# Patient Record
Sex: MALE | Race: WHITE | Employment: OTHER | URBAN - METROPOLITAN AREA
[De-identification: names, ages, dates, MRNs, and addresses within clinical notes are randomized per-mention and may not be internally consistent; named-entity substitution may affect disease eponyms.]

---

## 2017-01-06 ENCOUNTER — OFFICE VISIT (OUTPATIENT)
Dept: FAMILY MEDICINE CLINIC | Age: 67
End: 2017-01-06

## 2017-01-06 VITALS
HEIGHT: 67 IN | TEMPERATURE: 97 F | WEIGHT: 199.5 LBS | SYSTOLIC BLOOD PRESSURE: 122 MMHG | DIASTOLIC BLOOD PRESSURE: 72 MMHG | HEART RATE: 72 BPM | BODY MASS INDEX: 31.31 KG/M2 | RESPIRATION RATE: 20 BRPM

## 2017-01-06 DIAGNOSIS — M47.816 ARTHRITIS OF LUMBAR SPINE: ICD-10-CM

## 2017-01-06 DIAGNOSIS — N18.30 CKD (CHRONIC KIDNEY DISEASE) STAGE 3, GFR 30-59 ML/MIN (HCC): ICD-10-CM

## 2017-01-06 DIAGNOSIS — E78.1 HYPERTRIGLYCERIDEMIA: ICD-10-CM

## 2017-01-06 DIAGNOSIS — M10.9 GOUT, UNSPECIFIED CAUSE, UNSPECIFIED CHRONICITY, UNSPECIFIED SITE: ICD-10-CM

## 2017-01-06 DIAGNOSIS — E78.2 MIXED HYPERLIPIDEMIA: ICD-10-CM

## 2017-01-06 DIAGNOSIS — M54.50 ACUTE BILATERAL LOW BACK PAIN WITHOUT SCIATICA: ICD-10-CM

## 2017-01-06 DIAGNOSIS — I10 ESSENTIAL HYPERTENSION: ICD-10-CM

## 2017-01-06 DIAGNOSIS — Z51.81 MEDICATION MONITORING ENCOUNTER: ICD-10-CM

## 2017-01-06 RX ORDER — ALLOPURINOL 300 MG/1
300 TABLET ORAL DAILY
Qty: 90 TAB | Refills: 1 | Status: SHIPPED | OUTPATIENT
Start: 2017-01-06 | End: 2017-09-08 | Stop reason: SDUPTHER

## 2017-01-06 RX ORDER — LISINOPRIL 20 MG/1
20 TABLET ORAL 2 TIMES DAILY
Qty: 180 TAB | Refills: 1 | Status: SHIPPED | OUTPATIENT
Start: 2017-01-06 | End: 2018-01-19 | Stop reason: SDUPTHER

## 2017-01-06 RX ORDER — DILTIAZEM HYDROCHLORIDE 360 MG/1
360 CAPSULE, EXTENDED RELEASE ORAL DAILY
Qty: 90 CAP | Refills: 1 | Status: SHIPPED | OUTPATIENT
Start: 2017-01-06 | End: 2018-01-19 | Stop reason: SDUPTHER

## 2017-01-06 RX ORDER — HYDROCODONE BITARTRATE AND ACETAMINOPHEN 5; 325 MG/1; MG/1
1 TABLET ORAL
Qty: 30 TAB | Refills: 0 | Status: SHIPPED | OUTPATIENT
Start: 2017-01-06 | End: 2017-09-08 | Stop reason: ALTCHOICE

## 2017-01-06 RX ORDER — GLYBURIDE 2.5 MG/1
2.5 TABLET ORAL 2 TIMES DAILY WITH MEALS
Qty: 180 TAB | Refills: 1 | Status: SHIPPED | OUTPATIENT
Start: 2017-01-06 | End: 2018-01-19 | Stop reason: SDUPTHER

## 2017-01-06 NOTE — PROGRESS NOTES
HISTORY OF PRESENT ILLNESS  Geoff Byrd is a 74 y.o. male. Chief Complaint   Patient presents with    Hypertension Chronic problem, uncontrolled Asymptomatic, no headache or dizziness. Reports compliance with meds      Gout    Diabetes chronic problem, uncontrolled to 8.0 had improved last ck, continues compliance with med and watching diet. Reports compliance with meds     Results     discuss lab results   complains of pain in low back noted over the past couple of weeks was severe now down to 2/10 now, has been taking aspirin 2/day which has helped, pain with bending over, no radiation, weakness, n/t.  ckd chronic problem, stable has been drinking more water  HPI  Past Medical History   Diagnosis Date    Calculus of kidney     Cancer ([facility] [address]      HX of BCC    Diabetes mellitus ([facility] [address] 8/19/2010    Gout 8/19/2010    HTN (hypertension) 8/19/2010    Hyperlipemia 8/19/2010    Kidney disease     Lumbar spondylosis     Proteinuria     Urolithiasis      Current Outpatient Prescriptions   Medication Sig Dispense Refill    diltiazem (TIAZAC) 360 mg SR capsule Take 1 Cap by mouth daily. 90 Cap 1    allopurinol (ZYLOPRIM) 300 mg tablet Take 1 Tab by mouth daily. 90 Tab 1    glyBURIDE (DIABETA) 2.5 mg tablet Take 1 Tab by mouth two (2) times daily (with meals). 180 Tab 1    lisinopril (PRINIVIL, ZESTRIL) 20 mg tablet Take 1 Tab by mouth two (2) times a day. 180 Tab 1    aspirin delayed-release (ADULT LOW DOSE ASPIRIN) 81 mg tablet Take 1 Tab by mouth daily. Indications: MYOCARDIAL INFARCTION PREVENTION 30 Tab prn     Allergies   Allergen Reactions    Metformin Other (comments)     Renal insufficiency         ROS Respiratory: Negative for shortness of breath. Cardiovascular: Negative for chest pain. Genitourinary: Negative for frequency. Neurological: Negative for dizziness and headaches.     Visit Vitals    /80 (BP 1 Location: Right arm, BP Patient Position: Sitting)    Pulse 72    Temp 97 °F (36.1 °C) (Oral)    Resp 20    Ht 5' 7\" (1.702 m)    Wt 199 lb 8 oz (90.5 kg)    BMI 31.25 kg/m2       Physical Exam.Nursing note and vitals reviewed. Constitutional: He is oriented to person, place, and time. He appears well-developed and well-nourished. No distress. HENT:   Mouth/Throat: Oropharynx is clear and moist.   Neck: No JVD present. No thyromegaly present. Cardiovascular: Normal rate, regular rhythm and normal heart sounds. Pulmonary/Chest: Effort normal and breath sounds normal. No respiratory distress. He has no wheezes. He has no rales. Abdominal: Soft. Bowel sounds are normal. He exhibits no distension. There is no tenderness. There is no rebound. Musculoskeletal: He exhibits no edema and no tenderness. Lymphadenopathy:     He has no cervical adenopathy. Neurological: He is alert and oriented to person, place, and time. Skin: No pallor. Psychiatric: He has a normal mood and affect. His behavior is normal.     Results for orders placed or performed during the hospital encounter of 12/23/16   LIPID PANEL   Result Value Ref Range    LIPID PROFILE          Cholesterol, total 183 <200 MG/DL    Triglyceride 225 (H) <150 MG/DL    HDL Cholesterol 30 (L) 40 - 60 MG/DL    LDL, calculated 108 (H) 0 - 100 MG/DL    VLDL, calculated 45 MG/DL    CHOL/HDL Ratio 6.1 (H) 0 - 5.0     METABOLIC PANEL, COMPREHENSIVE   Result Value Ref Range    Sodium 143 136 - 145 mmol/L    Potassium 4.3 3.5 - 5.5 mmol/L    Chloride 111 (H) 100 - 108 mmol/L    CO2 21 21 - 32 mmol/L    Anion gap 11 3.0 - 18 mmol/L    Glucose 112 (H) 74 - 99 mg/dL    BUN 19 (H) 7.0 - 18 MG/DL    Creatinine 1.42 (H) 0.6 - 1.3 MG/DL    BUN/Creatinine ratio 13 12 - 20      GFR est AA >60 >60 ml/min/1.73m2    GFR est non-AA 50 (L) >60 ml/min/1.73m2    Calcium 9.1 8.5 - 10.1 MG/DL    Bilirubin, total 0.6 0.2 - 1.0 MG/DL    ALT 18 16 - 61 U/L    AST 13 (L) 15 - 37 U/L    Alk.  phosphatase 98 45 - 117 U/L    Protein, total 6.8 6.4 - 8.2 g/dL    Albumin 3.5 3.4 - 5.0 g/dL    Globulin 3.3 2.0 - 4.0 g/dL    A-G Ratio 1.1 0.8 - 1.7     TSH AND FREE T4   Result Value Ref Range    TSH 0.33 (L) 0.36 - 3.74 uIU/mL    T4, Free 0.9 0.7 - 1.5 NG/DL   MICROALBUMIN, UR, RAND   Result Value Ref Range    Microalbumin,urine random 253.00 (H) 0 - 3.0 MG/DL    Creatinine, urine 141.00 (H) 30 - 125 mg/dL    Microalbumin/Creat ratio (mg/g creat) 1794 (H) 0 - 30 mg/g   HEMOGLOBIN A1C W/O EAGSTIMATION   Result Value Ref Range    Hemoglobin A1c 7.0 (H) 4.2 - 5.6 %       ASSESSMENT and PLAN    ICD-10-CM ICD-9-CM    1. Uncontrolled diabetes mellitus type 2 without complications, unspecified long term insulin use status (HCC) E11.65 250.02 glyBURIDE (DIABETA) 2.5 mg tablet      METABOLIC PANEL, COMPREHENSIVE      HEMOGLOBIN A1C W/O EAG   2. Hypertriglyceridemia E78.1 272.1 LIPID PANEL      METABOLIC PANEL, COMPREHENSIVE   3. Mixed hyperlipidemia E78.2 272.2 LIPID PANEL      METABOLIC PANEL, COMPREHENSIVE   4. Acute bilateral low back pain without sciatica M54.5 724.2 HYDROcodone-acetaminophen (NORCO) 5-325 mg per tablet     338.19    5. Arthritis of lumbar spine M47.9 721.3 HYDROcodone-acetaminophen (NORCO) 5-325 mg per tablet   6. Essential hypertension I10 401.9 dilTIAZem (TIAZAC) 360 mg SR capsule      lisinopril (PRINIVIL, ZESTRIL) 20 mg tablet      METABOLIC PANEL, COMPREHENSIVE   7. CKD (chronic kidney disease) stage 3, GFR 30-59 ml/min O29.1 877.2 METABOLIC PANEL, COMPREHENSIVE   8. Gout, unspecified cause, unspecified chronicity, unspecified site M10.9 274.9 allopurinol (ZYLOPRIM) 300 mg tablet   9. Medication monitoring encounter Z51.81 V58.83 LIPID PANEL      METABOLIC PANEL, COMPREHENSIVE      HEMOGLOBIN A1C W/O EAG   Follow-up Disposition:  Return in about 6 months (around 7/6/2017).  per pt request

## 2017-01-06 NOTE — PROGRESS NOTES
Chief Complaint   Patient presents with    Hypertension    Gout    Diabetes    Results     discuss lab results       Health Maintenance reviewed     1. Have you been to the ER, urgent care clinic since your last visit? Hospitalized since your last visit? No    2. Have you seen or consulted any other health care providers outside of the [address] [place] [address] since your last visit? Include any pap smears or colon screening.  No

## 2017-01-27 ENCOUNTER — HOSPITAL ENCOUNTER (OUTPATIENT)
Dept: LAB | Age: 67
Discharge: HOME | End: 2017-01-27
Payer: COMMERCIAL

## 2017-01-27 DIAGNOSIS — Z01.818 PRE-OP EVALUATION: ICD-10-CM

## 2017-01-27 LAB
ANION GAP BLD CALC-SCNC: 8 MMOL/L (ref 3–18)
ATRIAL RATE: 74 BPM
BUN SERPL-MCNC: 23 MG/DL (ref 7–18)
BUN/CREAT SERPL: 14 (ref 12–20)
CALCIUM SERPL-MCNC: 9.4 MG/DL (ref 8.5–10.1)
CALCULATED P AXIS, ECG09: 34 DEGREES
CALCULATED R AXIS, ECG10: 17 DEGREES
CALCULATED T AXIS, ECG11: 55 DEGREES
CHLORIDE SERPL-SCNC: 109 MMOL/L (ref 100–108)
CO2 SERPL-SCNC: 26 MMOL/L (ref 21–32)
CREAT SERPL-MCNC: 1.64 MG/DL (ref 0.6–1.3)
DIAGNOSIS, 93000: NORMAL
GLUCOSE SERPL-MCNC: 141 MG/DL (ref 74–99)
P-R INTERVAL, ECG05: 162 MS
POTASSIUM SERPL-SCNC: 4.7 MMOL/L (ref 3.5–5.5)
Q-T INTERVAL, ECG07: 364 MS
QRS DURATION, ECG06: 80 MS
QTC CALCULATION (BEZET), ECG08: 404 MS
SODIUM SERPL-SCNC: 143 MMOL/L (ref 136–145)
VENTRICULAR RATE, ECG03: 74 BPM

## 2017-01-27 PROCEDURE — 93005 ELECTROCARDIOGRAM TRACING: CPT

## 2017-02-01 ENCOUNTER — HOSPITAL ENCOUNTER (OUTPATIENT)
Dept: GENERAL RADIOLOGY | Age: 67
Discharge: HOME | End: 2017-02-01
Payer: COMMERCIAL

## 2017-02-01 ENCOUNTER — OFFICE VISIT (OUTPATIENT)
Dept: FAMILY MEDICINE CLINIC | Age: 67
End: 2017-02-01

## 2017-02-01 VITALS
WEIGHT: 194 LBS | HEART RATE: 89 BPM | DIASTOLIC BLOOD PRESSURE: 67 MMHG | BODY MASS INDEX: 30.45 KG/M2 | HEIGHT: 67 IN | SYSTOLIC BLOOD PRESSURE: 120 MMHG | TEMPERATURE: 96.5 F

## 2017-02-01 DIAGNOSIS — M54.42 ACUTE LEFT-SIDED LOW BACK PAIN WITH LEFT-SIDED SCIATICA: Primary | ICD-10-CM

## 2017-02-01 DIAGNOSIS — M62.830 MUSCLE SPASM OF BACK: ICD-10-CM

## 2017-02-01 DIAGNOSIS — M54.42 ACUTE LEFT-SIDED LOW BACK PAIN WITH LEFT-SIDED SCIATICA: ICD-10-CM

## 2017-02-01 PROCEDURE — 72114 X-RAY EXAM L-S SPINE BENDING: CPT

## 2017-02-01 RX ORDER — CYCLOBENZAPRINE HCL 10 MG
10 TABLET ORAL
Qty: 30 TAB | Refills: 0 | Status: SHIPPED | OUTPATIENT
Start: 2017-02-01 | End: 2017-09-08 | Stop reason: ALTCHOICE

## 2017-02-01 RX ORDER — PREDNISONE 10 MG/1
TABLET ORAL
Qty: 20 TAB | Refills: 0 | Status: SHIPPED | OUTPATIENT
Start: 2017-02-01 | End: 2017-02-08 | Stop reason: ALTCHOICE

## 2017-02-01 NOTE — PROGRESS NOTES
HISTORY OF PRESENT ILLNESS  Annemarie Pruett is a 74 y.o. male. .  Pain now ongoing for 2 months, had improved with muscle relaxants and pain med but worse now  complains of severe flare of pain in the low back on the left side, worse 2 weeks ago after bending over and putting on his pants, not able to sleep bc of pain pain level 8/10. Complains that due to pain his balance is off  Pain radiating down both legs worse on the left, no bowel or bladder incontinence    Feb 14 will have BCC neck removed  HPI  Past Medical History   Diagnosis Date    Calculus of kidney     Cancer ([facility] [address]      HX of BCC    Diabetes mellitus ([facility] [address] 8/19/2010    Gout 8/19/2010    HTN (hypertension) 8/19/2010    Hyperlipemia 8/19/2010    Kidney disease     Lumbar spondylosis     Proteinuria     Urolithiasis      Current Outpatient Prescriptions   Medication Sig Dispense Refill    dilTIAZem (TIAZAC) 360 mg SR capsule Take 1 Cap by mouth daily. 90 Cap 1    allopurinol (ZYLOPRIM) 300 mg tablet Take 1 Tab by mouth daily. 90 Tab 1    glyBURIDE (DIABETA) 2.5 mg tablet Take 1 Tab by mouth two (2) times daily (with meals). 180 Tab 1    lisinopril (PRINIVIL, ZESTRIL) 20 mg tablet Take 1 Tab by mouth two (2) times a day. 180 Tab 1    HYDROcodone-acetaminophen (NORCO) 5-325 mg per tablet Take 1 Tab by mouth daily as needed for Pain. 30 Tab 0    aspirin delayed-release (ADULT LOW DOSE ASPIRIN) 81 mg tablet Take 1 Tab by mouth daily. Indications: MYOCARDIAL INFARCTION PREVENTION 30 Tab prn     Allergies   Allergen Reactions    Metformin Other (comments)     Renal insufficiency         Review of Systems   Musculoskeletal: Positive for back pain and joint pain. Negative for falls. Neurological: Positive for sensory change. Negative for dizziness and focal weakness.      Visit Vitals    /67 (BP 1 Location: Right arm, BP Patient Position: Sitting)    Pulse 89    Temp 96.5 °F (35.8 °C) (Oral)    Ht 5' 7\" (1.702 m)    Wt 194 lb (88 kg)  BMI 30.38 kg/m2       Physical Exam   Constitutional: He appears well-developed and well-nourished. He appears distressed. Cardiovascular: Normal rate, regular rhythm and normal heart sounds. Pulmonary/Chest: Effort normal and breath sounds normal. No respiratory distress. He has no wheezes. He has no rales. Musculoskeletal: He exhibits no edema. Neurological: No cranial nerve deficit or sensory deficit. He exhibits normal muscle tone. Gait abnormal. Coordination normal.   Reflex Scores:       Patellar reflexes are 1+ on the right side and 1+ on the left side. Psychiatric: He has a normal mood and affect. His behavior is normal.   Nursing note and vitals reviewed. ASSESSMENT and PLAN    ICD-10-CM ICD-9-CM    1. Acute left-sided low back pain with left-sided sciatica M54.42 724.2 XR SPINE LUMB COMP W BEND     724.3 predniSONE (DELTASONE) 10 mg tablet   2. Muscle spasm of back M62.830 724.8 cyclobenzaprine (FLEXERIL) 10 mg tablet   prescribed prednisone 40 mg taper  Follow-up Disposition:  Return in about 1 week (around 2/8/2017).

## 2017-02-01 NOTE — MR AVS SNAPSHOT
Visit Information Date & Time Provider Department Dept. Phone Encounter #  
 2/1/2017 11:15 AM Ofelia Velazquez MD [address] [phone] [id number] Follow-up Instructions Return in about 1 week (around 2/8/2017). Your Appointments 5/5/2017  7:30 AM  
Follow Up with Ofelia Velazquez MD  
[address] (--) Appt Note: Follow Up  
 [address] [id number] [address] [address] [phone]  
  
   
 [address] [id number] [address] [address] Upcoming Health Maintenance Date Due Hepatitis C Screening 1950 FOBT Q 1 YEAR AGE 50-75 3/21/1998 Pneumococcal 65+ High/Highest Risk (1 of 2 - PCV13) 3/21/2013 MEDICARE YEARLY EXAM 3/21/2013 EYE EXAM RETINAL OR DILATED Q1 1/30/2015 FOOT EXAM Q1 6/17/2017 HEMOGLOBIN A1C Q6M 6/23/2017 MICROALBUMIN Q1 12/23/2017 LIPID PANEL Q1 12/23/2017 GLAUCOMA SCREENING Q2Y 6/17/2018 DTaP/Tdap/Td series (2 - Td) 6/17/2026 Allergies as of 2/1/2017  Review Complete On: 2/1/2017 By: Ofelia Velazquez MD  
  
 Severity Noted Reaction Type Reactions Metformin  11/01/2013   Side Effect Other (comments) Renal insufficiency Current Immunizations  Never Reviewed Name Date Influenza Vaccine 12/7/2015 Not reviewed this visit You Were Diagnosed With   
  
 Codes Comments Acute left-sided low back pain with left-sided sciatica    -  Primary ICD-10-CM: M54.42 
ICD-9-CM: 724.2, 724.3 Muscle spasm of back     ICD-10-CM: Y36.967 ICD-9-CM: 724.8 Vitals Smoking Status Never Smoker Preferred Pharmacy Pharmacy Name Phone [facility] [address] [facility], [address] [facility] [phone] Your Updated Medication List  
  
   
This list is accurate as of: 2/1/17 12:42 PM.  Always use your most recent med list.  
  
  
  
  
 ADULT LOW DOSE ASPIRIN 81 mg tablet Generic drug:  aspirin delayed-release Take 1 Tab by mouth daily. Indications: MYOCARDIAL INFARCTION PREVENTION  
  
 allopurinol 300 mg tablet Commonly known as:  Deepa Gosling Take 1 Tab by mouth daily. cyclobenzaprine 10 mg tablet Commonly known as:  FLEXERIL Take 1 Tab by mouth three (3) times daily as needed for Muscle Spasm(s). dilTIAZem 360 mg SR capsule Commonly known as:  [facility] Take 1 Cap by mouth daily. glyBURIDE 2.5 mg tablet Commonly known as:  Diego Jaki Take 1 Tab by mouth two (2) times daily (with meals). HYDROcodone-acetaminophen 5-325 mg per tablet Commonly known as:  Dionna Amaya Take 1 Tab by mouth daily as needed for Pain. lisinopril 20 mg tablet Commonly known as:  Delicia Pinch Take 1 Tab by mouth two (2) times a day. predniSONE 10 mg tablet Commonly known as:  DELTASONE  
T.tessy 4 piis x 2 days, 3 x 2 days, 2 x 2 days, 1 x 2 days then stop. Take with food Prescriptions Sent to Pharmacy Refills  
 predniSONE (DELTASONE) 10 mg tablet 0 Sig: T.tessy 4 piis x 2 days, 3 x 2 days, 2 x 2 days, 1 x 2 days then stop. Take with food Class: Normal  
 Pharmacy: [address] Lawanda Shafer Ph #: [phone]  
 cyclobenzaprine (FLEXERIL) 10 mg tablet 0 Sig: Take 1 Tab by mouth three (3) times daily as needed for Muscle Spasm(s). Class: Normal  
 Pharmacy: [id number] [address] [place] [address], [facility] Ph #: [phone] Route: Oral  
  
Follow-up Instructions Return in about 1 week (around 2/8/2017). To-Do List   
 02/01/2017 Imaging:  XR SPINE LUMB COMP W BEND Patient Instructions Sciatica: Care Instructions Your Care Instructions Sciatica (say \"bbi-PF-yb-kuh\") is an irritation of one of the sciatic nerves, which come from the spinal cord in the lower back.  The sciatic nerves and their branches extend down through the buttock to the foot. Sciatica can develop when an injured disc in the back presses against a spinal nerve root. Its main symptom is pain, numbness, or weakness that is often worse in the leg or foot than in the back. Sciatica often will improve and go away with time. Early treatment usually includes medicines and exercises to relieve pain. Follow-up care is a key part of your treatment and safety. Be sure to make and go to all appointments, and call your doctor if you are having problems. It's also a good idea to know your test results and keep a list of the medicines you take. How can you care for yourself at home? · Take pain medicines exactly as directed. ¨ If the doctor gave you a prescription medicine for pain, take it as prescribed. ¨ If you are not taking a prescription pain medicine, ask your doctor if you can take an over-the-counter medicine. · Use heat or ice to relieve pain. ¨ To apply heat, put a warm water bottle, heating pad set on low, or warm cloth on your back. Do not go to sleep with a heating pad on your skin. ¨ To use ice, put ice or a cold pack on the area for 10 to 20 minutes at a time. Put a thin cloth between the ice and your skin. · Avoid sitting if possible, unless it feels better than standing. · Alternate lying down with short walks. Increase your walking distance as you are able to without making your symptoms worse. · Do not do anything that makes your symptoms worse. When should you call for help? Call 911 anytime you think you may need emergency care. For example, call if: 
· You are unable to move a leg at all. Call your doctor now or seek immediate medical care if: 
· You have new or worse symptoms in your legs or buttocks. Symptoms may include: ¨ Numbness or tingling. ¨ Weakness. ¨ Pain. · You lose bladder or bowel control. Watch closely for changes in your health, and be sure to contact your doctor if: · You are not getting better as expected. Where can you learn more? Go to http://kari-jesus.info/. Enter [phone] in the search box to learn more about \"Sciatica: Care Instructions. \" Current as of: May 23, 2016 Content Version: 11.1 © 8961-2864 [employer]. Care instructions adapted under license by [employer] (which disclaims liability or warranty for this information). If you have questions about a medical condition or this instruction, always ask your healthcare professional. [address] any warranty or liability for your use of this information. Introducing [facility]! Dear Deanna Morales: Thank you for requesting a [employer] account. Our records indicate that you already have an active Sybari account. You can access your account anytime at [email or web address]. [email or web address] Did you know that you can access your hospital and ER discharge instructions at any time in Sybari? You can also review all of your test results from your hospital stay or ER visit. Additional Information If you have questions, please visit the Frequently Asked Questions section of the Syb[employer] website at [email or web address] Remember, Sybari is NOT to be used for urgent needs. For medical emergencies, dial 911. Now available from your iPhone and Android! Please provide this summary of care documentation to your next provider. Your primary care clinician is listed as LARISA HURST. If you have any questions after today's visit, please call [phone].

## 2017-02-01 NOTE — PATIENT INSTRUCTIONS
Sciatica: Care Instructions  Your Care Instructions    Sciatica (say \"vpl-RG-hr-kuh\") is an irritation of one of the sciatic nerves, which come from the spinal cord in the lower back. The sciatic nerves and their branches extend down through the buttock to the foot. Sciatica can develop when an injured disc in the back presses against a spinal nerve root. Its main symptom is pain, numbness, or weakness that is often worse in the leg or foot than in the back. Sciatica often will improve and go away with time. Early treatment usually includes medicines and exercises to relieve pain. Follow-up care is a key part of your treatment and safety. Be sure to make and go to all appointments, and call your doctor if you are having problems. It's also a good idea to know your test results and keep a list of the medicines you take. How can you care for yourself at home? · Take pain medicines exactly as directed. ¨ If the doctor gave you a prescription medicine for pain, take it as prescribed. ¨ If you are not taking a prescription pain medicine, ask your doctor if you can take an over-the-counter medicine. · Use heat or ice to relieve pain. ¨ To apply heat, put a warm water bottle, heating pad set on low, or warm cloth on your back. Do not go to sleep with a heating pad on your skin. ¨ To use ice, put ice or a cold pack on the area for 10 to 20 minutes at a time. Put a thin cloth between the ice and your skin. · Avoid sitting if possible, unless it feels better than standing. · Alternate lying down with short walks. Increase your walking distance as you are able to without making your symptoms worse. · Do not do anything that makes your symptoms worse. When should you call for help? Call 911 anytime you think you may need emergency care. For example, call if:  · You are unable to move a leg at all.   Call your doctor now or seek immediate medical care if:  · You have new or worse symptoms in your legs or buttocks. Symptoms may include:  ¨ Numbness or tingling. ¨ Weakness. ¨ Pain. · You lose bladder or bowel control. Watch closely for changes in your health, and be sure to contact your doctor if:  · You are not getting better as expected. Where can you learn more? Go to http://kari-jesus.info/. Enter [phone] in the search box to learn more about \"Sciatica: Care Instructions. \"  Current as of: May 23, 2016  Content Version: 11.1  © 2060-3980 [employer]. Care instructions adapted under license by [employer] (which disclaims liability or warranty for this information). If you have questions about a medical condition or this instruction, always ask your healthcare professional. [address] any warranty or liability for your use of this information.

## 2017-02-08 ENCOUNTER — OFFICE VISIT (OUTPATIENT)
Dept: FAMILY MEDICINE CLINIC | Age: 67
End: 2017-02-08

## 2017-02-08 VITALS
HEART RATE: 70 BPM | RESPIRATION RATE: 16 BRPM | HEIGHT: 67 IN | SYSTOLIC BLOOD PRESSURE: 108 MMHG | WEIGHT: 192.5 LBS | BODY MASS INDEX: 30.21 KG/M2 | DIASTOLIC BLOOD PRESSURE: 64 MMHG | TEMPERATURE: 97.1 F

## 2017-02-08 DIAGNOSIS — M54.42 ACUTE LEFT-SIDED LOW BACK PAIN WITH LEFT-SIDED SCIATICA: ICD-10-CM

## 2017-02-08 DIAGNOSIS — M51.36 DDD (DEGENERATIVE DISC DISEASE), LUMBAR: Primary | ICD-10-CM

## 2017-02-08 NOTE — PATIENT INSTRUCTIONS
Please contact our office if you have any questions about your visit today. Back Care and Preventing Injuries: Care Instructions  Your Care Instructions  You can hurt your back doing many everyday activities: lifting a heavy box, bending down to garden, exercising at the gym, and even getting out of bed. But you can keep your back strong and healthy by doing some exercises. You also can follow a few tips for sitting, sleeping, and lifting to avoid hurting your back again. Talk to your doctor before you start an exercise program. Ask for help if you want to learn more about keeping your back healthy. Follow-up care is a key part of your treatment and safety. Be sure to make and go to all appointments, and call your doctor if you are having problems. It's also a good idea to know your test results and keep a list of the medicines you take. How can you care for yourself at home? · Stay at a healthy weight to avoid strain on your lower back. · Do not smoke. Smoking increases the risk of osteoporosis, which weakens the spine. If you need help quitting, talk to your doctor about stop-smoking programs and medicines. These can increase your chances of quitting for good. · Make sure you sleep in a position that maintains your back's normal curves and on a mattress that feels comfortable. Sleep on your side with a pillow between your knees, or sleep on your back with a pillow under your knees. These positions can reduce strain on your back. · When you get out of bed, lie on your side and bend both knees. Drop your feet over the edge of the bed as you push up with both arms. Scoot to the edge of the bed. Make sure your feet are in line with your rear end (buttocks), and then stand up. · If you must stand for a long time, put one foot on a stool, ledge, or box. Exercise to strengthen your back and other muscles  · Get at least 30 minutes of exercise on most days of the week. Walking is a good choice.  You also may want to do other activities, such as running, swimming, cycling, or playing tennis or team sports. · Stretch your back muscles. Here are few exercises to try:  Jillian Sonya on your back with your knees bent and your feet flat on the floor. Gently pull one bent knee to your chest. Put that foot back on the floor, and then pull the other knee to your chest. Hold for 15 to 30 seconds. Repeat 2 to 4 times. ¨ Do pelvic tilts. Lie on your back with your knees bent. Tighten your stomach muscles. Pull your belly button (navel) in and up toward your ribs. You should feel like your back is pressing to the floor and your hips and pelvis are slightly lifting off the floor. Hold for 6 seconds while breathing smoothly. · Keep your core muscles strong. The muscles of your back, belly (abdomen), and buttocks support your spine. ¨ Pull in your belly, and imagine pulling your navel toward your spine. Hold this for 6 seconds, then relax. Remember to keep breathing normally as you tense your muscles. ¨ Do curl-ups. Always do them with your knees bent. Keep your low back on the floor, and curl your shoulders toward your knees using a smooth, slow motion. Keep your arms folded across your chest. If this bothers your neck, try putting your hands behind your neck (not your head), with your elbows spread apart. ¨ Lie on your back with your knees bent and your feet flat on the floor. Tighten your belly muscles, and then push with your feet and raise your buttocks up a few inches. Hold this position 6 seconds as you continue to breathe normally, then lower yourself slowly to the floor. Repeat 8 to 12 times. ¨ If you like group exercise, try Pilates or yoga. These classes have poses that strengthen the core muscles. Protect your back when you sit  · Place a small pillow, a rolled-up towel, or a lumbar roll in the curve of your back if you need extra support.   · Sit in a chair that is low enough to let you place both feet flat on the floor with both knees nearly level with your hips. If your chair or desk is too high, use a foot rest to raise your knees. · When driving, keep your knees nearly level with your hips. Sit straight, and drive with both hands on the steering wheel. Your arms should be in a slightly bent position. · Try a kneeling chair, which helps tilt your hips forward. This takes pressure off your lower back. · Try sitting on an exercise ball. It can rock from side to side, which helps keep your back loose. Lift properly  · Squat down, bending at the hips and knees only. If you need to, put one knee to the floor and extend your other knee in front of you, bent at a right angle (half kneeling). · Press your chest straight forward. This helps keep your upper back straight while keeping a slight arch in your low back. · Hold the load as close to your body as possible, at the level of your navel. · Use your feet to change direction, taking small steps. · Lead with your hips as you change direction. Keep your shoulders in line with your hips as you move. Do not twist your body. · Set down your load carefully, squatting with your knees and hips only. When should you call for help? Watch closely for changes in your health, and be sure to contact your doctor if:  · You want more exercises to make your back and other core muscles stronger. Where can you learn more? Go to http://kari-jesus.info/. Enter S810 in the search box to learn more about \"Back Care and Preventing Injuries: Care Instructions. \"  Current as of: May 23, 2016  Content Version: 11.1  © 9382-8256 [employer]. Care instructions adapted under license by [employer] (which disclaims liability or warranty for this information).  If you have questions about a medical condition or this instruction, always ask your healthcare professional. [address] any warranty or liability for your use of this information. Back Stretches: Exercises  Your Care Instructions  Here are some examples of exercises for stretching your back. Start each exercise slowly. Ease off the exercise if you start to have pain. Your doctor or physical therapist will tell you when you can start these exercises and which ones will work best for you. How to do the exercises  Overhead stretch    1. Stand comfortably with your feet shoulder-width apart. 2. Looking straight ahead, raise both arms over your head and reach toward the ceiling. Do not allow your head to tilt back. 3. Hold for 15 to 30 seconds, then lower your arms to your sides. 4. Repeat 2 to 4 times. Side stretch    1. Stand comfortably with your feet shoulder-width apart. 2. Raise one arm over your head, and then lean to the other side. 3. Slide your hand down your leg as you let the weight of your arm gently stretch your side muscles. Hold for 15 to 30 seconds. 4. Repeat 2 to 4 times on each side. Press-up    1. Lie on your stomach, supporting your body with your forearms. 2. Press your elbows down into the floor to raise your upper back. As you do this, relax your stomach muscles and allow your back to arch without using your back muscles. As your press up, do not let your hips or pelvis come off the floor. 3. Hold for 15 to 30 seconds, then relax. 4. Repeat 2 to 4 times. Relax and rest    1. Lie on your back with a rolled towel under your neck and a pillow under your knees. Extend your arms comfortably to your sides. 2. Relax and breathe normally. 3. Remain in this position for about 10 minutes. 4. If you can, do this 2 or 3 times each day. Follow-up care is a key part of your treatment and safety. Be sure to make and go to all appointments, and call your doctor if you are having problems. It's also a good idea to know your test results and keep a list of the medicines you take. Where can you learn more?   Go to http://kari-jesus.info/. Enter Q718 in the search box to learn more about \"Back Stretches: Exercises. \"  Current as of: May 23, 2016  Content Version: 11.1  © 9941-3988 [employer]. Care instructions adapted under license by [employer] (which disclaims liability or warranty for this information). If you have questions about a medical condition or this instruction, always ask your healthcare professional. [place] [address] any warranty or liability for your use of this information.

## 2017-02-08 NOTE — PROGRESS NOTES
Chief Complaint   Patient presents with    Follow-up     1 week f/u on back pain. Patient states back feels 'a whole lot better'.  Results     discuss xray results       1. Have you been to the ER, urgent care clinic since your last visit? Hospitalized since your last visit? No    2. Have you seen or consulted any other health care providers outside of the [address] [place] [address] since your last visit? Include any pap smears or colon screening.  No

## 2017-02-08 NOTE — MR AVS SNAPSHOT
Visit Information Date & Time Provider Department Dept. Phone Encounter #  
 2/8/2017 11:15 AM Abdifatah Allen MD [address] [phone] [id number] Your Appointments 5/5/2017  7:30 AM  
Follow Up with Abdifatah Allen MD  
[address] (--) Appt Note: Follow Up  
 [place] [address] [address] [address] [phone]  
  
   
 Lisa [address] [address] [address] [address] Upcoming Health Maintenance Date Due Hepatitis C Screening 1950 FOBT Q 1 YEAR AGE 50-75 3/21/1998 Pneumococcal 65+ High/Highest Risk (1 of 2 - PCV13) 3/21/2013 MEDICARE YEARLY EXAM 3/21/2013 EYE EXAM RETINAL OR DILATED Q1 1/30/2015 FOOT EXAM Q1 6/17/2017 HEMOGLOBIN A1C Q6M 6/23/2017 MICROALBUMIN Q1 12/23/2017 LIPID PANEL Q1 12/23/2017 GLAUCOMA SCREENING Q2Y 6/17/2018 DTaP/Tdap/Td series (2 - Td) 6/17/2026 Allergies as of 2/8/2017  Review Complete On: 2/8/2017 By: Abdifatah Allen MD  
  
 Severity Noted Reaction Type Reactions Metformin  11/01/2013   Side Effect Other (comments) Renal insufficiency Current Immunizations  Never Reviewed Name Date Influenza Vaccine 12/7/2015 Not reviewed this visit You Were Diagnosed With   
  
 Codes Comments DDD (degenerative disc disease), lumbar    -  Primary ICD-10-CM: M51.36 
ICD-9-CM: 722.52 Acute left-sided low back pain with left-sided sciatica     ICD-10-CM: M54.42 
ICD-9-CM: 724.2, 724.3 Vitals BP Pulse Temp Resp Height(growth percentile) Weight(growth percentile) 108/64 (BP 1 Location: Right arm, BP Patient Position: Sitting) 70 97.1 °F (36.2 °C) (Oral) 16 5' 7\" (1.702 m) 192 lb 8 oz (87.3 kg) BMI Smoking Status 30.15 kg/m2 Never Smoker BMI and BSA Data Body Mass Index Body Surface Area  
 30.15 kg/m 2 2.03 m 2 Preferred Pharmacy Pharmacy Name Phone [facility] [address] [facility], [facility] [phone] Your Updated Medication List  
  
   
This list is accurate as of: 2/8/17 12:19 PM.  Always use your most recent med list.  
  
  
  
  
 ADULT LOW DOSE ASPIRIN 81 mg tablet Generic drug:  aspirin delayed-release Take 1 Tab by mouth daily. Indications: MYOCARDIAL INFARCTION PREVENTION  
  
 allopurinol 300 mg tablet Commonly known as:  Acquanetta Bunk Take 1 Tab by mouth daily. cyclobenzaprine 10 mg tablet Commonly known as:  FLEXERIL Take 1 Tab by mouth three (3) times daily as needed for Muscle Spasm(s). dilTIAZem 360 mg SR capsule Commonly known as:  [facility] Take 1 Cap by mouth daily. glyBURIDE 2.5 mg tablet Commonly known as:  Arzella Arn Take 1 Tab by mouth two (2) times daily (with meals). HYDROcodone-acetaminophen 5-325 mg per tablet Commonly known as:  Juanetta Rasp Take 1 Tab by mouth daily as needed for Pain. lisinopril 20 mg tablet Commonly known as:  Emily Giorgio Take 1 Tab by mouth two (2) times a day. Patient Instructions Please contact our office if you have any questions about your visit today. Back Care and Preventing Injuries: Care Instructions Your Care Instructions You can hurt your back doing many everyday activities: lifting a heavy box, bending down to garden, exercising at the gym, and even getting out of bed. But you can keep your back strong and healthy by doing some exercises. You also can follow a few tips for sitting, sleeping, and lifting to avoid hurting your back again. Talk to your doctor before you start an exercise program. Ask for help if you want to learn more about keeping your back healthy. Follow-up care is a key part of your treatment and safety. Be sure to make and go to all appointments, and call your doctor if you are having problems.  It's also a good idea to know your test results and keep a list of the medicines you take. How can you care for yourself at home? · Stay at a healthy weight to avoid strain on your lower back. · Do not smoke. Smoking increases the risk of osteoporosis, which weakens the spine. If you need help quitting, talk to your doctor about stop-smoking programs and medicines. These can increase your chances of quitting for good. · Make sure you sleep in a position that maintains your back's normal curves and on a mattress that feels comfortable. Sleep on your side with a pillow between your knees, or sleep on your back with a pillow under your knees. These positions can reduce strain on your back. · When you get out of bed, lie on your side and bend both knees. Drop your feet over the edge of the bed as you push up with both arms. Scoot to the edge of the bed. Make sure your feet are in line with your rear end (buttocks), and then stand up. · If you must stand for a long time, put one foot on a stool, ledge, or box. Exercise to strengthen your back and other muscles · Get at least 30 minutes of exercise on most days of the week. Walking is a good choice. You also may want to do other activities, such as running, swimming, cycling, or playing tennis or team sports. · Stretch your back muscles. Here are few exercises to try: ¨ Lie on your back with your knees bent and your feet flat on the floor. Gently pull one bent knee to your chest. Put that foot back on the floor, and then pull the other knee to your chest. Hold for 15 to 30 seconds. Repeat 2 to 4 times. ¨ Do pelvic tilts. Lie on your back with your knees bent. Tighten your stomach muscles. Pull your belly button (navel) in and up toward your ribs. You should feel like your back is pressing to the floor and your hips and pelvis are slightly lifting off the floor. Hold for 6 seconds while breathing smoothly. · Keep your core muscles strong. The muscles of your back, belly (abdomen), and buttocks support your spine. ¨ Pull in your belly, and imagine pulling your navel toward your spine. Hold this for 6 seconds, then relax. Remember to keep breathing normally as you tense your muscles. ¨ Do curl-ups. Always do them with your knees bent. Keep your low back on the floor, and curl your shoulders toward your knees using a smooth, slow motion. Keep your arms folded across your chest. If this bothers your neck, try putting your hands behind your neck (not your head), with your elbows spread apart. ¨ Lie on your back with your knees bent and your feet flat on the floor. Tighten your belly muscles, and then push with your feet and raise your buttocks up a few inches. Hold this position 6 seconds as you continue to breathe normally, then lower yourself slowly to the floor. Repeat 8 to 12 times. ¨ If you like group exercise, try Pilates or yoga. These classes have poses that strengthen the core muscles. Protect your back when you sit · Place a small pillow, a rolled-up towel, or a lumbar roll in the curve of your back if you need extra support. · Sit in a chair that is low enough to let you place both feet flat on the floor with both knees nearly level with your hips. If your chair or desk is too high, use a foot rest to raise your knees. · When driving, keep your knees nearly level with your hips. Sit straight, and drive with both hands on the steering wheel. Your arms should be in a slightly bent position. · Try a kneeling chair, which helps tilt your hips forward. This takes pressure off your lower back. · Try sitting on an exercise ball. It can rock from side to side, which helps keep your back loose. Lift properly · Squat down, bending at the hips and knees only. If you need to, put one knee to the floor and extend your other knee in front of you, bent at a right angle (half kneeling). · Press your chest straight forward. This helps keep your upper back straight while keeping a slight arch in your low back. · Hold the load as close to your body as possible, at the level of your navel. · Use your feet to change direction, taking small steps. · Lead with your hips as you change direction. Keep your shoulders in line with your hips as you move. Do not twist your body. · Set down your load carefully, squatting with your knees and hips only. When should you call for help? Watch closely for changes in your health, and be sure to contact your doctor if: 
· You want more exercises to make your back and other core muscles stronger. Where can you learn more? Go to http://kari-jesus.info/. Enter S810 in the search box to learn more about \"Back Care and Preventing Injuries: Care Instructions. \" Current as of: May 23, 2016 Content Version: 11.1 © 5875-5948 [employer]. Care instructions adapted under license by [employer] (which disclaims liability or warranty for this information). If you have questions about a medical condition or this instruction, always ask your healthcare professional. [place] [address] any warranty or liability for your use of this information. Back Stretches: Exercises Your Care Instructions Here are some examples of exercises for stretching your back. Start each exercise slowly. Ease off the exercise if you start to have pain. Your doctor or physical therapist will tell you when you can start these exercises and which ones will work best for you. How to do the exercises Overhead stretch 1. Stand comfortably with your feet shoulder-width apart. 2. Looking straight ahead, raise both arms over your head and reach toward the ceiling. Do not allow your head to tilt back. 3. Hold for 15 to 30 seconds, then lower your arms to your sides. 4. Repeat 2 to 4 times. Side stretch 1. Stand comfortably with your feet shoulder-width apart. 2. Raise one arm over your head, and then lean to the other side. 3. Slide your hand down your leg as you let the weight of your arm gently stretch your side muscles. Hold for 15 to 30 seconds. 4. Repeat 2 to 4 times on each side. Press-up 1. Lie on your stomach, supporting your body with your forearms. 2. Press your elbows down into the floor to raise your upper back. As you do this, relax your stomach muscles and allow your back to arch without using your back muscles. As your press up, do not let your hips or pelvis come off the floor. 3. Hold for 15 to 30 seconds, then relax. 4. Repeat 2 to 4 times. Relax and rest 
 
1. Lie on your back with a rolled towel under your neck and a pillow under your knees. Extend your arms comfortably to your sides. 2. Relax and breathe normally. 3. Remain in this position for about 10 minutes. 4. If you can, do this 2 or 3 times each day. Follow-up care is a key part of your treatment and safety. Be sure to make and go to all appointments, and call your doctor if you are having problems. It's also a good idea to know your test results and keep a list of the medicines you take. Where can you learn more? Go to http://kari-jesus.info/. Enter L003 in the search box to learn more about \"Back Stretches: Exercises. \" Current as of: May 23, 2016 Content Version: 11.1 © 6406-2081 [employer]. Care instructions adapted under license by [employer] (which disclaims liability or warranty for this information). If you have questions about a medical condition or this instruction, always ask your healthcare professional. [address] any warranty or liability for your use of this information. Introducing [facility]! Dear Glenn Purchase: Thank you for requesting a Chat& (ChatAnd) account. Our records indicate that you already have an active Chat& (ChatAnd) account. You can access your account anytime at [email or web address] Did you know that you can access your hospital and ER discharge instructions at any time in IsoPlexis? You can also review all of your test results from your hospital stay or ER visit. Additional Information If you have questions, please visit the Frequently Asked Questions section of the IsoPlexis website at [email or web address] Remember, IsoPlexis is NOT to be used for urgent needs. For medical emergencies, dial 911. Now available from your iPhone and Android! Please provide this summary of care documentation to your next provider. Your primary care clinician is listed as LARISA HURST. If you have any questions after today's visit, please call [phone].

## 2017-02-08 NOTE — PROGRESS NOTES
HISTORY OF PRESENT ILLNESS  Sarah Garcia is a 74 y.o. male. Chief Complaint   Patient presents with    Follow-up     1 week f/u on back pain. Patient states back feels 'a whole lot better'.  Results     discuss xray results     i prescribed prednisone 40 mg taper and flexeril, back pain is much improved. Still has a little pain in the buttock but much better. Tolerated meds well. Improvement noted mostly over the past 2 days. HPI  Past Medical History   Diagnosis Date    Calculus of kidney     Cancer ([facility] [address]      HX of BCC    Diabetes mellitus ([facility] [address] 8/19/2010    Gout 8/19/2010    HTN (hypertension) 8/19/2010    Hyperlipemia 8/19/2010    Kidney disease     Lumbar spondylosis     Proteinuria     Urolithiasis      Current Outpatient Prescriptions   Medication Sig Dispense Refill    cyclobenzaprine (FLEXERIL) 10 mg tablet Take 1 Tab by mouth three (3) times daily as needed for Muscle Spasm(s). 30 Tab 0    dilTIAZem (TIAZAC) 360 mg SR capsule Take 1 Cap by mouth daily. 90 Cap 1    allopurinol (ZYLOPRIM) 300 mg tablet Take 1 Tab by mouth daily. 90 Tab 1    glyBURIDE (DIABETA) 2.5 mg tablet Take 1 Tab by mouth two (2) times daily (with meals). 180 Tab 1    lisinopril (PRINIVIL, ZESTRIL) 20 mg tablet Take 1 Tab by mouth two (2) times a day. 180 Tab 1    HYDROcodone-acetaminophen (NORCO) 5-325 mg per tablet Take 1 Tab by mouth daily as needed for Pain. 30 Tab 0    aspirin delayed-release (ADULT LOW DOSE ASPIRIN) 81 mg tablet Take 1 Tab by mouth daily. Indications: MYOCARDIAL INFARCTION PREVENTION 30 Tab prn     Allergies   Allergen Reactions    Metformin Other (comments)     Renal insufficiency         Review of Systems   Musculoskeletal: Positive for back pain and joint pain.      Visit Vitals    /64 (BP 1 Location: Right arm, BP Patient Position: Sitting)    Pulse 70    Temp 97.1 °F (36.2 °C) (Oral)    Resp 16    Ht 5' 7\" (1.702 m)    Wt 192 lb 8 oz (87.3 kg)    BMI 30.15 kg/m2 Physical Exam   Constitutional: He is oriented to person, place, and time. He appears well-developed and well-nourished. No distress. Musculoskeletal: He exhibits no tenderness. Neurological: He is alert and oriented to person, place, and time. Psychiatric: He has a normal mood and affect. His behavior is normal.   Nursing note and vitals reviewed. Lumbar spine 5 views     HISTORY: Back pain.     COMPARISON: October 10, 2014.     FINDINGS: Vertebral body heights are maintained. There is severe degenerative  disc disease throughout the lumbar spine. There is also severe degenerative disc  disease in the lower thoracic spine.     There is anterior subluxation of L4 on L5 by 8 millimeters.     There is advanced facet hypertrophy at L3-4, L4-5 and L5-S1. No pars  interarticularis defects are seen.     There is suspicion for severe neural foraminal narrowing at L2-3, L3-4, L4-5 and  L5-S1. L4-5 level is the worst level.     In flexion, the subluxation was 10 mm. In extension the subluxation was 10 mm.     Oblique view showed do not show any pars interarticularis defects.     Frontal view showed moderate scoliosis.     IMPRESSION  IMPRESSION:  1. Severe degenerative disc disease throughout the lumbar spine. 2. There is severe neural foraminal narrowing at L3-4. Moderate neural foraminal  narrowing suspected at L5-3, L4-5.  4. Anterior subluxation of L4 on L5 demonstrated instability with flexion and  extension. 5. There is no pars interarticularis defects. Severe facet hypertrophy was seen  at L3-4, L4-5 and L5-S1.       ASSESSMENT and PLAN    ICD-10-CM ICD-9-CM    1. DDD (degenerative disc disease), lumbar M51.36 722.52    2. Acute left-sided low back pain with left-sided sciatica M54.42 724.2      724.3    discussed xr in detail, DDD, sciatica, offered PT, handouts give, refused PT for now.   Do not feel needs mri as sx improved at present and likely not surgicaly  follow up if symptoms persist or worsen. Or on regular follow up already scheduled.

## 2017-02-09 ENCOUNTER — TELEPHONE (OUTPATIENT)
Dept: FAMILY MEDICINE CLINIC | Age: 67
End: 2017-02-09

## 2017-02-09 DIAGNOSIS — M54.42 ACUTE LEFT-SIDED LOW BACK PAIN WITH LEFT-SIDED SCIATICA: ICD-10-CM

## 2017-02-09 RX ORDER — PREDNISONE 10 MG/1
TABLET ORAL
Qty: 20 TAB | Refills: 0 | Status: SHIPPED | OUTPATIENT
Start: 2017-02-09 | End: 2017-09-08 | Stop reason: ALTCHOICE

## 2017-02-09 NOTE — TELEPHONE ENCOUNTER
Pt was seen yesterday and was told if he started feeling bad again to call to get another round of Prednisone.  He would like this [facility] to [facility].

## 2017-04-20 ENCOUNTER — HOSPITAL ENCOUNTER (OUTPATIENT)
Dept: LAB | Age: 67
Discharge: HOME | End: 2017-04-20
Payer: COMMERCIAL

## 2017-04-20 LAB
ANION GAP BLD CALC-SCNC: 10 MMOL/L (ref 3–18)
BUN SERPL-MCNC: 35 MG/DL (ref 7–18)
BUN/CREAT SERPL: 19 (ref 12–20)
CALCIUM SERPL-MCNC: 8.5 MG/DL (ref 8.5–10.1)
CHLORIDE SERPL-SCNC: 109 MMOL/L (ref 100–108)
CO2 SERPL-SCNC: 21 MMOL/L (ref 21–32)
CREAT SERPL-MCNC: 1.86 MG/DL (ref 0.6–1.3)
GLUCOSE SERPL-MCNC: 177 MG/DL (ref 74–99)
POTASSIUM SERPL-SCNC: 4.2 MMOL/L (ref 3.5–5.5)
SODIUM SERPL-SCNC: 140 MMOL/L (ref 136–145)

## 2017-04-20 PROCEDURE — 80048 BASIC METABOLIC PNL TOTAL CA: CPT | Performed by: PLASTIC SURGERY

## 2017-04-20 PROCEDURE — 36415 COLL VENOUS BLD VENIPUNCTURE: CPT | Performed by: PLASTIC SURGERY

## 2017-05-02 ENCOUNTER — HOSPITAL ENCOUNTER (OUTPATIENT)
Dept: LAB | Age: 67
Discharge: HOME | End: 2017-05-02
Payer: COMMERCIAL

## 2017-05-02 PROCEDURE — 88331 PATH CONSLTJ SURG 1 BLK 1SPC: CPT | Performed by: PLASTIC SURGERY

## 2017-05-02 PROCEDURE — 88332 PATH CONSLTJ SURG EA ADD BLK: CPT | Performed by: PLASTIC SURGERY

## 2017-05-02 PROCEDURE — 88305 TISSUE EXAM BY PATHOLOGIST: CPT | Performed by: PLASTIC SURGERY

## 2017-05-03 ENCOUNTER — HOSPITAL ENCOUNTER (OUTPATIENT)
Dept: LAB | Age: 67
Discharge: HOME | End: 2017-05-03
Payer: COMMERCIAL

## 2017-05-03 DIAGNOSIS — E78.1 HYPERTRIGLYCERIDEMIA: ICD-10-CM

## 2017-05-03 DIAGNOSIS — Z51.81 MEDICATION MONITORING ENCOUNTER: ICD-10-CM

## 2017-05-03 DIAGNOSIS — E78.2 MIXED HYPERLIPIDEMIA: ICD-10-CM

## 2017-05-03 DIAGNOSIS — I10 ESSENTIAL HYPERTENSION: ICD-10-CM

## 2017-05-03 DIAGNOSIS — N18.30 CKD (CHRONIC KIDNEY DISEASE) STAGE 3, GFR 30-59 ML/MIN (HCC): ICD-10-CM

## 2017-05-03 LAB
ALBUMIN SERPL BCP-MCNC: 3.4 G/DL (ref 3.4–5)
ALBUMIN/GLOB SERPL: 1.1 {RATIO} (ref 0.8–1.7)
ALP SERPL-CCNC: 94 U/L (ref 45–117)
ALT SERPL-CCNC: 17 U/L (ref 16–61)
ANION GAP BLD CALC-SCNC: 7 MMOL/L (ref 3–18)
AST SERPL W P-5'-P-CCNC: 11 U/L (ref 15–37)
BILIRUB SERPL-MCNC: 0.4 MG/DL (ref 0.2–1)
BUN SERPL-MCNC: 20 MG/DL (ref 7–18)
BUN/CREAT SERPL: 14 (ref 12–20)
CALCIUM SERPL-MCNC: 9 MG/DL (ref 8.5–10.1)
CHLORIDE SERPL-SCNC: 111 MMOL/L (ref 100–108)
CHOLEST SERPL-MCNC: 165 MG/DL
CO2 SERPL-SCNC: 24 MMOL/L (ref 21–32)
CREAT SERPL-MCNC: 1.44 MG/DL (ref 0.6–1.3)
GLOBULIN SER CALC-MCNC: 3.2 G/DL (ref 2–4)
GLUCOSE SERPL-MCNC: 126 MG/DL (ref 74–99)
HBA1C MFR BLD: 7.5 % (ref 4.2–5.6)
HDLC SERPL-MCNC: 35 MG/DL (ref 40–60)
HDLC SERPL: 4.7 {RATIO} (ref 0–5)
LDLC SERPL CALC-MCNC: 98.6 MG/DL (ref 0–100)
LIPID PROFILE,FLP: ABNORMAL
POTASSIUM SERPL-SCNC: 4.6 MMOL/L (ref 3.5–5.5)
PROT SERPL-MCNC: 6.6 G/DL (ref 6.4–8.2)
SODIUM SERPL-SCNC: 142 MMOL/L (ref 136–145)
TRIGL SERPL-MCNC: 157 MG/DL (ref ?–150)
VLDLC SERPL CALC-MCNC: 31.4 MG/DL

## 2017-05-03 PROCEDURE — 83036 HEMOGLOBIN GLYCOSYLATED A1C: CPT | Performed by: FAMILY MEDICINE

## 2017-05-03 PROCEDURE — 36415 COLL VENOUS BLD VENIPUNCTURE: CPT | Performed by: FAMILY MEDICINE

## 2017-05-03 PROCEDURE — 80061 LIPID PANEL: CPT | Performed by: FAMILY MEDICINE

## 2017-05-03 PROCEDURE — 80053 COMPREHEN METABOLIC PANEL: CPT | Performed by: FAMILY MEDICINE

## 2017-05-05 ENCOUNTER — OFFICE VISIT (OUTPATIENT)
Dept: FAMILY MEDICINE CLINIC | Age: 67
End: 2017-05-05

## 2017-05-05 VITALS
SYSTOLIC BLOOD PRESSURE: 135 MMHG | WEIGHT: 197 LBS | OXYGEN SATURATION: 98 % | BODY MASS INDEX: 30.92 KG/M2 | HEIGHT: 67 IN | HEART RATE: 86 BPM | DIASTOLIC BLOOD PRESSURE: 76 MMHG | TEMPERATURE: 97.2 F

## 2017-05-05 DIAGNOSIS — E78.2 MIXED HYPERLIPIDEMIA: ICD-10-CM

## 2017-05-05 DIAGNOSIS — I10 ESSENTIAL HYPERTENSION: ICD-10-CM

## 2017-05-05 NOTE — PROGRESS NOTES
HISTORY OF PRESENT ILLNESS  Amena Gillis is a 69 y.o. male. Chief Complaint   Patient presents with    Hypertension chronic problem, stable     Diabetes    Chronic Kidney Disease    Cholesterol Problem    Back Pain     chronic, states no pain today    Skin Problem     just had skin cancer removed from neck 5/2/17       HPI  Past Medical History:   Diagnosis Date    Calculus of kidney     Cancer ([facility] [address]     HX of BCC    Diabetes mellitus ([facility] [address] 8/19/2010    Gout 8/19/2010    HTN (hypertension) 8/19/2010    Hyperlipemia 8/19/2010    Kidney disease     Lumbar spondylosis     Proteinuria     Skin cancer, basal cell     neck    Urolithiasis      Current Outpatient Prescriptions   Medication Sig Dispense Refill    dilTIAZem (TIAZAC) 360 mg SR capsule Take 1 Cap by mouth daily. 90 Cap 1    allopurinol (ZYLOPRIM) 300 mg tablet Take 1 Tab by mouth daily. 90 Tab 1    glyBURIDE (DIABETA) 2.5 mg tablet Take 1 Tab by mouth two (2) times daily (with meals). 180 Tab 1    lisinopril (PRINIVIL, ZESTRIL) 20 mg tablet Take 1 Tab by mouth two (2) times a day. 180 Tab 1    aspirin delayed-release (ADULT LOW DOSE ASPIRIN) 81 mg tablet Take 1 Tab by mouth daily. Indications: MYOCARDIAL INFARCTION PREVENTION 30 Tab prn    predniSONE (DELTASONE) 10 mg tablet T.tessy 4 piis x 2 days, 3 x 2 days, 2 x 2 days, 1 x 2 days then stop. Take with food 20 Tab 0    cyclobenzaprine (FLEXERIL) 10 mg tablet Take 1 Tab by mouth three (3) times daily as needed for Muscle Spasm(s). 30 Tab 0    HYDROcodone-acetaminophen (NORCO) 5-325 mg per tablet Take 1 Tab by mouth daily as needed for Pain. 30 Tab 0     Allergies   Allergen Reactions    Metformin Other (comments)     Renal insufficiency         ROS Respiratory: Negative for shortness of breath. Cardiovascular: Negative for chest pain. Genitourinary: Negative for frequency. Neurological: Negative for dizziness and headaches.     Visit Vitals    /76    Pulse 86    Temp 97.2 °F (36.2 °C) (Oral)    Ht 5' 7\" (1.702 m)    Wt 197 lb (89.4 kg)    SpO2 98%    BMI 30.85 kg/m2       Physical Exam Nursing note and vitals reviewed. Constitutional: He is oriented to person, place, and time. He appears well-developed and well-nourished. No distress. HENT:   Mouth/Throat: Oropharynx is clear and moist.   Neck: No JVD present. No thyromegaly present. Cardiovascular: Normal rate, regular rhythm and normal heart sounds. Pulmonary/Chest: Effort normal and breath sounds normal. No respiratory distress. He has no wheezes. He has no rales. Abdominal: Soft. Bowel sounds are normal. He exhibits no distension. There is no tenderness. There is no rebound. Musculoskeletal: He exhibits no edema and no tenderness. Lymphadenopathy:     He has no cervical adenopathy. Neurological: He is alert and oriented to person, place, and time. Skin: No pallor. Psychiatric: He has a normal mood and affect. His behavior is normal.    Results for orders placed or performed during the hospital encounter of 05/03/17   LIPID PANEL   Result Value Ref Range    LIPID PROFILE          Cholesterol, total 165 <200 MG/DL    Triglyceride 157 (H) <150 MG/DL    HDL Cholesterol 35 (L) 40 - 60 MG/DL    LDL, calculated 98.6 0 - 100 MG/DL    VLDL, calculated 31.4 MG/DL    CHOL/HDL Ratio 4.7 0 - 5.0     METABOLIC PANEL, COMPREHENSIVE   Result Value Ref Range    Sodium 142 136 - 145 mmol/L    Potassium 4.6 3.5 - 5.5 mmol/L    Chloride 111 (H) 100 - 108 mmol/L    CO2 24 21 - 32 mmol/L    Anion gap 7 3.0 - 18 mmol/L    Glucose 126 (H) 74 - 99 mg/dL    BUN 20 (H) 7.0 - 18 MG/DL    Creatinine 1.44 (H) 0.6 - 1.3 MG/DL    BUN/Creatinine ratio 14 12 - 20      GFR est AA 59 (L) >60 ml/min/1.73m2    GFR est non-AA 49 (L) >60 ml/min/1.73m2    Calcium 9.0 8.5 - 10.1 MG/DL    Bilirubin, total 0.4 0.2 - 1.0 MG/DL    ALT (SGPT) 17 16 - 61 U/L    AST (SGOT) 11 (L) 15 - 37 U/L    Alk.  phosphatase 94 45 - 117 U/L    Protein, total 6.6 6.4 - 8.2 g/dL    Albumin 3.4 3.4 - 5.0 g/dL    Globulin 3.2 2.0 - 4.0 g/dL    A-G Ratio 1.1 0.8 - 1.7     HEMOGLOBIN A1C W/O EAG   Result Value Ref Range    Hemoglobin A1c 7.5 (H) 4.2 - 5.6 %       ASSESSMENT and PLAN    ICD-10-CM ICD-9-CM    1. Uncontrolled type 2 diabetes mellitus without complication, without long-term current use of insulin ([facility] [address] improved some watch diet E11.65 250.02    2. Mixed hyperlipidemia improved stable continue current medications  E78.2 272.2    3.  Essential hypertension stable continue current medications  I10 401.9

## 2017-05-05 NOTE — MR AVS SNAPSHOT
Visit Information Date & Time Provider Department Dept. Phone Encounter #  
 5/5/2017  7:30 AM Eliel James MD [facility] [phone] [id number] Follow-up Instructions Return in about 4 months (around 9/5/2017). Upcoming Health Maintenance Date Due Hepatitis C Screening 1950 FOBT Q 1 YEAR AGE 50-75 3/21/1998 EYE EXAM RETINAL OR DILATED Q1 1/30/2015 Pneumococcal 65+ Low/Medium Risk (2 of 2 - PPSV23) 6/17/2017 FOOT EXAM Q1 6/17/2017 INFLUENZA AGE 9 TO ADULT 8/1/2017 HEMOGLOBIN A1C Q6M 11/3/2017 MICROALBUMIN Q1 12/23/2017 LIPID PANEL Q1 5/3/2018 MEDICARE YEARLY EXAM 5/6/2018 GLAUCOMA SCREENING Q2Y 6/17/2018 DTaP/Tdap/Td series (2 - Td) 6/17/2026 Allergies as of 5/5/2017  Review Complete On: 5/5/2017 By: Eliel James MD  
  
 Severity Noted Reaction Type Reactions Metformin  11/01/2013   Side Effect Other (comments) Renal insufficiency Current Immunizations  Never Reviewed Name Date Influenza Vaccine 12/7/2015 Not reviewed this visit You Were Diagnosed With   
  
 Codes Comments Uncontrolled type 2 diabetes mellitus without complication, without long-term current use of insulin ([facility]    -  Primary ICD-10-CM: E11.65 ICD-9-CM: 250.02 Mixed hyperlipidemia     ICD-10-CM: E78.2 ICD-9-CM: 272.2 Essential hypertension     ICD-10-CM: I10 
ICD-9-CM: 401.9 Vitals BP Pulse Temp Height(growth percentile) Weight(growth percentile) SpO2  
 135/76 86 97.2 °F (36.2 °C) (Oral) 5' 7\" (1.702 m) 197 lb (89.4 kg) 98% BMI Smoking Status 30.85 kg/m2 Never Smoker BMI and BSA Data Body Mass Index Body Surface Area  
 30.85 kg/m 2 2.06 m 2 Preferred Pharmacy Pharmacy Name Phone [facility] [address] [facility], [address] [phone] Your Updated Medication List  
  
   
 This list is accurate as of: 5/5/17  8:08 AM.  Always use your most recent med list.  
  
  
  
  
 ADULT LOW DOSE ASPIRIN 81 mg tablet Generic drug:  aspirin delayed-release Take 1 Tab by mouth daily. Indications: MYOCARDIAL INFARCTION PREVENTION  
  
 allopurinol 300 mg tablet Commonly known as:  Joe Van Take 1 Tab by mouth daily. cyclobenzaprine 10 mg tablet Commonly known as:  FLEXERIL Take 1 Tab by mouth three (3) times daily as needed for Muscle Spasm(s). dilTIAZem 360 mg SR capsule Commonly known as:  [facility] Take 1 Cap by mouth daily. glyBURIDE 2.5 mg tablet Commonly known as:  Amilcar Forget Take 1 Tab by mouth two (2) times daily (with meals). HYDROcodone-acetaminophen 5-325 mg per tablet Commonly known as:  Teri Staple Take 1 Tab by mouth daily as needed for Pain. lisinopril 20 mg tablet Commonly known as:  Michael Apo Take 1 Tab by mouth two (2) times a day. predniSONE 10 mg tablet Commonly known as:  DELTASONE  
Nik 4 piis x 2 days, 3 x 2 days, 2 x 2 days, 1 x 2 days then stop. Take with food Follow-up Instructions Return in about 4 months (around 9/5/2017). Patient Instructions Please contact our office if you have any questions about your visit today. Introducing [facility]! Dear Julia Nixon: Thank you for requesting a [facility] account. Our records indicate that you already have an active FP Complete account. You can access your account anytime at [email or web address]". PlayWith/"[employer]" Did you know that you can access your hospital and ER discharge instructions at any time in FP Complete? You can also review all of your test results from your hospital stay or ER visit. Additional Information If you have questions, please visit the Frequently Asked Questions section of the FP Complete website at [email or web address]". PlayWith/"[employer]"/. Remember, FP Complete is NOT to be used for urgent needs.  For medical emergencies, dial 911. Now available from your iPhone and Android! Please provide this summary of care documentation to your next provider. Your primary care clinician is listed as LARISA HURST. If you have any questions after today's visit, please call [phone].

## 2017-05-05 NOTE — PROGRESS NOTES
Chief Complaint   Patient presents with    Hypertension    Diabetes    Chronic Kidney Disease    Cholesterol Problem    Back Pain     chronic, states no pain today    Skin Problem     just had skin cancer removed from neck 5/2/17     1. Have you been to the ER, urgent care clinic since your last visit? Hospitalized since your last visit? No    2. Have you seen or consulted any other health care providers outside of the [facility] since your last visit? Include any pap smears or colon screening.  Yes When: 5/2/17 Where: Dr. Gardenia Villagomez Reason for visit: skin cancer removal neck

## 2017-07-12 ENCOUNTER — HOSPITAL ENCOUNTER (EMERGENCY)
Age: 67
Discharge: HOME | End: 2017-07-12
Attending: EMERGENCY MEDICINE
Payer: COMMERCIAL

## 2017-07-12 ENCOUNTER — APPOINTMENT (OUTPATIENT)
Dept: GENERAL RADIOLOGY | Age: 67
End: 2017-07-12
Attending: EMERGENCY MEDICINE
Payer: COMMERCIAL

## 2017-07-12 VITALS
RESPIRATION RATE: 16 BRPM | SYSTOLIC BLOOD PRESSURE: 158 MMHG | WEIGHT: 200 LBS | TEMPERATURE: 97.7 F | BODY MASS INDEX: 31.39 KG/M2 | HEIGHT: 67 IN | OXYGEN SATURATION: 97 % | HEART RATE: 66 BPM | DIASTOLIC BLOOD PRESSURE: 75 MMHG
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
## 2017-07-12 DIAGNOSIS — T07.XXXA MULTIPLE ABRASIONS: ICD-10-CM

## 2017-07-12 PROCEDURE — 99283 EMERGENCY DEPT VISIT LOW MDM: CPT

## 2017-07-12 PROCEDURE — 72040 X-RAY EXAM NECK SPINE 2-3 VW: CPT

## 2017-07-12 PROCEDURE — 74011000250 HC RX REV CODE- 250: Performed by: EMERGENCY MEDICINE

## 2017-07-12 PROCEDURE — 73060 X-RAY EXAM OF HUMERUS: CPT

## 2017-07-12 RX ORDER — BACITRACIN ZINC 500 UNIT/G
OINTMENT (GRAM) TOPICAL
Status: COMPLETED | OUTPATIENT
Start: 2017-07-12 | End: 2017-07-12

## 2017-07-12 RX ORDER — OXYCODONE AND ACETAMINOPHEN 5; 325 MG/1; MG/1
TABLET ORAL
Qty: 12 TAB | Refills: 0 | Status: SHIPPED | OUTPATIENT
Start: 2017-07-12 | End: 2017-09-08 | Stop reason: ALTCHOICE

## 2017-07-12 RX ADMIN — BACITRACIN ZINC: 500 OINTMENT TOPICAL at 03:45

## 2017-07-12 NOTE — ED TRIAGE NOTES
Pt states he fell tonight; pt has multiple abrasions noted to face and arms bilat. States he fell 12 steps, pt c/o neck pain and bilat shoulder pain and L shoulder pain. Uncertain of last tetanus.

## 2017-07-12 NOTE — ED PROVIDER NOTES
HPI Comments: Diana Marie down a flight of steps tonight while walking down the slade, says his neck is sore and L upper arm hurts, no LOC or MS changes , no headache. C/O multiple abrasions to face and arms. Patient is a 69 y.o. male presenting with fall. The history is provided by the patient. Fall          Past Medical History:   Diagnosis Date    Calculus of kidney     Cancer ([facility] [address]     HX of BCC    Diabetes mellitus ([facility] [address] 8/19/2010    Gout 8/19/2010    HTN (hypertension) 8/19/2010    Hyperlipemia 8/19/2010    Kidney disease     Lumbar spondylosis     Proteinuria     Skin cancer, basal cell     neck    Urolithiasis        Past Surgical History:   Procedure Laterality Date    HX GI      HX OTHER SURGICAL      Basal cell removed from left side of face    HX UROLOGICAL      kidney         Family History:   Problem Relation Age of Onset    Diabetes Mother        Social History     Social History    Marital status:      Spouse name: N/A    Number of children: N/A    Years of education: N/A     Occupational History    Not on file. Social History Main Topics    Smoking status: Never Smoker    Smokeless tobacco: Never Used    Alcohol use No    Drug use: No    Sexual activity: Yes     Other Topics Concern    Not on file     Social History Narrative         ALLERGIES: Metformin    Review of Systems   Constitutional: Negative. HENT: Negative. Respiratory: Negative. Cardiovascular: Negative. Gastrointestinal: Negative. Genitourinary: Negative. Neurological: Negative. Psychiatric/Behavioral: Negative. Vitals:    07/12/17 0304   BP: 158/75   Pulse: 66   Resp: 16   Temp: 97.7 °F (36.5 °C)   SpO2: 97%   Weight: 90.7 kg (200 lb)   Height: 5' 7\" (1.702 m)            Physical Exam   Constitutional: He is oriented to person, place, and time. He appears well-developed and well-nourished. HENT:   Head: Normocephalic and atraumatic.    Mouth/Throat: Oropharynx is clear and moist.   Eyes: EOM are normal. Pupils are equal, round, and reactive to light. Neck: Neck supple. Cardiovascular: Normal rate and regular rhythm. Pulmonary/Chest: Effort normal and breath sounds normal.   Abdominal: Soft. Musculoskeletal: Normal range of motion. Neurological: He is alert and oriented to person, place, and time. Skin: Skin is warm and dry. Multiple abrasions on face and arms   Psychiatric: He has a normal mood and affect. Nursing note and vitals reviewed. MDM  Number of Diagnoses or Management Options  Diagnosis management comments: X-Rays reviewed, no fractures. Pt's abrasions have been cleaned and dressed, he feels better and wants to go home. Pt agrees with dispo and F/U plan.   Reshma Mata MD  3:52 AM         Amount and/or Complexity of Data Reviewed  Tests in the radiology section of CPT®: ordered and reviewed      ED Course       Procedures

## 2017-07-12 NOTE — ED NOTES
Wounds cleaned and covered, instructions given regarding wound and bandage care with verbal understanding. Wife and son to return patient home. I have reviewed discharge instructions with the patient and spouse. The patient and spouse verbalized understanding. Ambulatory from ED. Patient armband removed and shredded.

## 2017-07-12 NOTE — DISCHARGE INSTRUCTIONS

## 2017-09-08 ENCOUNTER — OFFICE VISIT (OUTPATIENT)
Dept: FAMILY MEDICINE CLINIC | Age: 67
End: 2017-09-08

## 2017-09-08 VITALS
HEIGHT: 67 IN | TEMPERATURE: 97.3 F | HEART RATE: 57 BPM | WEIGHT: 194 LBS | RESPIRATION RATE: 16 BRPM | BODY MASS INDEX: 30.45 KG/M2 | DIASTOLIC BLOOD PRESSURE: 60 MMHG | SYSTOLIC BLOOD PRESSURE: 98 MMHG
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
## 2017-09-08 DIAGNOSIS — M10.9 GOUT, UNSPECIFIED CAUSE, UNSPECIFIED CHRONICITY, UNSPECIFIED SITE: ICD-10-CM

## 2017-09-08 DIAGNOSIS — E11.8 TYPE 2 DIABETES MELLITUS WITH COMPLICATION, UNSPECIFIED LONG TERM INSULIN USE STATUS: Primary | ICD-10-CM

## 2017-09-08 DIAGNOSIS — I95.1 ORTHOSTATIC HYPOTENSION: ICD-10-CM

## 2017-09-08 DIAGNOSIS — I10 ESSENTIAL HYPERTENSION: ICD-10-CM

## 2017-09-08 DIAGNOSIS — R42 DIZZINESS: ICD-10-CM

## 2017-09-08 LAB
GLUCOSE POC: 156 MG/DL
HBA1C MFR BLD HPLC: 6.8 %

## 2017-09-08 RX ORDER — DILTIAZEM HYDROCHLORIDE 360 MG/1
360 CAPSULE, EXTENDED RELEASE ORAL DAILY
Qty: 90 CAP | Refills: 1 | Status: CANCELLED | OUTPATIENT
Start: 2017-09-08

## 2017-09-08 RX ORDER — ALLOPURINOL 300 MG/1
300 TABLET ORAL DAILY
Qty: 90 TAB | Refills: 1 | Status: SHIPPED | OUTPATIENT
Start: 2017-09-08 | End: 2018-01-19 | Stop reason: SDUPTHER

## 2017-09-08 NOTE — MR AVS SNAPSHOT
Visit Information Date & Time Provider Department Dept. Phone Encounter #  
 9/8/2017  7:30 AM Clara Wagner MD [facility] [id number] Follow-up Instructions Return in about 1 day (around 9/9/2017) for 10:00 am. Upcoming Health Maintenance Date Due Hepatitis C Screening 1950 FOBT Q 1 YEAR AGE 50-75 3/21/1998 EYE EXAM RETINAL OR DILATED Q1 1/30/2015 Pneumococcal 65+ Low/Medium Risk (2 of 2 - PPSV23) 6/17/2017 FOOT EXAM Q1 6/17/2017 HEMOGLOBIN A1C Q6M 11/3/2017 MICROALBUMIN Q1 12/23/2017 LIPID PANEL Q1 5/3/2018 MEDICARE YEARLY EXAM 5/6/2018 GLAUCOMA SCREENING Q2Y 6/17/2018 DTaP/Tdap/Td series (2 - Td) 6/17/2026 Allergies as of 9/8/2017  Review Complete On: 9/8/2017 By: Clara Wagner MD  
  
 Severity Noted Reaction Type Reactions Metformin  11/01/2013   Side Effect Other (comments) Renal insufficiency Current Immunizations  Never Reviewed Name Date Influenza Vaccine 12/7/2015 Not reviewed this visit You Were Diagnosed With   
  
 Codes Comments Type 2 diabetes mellitus with complication, unspecified long term insulin use status (HCC)    -  Primary ICD-10-CM: E11.8 ICD-9-CM: 250.90 Essential hypertension     ICD-10-CM: I10 
ICD-9-CM: 401.9 Gout, unspecified cause, unspecified chronicity, unspecified site     ICD-10-CM: M10.9 ICD-9-CM: 274.9 Orthostatic hypotension     ICD-10-CM: I95.1 ICD-9-CM: 458.0 Dizziness     ICD-10-CM: V16 ICD-9-CM: 780.4 Fall, sequela     ICD-10-CM: W19. XXXS 
ICD-9-CM: 909.4, E929.3 Vitals BP Pulse Temp Resp Height(growth percentile) Weight(growth percentile) 98/60 (BP 1 Location: Right arm, BP Patient Position: Standing) (!) 57 97.3 °F (36.3 °C) (Oral) 16 5' 7\" (1.702 m) 194 lb (88 kg) BMI Smoking Status 30.38 kg/m2 Never Smoker Vitals History BMI and BSA Data Body Mass Index Body Surface Area  
 30.38 kg/m 2 2.04 m 2 Preferred Pharmacy Pharmacy Name Phone [facility] [address] [facility], [address] [facility] [phone] Your Updated Medication List  
  
   
This list is accurate as of: 9/8/17  8:56 AM.  Always use your most recent med list.  
  
  
  
  
 ADULT LOW DOSE ASPIRIN 81 mg tablet Generic drug:  aspirin delayed-release Take 1 Tab by mouth daily. Indications: MYOCARDIAL INFARCTION PREVENTION  
  
 allopurinol 300 mg tablet Commonly known as:  Carola Mantilla Take 1 Tab by mouth daily. dilTIAZem 360 mg SR capsule Commonly known as:  [facility] Take 1 Cap by mouth daily. glyBURIDE 2.5 mg tablet Commonly known as:  Ja Slateddy Take 1 Tab by mouth two (2) times daily (with meals). lisinopril 20 mg tablet Commonly known as:  Serafinclayton Vargas Take 1 Tab by mouth two (2) times a day. Prescriptions Sent to Pharmacy Refills  
 allopurinol (ZYLOPRIM) 300 mg tablet 1 Sig: Take 1 Tab by mouth daily. Class: Normal  
 Pharmacy: [id number] [place] [address], [address] [facility] Ph #: [phone] Route: Oral  
  
We Performed the Following AMB POC GLUCOSE, QUANTITATIVE, BLOOD [84021 CPT(R)] AMB POC HEMOGLOBIN A1C [19829 CPT(R)] Follow-up Instructions Return in about 1 day (around 9/9/2017) for 10:00 am.  
  
  
Patient Instructions Please contact our office if you have any questions about your visit today. Introducing [facility]! Dear Mariya Bryant: Thank you for requesting a [facility] account. Our records indicate that you already have an active [facility] account. You can access your account anytime at [email or web address] Did you know that you can access your hospital and ER discharge instructions at any time in [facility]? You can also review all of your test results from your hospital stay or ER visit. Additional Information If you have questions, please visit the Frequently Asked Questions section of the [email or web address] website at [email or web address] Remember, [employer] is NOT to be used for urgent needs. For medical emergencies, dial 911. Now available from your iPhone and Android! Please provide this summary of care documentation to your next provider. Your primary care clinician is listed as LARISA HURST. If you have any questions after today's visit, please call [phone].

## 2017-09-08 NOTE — PROGRESS NOTES
Chief Complaint   Patient presents with    Diabetes    Cholesterol Problem     high chol    Hypertension       Health Maintenance Due   Topic Date Due    Hepatitis C Screening  1950    FOBT Q 1 YEAR AGE 50-75  03/21/1998    EYE EXAM RETINAL OR DILATED Q1  01/30/2015    Pneumococcal 65+ Low/Medium Risk (2 of 2 - PPSV23) 06/17/2017    FOOT EXAM Q1  06/17/2017    INFLUENZA AGE 9 TO ADULT  08/01/2017       Health Maintenance reviewed     1. Have you been to the ER, urgent care clinic since your last visit? Hospitalized since your last visit? Yes When: 7/17 Where: [address] [place] [address] Reason for visit: fall    2. Have you seen or consulted any other health care providers outside of the [address] [place] [address] since your last visit? Include any pap smears or colon screening.  No

## 2017-09-08 NOTE — PROGRESS NOTES
HISTORY OF PRESENT ILLNESS  Genia Sawyer is a 69 y.o. male. Chief Complaint   Patient presents with    Diabetes chronic problem, stable     Cholesterol Problem chronic problem, stable      high chol    Hypertension chronic problem, stable blood pressure low today     complains of a fall 2  Months ago, lost footing and fell down 20 stairs from top of stairs in his home in the middle of the night, went to ED HV and has scars from skin tears but no fx. .complains of feeling dizzy and off balance since then. complains of spinning dizzy spells with moving his head. Worse with lying in bed and when turns over feels dizzy, occ with standing. No n/v.    HPIjj  Past Medical History:   Diagnosis Date    Calculus of kidney     Cancer ([facility] [address]     HX of BCC    Diabetes mellitus ([facility] [address] 8/19/2010    Gout 8/19/2010    HTN (hypertension) 8/19/2010    Hyperlipemia 8/19/2010    Kidney disease     Lumbar spondylosis     Proteinuria     Skin cancer, basal cell     neck    Urolithiasis      Current Outpatient Prescriptions   Medication Sig Dispense Refill    dilTIAZem (TIAZAC) 360 mg SR capsule Take 1 Cap by mouth daily. 90 Cap 1    allopurinol (ZYLOPRIM) 300 mg tablet Take 1 Tab by mouth daily. 90 Tab 1    glyBURIDE (DIABETA) 2.5 mg tablet Take 1 Tab by mouth two (2) times daily (with meals). 180 Tab 1    lisinopril (PRINIVIL, ZESTRIL) 20 mg tablet Take 1 Tab by mouth two (2) times a day. 180 Tab 1    aspirin delayed-release (ADULT LOW DOSE ASPIRIN) 81 mg tablet Take 1 Tab by mouth daily. Indications: MYOCARDIAL INFARCTION PREVENTION 30 Tab prn     Allergies   Allergen Reactions    Metformin Other (comments)     Renal insufficiency         Review of Systems   Constitutional: Positive for malaise/fatigue. Musculoskeletal: Positive for falls. Neurological: Positive for dizziness and headaches.      .  Visit Vitals    /56 (BP 1 Location: Left arm, BP Patient Position: Sitting)    Pulse (!) 57    Temp 97.3 °F (36.3 °C) (Oral)    Resp 16    Ht 5' 7\" (1.702 m)    Wt 194 lb (88 kg)    BMI 30.38 kg/m2       Physical Exam Nursing note and vitals reviewed. Constitutional: He is oriented to person, place, and time. He appears well-developed and well-nourished. No distress. HENT:   Mouth/Throat: Oropharynx is clear and moist.   Neck: No JVD present. No thyromegaly present. Cardiovascular: Normal rate, regular rhythm and normal heart sounds. Pulmonary/Chest: Effort normal and breath sounds normal. No respiratory distress. He has no wheezes. He has no rales. Abdominal: Soft. Bowel sounds are normal. He exhibits no distension. There is no tenderness. There is no rebound. Musculoskeletal: He exhibits no edema and no tenderness. Lymphadenopathy:     He has no cervical adenopathy. Neurological: He is alert and oriented to person, place, and time. Skin: No pallor. Psychiatric: He has a normal mood and affect. His behavior is normal.    Results for orders placed or performed in visit on 09/08/17   AMB POC HEMOGLOBIN A1C   Result Value Ref Range    Hemoglobin A1c (POC) 6.8 %   AMB POC GLUCOSE, QUANTITATIVE, BLOOD   Result Value Ref Range    Glucose  mg/dL       ASSESSMENT and PLAN    ICD-10-CM ICD-9-CM    1. Type 2 diabetes mellitus with complication, unspecified long term insulin use status E11.8 250.90 AMB POC HEMOGLOBIN A1C      AMB POC GLUCOSE, QUANTITATIVE, BLOOD   2. Essential hypertension I10 401.9    3. Gout, unspecified cause, unspecified chronicity, unspecified site M10.9 274.9 allopurinol (ZYLOPRIM) 300 mg tablet   4. Orthostatic hypotension I95.1 458.0    5. Dizziness R42 780.4    6. Fall, sequela W19. XXXS 909.4      E929.3    Visit based of time 50 minutes total,  with more than 50% of the face-to-face visit time spent in counseling on fall, dizziness,  orthostatic hypotension, it's treatment, prognosis, management advise, plan and follow-up recommendations.    Follow-up Disposition:  Return in about 1 day (around 9/9/2017) for 10:00 am.

## 2017-09-15 ENCOUNTER — OFFICE VISIT (OUTPATIENT)
Dept: FAMILY MEDICINE CLINIC | Age: 67
End: 2017-09-15

## 2017-09-15 VITALS
SYSTOLIC BLOOD PRESSURE: 119 MMHG | HEART RATE: 84 BPM | WEIGHT: 192.5 LBS | HEIGHT: 67 IN | BODY MASS INDEX: 30.21 KG/M2 | DIASTOLIC BLOOD PRESSURE: 68 MMHG | RESPIRATION RATE: 20 BRPM | TEMPERATURE: 97.7 F

## 2017-09-15 DIAGNOSIS — I95.1 ORTHOSTATIC HYPOTENSION: Primary | ICD-10-CM

## 2017-09-15 DIAGNOSIS — S09.90XS CLOSED HEAD INJURY, SEQUELA: ICD-10-CM
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
## 2017-09-15 DIAGNOSIS — R42 DIZZINESS: ICD-10-CM

## 2017-09-15 NOTE — MR AVS SNAPSHOT
Visit Information Date & Time Provider Department Dept. Phone Encounter #  
 9/15/2017 10:00 AM Shayla Thapa MD [address] [phone] [id number] Follow-up Instructions Return in about 3 weeks (around 10/6/2017) for 12:45 pm. Upcoming Health Maintenance Date Due Hepatitis C Screening 1950 FOBT Q 1 YEAR AGE 50-75 3/21/1998 EYE EXAM RETINAL OR DILATED Q1 1/30/2015 Pneumococcal 65+ Low/Medium Risk (2 of 2 - PPSV23) 6/17/2017 FOOT EXAM Q1 6/17/2017 MICROALBUMIN Q1 12/23/2017 HEMOGLOBIN A1C Q6M 3/8/2018 LIPID PANEL Q1 5/3/2018 MEDICARE YEARLY EXAM 5/6/2018 GLAUCOMA SCREENING Q2Y 6/17/2018 DTaP/Tdap/Td series (2 - Td) 6/17/2026 Allergies as of 9/15/2017  Review Complete On: 9/15/2017 By: Shayla Thapa MD  
  
 Severity Noted Reaction Type Reactions Metformin  11/01/2013   Side Effect Other (comments) Renal insufficiency Current Immunizations  Never Reviewed Name Date Influenza Vaccine 12/7/2015 Not reviewed this visit You Were Diagnosed With   
  
 Codes Comments Orthostatic hypotension    -  Primary ICD-10-CM: I95.1 ICD-9-CM: 458.0 Fall, sequela     ICD-10-CM: W19. XXXS 
ICD-9-CM: 909.4, E929.3 Dizziness     ICD-10-CM: V09 ICD-9-CM: 780.4 Closed head injury, sequela     ICD-10-CM: S09.90XS ICD-9-CM: 793. 9 Vitals BP Pulse Temp Resp Height(growth percentile) Weight(growth percentile)  
 119/68 (BP 1 Location: Left arm, BP Patient Position: Sitting) 84 97.7 °F (36.5 °C) (Oral) 20 5' 7\" (1.702 m) 192 lb 8 oz (87.3 kg) BMI Smoking Status 30.15 kg/m2 Never Smoker BMI and BSA Data Body Mass Index Body Surface Area  
 30.15 kg/m 2 2.03 m 2 Preferred Pharmacy Pharmacy Name Phone [facility] [address] [facility] [phone] Your Updated Medication List  
  
   
 This list is accurate as of: 9/15/17 11:31 AM.  Always use your most recent med list.  
  
  
  
  
 ADULT LOW DOSE ASPIRIN 81 mg tablet Generic drug:  aspirin delayed-release Take 1 Tab by mouth daily. Indications: MYOCARDIAL INFARCTION PREVENTION  
  
 allopurinol 300 mg tablet Commonly known as:  Hanna Pop Take 1 Tab by mouth daily. dilTIAZem 360 mg SR capsule Commonly known as:  [facility] Take 1 Cap by mouth daily. glyBURIDE 2.5 mg tablet Commonly known as:  Tod Brandon Take 1 Tab by mouth two (2) times daily (with meals). lisinopril 20 mg tablet Commonly known as:  Ward Mejias Take 1 Tab by mouth two (2) times a day. Follow-up Instructions Return in about 3 weeks (around 10/6/2017) for 12:45 pm.  
  
To-Do List   
 09/15/2017 Imaging:  CT HEAD WO CONT Patient Instructions Please contact our office if you have any questions about your visit today. Introducing [facility]! Dear James: Thank you for requesting a [employer] account. Our records indicate that you already have an active [employer] account. You can access your account anytime at [email or web address] Did you know that you can access your hospital and ER discharge instructions at any time in [employer]? You can also review all of your test results from your hospital stay or ER visit. Additional Information If you have questions, please visit the Frequently Asked Questions section of the [employer] website at [email or web address] Remember, [employer] is NOT to be used for urgent needs. For medical emergencies, dial 911. Now available from your iPhone and Android! Please provide this summary of care documentation to your next provider. Your primary care clinician is listed as LARISA HURST. If you have any questions after today's visit, please call [phone].

## 2017-09-15 NOTE — PROGRESS NOTES
HISTORY OF PRESENT ILLNESS  Berlin Schmidt is a 69 y.o. male. Chief Complaint   Patient presents with    Hypotension     1 week f/u   Follow-up hypotension patient was taken off his medications but quickly noticed that his blood pressures became severely elevated in the 170-180 range over  and at times even higher. He restarted his medications and since then it has been better. He was significantly orthostatic on the last visit. He reports that he has been drinking fluids. His dizziness has improved but is still there is still not resolved. He complains of feeling dizzy since his severe fall from the top of a flight of stairs all the way to the bottom of the stairs. He had no problems with dizziness prior to this but is still off balance some. HPI  Past Medical History:   Diagnosis Date    Calculus of kidney     Cancer ([facility] [address]     HX of BCC    Diabetes mellitus ([facility] [address] 8/19/2010    Gout 8/19/2010    HTN (hypertension) 8/19/2010    Hyperlipemia 8/19/2010    Kidney disease     Lumbar spondylosis     Proteinuria     Skin cancer, basal cell     neck    Urolithiasis      Current Outpatient Prescriptions   Medication Sig Dispense Refill    allopurinol (ZYLOPRIM) 300 mg tablet Take 1 Tab by mouth daily. 90 Tab 1    dilTIAZem (TIAZAC) 360 mg SR capsule Take 1 Cap by mouth daily. 90 Cap 1    glyBURIDE (DIABETA) 2.5 mg tablet Take 1 Tab by mouth two (2) times daily (with meals). 180 Tab 1    lisinopril (PRINIVIL, ZESTRIL) 20 mg tablet Take 1 Tab by mouth two (2) times a day. 180 Tab 1    aspirin delayed-release (ADULT LOW DOSE ASPIRIN) 81 mg tablet Take 1 Tab by mouth daily. Indications: MYOCARDIAL INFARCTION PREVENTION 30 Tab prn     Allergies   Allergen Reactions    Metformin Other (comments)     Renal insufficiency         Review of Systems   Constitutional: Negative for chills and fever. Respiratory: Negative for shortness of breath. Musculoskeletal: Positive for falls.    Neurological: Positive for dizziness. All other systems reviewed and are negative. Visit Vitals    /68 (BP 1 Location: Left arm, BP Patient Position: Sitting)    Pulse 84    Temp 97.7 °F (36.5 °C) (Oral)    Resp 20    Ht 5' 7\" (1.702 m)    Wt 192 lb 8 oz (87.3 kg)    BMI 30.15 kg/m2     Vitals:    09/15/17 1037   BP: 119/68   BP 1 Location: Left arm   BP Patient Position: Sitting   Pulse: 84   Resp: 20   Temp: 97.7 °F (36.5 °C)   TempSrc: Oral   Weight: 192 lb 8 oz (87.3 kg)   Height: 5' 7\" (1.702 m)     Visit Vitals    /68 (BP 1 Location: Left arm, BP Patient Position: Sitting)    Pulse 84    Temp 97.7 °F (36.5 °C) (Oral)    Resp 20    Ht 5' 7\" (1.702 m)    Wt 192 lb 8 oz (87.3 kg)    BMI 30.15 kg/m2       Physical Exam  Nursing note and vitals reviewed. Constitutional: He is oriented to person, place, and time. He appears well-developed and well-nourished. No distress. HENT:   Mouth/Throat: Oropharynx is clear and moist.   Neck: No JVD present. No thyromegaly present. Cardiovascular: Normal rate, regular rhythm and normal heart sounds. Pulmonary/Chest: Effort normal and breath sounds normal. No respiratory distress. He has no wheezes. He has no rales. Abdominal: Soft. Bowel sounds are normal. He exhibits no distension. There is no tenderness. There is no rebound. Musculoskeletal: He exhibits no edema and no tenderness. Lymphadenopathy:     He has no cervical adenopathy. Neurological: He is alert and oriented to person, place, and time. gait and balance mildly impaired  Skin: No pallor. Psychiatric: He has a normal mood and affect. His behavior is normal.    ASSESSMENT and PLAN    ICD-10-CM ICD-9-CM    1. Orthostatic hypotension I95.1 458.0    2. Fall, sequela W19. XXXS 909.4 CT HEAD WO CONT     E929.3    3. Dizziness R42 780.4 CT HEAD WO CONT   4.  Closed head injury, sequela S09.90XS 908.9 CT HEAD WO CONT   Orthostatic hypotension seems to have resolved at present the patient has continued significant dizziness with history of closed head injury from severe fall from the top of a flight of stairs.   I feel it necessary to order a CT of the head  Follow-up Disposition:  Return in about 3 weeks (around 10/6/2017) for 12:45 pm.

## 2017-09-15 NOTE — PROGRESS NOTES
Chief Complaint   Patient presents with    Hypotension     1 week f/u       1. Have you been to the ER, urgent care clinic since your last visit? Hospitalized since your last visit? No    2. Have you seen or consulted any other health care providers outside of the [address] [place] [address] since your last visit? Include any pap smears or colon screening.  No

## 2017-09-19 ENCOUNTER — HOSPITAL ENCOUNTER (OUTPATIENT)
Dept: CT IMAGING | Age: 67
Discharge: HOME | End: 2017-09-19
Attending: FAMILY MEDICINE
Payer: COMMERCIAL
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
## 2017-09-19 DIAGNOSIS — R42 DIZZINESS: ICD-10-CM

## 2017-09-19 DIAGNOSIS — S09.90XS CLOSED HEAD INJURY, SEQUELA: ICD-10-CM

## 2017-09-19 PROCEDURE — 70450 CT HEAD/BRAIN W/O DYE: CPT

## 2017-09-25 ENCOUNTER — TELEPHONE (OUTPATIENT)
Dept: FAMILY MEDICINE CLINIC | Age: 67
End: 2017-09-25

## 2017-09-25 NOTE — TELEPHONE ENCOUNTER
Chief Complaint   Patient presents with    Results     Pt called and is requesting his results of CT scan. Pt can be reached at his work until 3:30 @ [phone] then at home after that time.

## 2017-09-27 NOTE — TELEPHONE ENCOUNTER
Andreia Merrill MD   You 2 days ago                 No acute changes, no stroke, no tumor (Routing comment)

## 2017-10-06 ENCOUNTER — OFFICE VISIT (OUTPATIENT)
Dept: FAMILY MEDICINE CLINIC | Age: 67
End: 2017-10-06

## 2017-10-06 VITALS
TEMPERATURE: 98.2 F | HEIGHT: 67 IN | HEART RATE: 97 BPM | WEIGHT: 190 LBS | SYSTOLIC BLOOD PRESSURE: 115 MMHG | BODY MASS INDEX: 29.82 KG/M2 | DIASTOLIC BLOOD PRESSURE: 74 MMHG | RESPIRATION RATE: 20 BRPM

## 2017-10-06 DIAGNOSIS — F32.A DEPRESSION, UNSPECIFIED DEPRESSION TYPE: ICD-10-CM

## 2017-10-06 DIAGNOSIS — I95.1 ORTHOSTATIC HYPOTENSION: Primary | ICD-10-CM

## 2017-10-06 DIAGNOSIS — R42 DIZZINESS: ICD-10-CM

## 2017-10-06 DIAGNOSIS — F43.10 PTSD (POST-TRAUMATIC STRESS DISORDER): ICD-10-CM

## 2017-10-06 DIAGNOSIS — I10 ESSENTIAL HYPERTENSION: ICD-10-CM

## 2017-10-06 RX ORDER — SERTRALINE HYDROCHLORIDE 50 MG/1
50 TABLET, FILM COATED ORAL DAILY
Qty: 30 TAB | Refills: 3 | Status: SHIPPED | OUTPATIENT
Start: 2017-10-06 | End: 2018-03-23 | Stop reason: ALTCHOICE

## 2017-10-06 NOTE — PATIENT INSTRUCTIONS
Please contact our office if you have any questions about your visit today. Post-Traumatic Stress Disorder (PTSD): Care Instructions  Your Care Instructions  Post-traumatic stress disorder (PTSD) is a mental condition that can result from being in or seeing a traumatic or terrifying event. These events can include combat, a terrorist attack, a natural disaster, a serious accident, an assault, or a rape. If you have PTSD, you may often relive the experience in nightmares or flashbacks. These are clear and frightening memories of the event. You may also have trouble sleeping. PTSD affects people in very different ways. It can interfere with daily activities such as work or school, and it can make you withdraw from friends or loved ones. Follow-up care is a key part of your treatment and safety. Be sure to make and go to all appointments, and call your doctor if you are having problems. It's also a good idea to know your test results and keep a list of the medicines you take. How can you care for yourself at home? · Take medicines exactly as directed. Call your doctor if you think you are having a problem with your medicine. · Go to your counseling sessions and follow-up appointments. · Recognize and accept your anxiety. Then, when you are in a situation that makes you anxious, say to yourself, \"This is not an emergency. I feel uncomfortable, but I am not in danger. I can keep going even if I feel anxious. \"  · Be kind to your body:  ¨ Relieve tension with exercise or a massage. ¨ Get enough rest.  ¨ Avoid alcohol, caffeine, nicotine, and illegal drugs. They can increase your anxiety level and cause sleep problems. ¨ Learn and do relaxation techniques. See below for more about these techniques. · Engage your mind. Get out and do something you enjoy. Go to a funny movie, or take a walk or hike. Plan your day. Having too much or too little to do can make you anxious. · Keep a record of your symptoms. Discuss your fears with a good friend or family member, or join a support group for people with similar problems. Talking to others sometimes relieves stress. · Get involved in social groups, or volunteer to help others. Being alone sometimes makes things seem worse than they are. · Get at least 30 minutes of exercise on most days of the week. Walking is a good choice. You also may want to do other activities, such as running, swimming, cycling, or playing tennis or team sports. · Keep the numbers for these national suicide hotlines: 2-406-748-TALK (3-301.899.2199) and 3-941-ETDLRUO (4-812.138.3571). If you or someone you know talks about suicide or feeling hopeless, get help right away. Relaxation techniques  Do relaxation exercises 10 to 20 minutes a day. You can play soothing, relaxing music while you do them, if you wish. · Tell others in your house that you are going to do your relaxation exercises. Ask them not to disturb you. · Find a comfortable place, away from all distractions and noise. · Lie down on your back, or sit with your back straight. · Focus on your breathing. Make it slow and steady. · Breathe in through your nose. Breathe out through either your nose or mouth. · Breathe deeply, filling up the area between your navel and your rib cage. Breathe so that your belly goes up and down. · Do not hold your breath. · Breathe like this for 5 to 10 minutes. Notice the feeling of calmness throughout your whole body. As you continue to breathe slowly and deeply, relax by doing the following for another 5 to 10 minutes:  · Tighten and relax each muscle group in your body. You can begin at your toes and work your way up to your head. · Imagine your muscle groups relaxing and becoming heavy. · Empty your mind of all thoughts. · Let yourself relax more and more deeply. · Become aware of the state of calmness that surrounds you.   · When your relaxation time is over, you can bring yourself back to alertness by moving your fingers and toes and then your hands and feet and then stretching and moving your entire body. Sometimes people fall asleep during relaxation, but they usually wake up shortly afterward. · Always give yourself time to return to full alertness before you drive a car or do anything that might cause an accident if you are not fully alert. Never play a relaxation tape while you drive a car. When should you call for help? Call 911 anytime you think you may need emergency care. For example, call if:  · You feel you cannot stop from hurting yourself or someone else. Watch closely for changes in your health, and be sure to contact your doctor if:  · Your PTSD symptoms are getting worse. · You have new or worsening symptoms of anxiety. · You are not getting better as expected. Where can you learn more? Go to http://kari-jesus.info/. Archana Moore in the search box to learn more about \"Post-Traumatic Stress Disorder (PTSD): Care Instructions. \"  Current as of: July 26, 2016  Content Version: 11.3  © 1553-9878 [employer] Care instructions adapted under license by [employer] (which disclaims liability or warranty for this information). If you have questions about a medical condition or this instruction, always ask your healthcare professional. [place] [address] any warranty or liability for your use of this information. Orthostatic Hypotension: Care Instructions  Your Care Instructions  Orthostatic hypotension is a quick drop in blood pressure. It happens when you get up from sitting or lying down. You may feel faint, lightheaded, or dizzy. When a person sits up or stands up, the body changes the way it pumps blood. This can slow the flow of blood to the brain for a very short time. And that can make you feel lightheaded. Many medicines can cause this problem, especially in older people.  Lack of fluids (dehydration) or illnesses such as diabetes or heart disease also can cause it. Follow-up care is a key part of your treatment and safety. Be sure to make and go to all appointments, and call your doctor if you are having problems. It's also a good idea to know your test results and keep a list of the medicines you take. How can you care for yourself at home? · Tell your doctor about any problems you have with your medicines. · If your doctor prescribes medicine to help prevent a low blood pressure problem, take it exactly as prescribed. Call your doctor if you think you are having a problem with your medicine. · Drink plenty of fluids, enough so that your urine is light yellow or clear like water. Choose water and other caffeine-free clear liquids. If you have kidney, heart, or liver disease and have to limit fluids, talk with your doctor before you increase the amount of fluids you drink. · Limit or avoid alcohol and caffeine. · Get up slowly from bed or after sitting for a long time. If you are in bed, roll to your side and swing your legs over the edge of the bed and onto the floor. Push your body up to a sitting position. Wait for a while before you slowly stand up. If you are dizzy or lightheaded, sit or lie down. When should you call for help? Call 911 anytime you think you may need emergency care. For example, call if:  · You passed out (lost consciousness). Watch closely for changes in your health, and be sure to contact your doctor if:  · You do not get better as expected. Where can you learn more? Go to http://kari-jesus.info/. Enter Y042 in the search box to learn more about \"Orthostatic Hypotension: Care Instructions. \"  Current as of: April 3, 2017  Content Version: 11.3  © 2701-3022 [employer]. Care instructions adapted under license by [employer] (which disclaims liability or warranty for this information).  If you have questions about a medical condition or this instruction, always ask your healthcare professional. [place] [address] any warranty or liability for your use of this information. Vertigo: Care Instructions  Your Care Instructions  Vertigo is the feeling that you or your surroundings are moving when there is no actual movement. It is often described as a feeling of spinning, whirling, falling, or tilting. Vertigo may make you vomit or feel nauseated. You may have trouble standing or walking and may lose your balance. Vertigo is often related to an inner ear problem, but it can have other more serious causes. If vertigo continues, you may need more tests to find its cause. Follow-up care is a key part of your treatment and safety. Be sure to make and go to all appointments, and call your doctor if you are having problems. Its also a good idea to know your test results and keep a list of the medicines you take. How can you care for yourself at home? · Do not lie flat on your back. Prop yourself up slightly. This may reduce the spinning feeling. Keep your eyes open. · Move slowly so that you do not fall. · If your doctor recommends medicine, take it exactly as directed. · Do not drive while you are having vertigo. Certain exercises, called Angeles-Daroff exercises, can help decrease vertigo. To do Angeles-Daroff exercises:  · Sit on the edge of a bed or sofa and quickly lie down on the side that causes the worst vertigo. Lie on your side with your ear down. · Stay in this position for at least 30 seconds or until the vertigo goes away. · Sit up. If this causes vertigo, wait for it to stop. · Repeat the procedure on the other side. · Repeat this 10 times. Do these exercises 2 times a day until the vertigo is gone. When should you call for help? Call 911 anytime you think you may need emergency care. For example, call if:  · You passed out (lost consciousness). · You have symptoms of a stroke.  These may include:  ¨ Sudden numbness, tingling, weakness, or loss of movement in your face, arm, or leg, especially on only one side of your body. ¨ Sudden vision changes. ¨ Sudden trouble speaking. ¨ Sudden confusion or trouble understanding simple statements. ¨ Sudden problems with walking or balance. ¨ A sudden, severe headache that is different from past headaches. Call your doctor now or seek immediate medical care if:  · Vertigo occurs with a fever, a headache, or ringing in your ears. · You have new or increased nausea and vomiting. Watch closely for changes in your health, and be sure to contact your doctor if:  · Vertigo gets worse or happens more often. · Vertigo has not gotten better after 2 weeks. Where can you learn more? Go to http://kari-jesus.info/. Enter O266 in the search box to learn more about \"Vertigo: Care Instructions. \"  Current as of: July 29, 2016  Content Version: 11.3  © 1322-0102 [employer]. Care instructions adapted under license by [employer] (which disclaims liability or warranty for this information). If you have questions about a medical condition or this instruction, always ask your healthcare professional. [place] [address] any warranty or liability for your use of this information.

## 2017-10-06 NOTE — MR AVS SNAPSHOT
Visit Information Date & Time Provider Department Dept. Phone Encounter #  
 10/6/2017 12:45 PM Eleonora Winkler MD [address] [phone] [id number] Follow-up Instructions Return in about 1 month (around 11/6/2017). Upcoming Health Maintenance Date Due Hepatitis C Screening 1950 FOBT Q 1 YEAR AGE 50-75 3/21/1998 EYE EXAM RETINAL OR DILATED Q1 1/30/2015 Pneumococcal 65+ Low/Medium Risk (2 of 2 - PPSV23) 6/17/2017 FOOT EXAM Q1 6/17/2017 MICROALBUMIN Q1 12/23/2017 HEMOGLOBIN A1C Q6M 3/8/2018 LIPID PANEL Q1 5/3/2018 MEDICARE YEARLY EXAM 5/6/2018 GLAUCOMA SCREENING Q2Y 6/17/2018 DTaP/Tdap/Td series (2 - Td) 6/17/2026 Allergies as of 10/6/2017  Review Complete On: 10/6/2017 By: Eleonora Winkler MD  
  
 Severity Noted Reaction Type Reactions Metformin  11/01/2013   Side Effect Other (comments) Renal insufficiency Current Immunizations  Never Reviewed Name Date Influenza Vaccine 12/7/2015 Not reviewed this visit You Were Diagnosed With   
  
 Codes Comments Orthostatic hypotension    -  Primary ICD-10-CM: I95.1 ICD-9-CM: 458.0 Dizziness     ICD-10-CM: Y10 ICD-9-CM: 780.4 Essential hypertension     ICD-10-CM: I10 
ICD-9-CM: 401.9 Depression, unspecified depression type     ICD-10-CM: F32.9 ICD-9-CM: 918 PTSD (post-traumatic stress disorder)     ICD-10-CM: F43.10 ICD-9-CM: 309.81 Vitals BP Pulse Temp Resp Height(growth percentile) Weight(growth percentile) 115/74 97 98.2 °F (36.8 °C) (Oral) 20 5' 7\" (1.702 m) 190 lb (86.2 kg) BMI Smoking Status 29.76 kg/m2 Never Smoker Vitals History BMI and BSA Data Body Mass Index Body Surface Area  
 29.76 kg/m 2 2.02 m 2 Preferred Pharmacy Pharmacy Name Phone [facility] [address] [facility], [address] [phone] Your Updated Medication List  
  
   
 This list is accurate as of: 10/6/17  1:52 PM.  Always use your most recent med list.  
  
  
  
  
 ADULT LOW DOSE ASPIRIN 81 mg tablet Generic drug:  aspirin delayed-release Take 1 Tab by mouth daily. Indications: MYOCARDIAL INFARCTION PREVENTION  
  
 allopurinol 300 mg tablet Commonly known as:  Cee Diaz Take 1 Tab by mouth daily. dilTIAZem 360 mg SR capsule Commonly known as:  [facility] Take 1 Cap by mouth daily. glyBURIDE 2.5 mg tablet Commonly known as:  Larinda Manzanilla Take 1 Tab by mouth two (2) times daily (with meals). lisinopril 20 mg tablet Commonly known as:  Christy Nicanor Take 1 Tab by mouth two (2) times a day. sertraline 50 mg tablet Commonly known as:  ZOLOFT Take 1 Tab by mouth daily. Prescriptions Sent to Pharmacy Refills  
 sertraline (ZOLOFT) 50 mg tablet 3 Sig: Take 1 Tab by mouth daily. Class: Normal  
 Pharmacy: [address] #: [phone] Route: Oral  
  
We Performed the Following REFERRAL TO CARDIOLOGY [UHC71 Custom] Comments:  
 Please evaluate for REFERRAL TO PHYSICAL THERAPY [XMO31 Custom] Follow-up Instructions Return in about 1 month (around 11/6/2017). Referral Information Referral ID Referred By Referred To  
  
 [id number] Ellen HURST, [address], [address] Phone: [phone] Fax: [phone] Visits Status Start Date End Date 1 New Request 10/6/17 10/6/18 If your referral has a status of pending review or denied, additional information will be sent to support the outcome of this decision. Referral ID Referred By Referred To  
 [id number] Precious CUMMINGS Not Available Visits Status Start Date End Date 1 New Request 10/6/17 10/6/18  If your referral has a status of pending review or denied, additional information will be sent to support the outcome of this decision. Patient Instructions Please contact our office if you have any questions about your visit today. Introducing [facility] SERVICES! Dear Ondina Pop: Thank you for requesting a [employer] account. Our records indicate that you already have an active [employer] account. You can access your account anytime at [email or web address] Did you know that you can access your hospital and ER discharge instructions at any time in [employer]? You can also review all of your test results from your hospital stay or ER visit. Additional Information If you have questions, please visit the Frequently Asked Questions section of the [employer] website at [email or web address] Remember, [employer] is NOT to be used for urgent needs. For medical emergencies, dial 911. Now available from your iPhone and Android! Please provide this summary of care documentation to your next provider. Your primary care clinician is listed as LARISA HURST. If you have any questions after today's visit, please call [phone].

## 2017-10-06 NOTE — PROGRESS NOTES
Chief Complaint   Patient presents with    Hypotension    Follow-up     fall, dizziness    Results     discuss CT results       Health Maintenance Due   Topic Date Due    Hepatitis C Screening  1950    FOBT Q 1 YEAR AGE 50-75  03/21/1998    EYE EXAM RETINAL OR DILATED Q1  01/30/2015    Pneumococcal 65+ Low/Medium Risk (2 of 2 - PPSV23) 06/17/2017    FOOT EXAM Q1  06/17/2017       Health Maintenance reviewed     1. Have you been to the ER, urgent care clinic since your last visit? Hospitalized since your last visit? No    2. Have you seen or consulted any other health care providers outside of the [address] [place] [address] since your last visit? Include any pap smears or colon screening.  No
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
## 2017-11-17 ENCOUNTER — DOCUMENTATION ONLY (OUTPATIENT)
Dept: CARDIOLOGY CLINIC | Age: 67
End: 2017-11-17

## 2017-11-17 ENCOUNTER — OFFICE VISIT (OUTPATIENT)
Dept: CARDIOLOGY CLINIC | Age: 67
End: 2017-11-17

## 2017-11-17 VITALS
HEIGHT: 67 IN | DIASTOLIC BLOOD PRESSURE: 88 MMHG | WEIGHT: 192 LBS | OXYGEN SATURATION: 97 % | SYSTOLIC BLOOD PRESSURE: 140 MMHG | BODY MASS INDEX: 30.13 KG/M2 | HEART RATE: 89 BPM

## 2017-11-17 DIAGNOSIS — R42 DIZZINESS: Primary | ICD-10-CM

## 2017-11-17 NOTE — PROGRESS NOTES
Patient scheduled with ENT @ Dr. Meg Norton office for 12/1/17 @ 9:00am arrival. Address is [address] [facility], [place], [address] [place] [address] Str. Patient given all information.

## 2017-11-17 NOTE — MR AVS SNAPSHOT
Visit Information Date & Time Provider Department Dept. Phone Encounter #  
 11/17/2017  8:20 AM Lo Marie MD [facility] [address] [id number] Upcoming Health Maintenance Date Due Hepatitis C Screening 1950 FOBT Q 1 YEAR AGE 50-75 3/21/1998 EYE EXAM RETINAL OR DILATED Q1 1/30/2015 Pneumococcal 65+ Low/Medium Risk (2 of 2 - PPSV23) 6/17/2017 FOOT EXAM Q1 6/17/2017 MICROALBUMIN Q1 12/23/2017 HEMOGLOBIN A1C Q6M 3/8/2018 LIPID PANEL Q1 5/3/2018 MEDICARE YEARLY EXAM 5/6/2018 GLAUCOMA SCREENING Q2Y 6/17/2018 DTaP/Tdap/Td series (2 - Td) 6/17/2026 Allergies as of 11/17/2017  Review Complete On: 10/6/2017 By: Dorian Hadley MD  
  
 Severity Noted Reaction Type Reactions Metformin  11/01/2013   Side Effect Other (comments) Renal insufficiency Current Immunizations  Never Reviewed Name Date Influenza Vaccine 12/7/2015 Not reviewed this visit You Were Diagnosed With   
  
 Codes Comments Dizziness    -  Primary ICD-10-CM: M40 ICD-9-CM: 780.4 Vitals BP Pulse Height(growth percentile) Weight(growth percentile) SpO2 BMI  
 140/88 (BP 1 Location: Left arm, BP Patient Position: Standing) 89 5' 7\" (1.702 m) 192 lb (87.1 kg) 97% 30.07 kg/m2 Smoking Status Never Smoker Vitals History BMI and BSA Data Body Mass Index Body Surface Area 30.07 kg/m 2 2.03 m 2 Preferred Pharmacy Pharmacy Name Phone [facility] [address] [facility], [address] [phone] Your Updated Medication List  
  
   
This list is accurate as of: 11/17/17  9:10 AM.  Always use your most recent med list.  
  
  
  
  
 ADULT LOW DOSE ASPIRIN 81 mg tablet Generic drug:  aspirin delayed-release Take 81 mg by mouth daily as needed. Indications: myocardial infarction prevention  
  
 allopurinol 300 mg tablet Commonly known as:  Genora Hack Take 1 Tab by mouth daily. dilTIAZem 360 mg SR capsule Commonly known as:  [facility] Take 1 Cap by mouth daily. glyBURIDE 2.5 mg tablet Commonly known as:  Timo Angelo Take 1 Tab by mouth two (2) times daily (with meals). lisinopril 20 mg tablet Commonly known as:  Prakash Bridges Take 1 Tab by mouth two (2) times a day. sertraline 50 mg tablet Commonly known as:  ZOLOFT Take 1 Tab by mouth daily. We Performed the Following AMB POC EKG ROUTINE W/ 12 LEADS, INTER & REP [22064 CPT(R)] REFERRAL TO ENT-OTOLARYNGOLOGY [IOO91 Custom] Comments:  
 Please evaluate patient for vertigo. Referral Information Referral ID Referred By Referred To  
  
 [id number] Elvin Guillaume MD   
   [address] Phone: [phone] Fax: [phone] Visits Status Start Date End Date 1 New Request 11/17/17 11/17/18 If your referral has a status of pending review or denied, additional information will be sent to support the outcome of this decision. Introducing [facility] & HEALTH SERVICES! Dear Zulema Ambrosio: Thank you for requesting a [employer] [facility] account. Our records indicate that you already have an active [facility] account. You can access your account anytime at [email or web address] Did you know that you can access your hospital and ER discharge instructions at any time in [employer] [facility]? You can also review all of your test results from your hospital stay or ER visit. Additional Information If you have questions, please visit the Frequently Asked Questions section of the [place] [facility] website at [email or web address] Remember, [employer] [facility] is NOT to be used for urgent needs. For medical emergencies, dial 911. Now available from your iPhone and Android! Please provide this summary of care documentation to your next provider. Your primary care clinician is listed as LARISA HURST. If you have any questions after today's visit, please call [phone].

## 2017-11-17 NOTE — LETTER
10/24/2017 2:05 PM 
 
Mr. Alexy Chapman [phone] Dear Kelley Ernst, This letter is being sent to you because we have been unable to reach you by telephone. Minesh Darnell MD will be out of the office on 11/17/17. Please call the office at your earliest convenience to reschedule your appointment. We can be reached at [phone]. We look forward to hearing from you soon.

## 2017-11-17 NOTE — PROGRESS NOTES
1. Have you been to the ER, urgent care clinic since your last visit? Hospitalized since your last visit? no  2. Have you seen or consulted any other health care providers outside of the [address] [place] [address] since your last visit? Include any pap smears or colon screening.  no

## 2017-11-19 NOTE — PROGRESS NOTES
PATIENT NAME: Esther Narvaez         69 y.o.      1950              DATE:11/17/2017    REASON FOR VISIT: Dizziness    HISTORY OF PRESENT ILLNESS: The patient had a head injury. He has been experiencing some dizziness intermittently since that time. The symptoms occur when he is lying in bed and when he rolls over and changes positions. There are described as a vertiginous sensation. The patient denies palpitation, syncope, presyncope. Denies chest pain and dyspnea on exertion. Denies edema in the lower extremities. PAST MEDICAL HISTORY:   Past Medical History:  No date: Calculus of kidney  No date: Cancer [facility])      Comment: HX of BCC  8/19/2010: Diabetes mellitus ([address]  8/19/2010: Gout  8/19/2010: HTN (hypertension)  8/19/2010: Hyperlipemia  No date: Kidney disease  No date: Lumbar spondylosis  No date: Proteinuria  No date: Skin cancer, basal cell      Comment: neck  No date: Urolithiasis    PAST SURGICAL HISTORY:   Past Surgical History:  No date: HX GI  No date: HX OTHER SURGICAL      Comment: Basal cell removed from left side of face  No date: HX UROLOGICAL      Comment: kidney      SOCIAL HISTORY:  Social History    Marital status:              Spouse name:                       Years of education:                 Number of children:               Social History Main Topics    Smoking status: Never Smoker                                                                Smokeless status: Never Used                        Alcohol use: No              Drug use: No              Sexual activity: Yes                      ALLERGIES:    -- Metformin -- Other (comments)    --  Renal insufficiency     CURRENT MEDICATIONS:   Current Outpatient Prescriptions:  sertraline (ZOLOFT) 50 mg tablet, Take 1 Tab by mouth daily. allopurinol (ZYLOPRIM) 300 mg tablet, Take 1 Tab by mouth daily. dilTIAZem (TIAZAC) 360 mg SR capsule, Take 1 Cap by mouth daily.   glyBURIDE (DIABETA) 2.5 mg tablet, Take 1 Tab by mouth two (2) times daily (with meals). (Patient taking differently: Take 2.5 mg by mouth daily.)  lisinopril (PRINIVIL, ZESTRIL) 20 mg tablet, Take 1 Tab by mouth two (2) times a day. (Patient taking differently: Take 20 mg by mouth daily.)  aspirin delayed-release (ADULT LOW DOSE ASPIRIN) 81 mg tablet, Take 81 mg by mouth daily as needed. Indications: myocardial infarction prevention    No current facility-administered medications for this visit. REVIEW of SYSTEMS:History obtained from chart review and the patient  General ROS: negative for - weight gain or weight loss  HEENT: Denies tinnitus. Cardiovascular: Please see history of present illness     PHYSICAL EXAMINATION:   /88 (BP 1 Location: Left arm, BP Patient Position: Standing)  Pulse 89  Ht 5' 7\" (1.702 m)  Wt 87.1 kg (192 lb)  SpO2 97%  BMI 30.07 kg/m2  BP Readings from Last 3 Encounters:  11/17/17 : 140/88  10/06/17 : 115/74  09/15/17 : 119/68    Pulse Readings from Last 3 Encounters:  11/17/17 : 89  10/06/17 : 97  09/15/17 : 84    Wt Readings from Last 3 Encounters:  11/17/17 : 87.1 kg (192 lb)  10/06/17 : 86.2 kg (190 lb)  09/15/17 : 87.3 kg (192 lb 8 oz)    General: Well-developed white male in no apparent distress. HEENT: Sclera clear. Mucous membranes pink and moist.  Neck: No jugular venous distention. Carotid upstrokes 2+ without bruits. Chest: Clear to  auscultation. Heart: PMI not palpable. Regular rhythm. No murmur or gallop. Abdomen: Nontender without masses or organomegaly. Extremities: No edema. Skin: Warm and dry. No stasis changes. Neuro: Alert, oriented, speech WNL, no facial asymmetry. Gait WNL. EKG: Within normal limits    IMPRESSION:   Benign positional vertigo  Lightheadedness no cardiac etiology graft    PLAN:  No additional cardiac testing indicated. Referral to otolaryngology   return to office as needed    The diagnoses and plan were discussed with patient. All questions answered.   Plan of care agreed to by all concerned. Amy Ovalle.  MD Will       ,

## 2018-01-19 ENCOUNTER — OFFICE VISIT (OUTPATIENT)
Dept: FAMILY MEDICINE CLINIC | Age: 68
End: 2018-01-19

## 2018-01-19 VITALS
BODY MASS INDEX: 30.29 KG/M2 | WEIGHT: 193 LBS | HEART RATE: 85 BPM | SYSTOLIC BLOOD PRESSURE: 144 MMHG | DIASTOLIC BLOOD PRESSURE: 83 MMHG | OXYGEN SATURATION: 98 % | TEMPERATURE: 97 F | RESPIRATION RATE: 14 BRPM | HEIGHT: 67 IN

## 2018-01-19 DIAGNOSIS — E11.9 TYPE 2 DIABETES MELLITUS WITHOUT COMPLICATION, WITHOUT LONG-TERM CURRENT USE OF INSULIN (HCC): Primary | ICD-10-CM

## 2018-01-19 DIAGNOSIS — I10 ESSENTIAL HYPERTENSION: ICD-10-CM

## 2018-01-19 DIAGNOSIS — M10.9 GOUT, UNSPECIFIED CAUSE, UNSPECIFIED CHRONICITY, UNSPECIFIED SITE: ICD-10-CM

## 2018-01-19 RX ORDER — LISINOPRIL 20 MG/1
20 TABLET ORAL 2 TIMES DAILY
Qty: 180 TAB | Refills: 0 | Status: SHIPPED | OUTPATIENT
Start: 2018-01-19 | End: 2018-06-22 | Stop reason: SDUPTHER

## 2018-01-19 RX ORDER — ALLOPURINOL 300 MG/1
300 TABLET ORAL DAILY
Qty: 90 TAB | Refills: 0 | Status: SHIPPED | OUTPATIENT
Start: 2018-01-19 | End: 2018-03-23 | Stop reason: SDUPTHER

## 2018-01-19 RX ORDER — GLYBURIDE 2.5 MG/1
2.5 TABLET ORAL 2 TIMES DAILY WITH MEALS
Qty: 180 TAB | Refills: 0 | Status: SHIPPED | OUTPATIENT
Start: 2018-01-19 | End: 2018-06-22 | Stop reason: SDUPTHER

## 2018-01-19 RX ORDER — DILTIAZEM HYDROCHLORIDE 360 MG/1
360 CAPSULE, EXTENDED RELEASE ORAL DAILY
Qty: 90 CAP | Refills: 0 | Status: SHIPPED | OUTPATIENT
Start: 2018-01-19 | End: 2018-03-23 | Stop reason: SDUPTHER

## 2018-01-19 NOTE — PROGRESS NOTES
Terri Cervantes 69 y.o. male   Chief Complaint   Patient presents with    Follow-up    Diabetes    Hypertension    Gout    Medication Refill         1. Have you been to the ER, urgent care clinic since your last visit? Hospitalized since your last visit? No    2. Have you seen or consulted any other health care providers outside of the [address] [place] [address] since your last visit? Include any pap smears or colon screening.  No

## 2018-01-19 NOTE — PROGRESS NOTES
HISTORY OF PRESENT ILLNESS  Jenn Lee is a 69 y.o. male. Chief Complaint   Patient presents with    Follow-up    Diabetes    Hypertension    Gout    Medication Refill       HPI  Patient is here for a follow up of DM, HTN, and Gout. Pt reports that he is feeling well overall. His home bs readings have been wnl per pt. Patient does need medication refills today. Patient requests electronic refills. New concerns today: none      ROS  Review of Systems   Constitutional: Negative. HENT: Negative. Respiratory: Negative. Cardiovascular: Negative. All other systems reviewed and are negative. Physical Exam  Nursing note and vitals reviewed. Constitutional: He is oriented to person, place, and time. He appears well-developed and well-nourished. HENT:   Head: Normocephalic and atraumatic. Right Ear: External ear normal.   Left Ear: External ear normal.   Nose: Nose normal.   Eyes: Conjunctivae and EOM are normal.   Neck: Normal range of motion. Neck supple. No JVD present. Carotid bruit is not present. No thyromegaly present. Cardiovascular: Normal rate, regular rhythm, normal heart sounds and intact distal pulses. Exam reveals no gallop and no friction rub. No murmur heard. Pulmonary/Chest: Effort normal and breath sounds normal. He has no wheezes. He has no rhonchi. He has no rales. Abdominal: Soft. Musculoskeletal: Normal range of motion. Neurological: He is alert and oriented to person, place, and time. Coordination normal.   Skin: Skin is warm and dry. Psychiatric: He has a normal mood and affect. His behavior is normal. Judgment and thought content normal.       ASSESSMENT and PLAN  Diagnoses and all orders for this visit:    1. Type 2 diabetes mellitus without complication, without long-term current use of insulin (HCC)  -     glyBURIDE (DIABETA) 2.5 mg tablet; Take 1 Tab by mouth two (2) times daily (with meals). -     METABOLIC PANEL, COMPREHENSIVE;  Future  - LIPID PANEL; Future  -     HEMOGLOBIN A1C WITH EAG; Future  -     MICROALBUMIN, UR, RAND W/ MICROALBUMIN/CREA RATIO; Future  Stable, cont pres tx plan. 2. Gout, unspecified cause, unspecified chronicity, unspecified site  -     allopurinol (ZYLOPRIM) 300 mg tablet; Take 1 Tab by mouth daily.  -     METABOLIC PANEL, COMPREHENSIVE; Future  Stable, cont pres tx plan. 3. Essential hypertension  -     lisinopril (PRINIVIL, ZESTRIL) 20 mg tablet; Take 1 Tab by mouth two (2) times a day. -     dilTIAZem (TIAZAC) 360 mg SR capsule; Take 1 Cap by mouth daily.  -     METABOLIC PANEL, COMPREHENSIVE; Future  -     LIPID PANEL; Future  Stable, cont pres tx plan.        Follow-up Disposition:  3 months with pcp; sooner prn

## 2018-03-16 ENCOUNTER — HOSPITAL ENCOUNTER (OUTPATIENT)
Dept: LAB | Age: 68
Discharge: HOME | End: 2018-03-16
Payer: COMMERCIAL

## 2018-03-16 DIAGNOSIS — M10.9 GOUT, UNSPECIFIED CAUSE, UNSPECIFIED CHRONICITY, UNSPECIFIED SITE: ICD-10-CM

## 2018-03-16 DIAGNOSIS — I10 ESSENTIAL HYPERTENSION: ICD-10-CM

## 2018-03-16 DIAGNOSIS — E11.9 TYPE 2 DIABETES MELLITUS WITHOUT COMPLICATION, WITHOUT LONG-TERM CURRENT USE OF INSULIN (HCC): ICD-10-CM

## 2018-03-16 LAB
ALBUMIN SERPL-MCNC: 3.5 G/DL (ref 3.4–5)
ALBUMIN/GLOB SERPL: 1.1 {RATIO} (ref 0.8–1.7)
ALP SERPL-CCNC: 105 U/L (ref 45–117)
ALT SERPL-CCNC: 17 U/L (ref 16–61)
ANION GAP SERPL CALC-SCNC: 8 MMOL/L (ref 3–18)
AST SERPL-CCNC: 13 U/L (ref 15–37)
BILIRUB SERPL-MCNC: 0.6 MG/DL (ref 0.2–1)
BUN SERPL-MCNC: 19 MG/DL (ref 7–18)
BUN/CREAT SERPL: 14 (ref 12–20)
CALCIUM SERPL-MCNC: 9.1 MG/DL (ref 8.5–10.1)
CHLORIDE SERPL-SCNC: 106 MMOL/L (ref 100–108)
CHOLEST SERPL-MCNC: 160 MG/DL
CO2 SERPL-SCNC: 24 MMOL/L (ref 21–32)
CREAT SERPL-MCNC: 1.34 MG/DL (ref 0.6–1.3)
EST. AVERAGE GLUCOSE BLD GHB EST-MCNC: 151 MG/DL
GLOBULIN SER CALC-MCNC: 3.3 G/DL (ref 2–4)
GLUCOSE SERPL-MCNC: 112 MG/DL (ref 74–99)
HBA1C MFR BLD: 6.9 % (ref 4.2–5.6)
HDLC SERPL-MCNC: 30 MG/DL (ref 40–60)
HDLC SERPL: 5.3 {RATIO} (ref 0–5)
LDLC SERPL CALC-MCNC: 91 MG/DL (ref 0–100)
LIPID PROFILE,FLP: ABNORMAL
POTASSIUM SERPL-SCNC: 4.1 MMOL/L (ref 3.5–5.5)
PROT SERPL-MCNC: 6.8 G/DL (ref 6.4–8.2)
SODIUM SERPL-SCNC: 138 MMOL/L (ref 136–145)
TRIGL SERPL-MCNC: 195 MG/DL (ref ?–150)
VLDLC SERPL CALC-MCNC: 39 MG/DL

## 2018-03-16 PROCEDURE — 83036 HEMOGLOBIN GLYCOSYLATED A1C: CPT | Performed by: FAMILY MEDICINE

## 2018-03-16 PROCEDURE — 80053 COMPREHEN METABOLIC PANEL: CPT | Performed by: FAMILY MEDICINE

## 2018-03-16 PROCEDURE — 36415 COLL VENOUS BLD VENIPUNCTURE: CPT | Performed by: FAMILY MEDICINE

## 2018-03-16 PROCEDURE — 80061 LIPID PANEL: CPT | Performed by: FAMILY MEDICINE

## 2018-03-23 ENCOUNTER — OFFICE VISIT (OUTPATIENT)
Dept: FAMILY MEDICINE CLINIC | Age: 68
End: 2018-03-23

## 2018-03-23 VITALS
HEIGHT: 67 IN | DIASTOLIC BLOOD PRESSURE: 76 MMHG | WEIGHT: 190.25 LBS | SYSTOLIC BLOOD PRESSURE: 129 MMHG | HEART RATE: 95 BPM | TEMPERATURE: 97.2 F | RESPIRATION RATE: 20 BRPM | BODY MASS INDEX: 29.86 KG/M2

## 2018-03-23 DIAGNOSIS — Z71.2 ENCOUNTER TO DISCUSS TEST RESULTS: Primary | ICD-10-CM

## 2018-03-23 DIAGNOSIS — M25.512 CHRONIC LEFT SHOULDER PAIN: ICD-10-CM

## 2018-03-23 DIAGNOSIS — I10 ESSENTIAL HYPERTENSION: ICD-10-CM

## 2018-03-23 DIAGNOSIS — M10.9 GOUT, UNSPECIFIED CAUSE, UNSPECIFIED CHRONICITY, UNSPECIFIED SITE: ICD-10-CM

## 2018-03-23 DIAGNOSIS — G89.29 CHRONIC LEFT SHOULDER PAIN: ICD-10-CM

## 2018-03-23 PROBLEM — E11.21 TYPE 2 DIABETES WITH NEPHROPATHY (HCC): Status: ACTIVE | Noted: 2018-03-23

## 2018-03-23 RX ORDER — DILTIAZEM HYDROCHLORIDE 360 MG/1
360 CAPSULE, EXTENDED RELEASE ORAL DAILY
Qty: 90 CAP | Refills: 0 | Status: SHIPPED | OUTPATIENT
Start: 2018-03-23 | End: 2018-06-22 | Stop reason: SDUPTHER

## 2018-03-23 RX ORDER — ALLOPURINOL 300 MG/1
300 TABLET ORAL DAILY
Qty: 90 TAB | Refills: 0 | Status: SHIPPED | OUTPATIENT
Start: 2018-03-23 | End: 2018-06-22 | Stop reason: SDUPTHER

## 2018-03-23 NOTE — PROGRESS NOTES
Chief Complaint   Patient presents with    Diabetes    Gout    Hypertension    Results     discuss lab results       Health Maintenance Due   Topic Date Due    Hepatitis C Screening  1950    FOBT Q 1 YEAR AGE 50-75  03/21/1998    EYE EXAM RETINAL OR DILATED Q1  01/30/2015    Pneumococcal 65+ Low/Medium Risk (2 of 2 - PPSV23) 06/17/2017    FOOT EXAM Q1  06/17/2017    MICROALBUMIN Q1  12/23/2017       Health Maintenance reviewed     1. Have you been to the ER, urgent care clinic since your last visit? Hospitalized since your last visit? No    2. Have you seen or consulted any other health care providers outside of the [address] [place] [address] since your last visit? Include any pap smears or colon screening.  No

## 2018-03-23 NOTE — MR AVS SNAPSHOT
[address] [place] [address] 
 
 
 [address] [place] [id number] [phone] Patient: Zina Fothergill MRN:  [id number] Visit Information Date & Time Provider Department Dept. Phone Encounter #  
 3/23/2018 11:15 AM Jacqui Vinson NP [phone] [id number] Follow-up Instructions Return in about 3 months (around 6/23/2018). Upcoming Health Maintenance Date Due Hepatitis C Screening 1950 FOBT Q 1 YEAR AGE 50-75 3/21/1998 EYE EXAM RETINAL OR DILATED Q1 1/30/2015 Pneumococcal 65+ Low/Medium Risk (2 of 2 - PPSV23) 6/17/2017 FOOT EXAM Q1 6/17/2017 MICROALBUMIN Q1 12/23/2017 MEDICARE YEARLY EXAM 5/6/2018 GLAUCOMA SCREENING Q2Y 6/17/2018 HEMOGLOBIN A1C Q6M 9/16/2018 LIPID PANEL Q1 3/16/2019 DTaP/Tdap/Td series (2 - Td) 6/17/2026 Allergies as of 3/23/2018  Review Complete On: 3/23/2018 By: Jacqui Vinson NP Severity Noted Reaction Type Reactions Metformin  11/01/2013   Side Effect Other (comments) Renal insufficiency Current Immunizations  Never Reviewed Name Date Influenza Vaccine 12/7/2015 Not reviewed this visit You Were Diagnosed With   
  
 Codes Comments Encounter to discuss test results    -  Primary ICD-10-CM: Z71.89 ICD-9-CM: V65.49 Gout, unspecified cause, unspecified chronicity, unspecified site     ICD-10-CM: M10.9 ICD-9-CM: 274.9 Essential hypertension     ICD-10-CM: I10 
ICD-9-CM: 401.9 Chronic left shoulder pain     ICD-10-CM: M25.512, G89.29 ICD-9-CM: 719.41, 338.29 Vitals BP Pulse Temp Resp Height(growth percentile) Weight(growth percentile) 129/76 (BP 1 Location: Left arm, BP Patient Position: Sitting) 95 97.2 °F (36.2 °C) (Oral) 20 5' 7\" (1.702 m) 190 lb 4 oz (86.3 kg) BMI Smoking Status 29.8 kg/m2 Never Smoker BMI and BSA Data Body Mass Index Body Surface Area 29.8 kg/m 2 2.02 m 2 [facility]erred Pharm[facility] [phone] Your Updated Medication List  
  
   
This list is accurate as of 3/23/18 11:58 AM.  Always use your most recent med list.  
  
  
  
  
 ADULT LOW DOSE ASPIRIN 81 mg tablet Generic drug:  aspirin delayed-release Take 81 mg by mouth daily as needed. Indications: myocardial infarction prevention  
  
 allopurinol 300 mg tablet Commonly known as:  Sims Ros Take 1 Tab by mouth daily. dilTIAZem 360 mg SR capsule Commonly known as:  [facility] Take 1 Cap by mouth daily. glyBURIDE 2.5 mg tablet Commonly known as:  Perkb Bibber Take 1 Tab by mouth two (2) times daily (with meals). lisinopril 20 mg tablet Commonly known as:  Sita Huizar Take 1 Tab by mouth two (2) times a day. Prescriptions Sent to Pharmacy Refills  
 allopurinol (ZYLOPRIM) 300 mg tablet 0 Sig: Take 1 Tab by mouth daily. Class: Normal  
 Pharmacy: [id number] [place] [address] Ph #: [phone] Route: Oral  
 dilTIAZem (TIAZAC) 360 mg SR capsule 0 Sig: Take 1 Cap by mouth daily. Class: Normal  
 Pharmacy: [id number] [place] [address] Ph #: [phone] Route: Oral  
  
We Performed the Following REFERRAL TO PHYSICAL THERAPY [[id number] Custom] Comments:  
 Please evaluate patient for left shoulder pain Follow-up Instructions Return in about 3 months (around 6/23/2018). Referral Information Referral ID Referred By Referred To  
  
 [id number] Bud LANGE Not Available Visits Status Start Date End Date 1 New Request 3/23/18 3/23/19 If your referral has a status of pending review or denied, additional information will be sent to support the outcome of this decision. Patient Instructions Please contact our office if you have any questions about your visit today. DASH Diet: Care Instructions Your Care Instructions The DASH diet is an eating plan that can help lower your blood pressure. DASH stands for Dietary Approaches to Stop Hypertension. Hypertension is high blood pressure. The DASH diet focuses on eating foods that are high in calcium, potassium, and magnesium. These nutrients can lower blood pressure. The foods that are highest in these nutrients are fruits, vegetables, low-fat dairy products, nuts, seeds, and legumes. But taking calcium, potassium, and magnesium supplements instead of eating foods that are high in those nutrients does not have the same effect. The DASH diet also includes whole grains, fish, and poultry. The DASH diet is one of several lifestyle changes your doctor may recommend to lower your high blood pressure. Your doctor may also want you to decrease the amount of sodium in your diet. Lowering sodium while following the DASH diet can lower blood pressure even further than just the DASH diet alone. Follow-up care is a key part of your treatment and safety. Be sure to make and go to all appointments, and call your doctor if you are having problems. It's also a good idea to know your test results and keep a list of the medicines you take. How can you care for yourself at home? Following the DASH diet · Eat 4 to 5 servings of fruit each day. A serving is 1 medium-sized piece of fruit, ½ cup chopped or canned fruit, 1/4 cup dried fruit, or 4 ounces (½ cup) of fruit juice. Choose fruit more often than fruit juice. · Eat 4 to 5 servings of vegetables each day. A serving is 1 cup of lettuce or raw leafy vegetables, ½ cup of chopped or cooked vegetables, or 4 ounces (½ cup) of vegetable juice. Choose vegetables more often than vegetable juice. · Get 2 to 3 servings of low-fat and fat-free dairy each day.  A serving is 8 ounces of milk, 1 cup of yogurt, or 1 ½ ounces of cheese. · Eat 6 to 8 servings of grains each day. A serving is 1 slice of bread, 1 ounce of dry cereal, or ½ cup of cooked rice, pasta, or cooked cereal. Try to choose whole-grain products as much as possible. · Limit lean meat, poultry, and fish to 2 servings each day. A serving is 3 ounces, about the size of a deck of cards. · Eat 4 to 5 servings of nuts, seeds, and legumes (cooked dried beans, lentils, and split peas) each week. A serving is 1/3 cup of nuts, 2 tablespoons of seeds, or ½ cup of cooked beans or peas. · Limit fats and oils to 2 to 3 servings each day. A serving is 1 teaspoon of vegetable oil or 2 tablespoons of salad dressing. · Limit sweets and added sugars to 5 servings or less a week. A serving is 1 tablespoon jelly or jam, ½ cup sorbet, or 1 cup of lemonade. · Eat less than 2,300 milligrams (mg) of sodium a day. If you limit your sodium to 1,500 mg a day, you can lower your blood pressure even more. Tips for success · Start small. Do not try to make dramatic changes to your diet all at once. You might feel that you are missing out on your favorite foods and then be more likely to not follow the plan. Make small changes, and stick with them. Once those changes become habit, add a few more changes. · Try some of the following: ¨ Make it a goal to eat a fruit or vegetable at every meal and at snacks. This will make it easy to get the recommended amount of fruits and vegetables each day. ¨ Try yogurt topped with fruit and nuts for a snack or healthy dessert. ¨ Add lettuce, tomato, cucumber, and onion to sandwiches. ¨ Combine a ready-made pizza crust with low-fat mozzarella cheese and lots of vegetable toppings. Try using tomatoes, squash, spinach, broccoli, carrots, cauliflower, and onions. ¨ Have a variety of cut-up vegetables with a low-fat dip as an appetizer instead of chips and dip. ¨ Sprinkle sunflower seeds or chopped almonds over salads. Or try adding chopped walnuts or almonds to cooked vegetables. ¨ Try some vegetarian meals using beans and peas. Add garbanzo or kidney beans to salads. Make burritos and tacos with mashed reid beans or black beans. Where can you learn more? Go to http://kari-jesus.info/. Enter B586 in the search box to learn more about \"DASH Diet: Care Instructions. \" Current as of: September 21, 2016 Content Version: 11.4 © 7265-5771 StudyCloud. Care instructions adapted under license by [employer] (which disclaims liability or warranty for this information). If you have questions about a medical condition or this instruction, always ask your healthcare professional. [address] any warranty or liability for your use of this information. Kidney Disease and High Blood Pressure: Care Instructions Your Care Instructions Long-term (chronic) kidney disease happens when the kidneys cannot remove waste and keep your body's fluids and chemicals in balance. Usually, the kidneys remove waste from the blood through the urine. When the kidneys are not working well, waste can build up so much that it poisons the body. Kidney disease can make you very tired. It also can cause swelling, or edema, in your legs or other areas of your body. High blood pressure is one of the major causes of chronic kidney disease. And kidney disease can also cause high blood pressure. No matter which came first, having high blood pressure damages the tiny blood vessels in the kidneys. If you have high blood pressure, it is important to lower it. There are many things you can do to lower your blood pressure, which may help slow or stop the damage to your kidneys. Follow-up care is a key part of your treatment and safety.  Be sure to make and go to all appointments, and call your doctor if you are having problems. It's also a good idea to know your test results and keep a list of the medicines you take. How can you care for yourself at home? · Be safe with medicines. Take your medicines exactly as prescribed. Call your doctor if you have any problems with your medicine. You will probably need more than one medicine to lower your blood pressure. You will get more details on the specific medicines your doctor prescribes. · Work with your doctor and a dietitian to plan meals that have the right amount of nutrients for you. You will probably have to limit salt, fluids, and protein. · Stay at a healthy weight. This is very important if you put on weight around the waist. Losing even 10 pounds can help you lower your blood pressure. · Manage other health problems such as diabetes and high cholesterol. You can help lower your risk for heart disease and blood vessel problems with a healthy lifestyle along with medicines. · Do not take ibuprofen (Advil, Motrin) or naproxen (Aleve), or similar medicines, unless your doctor tells you to. They may make chronic kidney disease worse. It is okay to take acetaminophen (Tylenol). · If your doctor recommends it, get more exercise. Walking is a good choice. Bit by bit, increase the amount you walk every day. Try for at least 30 minutes on most days of the week. You also may want to swim, bike, or do other activities. · Limit or avoid alcohol. Talk to your doctor about whether you can drink any alcohol. · Do not smoke or allow others to smoke around you. If you need help quitting, talk to your doctor about stop-smoking programs and medicines. These can increase your chances of quitting for good. When should you call for help? Call 911 anytime you think you may need emergency care. For example, call if: 
? · You passed out (lost consciousness). ?Call your doctor now or seek immediate medical care if: 
? · You have new or worse nausea and vomiting. ? · You have much less urine than normal, or you have no urine. ? · You are feeling confused or cannot think clearly. ? · You have new or more blood in your urine. ? · You have new swelling. ? · You are dizzy or lightheaded, or you feel like you may faint. ? Watch closely for changes in your health, and be sure to contact your doctor if: 
? · You do not get better as expected. Where can you learn more? Go to http://kari-jesus.info/. Enter H181 in the search box to learn more about \"Kidney Disease and High Blood Pressure: Care Instructions. \" Current as of: May 12, 2017 Content Version: 11.4 © 2963-0365 Skelta Software. Care instructions adapted under license by [employer] (which disclaims liability or warranty for this information). If you have questions about a medical condition or this instruction, always ask your healthcare professional. [address] any warranty or liability for your use of this information. Introducing hospitals & HEALTH SERVICES! Dear Lucía Juarez: Thank you for requesting a ethority account. Our records indicate that you already have an active ethority account. You can access your account anytime at [email or web address] Did you know that you can access your hospital and ER discharge instructions at any time in ethority? You can also review all of your test results from your hospital stay or ER visit. Additional Information If you have questions, please visit the Frequently Asked Questions section of the ethority website at [email or web address] Remember, ethority is NOT to be used for urgent needs. For medical emergencies, dial 911. Now available from your iPhone and Android! Please provide this summary of care documentation to your next provider. Your primary care clinician is listed as LARISA HURST.  If you have any questions after today's visit, please call [phone].

## 2018-03-23 NOTE — PATIENT INSTRUCTIONS
Please contact our office if you have any questions about your visit today. DASH Diet: Care Instructions  Your Care Instructions    The DASH diet is an eating plan that can help lower your blood pressure. DASH stands for Dietary Approaches to Stop Hypertension. Hypertension is high blood pressure. The DASH diet focuses on eating foods that are high in calcium, potassium, and magnesium. These nutrients can lower blood pressure. The foods that are highest in these nutrients are fruits, vegetables, low-fat dairy products, nuts, seeds, and legumes. But taking calcium, potassium, and magnesium supplements instead of eating foods that are high in those nutrients does not have the same effect. The DASH diet also includes whole grains, fish, and poultry. The DASH diet is one of several lifestyle changes your doctor may recommend to lower your high blood pressure. Your doctor may also want you to decrease the amount of sodium in your diet. Lowering sodium while following the DASH diet can lower blood pressure even further than just the DASH diet alone. Follow-up care is a key part of your treatment and safety. Be sure to make and go to all appointments, and call your doctor if you are having problems. It's also a good idea to know your test results and keep a list of the medicines you take. How can you care for yourself at home? Following the DASH diet  · Eat 4 to 5 servings of fruit each day. A serving is 1 medium-sized piece of fruit, ½ cup chopped or canned fruit, 1/4 cup dried fruit, or 4 ounces (½ cup) of fruit juice. Choose fruit more often than fruit juice. · Eat 4 to 5 servings of vegetables each day. A serving is 1 cup of lettuce or raw leafy vegetables, ½ cup of chopped or cooked vegetables, or 4 ounces (½ cup) of vegetable juice. Choose vegetables more often than vegetable juice. · Get 2 to 3 servings of low-fat and fat-free dairy each day.  A serving is 8 ounces of milk, 1 cup of yogurt, or 1 ½ ounces of cheese. · Eat 6 to 8 servings of grains each day. A serving is 1 slice of bread, 1 ounce of dry cereal, or ½ cup of cooked rice, pasta, or cooked cereal. Try to choose whole-grain products as much as possible. · Limit lean meat, poultry, and fish to 2 servings each day. A serving is 3 ounces, about the size of a deck of cards. · Eat 4 to 5 servings of nuts, seeds, and legumes (cooked dried beans, lentils, and split peas) each week. A serving is 1/3 cup of nuts, 2 tablespoons of seeds, or ½ cup of cooked beans or peas. · Limit fats and oils to 2 to 3 servings each day. A serving is 1 teaspoon of vegetable oil or 2 tablespoons of salad dressing. · Limit sweets and added sugars to 5 servings or less a week. A serving is 1 tablespoon jelly or jam, ½ cup sorbet, or 1 cup of lemonade. · Eat less than 2,300 milligrams (mg) of sodium a day. If you limit your sodium to 1,500 mg a day, you can lower your blood pressure even more. Tips for success  · Start small. Do not try to make dramatic changes to your diet all at once. You might feel that you are missing out on your favorite foods and then be more likely to not follow the plan. Make small changes, and stick with them. Once those changes become habit, add a few more changes. · Try some of the following:  ¨ Make it a goal to eat a fruit or vegetable at every meal and at snacks. This will make it easy to get the recommended amount of fruits and vegetables each day. ¨ Try yogurt topped with fruit and nuts for a snack or healthy dessert. ¨ Add lettuce, tomato, cucumber, and onion to sandwiches. ¨ Combine a ready-made pizza crust with low-fat mozzarella cheese and lots of vegetable toppings. Try using tomatoes, squash, spinach, broccoli, carrots, cauliflower, and onions. ¨ Have a variety of cut-up vegetables with a low-fat dip as an appetizer instead of chips and dip. ¨ Sprinkle sunflower seeds or chopped almonds over salads.  Or try adding chopped walnuts or almonds to cooked vegetables. ¨ Try some vegetarian meals using beans and peas. Add garbanzo or kidney beans to salads. Make burritos and tacos with mashed reid beans or black beans. Where can you learn more? Go to http://kari-jesus.info/. Enter A218 in the search box to learn more about \"DASH Diet: Care Instructions. \"  Current as of: September 21, 2016  Content Version: 11.4  © 3696-4757 CADFORCE. Care instructions adapted under license by [employer] (which disclaims liability or warranty for this information). If you have questions about a medical condition or this instruction, always ask your healthcare professional. [address] any warranty or liability for your use of this information. Kidney Disease and High Blood Pressure: Care Instructions  Your Care Instructions    Long-term (chronic) kidney disease happens when the kidneys cannot remove waste and keep your body's fluids and chemicals in balance. Usually, the kidneys remove waste from the blood through the urine. When the kidneys are not working well, waste can build up so much that it poisons the body. Kidney disease can make you very tired. It also can cause swelling, or edema, in your legs or other areas of your body. High blood pressure is one of the major causes of chronic kidney disease. And kidney disease can also cause high blood pressure. No matter which came first, having high blood pressure damages the tiny blood vessels in the kidneys. If you have high blood pressure, it is important to lower it. There are many things you can do to lower your blood pressure, which may help slow or stop the damage to your kidneys. Follow-up care is a key part of your treatment and safety. Be sure to make and go to all appointments, and call your doctor if you are having problems. It's also a good idea to know your test results and keep a list of the medicines you take.   How can you care for yourself at home? · Be safe with medicines. Take your medicines exactly as prescribed. Call your doctor if you have any problems with your medicine. You will probably need more than one medicine to lower your blood pressure. You will get more details on the specific medicines your doctor prescribes. · Work with your doctor and a dietitian to plan meals that have the right amount of nutrients for you. You will probably have to limit salt, fluids, and protein. · Stay at a healthy weight. This is very important if you put on weight around the waist. Losing even 10 pounds can help you lower your blood pressure. · Manage other health problems such as diabetes and high cholesterol. You can help lower your risk for heart disease and blood vessel problems with a healthy lifestyle along with medicines. · Do not take ibuprofen (Advil, Motrin) or naproxen (Aleve), or similar medicines, unless your doctor tells you to. They may make chronic kidney disease worse. It is okay to take acetaminophen (Tylenol). · If your doctor recommends it, get more exercise. Walking is a good choice. Bit by bit, increase the amount you walk every day. Try for at least 30 minutes on most days of the week. You also may want to swim, bike, or do other activities. · Limit or avoid alcohol. Talk to your doctor about whether you can drink any alcohol. · Do not smoke or allow others to smoke around you. If you need help quitting, talk to your doctor about stop-smoking programs and medicines. These can increase your chances of quitting for good. When should you call for help? Call 911 anytime you think you may need emergency care. For example, call if:  ? · You passed out (lost consciousness). ?Call your doctor now or seek immediate medical care if:  ? · You have new or worse nausea and vomiting. ? · You have much less urine than normal, or you have no urine. ? · You are feeling confused or cannot think clearly.    ? · You have new or more blood in your urine. ? · You have new swelling. ? · You are dizzy or lightheaded, or you feel like you may faint. ? Watch closely for changes in your health, and be sure to contact your doctor if:  ? · You do not get better as expected. Where can you learn more? Go to http://kari-jesus.info/. Enter C714 in the search box to learn more about \"Kidney Disease and High Blood Pressure: Care Instructions. \"  Current as of: May 12, 2017  Content Version: 11.4  © 2503-5107 [employer]. Care instructions adapted under license by [employer] (which disclaims liability or warranty for this information). If you have questions about a medical condition or this instruction, always ask your healthcare professional. [address] any warranty or liability for your use of this information.

## 2018-03-23 NOTE — PROGRESS NOTES
NORRIS Cuevas is a 77 y.o. male  Chief Complaint   Patient presents with    Diabetes    Gout    Hypertension    Results     discuss lab results     Denies chest pain and or shortness of breath. Takes blood pressure as prescribed. Denies any current gout. Requesting medication refills. Left intermittent shoulder pain from a fall that occurred 9 months ago. Shoulder was x-rayed and negative at the time of fall. PT was recommended by his PCP a few months later but he declined due to his other medical issues that were more important. He is now requesting PT as the pain and pressure occurs a little more frequently depending on movement. Reports pain is more of a twinge or muscle tightening. Reports pain is non-radiating. Past Medical History  Past Medical History:   Diagnosis Date    Calculus of kidney     Cancer ([facility]     HX of BCC    Diabetes mellitus ([facility] 8/19/2010    Gout 8/19/2010    HTN (hypertension) 8/19/2010    Hyperlipemia 8/19/2010    Kidney disease     Lumbar spondylosis     Proteinuria     Skin cancer, basal cell     neck    Type 2 diabetes mellitus without complication, without long-term current use of insulin ([facility] [address] [facility] 8/19/2010    Urolithiasis        Surgical History  Past Surgical History:   Procedure Laterality Date    HX GI      HX OTHER SURGICAL      Basal cell removed from left side of face    HX UROLOGICAL      kidney        Medications  Current Outpatient Prescriptions   Medication Sig Dispense Refill    allopurinol (ZYLOPRIM) 300 mg tablet Take 1 Tab by mouth daily. 90 Tab 0    dilTIAZem (TIAZAC) 360 mg SR capsule Take 1 Cap by mouth daily. 90 Cap 0    glyBURIDE (DIABETA) 2.5 mg tablet Take 1 Tab by mouth two (2) times daily (with meals). 180 Tab 0    lisinopril (PRINIVIL, ZESTRIL) 20 mg tablet Take 1 Tab by mouth two (2) times a day. 180 Tab 0    aspirin delayed-release (ADULT LOW DOSE ASPIRIN) 81 mg tablet Take 81 mg by mouth daily as needed.  Indications: myocardial infarction prevention 30 Tab prn       Allergies  Allergies   Allergen Reactions    Metformin Other (comments)     Renal insufficiency         Family History  Family History   Problem Relation Age of Onset    Diabetes Mother        Social History  Social History     Social History    Marital status:      Spouse name: N/A    Number of children: N/A    Years of education: N/A     Occupational History    Not on file. Social History Main Topics    Smoking status: Never Smoker    Smokeless tobacco: Never Used    Alcohol use No    Drug use: No    Sexual activity: Yes     Other Topics Concern    Not on file     Social History Narrative       Problem List  Patient Active Problem List   Diagnosis Code    Type 2 diabetes mellitus without complication, without long-term current use of insulin (HCC) E11.9    HTN (hypertension) I10    Gout M10.9    Hyperlipemia E78.5    Nocturia R35.1    Type 2 diabetes with nephropathy (HCC) E11.21       Review of Systems  Review of Systems   Eyes: Negative for blurred vision. Respiratory: Negative for shortness of breath. Cardiovascular: Negative for chest pain. Gastrointestinal: Negative for abdominal pain, nausea and vomiting. Genitourinary: Negative for frequency and urgency. Neurological: Negative for dizziness. Endo/Heme/Allergies: Negative for polydipsia. Vital Signs  Vitals:    03/23/18 1131   BP: 129/76   Pulse: 95   Resp: 20   Temp: 97.2 °F (36.2 °C)   TempSrc: Oral   Weight: 190 lb 4 oz (86.3 kg)   Height: 5' 7\" (1.702 m)   PainSc:   0 - No pain       Physical Exam  Physical Exam   Constitutional: He is oriented to person, place, and time. HENT:   Mouth/Throat: Oropharynx is clear and moist.   Eyes: Pupils are equal, round, and reactive to light. Cardiovascular: Normal rate, regular rhythm and normal heart sounds. No murmur heard. Pulmonary/Chest: Effort normal and breath sounds normal. No respiratory distress. Musculoskeletal:   Pain on positioning of left shoulder and arm a certain way. No swelling or bruising of left shoulder or arm. No deformity noted. Non-tender to touch. Neurological: He is alert and oriented to person, place, and time. Skin: Skin is warm. Psychiatric: He has a normal mood and affect. His behavior is normal.   Vitals reviewed. Diagnostics  Orders Placed This Encounter    REFERRAL TO PHYSICAL THERAPY     Referral Priority:   Routine     Referral Type:   PT/OT/ST     Referral Reason:   Specialty Services Required    allopurinol (ZYLOPRIM) 300 mg tablet     Sig: Take 1 Tab by mouth daily. Dispense:  90 Tab     Refill:  0    dilTIAZem (TIAZAC) 360 mg SR capsule     Sig: Take 1 Cap by mouth daily. Dispense:  90 Cap     Refill:  0       Results  Results for orders placed or performed during the hospital encounter of 56/45/30   METABOLIC PANEL, COMPREHENSIVE   Result Value Ref Range    Sodium 138 136 - 145 mmol/L    Potassium 4.1 3.5 - 5.5 mmol/L    Chloride 106 100 - 108 mmol/L    CO2 24 21 - 32 mmol/L    Anion gap 8 3.0 - 18 mmol/L    Glucose 112 (H) 74 - 99 mg/dL    BUN 19 (H) 7.0 - 18 MG/DL    Creatinine 1.34 (H) 0.6 - 1.3 MG/DL    BUN/Creatinine ratio 14 12 - 20      GFR est AA >60 >60 ml/min/1.73m2    GFR est non-AA 53 (L) >60 ml/min/1.73m2    Calcium 9.1 8.5 - 10.1 MG/DL    Bilirubin, total 0.6 0.2 - 1.0 MG/DL    ALT (SGPT) 17 16 - 61 U/L    AST (SGOT) 13 (L) 15 - 37 U/L    Alk.  phosphatase 105 45 - 117 U/L    Protein, total 6.8 6.4 - 8.2 g/dL    Albumin 3.5 3.4 - 5.0 g/dL    Globulin 3.3 2.0 - 4.0 g/dL    A-G Ratio 1.1 0.8 - 1.7     LIPID PANEL   Result Value Ref Range    LIPID PROFILE          Cholesterol, total 160 <200 MG/DL    Triglyceride 195 (H) <150 MG/DL    HDL Cholesterol 30 (L) 40 - 60 MG/DL    LDL, calculated 91 0 - 100 MG/DL    VLDL, calculated 39 MG/DL    CHOL/HDL Ratio 5.3 (H) 0 - 5.0     HEMOGLOBIN A1C WITH EAG   Result Value Ref Range    Hemoglobin A1c 6.9 (H) 4.2 - 5.6 %    Est. average glucose 151 mg/dL           Assessment and Plan  Diagnoses and all orders for this visit:    1. Encounter to discuss test results    2. Gout, unspecified cause, unspecified chronicity, unspecified site  -     allopurinol (ZYLOPRIM) 300 mg tablet; Take 1 Tab by mouth daily. 3. Essential hypertension  -     dilTIAZem (TIAZAC) 360 mg SR capsule; Take 1 Cap by mouth daily. 4. Chronic left shoulder pain  -     REFERRAL TO PHYSICAL THERAPY      Lab results reviewed with patient. After care summary printed and reviewed with patient. Plan reviewed with patient. Questions answered. Patient verbalized understanding of plan and is in agreement with plan. Patient to follow up in three months or earlier if symptoms worsen.      De Valderrama, CHRISTIANO-C

## 2018-04-03 ENCOUNTER — HOSPITAL ENCOUNTER (OUTPATIENT)
Dept: PHYSICAL THERAPY | Age: 68
Discharge: HOME | End: 2018-04-03
Payer: MEDICARE

## 2018-04-03 PROCEDURE — G8984 CARRY CURRENT STATUS: HCPCS

## 2018-04-03 PROCEDURE — G8985 CARRY GOAL STATUS: HCPCS

## 2018-04-03 PROCEDURE — 97110 THERAPEUTIC EXERCISES: CPT

## 2018-04-03 PROCEDURE — 97161 PT EVAL LOW COMPLEX 20 MIN: CPT

## 2018-04-03 NOTE — PROGRESS NOTES
PT DAILY TREATMENT NOTE - [facility]     Patient Name: Tammy Batch  Date:4/3/2018  : 1950  [x]  Patient  Verified  [place]or: [place] MED[place]ARE / Plan: [place] MED[place]ARE PART A / Product Type: Medicare /    In time:1105  Out time:1140  Total Treatment Time (min): 35  Total Timed Codes (min): 8  1:1 Treatment Time ( [address] only): 35   Visit #: 1 of 10    Treatment Area: Left shoulder pain [M25.512]    SUBJECTIVE  Pain Level (0-10 scale): 0  Any medication changes, allergies to medications, adverse drug reactions, diagnosis change, or new procedure performed?: [x] No    [] Yes (see summary sheet for update)  Subjective functional status/changes:   [] No changes reported      OBJECTIVE    Modality rationale:    Min Type Additional Details    [] Estim:  []Unatt       []IFC  []Premod                        []Other:  []w/ice   []w/heat  Position:  Location:    [] Estim: []Att    []TENS instruct  []NMES                    []Other:  []w/US   []w/ice   []w/heat  Position:  Location:    []  Traction: [] Cervical       []Lumbar                       [] Prone          []Supine                       []Intermittent   []Continuous Lbs:  [] before manual  [] after manual    []  Ultrasound: []Continuous   [] Pulsed                           []1MHz   []3MHz W/cm2:  Location:    []  Iontophoresis with dexamethasone         Location: [] Take home patch   [] In clinic    []  Ice     []  heat  []  Ice massage  []  Laser   []  Anodyne Position:  Location:    []  Laser with stim  []  Other:  Position:  Location:    []  Vasopneumatic Device Pressure:       [] lo [] med [] hi   Temperature: [] lo [] med [] hi   [] Skin assessment post-treatment:  []intact []redness- no adverse reaction    []redness  adverse reaction:     27 min [x]Eval                  []Re-Eval       8 min Therapeutic Exercise:  [] See flow sheet :HEP   Rationale: increase ROM and increase strength to improve the patients ability to perform ADL    With   [] TE   [] TA   [] neuro [] other: Patient Education: [x] Review HEP    [] Progressed/Changed HEP based on:   [] positioning   [] body mechanics   [] transfers   [] heat/ice application    [] other:      Other Objective/Functional Measures:      Pain Level (0-10 scale) post treatment: 0    ASSESSMENT/Changes in Function:     Patient will continue to benefit from skilled PT services to modify and progress therapeutic interventions, address functional mobility deficits, address ROM deficits, address strength deficits, analyze and address soft tissue restrictions, analyze and cue movement patterns, analyze and modify body mechanics/ergonomics, assess and modify postural abnormalities, address imbalance/dizziness and instruct in home and community integration to attain remaining goals.      [x]  See Plan of Care  []  See progress note/recertification  []  See Discharge Summary         Progress towards goals / Updated goals:  See POC    PLAN  []  Upgrade activities as tolerated     [x]  Continue plan of care  []  Update interventions per flow sheet       []  Discharge due to:_  []  Other:_      Gilbert Davis PT 4/3/2018  10:38 AM    Future Appointments  Date Time Provider Roby Joyner   4/3/2018 11:00 AM Gilbert Davis PT [facility]   6/22/2018 8:30 AM Miriam Benítez NP [facility] None

## 2018-04-03 NOTE — PROGRESS NOTES
[facility], [address] [place] [address]  [phone] fax  Plan of Care/ Statement of Necessity for Physical Therapy Services    Patient name: Kayy Norton Start of Care: 4/3/2018   Referral source: Socorro Duval MD : 1950    Medical Diagnosis: Left shoulder pain [M25.512]   Onset Date:2 months    Treatment Diagnosis: Left shoulder pain   Prior Hospitalization: see medical history Provider#: [id number]   Medications: Verified on Patient summary List    Comorbidities: history of basal cell cancer, high blood pressure, diabetes   Prior Level of Function: Functionally independent, just retired from [employer], lives with wife and they also have a house at [address] [facility] and following information is based on the information from the initial evaluation. Assessment/ key information: Patient is a pleasant Right handed 77year old male who presents with complaints of Left shoulder pain over the past few months. He reports falling down his stairs last July, which did not result in any fractures but he did suffer dizziness spells and had extensive bruising and skin tears in his Left arm; limited pain at that time. As he has been able to use his arm more he reports intermittent sharp pains, as well as limitations with reaching overhead and with lifting. At evaluation Patient demonstrates the following shoulder AROM: flexion Left 120 Right 133, abduction Left 72 Right 120, extension Left 45 Right 50, internal rotation bilaterally behind the bact to iliac crest, external rotation grossly full. Left shoulder PROM in supine grossly full. Within available ROM, Left shoulder strength is 4+/5 to 5/5. Tender at the [facility] joint. Negative special tests other than pain with Speed's test Left at the biceps.  Overall Patient is a good rehab candidate based on premorbid status, and will benefit from skilled physical therapy in order to address the above deficits. Evaluation Complexity History MEDIUM  Complexity : 1-2 comorbidities / personal factors will impact the outcome/ POC ; Examination LOW Complexity : 1-2 Standardized tests and measures addressing body structure, function, activity limitation and / or participation in recreation  ;Presentation LOW Complexity : Stable, uncomplicated  ;Clinical Decision Making MEDIUM Complexity : FOTO score of 26-74  Overall Complexity Rating: LOW   Problem List: pain affecting function, decrease ROM, decrease strength, edema affecting function, decrease ADL/ functional abilitiies, decrease activity tolerance, decrease flexibility/ joint mobility and decrease transfer abilities   Treatment Plan may include any combination of the following: Therapeutic exercise, Therapeutic activities, Neuromuscular re-education, Physical agent/modality, Manual therapy, Aquatic therapy, Patient education and Self Care training  Patient / Family readiness to learn indicated by: asking questions, trying to perform skills and interest  Persons(s) to be included in education: patient (P)  Barriers to Learning/Limitations: None  Patient Goal (s): find cause of pain  Patient Self Reported Health Status: good  Rehabilitation Potential: good    Short Term Goals: To be accomplished in 1 weeks:  Goal: Patient will be independent and compliant with HEP in order to progress toward long term goals. Status at last note/certification: Issued and reviewed  Long Term Goals: To be accomplished in 10 treatments:  Goal: Patient will improve FOTO assessment score to 73 in order to indicate improved functional abilities. Status at last note/certification: 68  Goal: Patient will improve seated Left shoulder flexion and abduction AROM by at least 10 degrees in order to perform overhead activities.   Status at last note/certification: flexion 120, abduction 72  Goal: Patient will improve Left shoulder flexion/abduction/external rotation strength, within available ROM, to 5/5 without increase in pain in order to improve ease of household/yard tasks. Status at last note/certification: 4+/5, some pain and instability  Goal: Patient will report overall at least 65% improvement in function in order to progress toward premorbid status. Status at last note/certification: n/a    Frequency / Duration: Patient to be seen 2 times per week for 10 treatments. Patient/ Caregiver education and instruction: Diagnosis, prognosis, exercises   [x]  Plan of care has been reviewed with PTA    G-Codes (GP)  Carry   Current  CJ= 20-39%    Goal  CJ= 20-39%    The severity rating is based on clinical judgment and the FOTO score. Certification Period: 4/3/18 to 5/2/18  Silverio Correa, PT 4/3/2018 10:38 AM  _____________________________________________________________________  I certify that the above Therapy Services are being furnished while the patient is under my care. I agree with the treatment plan and certify that this therapy is necessary.     Physician's Signature:____________________  Date:__________Time:______    Please sign and return to [facility] [address]  [phone] fax

## 2018-04-05 ENCOUNTER — HOSPITAL ENCOUNTER (OUTPATIENT)
Dept: PHYSICAL THERAPY | Age: 68
Discharge: HOME | End: 2018-04-05
Payer: MEDICARE

## 2018-04-05 PROCEDURE — 97110 THERAPEUTIC EXERCISES: CPT

## 2018-04-05 NOTE — PROGRESS NOTES
PT DAILY TREATMENT NOTE - [facility]     Patient Name: Shalonda Brown  Date:2018  : 1950  [x]  Patient  Verified  Payor: [place] MEDICARE / Plan: [place] MEDICARE PART A & B / Product Type: Medicare /    In time:2:00  Out time:2:25  Total Treatment Time (min): 25  Total Timed Codes (min): 25  1:1 Treatment Time ( [address] only): 25   Visit #: 2 of 10    Treatment Area: Left shoulder pain [M25.512]    SUBJECTIVE  Pain Level (0-10 scale): 0/10  Any medication changes, allergies to medications, adverse drug reactions, diagnosis change, or new procedure performed?: [x] No    [] Yes (see summary sheet for update)  Subjective functional status/changes:   [] No changes reported  Pt reports compliance with HEP. OBJECTIVE      25 min Therapeutic Exercise:  [] See flow sheet :   Rationale: increase ROM and increase strength to improve the patients ability to perform ADLs without difficulty. With   [] TE   [] TA   [] neuro   [] other: Patient Education: [x] Review HEP    [] Progressed/Changed HEP based on:   [] positioning   [] body mechanics   [] transfers   [] heat/ice application    [] other:      Other Objective/Functional Measures:      Pain Level (0-10 scale) post treatment: 0/10    ASSESSMENT/Changes in Function: Initiated ex. Per flow sheet. Pt reported slight discomfort with finger ladder ex. But was able to perform. Patient will continue to benefit from skilled PT services to modify and progress therapeutic interventions, address functional mobility deficits, address ROM deficits, address strength deficits, analyze and address soft tissue restrictions and analyze and cue movement patterns to attain remaining goals. []  See Plan of Care  []  See progress note/recertification  []  See Discharge Summary         Progress towards goals / Updated goals:    Short Term Goals:  To be accomplished in 1 weeks:  Goal: Patient will be independent and compliant with HEP in order to progress toward long term goals.  Status at last note/certification: Issued and reviewed  Current: MET. Pt reports compliance. Long Term Goals: To be accomplished in 10 treatments:  Goal: Patient will improve FOTO assessment score to 73 in order to indicate improved functional abilities. Status at last note/certification: 68  Goal: Patient will improve seated Left shoulder flexion and abduction AROM by at least 10 degrees in order to perform overhead activities. Status at last note/certification: flexion 120, abduction 72  Goal: Patient will improve Left shoulder flexion/abduction/external rotation strength, within available ROM, to 5/5 without increase in pain in order to improve ease of household/yard tasks. Status at last note/certification: 4+/5, some pain and instability  Goal: Patient will report overall at least 65% improvement in function in order to progress toward premorbid status.   Status at last note/certification: n/a  PLAN  [x]  Upgrade activities as tolerated     [x]  Continue plan of care  []  Update interventions per flow sheet       []  Discharge due to:_  []  Other:_      Shelia Johnson PTA 4/5/2018  2:08 PM    Future Appointments  Date Time Provider Roby Joyner   4/10/2018 10:00  [address] [facility]   4/12/2018 11:00  [address] [facility]   4/17/2018 10:30 AM NATALIE Sun   4/19/2018 11:00  [address] [facility]   4/24/2018 10:30 AM Shelia Johnson PTA [facility]   4/26/2018 11:00 AM Shelia Johnson PTA [facility]   6/22/2018 8:30 AM Giselle Ramírez NP [facility] None

## 2018-04-10 ENCOUNTER — HOSPITAL ENCOUNTER (OUTPATIENT)
Dept: PHYSICAL THERAPY | Age: 68
Discharge: HOME | End: 2018-04-10
Payer: MEDICARE

## 2018-04-10 PROCEDURE — 97110 THERAPEUTIC EXERCISES: CPT

## 2018-04-10 NOTE — PROGRESS NOTES
PT DAILY TREATMENT NOTE - [facility]     Patient Name: Ale Kilgore  Date:4/10/2018  : 1950  [x]  Patient  Verified  Payor: [place] MEDICARE / Plan: [place] MEDICARE PART A & B / Product Type: Medicare /    In time:10:00  Out time:10:25  Total Treatment Time (min): 25  Total Timed Codes (min): 25  1:1 Treatment Time ( [address] only): 15   Visit #: 3 of 10    Treatment Area: Left shoulder pain [M25.512]    SUBJECTIVE  Pain Level (0-10 scale): 0  Any medication changes, allergies to medications, adverse drug reactions, diagnosis change, or new procedure performed?: [x] No    [] Yes (see summary sheet for update)  Subjective functional status/changes:   [] No changes reported  I'm doing OK. But my wife and I have been sick which has slowed me down. Haven't been able to do my HEP    OBJECTIVE    25 min Therapeutic Exercise:  [] See flow sheet :   Rationale: increase ROM and increase strength to improve the patients ability to perform ADL's    With   [] TE   [] TA   [] neuro   [] other: Patient Education: [x] Review HEP    [] Progressed/Changed HEP based on:   [] positioning   [] body mechanics   [] transfers   [] heat/ice application    [] other:      Other Objective/Functional Measures: progressed program     Pain Level (0-10 scale) post treatment: 0    ASSESSMENT/Changes in Function: challenged to hold scapular protraction for serratus punches, weak. Assist provided to correct form    Patient will continue to benefit from skilled PT services to modify and progress therapeutic interventions, address functional mobility deficits, address ROM deficits, address strength deficits, analyze and address soft tissue restrictions, analyze and cue movement patterns, analyze and modify body mechanics/ergonomics, assess and modify postural abnormalities and address imbalance/dizziness to attain remaining goals.      []  See Plan of Care  []  See progress note/recertification  []  See Discharge Summary         Progress towards goals / Updated goals:  Goal: Patient will be independent and compliant with HEP in order to progress toward long term goals. Status at last note/certification: Issued and reviewed  Current: MET. Pt reports compliance. Long Term Goals: To be accomplished in 10 treatments:  Goal: Patient will improve FOTO assessment score to 73 in order to indicate improved functional abilities. Status at last note/certification: 68  Goal: Patient will improve seated Left shoulder flexion and abduction AROM by at least 10 degrees in order to perform overhead activities. Status at last note/certification: flexion 120, abduction 72  Goal: Patient will improve Left shoulder flexion/abduction/external rotation strength, within available ROM, to 5/5 without increase in pain in order to improve ease of household/yard tasks. Status at last note/certification: 4+/5, some pain and instability  Goal: Patient will report overall at least 65% improvement in function in order to progress toward premorbid status.   Status at last note/certification: n/a    PLAN  [x]  Upgrade activities as tolerated     []  Continue plan of care  []  Update interventions per flow sheet       []  Discharge due to:_  []  Other:_      Haven Ball PTA 4/10/2018  10:34 AM    Future Appointments  Date Time Provider Roby Joyner   4/12/2018 11:00  [address] [facility]   4/17/2018 10:30 AM Celestine Rincon PT [facility]   4/19/2018 11:00  [address] [facility]   4/24/2018 10:30 AM Reyes Palin, PTA [facility]   4/26/2018 11:00 AM Reyes Palin, PTA [facility]   6/22/2018 8:30 AM Josephyvonne Hawthorne NP [facility] None

## 2018-04-12 ENCOUNTER — HOSPITAL ENCOUNTER (OUTPATIENT)
Dept: PHYSICAL THERAPY | Age: 68
Discharge: HOME | End: 2018-04-12
Payer: MEDICARE

## 2018-04-12 PROCEDURE — 97110 THERAPEUTIC EXERCISES: CPT

## 2018-04-12 NOTE — PROGRESS NOTES
PT DAILY TREATMENT NOTE - [facility]     Patient Name: Shakira Nunn  Date:2018  : 1950  [x]  Patient  Verified  Payor: Sushila Matias / Plan: [place] / Product Type: Medicare /    In time:11;00  Out time:  Total Treatment Time (min): 25  Total Timed Codes (min): 25  1:1 Treatment Time ( [address] only): 10   Visit #: 4 of 10    Treatment Area: Left shoulder pain [M25.512]    SUBJECTIVE  Pain Level (0-10 scale): 0  Any medication changes, allergies to medications, adverse drug reactions, diagnosis change, or new procedure performed?: [x] No    [] Yes (see summary sheet for update)  Subjective functional status/changes:   [] No changes reported  Decreased episodes of sharp pain    OBJECTIVE    25 min Therapeutic Exercise:  [] See flow sheet :   Rationale: increase ROM and increase strength to improve the patients ability to perform ADL's      With   [] TE   [] TA   [] neuro   [] other: Patient Education: [x] Review HEP    [] Progressed/Changed HEP based on:   [] positioning   [] body mechanics   [] transfers   [] heat/ice application    [] other:      Other Objective/Functional Measures:      Pain Level (0-10 scale) post treatment:  0    ASSESSMENT/Changes in Function: improving ROM and strength and dcreased c/o pain. Patient will continue to benefit from skilled PT services to modify and progress therapeutic interventions, address functional mobility deficits, address ROM deficits, address strength deficits, analyze and address soft tissue restrictions, analyze and cue movement patterns, analyze and modify body mechanics/ergonomics, assess and modify postural abnormalities, address imbalance/dizziness and instruct in home and community integration to attain remaining goals.      []  See Plan of Care  []  See progress note/recertification  []  See Discharge Summary         Progress towards goals / Updated goals:  Goal: Patient will be independent and compliant with HEP in order to progress toward long term goals. Status at last note/certification: Issued and reviewed  Current: MET. Pt reports compliance. Long Term Goals: To be accomplished in 10 treatments:  Goal: Patient will improve FOTO assessment score to 73 in order to indicate improved functional abilities. Status at last note/certification: 68  Goal: Patient will improve seated Left shoulder flexion and abduction AROM by at least 10 degrees in order to perform overhead activities. Status at last note/certification: flexion 120, abduction 72  Goal Met: Flexion  40, abd 95  Goal: Patient will improve Left shoulder flexion/abduction/external rotation strength, within available ROM, to 5/5 without increase in pain in order to improve ease of household/yard tasks. Status at last note/certification: 4+/5, some pain and instability  Progressing flexion, ABD 5/5 but shaky,  ER 4+/5  Goal: Patient will report overall at least 65% improvement in function in order to progress toward premorbid status.   Status at last note/certification: n/a  82% improved progressing       PLAN  [x]  Upgrade activities as tolerated     []  Continue plan of care  []  Update interventions per flow sheet       []  Discharge due to:_  []  Other:_      William Serra PTA 4/12/2018  11:19 AM    Future Appointments  Date Time Provider Roby Joyner   4/17/2018 10:30 AM Felicia Hager, PT [facility]   4/19/2018 11:00  [address] [facility]   4/24/2018 10:30 AM Evonne Worley [facility]   4/26/2018 11:00 AM Evonne Worley [facility]   6/22/2018 8:30 AM Sherley Vega, NP [facility] None

## 2018-04-17 ENCOUNTER — HOSPITAL ENCOUNTER (OUTPATIENT)
Dept: PHYSICAL THERAPY | Age: 68
Discharge: HOME | End: 2018-04-17
Payer: MEDICARE

## 2018-04-17 PROCEDURE — 97110 THERAPEUTIC EXERCISES: CPT

## 2018-04-17 NOTE — PROGRESS NOTES
PT DAILY TREATMENT NOTE - [facility]     Patient Name: Ortiz Age  Date:2018  : 1950  [x]  Patient  Verified  Payor: Yanet Art / Plan: [place] MED[place] PART A & B / Product Type: Medicare /    In time:1027  Out time:1100  Total Treatment Time (min): 33  Total Timed Codes (min): 33  1:1 Treatment Time ( [address] only): 29   Visit #: 5 of 10    Treatment Area: Left shoulder pain [M25.512]    SUBJECTIVE  Pain Level (0-10 scale): 0  Any medication changes, allergies to medications, adverse drug reactions, diagnosis change, or new procedure performed?: [x] No    [] Yes (see summary sheet for update)  Subjective functional status/changes:   [] No changes reported  I don't have any pain right now but it was  or yesterday when it was nagging me. I never know when it's going to hurt.      OBJECTIVE    Modality rationale:    Min Type Additional Details    [] Estim:  []Unatt       []IFC  []Premod                        []Other:  []w/ice   []w/heat  Position:  Location:    [] Estim: []Att    []TENS instruct  []NMES                    []Other:  []w/US   []w/ice   []w/heat  Position:  Location:    []  Traction: [] Cervical       []Lumbar                       [] Prone          []Supine                       []Intermittent   []Continuous Lbs:  [] before manual  [] after manual    []  Ultrasound: []Continuous   [] Pulsed                           []1MHz   []3MHz W/cm2:  Location:    []  Iontophoresis with dexamethasone         Location: [] Take home patch   [] In clinic    []  Ice     []  heat  []  Ice massage  []  Laser   []  Anodyne Position:  Location:    []  Laser with stim  []  Other:  Position:  Location:    []  Vasopneumatic Device Pressure:       [] lo [] med [] hi   Temperature: [] lo [] med [] hi   [] Skin assessment post-treatment:  []intact []redness- no adverse reaction    []redness  adverse reaction:     33 min Therapeutic Exercise:  [x] See flow sheet :   Rationale: increase ROM and increase strength to improve the patients ability to perform ADL    With   [] TE   [] TA   [] neuro   [] other: Patient Education: [x] Review HEP    [] Progressed/Changed HEP based on:   [] positioning   [] body mechanics   [] transfers   [] heat/ice application    [] other:      Other Objective/Functional Measures: increased repetitions per flow sheet     Pain Level (0-10 scale) post treatment: 0    ASSESSMENT/Changes in Function: Patient experiencing pain with Left shoulder abduction at start of session, but decreased after performing exercises. Patient will continue to benefit from skilled PT services to modify and progress therapeutic interventions, address functional mobility deficits, address ROM deficits, address strength deficits, analyze and address soft tissue restrictions, analyze and cue movement patterns, analyze and modify body mechanics/ergonomics, assess and modify postural abnormalities and instruct in home and community integration to attain remaining goals. []  See Plan of Care  []  See progress note/recertification  []  See Discharge Summary         Progress towards goals / Updated goals:  Goal: Patient will be independent and compliant with HEP in order to progress toward long term goals. Status at last note/certification: Issued and reviewed  Current: MET. Pt reports compliance. Long Term Goals: To be accomplished in 10 treatments:  Goal: Patient will improve FOTO assessment score to 73 in order to indicate improved functional abilities. Status at last note/certification: 68  Current status: Progressing, 69  Goal: Patient will improve seated Left shoulder flexion and abduction AROM by at least 10 degrees in order to perform overhead activities.   Status at last note/certification: flexion 120, abduction 72  Goal Met: Flexion  40, abd 95  Goal: Patient will improve Left shoulder flexion/abduction/external rotation strength, within available ROM, to 5/5 without increase in pain in order to improve ease of household/yard tasks. Status at last note/certification: 4+/5, some pain and instability  Progressing flexion, ABD 5/5 but shaky,  ER 4+/5  Goal: Patient will report overall at least 65% improvement in function in order to progress toward premorbid status.   Status at last note/certification: n/a  31% improved progressing    PLAN  [x]  Upgrade activities as tolerated     [x]  Continue plan of care  []  Update interventions per flow sheet       []  Discharge due to:_  []  Other:_      Dom Patches, PT 4/17/2018  7:14 AM    Future Appointments  Date Time Provider Roby Joyner   4/17/2018 10:30 AM Dom Enid, PT [facility]   4/19/2018 11:00  [address] [facility]   4/24/2018 10:30 AM Troy Rust [facility]   4/26/2018 11:00 AM Troy Rust [facility]   6/22/2018 8:30 AM Octavio Roman NP [facility] None

## 2018-04-19 ENCOUNTER — HOSPITAL ENCOUNTER (OUTPATIENT)
Dept: PHYSICAL THERAPY | Age: 68
Discharge: HOME | End: 2018-04-19
Payer: MEDICARE

## 2018-04-19 PROCEDURE — 97110 THERAPEUTIC EXERCISES: CPT

## 2018-04-19 NOTE — PROGRESS NOTES
PT DAILY TREATMENT NOTE - [facility]     Patient Name: Shalonda Brown  [id number]  : 1950  [x]  Patient  Verified  Payor: Sekou Thornton / Plan: [place] MEDICARE PART A & B / Product Type: Medicare /    In time:11;00  Out time:;40  Total Treatment Time (min): 40  Total Timed Codes (min): 40  1:1 Treatment Time ([facility] only): 15   Visit #: 6 of 10    Treatment Area: Left shoulder pain [M25.512]    SUBJECTIVE  Pain Level (0-10 scale): 0  Any medication changes, allergies to medications, adverse drug reactions, diagnosis change, or new procedure performed?: [x] No    [] Yes (see summary sheet for update)  Subjective functional status/changes:   [] No changes reported  I thought I'd be sore today from putting down mulch yesterday,  But I feel OK    OBJECTIVE      30 min Therapeutic Exercise:  [] See flow sheet :   Rationale: increase ROM and increase strength to improve the patients ability to perform ADL's    10 min Manual Therapy:  STM to left pec minor, UT. Manual stretching   Rationale: increase ROM and increase tissue extensibility to improve ease of UE function         With   [] TE   [] TA   [] neuro   [] other: Patient Education: [x] Review HEP    [] Progressed/Changed HEP based on:   [] positioning   [] body mechanics   [] transfers   [] heat/ice application    [] other:      Other Objective/Functional Measures:    Added MT to address tightness and ROM     Pain Level (0-10 scale) post treatment:  0    ASSESSMENT/Changes in Function: Good tolerance ot MT and reported reduction in c/o tightness    Patient will continue to benefit from skilled PT services to modify and progress therapeutic interventions, address functional mobility deficits, address ROM deficits, address strength deficits, analyze and address soft tissue restrictions, analyze and cue movement patterns, analyze and modify body mechanics/ergonomics, assess and modify postural abnormalities, address imbalance/dizziness and instruct in home and community integration to attain remaining goals. []  See Plan of Care  []  See progress note/recertification  []  See Discharge Summary         Progress towards goals / Updated goals:  Goal: Patient will be independent and compliant with HEP in order to progress toward long term goals. Status at last note/certification: Issued and reviewed  Current: MET. Pt reports compliance. Long Term Goals: To be accomplished in 10 treatments:  Goal: Patient will improve FOTO assessment score to 73 in order to indicate improved functional abilities. Status at last note/certification: 68  Current status: Progressing, 69  Goal: Patient will improve seated Left shoulder flexion and abduction AROM by at least 10 degrees in order to perform overhead activities. Status at last note/certification: flexion 120, abduction 72  Goal Met: Flexion  40, abd 95  Goal: Patient will improve Left shoulder flexion/abduction/external rotation strength, within available ROM, to 5/5 without increase in pain in order to improve ease of household/yard tasks. Status at last note/certification: 4+/5, some pain and instability  Progressing flexion, ABD 5/5 but shaky,  ER 4+/5  Goal: Patient will report overall at least 65% improvement in function in order to progress toward premorbid status.   Status at last note/certification: n/a  63% improved progressing    PLAN  [x]  Upgrade activities as tolerated     []  Continue plan of care  []  Update interventions per flow sheet       []  Discharge due to:_  []  Other:_      Nahomy Marks [facility] [place] 4/19/2018  11:30 AM    Future Appointments  Date Time Provider Roby Joyner   4/24/2018 10:30 AM Nghia Izaguirre [facility]   4/26/2018 11:00 AM Nghia Izaguirre [facility]   6/22/2018 8:30 AM Keisha Zee NP [facility] None

## 2018-04-24 ENCOUNTER — HOSPITAL ENCOUNTER (OUTPATIENT)
Dept: PHYSICAL THERAPY | Age: 68
Discharge: HOME | End: 2018-04-24
Payer: MEDICARE

## 2018-04-24 PROCEDURE — 97110 THERAPEUTIC EXERCISES: CPT

## 2018-04-24 NOTE — PROGRESS NOTES
PT DAILY TREATMENT NOTE - [facility]     Patient Name: Laurent Councilman  Date:2018  : 1950  [x]  Patient  Verified  Payor: Renny Frankel / Plan: [place] MED[place]ARE PART A & B / Product Type: Medicare /    In time:10:25  Out time:10:55  Total Treatment Time (min): 30  Total Timed Codes (min): 30  1:1 Treatment Time ( [address] only): 25   Visit #: 7 of 10    Treatment Area: Left shoulder pain [M25.512]    SUBJECTIVE  Pain Level (0-10 scale): 0/10  Any medication changes, allergies to medications, adverse drug reactions, diagnosis change, or new procedure performed?: [x] No    [] Yes (see summary sheet for update)  Subjective functional status/changes:   [] No changes reported  \"I was really sore after last visit, but I feel okay right now. \"    OBJECTIVE    30 min Therapeutic Exercise:  [] See flow sheet :   Rationale: increase ROM and increase strength to improve the patients ability to perform ADls with less difficulty. With   [] TE   [] TA   [] neuro   [] other: Patient Education: [x] Review HEP    [] Progressed/Changed HEP based on:   [] positioning   [] body mechanics   [] transfers   [] heat/ice application    [] other:      Other Objective/Functional Measures:      Pain Level (0-10 scale) post treatment: 0/10    ASSESSMENT/Changes in Function: Added wall planks with twist to increase thoracic and pec mobility. No p! Was reported during or after therapy. Patient will continue to benefit from skilled PT services to modify and progress therapeutic interventions, address functional mobility deficits, address ROM deficits, address strength deficits, analyze and address soft tissue restrictions and analyze and cue movement patterns to attain remaining goals. []  See Plan of Care  []  See progress note/recertification  []  See Discharge Summary         Progress towards goals / Updated goals:    Goal: Patient will be independent and compliant with HEP in order to progress toward long term goals.   Status at last note/certification: Issued and reviewed  Current: MET. Pt reports compliance. Long Term Goals: To be accomplished in 10 treatments:  Goal: Patient will improve FOTO assessment score to 73 in order to indicate improved functional abilities. Status at last note/certification: 68  Current status: Progressing, 69  Goal: Patient will improve seated Left shoulder flexion and abduction AROM by at least 10 degrees in order to perform overhead activities. Status at last note/certification: flexion 120, abduction 72  Goal Met: Flexion  40, abd 95  Goal: Patient will improve Left shoulder flexion/abduction/external rotation strength, within available ROM, to 5/5 without increase in pain in order to improve ease of household/yard tasks. Status at last note/certification: 4+/5, some pain and instability  Progressing flexion, ABD 5/5 but shaky,  ER 4+/5  Goal: Patient will report overall at least 65% improvement in function in order to progress toward premorbid status.   Status at last note/certification: n/a  75% improved progressing  PLAN  [x]  Upgrade activities as tolerated     [x]  Continue plan of care  []  Update interventions per flow sheet       []  Discharge due to:_  []  Other:_      Trung Gonzalez PTA 4/24/2018  10:52 AM    Future Appointments  Date Time Provider Roby Joyner   4/26/2018 11:00 AM Trung Gonzalez PTA [facility]   6/22/2018 8:30 AM Benedict Veliz NP [facility] None

## 2018-04-26 ENCOUNTER — HOSPITAL ENCOUNTER (OUTPATIENT)
Dept: PHYSICAL THERAPY | Age: 68
Discharge: HOME | End: 2018-04-26
Payer: MEDICARE

## 2018-04-26 PROCEDURE — 97110 THERAPEUTIC EXERCISES: CPT

## 2018-04-26 PROCEDURE — 97140 MANUAL THERAPY 1/> REGIONS: CPT

## 2018-04-26 NOTE — PROGRESS NOTES
PT DAILY TREATMENT NOTE - [facility]     Patient Name: Ale Kilgore  Date:2018  : 1950  [x]  Patient  Verified  Payor: Aminata Jesus / Plan: [place] MEDICARE PART A & B / Product Type: Medicare /    In time:10:55  Out time:11:30  Total Treatment Time (min): 35  Total Timed Codes (min): 35  1:1 Treatment Time ( [address] only): 35   Visit #: 8 of 10    Treatment Area: Left shoulder pain [M25.512]    SUBJECTIVE  Pain Level (0-10 scale): 0/10  Any medication changes, allergies to medications, adverse drug reactions, diagnosis change, or new procedure performed?: [x] No    [] Yes (see summary sheet for update)  Subjective functional status/changes:   [] No changes reported  Pt reports improvement, but still has pain when he gets the twinge\"    OBJECTIVE    20 min Therapeutic Exercise:  [] See flow sheet :   Rationale: increase ROM and increase strength to improve the patients ability to perform ADLs with less difficulty. 15 min Manual Therapy:  MET into extension right sidebend, right rotation @ T6. Rationale: decrease pain, increase ROM and increase tissue extensibility to perform ADLs with less difficulty. With   [] TE   [] TA   [] neuro   [] other: Patient Education: [x] Review HEP    [] Progressed/Changed HEP based on:   [] positioning   [] body mechanics   [] transfers   [] heat/ice application    [] other:      Other Objective/Functional Measures: Functional Gains: \"increased flexibility\"  Functional Deficits: \"infrequent shoulder pain\"  % improvement: 50%  Pain   Average: 0/10       Best: 0/10     Worst: 4/10 \"twinge\"  Patient Goal: \"Get this shoulder better and figure out whats going on. \"     Pain Level (0-10 scale) post treatment: 0/10    ASSESSMENT/Changes in Function: Added seated thoracic ext with 1/2 foam ex. To increase mobility. Pt reported some discomfort with spider walk ex. And was only able to perform one lap.     Patient will continue to benefit from skilled PT services to modify and progress therapeutic interventions, address functional mobility deficits, address ROM deficits, address strength deficits and analyze and address soft tissue restrictions to attain remaining goals. []  See Plan of Care  []  See progress note/recertification  []  See Discharge Summary         Progress towards goals / Updated goals:  Goal: Patient will be independent and compliant with HEP in order to progress toward long term goals. Status at last note/certification: Issued and reviewed  Current: MET. Pt reports compliance. Long Term Goals: To be accomplished in 10 treatments:  Goal: Patient will improve FOTO assessment score to 73 in order to indicate improved functional abilities. Status at last note/certification: 68  Current status: Progressing, 69  Goal: Patient will improve seated Left shoulder flexion and abduction AROM by at least 10 degrees in order to perform overhead activities. Status at last note/certification: flexion 120, abduction 72  Goal Met: Flexion  40, abd 135  Goal: Patient will improve Left shoulder flexion/abduction/external rotation strength, within available ROM, to 5/5 without increase in pain in order to improve ease of household/yard tasks. Status at last note/certification: 4+/5, some pain and instability  Progressing flexion, ABD 5/5 but shaky,  ER 4+/5  Goal: Patient will report overall at least 65% improvement in function in order to progress toward premorbid status. Status at last note/certification: n/a  Current: NOT MET.  50%    PLAN  []  Upgrade activities as tolerated     []  Continue plan of care  []  Update interventions per flow sheet       []  Discharge due to:_  []  Other:_      Vale Parsons PTA 4/26/2018  10:55 AM    Future Appointments  Date Time Provider Roby Joyner   4/26/2018 11:00 AM Vale Parsons PTA [facility]   6/22/2018 8:30 AM Sukhdeep Coates NP [facility] None

## 2018-05-01 ENCOUNTER — HOSPITAL ENCOUNTER (OUTPATIENT)
Dept: PHYSICAL THERAPY | Age: 68
Discharge: HOME | End: 2018-05-01
Payer: MEDICARE

## 2018-05-01 PROCEDURE — G8985 CARRY GOAL STATUS: HCPCS

## 2018-05-01 PROCEDURE — G8984 CARRY CURRENT STATUS: HCPCS

## 2018-05-01 PROCEDURE — 97110 THERAPEUTIC EXERCISES: CPT

## 2018-05-01 NOTE — PROGRESS NOTES
PT DAILY TREATMENT NOTE - [facility]     Patient Name: Hemalatha Cox  Date:2018  : 1950  [x]  Patient  Verified  Payor: [place] MEDICARE / Plan: [place] MEDICARE PART A & B / Product Type: Medicare /    In time:9:25  Out time:10:00  Total Treatment Time (min): 35  Total Timed Codes (min): 35  1:1 Treatment Time ( [address] only): 15   Visit #: 9  of 10    Treatment Area: Left shoulder pain [M25.512]    SUBJECTIVE  Pain Level (0-10 scale): 0  Any medication changes, allergies to medications, adverse drug reactions, diagnosis change, or new procedure performed?: [x] No    [] Yes (see summary sheet for update)  Subjective functional status/changes:   [] No changes reported  My arm is getting stronger    OBJECTIVE    25 min Therapeutic Exercise:  [] See flow sheet :   Rationale: increase ROM and increase strength to improve the patients ability to perform ADL's    10 min Manual Therapy:  STM to left pec minor and GHJ mobs to improve ease of UE function   Rationale: increase ROM and increase tissue extensibility to increase UE function for ADL's. With   [] TE   [] TA   [] neuro   [] other: Patient Education: [x] Review HEP    [] Progressed/Changed HEP based on:   [] positioning   [] body mechanics   [] transfers   [] heat/ice application    [] other:      Other Objective/Functional Measures:      Pain Level (0-10 scale) post treatment:  0    ASSESSMENT/Changes in Function: taut minor on the left.   Still reports weakness in left compared to the right but is improving    Patient will continue to benefit from skilled PT services to modify and progress therapeutic interventions, address functional mobility deficits, address ROM deficits, address strength deficits, analyze and address soft tissue restrictions, analyze and cue movement patterns, analyze and modify body mechanics/ergonomics, assess and modify postural abnormalities, address imbalance/dizziness and instruct in home and community integration to attain remaining goals. []  See Plan of Care  []  See progress note/recertification  []  See Discharge Summary         Progress towards goals / Updated goals:  Goal: Patient will be independent and compliant with HEP in order to progress toward long term goals. Status at last note/certification: Issued and reviewed  Current: MET. Pt reports compliance. Long Term Goals: To be accomplished in 10 treatments:  Goal: Patient will improve FOTO assessment score to 73 in order to indicate improved functional abilities. Status at last note/certification: 68  Current status: Progressing, 69  Goal: Patient will improve seated Left shoulder flexion and abduction AROM by at least 10 degrees in order to perform overhead activities. Status at last note/certification: flexion 120, abduction 72  Goal Met: Flexion  40, abd 135  Goal: Patient will improve Left shoulder flexion/abduction/external rotation strength, within available ROM, to 5/5 without increase in pain in order to improve ease of household/yard tasks. Status at last note/certification: 4+/5, some pain and instability  Progressing flexion, ABD 5/5 but shaky,  ER 4+/5  Goal: Patient will report overall at least 65% improvement in function in order to progress toward premorbid status. Status at last note/certification: n/a  Current: NOT MET.  50%    PLAN  [x]  Upgrade activities as tolerated     []  Continue plan of care  []  Update interventions per flow sheet       []  Discharge due to:_  []  Other:_      William Serra PTA 5/1/2018  9:47 AM    Future Appointments  Date Time Provider Roby Joyner   5/3/2018 10:30  [address] [facility]   5/8/2018 11:30 AM Evonne Worley PTA [facility]   5/10/2018 11:00  [address] [facility]   5/15/2018 10:30  [address] [facility]   5/17/2018 10:00  [address] [facility]   6/22/2018 8:30 AM Sherley Vega, JOSE [facility] None

## 2018-05-03 ENCOUNTER — HOSPITAL ENCOUNTER (OUTPATIENT)
Dept: PHYSICAL THERAPY | Age: 68
Discharge: HOME | End: 2018-05-03
Payer: MEDICARE

## 2018-05-03 PROCEDURE — 97140 MANUAL THERAPY 1/> REGIONS: CPT

## 2018-05-03 PROCEDURE — 97110 THERAPEUTIC EXERCISES: CPT

## 2018-05-03 NOTE — PROGRESS NOTES
PT DAILY TREATMENT NOTE - [facility]     Patient Name: Tono Cuellar  Date:5/3/2018  : 1950  [x]  Patient  Verified  Payor: VA MEDICARE / Plan: VA MEDICARE PART A & B / Product Type: Medicare /    In time:10:25  Out time: 10:55  Total Treatment Time (min): 30  Total Timed Codes (min): 30  1:1 Treatment Time ( [address] only): 25   Visit #: 1 of 10    Treatment Area: Left shoulder pain [M25.512]    SUBJECTIVE  Pain Level (0-10 scale): 0  Any medication changes, allergies to medications, adverse drug reactions, diagnosis change, or new procedure performed?: [x] No    [] Yes (see summary sheet for update)  Subjective functional status/changes:   [] No changes reported  I noticed yesterday that I can turn the steering wheel better    OBJECTIVE    20 min Therapeutic Exercise:  [] See flow sheet :   Rationale: increase ROM and increase strength to improve the patients ability to improve ease of function,  ADL's        10 min Manual Therapy:  STM to pec minor with GHJ  mobs and long axis traction   Rationale: increase ROM and increase tissue extensibility to improve ease of reach, function         With   [] TE   [] TA   [] neuro   [] other: Patient Education: [x] Review HEP    [] Progressed/Changed HEP based on:   [] positioning   [] body mechanics   [] transfers   [] heat/ice application    [] other:      Other Objective/Functional Measures:      Pain Level (0-10 scale) post treatment: 0    ASSESSMENT/Changes in Function:  Decreased c/o pulling with turing steering wheel and more mobility per pt. Patient will continue to benefit from skilled PT services to modify and progress therapeutic interventions, address functional mobility deficits, address ROM deficits, address strength deficits, analyze and address soft tissue restrictions, analyze and cue movement patterns, analyze and modify body mechanics/ergonomics and assess and modify postural abnormalities to attain remaining goals.      []  See Plan of Care  []  See progress note/recertification  []  See Discharge Summary         Progress towards goals / Updated goals:  Goal: Patient will improve FOTO assessment score to 73 in order to indicate improved functional abilities. Status at last note/certification: 69  Goal: Patient will improve Left shoulder flexion/abduction/external rotation strength, within available ROM, to 5/5 without increase in pain in order to improve ease of household/yard tasks. Status at last note/certification: flexion/Abduction 5/5 but shaky, external rotation 4+/5  Goal: Patient will report overall at least 65% improvement in function in order to progress toward premorbid status.   Status at last note/certification: 18%    PLAN  [x]  Upgrade activities as tolerated     []  Continue plan of care  []  Update interventions per flow sheet       []  Discharge due to:_  []  Other:_      Eric Juarez PTA 5/3/2018  10:38 AM    Future Appointments  Date Time Provider Roby Joyner   5/8/2018 11:30 AM Corry Flowers PTA [facility]   5/10/2018 11:00  [address] [facility]   5/15/2018 10:30  [address] [facility]   5/17/2018 10:00  [address] [facility]   6/22/2018 8:30 AM [facility] None

## 2018-05-08 ENCOUNTER — HOSPITAL ENCOUNTER (OUTPATIENT)
Dept: PHYSICAL THERAPY | Age: 68
Discharge: HOME | End: 2018-05-08
Payer: MEDICARE

## 2018-05-08 PROCEDURE — 97112 NEUROMUSCULAR REEDUCATION: CPT

## 2018-05-08 PROCEDURE — 97110 THERAPEUTIC EXERCISES: CPT

## 2018-05-08 NOTE — PROGRESS NOTES
PT DAILY TREATMENT NOTE - [facility]     Patient Name: Hemalatha Cox  Date:2018  : 1950  [x]  Patient  Verified  Payor: VA MEDICARE / Plan: VA MEDICARE PART A & B / Product Type: Medicare /    In time:11:30  Out time:12:00  Total Treatment Time (min): 30  Total Timed Codes (min): 30  1:1 Treatment Time ( [address] only): 30   Visit #: 2 of 10    Treatment Area: Left shoulder pain [M25.512]    SUBJECTIVE  Pain Level (0-10 scale): 0/10  Any medication changes, allergies to medications, adverse drug reactions, diagnosis change, or new procedure performed?: [x] No    [] Yes (see summary sheet for update)  Subjective functional status/changes:   [] No changes reported  \"I feel a little better now especially when I drive. \"    OBJECTIVE    20 min Therapeutic Exercise:  [] See flow sheet :   Rationale: increase ROM and increase strength to improve the patients ability to perform ADLs with less difficulty. 10 min Neuromuscular Re-education:  []  See flow sheet :scap stab. Ex. Rationale: increase strength  to improve the patients ability to perform ADls with less difficulty. With   [] TE   [] TA   [] neuro   [] other: Patient Education: [x] Review HEP    [] Progressed/Changed HEP based on:   [] positioning   [] body mechanics   [] transfers   [] heat/ice application    [] other:      Other Objective/Functional Measures: FOTO 67     Pain Level (0-10 scale) post treatment: 0/10    ASSESSMENT/Changes in Function: Continued with ex. Per flow sheet. No p! Was reported during ex. Pt. Reported he could feel his muscles working. Patient will continue to benefit from skilled PT services to modify and progress therapeutic interventions, address functional mobility deficits, address ROM deficits, address strength deficits, analyze and address soft tissue restrictions and analyze and cue movement patterns to attain remaining goals.      []  See Plan of Care  []  See progress note/recertification  []  See Discharge Summary         Progress towards goals / Updated goals:  Goal: Patient will improve FOTO assessment score to 73 in order to indicate improved functional abilities. Status at last note/certification: 69  Current: Regression, FOTO 67. Goal: Patient will improve Left shoulder flexion/abduction/external rotation strength, within available ROM, to 5/5 without increase in pain in order to improve ease of household/yard tasks. Status at last note/certification: [id number] 3/3 but shaky, external rotation 4+/5  Goal: Patient will report overall at least 65% improvement in function in order to progress toward premorbid status.   Status at last note/certification: 82%    PLAN  [x]  Upgrade activities as tolerated     [x]  Continue plan of care  []  Update interventions per flow sheet       []  Discharge due to:_  []  Other:_      Evonne Worley PTA 5/8/2018  11:36 AM    Future Appointments  Date Time Provider Roby Joyner   5/10/2018 11:00  [address] [facility]   5/15/2018 10:30  [address] [facility]   5/17/2018 10:00  [address] [facility]   6/22/2018 8:30 AM Sherley Vega NP [facility] None

## 2018-05-10 ENCOUNTER — HOSPITAL ENCOUNTER (OUTPATIENT)
Dept: PHYSICAL THERAPY | Age: 68
Discharge: HOME | End: 2018-05-10
Payer: MEDICARE

## 2018-05-10 PROCEDURE — 97110 THERAPEUTIC EXERCISES: CPT

## 2018-05-10 NOTE — PROGRESS NOTES
PT DAILY TREATMENT NOTE - [facility]     Patient Name: Kimberly Sutton  Date:5/10/2018  : 1950  [x]  Patient  Verified  Payor: VA MEDICARE / Plan: VA MEDICARE PART A & B / Product Type: Medicare /    In time:;  Out time:   Total Treatment Time (min): 25  Total Timed Codes (min): 25  1:1 Treatment Time ( [address] only): 15   Visit #: 3 of 10    Treatment Area: Left shoulder pain [M25.512]    SUBJECTIVE  Pain Level (0-10 scale): 0  Any medication changes, allergies to medications, adverse drug reactions, diagnosis change, or new procedure performed?: [x] No    [] Yes (see summary sheet for update)  Subjective functional status/changes:   [] No changes reported  I do feel a little weaker when I move my arm out to the side, but much better    OBJECTIVE    15 min Therapeutic Exercise:  [] See flow sheet :   Rationale: increase ROM and increase strength to improve the patients ability to perform ADL's    10 min Neuromuscular Re-education:  []  See flow sheet :   Rationale: increase strength  to improve the patients ability to increase shoulder stability       With   [] TE   [] TA   [] neuro   [] other: Patient Education: [x] Review HEP    [] Progressed/Changed HEP based on:   [] positioning   [] body mechanics   [] transfers   [] heat/ice application    [] other:      Other Objective/Functional Measures:      Pain Level (0-10 scale) post treatment:  0    ASSESSMENT/Changes in Function: demo improved strength in ER, some shaking and no pain. Patient will continue to benefit from skilled PT services to modify and progress therapeutic interventions, address functional mobility deficits, address ROM deficits, address strength deficits, analyze and address soft tissue restrictions, analyze and cue movement patterns, analyze and modify body mechanics/ergonomics, assess and modify postural abnormalities, address imbalance/dizziness and instruct in home and community integration to attain remaining goals.      [] See Plan of Care  []  See progress note/recertification  []  See Discharge Summary         Progress towards goals / Updated goals:  Goal: Patient will improve FOTO assessment score to 73 in order to indicate improved functional abilities. Status at last note/certification: 69  Current: Regression, FOTO 67. Goal: Patient will improve Left shoulder flexion/abduction/external rotation strength, within available ROM, to 5/5 without increase in pain in order to improve ease of household/yard tasks. Status at last note/certification: [id number] 7/0 but shaky, external rotation 4+/5  5/5 grossly  met  Goal: Patient will report overall at least 65% improvement in function in order to progress toward premorbid status.   Status at last note/certification: 31%   70% improved  Met    PLAN  [x]  Upgrade activities as tolerated     []  Continue plan of care  []  Update interventions per flow sheet       []  Discharge due to:_  []  Other:_      Betty Overall, PTA 5/10/2018  10:52 AM    Future Appointments  Date Time Provider Roby Joyner   5/10/2018 11:00  [address] [facility]   5/15/2018 10:30  [address] [facility]   5/17/2018 10:00  [address] [facility]   6/22/2018 8:30 AM Orlando Mccoy NP [facility] None

## 2018-05-15 ENCOUNTER — HOSPITAL ENCOUNTER (OUTPATIENT)
Dept: PHYSICAL THERAPY | Age: 68
Discharge: HOME | End: 2018-05-15
Payer: MEDICARE

## 2018-05-15 PROCEDURE — 97112 NEUROMUSCULAR REEDUCATION: CPT

## 2018-05-15 PROCEDURE — 97110 THERAPEUTIC EXERCISES: CPT

## 2018-05-15 NOTE — PROGRESS NOTES
PT DAILY TREATMENT NOTE - [facility]     Patient Name: Merlin Carey  Date:5/15/2018  : 1950  [x]  Patient  Verified  Payor: VA MEDICARE / Plan: VA MEDICARE PART A & B / Product Type: Medicare /    In time:10;25  Out time: 10;50  Total Treatment Time (min): 25  Total Timed Codes (min): 25  1:1 Treatment Time ( [address] only): 25   Visit #: 4 of 10    Treatment Area: Left shoulder pain [M25.512]    SUBJECTIVE  Pain Level (0-10 scale): 0  Any medication changes, allergies to medications, adverse drug reactions, diagnosis change, or new procedure performed?: [x] No    [] Yes (see summary sheet for update)  Subjective functional status/changes:   [] No changes reported  I can lower a bottle of laundry detergent from a shelf just above my shoulder. Dereje Thomasos it, but not really pain. OBJECTIVE    10 min Therapeutic Exercise:  [] See flow sheet :   Rationale: increase ROM and increase strength to improve the patients ability to performA DL's     15 min Neuromuscular Re-education:  []  See flow sheet :   Rationale: increase strength and improve coordination  to improve the patients ability to increase shoulder stability for reaching, lifting    With   [] TE   [] TA   [] neuro   [] other: Patient Education: [x] Review HEP    [] Progressed/Changed HEP based on:   [] positioning   [] body mechanics   [] transfers   [] heat/ice application    [] other:      Other Objective/Functional Measures: added 1# weight to serratus punches     Pain Level (0-10 scale) post treatment: 0    ASSESSMENT/Changes in Function: Reports occasional sharp pains in left shoulder that occur at random.   But is able to lift more with left UE    Patient will continue to benefit from skilled PT services to modify and progress therapeutic interventions, address functional mobility deficits, address ROM deficits, address strength deficits, analyze and address soft tissue restrictions, analyze and cue movement patterns, analyze and modify body mechanics/ergonomics, assess and modify postural abnormalities, address imbalance/dizziness and instruct in home and community integration to attain remaining goals. []  See Plan of Care  []  See progress note/recertification  []  See Discharge Summary         Progress towards goals / Updated goals:  Goal: Patient will improve FOTO assessment score to 73 in order to indicate improved functional abilities. Status at last note/certification: 69  Current: Regression, FOTO 67. Goal: Patient will improve Left shoulder flexion/abduction/external rotation strength, within available ROM, to 5/5 without increase in pain in order to improve ease of household/yard tasks. Status at last note/certification: [id number] 2/8 but shaky, external rotation 4+/5  5/5 grossly  met  Goal: Patient will report overall at least 65% improvement in function in order to progress toward premorbid status.   Status at last note/certification: 05%   70% improved  Met    PLAN  [x]  Upgrade activities as tolerated     []  Continue plan of care  []  Update interventions per flow sheet       []  Discharge due to:_  []  Other:_      Basilio Euceda PTA 5/15/2018  10:26 AM    Future Appointments  Date Time Provider Roby Joyner   5/15/2018 10:30  [address] [facility]   5/17/2018 10:00  [address] [facility]   6/22/2018 8:30 AM Shruthi Ramsey NP [facility] None

## 2018-05-17 ENCOUNTER — HOSPITAL ENCOUNTER (OUTPATIENT)
Dept: PHYSICAL THERAPY | Age: 68
Discharge: HOME | End: 2018-05-17
Payer: MEDICARE

## 2018-05-17 PROCEDURE — G8986 CARRY D/C STATUS: HCPCS

## 2018-05-17 PROCEDURE — 97112 NEUROMUSCULAR REEDUCATION: CPT

## 2018-05-17 PROCEDURE — 97110 THERAPEUTIC EXERCISES: CPT

## 2018-05-17 PROCEDURE — G8985 CARRY GOAL STATUS: HCPCS

## 2018-05-17 NOTE — PROGRESS NOTES
PT DAILY TREATMENT NOTE - [facility]     Patient Name: Nik Palmer  Date:2018  : 1950  [x]  Patient  Verified  Payor: VA MEDICARE / Plan: VA MEDICARE PART A & B / Product Type: Medicare /    In time:10:00  Out time:10:25  Total Treatment Time (min): 25  Total Timed Codes (min): 25  1:1 Treatment Time ( [address] only): 25   Visit #: 5 of 10    Treatment Area: Left shoulder pain [M25.512]    SUBJECTIVE  Pain Level (0-10 scale): 0  Any medication changes, allergies to medications, adverse drug reactions, diagnosis change, or new procedure performed?: [x] No    [] Yes (see summary sheet for update)  Subjective functional status/changes:   [] No changes reported  My left arm doens't feel as strong as my right and I still get pains if I move it certain ways. OBJECTIVE    10 min Therapeutic Exercise:  [] See flow sheet :   Rationale: increase ROM and increase strength to improve the patients ability to perform ADL's    15 min Neuromuscular Re-education:  []  See flow sheet :   Rationale: increase strength and improve coordination  to improve the patients ability to increase shoulder stability          With   [] TE   [] TA   [] neuro   [] other: Patient Education: [x] Review HEP    [] Progressed/Changed HEP based on:   [] positioning   [] body mechanics   [] transfers   [] heat/ice application    [] other:      Other Objective/Functional Measures:   Deficits: still has some weakness in left UE and certain movements provoke sharp pain. Pain with reachng, lifting laundry detergent into [place] shelf. Otherwise no functional limitations.     Pain Level (0-10 scale) post treatment: 0    ASSESSMENT/Changes in Function: see goals    Patient will continue to benefit from skilled PT services to modify and progress therapeutic interventions, address functional mobility deficits, address ROM deficits, address strength deficits, analyze and address soft tissue restrictions, analyze and cue movement patterns, analyze and modify body mechanics/ergonomics, assess and modify postural abnormalities, address imbalance/dizziness and instruct in home and community integration to attain remaining goals. []  See Plan of Care  []  See progress note/recertification  []  See Discharge Summary         Progress towards goals / Updated goals:  Goal: Patient will improve FOTO assessment score to 73 in order to indicate improved functional abilities. Status at last note/certification: 69  Current: Regression, FOTO 67. Goal: Patient will improve Left shoulder flexion/abduction/external rotation strength, within available ROM, to 5/5 without increase in pain in order to improve ease of household/yard tasks. Status at last note/certification: [id number] 0/4 but shaky, external rotation 4+/5  5/5 grossly  met  Goal: Patient will report overall at least 65% improvement in function in order to progress toward premorbid status.   Status at last note/certification: 10%   70% improved  Met    PLAN  [x]  Upgrade activities as tolerated     []  Continue plan of care  []  Update interventions per flow sheet       []  Discharge due to:_  []  Other:_      Eric Juarez PTA 5/17/2018  10:21 AM    Future Appointments  Date Time Provider Roby Joyner   6/22/2018 8:30 AM [facility]

## 2018-05-17 NOTE — PROGRESS NOTES
[facility], [address] [place] [address]  [phone] fax    Discharge Summary      Patient name: Opal Acosta Start of Care: 4/3/2018   Referral source: Corazon Lutz MD : 1950                         Medical Diagnosis: Left shoulder pain [M25.512] Onset Date:2 months                         Treatment Diagnosis: Left shoulder pain   Prior Hospitalization: see medical history Provider#: [id number]   Medications: Verified on Patient summary List    Comorbidities: history of basal cell cancer, high blood pressure, diabetes   Prior Level of Function: Functionally independent, just retired from [employer], lives with wife and they also have a house at [facility]    Visits from [facility]: 14    Missed Visits: 0  Reporting Period : 4/3/18 to 18    Goal: Patient will improve FOTO assessment score to 73 in order to indicate improved functional abilities. Status at last note/certification: 69  Current status: Not met, FOTO 67. Goal: Patient will improve Left shoulder flexion/abduction/external rotation strength, within available ROM, to 5/5 without increase in pain in order to improve ease of household/yard tasks. Status at last note/certification: [id number]/[id number] 4/7 but shaky, external rotation 4+/5  Current status: Met, 5/5 grossly   Goal: Patient will report overall at least 65% improvement in function in order to progress toward premorbid status. Status at last note/certification: 56%   Current status: Met, 70% improved     G-Codes (GP)  Carry    Goal  CJ= 20-39%   D/C  CJ= 20-39%    The severity rating is based on clinical judgment and the FOTO score. Assessment/ Summary of Care: Patient has consistently attended therapy for Left shoulder pain/weakness. While he reports some difficulty with reaching/lifting to this day, he has made good gains in strength, and reports overall limited functional deficits.  Due to decent gains Patient will be discharged to [facility]. Thank you for the referral of this Patient.      RECOMMENDATIONS:  [x]Discontinue therapy: [x]Patient has reached or is progressing toward set goals      []Patient is non-compliant or has abdicated      []Due to lack of appreciable progress towards set [facility], PT 5/17/2018 1:33 PM

## 2018-06-22 ENCOUNTER — OFFICE VISIT (OUTPATIENT)
Dept: FAMILY MEDICINE CLINIC | Age: 68
End: 2018-06-22

## 2018-06-22 VITALS
HEIGHT: 67 IN | BODY MASS INDEX: 29.19 KG/M2 | TEMPERATURE: 98 F | OXYGEN SATURATION: 96 % | WEIGHT: 186 LBS | HEART RATE: 82 BPM | RESPIRATION RATE: 16 BRPM | SYSTOLIC BLOOD PRESSURE: 129 MMHG | DIASTOLIC BLOOD PRESSURE: 72 MMHG

## 2018-06-22 DIAGNOSIS — M10.9 GOUT, UNSPECIFIED CAUSE, UNSPECIFIED CHRONICITY, UNSPECIFIED SITE: ICD-10-CM

## 2018-06-22 DIAGNOSIS — I10 ESSENTIAL HYPERTENSION: ICD-10-CM

## 2018-06-22 DIAGNOSIS — G89.29 CHRONIC LEFT SHOULDER PAIN: ICD-10-CM

## 2018-06-22 DIAGNOSIS — M25.512 CHRONIC LEFT SHOULDER PAIN: ICD-10-CM

## 2018-06-22 DIAGNOSIS — E11.9 TYPE 2 DIABETES MELLITUS WITHOUT COMPLICATION, WITHOUT LONG-TERM CURRENT USE OF INSULIN (HCC): ICD-10-CM

## 2018-06-22 DIAGNOSIS — E11.8 TYPE 2 DIABETES MELLITUS WITH COMPLICATION, WITHOUT LONG-TERM CURRENT USE OF INSULIN (HCC): Primary | ICD-10-CM

## 2018-06-22 DIAGNOSIS — N52.9 INABILITY TO MAINTAIN ERECTION: ICD-10-CM

## 2018-06-22 RX ORDER — ASPIRIN 81 MG/1
81 TABLET ORAL
Qty: 30 TAB | Status: SHIPPED | OUTPATIENT
Start: 2018-06-22 | End: 2018-09-21 | Stop reason: ALTCHOICE

## 2018-06-22 RX ORDER — GLYBURIDE 2.5 MG/1
2.5 TABLET ORAL 2 TIMES DAILY WITH MEALS
Qty: 180 TAB | Refills: 0 | Status: SHIPPED | OUTPATIENT
Start: 2018-06-22 | End: 2019-03-28 | Stop reason: SDUPTHER

## 2018-06-22 RX ORDER — LISINOPRIL 20 MG/1
20 TABLET ORAL 2 TIMES DAILY
Qty: 180 TAB | Refills: 0 | Status: SHIPPED | OUTPATIENT
Start: 2018-06-22 | End: 2019-03-28 | Stop reason: SDUPTHER

## 2018-06-22 RX ORDER — DILTIAZEM HYDROCHLORIDE 360 MG/1
360 CAPSULE, EXTENDED RELEASE ORAL DAILY
Qty: 90 CAP | Refills: 0 | Status: SHIPPED | OUTPATIENT
Start: 2018-06-22 | End: 2019-03-28 | Stop reason: SDUPTHER

## 2018-06-22 RX ORDER — ALLOPURINOL 300 MG/1
300 TABLET ORAL DAILY
Qty: 90 TAB | Refills: 0 | Status: SHIPPED | OUTPATIENT
Start: 2018-06-22 | End: 2019-03-28 | Stop reason: SDUPTHER

## 2018-06-22 NOTE — MR AVS SNAPSHOT
[address] [place] [address] 
 
 
 [address] [place] [address] [phone] Patient: Opal Acosta MRN:  [id number] Visit Information Date & Time Provider Department Dept. Phone Encounter #  
 6/22/2018  8:30 AM Ludwig Schaffer NP [facility] Desai 77 [id number] Follow-up Instructions Return in about 3 months (around 9/22/2018), or if symptoms worsen or fail to improve. Upcoming Health Maintenance Date Due Hepatitis C Screening 1950 FOBT Q 1 YEAR AGE 50-75 3/21/1998 EYE EXAM RETINAL OR DILATED Q1 1/30/2015 Pneumococcal 65+ Low/Medium Risk (2 of 2 - PPSV23) 6/17/2017 FOOT EXAM Q1 6/17/2017 MICROALBUMIN Q1 12/23/2017 MEDICARE YEARLY EXAM 5/6/2018 GLAUCOMA SCREENING Q2Y 6/17/2018 Influenza Age 5 to Adult 8/1/2018 HEMOGLOBIN A1C Q6M 9/16/2018 LIPID PANEL Q1 3/16/2019 DTaP/Tdap/Td series (2 - Td) 6/17/2026 Allergies as of 6/22/2018  Review Complete On: 6/22/2018 By: Claudia Yu LPN Severity Noted Reaction Type Reactions Metformin  11/01/2013   Side Effect Other (comments) Renal insufficiency Current Immunizations  Never Reviewed Name Date Influenza Vaccine 12/7/2015 Not reviewed this visit You Were Diagnosed With   
  
 Codes Comments Type 2 diabetes mellitus with complication, without long-term current use of insulin (HCC)    -  Primary ICD-10-CM: E11.8 ICD-9-CM: 250.90 Gout, unspecified cause, unspecified chronicity, unspecified site     ICD-10-CM: M10.9 ICD-9-CM: 274.9 Essential hypertension     ICD-10-CM: I10 
ICD-9-CM: 401.9 Type 2 diabetes mellitus without complication, without long-term current use of insulin (HCC)     ICD-10-CM: E11.9 ICD-9-CM: 250.00 Chronic left shoulder pain     ICD-10-CM: M25.512, G89.29 ICD-9-CM: 719.41, 338.29 Inability to maintain erection     ICD-10-CM: N52.9 ICD-9-CM: 302.72   
  
 Vitals BP Pulse Temp Resp Height(growth percentile) Weight(growth percentile) 129/72 (BP 1 Location: Right arm, BP Patient Position: Sitting) 82 98 °F (36.7 °C) (Oral) 16 5' 7\" (1.702 m) 186 lb (84.4 kg) SpO2 BMI Smoking Status 96% 29.13 kg/m2 Never Smoker BMI and BSA Data Body Mass Index Body Surface Area  
 29.13 kg/m 2 2 m 2 Preferred Pharmacy Pharmacy Name Phone [facility] [address] [facility], [address] [place] [address] [phone] Your Updated Medication List  
  
   
This list is accurate as of 6/22/18  9:30 AM.  Always use your most recent med list.  
  
  
  
  
 allopurinol 300 mg tablet Commonly known as:  Vena Ditch Take 1 Tab by mouth daily. aspirin delayed-release 81 mg tablet Commonly known as:  ADULT LOW DOSE ASPIRIN Take 1 Tab by mouth daily as needed. Indications: myocardial infarction prevention  
  
 dilTIAZem 360 mg SR capsule Commonly known as:  [facility] Take 1 Cap by mouth daily. glyBURIDE 2.5 mg tablet Commonly known as:  Tressia Linda Take 1 Tab by mouth two (2) times daily (with meals). lisinopril 20 mg tablet Commonly known as:  Pako Boehringer Take 1 Tab by mouth two (2) times a day. Prescriptions Sent to Pharmacy Refills  
 allopurinol (ZYLOPRIM) 300 mg tablet 0 Sig: Take 1 Tab by mouth daily. Class: Normal  
 Pharmacy: [id number] [place] Ph #: [phone] Route: Oral  
 dilTIAZem (TIAZAC) 360 mg SR capsule 0 Sig: Take 1 Cap by mouth daily. Class: Normal  
 Pharmacy: [id number] [address] Ph #: [phone] Route: Oral  
 glyBURIDE (DIABETA) 2.5 mg tablet 0 Sig: Take 1 Tab by mouth two (2) times daily (with meals). Class: Normal  
 Pharmacy: [id number] [place] Ph #: [phone] Route: Oral  
 lisinopril (PRINIVIL, ZESTRIL) 20 mg tablet 0 Sig: Take 1 Tab by mouth two (2) times a day. Class: Normal  
 Pharmacy: [id number] [address], [address] [place] [address] Ph #: [phone] Route: Oral  
 aspirin delayed-release (ADULT LOW DOSE ASPIRIN) 81 mg tablet prn Sig: Take 1 Tab by mouth daily as needed. Indications: myocardial infarction prevention Class: Normal  
 Pharmacy: [id number] [address] [facility], [address] [place] [address] Ph #: [phone] Route: Oral  
  
We Performed the Following  DIABETES FOOT EXAM [[facility]] Follow-up Instructions Return in about 3 months (around 9/22/2018), or if symptoms worsen or fail to improve. To-Do List   
 06/22/2018 Lab:  HEMOGLOBIN A1C WITH EAG   
  
 06/22/2018 Lab:  TESTOSTERONE, FREE & TOTAL Around 09/20/2018 Lab:  LIPID PANEL Around 09/20/2018 Lab:  METABOLIC PANEL, COMPREHENSIVE Patient Instructions Please contact our office if you have any questions about your visit today. Introducing [facility]! Dear Fabrice Topete: Thank you for requesting a [employer] account. Our records indicate that you already have an active [employer] account. You can access your account anytime at [email or web address] Did you know that you can access your hospital and ER discharge instructions at any time in [employer]? You can also review all of your test results from your hospital stay or ER visit. Additional Information If you have questions, please visit the Frequently Asked Questions section of the [employer] website at [email or web address] Remember, [employer] is NOT to be used for urgent needs. For medical emergencies, dial 911. Now available from your iPhone and Android! Please provide this summary of care documentation to your next provider. Your primary care clinician is listed as LARISA HURST. If you have any questions after today's visit, please call [phone].

## 2018-06-22 NOTE — PROGRESS NOTES
HPI  Kimberly Sutton is a 77 y.o. male  Chief Complaint   Patient presents with    Follow-up     3 month follow up patient last seen in office 3/23/2018    Medication Refill     Requesting labs as he has had issues keeping an erection. Reports his wife has noticed this and she is concerned. Reports he still can get an erection but states he has a hard time maintaining. Reports he still has shoulder pain from his fall almost a year ago. Reports going to Physical Therapy but states he thinks this caused more pain. Reports PT did help as he is able to move his shoulder. Reports he started taking OTC aspirin two in the morning and two in the evening. Reports this does help with the pain. Reports he has noticed increased bruising to his arms and wants to know what can be done about this. Denies chest pain or shortness of breath. Reports taking blood pressure medications as prescribed. Admits to diabetes. Reports he has not had a foot exam. Denies sores or open areas to feet. Denies hypo or hyperglycemic episodes. Denies dizziness or tingling to extremities. Past Medical History  Past Medical History:   Diagnosis Date    Calculus of kidney     Cancer ([facility]     HX of BCC    Diabetes mellitus ([facility] 8/19/2010    Gout 8/19/2010    HTN (hypertension) 8/19/2010    Hyperlipemia 8/19/2010    Kidney disease     Lumbar spondylosis     Proteinuria     Skin cancer, basal cell     neck    Type 2 diabetes mellitus without complication, without long-term current use of insulin ([facility] 8/19/2010    Urolithiasis        Surgical History  Past Surgical History:   Procedure Laterality Date    HX GI      HX OTHER SURGICAL      Basal cell removed from left side of face    HX UROLOGICAL      kidney        Medications  Current Outpatient Prescriptions   Medication Sig Dispense Refill    allopurinol (ZYLOPRIM) 300 mg tablet Take 1 Tab by mouth daily.  90 Tab 0    dilTIAZem (TIAZAC) 360 mg SR capsule Take 1 Cap by mouth daily. 90 Cap 0    glyBURIDE (DIABETA) 2.5 mg tablet Take 1 Tab by mouth two (2) times daily (with meals). 180 Tab 0    lisinopril (PRINIVIL, ZESTRIL) 20 mg tablet Take 1 Tab by mouth two (2) times a day. 180 Tab 0    aspirin delayed-release (ADULT LOW DOSE ASPIRIN) 81 mg tablet Take 1 Tab by mouth daily as needed. Indications: myocardial infarction prevention 30 Tab prn       Allergies  Allergies   Allergen Reactions    Metformin Other (comments)     Renal insufficiency         Family History  Family History   Problem Relation Age of Onset    Diabetes Mother        Social History  Social History     Social History    Marital status:      Spouse name: N/A    Number of children: N/A    Years of education: N/A     Occupational History    Not on file. Social History Main Topics    Smoking status: Never Smoker    Smokeless tobacco: Never Used    Alcohol use No    Drug use: No    Sexual activity: Yes     Other Topics Concern    Not on file     Social History Narrative       Problem List  Patient Active Problem List   Diagnosis Code    Type 2 diabetes mellitus without complication, without long-term current use of insulin (HCC) E11.9    HTN (hypertension) I10    Gout M10.9    Hyperlipemia E78.5    Nocturia R35.1    Type 2 diabetes with nephropathy ([facility] E11.21       Review of Systems  Review of Systems   Constitutional: Negative for chills and fever. Respiratory: Negative for shortness of breath. Cardiovascular: Negative for chest pain. Gastrointestinal: Negative for abdominal pain, nausea and vomiting. Musculoskeletal: Positive for joint pain and myalgias. Neurological: Negative for dizziness and speech change. Endo/Heme/Allergies: Bruises/bleeds easily.        Vital Signs  Vitals:    06/22/18 0839   BP: 129/72   Pulse: 82   Resp: 16   Temp: 98 °F (36.7 °C)   TempSrc: Oral   SpO2: 96%   Weight: 186 lb (84.4 kg)   Height: 5' 7\" (1.702 m)   PainSc:   0 - No pain       Physical Exam  Physical Exam   Constitutional: He is oriented to person, place, and time. HENT:   Mouth/Throat: Oropharynx is clear and moist.   Eyes: Pupils are equal, round, and reactive to light. Cardiovascular: Normal rate, regular rhythm, normal heart sounds and intact distal pulses. Pulmonary/Chest: Effort normal and breath sounds normal. No respiratory distress. Abdominal: Soft. Bowel sounds are normal.   Musculoskeletal: He exhibits no edema. Right foot: There is normal range of motion and no deformity. Left foot: There is normal range of motion and no deformity. Feet:   Right Foot:   Protective Sensation: 5 sites tested. 5 sites sensed. Skin Integrity: Negative for ulcer, blister or skin breakdown. Left Foot:   Protective Sensation: 5 sites sensed. Skin Integrity: Negative for ulcer, blister or skin breakdown. Lymphadenopathy:     He has no cervical adenopathy. Neurological: He is alert and oriented to person, place, and time. Skin:   Old bruising in multiple stages of healing to arms bilaterally. Psychiatric: He has a normal mood and affect. His behavior is normal.   Vitals reviewed. Diagnostics  Orders Placed This Encounter    LIPID PANEL     Standing Status:   Future     Standing Expiration Date:   81/41/3540    METABOLIC PANEL, COMPREHENSIVE     Standing Status:   Future     Standing Expiration Date:   12/20/2018    HEMOGLOBIN A1C WITH EAG     Standing Status:   Future     Standing Expiration Date:   6/23/2019    TESTOSTERONE, FREE & TOTAL     Standing Status:   Future     Standing Expiration Date:   6/23/2019     DIABETES FOOT EXAM  [order this if you have performed a diabetic foot exam today)    allopurinol (ZYLOPRIM) 300 mg tablet     Sig: Take 1 Tab by mouth daily. Dispense:  90 Tab     Refill:  0    dilTIAZem (TIAZAC) 360 mg SR capsule     Sig: Take 1 Cap by mouth daily.      Dispense:  90 Cap     Refill:  0    glyBURIDE (DIABETA) 2.5 mg tablet Sig: Take 1 Tab by mouth two (2) times daily (with meals). Dispense:  180 Tab     Refill:  0    lisinopril (PRINIVIL, ZESTRIL) 20 mg tablet     Sig: Take 1 Tab by mouth two (2) times a day. Dispense:  180 Tab     Refill:  0    aspirin delayed-release (ADULT LOW DOSE ASPIRIN) 81 mg tablet     Sig: Take 1 Tab by mouth daily as needed. Indications: myocardial infarction prevention     Dispense:  30 Tab     Refill:  prn       Results  Results for orders placed or performed during the hospital encounter of 99/01/53   METABOLIC PANEL, COMPREHENSIVE   Result Value Ref Range    Sodium 138 136 - 145 mmol/L    Potassium 4.1 3.5 - 5.5 mmol/L    Chloride 106 100 - 108 mmol/L    CO2 24 21 - 32 mmol/L    Anion gap 8 3.0 - 18 mmol/L    Glucose 112 (H) 74 - 99 mg/dL    BUN 19 (H) 7.0 - 18 MG/DL    Creatinine 1.34 (H) 0.6 - 1.3 MG/DL    BUN/Creatinine ratio 14 12 - 20      GFR est AA >60 >60 ml/min/1.73m2    GFR est non-AA 53 (L) >60 ml/min/1.73m2    Calcium 9.1 8.5 - 10.1 MG/DL    Bilirubin, total 0.6 0.2 - 1.0 MG/DL    ALT (SGPT) 17 16 - 61 U/L    AST (SGOT) 13 (L) 15 - 37 U/L    Alk. phosphatase 105 45 - 117 U/L    Protein, total 6.8 6.4 - 8.2 g/dL    Albumin 3.5 3.4 - 5.0 g/dL    Globulin 3.3 2.0 - 4.0 g/dL    A-G Ratio 1.1 0.8 - 1.7     LIPID PANEL   Result Value Ref Range    LIPID PROFILE          Cholesterol, total 160 <200 MG/DL    Triglyceride 195 (H) <150 MG/DL    HDL Cholesterol 30 (L) 40 - 60 MG/DL    LDL, calculated 91 0 - 100 MG/DL    VLDL, calculated 39 MG/DL    CHOL/HDL Ratio 5.3 (H) 0 - 5.0     HEMOGLOBIN A1C WITH EAG   Result Value Ref Range    Hemoglobin A1c 6.9 (H) 4.2 - 5.6 %    Est. average glucose 151 mg/dL       Assessment and Plan  Diagnoses and all orders for this visit:    1. Type 2 diabetes mellitus with complication, without long-term current use of insulin ([facility]  -      DIABETES FOOT EXAM  [order this if you have performed a diabetic foot exam today)    2.  Gout, unspecified cause, unspecified chronicity, unspecified site  -     allopurinol (ZYLOPRIM) 300 mg tablet; Take 1 Tab by mouth daily. 3. Essential hypertension  -     dilTIAZem (TIAZAC) 360 mg SR capsule; Take 1 Cap by mouth daily. -     lisinopril (PRINIVIL, ZESTRIL) 20 mg tablet; Take 1 Tab by mouth two (2) times a day. -     LIPID PANEL; Future  -     METABOLIC PANEL, COMPREHENSIVE; Future    4. Type 2 diabetes mellitus without complication, without long-term current use of insulin (HCC)  -     glyBURIDE (DIABETA) 2.5 mg tablet; Take 1 Tab by mouth two (2) times daily (with meals). -     HEMOGLOBIN A1C WITH EAG; Future    5. Chronic left shoulder pain    6. Inability to maintain erection  -     TESTOSTERONE, FREE & TOTAL; Future    Other orders  -     aspirin delayed-release (ADULT LOW DOSE ASPIRIN) 81 mg tablet; Take 1 Tab by mouth daily as needed. Indications: myocardial infarction prevention    Lab results from 3/16/18 reviewed. Instructed to take OTC tylenol for shoulder pain. Instructed to take aspirin as prescribed. Educated on aspirin and bruising. Offered orthopedic referral for shoulder. Patient declined. After care summary printed and reviewed with patient. Plan reviewed with patient. Questions answered. Patient verbalized understanding of plan and is in agreement with plan. Patient to follow up in three months or earlier if symptoms worsen.      KARTHIK Byrne

## 2018-06-22 NOTE — PROGRESS NOTES
Chief Complaint   Patient presents with    Follow-up     3 month follow up patient last seen in office 3/23/2018    Medication Refill     1. Have you been to the ER, urgent care clinic since your last visit? Hospitalized since your last visit? No    2. Have you seen or consulted any other health care providers outside of the [address] [place] [address] since your last visit? Include any pap smears or colon screening.  No     Health Maintenance Due   Topic Date Due    Hepatitis C Screening  1950    FOBT Q 1 YEAR AGE 50-75  03/21/1998    EYE EXAM RETINAL OR DILATED Q1  01/30/2015    Pneumococcal 65+ Low/Medium Risk (2 of 2 - PPSV23) 06/17/2017    FOOT EXAM Q1  06/17/2017    MICROALBUMIN Q1  12/23/2017    MEDICARE YEARLY EXAM  05/06/2018    GLAUCOMA SCREENING Q2Y  06/17/2018

## 2018-09-13 ENCOUNTER — HOSPITAL ENCOUNTER (OUTPATIENT)
Dept: LAB | Age: 68
Discharge: HOME | End: 2018-09-13
Payer: MEDICARE

## 2018-09-13 DIAGNOSIS — N52.9 INABILITY TO MAINTAIN ERECTION: ICD-10-CM

## 2018-09-13 DIAGNOSIS — I10 ESSENTIAL HYPERTENSION: ICD-10-CM

## 2018-09-13 DIAGNOSIS — E11.9 TYPE 2 DIABETES MELLITUS WITHOUT COMPLICATION, WITHOUT LONG-TERM CURRENT USE OF INSULIN (HCC): ICD-10-CM

## 2018-09-13 LAB
ALBUMIN SERPL-MCNC: 3.6 G/DL (ref 3.4–5)
ALBUMIN/GLOB SERPL: 1.1 {RATIO} (ref 0.8–1.7)
ALP SERPL-CCNC: 96 U/L (ref 45–117)
ALT SERPL-CCNC: 15 U/L (ref 16–61)
ANION GAP SERPL CALC-SCNC: 8 MMOL/L (ref 3–18)
AST SERPL-CCNC: 9 U/L (ref 15–37)
BILIRUB SERPL-MCNC: 0.5 MG/DL (ref 0.2–1)
BUN SERPL-MCNC: 18 MG/DL (ref 7–18)
BUN/CREAT SERPL: 12 (ref 12–20)
CALCIUM SERPL-MCNC: 9 MG/DL (ref 8.5–10.1)
CHLORIDE SERPL-SCNC: 113 MMOL/L (ref 100–108)
CHOLEST SERPL-MCNC: 163 MG/DL
CO2 SERPL-SCNC: 23 MMOL/L (ref 21–32)
CREAT SERPL-MCNC: 1.51 MG/DL (ref 0.6–1.3)
EST. AVERAGE GLUCOSE BLD GHB EST-MCNC: 128 MG/DL
GLOBULIN SER CALC-MCNC: 3.4 G/DL (ref 2–4)
GLUCOSE SERPL-MCNC: 114 MG/DL (ref 74–99)
HBA1C MFR BLD: 6.1 % (ref 4.2–5.6)
HDLC SERPL-MCNC: 32 MG/DL (ref 40–60)
HDLC SERPL: 5.1 {RATIO} (ref 0–5)
LDLC SERPL CALC-MCNC: 106.8 MG/DL (ref 0–100)
LIPID PROFILE,FLP: ABNORMAL
POTASSIUM SERPL-SCNC: 4.4 MMOL/L (ref 3.5–5.5)
PROT SERPL-MCNC: 7 G/DL (ref 6.4–8.2)
SODIUM SERPL-SCNC: 144 MMOL/L (ref 136–145)
TRIGL SERPL-MCNC: 121 MG/DL (ref ?–150)
VLDLC SERPL CALC-MCNC: 24.2 MG/DL

## 2018-09-13 PROCEDURE — 84403 ASSAY OF TOTAL TESTOSTERONE: CPT | Performed by: NURSE PRACTITIONER

## 2018-09-13 PROCEDURE — 80061 LIPID PANEL: CPT | Performed by: NURSE PRACTITIONER

## 2018-09-13 PROCEDURE — 83036 HEMOGLOBIN GLYCOSYLATED A1C: CPT | Performed by: NURSE PRACTITIONER

## 2018-09-13 PROCEDURE — 36415 COLL VENOUS BLD VENIPUNCTURE: CPT | Performed by: NURSE PRACTITIONER

## 2018-09-13 PROCEDURE — 80053 COMPREHEN METABOLIC PANEL: CPT | Performed by: NURSE PRACTITIONER

## 2018-09-14 LAB
TESTOST FREE SERPL-MCNC: 9.2 PG/ML (ref 6.6–18.1)
TESTOST SERPL-MCNC: 626 NG/DL (ref 264–916)

## 2018-09-21 ENCOUNTER — OFFICE VISIT (OUTPATIENT)
Dept: FAMILY MEDICINE CLINIC | Age: 68
End: 2018-09-21

## 2018-09-21 ENCOUNTER — HOSPITAL ENCOUNTER (OUTPATIENT)
Dept: LAB | Age: 68
Discharge: HOME | End: 2018-09-21
Payer: MEDICARE

## 2018-09-21 VITALS
HEIGHT: 67 IN | RESPIRATION RATE: 20 BRPM | BODY MASS INDEX: 29.58 KG/M2 | HEART RATE: 79 BPM | SYSTOLIC BLOOD PRESSURE: 116 MMHG | DIASTOLIC BLOOD PRESSURE: 69 MMHG | TEMPERATURE: 97.9 F | WEIGHT: 188.5 LBS

## 2018-09-21 DIAGNOSIS — M25.511 CHRONIC PAIN OF BOTH SHOULDERS: ICD-10-CM

## 2018-09-21 DIAGNOSIS — R07.89 CHEST TIGHTNESS: ICD-10-CM

## 2018-09-21 DIAGNOSIS — I10 ESSENTIAL HYPERTENSION: ICD-10-CM

## 2018-09-21 DIAGNOSIS — E11.21 TYPE 2 DIABETES WITH NEPHROPATHY (HCC): ICD-10-CM

## 2018-09-21 DIAGNOSIS — Z28.21 INFLUENZA VACCINATION DECLINED: ICD-10-CM

## 2018-09-21 DIAGNOSIS — R94.4 DECREASED GFR: ICD-10-CM

## 2018-09-21 DIAGNOSIS — M1A.9XX0 CHRONIC GOUT WITHOUT TOPHUS, UNSPECIFIED CAUSE, UNSPECIFIED SITE: ICD-10-CM

## 2018-09-21 DIAGNOSIS — G89.29 CHRONIC PAIN OF BOTH SHOULDERS: ICD-10-CM

## 2018-09-21 DIAGNOSIS — E78.2 MIXED HYPERLIPIDEMIA: ICD-10-CM

## 2018-09-21 DIAGNOSIS — M25.512 CHRONIC PAIN OF BOTH SHOULDERS: ICD-10-CM

## 2018-09-21 LAB
ATRIAL RATE: 61 BPM
CALCULATED P AXIS, ECG09: 19 DEGREES
CALCULATED R AXIS, ECG10: 15 DEGREES
CALCULATED T AXIS, ECG11: 49 DEGREES
DIAGNOSIS, 93000: NORMAL
P-R INTERVAL, ECG05: 168 MS
Q-T INTERVAL, ECG07: 404 MS
QRS DURATION, ECG06: 84 MS
QTC CALCULATION (BEZET), ECG08: 406 MS
VENTRICULAR RATE, ECG03: 61 BPM

## 2018-09-21 PROCEDURE — 93005 ELECTROCARDIOGRAM TRACING: CPT

## 2018-09-21 RX ORDER — CYCLOBENZAPRINE HCL 5 MG
5 TABLET ORAL
Qty: 30 TAB | Refills: 0 | Status: SHIPPED | OUTPATIENT
Start: 2018-09-21 | End: 2020-01-01

## 2018-09-21 NOTE — PROGRESS NOTES
Chief Complaint Patient presents with  Diabetes  Gout  Hypertension  Erectile Dysfunction  Pain (Chronic)  
  shoulder  Results  
  discuss lab results Health Maintenance Due Topic Date Due  
 Hepatitis C Screening  1950  FOBT Q 1 YEAR AGE 50-75  03/21/1998  Pneumococcal 65+ Low/Medium Risk (2 of 2 - PPSV23) 06/17/2017  MICROALBUMIN Q1  12/23/2017  MEDICARE YEARLY EXAM  05/06/2018  Influenza Age 5 to Adult  08/01/2018 Health Maintenance reviewed 1. Have you been to the ER, urgent care clinic since your last visit? Hospitalized since your last visit? No 
 
2. Have you seen or consulted any other health care providers outside of the [address] [place] [address] since your last visit? Include any pap smears or colon screening. No 
 
 
Abuse and learning screenings updated

## 2018-09-21 NOTE — PATIENT INSTRUCTIONS
Please contact our office if you have any questions about your visit today. Body Mass Index: Care Instructions Your Care Instructions Body mass index (BMI) can help you see if your weight is raising your risk for health problems. It uses a formula to compare how much you weigh with how tall you are. · A BMI lower than 18.5 is considered underweight. · A BMI between 18.5 and 24.9 is considered healthy. · A BMI between 25 and 29.9 is considered overweight. A BMI of 30 or higher is considered obese. If your BMI is in the normal range, it means that you have a lower risk for weight-related health problems. If your BMI is in the overweight or obese range, you may be at increased risk for weight-related health problems, such as high blood pressure, heart disease, stroke, arthritis or joint pain, and diabetes. If your BMI is in the underweight range, you may be at increased risk for health problems such as fatigue, lower protection (immunity) against illness, muscle loss, bone loss, hair loss, and hormone problems. BMI is just one measure of your risk for weight-related health problems. You may be at higher risk for health problems if you are not active, you eat an unhealthy diet, or you drink too much alcohol or use tobacco products. Follow-up care is a key part of your treatment and safety. Be sure to make and go to all appointments, and call your doctor if you are having problems. It's also a good idea to know your test results and keep a list of the medicines you take. How can you care for yourself at home? · Practice healthy eating habits. This includes eating plenty of fruits, vegetables, whole grains, lean protein, and low-fat dairy. · If your doctor recommends it, get more exercise. Walking is a good choice. Bit by bit, increase the amount you walk every day. Try for at least 30 minutes on most days of the week. · Do not smoke. Smoking can increase your risk for health problems.  If you need help quitting, talk to your doctor about stop-smoking programs and medicines. These can increase your chances of quitting for good. · Limit alcohol to 2 drinks a day for men and 1 drink a day for women. Too much alcohol can cause health problems. If you have a BMI higher than 25 · Your doctor may do other tests to check your risk for weight-related health problems. This may include measuring the distance around your waist. A waist measurement of more than 40 inches in men or 35 inches in women can increase the risk of weight-related health problems. · Talk with your doctor about steps you can take to stay healthy or improve your health. You may need to make lifestyle changes to lose weight and stay healthy, such as changing your diet and getting regular exercise. If you have a BMI lower than 18.5 · Your doctor may do other tests to check your risk for health problems. · Talk with your doctor about steps you can take to stay healthy or improve your health. You may need to make lifestyle changes to gain or maintain weight and stay healthy, such as getting more healthy foods in your diet and doing exercises to build muscle. Where can you learn more? Go to http://kari-jesus.info/. Enter S176 in the search box to learn more about \"Body Mass Index: Care Instructions. \" Current as of: October 13, 2016 Content Version: 11.4 © 8588-7407 Healthwise, Incorporated. Care instructions adapted under license by [employer] (which disclaims liability or warranty for this information). If you have questions about a medical condition or this instruction, always ask your healthcare professional. [place] [address] any warranty or liability for your use of this information. Joint Pain: Care Instructions Your Care Instructions Many people have small aches and pains from overuse or injury to muscles and joints. Joint injuries often happen during sports or recreation, work tasks, or projects around the home. An overuse injury can happen when you put too much stress on a joint or when you do an activity that stresses the joint over and over, such as using the computer or rowing a boat. You can take action at home to help your muscles and joints get better. You should feel better in 1 to 2 weeks, but it can take 3 months or more to heal completely. Follow-up care is a key part of your treatment and safety. Be sure to make and go to all appointments, and call your doctor if you are having problems. It's also a good idea to know your test results and keep a list of the medicines you take. How can you care for yourself at home? · Do not put weight on the injured joint for at least a day or two. · For the first day or two after an injury, do not take hot showers or baths, and do not use hot packs. The heat could make swelling worse. · Put ice or a cold pack on the sore joint for 10 to 20 minutes at a time. Try to do this every 1 to 2 hours for the next 3 days (when you are awake) or until the swelling goes down. Put a thin cloth between the ice and your skin. · Wrap the injury in an elastic bandage. Do not wrap it too tightly because this can cause more swelling. · Prop up the sore joint on a pillow when you ice it or anytime you sit or lie down during the next 3 days. Try to keep it above the level of your heart. This will help reduce swelling. · Take an over-the-counter pain medicine, such as acetaminophen (Tylenol), ibuprofen (Advil, Motrin), or naproxen (Aleve). Read and follow all instructions on the label. · After 1 or 2 days of rest, begin moving the joint gently. While the joint is still healing, you can begin to exercise using activities that do not strain or hurt the painful joint. When should you call for help? Call your doctor now or seek immediate medical care if:   · You have signs of infection, such as: 
¨ Increased pain, swelling, warmth, and redness. ¨ Red streaks leading from the joint. ¨ A fever.  
 Watch closely for changes in your health, and be sure to contact your doctor if: 
  · Your movement or symptoms are not getting better after 1 to 2 weeks of home treatment. Where can you learn more? Go to http://kari-jesus.info/. Enter P205 in the search box to learn more about \"Joint Pain: Care Instructions. \" Current as of: November 29, 2017 Content Version: 11.7 © 2598-8079 [employer]. Care instructions adapted under license by [employer] (which disclaims liability or warranty for this information). If you have questions about a medical condition or this instruction, always ask your healthcare professional. [address] any warranty or liability for your use of this information. Musculoskeletal Pain: Care Instructions Your Care Instructions Different problems with the bones, muscles, nerves, ligaments, and tendons in the body can cause pain. One or more areas of your body may ache or burn. Or they may feel tired, stiff, or sore. The medical term for this type of pain is musculoskeletal pain. It can have many different causes. Sometimes the pain is caused by an injury such as a strain or sprain. Or you might have pain from using one part of your body in the same way over and over again. This is called overuse. In some cases, the cause of the pain is another health problem such as arthritis or fibromyalgia. The doctor will examine you and ask you questions about your health to help find the cause of your pain. Blood tests or imaging tests like an X-ray may also be helpful. But sometimes doctors can't find a cause of the pain. Treatment depends on your symptoms and the cause of the pain, if known. The doctor has checked you carefully, but problems can develop later.  If you notice any problems or new symptoms, get medical treatment right away. Follow-up care is a key part of your treatment and safety. Be sure to make and go to all appointments, and call your doctor if you are having problems. It's also a good idea to know your test results and keep a list of the medicines you take. How can you care for yourself at home? · Rest until you feel better. · Do not do anything that makes the pain worse. Return to exercise gradually if you feel better and your doctor says it's okay. · Be safe with medicines. Read and follow all instructions on the label. ¨ If the doctor gave you a prescription medicine for pain, take it as prescribed. ¨ If you are not taking a prescription pain medicine, ask your doctor if you can take an over-the-counter medicine. · Put ice or a cold pack on the area for 10 to 20 minutes at a time to ease pain. Put a thin cloth between the ice and your skin. When should you call for help? Call your doctor now or seek immediate medical care if: 
  · You have new pain, or your pain gets worse.  
  · You have new symptoms such as a fever, a rash, or chills.  
 Watch closely for changes in your health, and be sure to contact your doctor if: 
  · You do not get better as expected. Where can you learn more? Go to http://kari-jesus.info/. Enter E698 in the search box to learn more about \"Musculoskeletal Pain: Care Instructions. \" Current as of: October 9, 2017 Content Version: 11.7 © 6520-0698 [employer]. Care instructions adapted under license by [employer] (which disclaims liability or warranty for this information). If you have questions about a medical condition or this instruction, always ask your healthcare professional. [address] any warranty or liability for your use of this information.

## 2018-09-21 NOTE — MR AVS SNAPSHOT
[address] [place] [address] 
 
 
 [address] [address] [address] [address] [phone] Patient: Danish Blanco MRN:  [id number] Visit Information Date & Time Provider Department Dept. Phone Encounter #  
 9/21/2018  8:00 AM Audra Renner NP [address] [phone] [id number] Follow-up Instructions Return in about 2 weeks (around 10/5/2018), or if symptoms worsen or fail to improve, for 30 min follow up. Upcoming Health Maintenance Date Due Hepatitis C Screening 1950 FOBT Q 1 YEAR AGE 50-75 3/21/1998 Pneumococcal 65+ Low/Medium Risk (2 of 2 - PPSV23) 6/17/2017 MICROALBUMIN Q1 12/23/2017 MEDICARE YEARLY EXAM 5/6/2018 Influenza Age 5 to Adult 10/21/2018* HEMOGLOBIN A1C Q6M 3/13/2019 FOOT EXAM Q1 6/22/2019 EYE EXAM RETINAL OR DILATED Q1 7/20/2019 LIPID PANEL Q1 9/13/2019 GLAUCOMA SCREENING Q2Y 7/20/2020 DTaP/Tdap/Td series (2 - Td) 6/17/2026 *Topic was postponed. The date shown is not the original due date. Allergies as of 9/21/2018  Review Complete On: 6/22/2018 By: Audra Renner NP Severity Noted Reaction Type Reactions Metformin  11/01/2013   Side Effect Other (comments) Renal insufficiency Current Immunizations  Never Reviewed Name Date Influenza Vaccine 12/7/2015 Not reviewed this visit You Were Diagnosed With   
  
 Codes Comments BMI 29.0-29.9,adult    -  Primary ICD-10-CM: I86.46 ICD-9-CM: V85.25 Mixed hyperlipidemia     ICD-10-CM: E78.2 ICD-9-CM: 272.2 Essential hypertension     ICD-10-CM: I10 
ICD-9-CM: 401.9 Chronic gout without tophus, unspecified cause, unspecified site     ICD-10-CM: M1A. 9XX0 
ICD-9-CM: 274.02 Type 2 diabetes with nephropathy (HCC)     ICD-10-CM: E11.21 
ICD-9-CM: 250.40, 583.81 Chronic pain of both shoulders     ICD-10-CM: M25.511, G89.29, M25.512 ICD-9-CM: 719.41, 338.29   
 Chest tightness     ICD-10-CM: R07.89 ICD-9-CM: 786.59 Decreased GFR     ICD-10-CM: R94.4 ICD-9-CM: 794.4 Influenza vaccination declined     ICD-10-CM: C41.54 ICD-9-CM: V64.06 Vitals BP Pulse Temp Resp Height(growth percentile) Weight(growth percentile) 116/69 (BP 1 Location: Left arm, BP Patient Position: Sitting) 79 97.9 °F (36.6 °C) (Oral) 20 5' 7\" (1.702 m) 188 lb 8 oz (85.5 kg) BMI Smoking Status 29.52 kg/m2 Never Smoker BMI and BSA Data Body Mass Index Body Surface Area  
 29.52 kg/m 2 2.01 m 2 Preferred Pharmacy Pharmacy Name Phone [facility] [phone] [facility], [facility] [phone] Your Updated Medication List  
  
   
This list is accurate as of 9/21/18  9:12 AM.  Always use your most recent med list.  
  
  
  
  
 allopurinol 300 mg tablet Commonly known as:  Ollen Epley Take 1 Tab by mouth daily. cyclobenzaprine 5 mg tablet Commonly known as:  FLEXERIL Take 1 Tab by mouth nightly. dilTIAZem 360 mg SR capsule Commonly known as:  [facility] Take 1 Cap by mouth daily. glyBURIDE 2.5 mg tablet Commonly known as:  Merwyn Fleet Take 1 Tab by mouth two (2) times daily (with meals). lisinopril 20 mg tablet Commonly known as:  Stuart Shutters Take 1 Tab by mouth two (2) times a day. Prescriptions Sent to Pharmacy Refills  
 cyclobenzaprine (FLEXERIL) 5 mg tablet 0 Sig: Take 1 Tab by mouth nightly. Class: Normal  
 Pharmacy: [id number] [address] [facility], [address] [facility] Ph #: [phone] Route: Oral  
  
We Performed the Following REFERRAL TO NEPHROLOGY [[facility]] Follow-up Instructions Return in about 2 weeks (around 10/5/2018), or if symptoms worsen or fail to improve, for 30 min follow up. To-Do List   
 09/21/2018 ECG:  EKG, 12 LEAD, INITIAL   
  
 09/21/2018 Lab: MICROALBUMIN, UR, RAND W/ MICROALB/CREAT RATIO Referral Information Referral ID Referred By Referred To  
  
 [id number] Yari LANGE Not Available Visits Status Start Date End Date 1 New Request 9/21/18 9/21/19 If your referral has a status of pending review or denied, additional information will be sent to support the outcome of this decision. Patient Instructions Please contact our office if you have any questions about your visit today. Body Mass Index: Care Instructions Your Care Instructions Body mass index (BMI) can help you see if your weight is raising your risk for health problems. It uses a formula to compare how much you weigh with how tall you are. · A BMI lower than 18.5 is considered underweight. · A BMI between 18.5 and 24.9 is considered healthy. · A BMI between 25 and 29.9 is considered overweight. A BMI of 30 or higher is considered obese. If your BMI is in the normal range, it means that you have a lower risk for weight-related health problems. If your BMI is in the overweight or obese range, you may be at increased risk for weight-related health problems, such as high blood pressure, heart disease, stroke, arthritis or joint pain, and diabetes. If your BMI is in the underweight range, you may be at increased risk for health problems such as fatigue, lower protection (immunity) against illness, muscle loss, bone loss, hair loss, and hormone problems. BMI is just one measure of your risk for weight-related health problems. You may be at higher risk for health problems if you are not active, you eat an unhealthy diet, or you drink too much alcohol or use tobacco products. Follow-up care is a key part of your treatment and safety. Be sure to make and go to all appointments, and call your doctor if you are having problems. It's also a good idea to know your test results and keep a list of the medicines you take. How can you care for yourself at home? · Practice healthy eating habits. This includes eating plenty of fruits, vegetables, whole grains, lean protein, and low-fat dairy. · If your doctor recommends it, get more exercise. Walking is a good choice. Bit by bit, increase the amount you walk every day. Try for at least 30 minutes on most days of the week. · Do not smoke. Smoking can increase your risk for health problems. If you need help quitting, talk to your doctor about stop-smoking programs and medicines. These can increase your chances of quitting for good. · Limit alcohol to 2 drinks a day for men and 1 drink a day for women. Too much alcohol can cause health problems. If you have a BMI higher than 25 · Your doctor may do other tests to check your risk for weight-related health problems. This may include measuring the distance around your waist. A waist measurement of more than 40 inches in men or 35 inches in women can increase the risk of weight-related health problems. · Talk with your doctor about steps you can take to stay healthy or improve your health. You may need to make lifestyle changes to lose weight and stay healthy, such as changing your diet and getting regular exercise. If you have a BMI lower than 18.5 · Your doctor may do other tests to check your risk for health problems. · Talk with your doctor about steps you can take to stay healthy or improve your health. You may need to make lifestyle changes to gain or maintain weight and stay healthy, such as getting more healthy foods in your diet and doing exercises to build muscle. Where can you learn more? Go to http://kari-jesus.info/. Enter S176 in the search box to learn more about \"Body Mass Index: Care Instructions. \" Current as of: October 13, 2016 Content Version: 11.4 © 4781-3992 Healthwise, Incorporated.  Care instructions adapted under license by [address] (which disclaims liability or warranty for this information). If you have questions about a medical condition or this instruction, always ask your healthcare professional. [address] any warranty or liability for your use of this information. Joint Pain: Care Instructions Your Care Instructions Many people have small aches and pains from overuse or injury to muscles and joints. Joint injuries often happen during sports or recreation, work tasks, or projects around the home. An overuse injury can happen when you put too much stress on a joint or when you do an activity that stresses the joint over and over, such as using the computer or rowing a boat. You can take action at home to help your muscles and joints get better. You should feel better in 1 to 2 weeks, but it can take 3 months or more to heal completely. Follow-up care is a key part of your treatment and safety. Be sure to make and go to all appointments, and call your doctor if you are having problems. It's also a good idea to know your test results and keep a list of the medicines you take. How can you care for yourself at home? · Do not put weight on the injured joint for at least a day or two. · For the first day or two after an injury, do not take hot showers or baths, and do not use hot packs. The heat could make swelling worse. · Put ice or a cold pack on the sore joint for 10 to 20 minutes at a time. Try to do this every 1 to 2 hours for the next 3 days (when you are awake) or until the swelling goes down. Put a thin cloth between the ice and your skin. · Wrap the injury in an elastic bandage. Do not wrap it too tightly because this can cause more swelling. · Prop up the sore joint on a pillow when you ice it or anytime you sit or lie down during the next 3 days. Try to keep it above the level of your heart. This will help reduce swelling. · Take an over-the-counter pain medicine, such as acetaminophen (Tylenol), ibuprofen (Advil, Motrin), or naproxen (Aleve). Read and follow all instructions on the label. · After 1 or 2 days of rest, begin moving the joint gently. While the joint is still healing, you can begin to exercise using activities that do not strain or hurt the painful joint. When should you call for help? Call your doctor now or seek immediate medical care if: 
  · You have signs of infection, such as: 
¨ Increased pain, swelling, warmth, and redness. ¨ Red streaks leading from the joint. ¨ A fever.  
 Watch closely for changes in your health, and be sure to contact your doctor if: 
  · Your movement or symptoms are not getting better after 1 to 2 weeks of home treatment. Where can you learn more? Go to http://kariSaint Cloud Arcadejesus.info/. Enter P205 in the search box to learn more about \"Joint Pain: Care Instructions. \" Current as of: November 29, 2017 Content Version: 11.7 © 2507-7779 [employer]. Care instructions adapted under license by [facility] (which disclaims liability or warranty for this information). If you have questions about a medical condition or this instruction, always ask your healthcare professional. [address] any warranty or liability for your use of this information. Musculoskeletal Pain: Care Instructions Your Care Instructions Different problems with the bones, muscles, nerves, ligaments, and tendons in the body can cause pain. One or more areas of your body may ache or burn. Or they may feel tired, stiff, or sore. The medical term for this type of pain is musculoskeletal pain. It can have many different causes. Sometimes the pain is caused by an injury such as a strain or sprain. Or you might have pain from using one part of your body in the same way over and over again. This is called overuse. In some cases, the cause of the pain is another health problem such as arthritis or fibromyalgia. The doctor will examine you and ask you questions about your health to help find the cause of your pain. Blood tests or imaging tests like an X-ray may also be helpful. But sometimes doctors can't find a cause of the pain. Treatment depends on your symptoms and the cause of the pain, if known. The doctor has checked you carefully, but problems can develop later. If you notice any problems or new symptoms, get medical treatment right away. Follow-up care is a key part of your treatment and safety. Be sure to make and go to all appointments, and call your doctor if you are having problems. It's also a good idea to know your test results and keep a list of the medicines you take. How can you care for yourself at home? · Rest until you feel better. · Do not do anything that makes the pain worse. Return to exercise gradually if you feel better and your doctor says it's okay. · Be safe with medicines. Read and follow all instructions on the label. ¨ If the doctor gave you a prescription medicine for pain, take it as prescribed. ¨ If you are not taking a prescription pain medicine, ask your doctor if you can take an over-the-counter medicine. · Put ice or a cold pack on the area for 10 to 20 minutes at a time to ease pain. Put a thin cloth between the ice and your skin. When should you call for help? Call your doctor now or seek immediate medical care if: 
  · You have new pain, or your pain gets worse.  
  · You have new symptoms such as a fever, a rash, or chills.  
 Watch closely for changes in your health, and be sure to contact your doctor if: 
  · You do not get better as expected. Where can you learn more? Go to http://kari-jesus.info/. Enter C331 in the search box to learn more about \"Musculoskeletal Pain: Care Instructions. \" Current as of: October 9, 2017 Content Version: 11.7 © 9283-9272 Healthwise, Incorporated. Care instructions adapted under license by [employer] (which disclaims liability or warranty for this information). If you have questions about a medical condition or this instruction, always ask your healthcare professional. [address] any warranty or liability for your use of this information. Introducing \[facility]\"" & HEALTH SERVICES! Dear Matthew Hodges: Thank you for requesting a GetMaid account. Our records indicate that you already have an active GetMaid account. You can access your account anytime at [email or web address] Did you know that you can access your hospital and ER discharge instructions at any time in GetMaid? You can also review all of your test results from your hospital stay or ER visit. Additional Information If you have questions, please visit the Frequently Asked Questions section of the GetMaid website at [email or web address] Remember, GetMaid is NOT to be used for urgent needs. For medical emergencies, dial 911. Now available from your iPhone and Android! Please provide this summary of care documentation to your next provider. Your primary care clinician is listed as Audra Renner. If you have any questions after today's visit, please call [phone].

## 2018-09-21 NOTE — PROGRESS NOTES
NORRIS White is a 77 y.o. male Chief Complaint Patient presents with  Diabetes  Gout  Hypertension  Erectile Dysfunction  Pain (Chronic)  
  shoulder  Results  
  discuss lab results Reports right shoulder is now hurting. Reports left shoulder still hurts and is a little uncomfortable. Reports shoulder pain is radiating down especially when he is pushing the lawn more. Reports it feels like muscle pain but he is concerned that it is cardiac in nature. Denies shortness of breath. Denies dizziness or blurred vision. Reports the pain is more irritating and feels like a tightness around his chest when he raises his arms. Reports shoulder pain is achy. Reports shoulder pain 2-3/10. Reports he is concerned that pain may be cardiac in nature although he denies chest pain. Reports pain only occurs with certain movements. Denies any recent gout flares. Reports he has not had any issues with ED and thinks this was just a one time occurrence. Reports he does not have a kidney specialitst currently. Denies urinary issues. Reports going to the bathroom once at night. Reports his sugars have been controlled and he monitors them at home as they are usually around 100. Requesting lab results. Past Medical History Past Medical History:  
Diagnosis Date  Calculus of kidney  Cancer ([address] [facility] of [facility]  Diabetes mellitus ([address] 8/19/2010  Gout 8/19/2010  
 HTN (hypertension) 8/19/2010  Hyperlipemia 8/19/2010  Kidney disease  Lumbar spondylosis  Proteinuria   
 Skin cancer, basal cell   
 neck  Type 2 diabetes mellitus without complication, without long-term current use of insulin ([address] 8/19/2010  Urolithiasis Surgical History Past Surgical History:  
Procedure Laterality Date  HX GI    
 HX OTHER SURGICAL Basal cell removed from left side of face  HX UROLOGICAL    
 kidney Medications Current Outpatient Prescriptions Medication Sig Dispense Refill  cyclobenzaprine (FLEXERIL) 5 mg tablet Take 1 Tab by mouth nightly. 30 Tab 0  
 allopurinol (ZYLOPRIM) 300 mg tablet Take 1 Tab by mouth daily. 90 Tab 0  
 dilTIAZem (TIAZAC) 360 mg SR capsule Take 1 Cap by mouth daily. 90 Cap 0  
 glyBURIDE (DIABETA) 2.5 mg tablet Take 1 Tab by mouth two (2) times daily (with meals). 180 Tab 0  
 lisinopril (PRINIVIL, ZESTRIL) 20 mg tablet Take 1 Tab by mouth two (2) times a day. 180 Tab 0 Allergies Allergies Allergen Reactions  Metformin Other (comments) Renal insufficiency Family History Family History Problem Relation Age of Onset  Diabetes Mother Social History Social History Social History  Marital status:  Spouse name: N/A  
 Number of children: N/A  
 Years of education: N/A Occupational History  Not on file. Social History Main Topics  Smoking status: Never Smoker  Smokeless tobacco: Never Used  Alcohol use No  
 Drug use: No  
 Sexual activity: Yes Other Topics Concern  Not on file Social History Narrative Problem List 
Patient Active Problem List  
Diagnosis Code  Type 2 diabetes mellitus without complication, without long-term current use of insulin ([facility]) E11.9  
 HTN (hypertension) I10  
 Gout M10.9  Hyperlipemia E78.5  Nocturia R35.1  Type 2 diabetes with nephropathy ([facility]) E11.21 Review of Systems Review of Systems Eyes: Negative for blurred vision. Respiratory: Negative for shortness of breath. Cardiovascular: Positive for chest pain. Negative for palpitations and leg swelling. Gastrointestinal: Negative for abdominal pain, nausea and vomiting. Genitourinary: Negative. Musculoskeletal: Positive for joint pain (shoulders) and myalgias (shoulders). Negative for falls. Neurological: Negative for dizziness. Vital Signs Vitals:  
 09/21/18 0827 BP: 116/69 Pulse: 79 Resp: 20  
 Temp: 97.9 °F (36.6 °C) TempSrc: Oral  
Weight: 188 lb 8 oz (85.5 kg) Height: 5' 7\" (1.702 m) PainSc:   0 - No pain Physical Exam 
Physical Exam  
Constitutional: He is oriented to person, place, and time. HENT:  
Mouth/Throat: Oropharynx is clear and moist.  
Eyes: Pupils are equal, round, and reactive to light. Cardiovascular: Normal rate, regular rhythm and normal heart sounds. No murmur heard. Pulmonary/Chest: Effort normal and breath sounds normal. No respiratory distress. He exhibits no tenderness. Abdominal: Soft. Bowel sounds are normal. He exhibits no distension. There is no tenderness. Neurological: He is alert and oriented to person, place, and time. Skin: Skin is warm and dry. Psychiatric: He has a normal mood and affect. His behavior is normal.  
Vitals reviewed. Diagnostics Orders Placed This Encounter  MICROALBUMIN, UR, RAND W/ MICROALB/CREAT RATIO Standing Status:   Future Standing Expiration Date:   9/22/2019  REFERRAL TO NEPHROLOGY Referral Priority:   Routine Referral Type:   Consultation Referral Reason:   Specialty Services Required  EKG, 12 LEAD, INITIAL Standing Status:   Future Number of Occurrences:   1 Standing Expiration Date:   3/21/2019 Order Specific Question:   Reason for Exam: Answer:   chest tightness  cyclobenzaprine (FLEXERIL) 5 mg tablet Sig: Take 1 Tab by mouth nightly. Dispense:  30 Tab Refill:  0 Results Results for orders placed or performed during the hospital encounter of 09/13/18 LIPID PANEL Result Value Ref Range LIPID PROFILE Cholesterol, total 163 <200 MG/DL Triglyceride 121 <150 MG/DL  
 HDL Cholesterol 32 (L) 40 - 60 MG/DL  
 LDL, calculated 106.8 (H) 0 - 100 MG/DL VLDL, calculated 24.2 MG/DL  
 CHOL/HDL Ratio 5.1 (H) 0 - 5.0 METABOLIC PANEL, COMPREHENSIVE Result Value Ref Range  Sodium 144 136 - 145 mmol/L  
 Potassium 4.4 3.5 - 5.5 mmol/L Chloride 113 (H) 100 - 108 mmol/L  
 CO2 23 21 - 32 mmol/L Anion gap 8 3.0 - 18 mmol/L Glucose 114 (H) 74 - 99 mg/dL BUN 18 7.0 - 18 MG/DL Creatinine 1.51 (H) 0.6 - 1.3 MG/DL  
 BUN/Creatinine ratio 12 12 - 20 GFR est AA 56 (L) >60 ml/min/1.73m2 GFR est non-AA 46 (L) >60 ml/min/1.73m2 Calcium 9.0 8.5 - 10.1 MG/DL Bilirubin, total 0.5 0.2 - 1.0 MG/DL  
 ALT (SGPT) 15 (L) 16 - 61 U/L  
 AST (SGOT) 9 (L) 15 - 37 U/L Alk. phosphatase 96 45 - 117 U/L Protein, total 7.0 6.4 - 8.2 g/dL Albumin 3.6 3.4 - 5.0 g/dL Globulin 3.4 2.0 - 4.0 g/dL A-G Ratio 1.1 0.8 - 1.7 HEMOGLOBIN A1C WITH EAG Result Value Ref Range Hemoglobin A1c 6.1 (H) 4.2 - 5.6 % Est. average glucose 128 mg/dL TESTOSTERONE, FREE & TOTAL Result Value Ref Range Testosterone 626 264 - 916 ng/dL Free testosterone (Direct) 9.2 6.6 - 18.1 pg/mL Assessment and Plan Diagnoses and all orders for this visit: 1. BMI 29.0-29.9,adult 2. Mixed hyperlipidemia 3. Essential hypertension 4. Chronic gout without tophus, unspecified cause, unspecified site 5. Type 2 diabetes with nephropathy (HCC) -     MICROALBUMIN, UR, RAND W/ MICROALB/CREAT RATIO; Future 6. Chronic pain of both shoulders 
-     cyclobenzaprine (FLEXERIL) 5 mg tablet; Take 1 Tab by mouth nightly. 7. Chest tightness 
-     EKG, 12 LEAD, INITIAL; Future 8. Decreased GFR 
-     REFERRAL TO NEPHROLOGY 9. Influenza vaccination declined Risk of influenza discussed. Patient declines flu vaccine. Lab results discussed. After care summary printed and reviewed with patient. Plan reviewed with patient. Questions answered. Patient verbalized understanding of plan and is in agreement with plan. Patient to follow up in two weeks or earlier if symptoms worsen. KARTHIK Guy Discussed the patient's BMI with him. The BMI follow up plan is as follows: dietary management education, guidance, and counseling 
encourage exercise 
monitor weight 
prescribed dietary intake An After Visit Summary was printed and given to the patient.  
 
ANKITA ChuaC

## 2018-10-10 ENCOUNTER — OFFICE VISIT (OUTPATIENT)
Dept: FAMILY MEDICINE CLINIC | Age: 68
End: 2018-10-10

## 2018-10-10 VITALS
TEMPERATURE: 97.1 F | BODY MASS INDEX: 30.21 KG/M2 | SYSTOLIC BLOOD PRESSURE: 128 MMHG | RESPIRATION RATE: 20 BRPM | HEART RATE: 65 BPM | DIASTOLIC BLOOD PRESSURE: 64 MMHG | WEIGHT: 192.5 LBS | HEIGHT: 67 IN

## 2018-10-10 DIAGNOSIS — M25.512 CHRONIC PAIN OF BOTH SHOULDERS: Primary | ICD-10-CM

## 2018-10-10 DIAGNOSIS — G89.29 CHRONIC PAIN OF BOTH SHOULDERS: Primary | ICD-10-CM

## 2018-10-10 DIAGNOSIS — Z71.2 ENCOUNTER TO DISCUSS TEST RESULTS: ICD-10-CM

## 2018-10-10 DIAGNOSIS — M19.90 ARTHRITIS: ICD-10-CM

## 2018-10-10 DIAGNOSIS — M25.511 CHRONIC PAIN OF BOTH SHOULDERS: Primary | ICD-10-CM

## 2018-10-10 NOTE — PROGRESS NOTES
Chief Complaint   Patient presents with    Follow-up     2 week f/u    Results     discuss EKG results       Health Maintenance Due   Topic Date Due    Hepatitis C Screening  1950    Shingrix Vaccine Age 50> (1 of 2) 03/21/1998    FOBT Q 1 YEAR AGE 50-75  03/21/1998    Pneumococcal 65+ Low/Medium Risk (2 of 2 - PPSV23) 06/17/2017    MICROALBUMIN Q1  12/23/2017    MEDICARE YEARLY EXAM  05/06/2018       Health Maintenance reviewed      1. Have you been to the ER, urgent care clinic since your last visit? Hospitalized since your last visit? No    2. Have you seen or consulted any other health care providers outside of the [address] [place] [address] since your last visit? Include any pap smears or colon screening.  No

## 2018-10-10 NOTE — MR AVS SNAPSHOT
Dalton Man 
 
 
 [address] [id number] [address] [address] [phone] Patient: Aiyana Nayak MRN:  [id number] Visit Information Date & Time Provider Department Dept. Phone Encounter #  
 10/10/2018  7:30 AM Haven Villa NP [address] [id number] Follow-up Instructions Return in about 3 months (around 1/10/2019), or if symptoms worsen or fail to improve. Upcoming Health Maintenance Date Due Hepatitis C Screening 1950 Shingrix Vaccine Age 50> (1 of 2) 3/21/1998 FOBT Q 1 YEAR AGE 50-75 3/21/1998 Pneumococcal 65+ Low/Medium Risk (2 of 2 - PPSV23) 6/17/2017 MICROALBUMIN Q1 12/23/2017 MEDICARE YEARLY EXAM 5/6/2018 Influenza Age 5 to Adult 10/21/2018* HEMOGLOBIN A1C Q6M 3/13/2019 FOOT EXAM Q1 6/22/2019 EYE EXAM RETINAL OR DILATED Q1 7/20/2019 LIPID PANEL Q1 9/13/2019 GLAUCOMA SCREENING Q2Y 7/20/2020 DTaP/Tdap/Td series (2 - Td) 6/17/2026 *Topic was postponed. The date shown is not the original due date. Allergies as of 10/10/2018  Review Complete On: 10/10/2018 By: Haven Villa NP Severity Noted Reaction Type Reactions Metformin  11/01/2013   Side Effect Other (comments) Renal insufficiency Current Immunizations  Never Reviewed Name Date Influenza Vaccine 12/7/2015 Not reviewed this visit You Were Diagnosed With   
  
 Codes Comments Chronic pain of both shoulders    -  Primary ICD-10-CM: M25.511, G89.29, M25.512 ICD-9-CM: 719.41, 338.29 Arthritis     ICD-10-CM: M19.90 ICD-9-CM: 716.90 Encounter to discuss test results     ICD-10-CM: Z71.2 ICD-9-CM: V65.49 Vitals BP Pulse Temp Resp Height(growth percentile) Weight(growth percentile) 128/64 (BP 1 Location: Left arm, BP Patient Position: Sitting) 65 97.1 °F (36.2 °C) (Oral) 20 5' 7\" (1.702 m) 192 lb 8 oz (87.3 kg) BMI Smoking Status 30.15 kg/m2 Never Smoker BMI and BSA Data Body Mass Index Body Surface Area  
 30.15 kg/m 2 2.03 m 2 Preferred Pharmacy Pharmacy Name Phone [facility] [address] [phone] Your Updated Medication List  
  
   
This list is accurate as of 10/10/18  7:56 AM.  Always use your most recent med list.  
  
  
  
  
 allopurinol 300 mg tablet Commonly known as:  Neal Cassette Take 1 Tab by mouth daily. cyclobenzaprine 5 mg tablet Commonly known as:  FLEXERIL Take 1 Tab by mouth nightly. dilTIAZem 360 mg SR capsule Commonly known as:  [facility] Take 1 Cap by mouth daily. glyBURIDE 2.5 mg tablet Commonly known as:  Magaly Purple Take 1 Tab by mouth two (2) times daily (with meals). lisinopril 20 mg tablet Commonly known as:  Vonna Anny Take 1 Tab by mouth two (2) times a day. Follow-up Instructions Return in about 3 months (around 1/10/2019), or if symptoms worsen or fail to improve. Patient Instructions Please contact our office if you have any questions about your visit today. Introducing [facility]! Dear Lizzy Bishop: Thank you for requesting a [employer] account. Our records indicate that you already have an active [employer] account. You can access your account anytime at [email or web address] Did you know that you can access your hospital and ER discharge instructions at any time in [employer]? You can also review all of your test results from your hospital stay or ER visit. Additional Information If you have questions, please visit the Frequently Asked Questions section of the [employer] website at [email or web address] Remember, [employer] is NOT to be used for urgent needs. For medical emergencies, dial 911. Now available from your iPhone and Android! Please provide this summary of care documentation to your next provider. Your primary care clinician is listed as Jonh Tenorio. If you have any questions after today's visit, please call [phone].

## 2018-10-10 NOTE — PROGRESS NOTES
HPI  Vicky Ortega is a 77 y.o. male  Chief Complaint   Patient presents with    Follow-up     2 week f/u    Results     discuss EKG results     Reports shoulders still hurt especially in the morning or when he has not moved for awhile. Reports tylenol arthritis does help. Denies pain today as some days are better than others. Reports he has also tried a heating pad which has helped. Denies chest pain or shortness of breath. Requesting EKG results. Past Medical History  Past Medical History:   Diagnosis Date    Calculus of kidney     [facility]     HX of [facility]    Diabetes mellitus ([facility] 8/19/2010    Gout 8/19/2010    HTN (hypertension) 8/19/2010    Hyperlipemia 8/19/2010    Kidney disease     Lumbar spondylosis     Proteinuria     Skin cancer, basal cell     neck    Type 2 diabetes mellitus without complication, without long-term current use of insulin ([facility] 8/19/2010    Urolithiasis        Surgical History  Past Surgical History:   Procedure Laterality Date    HX GI      HX OTHER SURGICAL      Basal cell removed from left side of face    HX UROLOGICAL      kidney        Medications  Current Outpatient Prescriptions   Medication Sig Dispense Refill    allopurinol (ZYLOPRIM) 300 mg tablet Take 1 Tab by mouth daily. 90 Tab 0    dilTIAZem (TIAZAC) 360 mg SR capsule Take 1 Cap by mouth daily. 90 Cap 0    glyBURIDE (DIABETA) 2.5 mg tablet Take 1 Tab by mouth two (2) times daily (with meals). 180 Tab 0    lisinopril (PRINIVIL, ZESTRIL) 20 mg tablet Take 1 Tab by mouth two (2) times a day. 180 Tab 0    cyclobenzaprine (FLEXERIL) 5 mg tablet Take 1 Tab by mouth nightly.  30 Tab 0       Allergies  Allergies   Allergen Reactions    Metformin Other (comments)     Renal insufficiency         Family History  Family History   Problem Relation Age of Onset    Diabetes Mother        Social History  Social History     Social History    Marital status:      Spouse name: N/A    Number of children: N/A    Years of education: N/A     Occupational History    Not on file. Social History Main Topics    Smoking status: Never Smoker    Smokeless tobacco: Never Used    Alcohol use No    Drug use: No    Sexual activity: Yes     Other Topics Concern    Not on file     Social History Narrative       Problem List  Patient Active Problem List   Diagnosis Code    Type 2 diabetes mellitus without complication, without long-term current use of insulin ([facility]) E11.9    HTN (hypertension) I10    Gout M10.9    Hyperlipemia E78.5    Nocturia R35.1    Type 2 diabetes with nephropathy ([facility] E11.21       Review of Systems  Review of Systems   Respiratory: Negative for shortness of breath. Cardiovascular: Negative for chest pain. Musculoskeletal: Positive for joint pain and myalgias. Negative for falls. Vital Signs  Vitals:    10/10/18 0744   BP: 128/64   Pulse: 65   Resp: 20   Temp: 97.1 °F (36.2 °C)   TempSrc: Oral   Weight: 192 lb 8 oz (87.3 kg)   Height: 5' 7\" (1.702 m)   PainSc:   0 - No pain       Physical Exam  Physical Exam   Constitutional: He is oriented to person, place, and time. Cardiovascular: Normal rate, regular rhythm and normal heart sounds. Pulmonary/Chest: Effort normal and breath sounds normal. No respiratory distress. Musculoskeletal: He exhibits no edema or tenderness. Able to raise arms above head. Shoulders non-tender on paplation   Neurological: He is alert and oriented to person, place, and time. Psychiatric: He has a normal mood and affect. His behavior is normal.       Diagnostics  No orders of the defined types were placed in this encounter.       Results  Results for orders placed or performed during the hospital encounter of 09/21/18   EKG, 12 LEAD, INITIAL   Result Value Ref Range    Ventricular Rate 61 BPM    Atrial Rate 61 BPM    P-R Interval 168 ms    QRS Duration 84 ms    Q-T Interval 404 ms    QTC Calculation (Bezet) 406 ms    Calculated P Axis 19 degrees    Calculated R Axis 15 degrees    Calculated T Axis 49 degrees    Diagnosis       Normal sinus rhythm  Normal ECG  When compared with ECG of 27-JAN-2017 11:28,  No significant change was found  Confirmed by Shavon Serna ([id number]) on 9/21/2018 5:28:30 PM             Assessment and Plan  Diagnoses and all orders for this visit:    1. Chronic pain of both shoulders    2. Arthritis    3. Encounter to discuss test results     Continue with tylenol arthritis OTC. After care summary printed and reviewed with patient. Plan reviewed with patient. Questions answered. Patient verbalized understanding of plan and is in agreement with plan. Patient to follow up in three months or earlier if symptoms worsen.      Davy Simeon, CHRISTIANO-C

## 2018-11-15 ENCOUNTER — HOSPITAL ENCOUNTER (OUTPATIENT)
Dept: LAB | Age: 68
Discharge: HOME | End: 2018-11-15
Payer: MEDICARE

## 2018-11-15 ENCOUNTER — HOSPITAL ENCOUNTER (OUTPATIENT)
Dept: ULTRASOUND IMAGING | Age: 68
Discharge: HOME | End: 2018-11-15
Attending: INTERNAL MEDICINE
Payer: MEDICARE

## 2018-11-15 DIAGNOSIS — N18.30 CHRONIC KIDNEY DISEASE, STAGE III (MODERATE) (HCC): ICD-10-CM

## 2018-11-15 LAB
ALBUMIN SERPL-MCNC: 3.7 G/DL (ref 3.4–5)
ANION GAP SERPL CALC-SCNC: 5 MMOL/L (ref 3–18)
APPEARANCE UR: CLEAR
BACTERIA URNS QL MICRO: ABNORMAL /HPF
BILIRUB UR QL: NEGATIVE
BUN SERPL-MCNC: 21 MG/DL (ref 7–18)
BUN/CREAT SERPL: 14 (ref 12–20)
CALCIUM SERPL-MCNC: 9 MG/DL (ref 8.5–10.1)
CALCIUM SERPL-MCNC: 9.3 MG/DL (ref 8.5–10.1)
CHLORIDE SERPL-SCNC: 109 MMOL/L (ref 100–108)
CO2 SERPL-SCNC: 24 MMOL/L (ref 21–32)
COLOR UR: YELLOW
CREAT SERPL-MCNC: 1.52 MG/DL (ref 0.6–1.3)
CREAT UR-MCNC: 19.6 MG/DL (ref 30–125)
EPITH CASTS URNS QL MICRO: ABNORMAL /LPF (ref 0–5)
GLUCOSE SERPL-MCNC: 110 MG/DL (ref 74–99)
GLUCOSE UR STRIP.AUTO-MCNC: NEGATIVE MG/DL
HGB UR QL STRIP: NEGATIVE
KETONES UR QL STRIP.AUTO: NEGATIVE MG/DL
LEUKOCYTE ESTERASE UR QL STRIP.AUTO: NEGATIVE
MICROALBUMIN UR-MCNC: 57.1 MG/DL (ref 0–3)
MICROALBUMIN/CREAT UR-RTO: 2913 MG/G (ref 0–30)
NITRITE UR QL STRIP.AUTO: NEGATIVE
PH UR STRIP: 6 [PH] (ref 5–8)
PHOSPHATE SERPL-MCNC: 3.4 MG/DL (ref 2.5–4.9)
POTASSIUM SERPL-SCNC: 4.6 MMOL/L (ref 3.5–5.5)
PROT UR STRIP-MCNC: 100 MG/DL
PTH-INTACT SERPL-MCNC: 50.5 PG/ML (ref 18.4–88)
RBC #/AREA URNS HPF: ABNORMAL /HPF (ref 0–5)
SODIUM SERPL-SCNC: 138 MMOL/L (ref 136–145)
SP GR UR REFRACTOMETRY: 1 (ref 1–1.03)
URATE SERPL-MCNC: 5.9 MG/DL (ref 2.6–7.2)
UROBILINOGEN UR QL STRIP.AUTO: 0.2 EU/DL (ref 0.2–1)
WBC URNS QL MICRO: ABNORMAL /HPF (ref 0–4)

## 2018-11-15 PROCEDURE — 80069 RENAL FUNCTION PANEL: CPT

## 2018-11-15 PROCEDURE — 82570 ASSAY OF URINE CREATININE: CPT

## 2018-11-15 PROCEDURE — 84550 ASSAY OF BLOOD/URIC ACID: CPT

## 2018-11-15 PROCEDURE — 36415 COLL VENOUS BLD VENIPUNCTURE: CPT

## 2018-11-15 PROCEDURE — 81001 URINALYSIS AUTO W/SCOPE: CPT

## 2018-11-15 PROCEDURE — 83970 ASSAY OF PARATHORMONE: CPT

## 2018-11-15 PROCEDURE — 76770 US EXAM ABDO BACK WALL COMP: CPT

## 2018-12-27 ENCOUNTER — OFFICE VISIT (OUTPATIENT)
Dept: FAMILY MEDICINE CLINIC | Age: 68
End: 2018-12-27

## 2018-12-27 VITALS
HEIGHT: 67 IN | WEIGHT: 190.2 LBS | BODY MASS INDEX: 29.85 KG/M2 | HEART RATE: 76 BPM | DIASTOLIC BLOOD PRESSURE: 69 MMHG | RESPIRATION RATE: 18 BRPM | SYSTOLIC BLOOD PRESSURE: 112 MMHG | TEMPERATURE: 97.4 F

## 2018-12-27 DIAGNOSIS — M19.90 ARTHRITIS: ICD-10-CM

## 2018-12-27 DIAGNOSIS — E78.2 MIXED HYPERLIPIDEMIA: ICD-10-CM

## 2018-12-27 DIAGNOSIS — I10 ESSENTIAL HYPERTENSION: Primary | ICD-10-CM

## 2018-12-27 DIAGNOSIS — E11.21 TYPE 2 DIABETES WITH NEPHROPATHY (HCC): ICD-10-CM

## 2018-12-27 NOTE — PROGRESS NOTES
NORRIS Morales is a 77 y.o. male  Chief Complaint   Patient presents with    Hypertension     follow up    Diabetes    Cholesterol Problem     Diabetes- denies hypo or hyperglycemic episodes. Reports he did see Dr. Hubert Negrete who instructed him that he has a little protein in his urine but no changes where needed. Denies needing any medication. Hypertension - Denies chest pain and or shortness of breath. Reports taking medications as prescribed. Denies dizziness or blurred vision. Arthritis - Reports taking tylenol arthritis for chronic left arm and wrist pain. Past Medical History  Past Medical History:   Diagnosis Date    Calculus of kidney     Cancer ([facility]     HX of BCC    Diabetes mellitus ([facility] 8/19/2010    Gout 8/19/2010    HTN (hypertension) 8/19/2010    Hyperlipemia 8/19/2010    Kidney disease     Lumbar spondylosis     Proteinuria     Skin cancer, basal cell     neck    Type 2 diabetes mellitus without complication, without long-term current use of insulin ([facility] 8/19/2010    Urolithiasis        Surgical History  Past Surgical History:   Procedure Laterality Date    HX GI      HX OTHER SURGICAL      Basal cell removed from left side of face    HX UROLOGICAL      kidney        Medications  Current Outpatient Medications   Medication Sig Dispense Refill    TAZTIA  mg SR capsule take 1 capsule by mouth once daily 90 Cap 1    allopurinol (ZYLOPRIM) 300 mg tablet take 1 tablet by mouth once daily 90 Tab 1    allopurinol (ZYLOPRIM) 300 mg tablet Take 1 Tab by mouth daily. 90 Tab 0    dilTIAZem (TIAZAC) 360 mg SR capsule Take 1 Cap by mouth daily. 90 Cap 0    glyBURIDE (DIABETA) 2.5 mg tablet Take 1 Tab by mouth two (2) times daily (with meals). 180 Tab 0    lisinopril (PRINIVIL, ZESTRIL) 20 mg tablet Take 1 Tab by mouth two (2) times a day. 180 Tab 0    cyclobenzaprine (FLEXERIL) 5 mg tablet Take 1 Tab by mouth nightly.  30 Tab 0       Allergies  Allergies   Allergen Reactions    Metformin Other (comments)     Renal insufficiency         Family History  Family History   Problem Relation Age of Onset    Diabetes Mother        Social History  Social History     Socioeconomic History    Marital status:      Spouse name: Not on file    Number of children: Not on file    Years of education: Not on file    Highest education level: Not on file   Social Needs    Financial resource strain: Not on file    Food insecurity - worry: Not on file    Food insecurity - inability: Not on file   [employer] needs - medical: Not on file   [employer] needs - non-medical: Not on file   Occupational History    Not on file   Tobacco Use    Smoking status: Never Smoker    Smokeless tobacco: Never Used   Substance and Sexual Activity    Alcohol use: No    Drug use: No    Sexual activity: Yes   Other Topics Concern    Not on file   Social History Narrative    Not on file       Problem List  Patient Active Problem List   Diagnosis Code    Type 2 diabetes mellitus without complication, without long-term current use of insulin ([facility] E11.9    HTN (hypertension) I10    Gout M10.9    Hyperlipemia E78.5    Nocturia R35.1    Type 2 diabetes with nephropathy ([facility] E11.21       Review of Systems  Review of Systems   Constitutional: Negative for chills and fever. Eyes: Negative for blurred vision. Respiratory: Negative for shortness of breath. Cardiovascular: Negative for chest pain. Gastrointestinal: Negative for abdominal pain, nausea and vomiting. Musculoskeletal: Positive for joint pain (left arm and wrist pain) and myalgias (left arm chronic). Negative for falls. Neurological: Negative for dizziness. Endo/Heme/Allergies: Negative for polydipsia.        Vital Signs  Vitals:    12/27/18 1358   BP: 112/69   Pulse: 76   Resp: 18   Temp: 97.4 °F (36.3 °C)   TempSrc: Oral   Weight: 190 lb 3.2 oz (86.3 kg)   Height: 5' 7\" (1.702 m)   PainSc:   0 - No pain Physical Exam  Physical Exam   Constitutional: He is oriented to person, place, and time. HENT:   Mouth/Throat: Oropharynx is clear and moist.   Eyes: Pupils are equal, round, and reactive to light. Cardiovascular: Normal rate, regular rhythm, normal heart sounds and intact distal pulses. Pulmonary/Chest: Effort normal and breath sounds normal.   Abdominal: Soft. Bowel sounds are normal. He exhibits no distension. Musculoskeletal: Normal range of motion. He exhibits no edema. Neurological: He is alert and oriented to person, place, and time. Psychiatric: He has a normal mood and affect.  His behavior is normal.       Diagnostics  Orders Placed This Encounter    METABOLIC PANEL, COMPREHENSIVE     Standing Status:   Future     Standing Expiration Date:   12/28/2019    LIPID PANEL     Standing Status:   Future     Standing Expiration Date:   12/28/2019    CBC WITH AUTOMATED DIFF     Standing Status:   Future     Standing Expiration Date:   12/28/2019    TSH 3RD GENERATION     Standing Status:   Future     Standing Expiration Date:   12/28/2019    T4, FREE     Standing Status:   Future     Standing Expiration Date:   6/26/2019    HEMOGLOBIN A1C WITH EAG     Standing Status:   Future     Standing Expiration Date:   12/28/2019    MAGNESIUM     Standing Status:   Future     Standing Expiration Date:   12/28/2019       Results  Results for orders placed or performed during the hospital encounter of 11/15/18   RENAL FUNCTION PANEL   Result Value Ref Range    Sodium 138 136 - 145 mmol/L    Potassium 4.6 3.5 - 5.5 mmol/L    Chloride 109 (H) 100 - 108 mmol/L    CO2 24 21 - 32 mmol/L    Anion gap 5 3.0 - 18 mmol/L    Glucose 110 (H) 74 - 99 mg/dL    BUN 21 (H) 7.0 - 18 MG/DL    Creatinine 1.52 (H) 0.6 - 1.3 MG/DL    BUN/Creatinine ratio 14 12 - 20      GFR est AA 55 (L) >60 ml/min/1.73m2    GFR est non-AA 46 (L) >60 ml/min/1.73m2    Calcium 9.0 8.5 - 10.1 MG/DL    Phosphorus 3.4 2.5 - 4.9 MG/DL    Albumin 3.7 3.4 - 5.0 g/dL   URINALYSIS W/MICROSCOPIC   Result Value Ref Range    Color YELLOW      Appearance CLEAR      Specific gravity 1.005 1.005 - 1.030      pH (UA) 6.0 5.0 - 8.0      Protein 100 (A) NEG mg/dL    Glucose NEGATIVE  NEG mg/dL    Ketone NEGATIVE  NEG mg/dL    Bilirubin NEGATIVE  NEG      Blood NEGATIVE  NEG      Urobilinogen 0.2 0.2 - 1.0 EU/dL    Nitrites NEGATIVE  NEG      Leukocyte Esterase NEGATIVE  NEG      WBC 0 to 1 0 - 4 /hpf    RBC NONE 0 - 5 /hpf    Epithelial cells FEW 0 - 5 /lpf    Bacteria FEW (A) NEG /hpf   URIC ACID   Result Value Ref Range    Uric acid 5.9 2.6 - 7.2 MG/DL   PTH INTACT   Result Value Ref Range    Calcium 9.3 8.5 - 10.1 MG/DL    PTH, Intact 50.5 18.4 - 88.0 pg/mL   MICROALBUMIN, UR, RAND   Result Value Ref Range    Microalbumin,urine random 57.10 (H) 0 - 3.0 MG/DL    Creatinine, urine 19.60 (L) 30 - 125 mg/dL    Microalbumin/Creat ratio (mg/g creat) 2,913 (H) 0 - 30 mg/g           Assessment and Plan  Diagnoses and all orders for this visit:    1. Essential hypertension  -     METABOLIC PANEL, COMPREHENSIVE; Future  -     LIPID PANEL; Future  -     CBC WITH AUTOMATED DIFF; Future  -     TSH 3RD GENERATION; Future  -     T4, FREE; Future  -     HEMOGLOBIN A1C WITH EAG; Future  -     MAGNESIUM; Future    2. Type 2 diabetes with nephropathy (HCC)  -     METABOLIC PANEL, COMPREHENSIVE; Future  -     LIPID PANEL; Future  -     CBC WITH AUTOMATED DIFF; Future  -     TSH 3RD GENERATION; Future  -     T4, FREE; Future  -     HEMOGLOBIN A1C WITH EAG; Future  -     MAGNESIUM; Future    3. Mixed hyperlipidemia  -     METABOLIC PANEL, COMPREHENSIVE; Future  -     LIPID PANEL; Future  -     CBC WITH AUTOMATED DIFF; Future  -     TSH 3RD GENERATION; Future  -     T4, FREE; Future  -     HEMOGLOBIN A1C WITH EAG; Future  -     MAGNESIUM; Future    4. Arthritis      Patient has declined Hep C screening and pneumococcal vaccine.  He understands the risk of declining this screening and this vaccine. Fasting labs prior to next visit. Can continue tylenol arthritis for left arm and wrist pain. After care summary printed and reviewed with patient. Plan reviewed with patient. Questions answered. Patient verbalized understanding of plan and is in agreement with plan. Patient to follow up in three months or earlier if symptoms worsen.      Carolyn Rodriguez, HEP-C

## 2018-12-27 NOTE — PROGRESS NOTES
Jonas Acuna presents today for   Chief Complaint   Patient presents with    Hypertension     follow up    Diabetes    Cholesterol Problem       Is someone accompanying this pt? no    Is the patient using any DME equipment during OV? no    Depression Screening:  PHQ over the last two weeks 1/19/2018   Little interest or pleasure in doing things Not at all   Feeling down, depressed, irritable, or hopeless Not at all   Total Score PHQ 2 0       Learning Assessment:  Learning Assessment 9/21/2018   PRIMARY LEARNER Patient   HIGHEST LEVEL OF EDUCATION - PRIMARY LEARNER  2 YEARS Jaxson PRIMARY LEARNER NONE   CO-LEARNER CAREGIVER No   PRIMARY LANGUAGE ENGLISH    NEED No   LEARNER PREFERENCE PRIMARY DEMONSTRATION     -   ANSWERED BY patient   RELATIONSHIP SELF       Abuse Screening:  Abuse Screening Questionnaire 9/21/2018   Do you ever feel afraid of your partner? N   Are you in a relationship with someone who physically or mentally threatens you? N   Is it safe for you to go home? Y           Health Maintenance Due   Topic Date Due    Hepatitis C Screening  1950    Shingrix Vaccine Age 50> (1 of 2) 03/21/1998    FOBT Q 1 YEAR AGE 50-75  03/21/1998    Pneumococcal 65+ Low/Medium Risk (2 of 2 - PPSV23) 06/17/2017    MEDICARE YEARLY EXAM  05/06/2018    Influenza Age 9 to Adult  08/01/2018   . Health Maintenance reviewed and discussed and ordered per Provider. Jonas Acuan is updated on all       Coordination of Care:  1. Have you been to the ER, urgent care clinic since your last visit? Hospitalized since your last visit? no    2. Have you seen or consulted any other health care providers outside of the [address] [place] [address] since your last visit? Include any pap smears or colon screening. no    Per-NP Adwoa ackerman medication not taking to be deleted. Advance Directive:  1. Do you have an advance directive in place? Patient Reply:no    2.  If not, would you like material regarding how to put one in place?  Patient Reply: no

## 2019-01-01 ENCOUNTER — OFFICE VISIT (OUTPATIENT)
Dept: ORTHOPEDIC SURGERY | Facility: CLINIC | Age: 69
End: 2019-01-01

## 2019-01-01 ENCOUNTER — OFFICE VISIT (OUTPATIENT)
Dept: FAMILY MEDICINE CLINIC | Age: 69
End: 2019-01-01

## 2019-01-01 VITALS
DIASTOLIC BLOOD PRESSURE: 76 MMHG | HEART RATE: 71 BPM | SYSTOLIC BLOOD PRESSURE: 130 MMHG | HEIGHT: 67 IN | TEMPERATURE: 97.5 F | WEIGHT: 191.2 LBS | RESPIRATION RATE: 18 BRPM | BODY MASS INDEX: 30.01 KG/M2

## 2019-01-01 VITALS
SYSTOLIC BLOOD PRESSURE: 129 MMHG | RESPIRATION RATE: 18 BRPM | HEART RATE: 104 BPM | WEIGHT: 185 LBS | HEIGHT: 67 IN | TEMPERATURE: 96.4 F | BODY MASS INDEX: 29.03 KG/M2 | DIASTOLIC BLOOD PRESSURE: 89 MMHG

## 2019-01-01 DIAGNOSIS — G89.29 CHRONIC LEFT SHOULDER PAIN: Primary | ICD-10-CM

## 2019-01-01 DIAGNOSIS — M10.9 GOUT, UNSPECIFIED CAUSE, UNSPECIFIED CHRONICITY, UNSPECIFIED SITE: ICD-10-CM

## 2019-01-01 DIAGNOSIS — M25.512 CHRONIC LEFT SHOULDER PAIN: ICD-10-CM

## 2019-01-01 DIAGNOSIS — E11.9 TYPE 2 DIABETES MELLITUS WITHOUT COMPLICATION, WITHOUT LONG-TERM CURRENT USE OF INSULIN (HCC): ICD-10-CM

## 2019-01-01 DIAGNOSIS — G89.29 CHRONIC LEFT SHOULDER PAIN: ICD-10-CM

## 2019-01-01 DIAGNOSIS — I10 ESSENTIAL HYPERTENSION: ICD-10-CM

## 2019-01-01 DIAGNOSIS — Z23 ENCOUNTER FOR IMMUNIZATION: ICD-10-CM

## 2019-01-01 DIAGNOSIS — M25.512 CHRONIC LEFT SHOULDER PAIN: Primary | ICD-10-CM

## 2019-01-01 DIAGNOSIS — M75.52 CHRONIC BURSITIS OF LEFT SHOULDER: Primary | ICD-10-CM

## 2019-01-01 RX ORDER — ALLOPURINOL 300 MG/1
TABLET ORAL
Qty: 90 TAB | Refills: 1 | Status: SHIPPED | OUTPATIENT
Start: 2019-01-01 | End: 2020-01-01 | Stop reason: SDUPTHER

## 2019-01-01 RX ORDER — BETAMETHASONE SODIUM PHOSPHATE AND BETAMETHASONE ACETATE 3; 3 MG/ML; MG/ML
6 INJECTION, SUSPENSION INTRA-ARTICULAR; INTRALESIONAL; INTRAMUSCULAR; SOFT TISSUE ONCE
Qty: 0.5 ML | Refills: 0
Start: 2019-01-01 | End: 2019-01-01

## 2019-01-01 RX ORDER — LISINOPRIL 20 MG/1
20 TABLET ORAL 2 TIMES DAILY
Qty: 180 TAB | Refills: 1 | Status: SHIPPED | OUTPATIENT
Start: 2019-01-01 | End: 2020-01-01

## 2019-01-01 RX ORDER — GLYBURIDE 2.5 MG/1
2.5 TABLET ORAL 2 TIMES DAILY WITH MEALS
Qty: 180 TAB | Refills: 1 | Status: SHIPPED | OUTPATIENT
Start: 2019-01-01

## 2019-01-01 RX ORDER — DILTIAZEM HYDROCHLORIDE 360 MG/1
360 CAPSULE, EXTENDED RELEASE ORAL DAILY
Qty: 90 CAP | Refills: 1 | Status: SHIPPED | OUTPATIENT
Start: 2019-01-01 | End: 2020-01-01

## 2019-03-20 ENCOUNTER — HOSPITAL ENCOUNTER (OUTPATIENT)
Dept: LAB | Age: 69
Discharge: HOME | End: 2019-03-20
Payer: MEDICARE

## 2019-03-20 DIAGNOSIS — E78.2 MIXED HYPERLIPIDEMIA: ICD-10-CM

## 2019-03-20 DIAGNOSIS — E11.21 TYPE 2 DIABETES WITH NEPHROPATHY (HCC): ICD-10-CM

## 2019-03-20 DIAGNOSIS — I10 ESSENTIAL HYPERTENSION: ICD-10-CM

## 2019-03-20 LAB
ALBUMIN SERPL-MCNC: 3.6 G/DL (ref 3.4–5)
ALBUMIN/GLOB SERPL: 1.1 {RATIO} (ref 0.8–1.7)
ALP SERPL-CCNC: 94 U/L (ref 45–117)
ALT SERPL-CCNC: 19 U/L (ref 16–61)
ANION GAP SERPL CALC-SCNC: 4 MMOL/L (ref 3–18)
AST SERPL-CCNC: 13 U/L (ref 15–37)
BASOPHILS # BLD: 0.1 K/UL (ref 0–0.1)
BASOPHILS NFR BLD: 1 % (ref 0–2)
BILIRUB SERPL-MCNC: 0.2 MG/DL (ref 0.2–1)
BUN SERPL-MCNC: 22 MG/DL (ref 7–18)
BUN/CREAT SERPL: 15 (ref 12–20)
CALCIUM SERPL-MCNC: 9 MG/DL (ref 8.5–10.1)
CHLORIDE SERPL-SCNC: 112 MMOL/L (ref 100–108)
CHOLEST SERPL-MCNC: 189 MG/DL
CO2 SERPL-SCNC: 27 MMOL/L (ref 21–32)
CREAT SERPL-MCNC: 1.49 MG/DL (ref 0.6–1.3)
DIFFERENTIAL METHOD BLD: ABNORMAL
EOSINOPHIL # BLD: 0.2 K/UL (ref 0–0.4)
EOSINOPHIL NFR BLD: 2 % (ref 0–5)
ERYTHROCYTE [DISTWIDTH] IN BLOOD BY AUTOMATED COUNT: 13.5 % (ref 11.6–14.5)
EST. AVERAGE GLUCOSE BLD GHB EST-MCNC: 160 MG/DL
GLOBULIN SER CALC-MCNC: 3.2 G/DL (ref 2–4)
GLUCOSE SERPL-MCNC: 100 MG/DL (ref 74–99)
HBA1C MFR BLD: 7.2 % (ref 4.2–5.6)
HCT VFR BLD AUTO: 43 % (ref 36–48)
HDLC SERPL-MCNC: 32 MG/DL (ref 40–60)
HDLC SERPL: 5.9 {RATIO} (ref 0–5)
HGB BLD-MCNC: 14.4 G/DL (ref 13–16)
LDLC SERPL CALC-MCNC: 83.4 MG/DL (ref 0–100)
LIPID PROFILE,FLP: ABNORMAL
LYMPHOCYTES # BLD: 2.8 K/UL (ref 0.9–3.6)
LYMPHOCYTES NFR BLD: 27 % (ref 21–52)
MAGNESIUM SERPL-MCNC: 2.4 MG/DL (ref 1.6–2.6)
MCH RBC QN AUTO: 31.2 PG (ref 24–34)
MCHC RBC AUTO-ENTMCNC: 33.5 G/DL (ref 31–37)
MCV RBC AUTO: 93.3 FL (ref 74–97)
MONOCYTES # BLD: 0.7 K/UL (ref 0.05–1.2)
MONOCYTES NFR BLD: 7 % (ref 3–10)
NEUTS SEG # BLD: 6.4 K/UL (ref 1.8–8)
NEUTS SEG NFR BLD: 63 % (ref 40–73)
PLATELET # BLD AUTO: 268 K/UL (ref 135–420)
PMV BLD AUTO: 10.6 FL (ref 9.2–11.8)
POTASSIUM SERPL-SCNC: 4.4 MMOL/L (ref 3.5–5.5)
PROT SERPL-MCNC: 6.8 G/DL (ref 6.4–8.2)
RBC # BLD AUTO: 4.61 M/UL (ref 4.7–5.5)
SODIUM SERPL-SCNC: 143 MMOL/L (ref 136–145)
T4 FREE SERPL-MCNC: 1 NG/DL (ref 0.7–1.5)
TRIGL SERPL-MCNC: 368 MG/DL (ref ?–150)
TSH SERPL DL<=0.05 MIU/L-ACNC: 0.77 UIU/ML (ref 0.36–3.74)
VLDLC SERPL CALC-MCNC: 73.6 MG/DL
WBC # BLD AUTO: 10.3 K/UL (ref 4.6–13.2)

## 2019-03-20 PROCEDURE — 83735 ASSAY OF MAGNESIUM: CPT

## 2019-03-20 PROCEDURE — 36415 COLL VENOUS BLD VENIPUNCTURE: CPT

## 2019-03-20 PROCEDURE — 80053 COMPREHEN METABOLIC PANEL: CPT

## 2019-03-20 PROCEDURE — 84439 ASSAY OF FREE THYROXINE: CPT

## 2019-03-20 PROCEDURE — 84443 ASSAY THYROID STIM HORMONE: CPT

## 2019-03-20 PROCEDURE — 80061 LIPID PANEL: CPT

## 2019-03-20 PROCEDURE — 85025 COMPLETE CBC W/AUTO DIFF WBC: CPT

## 2019-03-20 PROCEDURE — 83036 HEMOGLOBIN GLYCOSYLATED A1C: CPT

## 2019-03-28 ENCOUNTER — OFFICE VISIT (OUTPATIENT)
Dept: FAMILY MEDICINE CLINIC | Age: 69
End: 2019-03-28

## 2019-03-28 VITALS
WEIGHT: 190.8 LBS | TEMPERATURE: 97.8 F | BODY MASS INDEX: 29.95 KG/M2 | RESPIRATION RATE: 18 BRPM | HEART RATE: 91 BPM | HEIGHT: 67 IN | SYSTOLIC BLOOD PRESSURE: 124 MMHG | DIASTOLIC BLOOD PRESSURE: 74 MMHG

## 2019-03-28 DIAGNOSIS — I10 ESSENTIAL HYPERTENSION: ICD-10-CM

## 2019-03-28 DIAGNOSIS — Z12.11 COLON CANCER SCREENING: Primary | ICD-10-CM

## 2019-03-28 DIAGNOSIS — E78.1 HIGH TRIGLYCERIDES: ICD-10-CM

## 2019-03-28 DIAGNOSIS — E11.9 TYPE 2 DIABETES MELLITUS WITHOUT COMPLICATION, WITHOUT LONG-TERM CURRENT USE OF INSULIN (HCC): ICD-10-CM

## 2019-03-28 DIAGNOSIS — M10.9 GOUT, UNSPECIFIED CAUSE, UNSPECIFIED CHRONICITY, UNSPECIFIED SITE: ICD-10-CM

## 2019-03-28 RX ORDER — LISINOPRIL 20 MG/1
20 TABLET ORAL 2 TIMES DAILY
Qty: 180 TAB | Refills: 1 | Status: SHIPPED | OUTPATIENT
Start: 2019-03-28 | End: 2019-01-01 | Stop reason: SDUPTHER

## 2019-03-28 RX ORDER — GLYBURIDE 2.5 MG/1
2.5 TABLET ORAL 2 TIMES DAILY WITH MEALS
Qty: 180 TAB | Refills: 1 | Status: SHIPPED | OUTPATIENT
Start: 2019-03-28 | End: 2019-01-01 | Stop reason: SDUPTHER

## 2019-03-28 RX ORDER — ALLOPURINOL 300 MG/1
TABLET ORAL
Qty: 90 TAB | Refills: 1 | Status: SHIPPED | OUTPATIENT
Start: 2019-03-28 | End: 2019-01-01 | Stop reason: SDUPTHER

## 2019-03-28 RX ORDER — DILTIAZEM HYDROCHLORIDE 360 MG/1
360 CAPSULE, EXTENDED RELEASE ORAL DAILY
Qty: 90 CAP | Refills: 1 | Status: SHIPPED | OUTPATIENT
Start: 2019-03-28 | End: 2019-01-01 | Stop reason: SDUPTHER

## 2019-03-28 NOTE — PROGRESS NOTES
Austin Gutierrez presents today for   Chief Complaint   Patient presents with    Hypertension     follow up    Diabetes    Cholesterol Problem    Labs     completed 3/20/19    Medication Refill       Is someone accompanying this pt? no    Is the patient using any DME equipment during OV? no    Depression Screening:  3 most recent PHQ Screens 3/28/2019   Little interest or pleasure in doing things Not at all   Feeling down, depressed, irritable, or hopeless Not at all   Total Score PHQ 2 0       Learning Assessment:  Learning Assessment 3/28/2019   PRIMARY LEARNER Patient   HIGHEST LEVEL OF EDUCATION - PRIMARY LEARNER  2 YEARS Jaxson PRIMARY LEARNER NONE   CO-LEARNER CAREGIVER No   PRIMARY LANGUAGE ENGLISH    NEED -   LEARNER PREFERENCE PRIMARY LISTENING     -   ANSWERED BY self   RELATIONSHIP SELF       Abuse Screening:  Abuse Screening Questionnaire 3/28/2019   Do you ever feel afraid of your partner? N   Are you in a relationship with someone who physically or mentally threatens you? N   Is it safe for you to go home? Y           Health Maintenance Due   Topic Date Due    Shingrix Vaccine Age 49> (1 of 2) 03/21/1998    FOBT Q 1 YEAR AGE 50-75  03/21/1998    Pneumococcal 65+ years (1 of 2 - PCV13) 03/21/2013    MEDICARE YEARLY EXAM  05/06/2018   . Health Maintenance reviewed and discussed and ordered per Provider. Austin Gutierrez is updated on all     Coordination of Care  1. Have you been to the ER, urgent care clinic since your last visit? Hospitalized since your last visit? no    2. Have you seen or consulted any other health care providers outside of the [address] [place] [address] since your last visit? Include any pap smears or colon screening. no    Per-NP. Aissatou ackerman medication not taking to be deleted. Advance Directive:  1. Do you have an advance directive in place? Patient Reply:no    2. If not, would you like material regarding how to put one in place? Patient Reply: no

## 2019-03-28 NOTE — PROGRESS NOTES
HPI  Hemalatha Cox is a 68 y.o. male  Chief Complaint   Patient presents with    Hypertension     follow up    Diabetes    Cholesterol Problem    Labs     completed 3/20/19    Medication Refill     Reports he retired last year and his eating habits have been off every since. Reports his hemoglobin A1c has always been in the low sevens and he thinks the last time this was a fluke. Patient states he has been on glyburide and he does not wish to change the doses of this medication. Patient states he has been to the renal specialist and he told him to increase his water intake. He states he is aware that his diabetes has impacted his kidneys but the renal specialist was not concerned. Left shoulder pain from a past fall. No falls recently. Reports he is using tylenol arthritis which does help but he has to use this daily. Reports pain is dull and sore. He denies any recent gout flares. Patient declines statin medication. He states he is not interested in cholesterol medication. Past Medical History  Past Medical History:   Diagnosis Date    Calculus of kidney     Cancer ([facility]     HX of BCC    Diabetes mellitus ([facility] 8/19/2010    Gout 8/19/2010    HTN (hypertension) 8/19/2010    Hyperlipemia 8/19/2010    Kidney disease     Lumbar spondylosis     Proteinuria     Skin cancer, basal cell     neck    Type 2 diabetes mellitus without complication, without long-term current use of insulin ([facility] 8/19/2010    Urolithiasis        Surgical History  Past Surgical History:   Procedure Laterality Date    HX GI      HX OTHER SURGICAL      Basal cell removed from left side of face    HX UROLOGICAL      kidney        Medications  Current Outpatient Medications   Medication Sig Dispense Refill    allopurinol (ZYLOPRIM) 300 mg tablet take 1 tablet by mouth once daily 90 Tab 1    dilTIAZem (TIAZAC) 360 mg SR capsule Take 1 Cap by mouth daily.  90 Cap 1    glyBURIDE (DIABETA) 2.5 mg tablet Take 1 Tab by mouth two (2) times daily (with meals). 180 Tab 1    lisinopril (PRINIVIL, ZESTRIL) 20 mg tablet Take 1 Tab by mouth two (2) times a day. 180 Tab 1    TAZTIA  mg SR capsule take 1 capsule by mouth once daily 90 Cap 1    cyclobenzaprine (FLEXERIL) 5 mg tablet Take 1 Tab by mouth nightly.  30 Tab 0       Allergies  Allergies   Allergen Reactions    Metformin Other (comments)     Renal insufficiency         Family History  Family History   Problem Relation Age of Onset    Diabetes Mother        Social History  Social History     Socioeconomic History    Marital status:      Spouse name: Not on file    Number of children: Not on file    Years of education: Not on file    Highest education level: Not on file   Occupational History    Not on file   Social Needs    Financial resource strain: Not on file    Food insecurity:     Worry: Not on file     Inability: Not on file    Transportation needs:     Medical: Not on file     Non-medical: Not on file   Tobacco Use    Smoking status: Never Smoker    Smokeless tobacco: Never Used   Substance and Sexual Activity    Alcohol use: No    Drug use: No    Sexual activity: Yes   Lifestyle    Physical activity:     Days per week: Not on file     Minutes per session: Not on file    Stress: Not on file   Relationships    Social connections:     Talks on phone: Not on file     Gets together: Not on file     Attends [employer] service: Not on file     Active member of club or organization: Not on file     Attends meetings of clubs or organizations: Not on file     Relationship status: Not on file    Intimate partner violence:     Fear of current or ex partner: Not on file     Emotionally abused: Not on file     Physically abused: Not on file     Forced sexual activity: Not on file   Other Topics Concern    Not on file   Social History Narrative    Not on file       Problem List  Patient Active Problem List   Diagnosis Code    Type 2 diabetes mellitus without complication, without long-term current use of insulin (HCC) E11.9    HTN (hypertension) I10    Gout M10.9    Hyperlipemia E78.5    Nocturia R35.1    Type [facility] E11.21       Review of Systems  Review of Systems   Respiratory: Negative for shortness of breath. Cardiovascular: Negative for chest pain and palpitations. Gastrointestinal: Negative for abdominal pain, nausea and vomiting. Musculoskeletal: Positive for joint pain and myalgias. Neurological: Negative for dizziness. Vital Signs  Vitals:    03/28/19 0744   BP: 124/74   Pulse: 91   Resp: 18   Temp: 97.8 °F (36.6 °C)   TempSrc: Oral   Weight: 190 lb 12.8 oz (86.5 kg)   Height: 5' 7\" (1.702 m)   PainSc:   0 - No pain       Physical Exam  Physical Exam   Constitutional: He is oriented to person, place, and time. HENT:   Mouth/Throat: Oropharynx is clear and moist.   Eyes: Pupils are equal, round, and reactive to light. Cardiovascular: Normal rate, regular rhythm and normal heart sounds. Pulmonary/Chest: Effort normal and breath sounds normal.   Abdominal: Soft. Bowel sounds are normal. He exhibits no distension. There is no tenderness. Musculoskeletal: He exhibits no edema. Neurological: He is alert and oriented to person, place, and time. Psychiatric: He has a normal mood and affect. Vitals reviewed.       Diagnostics  Orders Placed This Encounter    OCCULT BLOOD IMMUNOASSAY,DIAGNOSTIC     Standing Status:   Future     Standing Expiration Date:   5/49/2039    METABOLIC PANEL, COMPREHENSIVE     Standing Status:   Future     Standing Expiration Date:   3/28/2020    LIPID PANEL     Standing Status:   Future     Standing Expiration Date:   3/28/2020    CBC WITH AUTOMATED DIFF     Standing Status:   Future     Standing Expiration Date:   3/28/2020    HEMOGLOBIN A1C WITH EAG     Standing Status:   Future     Standing Expiration Date:   3/28/2020    allopurinol (ZYLOPRIM) 300 mg tablet     Sig: take 1 tablet by mouth once daily     Dispense:  90 Tab     Refill:  1    dilTIAZem (TIAZAC) 360 mg SR capsule     Sig: Take 1 Cap by mouth daily. Dispense:  90 Cap     Refill:  1    glyBURIDE (DIABETA) 2.5 mg tablet     Sig: Take 1 Tab by mouth two (2) times daily (with meals). Dispense:  180 Tab     Refill:  1    lisinopril (PRINIVIL, ZESTRIL) 20 mg tablet     Sig: Take 1 Tab by mouth two (2) times a day. Dispense:  180 Tab     Refill:  1       Results  Results for orders placed or performed during the hospital encounter of 29/26/64   METABOLIC PANEL, COMPREHENSIVE   Result Value Ref Range    Sodium 143 136 - 145 mmol/L    Potassium 4.4 3.5 - 5.5 mmol/L    Chloride 112 (H) 100 - 108 mmol/L    CO2 27 21 - 32 mmol/L    Anion gap 4 3.0 - 18 mmol/L    Glucose 100 (H) 74 - 99 mg/dL    BUN 22 (H) 7.0 - 18 MG/DL    Creatinine 1.49 (H) 0.6 - 1.3 MG/DL    BUN/Creatinine ratio 15 12 - 20      GFR est AA 57 (L) >60 ml/min/1.73m2    GFR est non-AA 47 (L) >60 ml/min/1.73m2    Calcium 9.0 8.5 - 10.1 MG/DL    Bilirubin, total 0.2 0.2 - 1.0 MG/DL    ALT (SGPT) 19 16 - 61 U/L    AST (SGOT) 13 (L) 15 - 37 U/L    Alk.  phosphatase 94 45 - 117 U/L    Protein, total 6.8 6.4 - 8.2 g/dL    Albumin 3.6 3.4 - 5.0 g/dL    Globulin 3.2 2.0 - 4.0 g/dL    A-G Ratio 1.1 0.8 - 1.7     LIPID PANEL   Result Value Ref Range    LIPID PROFILE          Cholesterol, total 189 <200 MG/DL    Triglyceride 368 (H) <150 MG/DL    HDL Cholesterol 32 (L) 40 - 60 MG/DL    LDL, calculated 83.4 0 - 100 MG/DL    VLDL, calculated 73.6 MG/DL    CHOL/HDL Ratio 5.9 (H) 0 - 5.0     CBC WITH AUTOMATED DIFF   Result Value Ref Range    WBC 10.3 4.6 - 13.2 K/uL    RBC 4.61 (L) 4.70 - 5.50 M/uL    HGB 14.4 13.0 - 16.0 g/dL    HCT 43.0 36.0 - 48.0 %    MCV 93.3 74.0 - 97.0 FL    MCH 31.2 24.0 - 34.0 PG    MCHC 33.5 31.0 - 37.0 g/dL    RDW 13.5 11.6 - 14.5 %    PLATELET 030 633 - 874 K/uL    MPV 10.6 9.2 - 11.8 FL    NEUTROPHILS 63 40 - 73 %    LYMPHOCYTES 27 21 - 52 %    MONOCYTES 7 3 - 10 %    EOSINOPHILS 2 0 - 5 %    BASOPHILS 1 0 - 2 %    ABS. NEUTROPHILS 6.4 1.8 - 8.0 K/UL    ABS. LYMPHOCYTES 2.8 0.9 - 3.6 K/UL    ABS. MONOCYTES 0.7 0.05 - 1.2 K/UL    ABS. EOSINOPHILS 0.2 0.0 - 0.4 K/UL    ABS. BASOPHILS 0.1 0.0 - 0.1 K/UL    DF AUTOMATED     TSH 3RD GENERATION   Result Value Ref Range    TSH 0.77 0.36 - 3.74 uIU/mL   T4, FREE   Result Value Ref Range    T4, Free 1.0 0.7 - 1.5 NG/DL   HEMOGLOBIN A1C WITH EAG   Result Value Ref Range    Hemoglobin A1c 7.2 (H) 4.2 - 5.6 %    Est. average glucose 160 mg/dL   MAGNESIUM   Result Value Ref Range    Magnesium 2.4 1.6 - 2.6 mg/dL         Assessment and Plan  Diagnoses and all orders for this visit:    1. Colon cancer screening  -     OCCULT BLOOD IMMUNOASSAY,DIAGNOSTIC; Future    2. Gout, unspecified cause, unspecified chronicity, unspecified site  -     allopurinol (ZYLOPRIM) 300 mg tablet; take 1 tablet by mouth once daily    3. Essential hypertension  -     dilTIAZem (TIAZAC) 360 mg SR capsule; Take 1 Cap by mouth daily. -     lisinopril (PRINIVIL, ZESTRIL) 20 mg tablet; Take 1 Tab by mouth two (2) times a day. -     METABOLIC PANEL, COMPREHENSIVE; Future  -     LIPID PANEL; Future  -     CBC WITH AUTOMATED DIFF; Future  -     HEMOGLOBIN A1C WITH EAG; Future    4. Type 2 diabetes mellitus without complication, without long-term current use of insulin (HCC)  -     glyBURIDE (DIABETA) 2.5 mg tablet; Take 1 Tab by mouth two (2) times daily (with meals). -     METABOLIC PANEL, COMPREHENSIVE; Future  -     LIPID PANEL; Future  -     CBC WITH AUTOMATED DIFF; Future  -     HEMOGLOBIN A1C WITH EAG; Future    5. High triglycerides  -     METABOLIC PANEL, COMPREHENSIVE; Future  -     LIPID PANEL; Future  -     CBC WITH AUTOMATED DIFF; Future  -     HEMOGLOBIN A1C WITH EAG; Future    All lab values discussed with patient.   Patient has refused adjustments to his diabetic medication glyburide and he also refuses a statin for his triglycerides at this time. Discussed his renal function with him in detail. Stress cardiovascular risk factors associated with elevated triglycerides. Stressed importance of diet adjustment for cholesterol and for diabetes. Patient adamantly declines a statin or any medication for his cholesterol. He would like to wait and have these repeated as he thinks that these are fluke as well. Patient declines any adjustments to his diabetic medication. Patient declines a colonoscopy. He is agreeable to the fit test.  After care summary printed and reviewed with patient. Plan reviewed with patient. Questions answered. Patient verbalized understanding of plan and is in agreement with plan. Patient to follow up in four months or earlier if symptoms worsen. Follow-up and Dispositions    · Return in about 4 months (around 7/28/2019), or if symptoms worsen or fail to improve.        Sarajane Cushing, CHRISTIANO-C

## 2019-07-18 ENCOUNTER — PATIENT OUTREACH (OUTPATIENT)
Dept: FAMILY MEDICINE CLINIC | Age: 69
End: 2019-07-18

## 2019-07-22 ENCOUNTER — PATIENT OUTREACH (OUTPATIENT)
Dept: FAMILY MEDICINE CLINIC | Age: 69
End: 2019-07-22

## 2019-08-19 ENCOUNTER — PATIENT OUTREACH (OUTPATIENT)
Dept: FAMILY MEDICINE CLINIC | Age: 69
End: 2019-08-19

## 2019-11-12 NOTE — PATIENT INSTRUCTIONS
Vaccine Information Statement    Influenza (Flu) Vaccine (Inactivated or Recombinant): What You Need to Know    Many Vaccine Information Statements are available in Serbian and other languages. See www.immunize.org/vis  Hojas de información sobre vacunas están disponibles en español y en muchos otros idiomas. Visite www.immunize.org/vis    1. Why get vaccinated? Influenza vaccine can prevent influenza (flu). Flu is a contagious disease that spreads around the [facility] every year, usually between October and May. Anyone can get the flu, but it is more dangerous for some people. Infants and young children, people 72years of age and older, pregnant women, and people with certain health conditions or a weakened immune system are at greatest risk of flu complications. Pneumonia, bronchitis, sinus infections and ear infections are examples of flu-related complications. If you have a medical condition, such as heart disease, cancer or diabetes, flu can make it worse. Flu can cause fever and chills, sore throat, muscle aches, fatigue, cough, headache, and runny or stuffy nose. Some people may have vomiting and diarrhea, though this is more common in children than adults. Each year thousands of people in the [facility] die from flu, and many more are hospitalized. Flu vaccine prevents millions of illnesses and flu-related visits to the doctor each year. 2. Influenza vaccines     CDC recommends everyone 10months of age and older get vaccinated every flu season. Children 6 months through 6years of age may need 2 doses during a single flu season. Everyone else needs only 1 dose each flu season. It takes about 2 weeks for protection to develop after vaccination. There are many flu viruses, and they are always changing. Each year a new flu vaccine is made to protect against three or four viruses that are likely to cause disease in the upcoming flu season.  Even when the vaccine doesnt exactly match these viruses, it may still provide some protection. Influenza vaccine does not cause flu. Influenza vaccine may be given at the same time as other vaccines. 3. Talk with your health care provider    Tell your vaccine provider if the person getting the vaccine:   Has had an allergic reaction after a previous dose of influenza vaccine, or has any severe, life-threatening allergies.  Has ever had Guillain-Barré Syndrome (also called GBS). In some cases, your health care provider may decide to postpone influenza vaccination to a future visit. People with minor illnesses, such as a cold, may be vaccinated. People who are moderately or severely ill should usually wait until they recover before getting influenza vaccine. Your health care provider can give you more information. 4. Risks of a reaction     Soreness, redness, and swelling where shot is given, fever, muscle aches, and headache can happen after influenza vaccine.  There may be a very small increased risk of Guillain-Barré Syndrome (GBS) after inactivated influenza vaccine (the flu shot). [facility] children who get the flu shot along with pneumococcal vaccine (PCV13), and/or DTaP vaccine at the same time might be slightly more likely to have a seizure caused by fever. Tell your health care provider if a child who is getting flu vaccine has ever had a seizure. People sometimes faint after medical procedures, including vaccination. Tell your provider if you feel dizzy or have vision changes or ringing in the ears. As with any medicine, there is a very remote chance of a vaccine causing a severe allergic reaction, other serious injury, or death. 5. What if there is a serious problem? An allergic reaction could occur after the vaccinated person leaves the clinic.  If you see signs of a severe allergic reaction (hives, swelling of the face and throat, difficulty breathing, a fast heartbeat, dizziness, or weakness), call 9-1-1 and get the person to the nearest hospital.    For other signs that concern you, call your health care provider. Adverse reactions should be reported to the Vaccine Adverse Event Reporting System (VAERS). Your health care provider will usually file this report, or you can do it yourself. Visit the VAERS website at www.vaers. [facility].gov or call 6-952.663.9005. VAERS is only for reporting reactions, and VAERS staff do not give medical advice. 6. The National Vaccine Injury Compensation Program    The [facility] Vaccine Injury Compensation Program (VICP) is a federal program that was created to compensate people who may have been injured by certain vaccines. Visit the VICP website at www.hrsa.gov/vaccinecompensation or call 3-186.783.3483 to learn about the program and about filing a claim. There is a time limit to file a claim for compensation. 7. How can I learn more?  Ask your health care provider.  Call your local or state health department.  Contact the Centers for Disease Control and Prevention (CDC):  - Call 1-662.976.9298 (0-565-TQR-INFO) or  - Visit CDCs influenza website at www.cdc.gov/flu    Vaccine Information Statement (Interim)  Inactivated Influenza Vaccine   8/15/2019  42 ASIA Cohen [id number]-[id number]   Department of Health and Human Services  Centers for Disease Control and Prevention    Office Use Only

## 2019-11-12 NOTE — PROGRESS NOTES
HPI  Isabelle Hooks is a 68 y.o. male  Chief Complaint   Patient presents with    Hypertension     follow up    Diabetes    Medication Refill     Left shoulder has started irritating him again. He was using the tylenol and this is no longer effective. Reports he has not been checking his blood glucose. He states he has been checking his blood pressure and this has been fine. He denies chest pain and or shortness of breath. He denies any swelling in his lower extremities. Past Medical History  Past Medical History:   Diagnosis Date    Calculus of kidney     Cancer ([facility] [address]     HX of BCC    Diabetes mellitus ([facility] [address] 8/19/2010    Gout 8/19/2010    HTN (hypertension) 8/19/2010    Hyperlipemia 8/19/2010    Kidney disease     Lumbar spondylosis     Proteinuria     Skin cancer, basal cell     neck    Type 2 diabetes mellitus without complication, without long-term current use of insulin ([facility] [address] 8/19/2010    Urolithiasis        Surgical History  Past Surgical History:   Procedure Laterality Date    HX GI      HX OTHER SURGICAL      Basal cell removed from left side of face    HX UROLOGICAL      kidney        Medications  Current Outpatient Medications   Medication Sig Dispense Refill    allopurinol (ZYLOPRIM) 300 mg tablet take 1 tablet by mouth once daily 90 Tab 1    dilTIAZem (TIAZAC) 360 mg SR capsule Take 1 Cap by mouth daily. 90 Cap 1    glyBURIDE (DIABETA) 2.5 mg tablet Take 1 Tab by mouth two (2) times daily (with meals). 180 Tab 1    lisinopril (PRINIVIL, ZESTRIL) 20 mg tablet Take 1 Tab by mouth two (2) times a day. 180 Tab 1    cyclobenzaprine (FLEXERIL) 5 mg tablet Take 1 Tab by mouth nightly.  30 Tab 0       Allergies  Allergies   Allergen Reactions    Metformin Other (comments)     Renal insufficiency         Family History  Family History   Problem Relation Age of Onset    Diabetes Mother        Social History  Social History     Socioeconomic History    Marital status:  Spouse name: Not on file    Number of children: Not on file    Years of education: Not on file    Highest education level: Not on file   Occupational History    Not on file   Social Needs    Financial resource strain: Not on file    Food insecurity:     Worry: Not on file     Inability: Not on file    Transportation needs:     Medical: Not on file     Non-medical: Not on file   Tobacco Use    Smoking status: Never Smoker    Smokeless tobacco: Never Used   Substance and Sexual Activity    Alcohol use: No    Drug use: No    Sexual activity: Yes   Lifestyle    Physical activity:     Days per week: Not on file     Minutes per session: Not on file    Stress: Not on file   Relationships    Social connections:     Talks on phone: Not on file     Gets together: Not on file     Attends Jehovah's witness service: Not on file     Active member of club or organization: Not on file     Attends meetings of clubs or organizations: Not on file     Relationship status: Not on file    Intimate partner violence:     Fear of current or ex partner: Not on file     Emotionally abused: Not on file     Physically abused: Not on file     Forced sexual activity: Not on file   Other Topics Concern    Not on file   Social History Narrative    Not on file       Problem List  Patient Active Problem List   Diagnosis Code    Type 2 diabetes mellitus without complication, without long-term current use of insulin ([facility] E11.9    HTN (hypertension) I10    Gout M10.9    Hyperlipemia E78.5    Nocturia R35.1    Type 2 diabetes with nephropathy ([facility] E11.21       Review of Systems  Review of Systems   Constitutional: Negative for chills and fever. Eyes: Negative for blurred vision. Respiratory: Negative for shortness of breath. Cardiovascular: Negative for chest pain and palpitations. Gastrointestinal: Negative for abdominal pain, nausea and vomiting. Neurological: Negative for dizziness.    Psychiatric/Behavioral: Negative for depression and suicidal ideas. Vital Signs  Vitals:    11/12/19 0749   BP: 130/76   Pulse: 71   Resp: 18   Temp: 97.5 °F (36.4 °C)   TempSrc: Oral   Weight: 191 lb 3.2 oz (86.7 kg)   Height: 5' 7\" (1.702 m)   PainSc:   0 - No pain       Physical Exam  Physical Exam   Constitutional: He is oriented to person, place, and time. HENT:   Mouth/Throat: Oropharynx is clear and moist.   Cardiovascular: Normal rate and regular rhythm. Pulmonary/Chest: Effort normal and breath sounds normal.   Abdominal: Soft. Bowel sounds are normal.   Musculoskeletal: Normal range of motion. Neurological: He is alert and oriented to person, place, and time. Skin: Skin is warm and dry. Psychiatric: He has a normal mood and affect. His behavior is normal.   Vitals reviewed. Diagnostics  Orders Placed This Encounter    Influenza virus vaccine (QUADRIVALENT PRES FREE SYRINGE) IM (11318)    Administration fee () for Medicare insured patients    allopurinol (ZYLOPRIM) 300 mg tablet     Sig: take 1 tablet by mouth once daily     Dispense:  90 Tab     Refill:  1    dilTIAZem (TIAZAC) 360 mg SR capsule     Sig: Take 1 Cap by mouth daily. Dispense:  90 Cap     Refill:  1    glyBURIDE (DIABETA) 2.5 mg tablet     Sig: Take 1 Tab by mouth two (2) times daily (with meals). Dispense:  180 Tab     Refill:  1    lisinopril (PRINIVIL, ZESTRIL) 20 mg tablet     Sig: Take 1 Tab by mouth two (2) times a day.      Dispense:  180 Tab     Refill:  1       Results  Results for orders placed or performed during the hospital encounter of 64/38/58   METABOLIC PANEL, COMPREHENSIVE   Result Value Ref Range    Sodium 143 136 - 145 mmol/L    Potassium 4.4 3.5 - 5.5 mmol/L    Chloride 112 (H) 100 - 108 mmol/L    CO2 27 21 - 32 mmol/L    Anion gap 4 3.0 - 18 mmol/L    Glucose 100 (H) 74 - 99 mg/dL    BUN 22 (H) 7.0 - 18 MG/DL    Creatinine 1.49 (H) 0.6 - 1.3 MG/DL    BUN/Creatinine ratio 15 12 - 20      GFR est AA 57 (L) >60 ml/min/1.73m2    GFR est non-AA 47 (L) >60 ml/min/1.73m2    Calcium 9.0 8.5 - 10.1 MG/DL    Bilirubin, total 0.2 0.2 - 1.0 MG/DL    ALT (SGPT) 19 16 - 61 U/L    AST (SGOT) 13 (L) 15 - 37 U/L    Alk. phosphatase 94 45 - 117 U/L    Protein, total 6.8 6.4 - 8.2 g/dL    Albumin 3.6 3.4 - 5.0 g/dL    Globulin 3.2 2.0 - 4.0 g/dL    A-G Ratio 1.1 0.8 - 1.7     LIPID PANEL   Result Value Ref Range    LIPID PROFILE          Cholesterol, total 189 <200 MG/DL    Triglyceride 368 (H) <150 MG/DL    HDL Cholesterol 32 (L) 40 - 60 MG/DL    LDL, calculated 83.4 0 - 100 MG/DL    VLDL, calculated 73.6 MG/DL    CHOL/HDL Ratio 5.9 (H) 0 - 5.0     CBC WITH AUTOMATED DIFF   Result Value Ref Range    WBC 10.3 4.6 - 13.2 K/uL    RBC 4.61 (L) 4.70 - 5.50 M/uL    HGB 14.4 13.0 - 16.0 g/dL    HCT 43.0 36.0 - 48.0 %    MCV 93.3 74.0 - 97.0 FL    MCH 31.2 24.0 - 34.0 PG    MCHC 33.5 31.0 - 37.0 g/dL    RDW 13.5 11.6 - 14.5 %    PLATELET 812 367 - 937 K/uL    MPV 10.6 9.2 - 11.8 FL    NEUTROPHILS 63 40 - 73 %    LYMPHOCYTES 27 21 - 52 %    MONOCYTES 7 3 - 10 %    EOSINOPHILS 2 0 - 5 %    BASOPHILS 1 0 - 2 %    ABS. NEUTROPHILS 6.4 1.8 - 8.0 K/UL    ABS. LYMPHOCYTES 2.8 0.9 - 3.6 K/UL    ABS. MONOCYTES 0.7 0.05 - 1.2 K/UL    ABS. EOSINOPHILS 0.2 0.0 - 0.4 K/UL    ABS. BASOPHILS 0.1 0.0 - 0.1 K/UL    DF AUTOMATED     TSH 3RD GENERATION   Result Value Ref Range    TSH 0.77 0.36 - 3.74 uIU/mL   T4, FREE   Result Value Ref Range    T4, Free 1.0 0.7 - 1.5 NG/DL   HEMOGLOBIN A1C WITH EAG   Result Value Ref Range    Hemoglobin A1c 7.2 (H) 4.2 - 5.6 %    Est. average glucose 160 mg/dL   MAGNESIUM   Result Value Ref Range    Magnesium 2.4 1.6 - 2.6 mg/dL     Declines his pneumococcal vaccine and his medicare wellness. He is aware that these are due. Patient agreed to influenza vaccine but declined the high dose vaccine as his wife had a reaction to the high dose and he was told not to get it.     Assessment and Plan  Diagnoses and all orders for this visit: 1. Gout, unspecified cause, unspecified chronicity, unspecified site  -     allopurinol (ZYLOPRIM) 300 mg tablet; take 1 tablet by mouth once daily    2. Essential hypertension  -     dilTIAZem (TIAZAC) 360 mg SR capsule; Take 1 Cap by mouth daily. -     lisinopril (PRINIVIL, ZESTRIL) 20 mg tablet; Take 1 Tab by mouth two (2) times a day. 3. Type 2 diabetes mellitus without complication, without long-term current use of insulin (HCC)  -     glyBURIDE (DIABETA) 2.5 mg tablet; Take 1 Tab by mouth two (2) times daily (with meals). 4. Encounter for immunization  -     INFLUENZA VIRUS VAC QUAD,SPLIT,PRESV FREE SYRINGE IM  -     ADMIN INFLUENZA VIRUS VAC      He would like all of his labs completed at [facility] including his A1C. He is to go for fasting labs. After care summary printed and reviewed with patient. Plan reviewed with patient. Questions answered. Patient verbalized understanding of plan and is in agreement with plan. Patient to follow up in three months or earlier if symptoms worsen.      KARTHIK Mayfield
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
## 2019-12-02 NOTE — PROGRESS NOTES
Verbal order given by Dr. Zabrina Leach to draw up 4 mL 0.25% Sensorcaine and 0.5 mL 30 mg/5mL Betamethasone.

## 2020-01-01 ENCOUNTER — TELEPHONE (OUTPATIENT)
Dept: FAMILY MEDICINE CLINIC | Age: 70
End: 2020-01-01

## 2020-01-01 ENCOUNTER — HOSPITAL ENCOUNTER (INPATIENT)
Age: 70
LOS: 11 days | Discharge: HOME HEALTH | DRG: 233 | End: 2020-03-13
Attending: EMERGENCY MEDICINE | Admitting: HOSPITALIST
Payer: MEDICARE

## 2020-01-01 ENCOUNTER — HOSPITAL ENCOUNTER (OUTPATIENT)
Dept: CARDIAC REHAB | Age: 70
Discharge: HOME | End: 2020-07-21
Payer: MEDICARE

## 2020-01-01 ENCOUNTER — HOSPITAL ENCOUNTER (INPATIENT)
Age: 70
LOS: 11 days | DRG: 177 | End: 2020-08-21
Attending: EMERGENCY MEDICINE | Admitting: HOSPITALIST
Payer: MEDICARE

## 2020-01-01 ENCOUNTER — HOME CARE VISIT (OUTPATIENT)
Dept: SCHEDULING | Facility: HOME HEALTH | Age: 70
End: 2020-01-01
Payer: MEDICARE

## 2020-01-01 ENCOUNTER — OFFICE VISIT (OUTPATIENT)
Dept: CARDIOLOGY CLINIC | Age: 70
End: 2020-01-01

## 2020-01-01 ENCOUNTER — HOSPITAL ENCOUNTER (OUTPATIENT)
Dept: CARDIAC REHAB | Age: 70
Discharge: HOME | End: 2020-06-23
Payer: MEDICARE

## 2020-01-01 ENCOUNTER — HOSPITAL ENCOUNTER (OUTPATIENT)
Dept: CARDIAC REHAB | Age: 70
Discharge: HOME | End: 2020-06-30
Payer: MEDICARE

## 2020-01-01 ENCOUNTER — HOME CARE VISIT (OUTPATIENT)
Dept: HOME HEALTH SERVICES | Facility: HOME HEALTH | Age: 70
End: 2020-01-01
Payer: MEDICARE

## 2020-01-01 ENCOUNTER — PATIENT OUTREACH (OUTPATIENT)
Dept: CASE MANAGEMENT | Age: 70
End: 2020-01-01

## 2020-01-01 ENCOUNTER — HOSPITAL ENCOUNTER (OUTPATIENT)
Dept: CARDIAC REHAB | Age: 70
Discharge: HOME | End: 2020-06-11
Payer: MEDICARE

## 2020-01-01 ENCOUNTER — APPOINTMENT (OUTPATIENT)
Dept: CARDIAC REHAB | Age: 70
End: 2020-01-01
Payer: MEDICARE

## 2020-01-01 ENCOUNTER — HOSPITAL ENCOUNTER (OUTPATIENT)
Dept: CARDIAC REHAB | Age: 70
Discharge: HOME | End: 2020-07-30
Payer: MEDICARE

## 2020-01-01 ENCOUNTER — OFFICE VISIT (OUTPATIENT)
Dept: FAMILY MEDICINE CLINIC | Age: 70
End: 2020-01-01

## 2020-01-01 ENCOUNTER — APPOINTMENT (OUTPATIENT)
Dept: VASCULAR SURGERY | Age: 70
DRG: 177 | End: 2020-01-01
Attending: FAMILY MEDICINE
Payer: MEDICARE

## 2020-01-01 ENCOUNTER — ANESTHESIA (OUTPATIENT)
Dept: MEDSURG UNIT | Age: 70
DRG: 177 | End: 2020-01-01
Payer: MEDICARE

## 2020-01-01 ENCOUNTER — APPOINTMENT (OUTPATIENT)
Dept: GENERAL RADIOLOGY | Age: 70
DRG: 233 | End: 2020-01-01
Attending: PHYSICIAN ASSISTANT
Payer: MEDICARE

## 2020-01-01 ENCOUNTER — HOSPITAL ENCOUNTER (OUTPATIENT)
Dept: ULTRASOUND IMAGING | Age: 70
DRG: 233 | End: 2020-01-01
Attending: NURSE PRACTITIONER
Payer: MEDICARE

## 2020-01-01 ENCOUNTER — HOSPITAL ENCOUNTER (INPATIENT)
Dept: INTERVENTIONAL RADIOLOGY/VASCULAR | Age: 70
Discharge: HOME | DRG: 177 | End: 2020-08-17
Attending: INTERNAL MEDICINE
Payer: MEDICARE

## 2020-01-01 ENCOUNTER — APPOINTMENT (OUTPATIENT)
Dept: CT IMAGING | Age: 70
DRG: 233 | End: 2020-01-01
Attending: EMERGENCY MEDICINE
Payer: MEDICARE

## 2020-01-01 ENCOUNTER — TELEPHONE (OUTPATIENT)
Dept: CARDIOTHORACIC SURGERY | Age: 70
End: 2020-01-01

## 2020-01-01 ENCOUNTER — TELEPHONE (OUTPATIENT)
Dept: CARDIOLOGY CLINIC | Age: 70
End: 2020-01-01

## 2020-01-01 ENCOUNTER — APPOINTMENT (OUTPATIENT)
Dept: VASCULAR SURGERY | Age: 70
DRG: 177 | End: 2020-01-01
Attending: INTERNAL MEDICINE
Payer: MEDICARE

## 2020-01-01 ENCOUNTER — TELEPHONE (OUTPATIENT)
Dept: CARDIAC REHAB | Age: 70
End: 2020-01-01

## 2020-01-01 ENCOUNTER — HOSPITAL ENCOUNTER (OUTPATIENT)
Dept: CARDIAC REHAB | Age: 70
Discharge: HOME | End: 2020-06-16
Payer: MEDICARE

## 2020-01-01 ENCOUNTER — HOSPITAL ENCOUNTER (OUTPATIENT)
Dept: CARDIAC REHAB | Age: 70
Discharge: HOME | End: 2020-05-29
Payer: MEDICARE

## 2020-01-01 ENCOUNTER — HOSPITAL ENCOUNTER (OUTPATIENT)
Dept: LAB | Age: 70
Discharge: HOME | End: 2020-05-19
Payer: MEDICARE

## 2020-01-01 ENCOUNTER — DOCUMENTATION ONLY (OUTPATIENT)
Dept: CARDIOTHORACIC SURGERY | Age: 70
End: 2020-01-01

## 2020-01-01 ENCOUNTER — ANESTHESIA EVENT (OUTPATIENT)
Dept: MEDSURG UNIT | Age: 70
DRG: 177 | End: 2020-01-01
Payer: MEDICARE

## 2020-01-01 ENCOUNTER — HOSPITAL ENCOUNTER (OUTPATIENT)
Dept: ULTRASOUND IMAGING | Age: 70
Discharge: HOME | DRG: 233 | End: 2020-03-13
Attending: NURSE PRACTITIONER
Payer: MEDICARE

## 2020-01-01 ENCOUNTER — HOME HEALTH ADMISSION (OUTPATIENT)
Dept: HOME HEALTH SERVICES | Facility: HOME HEALTH | Age: 70
End: 2020-01-01
Payer: MEDICARE

## 2020-01-01 ENCOUNTER — HOSPITAL ENCOUNTER (OUTPATIENT)
Dept: CARDIAC REHAB | Age: 70
Discharge: HOME | End: 2020-07-23
Payer: MEDICARE

## 2020-01-01 ENCOUNTER — HOSPITAL ENCOUNTER (EMERGENCY)
Age: 70
Discharge: HOME | End: 2020-03-17
Attending: EMERGENCY MEDICINE
Payer: MEDICARE

## 2020-01-01 ENCOUNTER — ANESTHESIA EVENT (OUTPATIENT)
Dept: CARDIOTHORACIC SURGERY | Age: 70
DRG: 233 | End: 2020-01-01
Payer: MEDICARE

## 2020-01-01 ENCOUNTER — HOSPITAL ENCOUNTER (OUTPATIENT)
Dept: CARDIAC REHAB | Age: 70
Discharge: HOME | End: 2020-07-14
Payer: MEDICARE

## 2020-01-01 ENCOUNTER — HOSPITAL ENCOUNTER (OUTPATIENT)
Dept: GENERAL RADIOLOGY | Age: 70
Discharge: HOME | End: 2020-03-20
Payer: MEDICARE

## 2020-01-01 ENCOUNTER — APPOINTMENT (OUTPATIENT)
Dept: GENERAL RADIOLOGY | Age: 70
DRG: 177 | End: 2020-01-01
Attending: HOSPITALIST
Payer: MEDICARE

## 2020-01-01 ENCOUNTER — APPOINTMENT (OUTPATIENT)
Dept: GENERAL RADIOLOGY | Age: 70
DRG: 177 | End: 2020-01-01
Attending: INTERNAL MEDICINE
Payer: MEDICARE

## 2020-01-01 ENCOUNTER — HOSPITAL ENCOUNTER (OUTPATIENT)
Dept: CARDIAC REHAB | Age: 70
Discharge: HOME | End: 2020-07-02
Payer: MEDICARE

## 2020-01-01 ENCOUNTER — HOSPITAL ENCOUNTER (OUTPATIENT)
Dept: CARDIAC REHAB | Age: 70
Discharge: HOME | End: 2020-06-04
Payer: MEDICARE

## 2020-01-01 ENCOUNTER — HOSPITAL ENCOUNTER (OUTPATIENT)
Dept: CARDIAC REHAB | Age: 70
Discharge: HOME | End: 2020-06-25
Payer: MEDICARE

## 2020-01-01 ENCOUNTER — APPOINTMENT (OUTPATIENT)
Dept: ULTRASOUND IMAGING | Age: 70
DRG: 177 | End: 2020-01-01
Attending: EMERGENCY MEDICINE
Payer: MEDICARE

## 2020-01-01 ENCOUNTER — APPOINTMENT (OUTPATIENT)
Dept: GENERAL RADIOLOGY | Age: 70
DRG: 233 | End: 2020-01-01
Attending: EMERGENCY MEDICINE
Payer: MEDICARE

## 2020-01-01 ENCOUNTER — APPOINTMENT (OUTPATIENT)
Dept: GENERAL RADIOLOGY | Age: 70
End: 2020-01-01
Attending: EMERGENCY MEDICINE
Payer: MEDICARE

## 2020-01-01 ENCOUNTER — HOSPITAL ENCOUNTER (OUTPATIENT)
Dept: CARDIAC REHAB | Age: 70
Discharge: HOME | End: 2020-07-09
Payer: MEDICARE

## 2020-01-01 ENCOUNTER — VIRTUAL VISIT (OUTPATIENT)
Dept: CARDIOTHORACIC SURGERY | Age: 70
End: 2020-01-01

## 2020-01-01 ENCOUNTER — APPOINTMENT (OUTPATIENT)
Dept: GENERAL RADIOLOGY | Age: 70
DRG: 177 | End: 2020-01-01
Attending: EMERGENCY MEDICINE
Payer: MEDICARE

## 2020-01-01 ENCOUNTER — OFFICE VISIT (OUTPATIENT)
Dept: CARDIOTHORACIC SURGERY | Age: 70
End: 2020-01-01

## 2020-01-01 ENCOUNTER — DOCUMENTATION ONLY (OUTPATIENT)
Dept: FAMILY MEDICINE CLINIC | Age: 70
End: 2020-01-01

## 2020-01-01 ENCOUNTER — APPOINTMENT (OUTPATIENT)
Dept: NON INVASIVE DIAGNOSTICS | Age: 70
DRG: 233 | End: 2020-01-01
Attending: INTERNAL MEDICINE
Payer: MEDICARE

## 2020-01-01 ENCOUNTER — APPOINTMENT (OUTPATIENT)
Dept: GENERAL RADIOLOGY | Age: 70
DRG: 233 | End: 2020-01-01
Attending: SPECIALIST
Payer: MEDICARE

## 2020-01-01 ENCOUNTER — HOSPITAL ENCOUNTER (OUTPATIENT)
Dept: LAB | Age: 70
Discharge: HOME | End: 2020-03-20
Payer: MEDICARE

## 2020-01-01 ENCOUNTER — OFFICE VISIT (OUTPATIENT)
Dept: ORTHOPEDIC SURGERY | Age: 70
End: 2020-01-01

## 2020-01-01 ENCOUNTER — ANESTHESIA (OUTPATIENT)
Dept: CARDIOTHORACIC SURGERY | Age: 70
DRG: 233 | End: 2020-01-01
Payer: MEDICARE

## 2020-01-01 ENCOUNTER — APPOINTMENT (OUTPATIENT)
Dept: CARDIAC REHAB | Age: 70
End: 2020-01-01

## 2020-01-01 ENCOUNTER — HOSPITAL ENCOUNTER (OUTPATIENT)
Dept: LAB | Age: 70
Discharge: HOME | End: 2020-05-12
Payer: MEDICARE

## 2020-01-01 ENCOUNTER — HOSPITAL ENCOUNTER (OUTPATIENT)
Dept: CARDIAC REHAB | Age: 70
Discharge: HOME | End: 2020-06-18
Payer: MEDICARE

## 2020-01-01 VITALS
WEIGHT: 172 LBS | SYSTOLIC BLOOD PRESSURE: 120 MMHG | HEIGHT: 67 IN | BODY MASS INDEX: 27 KG/M2 | HEART RATE: 83 BPM | OXYGEN SATURATION: 96 % | DIASTOLIC BLOOD PRESSURE: 76 MMHG

## 2020-01-01 VITALS
OXYGEN SATURATION: 97 % | SYSTOLIC BLOOD PRESSURE: 110 MMHG | TEMPERATURE: 96.9 F | RESPIRATION RATE: 17 BRPM | DIASTOLIC BLOOD PRESSURE: 80 MMHG | HEART RATE: 89 BPM

## 2020-01-01 VITALS
HEART RATE: 93 BPM | SYSTOLIC BLOOD PRESSURE: 120 MMHG | OXYGEN SATURATION: 97 % | TEMPERATURE: 98.4 F | DIASTOLIC BLOOD PRESSURE: 90 MMHG

## 2020-01-01 VITALS
BODY MASS INDEX: 28.9 KG/M2 | HEART RATE: 87 BPM | OXYGEN SATURATION: 100 % | HEIGHT: 67 IN | TEMPERATURE: 98 F | RESPIRATION RATE: 16 BRPM | DIASTOLIC BLOOD PRESSURE: 64 MMHG | SYSTOLIC BLOOD PRESSURE: 122 MMHG | WEIGHT: 184.1 LBS

## 2020-01-01 VITALS
TEMPERATURE: 96.5 F | WEIGHT: 173 LBS | SYSTOLIC BLOOD PRESSURE: 145 MMHG | DIASTOLIC BLOOD PRESSURE: 84 MMHG | HEART RATE: 80 BPM | OXYGEN SATURATION: 96 % | BODY MASS INDEX: 27.15 KG/M2 | HEIGHT: 67 IN

## 2020-01-01 VITALS
OXYGEN SATURATION: 98 % | SYSTOLIC BLOOD PRESSURE: 128 MMHG | TEMPERATURE: 97.2 F | HEART RATE: 98 BPM | DIASTOLIC BLOOD PRESSURE: 76 MMHG

## 2020-01-01 VITALS
TEMPERATURE: 97.4 F | SYSTOLIC BLOOD PRESSURE: 120 MMHG | RESPIRATION RATE: 20 BRPM | DIASTOLIC BLOOD PRESSURE: 80 MMHG | OXYGEN SATURATION: 99 % | HEART RATE: 100 BPM

## 2020-01-01 VITALS
TEMPERATURE: 97.6 F | HEART RATE: 102 BPM | OXYGEN SATURATION: 98 % | SYSTOLIC BLOOD PRESSURE: 130 MMHG | DIASTOLIC BLOOD PRESSURE: 80 MMHG

## 2020-01-01 VITALS — WEIGHT: 174 LBS | BODY MASS INDEX: 27.25 KG/M2

## 2020-01-01 VITALS
OXYGEN SATURATION: 98 % | DIASTOLIC BLOOD PRESSURE: 76 MMHG | TEMPERATURE: 98 F | HEART RATE: 80 BPM | SYSTOLIC BLOOD PRESSURE: 128 MMHG | RESPIRATION RATE: 18 BRPM

## 2020-01-01 VITALS
DIASTOLIC BLOOD PRESSURE: 86 MMHG | SYSTOLIC BLOOD PRESSURE: 141 MMHG | TEMPERATURE: 98.6 F | WEIGHT: 168 LBS | OXYGEN SATURATION: 98 % | RESPIRATION RATE: 16 BRPM | HEART RATE: 67 BPM | BODY MASS INDEX: 26.31 KG/M2

## 2020-01-01 VITALS — BODY MASS INDEX: 26.36 KG/M2 | WEIGHT: 168.3 LBS

## 2020-01-01 VITALS
WEIGHT: 174 LBS | SYSTOLIC BLOOD PRESSURE: 101 MMHG | HEART RATE: 96 BPM | TEMPERATURE: 97.2 F | HEIGHT: 67 IN | BODY MASS INDEX: 27.31 KG/M2 | DIASTOLIC BLOOD PRESSURE: 69 MMHG | RESPIRATION RATE: 20 BRPM

## 2020-01-01 VITALS — TEMPERATURE: 97.9 F | RESPIRATION RATE: 18 BRPM | HEART RATE: 100 BPM | OXYGEN SATURATION: 96 %

## 2020-01-01 VITALS — WEIGHT: 169 LBS | BODY MASS INDEX: 26.47 KG/M2

## 2020-01-01 VITALS
OXYGEN SATURATION: 94 % | DIASTOLIC BLOOD PRESSURE: 68 MMHG | RESPIRATION RATE: 20 BRPM | TEMPERATURE: 99 F | HEART RATE: 90 BPM | SYSTOLIC BLOOD PRESSURE: 132 MMHG

## 2020-01-01 VITALS — BODY MASS INDEX: 27.33 KG/M2 | WEIGHT: 174.5 LBS

## 2020-01-01 VITALS
TEMPERATURE: 95.9 F | DIASTOLIC BLOOD PRESSURE: 61 MMHG | RESPIRATION RATE: 24 BRPM | HEART RATE: 100 BPM | SYSTOLIC BLOOD PRESSURE: 119 MMHG

## 2020-01-01 VITALS
OXYGEN SATURATION: 99 % | SYSTOLIC BLOOD PRESSURE: 130 MMHG | DIASTOLIC BLOOD PRESSURE: 90 MMHG | RESPIRATION RATE: 28 BRPM | HEART RATE: 108 BPM | TEMPERATURE: 98.4 F

## 2020-01-01 VITALS
TEMPERATURE: 97.9 F | DIASTOLIC BLOOD PRESSURE: 90 MMHG | HEART RATE: 92 BPM | OXYGEN SATURATION: 95 % | SYSTOLIC BLOOD PRESSURE: 135 MMHG

## 2020-01-01 VITALS
WEIGHT: 185 LBS | TEMPERATURE: 99.2 F | RESPIRATION RATE: 35 BRPM | HEIGHT: 67 IN | DIASTOLIC BLOOD PRESSURE: 78 MMHG | BODY MASS INDEX: 29.03 KG/M2 | SYSTOLIC BLOOD PRESSURE: 154 MMHG | OXYGEN SATURATION: 95 % | HEART RATE: 112 BPM

## 2020-01-01 VITALS — BODY MASS INDEX: 27.1 KG/M2 | WEIGHT: 173 LBS

## 2020-01-01 VITALS
DIASTOLIC BLOOD PRESSURE: 90 MMHG | TEMPERATURE: 98.4 F | HEART RATE: 92 BPM | OXYGEN SATURATION: 95 % | SYSTOLIC BLOOD PRESSURE: 140 MMHG

## 2020-01-01 VITALS
SYSTOLIC BLOOD PRESSURE: 132 MMHG | RESPIRATION RATE: 18 BRPM | BODY MASS INDEX: 25.99 KG/M2 | TEMPERATURE: 97.6 F | OXYGEN SATURATION: 96 % | HEIGHT: 67 IN | DIASTOLIC BLOOD PRESSURE: 102 MMHG | HEART RATE: 70 BPM | WEIGHT: 165.6 LBS

## 2020-01-01 VITALS
DIASTOLIC BLOOD PRESSURE: 66 MMHG | BODY MASS INDEX: 27.62 KG/M2 | OXYGEN SATURATION: 98 % | SYSTOLIC BLOOD PRESSURE: 100 MMHG | WEIGHT: 176 LBS | HEIGHT: 67 IN | HEART RATE: 97 BPM

## 2020-01-01 VITALS — WEIGHT: 172 LBS | BODY MASS INDEX: 26.94 KG/M2

## 2020-01-01 VITALS
OXYGEN SATURATION: 99 % | HEART RATE: 100 BPM | DIASTOLIC BLOOD PRESSURE: 85 MMHG | RESPIRATION RATE: 20 BRPM | TEMPERATURE: 97.2 F | SYSTOLIC BLOOD PRESSURE: 146 MMHG | BODY MASS INDEX: 29.19 KG/M2 | WEIGHT: 186 LBS | HEIGHT: 67 IN

## 2020-01-01 VITALS — WEIGHT: 175 LBS | BODY MASS INDEX: 27.41 KG/M2

## 2020-01-01 VITALS — BODY MASS INDEX: 26.94 KG/M2 | WEIGHT: 172 LBS

## 2020-01-01 VITALS — BODY MASS INDEX: 26.63 KG/M2 | WEIGHT: 170 LBS

## 2020-01-01 VITALS — BODY MASS INDEX: 26.31 KG/M2 | WEIGHT: 168 LBS

## 2020-01-01 DIAGNOSIS — R42 DIZZINESS: ICD-10-CM

## 2020-01-01 DIAGNOSIS — E11.9 TYPE 2 DIABETES MELLITUS WITHOUT COMPLICATION, WITHOUT LONG-TERM CURRENT USE OF INSULIN (HCC): ICD-10-CM

## 2020-01-01 DIAGNOSIS — I10 ESSENTIAL HYPERTENSION: ICD-10-CM

## 2020-01-01 DIAGNOSIS — E78.2 MIXED HYPERLIPIDEMIA: ICD-10-CM

## 2020-01-01 DIAGNOSIS — Z95.1 S/P CABG X 3: Chronic | ICD-10-CM

## 2020-01-01 DIAGNOSIS — Z71.89 GOALS OF CARE, COUNSELING/DISCUSSION: ICD-10-CM

## 2020-01-01 DIAGNOSIS — I21.4 NSTEMI (NON-ST ELEVATED MYOCARDIAL INFARCTION) (HCC): ICD-10-CM

## 2020-01-01 DIAGNOSIS — Z00.00 MEDICARE ANNUAL WELLNESS VISIT, SUBSEQUENT: Primary | ICD-10-CM

## 2020-01-01 DIAGNOSIS — N18.30 STAGE 3 CHRONIC KIDNEY DISEASE (HCC): ICD-10-CM

## 2020-01-01 DIAGNOSIS — R94.4 DECREASED GFR: ICD-10-CM

## 2020-01-01 DIAGNOSIS — I48.92 ATRIAL FLUTTER, UNSPECIFIED TYPE (HCC): Primary | ICD-10-CM

## 2020-01-01 DIAGNOSIS — G89.18 POSTOPERATIVE PAIN: Primary | ICD-10-CM

## 2020-01-01 DIAGNOSIS — M54.6 THORACIC SPINE PAIN: ICD-10-CM

## 2020-01-01 DIAGNOSIS — Z95.1 S/P CABG X 3: ICD-10-CM

## 2020-01-01 DIAGNOSIS — R79.89 ABNORMAL TSH: Primary | ICD-10-CM

## 2020-01-01 DIAGNOSIS — R09.82 POST-NASAL DRAINAGE: ICD-10-CM

## 2020-01-01 DIAGNOSIS — M25.512 CHRONIC LEFT SHOULDER PAIN: ICD-10-CM

## 2020-01-01 DIAGNOSIS — R20.0 NUMBNESS AND TINGLING IN LEFT ARM: ICD-10-CM

## 2020-01-01 DIAGNOSIS — Z11.59 ENCOUNTER FOR HEPATITIS C SCREENING TEST FOR LOW RISK PATIENT: ICD-10-CM

## 2020-01-01 DIAGNOSIS — G89.29 CHRONIC LEFT SHOULDER PAIN: ICD-10-CM

## 2020-01-01 DIAGNOSIS — E11.21 TYPE 2 DIABETES WITH NEPHROPATHY (HCC): ICD-10-CM

## 2020-01-01 DIAGNOSIS — I24.9 ACS (ACUTE CORONARY SYNDROME) (HCC): Primary | ICD-10-CM

## 2020-01-01 DIAGNOSIS — Z71.89 ADVANCE CARE PLANNING: ICD-10-CM

## 2020-01-01 DIAGNOSIS — Z95.1 S/P CABG X 3: Primary | ICD-10-CM

## 2020-01-01 DIAGNOSIS — Z98.890 S/P CARDIAC CATHETERIZATION: ICD-10-CM

## 2020-01-01 DIAGNOSIS — R25.1 TREMOR: ICD-10-CM

## 2020-01-01 DIAGNOSIS — I25.10 ATHEROSCLEROSIS OF NATIVE CORONARY ARTERY OF NATIVE HEART WITHOUT ANGINA PECTORIS: ICD-10-CM

## 2020-01-01 DIAGNOSIS — J18.9 PNEUMONIA OF LEFT LOWER LOBE DUE TO INFECTIOUS ORGANISM: Primary | ICD-10-CM

## 2020-01-01 DIAGNOSIS — N17.9 ACUTE RENAL FAILURE SUPERIMPOSED ON STAGE 2 CHRONIC KIDNEY DISEASE, UNSPECIFIED ACUTE RENAL FAILURE TYPE (HCC): ICD-10-CM

## 2020-01-01 DIAGNOSIS — Z12.11 COLON CANCER SCREENING: ICD-10-CM

## 2020-01-01 DIAGNOSIS — M54.6 ACUTE MIDLINE THORACIC BACK PAIN: ICD-10-CM

## 2020-01-01 DIAGNOSIS — M79.602 LEFT ARM PAIN: ICD-10-CM

## 2020-01-01 DIAGNOSIS — R20.2 NUMBNESS AND TINGLING IN LEFT ARM: ICD-10-CM

## 2020-01-01 DIAGNOSIS — M54.12 CERVICAL RADICULOPATHY: Primary | ICD-10-CM

## 2020-01-01 DIAGNOSIS — N18.2 ACUTE RENAL FAILURE SUPERIMPOSED ON STAGE 2 CHRONIC KIDNEY DISEASE, UNSPECIFIED ACUTE RENAL FAILURE TYPE (HCC): ICD-10-CM

## 2020-01-01 DIAGNOSIS — M10.9 GOUT, UNSPECIFIED CAUSE, UNSPECIFIED CHRONICITY, UNSPECIFIED SITE: ICD-10-CM

## 2020-01-01 DIAGNOSIS — D72.829 LEUKOCYTOSIS, UNSPECIFIED TYPE: ICD-10-CM

## 2020-01-01 DIAGNOSIS — I25.10 ATHEROSCLEROSIS OF NATIVE CORONARY ARTERY OF NATIVE HEART WITHOUT ANGINA PECTORIS: Primary | ICD-10-CM

## 2020-01-01 DIAGNOSIS — J18.9 PNEUMONIA DUE TO INFECTIOUS ORGANISM, UNSPECIFIED LATERALITY, UNSPECIFIED PART OF LUNG: ICD-10-CM

## 2020-01-01 DIAGNOSIS — Z20.822 SUSPECTED COVID-19 VIRUS INFECTION: ICD-10-CM

## 2020-01-01 DIAGNOSIS — R79.89 ABNORMAL TSH: ICD-10-CM

## 2020-01-01 LAB
25(OH)D3 SERPL-MCNC: 10.8 NG/ML (ref 30–100)
ABO + RH BLD: NORMAL
ACT BLD: 114 SECS (ref 79–138)
ACT BLD: 114 SECS (ref 79–138)
ACT BLD: 136 SECS (ref 79–138)
ACT BLD: 142 SECS (ref 79–138)
ACT BLD: 373 SECS (ref 79–138)
ACT BLD: 433 SECS (ref 79–138)
ACT BLD: 466 SECS (ref 79–138)
ACT BLD: 499 SECS (ref 79–138)
ACT BLD: 516 SECS (ref 79–138)
ACT BLD: 527 SECS (ref 79–138)
ACT BLD: 532 SECS (ref 79–138)
ACT BLD: 538 SECS (ref 79–138)
ACT BLD: 560 SECS (ref 79–138)
ACT BLD: 571 SECS (ref 79–138)
ACT BLD: 731 SECS (ref 79–138)
ADMINISTERED INITIALS, ADMINIT: NORMAL
ALBUMIN SERPL-MCNC: 1.6 G/DL (ref 3.4–5)
ALBUMIN SERPL-MCNC: 1.7 G/DL (ref 3.4–5)
ALBUMIN SERPL-MCNC: 1.7 G/DL (ref 3.4–5)
ALBUMIN SERPL-MCNC: 1.8 G/DL (ref 3.4–5)
ALBUMIN SERPL-MCNC: 1.9 G/DL (ref 3.4–5)
ALBUMIN SERPL-MCNC: 2.1 G/DL (ref 3.4–5)
ALBUMIN SERPL-MCNC: 2.2 G/DL (ref 3.4–5)
ALBUMIN SERPL-MCNC: 2.3 G/DL (ref 3.4–5)
ALBUMIN SERPL-MCNC: 2.5 G/DL (ref 3.4–5)
ALBUMIN SERPL-MCNC: 2.6 G/DL (ref 3.4–5)
ALBUMIN SERPL-MCNC: 2.9 G/DL (ref 3.4–5)
ALBUMIN SERPL-MCNC: 2.9 G/DL (ref 3.4–5)
ALBUMIN SERPL-MCNC: 3 G/DL (ref 3.4–5)
ALBUMIN SERPL-MCNC: 3 G/DL (ref 3.4–5)
ALBUMIN SERPL-MCNC: 3.2 G/DL (ref 3.4–5)
ALBUMIN SERPL-MCNC: 3.3 G/DL (ref 3.4–5)
ALBUMIN SERPL-MCNC: 3.5 G/DL (ref 3.4–5)
ALBUMIN/GLOB SERPL: 0.4 {RATIO} (ref 0.8–1.7)
ALBUMIN/GLOB SERPL: 0.5 {RATIO} (ref 0.8–1.7)
ALBUMIN/GLOB SERPL: 0.5 {RATIO} (ref 0.8–1.7)
ALBUMIN/GLOB SERPL: 0.6 {RATIO} (ref 0.8–1.7)
ALBUMIN/GLOB SERPL: 0.7 {RATIO} (ref 0.8–1.7)
ALBUMIN/GLOB SERPL: 0.8 {RATIO} (ref 0.8–1.7)
ALBUMIN/GLOB SERPL: 0.9 {RATIO} (ref 0.8–1.7)
ALBUMIN/GLOB SERPL: 1 {RATIO} (ref 0.8–1.7)
ALBUMIN/GLOB SERPL: 1 {RATIO} (ref 0.8–1.7)
ALP SERPL-CCNC: 50 U/L (ref 45–117)
ALP SERPL-CCNC: 51 U/L (ref 45–117)
ALP SERPL-CCNC: 51 U/L (ref 45–117)
ALP SERPL-CCNC: 52 U/L (ref 45–117)
ALP SERPL-CCNC: 55 U/L (ref 45–117)
ALP SERPL-CCNC: 58 U/L (ref 45–117)
ALP SERPL-CCNC: 64 U/L (ref 45–117)
ALP SERPL-CCNC: 66 U/L (ref 45–117)
ALP SERPL-CCNC: 73 U/L (ref 45–117)
ALP SERPL-CCNC: 73 U/L (ref 45–117)
ALP SERPL-CCNC: 77 U/L (ref 45–117)
ALP SERPL-CCNC: 78 U/L (ref 45–117)
ALP SERPL-CCNC: 78 U/L (ref 45–117)
ALP SERPL-CCNC: 81 U/L (ref 45–117)
ALP SERPL-CCNC: 82 U/L (ref 45–117)
ALP SERPL-CCNC: 83 U/L (ref 45–117)
ALP SERPL-CCNC: 83 U/L (ref 45–117)
ALP SERPL-CCNC: 87 U/L (ref 45–117)
ALP SERPL-CCNC: 90 U/L (ref 45–117)
ALT SERPL-CCNC: 12 U/L (ref 16–61)
ALT SERPL-CCNC: 18 U/L (ref 16–61)
ALT SERPL-CCNC: 19 U/L (ref 16–61)
ALT SERPL-CCNC: 19 U/L (ref 16–61)
ALT SERPL-CCNC: 20 U/L (ref 16–61)
ALT SERPL-CCNC: 22 U/L (ref 16–61)
ALT SERPL-CCNC: 23 U/L (ref 16–61)
ALT SERPL-CCNC: 25 U/L (ref 16–61)
ALT SERPL-CCNC: 28 U/L (ref 16–61)
ALT SERPL-CCNC: 29 U/L (ref 16–61)
ALT SERPL-CCNC: 32 U/L (ref 16–61)
ALT SERPL-CCNC: 37 U/L (ref 16–61)
ALT SERPL-CCNC: 39 U/L (ref 16–61)
ALT SERPL-CCNC: 40 U/L (ref 16–61)
AMMONIA PLAS-SCNC: 22 UMOL/L (ref 11–32)
ANION GAP SERPL CALC-SCNC: 10 MMOL/L (ref 3–18)
ANION GAP SERPL CALC-SCNC: 11 MMOL/L (ref 3–18)
ANION GAP SERPL CALC-SCNC: 11 MMOL/L (ref 3–18)
ANION GAP SERPL CALC-SCNC: 4 MMOL/L (ref 3–18)
ANION GAP SERPL CALC-SCNC: 5 MMOL/L (ref 3–18)
ANION GAP SERPL CALC-SCNC: 6 MMOL/L (ref 3–18)
ANION GAP SERPL CALC-SCNC: 7 MMOL/L (ref 3–18)
ANION GAP SERPL CALC-SCNC: 8 MMOL/L (ref 3–18)
ANION GAP SERPL CALC-SCNC: 9 MMOL/L (ref 3–18)
APPEARANCE UR: CLEAR
APTT PPP: 132.3 SEC (ref 23–36.4)
APTT PPP: 178.6 SEC (ref 23–36.4)
APTT PPP: 24.1 SEC (ref 23–36.4)
APTT PPP: 25.2 SEC (ref 23–36.4)
APTT PPP: 25.6 SEC (ref 23–36.4)
APTT PPP: 26.8 SEC (ref 23–36.4)
APTT PPP: 26.9 SEC (ref 23–36.4)
APTT PPP: 27.3 SEC (ref 23–36.4)
APTT PPP: 27.3 SEC (ref 23–36.4)
APTT PPP: 27.5 SEC (ref 23–36.4)
APTT PPP: 29.5 SEC (ref 23–36.4)
APTT PPP: 35.1 SEC (ref 23–36.4)
APTT PPP: 35.8 SEC (ref 23–36.4)
APTT PPP: 39.5 SEC (ref 23–36.4)
APTT PPP: 40.3 SEC (ref 23–36.4)
APTT PPP: 43.4 SEC (ref 23–36.4)
APTT PPP: 58.7 SEC (ref 23–36.4)
APTT PPP: 81.8 SEC (ref 23–36.4)
APTT PPP: 91.1 SEC (ref 23–36.4)
APTT PPP: 96.1 SEC (ref 23–36.4)
APTT PPP: >180 SEC (ref 23–36.4)
ARTERIAL PATENCY WRIST A: ABNORMAL
ARTERIAL PATENCY WRIST A: NO
ARTERIAL PATENCY WRIST A: NORMAL
ARTERIAL PATENCY WRIST A: YES
AST SERPL-CCNC: 15 U/L (ref 10–38)
AST SERPL-CCNC: 16 U/L (ref 10–38)
AST SERPL-CCNC: 16 U/L (ref 10–38)
AST SERPL-CCNC: 18 U/L (ref 10–38)
AST SERPL-CCNC: 18 U/L (ref 10–38)
AST SERPL-CCNC: 22 U/L (ref 10–38)
AST SERPL-CCNC: 23 U/L (ref 10–38)
AST SERPL-CCNC: 23 U/L (ref 10–38)
AST SERPL-CCNC: 29 U/L (ref 10–38)
AST SERPL-CCNC: 32 U/L (ref 10–38)
AST SERPL-CCNC: 33 U/L (ref 10–38)
AST SERPL-CCNC: 33 U/L (ref 10–38)
AST SERPL-CCNC: 34 U/L (ref 10–38)
AST SERPL-CCNC: 34 U/L (ref 10–38)
AST SERPL-CCNC: 44 U/L (ref 10–38)
AST SERPL-CCNC: 45 U/L (ref 10–38)
AST SERPL-CCNC: 47 U/L (ref 10–38)
AST SERPL-CCNC: 52 U/L (ref 10–38)
AST SERPL-CCNC: 72 U/L (ref 10–38)
ATRIAL RATE: 102 BPM
ATRIAL RATE: 106 BPM
ATRIAL RATE: 288 BPM
ATRIAL RATE: 60 BPM
ATRIAL RATE: 66 BPM
ATRIAL RATE: 70 BPM
ATRIAL RATE: 83 BPM
ATRIAL RATE: 91 BPM
ATRIAL RATE: 98 BPM
BACTERIA SPEC CULT: NORMAL
BACTERIA URNS QL MICRO: ABNORMAL /HPF
BACTERIA URNS QL MICRO: NEGATIVE /HPF
BACTERIA URNS QL MICRO: NEGATIVE /HPF
BASE DEFICIT BLD-SCNC: 1 MMOL/L
BASE DEFICIT BLD-SCNC: 2 MMOL/L
BASE DEFICIT BLD-SCNC: 3 MMOL/L
BASE DEFICIT BLD-SCNC: 5 MMOL/L
BASE DEFICIT BLD-SCNC: 6 MMOL/L
BASE DEFICIT BLD-SCNC: 6 MMOL/L
BASE DEFICIT BLD-SCNC: 7 MMOL/L
BASE DEFICIT BLDV-SCNC: 3 MMOL/L
BASE DEFICIT BLDV-SCNC: 5 MMOL/L
BASE EXCESS BLD CALC-SCNC: 0 MMOL/L
BASE EXCESS BLD CALC-SCNC: 1 MMOL/L
BASE EXCESS BLD CALC-SCNC: 3 MMOL/L
BASE EXCESS BLDV CALC-SCNC: 1 MMOL/L
BASOPHILS # BLD: 0 K/UL (ref 0–0.06)
BASOPHILS # BLD: 0 K/UL (ref 0–0.1)
BASOPHILS # BLD: 0.1 K/UL (ref 0–0.1)
BASOPHILS NFR BLD: 0 % (ref 0–2)
BASOPHILS NFR BLD: 0 % (ref 0–3)
BASOPHILS NFR BLD: 1 % (ref 0–2)
BASOPHILS NFR BLD: 1 % (ref 0–2)
BDY SITE: ABNORMAL
BDY SITE: NORMAL
BILIRUB DIRECT SERPL-MCNC: 0.3 MG/DL (ref 0–0.2)
BILIRUB DIRECT SERPL-MCNC: <0.1 MG/DL (ref 0–0.2)
BILIRUB SERPL-MCNC: 0.4 MG/DL (ref 0.2–1)
BILIRUB SERPL-MCNC: 0.5 MG/DL (ref 0.2–1)
BILIRUB SERPL-MCNC: 0.6 MG/DL (ref 0.2–1)
BILIRUB SERPL-MCNC: 0.7 MG/DL (ref 0.2–1)
BILIRUB SERPL-MCNC: 0.8 MG/DL (ref 0.2–1)
BILIRUB SERPL-MCNC: 0.9 MG/DL (ref 0.2–1)
BILIRUB SERPL-MCNC: 1 MG/DL (ref 0.2–1)
BILIRUB UR QL: NEGATIVE
BLD PROD TYP BPU: NORMAL
BLOOD GROUP ANTIBODIES SERPL: NORMAL
BLOOD GROUP ANTIBODIES SERPL: NORMAL
BNP SERPL-MCNC: 7386 PG/ML (ref 0–900)
BODY TEMPERATURE: 34.4
BODY TEMPERATURE: 34.5
BODY TEMPERATURE: 34.5
BODY TEMPERATURE: 35.4
BODY TEMPERATURE: 35.8
BODY TEMPERATURE: 36
BODY TEMPERATURE: 36
BODY TEMPERATURE: 36.1
BODY TEMPERATURE: 36.1
BODY TEMPERATURE: 36.2
BODY TEMPERATURE: 36.3
BODY TEMPERATURE: 36.4
BODY TEMPERATURE: 36.5
BODY TEMPERATURE: 36.5
BODY TEMPERATURE: 37
BODY TEMPERATURE: 37.3
BODY TEMPERATURE: 37.4
BPU ID: NORMAL
BUN SERPL-MCNC: 17 MG/DL (ref 7–18)
BUN SERPL-MCNC: 21 MG/DL (ref 7–18)
BUN SERPL-MCNC: 22 MG/DL (ref 7–18)
BUN SERPL-MCNC: 26 MG/DL (ref 7–18)
BUN SERPL-MCNC: 27 MG/DL (ref 7–18)
BUN SERPL-MCNC: 27 MG/DL (ref 7–18)
BUN SERPL-MCNC: 29 MG/DL (ref 7–18)
BUN SERPL-MCNC: 32 MG/DL (ref 7–18)
BUN SERPL-MCNC: 34 MG/DL (ref 7–18)
BUN SERPL-MCNC: 38 MG/DL (ref 7–18)
BUN SERPL-MCNC: 39 MG/DL (ref 7–18)
BUN SERPL-MCNC: 39 MG/DL (ref 7–18)
BUN SERPL-MCNC: 40 MG/DL (ref 7–18)
BUN SERPL-MCNC: 41 MG/DL (ref 7–18)
BUN SERPL-MCNC: 43 MG/DL (ref 7–18)
BUN SERPL-MCNC: 46 MG/DL (ref 7–18)
BUN SERPL-MCNC: 46 MG/DL (ref 7–18)
BUN SERPL-MCNC: 49 MG/DL (ref 7–18)
BUN SERPL-MCNC: 52 MG/DL (ref 7–18)
BUN SERPL-MCNC: 53 MG/DL (ref 7–18)
BUN SERPL-MCNC: 54 MG/DL (ref 7–18)
BUN SERPL-MCNC: 55 MG/DL (ref 7–18)
BUN SERPL-MCNC: 64 MG/DL (ref 7–18)
BUN SERPL-MCNC: 76 MG/DL (ref 7–18)
BUN SERPL-MCNC: 89 MG/DL (ref 7–18)
BUN/CREAT SERPL: 12 (ref 12–20)
BUN/CREAT SERPL: 14 (ref 12–20)
BUN/CREAT SERPL: 15 (ref 12–20)
BUN/CREAT SERPL: 15 (ref 12–20)
BUN/CREAT SERPL: 17 (ref 12–20)
BUN/CREAT SERPL: 18 (ref 12–20)
BUN/CREAT SERPL: 20 (ref 12–20)
BUN/CREAT SERPL: 21 (ref 12–20)
BUN/CREAT SERPL: 21 (ref 12–20)
BUN/CREAT SERPL: 22 (ref 12–20)
BUN/CREAT SERPL: 22 (ref 12–20)
BUN/CREAT SERPL: 23 (ref 12–20)
BUN/CREAT SERPL: 23 (ref 12–20)
BUN/CREAT SERPL: 24 (ref 12–20)
BUN/CREAT SERPL: 24 (ref 12–20)
BUN/CREAT SERPL: 25 (ref 12–20)
BUN/CREAT SERPL: 25 (ref 12–20)
BUN/CREAT SERPL: 26 (ref 12–20)
BUN/CREAT SERPL: 26 (ref 12–20)
BUN/CREAT SERPL: 27 (ref 12–20)
BUN/CREAT SERPL: 30 (ref 12–20)
BUN/CREAT SERPL: 30 (ref 12–20)
BUN/CREAT SERPL: 32 (ref 12–20)
BUN/CREAT SERPL: 34 (ref 12–20)
BUN/CREAT SERPL: 36 (ref 12–20)
BUN/CREAT SERPL: 37 (ref 12–20)
BUN/CREAT SERPL: 43 (ref 12–20)
BUN/CREAT SERPL: 45 (ref 12–20)
CA-I BLD-MCNC: 1 MMOL/L (ref 1.12–1.32)
CA-I BLD-MCNC: 1.03 MMOL/L (ref 1.12–1.32)
CA-I BLD-MCNC: 1.03 MMOL/L (ref 1.12–1.32)
CA-I BLD-MCNC: 1.04 MMOL/L (ref 1.12–1.32)
CA-I BLD-MCNC: 1.07 MMOL/L (ref 1.12–1.32)
CA-I BLD-MCNC: 1.14 MMOL/L (ref 1.12–1.32)
CA-I BLD-MCNC: 1.14 MMOL/L (ref 1.12–1.32)
CA-I BLD-MCNC: 1.16 MMOL/L (ref 1.12–1.32)
CA-I BLD-MCNC: 1.18 MMOL/L (ref 1.12–1.32)
CA-I BLD-MCNC: 1.2 MMOL/L (ref 1.12–1.32)
CA-I BLD-MCNC: 1.21 MMOL/L (ref 1.12–1.32)
CA-I BLD-MCNC: 1.26 MMOL/L (ref 1.12–1.32)
CA-I SERPL-SCNC: 1.18 MMOL/L (ref 1.12–1.32)
CA-I SERPL-SCNC: 1.21 MMOL/L (ref 1.12–1.32)
CALCIUM SERPL-MCNC: 7.1 MG/DL (ref 8.5–10.1)
CALCIUM SERPL-MCNC: 7.4 MG/DL (ref 8.5–10.1)
CALCIUM SERPL-MCNC: 7.4 MG/DL (ref 8.5–10.1)
CALCIUM SERPL-MCNC: 7.5 MG/DL (ref 8.5–10.1)
CALCIUM SERPL-MCNC: 7.6 MG/DL (ref 8.5–10.1)
CALCIUM SERPL-MCNC: 7.7 MG/DL (ref 8.5–10.1)
CALCIUM SERPL-MCNC: 7.8 MG/DL (ref 8.5–10.1)
CALCIUM SERPL-MCNC: 7.9 MG/DL (ref 8.5–10.1)
CALCIUM SERPL-MCNC: 8.1 MG/DL (ref 8.5–10.1)
CALCIUM SERPL-MCNC: 8.2 MG/DL (ref 8.5–10.1)
CALCIUM SERPL-MCNC: 8.3 MG/DL (ref 8.5–10.1)
CALCIUM SERPL-MCNC: 8.4 MG/DL (ref 8.5–10.1)
CALCIUM SERPL-MCNC: 8.5 MG/DL (ref 8.5–10.1)
CALCIUM SERPL-MCNC: 8.6 MG/DL (ref 8.5–10.1)
CALCIUM SERPL-MCNC: 8.7 MG/DL (ref 8.5–10.1)
CALCIUM SERPL-MCNC: 8.9 MG/DL (ref 8.5–10.1)
CALCIUM SERPL-MCNC: 8.9 MG/DL (ref 8.5–10.1)
CALCIUM SERPL-MCNC: 9.2 MG/DL (ref 8.5–10.1)
CALCULATED P AXIS, ECG09: -29 DEGREES
CALCULATED P AXIS, ECG09: 14 DEGREES
CALCULATED P AXIS, ECG09: 29 DEGREES
CALCULATED P AXIS, ECG09: 32 DEGREES
CALCULATED P AXIS, ECG09: 35 DEGREES
CALCULATED P AXIS, ECG09: 41 DEGREES
CALCULATED P AXIS, ECG09: 45 DEGREES
CALCULATED P AXIS, ECG09: 54 DEGREES
CALCULATED P AXIS, ECG09: 74 DEGREES
CALCULATED R AXIS, ECG10: 19 DEGREES
CALCULATED R AXIS, ECG10: 21 DEGREES
CALCULATED R AXIS, ECG10: 24 DEGREES
CALCULATED R AXIS, ECG10: 26 DEGREES
CALCULATED R AXIS, ECG10: 29 DEGREES
CALCULATED R AXIS, ECG10: 30 DEGREES
CALCULATED R AXIS, ECG10: 31 DEGREES
CALCULATED R AXIS, ECG10: 42 DEGREES
CALCULATED R AXIS, ECG10: 51 DEGREES
CALCULATED T AXIS, ECG11: 135 DEGREES
CALCULATED T AXIS, ECG11: 136 DEGREES
CALCULATED T AXIS, ECG11: 62 DEGREES
CALCULATED T AXIS, ECG11: 75 DEGREES
CALCULATED T AXIS, ECG11: 78 DEGREES
CALCULATED T AXIS, ECG11: 82 DEGREES
CALCULATED T AXIS, ECG11: 82 DEGREES
CALCULATED T AXIS, ECG11: 92 DEGREES
CALCULATED T AXIS, ECG11: 94 DEGREES
CALLED TO:,BCALL1: NORMAL
CALLED TO:,BCALL2: NORMAL
CHLORIDE SERPL-SCNC: 106 MMOL/L (ref 100–111)
CHLORIDE SERPL-SCNC: 107 MMOL/L (ref 100–111)
CHLORIDE SERPL-SCNC: 108 MMOL/L (ref 100–111)
CHLORIDE SERPL-SCNC: 109 MMOL/L (ref 100–111)
CHLORIDE SERPL-SCNC: 109 MMOL/L (ref 100–111)
CHLORIDE SERPL-SCNC: 110 MMOL/L (ref 100–111)
CHLORIDE SERPL-SCNC: 110 MMOL/L (ref 100–111)
CHLORIDE SERPL-SCNC: 111 MMOL/L (ref 100–111)
CHLORIDE SERPL-SCNC: 112 MMOL/L (ref 100–111)
CHLORIDE SERPL-SCNC: 112 MMOL/L (ref 100–111)
CHLORIDE SERPL-SCNC: 113 MMOL/L (ref 100–111)
CHLORIDE SERPL-SCNC: 114 MMOL/L (ref 100–111)
CHLORIDE SERPL-SCNC: 115 MMOL/L (ref 100–111)
CHLORIDE SERPL-SCNC: 116 MMOL/L (ref 100–111)
CHOLEST SERPL-MCNC: 180 MG/DL
CHOLEST SERPL-MCNC: 84 MG/DL
CHOLEST SERPL-MCNC: 96 MG/DL
CK MB CFR SERPL CALC: 2.1 % (ref 0–4)
CK MB CFR SERPL CALC: 2.6 % (ref 0–4)
CK MB CFR SERPL CALC: 4.1 % (ref 0–4)
CK MB CFR SERPL CALC: 6.7 % (ref 0–4)
CK MB CFR SERPL CALC: 6.8 % (ref 0–4)
CK MB SERPL-MCNC: 10.8 NG/ML (ref 5–25)
CK MB SERPL-MCNC: 20.7 NG/ML (ref 5–25)
CK MB SERPL-MCNC: 25.7 NG/ML (ref 5–25)
CK MB SERPL-MCNC: 38.6 NG/ML (ref 5–25)
CK MB SERPL-MCNC: 8.5 NG/ML (ref 5–25)
CK SERPL-CCNC: 383 U/L (ref 39–308)
CK SERPL-CCNC: 396 U/L (ref 39–308)
CK SERPL-CCNC: 415 U/L (ref 39–308)
CK SERPL-CCNC: 502 U/L (ref 39–308)
CK SERPL-CCNC: 564 U/L (ref 39–308)
CO2 SERPL-SCNC: 19 MMOL/L (ref 21–32)
CO2 SERPL-SCNC: 22 MMOL/L (ref 21–32)
CO2 SERPL-SCNC: 23 MMOL/L (ref 21–32)
CO2 SERPL-SCNC: 24 MMOL/L (ref 21–32)
CO2 SERPL-SCNC: 25 MMOL/L (ref 21–32)
CO2 SERPL-SCNC: 26 MMOL/L (ref 21–32)
CO2 SERPL-SCNC: 27 MMOL/L (ref 21–32)
COLOR UR: YELLOW
CREAT SERPL-MCNC: 1.2 MG/DL (ref 0.6–1.3)
CREAT SERPL-MCNC: 1.28 MG/DL (ref 0.6–1.3)
CREAT SERPL-MCNC: 1.44 MG/DL (ref 0.6–1.3)
CREAT SERPL-MCNC: 1.44 MG/DL (ref 0.6–1.3)
CREAT SERPL-MCNC: 1.46 MG/DL (ref 0.6–1.3)
CREAT SERPL-MCNC: 1.48 MG/DL (ref 0.6–1.3)
CREAT SERPL-MCNC: 1.51 MG/DL (ref 0.6–1.3)
CREAT SERPL-MCNC: 1.52 MG/DL (ref 0.6–1.3)
CREAT SERPL-MCNC: 1.52 MG/DL (ref 0.6–1.3)
CREAT SERPL-MCNC: 1.53 MG/DL (ref 0.6–1.3)
CREAT SERPL-MCNC: 1.55 MG/DL (ref 0.6–1.3)
CREAT SERPL-MCNC: 1.58 MG/DL (ref 0.6–1.3)
CREAT SERPL-MCNC: 1.59 MG/DL (ref 0.6–1.3)
CREAT SERPL-MCNC: 1.62 MG/DL (ref 0.6–1.3)
CREAT SERPL-MCNC: 1.65 MG/DL (ref 0.6–1.3)
CREAT SERPL-MCNC: 1.67 MG/DL (ref 0.6–1.3)
CREAT SERPL-MCNC: 1.67 MG/DL (ref 0.6–1.3)
CREAT SERPL-MCNC: 1.71 MG/DL (ref 0.6–1.3)
CREAT SERPL-MCNC: 1.75 MG/DL (ref 0.6–1.3)
CREAT SERPL-MCNC: 1.77 MG/DL (ref 0.6–1.3)
CREAT SERPL-MCNC: 1.78 MG/DL (ref 0.6–1.3)
CREAT SERPL-MCNC: 1.83 MG/DL (ref 0.6–1.3)
CREAT SERPL-MCNC: 1.85 MG/DL (ref 0.6–1.3)
CREAT SERPL-MCNC: 1.95 MG/DL (ref 0.6–1.3)
CREAT SERPL-MCNC: 1.99 MG/DL (ref 0.6–1.3)
CREAT SERPL-MCNC: 2.02 MG/DL (ref 0.6–1.3)
CREAT SERPL-MCNC: 2.03 MG/DL (ref 0.6–1.3)
CREAT SERPL-MCNC: 2.21 MG/DL (ref 0.6–1.3)
CREAT UR-MCNC: 105 MG/DL (ref 30–125)
CROSSMATCH RESULT,%XM: NORMAL
CRP SERPL-MCNC: 0.5 MG/DL (ref 0–0.3)
CRP SERPL-MCNC: 0.6 MG/DL (ref 0–0.3)
CRP SERPL-MCNC: 0.7 MG/DL (ref 0–0.3)
CRP SERPL-MCNC: 1.2 MG/DL (ref 0–0.3)
CRP SERPL-MCNC: 1.6 MG/DL (ref 0–0.3)
CRP SERPL-MCNC: 1.9 MG/DL (ref 0–0.3)
CRP SERPL-MCNC: 17.3 MG/DL (ref 0–0.3)
CRP SERPL-MCNC: 2.1 MG/DL (ref 0–0.3)
CRP SERPL-MCNC: 23.6 MG/DL (ref 0–0.3)
CRP SERPL-MCNC: 4.4 MG/DL (ref 0–0.3)
CRP SERPL-MCNC: 8.4 MG/DL (ref 0–0.3)
D DIMER PPP FEU-MCNC: 2.26 UG/ML(FEU)
D DIMER PPP FEU-MCNC: 2.46 UG/ML(FEU)
D DIMER PPP FEU-MCNC: 2.69 UG/ML(FEU)
D DIMER PPP FEU-MCNC: 2.87 UG/ML(FEU)
D DIMER PPP FEU-MCNC: 2.92 UG/ML(FEU)
D DIMER PPP FEU-MCNC: 3.07 UG/ML(FEU)
D DIMER PPP FEU-MCNC: 3.14 UG/ML(FEU)
D DIMER PPP FEU-MCNC: 3.31 UG/ML(FEU)
D DIMER PPP FEU-MCNC: 3.43 UG/ML(FEU)
D DIMER PPP FEU-MCNC: 3.72 UG/ML(FEU)
D DIMER PPP FEU-MCNC: 6.42 UG/ML(FEU)
D DIMER PPP FEU-MCNC: 6.58 UG/ML(FEU)
D50 ADMINISTERED, D50ADM: 0 ML
D50 ORDER, D50ORD: 0 ML
DIAGNOSIS, 93000: NORMAL
DIFFERENTIAL METHOD BLD: ABNORMAL
ECHO AO ROOT DIAM: 3.3 CM
ECHO LA AREA 4C: 18.8 CM2
ECHO LA VOL 2C: 44.59 ML (ref 18–58)
ECHO LA VOL 4C: 53.23 ML (ref 18–58)
ECHO LA VOL BP: 50.67 ML (ref 18–58)
ECHO LA VOL/BSA BIPLANE: 25.06 ML/M2 (ref 16–28)
ECHO LA VOLUME INDEX A2C: 22.05 ML/M2 (ref 16–28)
ECHO LA VOLUME INDEX A4C: 26.32 ML/M2 (ref 16–28)
ECHO LV E' LATERAL VELOCITY: 7.14 CM/S
ECHO LV E' SEPTAL VELOCITY: 4.98 CM/S
ECHO LV EDV TEICHHOLZ: 0.62 ML
ECHO LV ESV TEICHHOLZ: 0.23 ML
ECHO LV INTERNAL DIMENSION DIASTOLIC: 4.75 CM (ref 4.2–5.9)
ECHO LV INTERNAL DIMENSION SYSTOLIC: 3.17 CM
ECHO LV IVSD: 1 CM (ref 0.6–1)
ECHO LV MASS 2D: 206.6 G (ref 88–224)
ECHO LV MASS INDEX 2D: 102.2 G/M2 (ref 49–115)
ECHO LV POSTERIOR WALL DIASTOLIC: 1.08 CM (ref 0.6–1)
ECHO LVOT DIAM: 2.01 CM
ECHO LVOT PEAK GRADIENT: 3.1 MMHG
ECHO LVOT PEAK VELOCITY: 88.4 CM/S
ECHO LVOT VTI: 18.11 CM
ECHO MV A VELOCITY: 60.02 CM/S
ECHO MV E DECELERATION TIME (DT): 209.5 MS
ECHO MV E VELOCITY: 50.63 CM/S
ECHO MV E/A RATIO: 0.84
ECHO MV E/E' LATERAL: 7.09
ECHO MV E/E' RATIO (AVERAGED): 8.63
ECHO MV E/E' SEPTAL: 10.17
EOSINOPHIL # BLD: 0 K/UL (ref 0–0.4)
EOSINOPHIL # BLD: 0.1 K/UL (ref 0–0.4)
EOSINOPHIL # BLD: 0.2 K/UL (ref 0–0.4)
EOSINOPHIL # BLD: 0.3 K/UL (ref 0–0.4)
EOSINOPHIL # BLD: 0.3 K/UL (ref 0–0.4)
EOSINOPHIL # BLD: 0.4 K/UL (ref 0–0.4)
EOSINOPHIL # BLD: 0.6 K/UL (ref 0–0.4)
EOSINOPHIL NFR BLD: 0 % (ref 0–5)
EOSINOPHIL NFR BLD: 1 % (ref 0–5)
EOSINOPHIL NFR BLD: 1 % (ref 0–5)
EOSINOPHIL NFR BLD: 2 % (ref 0–5)
EOSINOPHIL NFR BLD: 3 % (ref 0–5)
EOSINOPHIL NFR BLD: 4 % (ref 0–5)
EOSINOPHIL NFR BLD: 5 % (ref 0–5)
EPITH CASTS URNS QL MICRO: ABNORMAL /LPF (ref 0–5)
EPITH CASTS URNS QL MICRO: NEGATIVE /LPF (ref 0–5)
EPITH CASTS URNS QL MICRO: NORMAL /LPF (ref 0–5)
EPITH CASTS URNS QL MICRO: NORMAL /LPF (ref 0–5)
ERYTHROCYTE [DISTWIDTH] IN BLOOD BY AUTOMATED COUNT: 13.1 % (ref 11.6–14.5)
ERYTHROCYTE [DISTWIDTH] IN BLOOD BY AUTOMATED COUNT: 13.4 % (ref 11.6–14.5)
ERYTHROCYTE [DISTWIDTH] IN BLOOD BY AUTOMATED COUNT: 13.5 % (ref 11.6–14.5)
ERYTHROCYTE [DISTWIDTH] IN BLOOD BY AUTOMATED COUNT: 13.6 % (ref 11.6–14.5)
ERYTHROCYTE [DISTWIDTH] IN BLOOD BY AUTOMATED COUNT: 13.7 % (ref 11.6–14.5)
ERYTHROCYTE [DISTWIDTH] IN BLOOD BY AUTOMATED COUNT: 13.7 % (ref 11.6–14.5)
ERYTHROCYTE [DISTWIDTH] IN BLOOD BY AUTOMATED COUNT: 13.8 % (ref 11.6–14.5)
ERYTHROCYTE [DISTWIDTH] IN BLOOD BY AUTOMATED COUNT: 13.8 % (ref 11.6–14.5)
ERYTHROCYTE [DISTWIDTH] IN BLOOD BY AUTOMATED COUNT: 13.9 % (ref 11.6–14.5)
ERYTHROCYTE [DISTWIDTH] IN BLOOD BY AUTOMATED COUNT: 14 % (ref 11.6–14.5)
ERYTHROCYTE [DISTWIDTH] IN BLOOD BY AUTOMATED COUNT: 14.1 % (ref 11.6–14.5)
ERYTHROCYTE [DISTWIDTH] IN BLOOD BY AUTOMATED COUNT: 14.2 % (ref 11.6–14.5)
ERYTHROCYTE [DISTWIDTH] IN BLOOD BY AUTOMATED COUNT: 14.3 % (ref 11.6–14.5)
ERYTHROCYTE [DISTWIDTH] IN BLOOD BY AUTOMATED COUNT: 14.4 % (ref 11.6–14.5)
ERYTHROCYTE [DISTWIDTH] IN BLOOD BY AUTOMATED COUNT: 14.5 % (ref 11.6–14.5)
ERYTHROCYTE [DISTWIDTH] IN BLOOD BY AUTOMATED COUNT: 14.5 % (ref 11.6–14.5)
ERYTHROCYTE [DISTWIDTH] IN BLOOD BY AUTOMATED COUNT: 14.8 % (ref 11.6–14.5)
ERYTHROCYTE [DISTWIDTH] IN BLOOD BY AUTOMATED COUNT: 14.8 % (ref 11.6–14.5)
ERYTHROCYTE [DISTWIDTH] IN BLOOD BY AUTOMATED COUNT: 14.9 % (ref 11.6–14.5)
ERYTHROCYTE [DISTWIDTH] IN BLOOD BY AUTOMATED COUNT: 14.9 % (ref 11.6–14.5)
ERYTHROCYTE [DISTWIDTH] IN BLOOD BY AUTOMATED COUNT: 15 % (ref 11.6–14.5)
ERYTHROCYTE [DISTWIDTH] IN BLOOD BY AUTOMATED COUNT: 15.1 % (ref 11.6–14.5)
ERYTHROCYTE [DISTWIDTH] IN BLOOD BY AUTOMATED COUNT: 15.1 % (ref 11.6–14.5)
ERYTHROCYTE [DISTWIDTH] IN BLOOD BY AUTOMATED COUNT: 15.2 % (ref 11.6–14.5)
ERYTHROCYTE [DISTWIDTH] IN BLOOD BY AUTOMATED COUNT: 15.4 % (ref 11.6–14.5)
EST. AVERAGE GLUCOSE BLD GHB EST-MCNC: 143 MG/DL
EST. AVERAGE GLUCOSE BLD GHB EST-MCNC: 151 MG/DL
EST. AVERAGE GLUCOSE BLD GHB EST-MCNC: 157 MG/DL
FERRITIN SERPL-MCNC: 382 NG/ML (ref 8–388)
FERRITIN SERPL-MCNC: 522 NG/ML (ref 8–388)
FERRITIN SERPL-MCNC: 548 NG/ML (ref 8–388)
FERRITIN SERPL-MCNC: 617 NG/ML (ref 8–388)
FERRITIN SERPL-MCNC: 625 NG/ML (ref 8–388)
FERRITIN SERPL-MCNC: 639 NG/ML (ref 8–388)
FERRITIN SERPL-MCNC: 665 NG/ML (ref 8–388)
FERRITIN SERPL-MCNC: 691 NG/ML (ref 8–388)
FERRITIN SERPL-MCNC: 715 NG/ML (ref 8–388)
FERRITIN SERPL-MCNC: 782 NG/ML (ref 8–388)
FERRITIN SERPL-MCNC: 783 NG/ML (ref 8–388)
FERRITIN SERPL-MCNC: 794 NG/ML (ref 8–388)
FERRITIN SERPL-MCNC: 841 NG/ML (ref 8–388)
FIBRINOGEN PPP-MCNC: 200 MG/DL (ref 210–451)
FIBRINOGEN PPP-MCNC: 211 MG/DL (ref 210–451)
FIBRINOGEN PPP-MCNC: 234 MG/DL (ref 210–451)
FIBRINOGEN PPP-MCNC: 269 MG/DL (ref 210–451)
FIBRINOGEN PPP-MCNC: 274 MG/DL (ref 210–451)
FIBRINOGEN PPP-MCNC: 329 MG/DL (ref 210–451)
FIBRINOGEN PPP-MCNC: 337 MG/DL (ref 210–451)
FIBRINOGEN PPP-MCNC: 417 MG/DL (ref 210–451)
FIBRINOGEN PPP-MCNC: 597 MG/DL (ref 210–451)
FIBRINOGEN PPP-MCNC: 648 MG/DL (ref 210–451)
FIBRINOGEN PPP-MCNC: 682 MG/DL (ref 210–451)
FIBRINOGEN PPP-MCNC: 777 MG/DL (ref 210–451)
FLUAV AG NPH QL IA: NEGATIVE
FLUBV AG NOSE QL IA: NEGATIVE
GAS FLOW.O2 O2 DELIVERY SYS: ABNORMAL L/MIN
GAS FLOW.O2 O2 DELIVERY SYS: NORMAL L/MIN
GAS FLOW.O2 SETTING OXYMISER: 14 BPM
GAS FLOW.O2 SETTING OXYMISER: 15 L/M
GAS FLOW.O2 SETTING OXYMISER: 16 BPM
GAS FLOW.O2 SETTING OXYMISER: 16 BPM
GAS FLOW.O2 SETTING OXYMISER: 4 L/M
GAS FLOW.O2 SETTING OXYMISER: 5 L/M
GAS FLOW.O2 SETTING OXYMISER: 6 L/M
GLOBULIN SER CALC-MCNC: 2.4 G/DL (ref 2–4)
GLOBULIN SER CALC-MCNC: 2.4 G/DL (ref 2–4)
GLOBULIN SER CALC-MCNC: 2.7 G/DL (ref 2–4)
GLOBULIN SER CALC-MCNC: 2.8 G/DL (ref 2–4)
GLOBULIN SER CALC-MCNC: 3.2 G/DL (ref 2–4)
GLOBULIN SER CALC-MCNC: 3.2 G/DL (ref 2–4)
GLOBULIN SER CALC-MCNC: 3.3 G/DL (ref 2–4)
GLOBULIN SER CALC-MCNC: 3.4 G/DL (ref 2–4)
GLOBULIN SER CALC-MCNC: 3.5 G/DL (ref 2–4)
GLOBULIN SER CALC-MCNC: 3.6 G/DL (ref 2–4)
GLOBULIN SER CALC-MCNC: 3.7 G/DL (ref 2–4)
GLOBULIN SER CALC-MCNC: 3.7 G/DL (ref 2–4)
GLOBULIN SER CALC-MCNC: 3.8 G/DL (ref 2–4)
GLOBULIN SER CALC-MCNC: 3.9 G/DL (ref 2–4)
GLOBULIN SER CALC-MCNC: 4 G/DL (ref 2–4)
GLOBULIN SER CALC-MCNC: 4.5 G/DL (ref 2–4)
GLUCOSE BLD STRIP.AUTO-MCNC: 102 MG/DL (ref 70–110)
GLUCOSE BLD STRIP.AUTO-MCNC: 102 MG/DL (ref 74–106)
GLUCOSE BLD STRIP.AUTO-MCNC: 103 MG/DL (ref 70–110)
GLUCOSE BLD STRIP.AUTO-MCNC: 103 MG/DL (ref 70–110)
GLUCOSE BLD STRIP.AUTO-MCNC: 107 MG/DL (ref 70–110)
GLUCOSE BLD STRIP.AUTO-MCNC: 108 MG/DL (ref 70–110)
GLUCOSE BLD STRIP.AUTO-MCNC: 110 MG/DL (ref 70–110)
GLUCOSE BLD STRIP.AUTO-MCNC: 112 MG/DL (ref 70–110)
GLUCOSE BLD STRIP.AUTO-MCNC: 113 MG/DL (ref 70–110)
GLUCOSE BLD STRIP.AUTO-MCNC: 113 MG/DL (ref 70–110)
GLUCOSE BLD STRIP.AUTO-MCNC: 117 MG/DL (ref 70–110)
GLUCOSE BLD STRIP.AUTO-MCNC: 120 MG/DL (ref 70–110)
GLUCOSE BLD STRIP.AUTO-MCNC: 121 MG/DL (ref 70–110)
GLUCOSE BLD STRIP.AUTO-MCNC: 122 MG/DL (ref 70–110)
GLUCOSE BLD STRIP.AUTO-MCNC: 123 MG/DL (ref 70–110)
GLUCOSE BLD STRIP.AUTO-MCNC: 123 MG/DL (ref 70–110)
GLUCOSE BLD STRIP.AUTO-MCNC: 126 MG/DL (ref 70–110)
GLUCOSE BLD STRIP.AUTO-MCNC: 127 MG/DL (ref 70–110)
GLUCOSE BLD STRIP.AUTO-MCNC: 128 MG/DL (ref 70–110)
GLUCOSE BLD STRIP.AUTO-MCNC: 129 MG/DL (ref 70–110)
GLUCOSE BLD STRIP.AUTO-MCNC: 131 MG/DL (ref 70–110)
GLUCOSE BLD STRIP.AUTO-MCNC: 131 MG/DL (ref 70–110)
GLUCOSE BLD STRIP.AUTO-MCNC: 133 MG/DL (ref 70–110)
GLUCOSE BLD STRIP.AUTO-MCNC: 133 MG/DL (ref 70–110)
GLUCOSE BLD STRIP.AUTO-MCNC: 135 MG/DL (ref 70–110)
GLUCOSE BLD STRIP.AUTO-MCNC: 136 MG/DL (ref 74–106)
GLUCOSE BLD STRIP.AUTO-MCNC: 137 MG/DL (ref 70–110)
GLUCOSE BLD STRIP.AUTO-MCNC: 137 MG/DL (ref 70–110)
GLUCOSE BLD STRIP.AUTO-MCNC: 140 MG/DL (ref 70–110)
GLUCOSE BLD STRIP.AUTO-MCNC: 145 MG/DL (ref 70–110)
GLUCOSE BLD STRIP.AUTO-MCNC: 146 MG/DL (ref 70–110)
GLUCOSE BLD STRIP.AUTO-MCNC: 149 MG/DL (ref 70–110)
GLUCOSE BLD STRIP.AUTO-MCNC: 149 MG/DL (ref 70–110)
GLUCOSE BLD STRIP.AUTO-MCNC: 151 MG/DL (ref 70–110)
GLUCOSE BLD STRIP.AUTO-MCNC: 156 MG/DL (ref 70–110)
GLUCOSE BLD STRIP.AUTO-MCNC: 157 MG/DL (ref 70–110)
GLUCOSE BLD STRIP.AUTO-MCNC: 163 MG/DL (ref 70–110)
GLUCOSE BLD STRIP.AUTO-MCNC: 167 MG/DL (ref 70–110)
GLUCOSE BLD STRIP.AUTO-MCNC: 174 MG/DL (ref 70–110)
GLUCOSE BLD STRIP.AUTO-MCNC: 175 MG/DL (ref 70–110)
GLUCOSE BLD STRIP.AUTO-MCNC: 175 MG/DL (ref 74–106)
GLUCOSE BLD STRIP.AUTO-MCNC: 176 MG/DL (ref 70–110)
GLUCOSE BLD STRIP.AUTO-MCNC: 179 MG/DL (ref 70–110)
GLUCOSE BLD STRIP.AUTO-MCNC: 180 MG/DL (ref 70–110)
GLUCOSE BLD STRIP.AUTO-MCNC: 180 MG/DL (ref 70–110)
GLUCOSE BLD STRIP.AUTO-MCNC: 181 MG/DL (ref 70–110)
GLUCOSE BLD STRIP.AUTO-MCNC: 183 MG/DL (ref 70–110)
GLUCOSE BLD STRIP.AUTO-MCNC: 184 MG/DL (ref 70–110)
GLUCOSE BLD STRIP.AUTO-MCNC: 186 MG/DL (ref 70–110)
GLUCOSE BLD STRIP.AUTO-MCNC: 191 MG/DL (ref 70–110)
GLUCOSE BLD STRIP.AUTO-MCNC: 194 MG/DL (ref 70–110)
GLUCOSE BLD STRIP.AUTO-MCNC: 195 MG/DL (ref 70–110)
GLUCOSE BLD STRIP.AUTO-MCNC: 197 MG/DL (ref 70–110)
GLUCOSE BLD STRIP.AUTO-MCNC: 201 MG/DL (ref 74–106)
GLUCOSE BLD STRIP.AUTO-MCNC: 202 MG/DL (ref 70–110)
GLUCOSE BLD STRIP.AUTO-MCNC: 204 MG/DL (ref 70–110)
GLUCOSE BLD STRIP.AUTO-MCNC: 206 MG/DL (ref 70–110)
GLUCOSE BLD STRIP.AUTO-MCNC: 207 MG/DL (ref 70–110)
GLUCOSE BLD STRIP.AUTO-MCNC: 207 MG/DL (ref 70–110)
GLUCOSE BLD STRIP.AUTO-MCNC: 207 MG/DL (ref 74–106)
GLUCOSE BLD STRIP.AUTO-MCNC: 208 MG/DL (ref 74–106)
GLUCOSE BLD STRIP.AUTO-MCNC: 209 MG/DL (ref 70–110)
GLUCOSE BLD STRIP.AUTO-MCNC: 210 MG/DL (ref 70–110)
GLUCOSE BLD STRIP.AUTO-MCNC: 213 MG/DL (ref 70–110)
GLUCOSE BLD STRIP.AUTO-MCNC: 218 MG/DL (ref 70–110)
GLUCOSE BLD STRIP.AUTO-MCNC: 221 MG/DL (ref 70–110)
GLUCOSE BLD STRIP.AUTO-MCNC: 221 MG/DL (ref 70–110)
GLUCOSE BLD STRIP.AUTO-MCNC: 227 MG/DL (ref 70–110)
GLUCOSE BLD STRIP.AUTO-MCNC: 228 MG/DL (ref 70–110)
GLUCOSE BLD STRIP.AUTO-MCNC: 228 MG/DL (ref 70–110)
GLUCOSE BLD STRIP.AUTO-MCNC: 232 MG/DL (ref 74–106)
GLUCOSE BLD STRIP.AUTO-MCNC: 233 MG/DL (ref 70–110)
GLUCOSE BLD STRIP.AUTO-MCNC: 236 MG/DL (ref 70–110)
GLUCOSE BLD STRIP.AUTO-MCNC: 241 MG/DL (ref 70–110)
GLUCOSE BLD STRIP.AUTO-MCNC: 243 MG/DL (ref 70–110)
GLUCOSE BLD STRIP.AUTO-MCNC: 244 MG/DL (ref 74–106)
GLUCOSE BLD STRIP.AUTO-MCNC: 247 MG/DL (ref 70–110)
GLUCOSE BLD STRIP.AUTO-MCNC: 249 MG/DL (ref 70–110)
GLUCOSE BLD STRIP.AUTO-MCNC: 251 MG/DL (ref 70–110)
GLUCOSE BLD STRIP.AUTO-MCNC: 257 MG/DL (ref 70–110)
GLUCOSE BLD STRIP.AUTO-MCNC: 257 MG/DL (ref 70–110)
GLUCOSE BLD STRIP.AUTO-MCNC: 258 MG/DL (ref 70–110)
GLUCOSE BLD STRIP.AUTO-MCNC: 260 MG/DL (ref 70–110)
GLUCOSE BLD STRIP.AUTO-MCNC: 274 MG/DL (ref 70–110)
GLUCOSE BLD STRIP.AUTO-MCNC: 274 MG/DL (ref 70–110)
GLUCOSE BLD STRIP.AUTO-MCNC: 276 MG/DL (ref 70–110)
GLUCOSE BLD STRIP.AUTO-MCNC: 278 MG/DL (ref 74–106)
GLUCOSE BLD STRIP.AUTO-MCNC: 288 MG/DL (ref 74–106)
GLUCOSE BLD STRIP.AUTO-MCNC: 292 MG/DL (ref 70–110)
GLUCOSE BLD STRIP.AUTO-MCNC: 297 MG/DL (ref 70–110)
GLUCOSE BLD STRIP.AUTO-MCNC: 298 MG/DL (ref 74–106)
GLUCOSE BLD STRIP.AUTO-MCNC: 305 MG/DL (ref 70–110)
GLUCOSE BLD STRIP.AUTO-MCNC: 309 MG/DL (ref 74–106)
GLUCOSE BLD STRIP.AUTO-MCNC: 311 MG/DL (ref 70–110)
GLUCOSE BLD STRIP.AUTO-MCNC: 39 MG/DL (ref 70–110)
GLUCOSE BLD STRIP.AUTO-MCNC: 396 MG/DL (ref 70–110)
GLUCOSE BLD STRIP.AUTO-MCNC: 42 MG/DL (ref 70–110)
GLUCOSE BLD STRIP.AUTO-MCNC: 43 MG/DL (ref 70–110)
GLUCOSE BLD STRIP.AUTO-MCNC: 57 MG/DL (ref 70–110)
GLUCOSE BLD STRIP.AUTO-MCNC: 59 MG/DL (ref 70–110)
GLUCOSE BLD STRIP.AUTO-MCNC: 60 MG/DL (ref 70–110)
GLUCOSE BLD STRIP.AUTO-MCNC: 64 MG/DL (ref 70–110)
GLUCOSE BLD STRIP.AUTO-MCNC: 65 MG/DL (ref 70–110)
GLUCOSE BLD STRIP.AUTO-MCNC: 69 MG/DL (ref 70–110)
GLUCOSE BLD STRIP.AUTO-MCNC: 69 MG/DL (ref 70–110)
GLUCOSE BLD STRIP.AUTO-MCNC: 71 MG/DL (ref 70–110)
GLUCOSE BLD STRIP.AUTO-MCNC: 76 MG/DL (ref 70–110)
GLUCOSE BLD STRIP.AUTO-MCNC: 78 MG/DL (ref 70–110)
GLUCOSE BLD STRIP.AUTO-MCNC: 81 MG/DL (ref 70–110)
GLUCOSE BLD STRIP.AUTO-MCNC: 82 MG/DL (ref 70–110)
GLUCOSE BLD STRIP.AUTO-MCNC: 82 MG/DL (ref 70–110)
GLUCOSE BLD STRIP.AUTO-MCNC: 84 MG/DL (ref 70–110)
GLUCOSE BLD STRIP.AUTO-MCNC: 85 MG/DL (ref 74–106)
GLUCOSE BLD STRIP.AUTO-MCNC: 86 MG/DL (ref 74–106)
GLUCOSE BLD STRIP.AUTO-MCNC: 87 MG/DL (ref 70–110)
GLUCOSE BLD STRIP.AUTO-MCNC: 88 MG/DL (ref 70–110)
GLUCOSE BLD STRIP.AUTO-MCNC: 90 MG/DL (ref 70–110)
GLUCOSE BLD STRIP.AUTO-MCNC: 90 MG/DL (ref 70–110)
GLUCOSE BLD STRIP.AUTO-MCNC: 91 MG/DL (ref 70–110)
GLUCOSE BLD STRIP.AUTO-MCNC: 91 MG/DL (ref 70–110)
GLUCOSE BLD STRIP.AUTO-MCNC: 92 MG/DL (ref 70–110)
GLUCOSE BLD STRIP.AUTO-MCNC: 95 MG/DL (ref 70–110)
GLUCOSE BLD STRIP.AUTO-MCNC: 97 MG/DL (ref 70–110)
GLUCOSE SERPL-MCNC: 104 MG/DL (ref 74–99)
GLUCOSE SERPL-MCNC: 113 MG/DL (ref 74–99)
GLUCOSE SERPL-MCNC: 114 MG/DL (ref 74–99)
GLUCOSE SERPL-MCNC: 116 MG/DL (ref 74–99)
GLUCOSE SERPL-MCNC: 125 MG/DL (ref 74–99)
GLUCOSE SERPL-MCNC: 128 MG/DL (ref 74–99)
GLUCOSE SERPL-MCNC: 136 MG/DL (ref 74–99)
GLUCOSE SERPL-MCNC: 138 MG/DL (ref 74–99)
GLUCOSE SERPL-MCNC: 138 MG/DL (ref 74–99)
GLUCOSE SERPL-MCNC: 154 MG/DL (ref 74–99)
GLUCOSE SERPL-MCNC: 156 MG/DL (ref 74–99)
GLUCOSE SERPL-MCNC: 159 MG/DL (ref 74–99)
GLUCOSE SERPL-MCNC: 160 MG/DL (ref 74–99)
GLUCOSE SERPL-MCNC: 189 MG/DL (ref 74–99)
GLUCOSE SERPL-MCNC: 191 MG/DL (ref 74–99)
GLUCOSE SERPL-MCNC: 191 MG/DL (ref 74–99)
GLUCOSE SERPL-MCNC: 192 MG/DL (ref 74–99)
GLUCOSE SERPL-MCNC: 196 MG/DL (ref 74–99)
GLUCOSE SERPL-MCNC: 200 MG/DL (ref 74–99)
GLUCOSE SERPL-MCNC: 203 MG/DL (ref 74–99)
GLUCOSE SERPL-MCNC: 225 MG/DL (ref 74–99)
GLUCOSE SERPL-MCNC: 271 MG/DL (ref 74–99)
GLUCOSE SERPL-MCNC: 31 MG/DL (ref 74–99)
GLUCOSE SERPL-MCNC: 390 MG/DL (ref 74–99)
GLUCOSE SERPL-MCNC: 53 MG/DL (ref 74–99)
GLUCOSE SERPL-MCNC: 62 MG/DL (ref 74–99)
GLUCOSE SERPL-MCNC: 69 MG/DL (ref 74–99)
GLUCOSE SERPL-MCNC: 80 MG/DL (ref 74–99)
GLUCOSE SERPL-MCNC: 88 MG/DL (ref 74–99)
GLUCOSE SERPL-MCNC: 98 MG/DL (ref 74–99)
GLUCOSE SERPL-MCNC: 99 MG/DL (ref 74–99)
GLUCOSE UR STRIP.AUTO-MCNC: 500 MG/DL
GLUCOSE UR STRIP.AUTO-MCNC: NEGATIVE MG/DL
GLUCOSE, GLC: 102 MG/DL
GLUCOSE, GLC: 103 MG/DL
GLUCOSE, GLC: 107 MG/DL
GLUCOSE, GLC: 110 MG/DL
GLUCOSE, GLC: 112 MG/DL
GLUCOSE, GLC: 113 MG/DL
GLUCOSE, GLC: 123 MG/DL
GLUCOSE, GLC: 137 MG/DL
GLUCOSE, GLC: 157 MG/DL
GLUCOSE, GLC: 180 MG/DL
GLUCOSE, GLC: 181 MG/DL
GLUCOSE, GLC: 191 MG/DL
GLUCOSE, GLC: 201 MG/DL
GLUCOSE, GLC: 81 MG/DL
GLUCOSE, GLC: 87 MG/DL
GLUCOSE, GLC: 88 MG/DL
GLUCOSE, GLC: 90 MG/DL
GLUCOSE, GLC: 90 MG/DL
GLUCOSE, GLC: 92 MG/DL
GLUCOSE, GLC: 95 MG/DL
GLUCOSE, GLC: 97 MG/DL
GRAN CASTS URNS QL MICRO: NORMAL /LPF
HBA1C MFR BLD: 6.6 % (ref 4.2–5.6)
HBA1C MFR BLD: 6.9 % (ref 4.2–5.6)
HBA1C MFR BLD: 7.1 % (ref 4.2–5.6)
HCO3 BLD-SCNC: 17.8 MMOL/L (ref 22–26)
HCO3 BLD-SCNC: 18.5 MMOL/L (ref 22–26)
HCO3 BLD-SCNC: 19.9 MMOL/L (ref 22–26)
HCO3 BLD-SCNC: 20.6 MMOL/L (ref 22–26)
HCO3 BLD-SCNC: 21.5 MMOL/L (ref 22–26)
HCO3 BLD-SCNC: 22.1 MMOL/L (ref 22–26)
HCO3 BLD-SCNC: 22.3 MMOL/L (ref 22–26)
HCO3 BLD-SCNC: 22.5 MMOL/L (ref 22–26)
HCO3 BLD-SCNC: 22.9 MMOL/L (ref 22–26)
HCO3 BLD-SCNC: 23 MMOL/L (ref 22–26)
HCO3 BLD-SCNC: 23.3 MMOL/L (ref 22–26)
HCO3 BLD-SCNC: 24 MMOL/L (ref 22–26)
HCO3 BLD-SCNC: 24.4 MMOL/L (ref 22–26)
HCO3 BLD-SCNC: 24.5 MMOL/L (ref 22–26)
HCO3 BLD-SCNC: 24.8 MMOL/L (ref 22–26)
HCO3 BLD-SCNC: 24.9 MMOL/L (ref 22–26)
HCO3 BLD-SCNC: 24.9 MMOL/L (ref 22–26)
HCO3 BLD-SCNC: 25.3 MMOL/L (ref 22–26)
HCO3 BLD-SCNC: 26.7 MMOL/L (ref 22–26)
HCO3 BLDV-SCNC: 21.4 MMOL/L (ref 23–28)
HCO3 BLDV-SCNC: 21.5 MMOL/L (ref 23–28)
HCO3 BLDV-SCNC: 25.5 MMOL/L (ref 23–28)
HCT VFR BLD AUTO: 21.2 % (ref 36–48)
HCT VFR BLD AUTO: 21.4 % (ref 36–48)
HCT VFR BLD AUTO: 21.6 % (ref 36–48)
HCT VFR BLD AUTO: 22.6 % (ref 36–48)
HCT VFR BLD AUTO: 22.9 % (ref 36–48)
HCT VFR BLD AUTO: 23.7 % (ref 36–48)
HCT VFR BLD AUTO: 24.5 % (ref 36–48)
HCT VFR BLD AUTO: 25.5 % (ref 36–48)
HCT VFR BLD AUTO: 25.7 % (ref 36–48)
HCT VFR BLD AUTO: 25.9 % (ref 36–48)
HCT VFR BLD AUTO: 26.8 % (ref 36–48)
HCT VFR BLD AUTO: 27 % (ref 36–48)
HCT VFR BLD AUTO: 27.9 % (ref 36–48)
HCT VFR BLD AUTO: 28.2 % (ref 36–48)
HCT VFR BLD AUTO: 28.2 % (ref 36–48)
HCT VFR BLD AUTO: 30 % (ref 36–48)
HCT VFR BLD AUTO: 30.7 % (ref 36–48)
HCT VFR BLD AUTO: 32.3 % (ref 36–48)
HCT VFR BLD AUTO: 33.2 % (ref 36–48)
HCT VFR BLD AUTO: 33.4 % (ref 36–48)
HCT VFR BLD AUTO: 33.4 % (ref 36–48)
HCT VFR BLD AUTO: 35.1 % (ref 36–48)
HCT VFR BLD AUTO: 35.1 % (ref 36–48)
HCT VFR BLD AUTO: 35.2 % (ref 36–48)
HCT VFR BLD AUTO: 36.3 % (ref 36–48)
HCT VFR BLD AUTO: 36.5 % (ref 36–48)
HCT VFR BLD AUTO: 36.5 % (ref 36–48)
HCT VFR BLD AUTO: 36.6 % (ref 36–48)
HCT VFR BLD AUTO: 36.8 % (ref 36–48)
HCT VFR BLD AUTO: 37.2 % (ref 36–48)
HCT VFR BLD AUTO: 38.1 % (ref 36–48)
HCT VFR BLD AUTO: 38.3 % (ref 36–48)
HCT VFR BLD AUTO: 40.8 % (ref 36–48)
HCT VFR BLD CALC: 19 % (ref 36–49)
HCT VFR BLD CALC: 21 % (ref 36–49)
HCT VFR BLD CALC: 22 % (ref 36–49)
HCT VFR BLD CALC: 22 % (ref 36–49)
HCT VFR BLD CALC: 23 % (ref 36–49)
HCT VFR BLD CALC: 24 % (ref 36–49)
HCT VFR BLD CALC: 25 % (ref 36–49)
HCT VFR BLD CALC: 25 % (ref 36–49)
HCT VFR BLD CALC: 27 % (ref 36–49)
HCT VFR BLD CALC: 32 % (ref 36–49)
HCT VFR BLD CALC: 33 % (ref 36–49)
HCT VFR BLD CALC: 35 % (ref 36–49)
HCV AB SER IA-ACNC: <0.02 INDEX
HCV AB SER IA-ACNC: <0.02 INDEX
HCV AB SERPL QL IA: NEGATIVE
HCV AB SERPL QL IA: NEGATIVE
HCV COMMENT,HCGAC: NORMAL
HCV COMMENT,HCGAC: NORMAL
HDLC SERPL-MCNC: 32 MG/DL (ref 40–60)
HDLC SERPL-MCNC: 32 MG/DL (ref 40–60)
HDLC SERPL-MCNC: 33 MG/DL (ref 40–60)
HDLC SERPL: 2.6 {RATIO} (ref 0–5)
HDLC SERPL: 3 {RATIO} (ref 0–5)
HDLC SERPL: 5.5 {RATIO} (ref 0–5)
HEALTH STATUS, XMCV2T: ABNORMAL
HGB BLD-MCNC: 10.2 G/DL (ref 13–16)
HGB BLD-MCNC: 10.6 G/DL (ref 13–16)
HGB BLD-MCNC: 10.9 G/DL (ref 12–16)
HGB BLD-MCNC: 11.2 G/DL (ref 12–16)
HGB BLD-MCNC: 11.2 G/DL (ref 13–16)
HGB BLD-MCNC: 11.4 G/DL (ref 13–16)
HGB BLD-MCNC: 11.5 G/DL (ref 13–16)
HGB BLD-MCNC: 11.6 G/DL (ref 13–16)
HGB BLD-MCNC: 11.7 G/DL (ref 13–16)
HGB BLD-MCNC: 11.7 G/DL (ref 13–16)
HGB BLD-MCNC: 11.9 G/DL (ref 12–16)
HGB BLD-MCNC: 12 G/DL (ref 13–16)
HGB BLD-MCNC: 12 G/DL (ref 13–16)
HGB BLD-MCNC: 12.1 G/DL (ref 13–16)
HGB BLD-MCNC: 12.2 G/DL (ref 13–16)
HGB BLD-MCNC: 12.6 G/DL (ref 13–16)
HGB BLD-MCNC: 12.8 G/DL (ref 13–16)
HGB BLD-MCNC: 13.7 G/DL (ref 13–16)
HGB BLD-MCNC: 6.5 G/DL (ref 12–16)
HGB BLD-MCNC: 6.9 G/DL (ref 13–16)
HGB BLD-MCNC: 7.1 G/DL (ref 12–16)
HGB BLD-MCNC: 7.2 G/DL (ref 13–16)
HGB BLD-MCNC: 7.3 G/DL (ref 13–16)
HGB BLD-MCNC: 7.3 G/DL (ref 13–16)
HGB BLD-MCNC: 7.5 G/DL (ref 12–16)
HGB BLD-MCNC: 7.5 G/DL (ref 12–16)
HGB BLD-MCNC: 7.7 G/DL (ref 13–16)
HGB BLD-MCNC: 7.8 G/DL (ref 12–16)
HGB BLD-MCNC: 8 G/DL (ref 13–16)
HGB BLD-MCNC: 8.1 G/DL (ref 13–16)
HGB BLD-MCNC: 8.2 G/DL (ref 12–16)
HGB BLD-MCNC: 8.3 G/DL (ref 13–16)
HGB BLD-MCNC: 8.5 G/DL (ref 12–16)
HGB BLD-MCNC: 8.5 G/DL (ref 12–16)
HGB BLD-MCNC: 8.6 G/DL (ref 13–16)
HGB BLD-MCNC: 8.8 G/DL (ref 13–16)
HGB BLD-MCNC: 9.2 G/DL (ref 12–16)
HGB BLD-MCNC: 9.2 G/DL (ref 13–16)
HGB BLD-MCNC: 9.3 G/DL (ref 13–16)
HGB BLD-MCNC: 9.5 G/DL (ref 13–16)
HGB BLD-MCNC: 9.5 G/DL (ref 13–16)
HGB UR QL STRIP: ABNORMAL
HIGH TARGET, HITG: 150 MG/DL
INR PPP: 1.1 (ref 0.8–1.2)
INR PPP: 1.1 (ref 0.8–1.2)
INR PPP: 1.2 (ref 0.8–1.2)
INR PPP: 1.3 (ref 0.8–1.2)
INR PPP: 1.3 (ref 0.8–1.2)
INR PPP: 1.4 (ref 0.8–1.2)
INR PPP: 1.4 (ref 0.8–1.2)
INR PPP: 1.6 (ref 0.8–1.2)
INSPIRATION.DURATION SETTING TIME VENT: 1 SEC
INSULIN ADMINSTERED, INSADM: 1.3 UNITS/HOUR
INSULIN ADMINSTERED, INSADM: 1.6 UNITS/HOUR
INSULIN ADMINSTERED, INSADM: 1.7 UNITS/HOUR
INSULIN ADMINSTERED, INSADM: 1.8 UNITS/HOUR
INSULIN ADMINSTERED, INSADM: 1.8 UNITS/HOUR
INSULIN ADMINSTERED, INSADM: 1.9 UNITS/HOUR
INSULIN ADMINSTERED, INSADM: 2.1 UNITS/HOUR
INSULIN ADMINSTERED, INSADM: 2.2 UNITS/HOUR
INSULIN ADMINSTERED, INSADM: 2.5 UNITS/HOUR
INSULIN ADMINSTERED, INSADM: 2.6 UNITS/HOUR
INSULIN ADMINSTERED, INSADM: 2.8 UNITS/HOUR
INSULIN ADMINSTERED, INSADM: 2.8 UNITS/HOUR
INSULIN ADMINSTERED, INSADM: 3 UNITS/HOUR
INSULIN ADMINSTERED, INSADM: 3.1 UNITS/HOUR
INSULIN ADMINSTERED, INSADM: 3.2 UNITS/HOUR
INSULIN ADMINSTERED, INSADM: 3.8 UNITS/HOUR
INSULIN ADMINSTERED, INSADM: 3.9 UNITS/HOUR
INSULIN ADMINSTERED, INSADM: 4.6 UNITS/HOUR
INSULIN ADMINSTERED, INSADM: 4.8 UNITS/HOUR
INSULIN ADMINSTERED, INSADM: 5.8 UNITS/HOUR
INSULIN ADMINSTERED, INSADM: 6.1 UNITS/HOUR
INSULIN ORDER, INSORD: 1.3 UNITS/HOUR
INSULIN ORDER, INSORD: 1.6 UNITS/HOUR
INSULIN ORDER, INSORD: 1.7 UNITS/HOUR
INSULIN ORDER, INSORD: 1.8 UNITS/HOUR
INSULIN ORDER, INSORD: 1.8 UNITS/HOUR
INSULIN ORDER, INSORD: 1.9 UNITS/HOUR
INSULIN ORDER, INSORD: 2.1 UNITS/HOUR
INSULIN ORDER, INSORD: 2.2 UNITS/HOUR
INSULIN ORDER, INSORD: 2.5 UNITS/HOUR
INSULIN ORDER, INSORD: 2.6 UNITS/HOUR
INSULIN ORDER, INSORD: 2.8 UNITS/HOUR
INSULIN ORDER, INSORD: 2.8 UNITS/HOUR
INSULIN ORDER, INSORD: 3 UNITS/HOUR
INSULIN ORDER, INSORD: 3.1 UNITS/HOUR
INSULIN ORDER, INSORD: 3.2 UNITS/HOUR
INSULIN ORDER, INSORD: 3.8 UNITS/HOUR
INSULIN ORDER, INSORD: 3.9 UNITS/HOUR
INSULIN ORDER, INSORD: 4.6 UNITS/HOUR
INSULIN ORDER, INSORD: 4.8 UNITS/HOUR
INSULIN ORDER, INSORD: 5.8 UNITS/HOUR
INSULIN ORDER, INSORD: 6.1 UNITS/HOUR
IRON SATN MFR SERPL: 11 % (ref 20–50)
IRON SERPL-MCNC: 17 UG/DL (ref 50–175)
KETONES UR QL STRIP.AUTO: NEGATIVE MG/DL
LACTATE BLD-SCNC: 1.37 MMOL/L (ref 0.4–2)
LACTATE BLD-SCNC: 2.44 MMOL/L (ref 0.4–2)
LACTATE SERPL-SCNC: 1.4 MMOL/L (ref 0.4–2)
LACTATE SERPL-SCNC: 1.4 MMOL/L (ref 0.4–2)
LDH SERPL L TO P-CCNC: 303 U/L (ref 81–234)
LDH SERPL L TO P-CCNC: 333 U/L (ref 81–234)
LDH SERPL L TO P-CCNC: 352 U/L (ref 81–234)
LDH SERPL L TO P-CCNC: 368 U/L (ref 81–234)
LDH SERPL L TO P-CCNC: 435 U/L (ref 81–234)
LDH SERPL L TO P-CCNC: 436 U/L (ref 81–234)
LDH SERPL L TO P-CCNC: 458 U/L (ref 81–234)
LDH SERPL L TO P-CCNC: 513 U/L (ref 81–234)
LDH SERPL L TO P-CCNC: 524 U/L (ref 81–234)
LDH SERPL L TO P-CCNC: 550 U/L (ref 81–234)
LDH SERPL L TO P-CCNC: 557 U/L (ref 81–234)
LDH SERPL L TO P-CCNC: 860 U/L (ref 81–234)
LDLC SERPL CALC-MCNC: 22.8 MG/DL (ref 0–100)
LDLC SERPL CALC-MCNC: 33.6 MG/DL (ref 0–100)
LDLC SERPL CALC-MCNC: 73.4 MG/DL (ref 0–100)
LEUKOCYTE ESTERASE UR QL STRIP.AUTO: ABNORMAL
LEUKOCYTE ESTERASE UR QL STRIP.AUTO: NEGATIVE
LIPASE SERPL-CCNC: 143 U/L (ref 73–393)
LIPID PROFILE,FLP: ABNORMAL
LOW TARGET, LOT: 80 MG/DL
LVFS 2D: 33.35 %
LVOT MG: 1.9 MMHG
LVOT MV: 0.66 CM/S
LVSV (TEICH): 32.53 ML
LYMPHOCYTES # BLD: 0.2 K/UL (ref 0.8–3.5)
LYMPHOCYTES # BLD: 0.3 K/UL (ref 0.8–3.5)
LYMPHOCYTES # BLD: 0.4 K/UL (ref 0.8–3.5)
LYMPHOCYTES # BLD: 0.4 K/UL (ref 0.8–3.5)
LYMPHOCYTES # BLD: 0.5 K/UL (ref 0.8–3.5)
LYMPHOCYTES # BLD: 0.6 K/UL (ref 0.9–3.6)
LYMPHOCYTES # BLD: 0.8 K/UL (ref 0.8–3.5)
LYMPHOCYTES # BLD: 1.2 K/UL (ref 0.8–3.5)
LYMPHOCYTES # BLD: 1.2 K/UL (ref 0.9–3.6)
LYMPHOCYTES # BLD: 1.4 K/UL (ref 0.8–3.5)
LYMPHOCYTES # BLD: 1.5 K/UL (ref 0.9–3.6)
LYMPHOCYTES # BLD: 1.5 K/UL (ref 0.9–3.6)
LYMPHOCYTES # BLD: 1.8 K/UL (ref 0.8–3.5)
LYMPHOCYTES # BLD: 1.8 K/UL (ref 0.9–3.6)
LYMPHOCYTES # BLD: 1.9 K/UL (ref 0.8–3.5)
LYMPHOCYTES # BLD: 2 K/UL (ref 0.9–3.6)
LYMPHOCYTES # BLD: 2.1 K/UL (ref 0.8–3.5)
LYMPHOCYTES # BLD: 2.1 K/UL (ref 0.9–3.6)
LYMPHOCYTES # BLD: 2.4 K/UL (ref 0.9–3.6)
LYMPHOCYTES # BLD: 2.7 K/UL (ref 0.9–3.6)
LYMPHOCYTES # BLD: 3.4 K/UL (ref 0.9–3.6)
LYMPHOCYTES NFR BLD: 1 % (ref 20–51)
LYMPHOCYTES NFR BLD: 10 % (ref 21–52)
LYMPHOCYTES NFR BLD: 13 % (ref 21–52)
LYMPHOCYTES NFR BLD: 16 % (ref 21–52)
LYMPHOCYTES NFR BLD: 17 % (ref 21–52)
LYMPHOCYTES NFR BLD: 19 % (ref 21–52)
LYMPHOCYTES NFR BLD: 2 % (ref 20–51)
LYMPHOCYTES NFR BLD: 2 % (ref 20–51)
LYMPHOCYTES NFR BLD: 20 % (ref 21–52)
LYMPHOCYTES NFR BLD: 21 % (ref 21–52)
LYMPHOCYTES NFR BLD: 3 % (ref 20–51)
LYMPHOCYTES NFR BLD: 30 % (ref 21–52)
LYMPHOCYTES NFR BLD: 4 % (ref 20–51)
LYMPHOCYTES NFR BLD: 4 % (ref 20–51)
LYMPHOCYTES NFR BLD: 4 % (ref 21–52)
LYMPHOCYTES NFR BLD: 5 % (ref 20–51)
LYMPHOCYTES NFR BLD: 6 % (ref 20–51)
LYMPHOCYTES NFR BLD: 6 % (ref 20–51)
LYMPHOCYTES NFR BLD: 7 % (ref 20–51)
LYMPHOCYTES NFR BLD: 7 % (ref 20–51)
LYMPHOCYTES NFR BLD: 7 % (ref 21–52)
LYMPHOCYTES NFR BLD: 8 % (ref 20–51)
LYMPHOCYTES NFR BLD: 9 % (ref 20–51)
MAGNESIUM SERPL-MCNC: 1.9 MG/DL (ref 1.6–2.6)
MAGNESIUM SERPL-MCNC: 2 MG/DL (ref 1.6–2.6)
MAGNESIUM SERPL-MCNC: 2.1 MG/DL (ref 1.6–2.6)
MAGNESIUM SERPL-MCNC: 2.1 MG/DL (ref 1.6–2.6)
MAGNESIUM SERPL-MCNC: 2.3 MG/DL (ref 1.6–2.6)
MAGNESIUM SERPL-MCNC: 2.4 MG/DL (ref 1.6–2.6)
MAGNESIUM SERPL-MCNC: 2.4 MG/DL (ref 1.6–2.6)
MAGNESIUM SERPL-MCNC: 2.5 MG/DL (ref 1.6–2.6)
MCH RBC QN AUTO: 29 PG (ref 24–34)
MCH RBC QN AUTO: 29.2 PG (ref 24–34)
MCH RBC QN AUTO: 29.5 PG (ref 24–34)
MCH RBC QN AUTO: 29.6 PG (ref 24–34)
MCH RBC QN AUTO: 29.7 PG (ref 24–34)
MCH RBC QN AUTO: 29.7 PG (ref 24–34)
MCH RBC QN AUTO: 29.8 PG (ref 24–34)
MCH RBC QN AUTO: 29.9 PG (ref 24–34)
MCH RBC QN AUTO: 29.9 PG (ref 24–34)
MCH RBC QN AUTO: 30 PG (ref 24–34)
MCH RBC QN AUTO: 30 PG (ref 24–34)
MCH RBC QN AUTO: 30.1 PG (ref 24–34)
MCH RBC QN AUTO: 30.1 PG (ref 24–34)
MCH RBC QN AUTO: 30.4 PG (ref 24–34)
MCH RBC QN AUTO: 30.6 PG (ref 24–34)
MCH RBC QN AUTO: 30.7 PG (ref 24–34)
MCH RBC QN AUTO: 30.9 PG (ref 24–34)
MCH RBC QN AUTO: 30.9 PG (ref 24–34)
MCH RBC QN AUTO: 31 PG (ref 24–34)
MCH RBC QN AUTO: 31.1 PG (ref 24–34)
MCH RBC QN AUTO: 31.2 PG (ref 24–34)
MCH RBC QN AUTO: 31.6 PG (ref 24–34)
MCHC RBC AUTO-ENTMCNC: 31.2 G/DL (ref 31–37)
MCHC RBC AUTO-ENTMCNC: 31.5 G/DL (ref 31–37)
MCHC RBC AUTO-ENTMCNC: 31.7 G/DL (ref 31–37)
MCHC RBC AUTO-ENTMCNC: 32 G/DL (ref 31–37)
MCHC RBC AUTO-ENTMCNC: 32.1 G/DL (ref 31–37)
MCHC RBC AUTO-ENTMCNC: 32.3 G/DL (ref 31–37)
MCHC RBC AUTO-ENTMCNC: 32.5 G/DL (ref 31–37)
MCHC RBC AUTO-ENTMCNC: 32.5 G/DL (ref 31–37)
MCHC RBC AUTO-ENTMCNC: 32.8 G/DL (ref 31–37)
MCHC RBC AUTO-ENTMCNC: 32.8 G/DL (ref 31–37)
MCHC RBC AUTO-ENTMCNC: 32.9 G/DL (ref 31–37)
MCHC RBC AUTO-ENTMCNC: 33.1 G/DL (ref 31–37)
MCHC RBC AUTO-ENTMCNC: 33.2 G/DL (ref 31–37)
MCHC RBC AUTO-ENTMCNC: 33.3 G/DL (ref 31–37)
MCHC RBC AUTO-ENTMCNC: 33.4 G/DL (ref 31–37)
MCHC RBC AUTO-ENTMCNC: 33.4 G/DL (ref 31–37)
MCHC RBC AUTO-ENTMCNC: 33.5 G/DL (ref 31–37)
MCHC RBC AUTO-ENTMCNC: 33.6 G/DL (ref 31–37)
MCHC RBC AUTO-ENTMCNC: 33.6 G/DL (ref 31–37)
MCHC RBC AUTO-ENTMCNC: 33.8 G/DL (ref 31–37)
MCHC RBC AUTO-ENTMCNC: 34.3 G/DL (ref 31–37)
MCHC RBC AUTO-ENTMCNC: 34.4 G/DL (ref 31–37)
MCHC RBC AUTO-ENTMCNC: 34.4 G/DL (ref 31–37)
MCHC RBC AUTO-ENTMCNC: 34.5 G/DL (ref 31–37)
MCHC RBC AUTO-ENTMCNC: 34.7 G/DL (ref 31–37)
MCV RBC AUTO: 88.6 FL (ref 74–97)
MCV RBC AUTO: 88.8 FL (ref 74–97)
MCV RBC AUTO: 89.1 FL (ref 74–97)
MCV RBC AUTO: 89.5 FL (ref 74–97)
MCV RBC AUTO: 89.5 FL (ref 74–97)
MCV RBC AUTO: 89.6 FL (ref 74–97)
MCV RBC AUTO: 89.7 FL (ref 74–97)
MCV RBC AUTO: 90.1 FL (ref 74–97)
MCV RBC AUTO: 90.4 FL (ref 74–97)
MCV RBC AUTO: 90.5 FL (ref 74–97)
MCV RBC AUTO: 90.8 FL (ref 74–97)
MCV RBC AUTO: 91.1 FL (ref 74–97)
MCV RBC AUTO: 91.8 FL (ref 74–97)
MCV RBC AUTO: 91.9 FL (ref 74–97)
MCV RBC AUTO: 92.2 FL (ref 74–97)
MCV RBC AUTO: 92.3 FL (ref 74–97)
MCV RBC AUTO: 92.4 FL (ref 74–97)
MCV RBC AUTO: 92.6 FL (ref 74–97)
MCV RBC AUTO: 92.6 FL (ref 74–97)
MCV RBC AUTO: 92.7 FL (ref 74–97)
MCV RBC AUTO: 93.4 FL (ref 74–97)
MCV RBC AUTO: 93.6 FL (ref 74–97)
MCV RBC AUTO: 93.7 FL (ref 74–97)
MCV RBC AUTO: 94 FL (ref 74–97)
MCV RBC AUTO: 96.8 FL (ref 74–97)
MICROALBUMIN UR-MCNC: 30.2 MG/DL (ref 0–3)
MICROALBUMIN/CREAT UR-RTO: 288 MG/G (ref 0–30)
MINUTES UNTIL NEXT BG, NBG: 60 MIN
MONOCYTES # BLD: 0 K/UL (ref 0–1)
MONOCYTES # BLD: 0.2 K/UL (ref 0–1)
MONOCYTES # BLD: 0.4 K/UL (ref 0–1)
MONOCYTES # BLD: 0.5 K/UL (ref 0–1)
MONOCYTES # BLD: 0.6 K/UL (ref 0.05–1.2)
MONOCYTES # BLD: 0.7 K/UL (ref 0.05–1.2)
MONOCYTES # BLD: 0.7 K/UL (ref 0–1)
MONOCYTES # BLD: 0.8 K/UL (ref 0.05–1.2)
MONOCYTES # BLD: 0.8 K/UL (ref 0–1)
MONOCYTES # BLD: 0.9 K/UL (ref 0.05–1.2)
MONOCYTES # BLD: 1 K/UL (ref 0.05–1.2)
MONOCYTES # BLD: 1 K/UL (ref 0.05–1.2)
MONOCYTES # BLD: 1.1 K/UL (ref 0–1)
MONOCYTES # BLD: 1.2 K/UL (ref 0.05–1.2)
MONOCYTES # BLD: 1.2 K/UL (ref 0–1)
MONOCYTES # BLD: 1.2 K/UL (ref 0–1)
MONOCYTES # BLD: 1.4 K/UL (ref 0.05–1.2)
MONOCYTES # BLD: 2.1 K/UL (ref 0–1)
MONOCYTES # BLD: 2.6 K/UL (ref 0–1)
MONOCYTES NFR BLD: 0 % (ref 2–9)
MONOCYTES NFR BLD: 1 % (ref 2–9)
MONOCYTES NFR BLD: 1 % (ref 2–9)
MONOCYTES NFR BLD: 10 % (ref 3–10)
MONOCYTES NFR BLD: 10 % (ref 3–10)
MONOCYTES NFR BLD: 11 % (ref 3–10)
MONOCYTES NFR BLD: 13 % (ref 3–10)
MONOCYTES NFR BLD: 2 % (ref 2–9)
MONOCYTES NFR BLD: 2 % (ref 2–9)
MONOCYTES NFR BLD: 4 % (ref 2–9)
MONOCYTES NFR BLD: 5 % (ref 3–10)
MONOCYTES NFR BLD: 5 % (ref 3–10)
MONOCYTES NFR BLD: 6 % (ref 2–9)
MONOCYTES NFR BLD: 6 % (ref 3–10)
MONOCYTES NFR BLD: 6 % (ref 3–10)
MONOCYTES NFR BLD: 7 % (ref 2–9)
MONOCYTES NFR BLD: 8 % (ref 2–9)
MONOCYTES NFR BLD: 8 % (ref 2–9)
MONOCYTES NFR BLD: 8 % (ref 3–10)
MONOCYTES NFR BLD: 9 % (ref 3–10)
MULTIPLIER, MUL: 0.02
MULTIPLIER, MUL: 0.03
MULTIPLIER, MUL: 0.04
MULTIPLIER, MUL: 0.05
MULTIPLIER, MUL: 0.06
MV DEC SLOPE: 2.42
NEUTS BAND NFR BLD MANUAL: 4 % (ref 0–5)
NEUTS BAND NFR BLD MANUAL: 5 % (ref 0–5)
NEUTS BAND NFR BLD MANUAL: 7 % (ref 0–5)
NEUTS BAND NFR BLD MANUAL: 8 % (ref 0–5)
NEUTS SEG # BLD: 10.1 K/UL (ref 1.8–8)
NEUTS SEG # BLD: 10.1 K/UL (ref 1.8–8)
NEUTS SEG # BLD: 11.1 K/UL (ref 1.8–8)
NEUTS SEG # BLD: 12.5 K/UL (ref 1.8–8)
NEUTS SEG # BLD: 12.8 K/UL (ref 1.8–8)
NEUTS SEG # BLD: 14.2 K/UL (ref 1.8–8)
NEUTS SEG # BLD: 15.7 K/UL (ref 1.8–8)
NEUTS SEG # BLD: 17.5 K/UL (ref 1.8–8)
NEUTS SEG # BLD: 19 K/UL (ref 1.8–8)
NEUTS SEG # BLD: 19.1 K/UL (ref 1.8–8)
NEUTS SEG # BLD: 20.1 K/UL (ref 1.8–8)
NEUTS SEG # BLD: 20.2 K/UL (ref 1.8–8)
NEUTS SEG # BLD: 22 K/UL (ref 1.8–8)
NEUTS SEG # BLD: 26.7 K/UL (ref 1.8–8)
NEUTS SEG # BLD: 27.6 K/UL (ref 1.8–8)
NEUTS SEG # BLD: 6.3 K/UL (ref 1.8–8)
NEUTS SEG # BLD: 6.8 K/UL (ref 1.8–8)
NEUTS SEG # BLD: 7.7 K/UL (ref 1.8–8)
NEUTS SEG # BLD: 8.4 K/UL (ref 1.8–8)
NEUTS SEG # BLD: 8.6 K/UL (ref 1.8–8)
NEUTS SEG # BLD: 8.8 K/UL (ref 1.8–8)
NEUTS SEG # BLD: 9.4 K/UL (ref 1.8–8)
NEUTS SEG # BLD: 9.9 K/UL (ref 1.8–8)
NEUTS SEG NFR BLD: 58 % (ref 40–73)
NEUTS SEG NFR BLD: 62 % (ref 40–73)
NEUTS SEG NFR BLD: 67 % (ref 40–73)
NEUTS SEG NFR BLD: 68 % (ref 40–73)
NEUTS SEG NFR BLD: 73 % (ref 40–73)
NEUTS SEG NFR BLD: 75 % (ref 40–73)
NEUTS SEG NFR BLD: 76 % (ref 42–75)
NEUTS SEG NFR BLD: 77 % (ref 40–73)
NEUTS SEG NFR BLD: 80 % (ref 40–73)
NEUTS SEG NFR BLD: 83 % (ref 42–75)
NEUTS SEG NFR BLD: 86 % (ref 42–75)
NEUTS SEG NFR BLD: 88 % (ref 40–73)
NEUTS SEG NFR BLD: 88 % (ref 42–75)
NEUTS SEG NFR BLD: 90 % (ref 40–73)
NEUTS SEG NFR BLD: 90 % (ref 42–75)
NEUTS SEG NFR BLD: 90 % (ref 42–75)
NEUTS SEG NFR BLD: 92 % (ref 42–75)
NEUTS SEG NFR BLD: 92 % (ref 42–75)
NEUTS SEG NFR BLD: 95 % (ref 42–75)
NEUTS SEG NFR BLD: 95 % (ref 42–75)
NEUTS SEG NFR BLD: 96 % (ref 42–75)
NITRITE UR QL STRIP.AUTO: NEGATIVE
O2/TOTAL GAS SETTING VFR VENT: 0.4 %
O2/TOTAL GAS SETTING VFR VENT: 0.4 %
O2/TOTAL GAS SETTING VFR VENT: 100 %
O2/TOTAL GAS SETTING VFR VENT: 36 %
O2/TOTAL GAS SETTING VFR VENT: 40 %
O2/TOTAL GAS SETTING VFR VENT: 44 %
O2/TOTAL GAS SETTING VFR VENT: 60 %
O2/TOTAL GAS SETTING VFR VENT: 70 %
O2/TOTAL GAS SETTING VFR VENT: 80 %
O2/TOTAL GAS SETTING VFR VENT: 80 %
ORDER INITIALS, ORDINIT: NORMAL
P-R INTERVAL, ECG05: 138 MS
P-R INTERVAL, ECG05: 146 MS
P-R INTERVAL, ECG05: 146 MS
P-R INTERVAL, ECG05: 152 MS
P-R INTERVAL, ECG05: 152 MS
P-R INTERVAL, ECG05: 162 MS
P-R INTERVAL, ECG05: 172 MS
P-R INTERVAL, ECG05: 176 MS
PCO2 BLD: 27.2 MMHG (ref 35–45)
PCO2 BLD: 27.4 MMHG (ref 35–45)
PCO2 BLD: 33.8 MMHG (ref 35–45)
PCO2 BLD: 34.2 MMHG (ref 35–45)
PCO2 BLD: 34.3 MMHG (ref 35–45)
PCO2 BLD: 34.5 MMHG (ref 35–45)
PCO2 BLD: 34.8 MMHG (ref 35–45)
PCO2 BLD: 35.4 MMHG (ref 35–45)
PCO2 BLD: 35.5 MMHG (ref 35–45)
PCO2 BLD: 35.5 MMHG (ref 35–45)
PCO2 BLD: 35.7 MMHG (ref 35–45)
PCO2 BLD: 36.1 MMHG (ref 35–45)
PCO2 BLD: 36.2 MMHG (ref 35–45)
PCO2 BLD: 36.9 MMHG (ref 35–45)
PCO2 BLD: 37 MMHG (ref 35–45)
PCO2 BLD: 38 MMHG (ref 35–45)
PCO2 BLD: 38.1 MMHG (ref 35–45)
PCO2 BLD: 38.2 MMHG (ref 35–45)
PCO2 BLD: 38.6 MMHG (ref 35–45)
PCO2 BLD: 41.1 MMHG (ref 35–45)
PCO2 BLD: 42 MMHG (ref 35–45)
PCO2 BLDV: 37.7 MMHG (ref 41–51)
PCO2 BLDV: 38.6 MMHG (ref 41–51)
PCO2 BLDV: 41.9 MMHG (ref 41–51)
PEEP RESPIRATORY: 5 CMH2O
PEEP RESPIRATORY: 8 CMH2O
PH BLD: 7.35 [PH] (ref 7.35–7.45)
PH BLD: 7.37 [PH] (ref 7.35–7.45)
PH BLD: 7.37 [PH] (ref 7.35–7.45)
PH BLD: 7.38 [PH] (ref 7.35–7.45)
PH BLD: 7.39 [PH] (ref 7.35–7.45)
PH BLD: 7.41 [PH] (ref 7.35–7.45)
PH BLD: 7.42 [PH] (ref 7.35–7.45)
PH BLD: 7.42 [PH] (ref 7.35–7.45)
PH BLD: 7.43 [PH] (ref 7.35–7.45)
PH BLD: 7.44 [PH] (ref 7.35–7.45)
PH BLD: 7.45 [PH] (ref 7.35–7.45)
PH BLD: 7.46 [PH] (ref 7.35–7.45)
PH BLD: 7.46 [PH] (ref 7.35–7.45)
PH BLD: 7.47 [PH] (ref 7.35–7.45)
PH BLDV: 7.32 [PH] (ref 7.32–7.42)
PH BLDV: 7.37 [PH] (ref 7.32–7.42)
PH BLDV: 7.42 [PH] (ref 7.32–7.42)
PH UR STRIP: 5.5 [PH] (ref 5–8)
PHOSPHATE SERPL-MCNC: 2.1 MG/DL (ref 2.5–4.9)
PHOSPHATE SERPL-MCNC: 3.2 MG/DL (ref 2.5–4.9)
PHOSPHATE SERPL-MCNC: 3.3 MG/DL (ref 2.5–4.9)
PHOSPHATE SERPL-MCNC: 3.4 MG/DL (ref 2.5–4.9)
PHOSPHATE SERPL-MCNC: 3.9 MG/DL (ref 2.5–4.9)
PHOSPHATE SERPL-MCNC: 4.4 MG/DL (ref 2.5–4.9)
PLATELET # BLD AUTO: 125 K/UL (ref 135–420)
PLATELET # BLD AUTO: 151 K/UL (ref 135–420)
PLATELET # BLD AUTO: 166 K/UL (ref 135–420)
PLATELET # BLD AUTO: 172 K/UL (ref 135–420)
PLATELET # BLD AUTO: 193 K/UL (ref 135–420)
PLATELET # BLD AUTO: 203 K/UL (ref 135–420)
PLATELET # BLD AUTO: 235 K/UL (ref 135–420)
PLATELET # BLD AUTO: 235 K/UL (ref 135–420)
PLATELET # BLD AUTO: 239 K/UL (ref 135–420)
PLATELET # BLD AUTO: 240 K/UL (ref 135–420)
PLATELET # BLD AUTO: 241 K/UL (ref 135–420)
PLATELET # BLD AUTO: 247 K/UL (ref 135–420)
PLATELET # BLD AUTO: 249 K/UL (ref 135–420)
PLATELET # BLD AUTO: 250 K/UL (ref 135–420)
PLATELET # BLD AUTO: 255 K/UL (ref 135–420)
PLATELET # BLD AUTO: 284 K/UL (ref 135–420)
PLATELET # BLD AUTO: 291 K/UL (ref 135–420)
PLATELET # BLD AUTO: 303 K/UL (ref 135–420)
PLATELET # BLD AUTO: 372 K/UL (ref 135–420)
PLATELET # BLD AUTO: 377 K/UL (ref 135–420)
PLATELET # BLD AUTO: 379 K/UL (ref 135–420)
PLATELET # BLD AUTO: 381 K/UL (ref 135–420)
PLATELET # BLD AUTO: 386 K/UL (ref 135–420)
PLATELET # BLD AUTO: 387 K/UL (ref 135–420)
PLATELET # BLD AUTO: 406 K/UL (ref 135–420)
PLATELET # BLD AUTO: 455 K/UL (ref 135–420)
PLATELET # BLD AUTO: 551 K/UL (ref 135–420)
PLATELET COMMENTS,PCOM: ABNORMAL
PMV BLD AUTO: 10 FL (ref 9.2–11.8)
PMV BLD AUTO: 10.1 FL (ref 9.2–11.8)
PMV BLD AUTO: 10.2 FL (ref 9.2–11.8)
PMV BLD AUTO: 10.3 FL (ref 9.2–11.8)
PMV BLD AUTO: 10.3 FL (ref 9.2–11.8)
PMV BLD AUTO: 10.5 FL (ref 9.2–11.8)
PMV BLD AUTO: 10.5 FL (ref 9.2–11.8)
PMV BLD AUTO: 10.6 FL (ref 9.2–11.8)
PMV BLD AUTO: 10.7 FL (ref 9.2–11.8)
PMV BLD AUTO: 10.8 FL (ref 9.2–11.8)
PMV BLD AUTO: 10.9 FL (ref 9.2–11.8)
PMV BLD AUTO: 11 FL (ref 9.2–11.8)
PMV BLD AUTO: 11 FL (ref 9.2–11.8)
PMV BLD AUTO: 11.1 FL (ref 9.2–11.8)
PMV BLD AUTO: 11.3 FL (ref 9.2–11.8)
PMV BLD AUTO: 11.8 FL (ref 9.2–11.8)
PMV BLD AUTO: 9.6 FL (ref 9.2–11.8)
PO2 BLD: 100 MMHG (ref 80–100)
PO2 BLD: 151 MMHG (ref 80–100)
PO2 BLD: 157 MMHG (ref 80–100)
PO2 BLD: 161 MMHG (ref 80–100)
PO2 BLD: 200 MMHG (ref 80–100)
PO2 BLD: 242 MMHG (ref 80–100)
PO2 BLD: 350 MMHG (ref 80–100)
PO2 BLD: 361 MMHG (ref 80–100)
PO2 BLD: 363 MMHG (ref 80–100)
PO2 BLD: 370 MMHG (ref 80–100)
PO2 BLD: 380 MMHG (ref 80–100)
PO2 BLD: 396 MMHG (ref 80–100)
PO2 BLD: 457 MMHG (ref 80–100)
PO2 BLD: 60 MMHG (ref 80–100)
PO2 BLD: 66 MMHG (ref 80–100)
PO2 BLD: 71 MMHG (ref 80–100)
PO2 BLD: 77 MMHG (ref 80–100)
PO2 BLD: 79 MMHG (ref 80–100)
PO2 BLD: 82 MMHG (ref 80–100)
PO2 BLD: 89 MMHG (ref 80–100)
PO2 BLD: 95 MMHG (ref 80–100)
PO2 BLDV: 179 MMHG (ref 25–40)
PO2 BLDV: 26 MMHG (ref 25–40)
PO2 BLDV: 28 MMHG (ref 25–40)
POTASSIUM BLD-SCNC: 4 MMOL/L (ref 3.5–5.5)
POTASSIUM BLD-SCNC: 4.1 MMOL/L (ref 3.5–5.5)
POTASSIUM BLD-SCNC: 4.2 MMOL/L (ref 3.5–5.5)
POTASSIUM BLD-SCNC: 4.3 MMOL/L (ref 3.5–5.5)
POTASSIUM BLD-SCNC: 4.4 MMOL/L (ref 3.5–5.5)
POTASSIUM BLD-SCNC: 4.6 MMOL/L (ref 3.5–5.5)
POTASSIUM BLD-SCNC: 4.9 MMOL/L (ref 3.5–5.5)
POTASSIUM BLD-SCNC: 5.4 MMOL/L (ref 3.5–5.5)
POTASSIUM BLD-SCNC: 5.6 MMOL/L (ref 3.5–5.5)
POTASSIUM BLD-SCNC: 5.8 MMOL/L (ref 3.5–5.5)
POTASSIUM BLD-SCNC: 6.1 MMOL/L (ref 3.5–5.5)
POTASSIUM BLD-SCNC: 6.4 MMOL/L (ref 3.5–5.5)
POTASSIUM SERPL-SCNC: 2.9 MMOL/L (ref 3.5–5.5)
POTASSIUM SERPL-SCNC: 3.2 MMOL/L (ref 3.5–5.5)
POTASSIUM SERPL-SCNC: 3.3 MMOL/L (ref 3.5–5.5)
POTASSIUM SERPL-SCNC: 3.3 MMOL/L (ref 3.5–5.5)
POTASSIUM SERPL-SCNC: 3.4 MMOL/L (ref 3.5–5.5)
POTASSIUM SERPL-SCNC: 3.8 MMOL/L (ref 3.5–5.5)
POTASSIUM SERPL-SCNC: 3.9 MMOL/L (ref 3.5–5.5)
POTASSIUM SERPL-SCNC: 4 MMOL/L (ref 3.5–5.5)
POTASSIUM SERPL-SCNC: 4 MMOL/L (ref 3.5–5.5)
POTASSIUM SERPL-SCNC: 4.1 MMOL/L (ref 3.5–5.5)
POTASSIUM SERPL-SCNC: 4.2 MMOL/L (ref 3.5–5.5)
POTASSIUM SERPL-SCNC: 4.3 MMOL/L (ref 3.5–5.5)
POTASSIUM SERPL-SCNC: 4.3 MMOL/L (ref 3.5–5.5)
POTASSIUM SERPL-SCNC: 4.4 MMOL/L (ref 3.5–5.5)
POTASSIUM SERPL-SCNC: 4.5 MMOL/L (ref 3.5–5.5)
POTASSIUM SERPL-SCNC: 4.5 MMOL/L (ref 3.5–5.5)
POTASSIUM SERPL-SCNC: 4.6 MMOL/L (ref 3.5–5.5)
POTASSIUM SERPL-SCNC: 4.7 MMOL/L (ref 3.5–5.5)
PRESSURE SUPPORT SETTING VENT: 7 CMH2O
PROCALCITONIN SERPL-MCNC: 0.06 NG/ML
PROCALCITONIN SERPL-MCNC: 0.09 NG/ML
PROCALCITONIN SERPL-MCNC: 0.11 NG/ML
PROCALCITONIN SERPL-MCNC: 0.12 NG/ML
PROCALCITONIN SERPL-MCNC: 0.12 NG/ML
PROCALCITONIN SERPL-MCNC: 0.13 NG/ML
PROCALCITONIN SERPL-MCNC: 0.13 NG/ML
PROCALCITONIN SERPL-MCNC: 0.33 NG/ML
PROCALCITONIN SERPL-MCNC: 0.67 NG/ML
PROCALCITONIN SERPL-MCNC: 1.47 NG/ML
PROCALCITONIN SERPL-MCNC: 1.91 NG/ML
PROT SERPL-MCNC: 4.1 G/DL (ref 6.4–8.2)
PROT SERPL-MCNC: 4.4 G/DL (ref 6.4–8.2)
PROT SERPL-MCNC: 4.6 G/DL (ref 6.4–8.2)
PROT SERPL-MCNC: 4.9 G/DL (ref 6.4–8.2)
PROT SERPL-MCNC: 5.6 G/DL (ref 6.4–8.2)
PROT SERPL-MCNC: 5.8 G/DL (ref 6.4–8.2)
PROT SERPL-MCNC: 5.9 G/DL (ref 6.4–8.2)
PROT SERPL-MCNC: 6 G/DL (ref 6.4–8.2)
PROT SERPL-MCNC: 6 G/DL (ref 6.4–8.2)
PROT SERPL-MCNC: 6.1 G/DL (ref 6.4–8.2)
PROT SERPL-MCNC: 6.2 G/DL (ref 6.4–8.2)
PROT SERPL-MCNC: 6.5 G/DL (ref 6.4–8.2)
PROT SERPL-MCNC: 6.6 G/DL (ref 6.4–8.2)
PROT SERPL-MCNC: 6.6 G/DL (ref 6.4–8.2)
PROT SERPL-MCNC: 6.7 G/DL (ref 6.4–8.2)
PROT SERPL-MCNC: 6.7 G/DL (ref 6.4–8.2)
PROT SERPL-MCNC: 7.2 G/DL (ref 6.4–8.2)
PROT UR STRIP-MCNC: 300 MG/DL
PROT UR STRIP-MCNC: >300 MG/DL
PROTHROMBIN TIME: 13.6 SEC (ref 11.5–15.2)
PROTHROMBIN TIME: 13.8 SEC (ref 11.5–15.2)
PROTHROMBIN TIME: 14.9 SEC (ref 11.5–15.2)
PROTHROMBIN TIME: 15.4 SEC (ref 11.5–15.2)
PROTHROMBIN TIME: 15.4 SEC (ref 11.5–15.2)
PROTHROMBIN TIME: 15.7 SEC (ref 11.5–15.2)
PROTHROMBIN TIME: 16.4 SEC (ref 11.5–15.2)
PROTHROMBIN TIME: 17 SEC (ref 11.5–15.2)
PROTHROMBIN TIME: 17.2 SEC (ref 11.5–15.2)
PROTHROMBIN TIME: 18.8 SEC (ref 11.5–15.2)
PTH-INTACT SERPL-MCNC: 95.6 PG/ML (ref 18.4–88)
Q-T INTERVAL, ECG07: 318 MS
Q-T INTERVAL, ECG07: 344 MS
Q-T INTERVAL, ECG07: 356 MS
Q-T INTERVAL, ECG07: 392 MS
Q-T INTERVAL, ECG07: 398 MS
Q-T INTERVAL, ECG07: 400 MS
Q-T INTERVAL, ECG07: 402 MS
Q-T INTERVAL, ECG07: 418 MS
Q-T INTERVAL, ECG07: 418 MS
QRS DURATION, ECG06: 74 MS
QRS DURATION, ECG06: 76 MS
QRS DURATION, ECG06: 78 MS
QRS DURATION, ECG06: 80 MS
QRS DURATION, ECG06: 82 MS
QRS DURATION, ECG06: 82 MS
QRS DURATION, ECG06: 84 MS
QTC CALCULATION (BEZET), ECG08: 414 MS
QTC CALCULATION (BEZET), ECG08: 418 MS
QTC CALCULATION (BEZET), ECG08: 421 MS
QTC CALCULATION (BEZET), ECG08: 432 MS
QTC CALCULATION (BEZET), ECG08: 437 MS
QTC CALCULATION (BEZET), ECG08: 447 MS
QTC CALCULATION (BEZET), ECG08: 456 MS
QTC CALCULATION (BEZET), ECG08: 460 MS
QTC CALCULATION (BEZET), ECG08: 508 MS
RBC # BLD AUTO: 2.3 M/UL (ref 4.7–5.5)
RBC # BLD AUTO: 2.35 M/UL (ref 4.7–5.5)
RBC # BLD AUTO: 2.46 M/UL (ref 4.7–5.5)
RBC # BLD AUTO: 2.66 M/UL (ref 4.7–5.5)
RBC # BLD AUTO: 2.76 M/UL (ref 4.7–5.5)
RBC # BLD AUTO: 2.89 M/UL (ref 4.7–5.5)
RBC # BLD AUTO: 2.92 M/UL (ref 4.7–5.5)
RBC # BLD AUTO: 3.01 M/UL (ref 4.7–5.5)
RBC # BLD AUTO: 3.01 M/UL (ref 4.7–5.5)
RBC # BLD AUTO: 3.1 M/UL (ref 4.7–5.5)
RBC # BLD AUTO: 3.28 M/UL (ref 4.7–5.5)
RBC # BLD AUTO: 3.57 M/UL (ref 4.7–5.5)
RBC # BLD AUTO: 3.71 M/UL (ref 4.7–5.5)
RBC # BLD AUTO: 3.75 M/UL (ref 4.7–5.5)
RBC # BLD AUTO: 3.76 M/UL (ref 4.7–5.5)
RBC # BLD AUTO: 3.92 M/UL (ref 4.7–5.5)
RBC # BLD AUTO: 3.92 M/UL (ref 4.7–5.5)
RBC # BLD AUTO: 3.94 M/UL (ref 4.7–5.5)
RBC # BLD AUTO: 3.95 M/UL (ref 4.7–5.5)
RBC # BLD AUTO: 3.95 M/UL (ref 4.7–5.5)
RBC # BLD AUTO: 3.96 M/UL (ref 4.7–5.5)
RBC # BLD AUTO: 4.02 M/UL (ref 4.7–5.5)
RBC # BLD AUTO: 4.03 M/UL (ref 4.7–5.5)
RBC # BLD AUTO: 4.07 M/UL (ref 4.7–5.5)
RBC # BLD AUTO: 4.1 M/UL (ref 4.7–5.5)
RBC # BLD AUTO: 4.23 M/UL (ref 4.7–5.5)
RBC # BLD AUTO: 4.34 M/UL (ref 4.7–5.5)
RBC #/AREA URNS HPF: ABNORMAL /HPF (ref 0–5)
RBC #/AREA URNS HPF: NORMAL /HPF (ref 0–5)
RBC MORPH BLD: ABNORMAL
SAO2 % BLD: 100 % (ref 92–97)
SAO2 % BLD: 92 % (ref 92–97)
SAO2 % BLD: 93 % (ref 92–97)
SAO2 % BLD: 94 % (ref 92–97)
SAO2 % BLD: 95 % (ref 92–97)
SAO2 % BLD: 96 % (ref 92–97)
SAO2 % BLD: 96 % (ref 92–97)
SAO2 % BLD: 97 % (ref 92–97)
SAO2 % BLD: 98 % (ref 92–97)
SAO2 % BLD: 98 % (ref 92–97)
SAO2 % BLD: 99 % (ref 92–97)
SAO2 % BLD: 99 % (ref 92–97)
SAO2 % BLDV: 100 % (ref 65–88)
SAO2 % BLDV: 44 % (ref 65–88)
SAO2 % BLDV: 47 % (ref 65–88)
SARS-COV-2, COV2NT: DETECTED
SERVICE CMNT-IMP: ABNORMAL
SERVICE CMNT-IMP: NORMAL
SODIUM BLD-SCNC: 133 MMOL/L (ref 136–145)
SODIUM BLD-SCNC: 134 MMOL/L (ref 136–145)
SODIUM BLD-SCNC: 134 MMOL/L (ref 136–145)
SODIUM BLD-SCNC: 135 MMOL/L (ref 136–145)
SODIUM BLD-SCNC: 137 MMOL/L (ref 136–145)
SODIUM BLD-SCNC: 137 MMOL/L (ref 136–145)
SODIUM BLD-SCNC: 138 MMOL/L (ref 136–145)
SODIUM BLD-SCNC: 138 MMOL/L (ref 136–145)
SODIUM BLD-SCNC: 139 MMOL/L (ref 136–145)
SODIUM BLD-SCNC: 140 MMOL/L (ref 136–145)
SODIUM BLD-SCNC: 140 MMOL/L (ref 136–145)
SODIUM BLD-SCNC: 144 MMOL/L (ref 136–145)
SODIUM BLD-SCNC: 145 MMOL/L (ref 136–145)
SODIUM BLD-SCNC: 145 MMOL/L (ref 136–145)
SODIUM SERPL-SCNC: 135 MMOL/L (ref 136–145)
SODIUM SERPL-SCNC: 138 MMOL/L (ref 136–145)
SODIUM SERPL-SCNC: 139 MMOL/L (ref 136–145)
SODIUM SERPL-SCNC: 139 MMOL/L (ref 136–145)
SODIUM SERPL-SCNC: 140 MMOL/L (ref 136–145)
SODIUM SERPL-SCNC: 141 MMOL/L (ref 136–145)
SODIUM SERPL-SCNC: 142 MMOL/L (ref 136–145)
SODIUM SERPL-SCNC: 142 MMOL/L (ref 136–145)
SODIUM SERPL-SCNC: 143 MMOL/L (ref 136–145)
SODIUM SERPL-SCNC: 144 MMOL/L (ref 136–145)
SODIUM SERPL-SCNC: 145 MMOL/L (ref 136–145)
SODIUM SERPL-SCNC: 146 MMOL/L (ref 136–145)
SODIUM SERPL-SCNC: 148 MMOL/L (ref 136–145)
SOURCE, COVRS: ABNORMAL
SP GR UR REFRACTOMETRY: 1.01 (ref 1–1.03)
SP GR UR REFRACTOMETRY: 1.02 (ref 1–1.03)
SP GR UR REFRACTOMETRY: 1.02 (ref 1–1.03)
SP GR UR REFRACTOMETRY: >1.03 (ref 1–1.03)
SPECIMEN EXP DATE BLD: NORMAL
SPECIMEN EXP DATE BLD: NORMAL
SPECIMEN TYPE, XMCV1T: ABNORMAL
SPECIMEN TYPE: ABNORMAL
SPECIMEN TYPE: NORMAL
STATUS OF UNIT,%ST: NORMAL
T4 FREE SERPL-MCNC: 1.5 NG/DL (ref 0.7–1.5)
TIBC SERPL-MCNC: 151 UG/DL (ref 250–450)
TOTAL RESP. RATE, ITRR: 14
TOTAL RESP. RATE, ITRR: 15
TOTAL RESP. RATE, ITRR: 16
TOTAL RESP. RATE, ITRR: 17
TOTAL RESP. RATE, ITRR: 18
TOTAL RESP. RATE, ITRR: 18
TOTAL RESP. RATE, ITRR: 24
TOTAL RESP. RATE, ITRR: 24
TOTAL RESP. RATE, ITRR: 25
TOTAL RESP. RATE, ITRR: 32
TRIGL SERPL-MCNC: 146 MG/DL (ref ?–150)
TRIGL SERPL-MCNC: 152 MG/DL (ref ?–150)
TRIGL SERPL-MCNC: 368 MG/DL (ref ?–150)
TROPONIN I SERPL-MCNC: 0.04 NG/ML (ref 0–0.04)
TROPONIN I SERPL-MCNC: 14.1 NG/ML (ref 0–0.04)
TROPONIN I SERPL-MCNC: 20.4 NG/ML (ref 0–0.04)
TROPONIN I SERPL-MCNC: 4.57 NG/ML (ref 0–0.04)
TROPONIN I SERPL-MCNC: 5.11 NG/ML (ref 0–0.04)
TROPONIN I SERPL-MCNC: 5.51 NG/ML (ref 0–0.04)
TSH SERPL DL<=0.05 MIU/L-ACNC: 0.01 UIU/ML (ref 0.36–3.74)
TSH SERPL DL<=0.05 MIU/L-ACNC: 0.83 UIU/ML (ref 0.36–3.74)
TSH SERPL DL<=0.05 MIU/L-ACNC: <0.01 UIU/ML (ref 0.36–3.74)
UNIT DIVISION, %UDIV: 0
UROBILINOGEN UR QL STRIP.AUTO: 0.2 EU/DL (ref 0.2–1)
UROBILINOGEN UR QL STRIP.AUTO: 0.2 EU/DL (ref 0.2–1)
UROBILINOGEN UR QL STRIP.AUTO: 1 EU/DL (ref 0.2–1)
UROBILINOGEN UR QL STRIP.AUTO: 1 EU/DL (ref 0.2–1)
VENTILATION MODE VENT: ABNORMAL
VENTILATION MODE VENT: NORMAL
VENTRICULAR RATE, ECG03: 102 BPM
VENTRICULAR RATE, ECG03: 106 BPM
VENTRICULAR RATE, ECG03: 60 BPM
VENTRICULAR RATE, ECG03: 66 BPM
VENTRICULAR RATE, ECG03: 69 BPM
VENTRICULAR RATE, ECG03: 70 BPM
VENTRICULAR RATE, ECG03: 83 BPM
VENTRICULAR RATE, ECG03: 91 BPM
VENTRICULAR RATE, ECG03: 98 BPM
VLDLC SERPL CALC-MCNC: 29.2 MG/DL
VLDLC SERPL CALC-MCNC: 30.4 MG/DL
VLDLC SERPL CALC-MCNC: 73.6 MG/DL
VOLUME CONTROL PLUS IVLCP: YES
VT SETTING VENT: 355 ML
VT SETTING VENT: 600 ML
VT SETTING VENT: 600 ML
VT SETTING VENT: 623 ML
VT SETTING VENT: 646 ML
VT SETTING VENT: 649 ML
WBC # BLD AUTO: 10.3 K/UL (ref 4.6–13.2)
WBC # BLD AUTO: 10.4 K/UL (ref 4.6–13.2)
WBC # BLD AUTO: 10.4 K/UL (ref 4.6–13.2)
WBC # BLD AUTO: 10.6 K/UL (ref 4.6–13.2)
WBC # BLD AUTO: 11.5 K/UL (ref 4.6–13.2)
WBC # BLD AUTO: 11.5 K/UL (ref 4.6–13.2)
WBC # BLD AUTO: 11.7 K/UL (ref 4.6–13.2)
WBC # BLD AUTO: 12.3 K/UL (ref 4.6–13.2)
WBC # BLD AUTO: 12.4 K/UL (ref 4.6–13.2)
WBC # BLD AUTO: 12.5 K/UL (ref 4.6–13.2)
WBC # BLD AUTO: 12.8 K/UL (ref 4.6–13.2)
WBC # BLD AUTO: 14 K/UL (ref 4.6–13.2)
WBC # BLD AUTO: 14.2 K/UL (ref 4.6–13.2)
WBC # BLD AUTO: 14.3 K/UL (ref 4.6–13.2)
WBC # BLD AUTO: 14.5 K/UL (ref 4.6–13.2)
WBC # BLD AUTO: 15 K/UL (ref 4.6–13.2)
WBC # BLD AUTO: 15.2 K/UL (ref 4.6–13.2)
WBC # BLD AUTO: 16.2 K/UL (ref 4.6–13.2)
WBC # BLD AUTO: 17.1 K/UL (ref 4.6–13.2)
WBC # BLD AUTO: 18.6 K/UL (ref 4.6–13.2)
WBC # BLD AUTO: 20.4 K/UL (ref 4.6–13.2)
WBC # BLD AUTO: 20.7 K/UL (ref 4.6–13.2)
WBC # BLD AUTO: 21 K/UL (ref 4.6–13.2)
WBC # BLD AUTO: 21.1 K/UL (ref 4.6–13.2)
WBC # BLD AUTO: 26.2 K/UL (ref 4.6–13.2)
WBC # BLD AUTO: 29.7 K/UL (ref 4.6–13.2)
WBC # BLD AUTO: 32.1 K/UL (ref 4.6–13.2)
WBC URNS QL MICRO: ABNORMAL /HPF (ref 0–5)
WBC URNS QL MICRO: NORMAL /HPF (ref 0–4)
YEAST URNS QL MICRO: ABNORMAL

## 2020-01-01 PROCEDURE — 85730 THROMBOPLASTIN TIME PARTIAL: CPT

## 2020-01-01 PROCEDURE — 84100 ASSAY OF PHOSPHORUS: CPT

## 2020-01-01 PROCEDURE — 65660000004 HC RM CVT STEPDOWN

## 2020-01-01 PROCEDURE — 77030040831 HC BAG URINE DRNG MDII -A

## 2020-01-01 PROCEDURE — 3331090001 HH PPS REVENUE CREDIT

## 2020-01-01 PROCEDURE — 97530 THERAPEUTIC ACTIVITIES: CPT

## 2020-01-01 PROCEDURE — 97535 SELF CARE MNGMENT TRAINING: CPT

## 2020-01-01 PROCEDURE — 74011250637 HC RX REV CODE- 250/637: Performed by: HOSPITALIST

## 2020-01-01 PROCEDURE — 81001 URINALYSIS AUTO W/SCOPE: CPT

## 2020-01-01 PROCEDURE — P9059 PLASMA, FRZ BETWEEN 8-24HOUR: HCPCS

## 2020-01-01 PROCEDURE — 85025 COMPLETE CBC W/AUTO DIFF WBC: CPT

## 2020-01-01 PROCEDURE — 74011636637 HC RX REV CODE- 636/637: Performed by: INTERNAL MEDICINE

## 2020-01-01 PROCEDURE — 3331090002 HH PPS REVENUE DEBIT

## 2020-01-01 PROCEDURE — 94762 N-INVAS EAR/PLS OXIMTRY CONT: CPT

## 2020-01-01 PROCEDURE — 36600 WITHDRAWAL OF ARTERIAL BLOOD: CPT

## 2020-01-01 PROCEDURE — 74011250637 HC RX REV CODE- 250/637: Performed by: INTERNAL MEDICINE

## 2020-01-01 PROCEDURE — 36430 TRANSFUSION BLD/BLD COMPNT: CPT

## 2020-01-01 PROCEDURE — 93005 ELECTROCARDIOGRAM TRACING: CPT

## 2020-01-01 PROCEDURE — 74011000250 HC RX REV CODE- 250: Performed by: INTERNAL MEDICINE

## 2020-01-01 PROCEDURE — 82306 VITAMIN D 25 HYDROXY: CPT

## 2020-01-01 PROCEDURE — 83036 HEMOGLOBIN GLYCOSYLATED A1C: CPT

## 2020-01-01 PROCEDURE — 74011000258 HC RX REV CODE- 258: Performed by: PHYSICIAN ASSISTANT

## 2020-01-01 PROCEDURE — 94664 DEMO&/EVAL PT USE INHALER: CPT

## 2020-01-01 PROCEDURE — 83735 ASSAY OF MAGNESIUM: CPT

## 2020-01-01 PROCEDURE — 93798 PHYS/QHP OP CAR RHAB W/ECG: CPT

## 2020-01-01 PROCEDURE — 86140 C-REACTIVE PROTEIN: CPT

## 2020-01-01 PROCEDURE — 71045 X-RAY EXAM CHEST 1 VIEW: CPT

## 2020-01-01 PROCEDURE — 74011250636 HC RX REV CODE- 250/636: Performed by: FAMILY MEDICINE

## 2020-01-01 PROCEDURE — 74011250637 HC RX REV CODE- 250/637: Performed by: EMERGENCY MEDICINE

## 2020-01-01 PROCEDURE — 74011250637 HC RX REV CODE- 250/637: Performed by: PHYSICIAN ASSISTANT

## 2020-01-01 PROCEDURE — G0157 HHC PT ASSISTANT EA 15: HCPCS

## 2020-01-01 PROCEDURE — 74011000250 HC RX REV CODE- 250: Performed by: HOSPITALIST

## 2020-01-01 PROCEDURE — 83615 LACTATE (LD) (LDH) ENZYME: CPT

## 2020-01-01 PROCEDURE — 74011000258 HC RX REV CODE- 258

## 2020-01-01 PROCEDURE — 85347 COAGULATION TIME ACTIVATED: CPT

## 2020-01-01 PROCEDURE — 02100Z9 BYPASS CORONARY ARTERY, ONE ARTERY FROM LEFT INTERNAL MAMMARY, OPEN APPROACH: ICD-10-PCS | Performed by: SPECIALIST

## 2020-01-01 PROCEDURE — 82947 ASSAY GLUCOSE BLOOD QUANT: CPT

## 2020-01-01 PROCEDURE — 74011250636 HC RX REV CODE- 250/636: Performed by: HOSPITALIST

## 2020-01-01 PROCEDURE — 82803 BLOOD GASES ANY COMBINATION: CPT

## 2020-01-01 PROCEDURE — 97116 GAIT TRAINING THERAPY: CPT

## 2020-01-01 PROCEDURE — 87804 INFLUENZA ASSAY W/OPTIC: CPT

## 2020-01-01 PROCEDURE — 82962 GLUCOSE BLOOD TEST: CPT

## 2020-01-01 PROCEDURE — 85027 COMPLETE CBC AUTOMATED: CPT

## 2020-01-01 PROCEDURE — 74011250636 HC RX REV CODE- 250/636: Performed by: EMERGENCY MEDICINE

## 2020-01-01 PROCEDURE — 06BP4ZZ EXCISION OF RIGHT SAPHENOUS VEIN, PERCUTANEOUS ENDOSCOPIC APPROACH: ICD-10-PCS | Performed by: SPECIALIST

## 2020-01-01 PROCEDURE — 74011636637 HC RX REV CODE- 636/637: Performed by: EMERGENCY MEDICINE

## 2020-01-01 PROCEDURE — XW033E5 INTRODUCTION OF REMDESIVIR ANTI-INFECTIVE INTO PERIPHERAL VEIN, PERCUTANEOUS APPROACH, NEW TECHNOLOGY GROUP 5: ICD-10-PCS | Performed by: INTERNAL MEDICINE

## 2020-01-01 PROCEDURE — 65660000000 HC RM CCU STEPDOWN

## 2020-01-01 PROCEDURE — 77030019580 HC CBL PACE MEDT -B: Performed by: SPECIALIST

## 2020-01-01 PROCEDURE — B2111ZZ FLUOROSCOPY OF MULTIPLE CORONARY ARTERIES USING LOW OSMOLAR CONTRAST: ICD-10-PCS | Performed by: INTERNAL MEDICINE

## 2020-01-01 PROCEDURE — 85610 PROTHROMBIN TIME: CPT

## 2020-01-01 PROCEDURE — 74011250636 HC RX REV CODE- 250/636: Performed by: SPECIALIST

## 2020-01-01 PROCEDURE — 74011250636 HC RX REV CODE- 250/636: Performed by: PHYSICIAN ASSISTANT

## 2020-01-01 PROCEDURE — 4A023N7 MEASUREMENT OF CARDIAC SAMPLING AND PRESSURE, LEFT HEART, PERCUTANEOUS APPROACH: ICD-10-PCS | Performed by: INTERNAL MEDICINE

## 2020-01-01 PROCEDURE — 99285 EMERGENCY DEPT VISIT HI MDM: CPT

## 2020-01-01 PROCEDURE — 94640 AIRWAY INHALATION TREATMENT: CPT

## 2020-01-01 PROCEDURE — 74011000250 HC RX REV CODE- 250: Performed by: EMERGENCY MEDICINE

## 2020-01-01 PROCEDURE — 84132 ASSAY OF SERUM POTASSIUM: CPT

## 2020-01-01 PROCEDURE — 74011250636 HC RX REV CODE- 250/636: Performed by: INTERNAL MEDICINE

## 2020-01-01 PROCEDURE — 85379 FIBRIN DEGRADATION QUANT: CPT

## 2020-01-01 PROCEDURE — 86900 BLOOD TYPING SEROLOGIC ABO: CPT

## 2020-01-01 PROCEDURE — 77010033678 HC OXYGEN DAILY

## 2020-01-01 PROCEDURE — 80076 HEPATIC FUNCTION PANEL: CPT

## 2020-01-01 PROCEDURE — 77030012890

## 2020-01-01 PROCEDURE — 80053 COMPREHEN METABOLIC PANEL: CPT

## 2020-01-01 PROCEDURE — 93797 PHYS/QHP OP CAR RHAB WO ECG: CPT

## 2020-01-01 PROCEDURE — P9016 RBC LEUKOCYTES REDUCED: HCPCS

## 2020-01-01 PROCEDURE — 74011636320 HC RX REV CODE- 636/320: Performed by: EMERGENCY MEDICINE

## 2020-01-01 PROCEDURE — 86803 HEPATITIS C AB TEST: CPT

## 2020-01-01 PROCEDURE — 74011000250 HC RX REV CODE- 250: Performed by: PHYSICIAN ASSISTANT

## 2020-01-01 PROCEDURE — 84439 ASSAY OF FREE THYROXINE: CPT

## 2020-01-01 PROCEDURE — 36415 COLL VENOUS BLD VENIPUNCTURE: CPT

## 2020-01-01 PROCEDURE — 80048 BASIC METABOLIC PNL TOTAL CA: CPT

## 2020-01-01 PROCEDURE — B24BZZ4 ULTRASONOGRAPHY OF HEART WITH AORTA, TRANSESOPHAGEAL: ICD-10-PCS | Performed by: ANESTHESIOLOGY

## 2020-01-01 PROCEDURE — 77030022199 HC SYS HARV VESL GTNG -G1: Performed by: SPECIALIST

## 2020-01-01 PROCEDURE — 82728 ASSAY OF FERRITIN: CPT

## 2020-01-01 PROCEDURE — 93458 L HRT ARTERY/VENTRICLE ANGIO: CPT | Performed by: INTERNAL MEDICINE

## 2020-01-01 PROCEDURE — 85018 HEMOGLOBIN: CPT

## 2020-01-01 PROCEDURE — 74011636320 HC RX REV CODE- 636/320: Performed by: INTERNAL MEDICINE

## 2020-01-01 PROCEDURE — 77030010938 HC CLP LIG TELE -A: Performed by: SPECIALIST

## 2020-01-01 PROCEDURE — 97168 OT RE-EVAL EST PLAN CARE: CPT

## 2020-01-01 PROCEDURE — P9045 ALBUMIN (HUMAN), 5%, 250 ML: HCPCS | Performed by: PHYSICIAN ASSISTANT

## 2020-01-01 PROCEDURE — 74011000250 HC RX REV CODE- 250: Performed by: SPECIALIST

## 2020-01-01 PROCEDURE — 74011000258 HC RX REV CODE- 258: Performed by: INTERNAL MEDICINE

## 2020-01-01 PROCEDURE — 97164 PT RE-EVAL EST PLAN CARE: CPT

## 2020-01-01 PROCEDURE — 96375 TX/PRO/DX INJ NEW DRUG ADDON: CPT

## 2020-01-01 PROCEDURE — 99218 HC RM OBSERVATION: CPT

## 2020-01-01 PROCEDURE — 77030019896 HC KT ARTERIAL LN TELE -B: Performed by: SPECIALIST

## 2020-01-01 PROCEDURE — 51798 US URINE CAPACITY MEASURE: CPT

## 2020-01-01 PROCEDURE — 74011000250 HC RX REV CODE- 250

## 2020-01-01 PROCEDURE — 77030013313: Performed by: SPECIALIST

## 2020-01-01 PROCEDURE — 74011250636 HC RX REV CODE- 250/636

## 2020-01-01 PROCEDURE — G0299 HHS/HOSPICE OF RN EA 15 MIN: HCPCS

## 2020-01-01 PROCEDURE — 31500 INSERT EMERGENCY AIRWAY: CPT

## 2020-01-01 PROCEDURE — 99152 MOD SED SAME PHYS/QHP 5/>YRS: CPT | Performed by: INTERNAL MEDICINE

## 2020-01-01 PROCEDURE — 77030027138 HC INCENT SPIROMETER -A

## 2020-01-01 PROCEDURE — 84443 ASSAY THYROID STIM HORMONE: CPT

## 2020-01-01 PROCEDURE — 77030010929 HC CLMP VASC FGRTY EDWD -B: Performed by: SPECIALIST

## 2020-01-01 PROCEDURE — 400013 HH SOC

## 2020-01-01 PROCEDURE — 77010033711 HC HIGH FLOW OXYGEN

## 2020-01-01 PROCEDURE — 76937 US GUIDE VASCULAR ACCESS: CPT

## 2020-01-01 PROCEDURE — C1894 INTRO/SHEATH, NON-LASER: HCPCS | Performed by: INTERNAL MEDICINE

## 2020-01-01 PROCEDURE — 84145 PROCALCITONIN (PCT): CPT

## 2020-01-01 PROCEDURE — 65270000029 HC RM PRIVATE

## 2020-01-01 PROCEDURE — 94761 N-INVAS EAR/PLS OXIMETRY MLT: CPT

## 2020-01-01 PROCEDURE — 77030012390 HC DRN CHST BTL GTNG -B

## 2020-01-01 PROCEDURE — 86923 COMPATIBILITY TEST ELECTRIC: CPT

## 2020-01-01 PROCEDURE — 65620000000 HC RM CCU GENERAL

## 2020-01-01 PROCEDURE — 83540 ASSAY OF IRON: CPT

## 2020-01-01 PROCEDURE — 94660 CPAP INITIATION&MGMT: CPT

## 2020-01-01 PROCEDURE — 76770 US EXAM ABDO BACK WALL COMP: CPT

## 2020-01-01 PROCEDURE — 87040 BLOOD CULTURE FOR BACTERIA: CPT

## 2020-01-01 PROCEDURE — 82043 UR ALBUMIN QUANTITATIVE: CPT

## 2020-01-01 PROCEDURE — 96374 THER/PROPH/DIAG INJ IV PUSH: CPT

## 2020-01-01 PROCEDURE — 83690 ASSAY OF LIPASE: CPT

## 2020-01-01 PROCEDURE — 94669 MECHANICAL CHEST WALL OSCILL: CPT

## 2020-01-01 PROCEDURE — 77030020178 HC LP VESL TW QUES -B: Performed by: SPECIALIST

## 2020-01-01 PROCEDURE — 85384 FIBRINOGEN ACTIVITY: CPT

## 2020-01-01 PROCEDURE — 77030031139 HC SUT VCRL2 J&J -A: Performed by: SPECIALIST

## 2020-01-01 PROCEDURE — 77030018836 HC SOL IRR NACL ICUM -A: Performed by: SPECIALIST

## 2020-01-01 PROCEDURE — 74011250636 HC RX REV CODE- 250/636: Performed by: THORACIC SURGERY (CARDIOTHORACIC VASCULAR SURGERY)

## 2020-01-01 PROCEDURE — 74011636637 HC RX REV CODE- 636/637: Performed by: PHYSICIAN ASSISTANT

## 2020-01-01 PROCEDURE — 77030027845 HC BND COM RDL D-STAT TELE -B: Performed by: INTERNAL MEDICINE

## 2020-01-01 PROCEDURE — 76010000208 HC CV SURG 6.5 TO 7 HR INTENSV-TIER 1: Performed by: SPECIALIST

## 2020-01-01 PROCEDURE — 74011250636 HC RX REV CODE- 250/636: Performed by: ANESTHESIOLOGY

## 2020-01-01 PROCEDURE — 74011636637 HC RX REV CODE- 636/637

## 2020-01-01 PROCEDURE — XW13325 TRANSFUSION OF CONVALESCENT PLASMA (NONAUTOLOGOUS) INTO PERIPHERAL VEIN, PERCUTANEOUS APPROACH, NEW TECHNOLOGY GROUP 5: ICD-10-PCS | Performed by: EMERGENCY MEDICINE

## 2020-01-01 PROCEDURE — 97162 PT EVAL MOD COMPLEX 30 MIN: CPT

## 2020-01-01 PROCEDURE — 77030040392 HC DRSG OPTIFOAM MDII -A

## 2020-01-01 PROCEDURE — 96361 HYDRATE IV INFUSION ADD-ON: CPT

## 2020-01-01 PROCEDURE — 94002 VENT MGMT INPAT INIT DAY: CPT

## 2020-01-01 PROCEDURE — 77030002982 HC SUT POLYSRB J&J -A: Performed by: SPECIALIST

## 2020-01-01 PROCEDURE — 30233N1 TRANSFUSION OF NONAUTOLOGOUS RED BLOOD CELLS INTO PERIPHERAL VEIN, PERCUTANEOUS APPROACH: ICD-10-PCS | Performed by: INTERNAL MEDICINE

## 2020-01-01 PROCEDURE — 94003 VENT MGMT INPAT SUBQ DAY: CPT

## 2020-01-01 PROCEDURE — 36592 COLLECT BLOOD FROM PICC: CPT

## 2020-01-01 PROCEDURE — 77030018390 HC SPNG HEMSTAT2 J&J -B: Performed by: SPECIALIST

## 2020-01-01 PROCEDURE — 77030006247 HC LD PCMKR MYOCRD BPLR TEMP MEDT -B: Performed by: SPECIALIST

## 2020-01-01 PROCEDURE — 02HV33Z INSERTION OF INFUSION DEVICE INTO SUPERIOR VENA CAVA, PERCUTANEOUS APPROACH: ICD-10-PCS | Performed by: HOSPITALIST

## 2020-01-01 PROCEDURE — 83880 ASSAY OF NATRIURETIC PEPTIDE: CPT

## 2020-01-01 PROCEDURE — 97166 OT EVAL MOD COMPLEX 45 MIN: CPT

## 2020-01-01 PROCEDURE — 82550 ASSAY OF CK (CPK): CPT

## 2020-01-01 PROCEDURE — 77030002912 HC SUT ETHBND J&J -A: Performed by: SPECIALIST

## 2020-01-01 PROCEDURE — 83605 ASSAY OF LACTIC ACID: CPT

## 2020-01-01 PROCEDURE — 74011000272 HC RX REV CODE- 272: Performed by: SPECIALIST

## 2020-01-01 PROCEDURE — 97110 THERAPEUTIC EXERCISES: CPT

## 2020-01-01 PROCEDURE — P9035 PLATELET PHERES LEUKOREDUCED: HCPCS

## 2020-01-01 PROCEDURE — 74011250637 HC RX REV CODE- 250/637: Performed by: SPECIALIST

## 2020-01-01 PROCEDURE — 82330 ASSAY OF CALCIUM: CPT

## 2020-01-01 PROCEDURE — G0151 HHCP-SERV OF PT,EA 15 MIN: HCPCS

## 2020-01-01 PROCEDURE — G0152 HHCP-SERV OF OT,EA 15 MIN: HCPCS

## 2020-01-01 PROCEDURE — 87086 URINE CULTURE/COLONY COUNT: CPT

## 2020-01-01 PROCEDURE — 76060000044 HC ANESTHESIA 6.5 TO 7 HR: Performed by: SPECIALIST

## 2020-01-01 PROCEDURE — 30233R1 TRANSFUSION OF NONAUTOLOGOUS PLATELETS INTO PERIPHERAL VEIN, PERCUTANEOUS APPROACH: ICD-10-PCS | Performed by: INTERNAL MEDICINE

## 2020-01-01 PROCEDURE — 36569 INSJ PICC 5 YR+ W/O IMAGING: CPT

## 2020-01-01 PROCEDURE — 71046 X-RAY EXAM CHEST 2 VIEWS: CPT

## 2020-01-01 PROCEDURE — 74011000258 HC RX REV CODE- 258: Performed by: ANESTHESIOLOGY

## 2020-01-01 PROCEDURE — 77030030163 HC BN WAX J&J -A: Performed by: SPECIALIST

## 2020-01-01 PROCEDURE — 74011250637 HC RX REV CODE- 250/637

## 2020-01-01 PROCEDURE — 80061 LIPID PANEL: CPT

## 2020-01-01 PROCEDURE — G0300 HHS/HOSPICE OF LPN EA 15 MIN: HCPCS

## 2020-01-01 PROCEDURE — 92950 HEART/LUNG RESUSCITATION CPR: CPT

## 2020-01-01 PROCEDURE — 94668 MNPJ CHEST WALL SBSQ: CPT

## 2020-01-01 PROCEDURE — 74011000250 HC RX REV CODE- 250: Performed by: ANESTHESIOLOGY

## 2020-01-01 PROCEDURE — 99153 MOD SED SAME PHYS/QHP EA: CPT | Performed by: INTERNAL MEDICINE

## 2020-01-01 PROCEDURE — 77030015766: Performed by: INTERNAL MEDICINE

## 2020-01-01 PROCEDURE — 77030015683 HC ADPT CRDPLG MEDT -B: Performed by: SPECIALIST

## 2020-01-01 PROCEDURE — 77030002987 HC SUT PROL J&J -B: Performed by: SPECIALIST

## 2020-01-01 PROCEDURE — 87635 SARS-COV-2 COVID-19 AMP PRB: CPT

## 2020-01-01 PROCEDURE — 74011250637 HC RX REV CODE- 250/637: Performed by: FAMILY MEDICINE

## 2020-01-01 PROCEDURE — 3331090003 HH PPS REVENUE ADJ

## 2020-01-01 PROCEDURE — C1729 CATH, DRAINAGE: HCPCS | Performed by: SPECIALIST

## 2020-01-01 PROCEDURE — 74011636637 HC RX REV CODE- 636/637: Performed by: FAMILY MEDICINE

## 2020-01-01 PROCEDURE — 30233K1 TRANSFUSION OF NONAUTOLOGOUS FROZEN PLASMA INTO PERIPHERAL VEIN, PERCUTANEOUS APPROACH: ICD-10-PCS | Performed by: INTERNAL MEDICINE

## 2020-01-01 PROCEDURE — 90834 PSYTX W PT 45 MINUTES: CPT

## 2020-01-01 PROCEDURE — 93970 EXTREMITY STUDY: CPT

## 2020-01-01 PROCEDURE — 77030010516 HC APPL HEMA CLP TELE -B: Performed by: SPECIALIST

## 2020-01-01 PROCEDURE — 77030002933 HC SUT MCRYL J&J -A: Performed by: SPECIALIST

## 2020-01-01 PROCEDURE — 77030026855 HC PNCH AORT MYO AEMC -B: Performed by: SPECIALIST

## 2020-01-01 PROCEDURE — 77030012390 HC DRN CHST BTL GTNG -B: Performed by: SPECIALIST

## 2020-01-01 PROCEDURE — 77030003033 HC SUT WRE CRD J&J -A: Performed by: SPECIALIST

## 2020-01-01 PROCEDURE — 77030040393 HC DRSG OPTIFOAM GENT MDII -B

## 2020-01-01 PROCEDURE — 94667 MNPJ CHEST WALL 1ST: CPT

## 2020-01-01 PROCEDURE — 77030013797 HC KT TRNSDUC PRSSR EDWD -A: Performed by: INTERNAL MEDICINE

## 2020-01-01 PROCEDURE — 77030002996 HC SUT SLK J&J -A: Performed by: SPECIALIST

## 2020-01-01 PROCEDURE — 84484 ASSAY OF TROPONIN QUANT: CPT

## 2020-01-01 PROCEDURE — 77030002986 HC SUT PROL J&J -A: Performed by: SPECIALIST

## 2020-01-01 PROCEDURE — 93306 TTE W/DOPPLER COMPLETE: CPT

## 2020-01-01 PROCEDURE — 93971 EXTREMITY STUDY: CPT

## 2020-01-01 PROCEDURE — 5A1221Z PERFORMANCE OF CARDIAC OUTPUT, CONTINUOUS: ICD-10-PCS | Performed by: SPECIALIST

## 2020-01-01 PROCEDURE — 021109W BYPASS CORONARY ARTERY, TWO ARTERIES FROM AORTA WITH AUTOLOGOUS VENOUS TISSUE, OPEN APPROACH: ICD-10-PCS | Performed by: SPECIALIST

## 2020-01-01 PROCEDURE — 74011636637 HC RX REV CODE- 636/637: Performed by: ANESTHESIOLOGY

## 2020-01-01 PROCEDURE — B2151ZZ FLUOROSCOPY OF LEFT HEART USING LOW OSMOLAR CONTRAST: ICD-10-PCS | Performed by: INTERNAL MEDICINE

## 2020-01-01 PROCEDURE — 82140 ASSAY OF AMMONIA: CPT

## 2020-01-01 PROCEDURE — 77030003010 HC SUT SURG STL J&J -B: Performed by: SPECIALIST

## 2020-01-01 PROCEDURE — 83970 ASSAY OF PARATHORMONE: CPT

## 2020-01-01 PROCEDURE — 77030040830 HC CATH URETH FOL MDII -A

## 2020-01-01 PROCEDURE — 71275 CT ANGIOGRAPHY CHEST: CPT

## 2020-01-01 PROCEDURE — 77030005530 HC CATH URETH FOL40 BARD -B: Performed by: SPECIALIST

## 2020-01-01 RX ORDER — FOLIC ACID 1 MG/1
1 TABLET ORAL DAILY
Status: DISCONTINUED | OUTPATIENT
Start: 2020-01-01 | End: 2020-01-01 | Stop reason: HOSPADM

## 2020-01-01 RX ORDER — METOPROLOL TARTRATE 25 MG/1
12.5 TABLET, FILM COATED ORAL EVERY 12 HOURS
Status: DISCONTINUED | OUTPATIENT
Start: 2020-01-01 | End: 2020-01-01

## 2020-01-01 RX ORDER — HEPARIN SODIUM 1000 [USP'U]/ML
INJECTION, SOLUTION INTRAVENOUS; SUBCUTANEOUS
Status: DISPENSED
Start: 2020-01-01 | End: 2020-01-01

## 2020-01-01 RX ORDER — METOPROLOL SUCCINATE 25 MG/1
25 TABLET, EXTENDED RELEASE ORAL
Status: DISCONTINUED | OUTPATIENT
Start: 2020-01-01 | End: 2020-01-01 | Stop reason: HOSPADM

## 2020-01-01 RX ORDER — LANOLIN ALCOHOL/MO/W.PET/CERES
3 CREAM (GRAM) TOPICAL
Qty: 30 TAB | Refills: 2 | Status: SHIPPED | OUTPATIENT
Start: 2020-01-01 | End: 2020-01-01

## 2020-01-01 RX ORDER — MORPHINE SULFATE 2 MG/ML
1 INJECTION, SOLUTION INTRAMUSCULAR; INTRAVENOUS
Status: DISCONTINUED | OUTPATIENT
Start: 2020-01-01 | End: 2020-01-01 | Stop reason: HOSPADM

## 2020-01-01 RX ORDER — ASPIRIN 81 MG/1
81 TABLET ORAL DAILY
Qty: 30 TAB | Refills: 2 | Status: SHIPPED | OUTPATIENT
Start: 2020-01-01 | End: 2020-01-01

## 2020-01-01 RX ORDER — LISINOPRIL 20 MG/1
20 TABLET ORAL 2 TIMES DAILY
Status: DISCONTINUED | OUTPATIENT
Start: 2020-01-01 | End: 2020-01-01

## 2020-01-01 RX ORDER — HEPARIN SODIUM 1000 [USP'U]/ML
INJECTION, SOLUTION INTRAVENOUS; SUBCUTANEOUS AS NEEDED
Status: DISCONTINUED | OUTPATIENT
Start: 2020-01-01 | End: 2020-01-01 | Stop reason: HOSPADM

## 2020-01-01 RX ORDER — TAMSULOSIN HYDROCHLORIDE 0.4 MG/1
0.4 CAPSULE ORAL DAILY
Qty: 30 CAP | Refills: 2 | Status: SHIPPED | OUTPATIENT
Start: 2020-01-01 | End: 2020-01-01

## 2020-01-01 RX ORDER — SODIUM CHLORIDE 9 MG/ML
INJECTION, SOLUTION INTRAVENOUS
Status: DISCONTINUED | OUTPATIENT
Start: 2020-01-01 | End: 2020-01-01 | Stop reason: HOSPADM

## 2020-01-01 RX ORDER — SODIUM BICARBONATE 1 MEQ/ML
SYRINGE (ML) INTRAVENOUS
Status: COMPLETED
Start: 2020-01-01 | End: 2020-01-01

## 2020-01-01 RX ORDER — METOPROLOL SUCCINATE 50 MG/1
TABLET, EXTENDED RELEASE ORAL
Qty: 90 TAB | Refills: 3 | Status: ON HOLD | OUTPATIENT
Start: 2020-01-01 | End: 2020-01-01 | Stop reason: DRUGHIGH

## 2020-01-01 RX ORDER — POTASSIUM CHLORIDE 20 MEQ/1
40 TABLET, EXTENDED RELEASE ORAL
Status: COMPLETED | OUTPATIENT
Start: 2020-01-01 | End: 2020-01-01

## 2020-01-01 RX ORDER — METOPROLOL TARTRATE 25 MG/1
12.5 TABLET, FILM COATED ORAL EVERY 12 HOURS
Qty: 60 TAB | Refills: 2 | Status: SHIPPED | OUTPATIENT
Start: 2020-01-01 | End: 2020-01-01

## 2020-01-01 RX ORDER — INSULIN GLARGINE 100 [IU]/ML
20 INJECTION, SOLUTION SUBCUTANEOUS DAILY
Status: DISCONTINUED | OUTPATIENT
Start: 2020-01-01 | End: 2020-01-01

## 2020-01-01 RX ORDER — HEPARIN SODIUM 1000 [USP'U]/ML
80 INJECTION, SOLUTION INTRAVENOUS; SUBCUTANEOUS ONCE
Status: COMPLETED | OUTPATIENT
Start: 2020-01-01 | End: 2020-01-01

## 2020-01-01 RX ORDER — MAGNESIUM CITRATE
296 SOLUTION, ORAL ORAL
Status: COMPLETED | OUTPATIENT
Start: 2020-01-01 | End: 2020-01-01

## 2020-01-01 RX ORDER — INSULIN GLARGINE 100 [IU]/ML
10 INJECTION, SOLUTION SUBCUTANEOUS EVERY 12 HOURS
Status: DISCONTINUED | OUTPATIENT
Start: 2020-01-01 | End: 2020-01-01 | Stop reason: HOSPADM

## 2020-01-01 RX ORDER — ACETAMINOPHEN 650 MG/1
SUPPOSITORY RECTAL
Status: DISPENSED
Start: 2020-01-01 | End: 2020-01-01

## 2020-01-01 RX ORDER — METOPROLOL TARTRATE 25 MG/1
25 TABLET, FILM COATED ORAL
Status: COMPLETED | OUTPATIENT
Start: 2020-01-01 | End: 2020-01-01

## 2020-01-01 RX ORDER — POTASSIUM CHLORIDE 14.9 MG/ML
INJECTION INTRAVENOUS
Status: COMPLETED
Start: 2020-01-01 | End: 2020-01-01

## 2020-01-01 RX ORDER — PROPOFOL 10 MG/ML
0-50 VIAL (ML) INTRAVENOUS
Status: DISCONTINUED | OUTPATIENT
Start: 2020-01-01 | End: 2020-01-01

## 2020-01-01 RX ORDER — GUAIFENESIN 600 MG/1
1200 TABLET, EXTENDED RELEASE ORAL EVERY 12 HOURS
Status: DISCONTINUED | OUTPATIENT
Start: 2020-01-01 | End: 2020-01-01 | Stop reason: HOSPADM

## 2020-01-01 RX ORDER — METOPROLOL SUCCINATE 100 MG/1
1 TABLET, EXTENDED RELEASE ORAL
COMMUNITY
Start: 2020-01-01

## 2020-01-01 RX ORDER — MELATONIN
4000 DAILY
Qty: 120 TAB | Refills: 2 | Status: SHIPPED | OUTPATIENT
Start: 2020-01-01

## 2020-01-01 RX ORDER — FAMOTIDINE 20 MG/1
20 TABLET, FILM COATED ORAL DAILY
Status: DISCONTINUED | OUTPATIENT
Start: 2020-01-01 | End: 2020-01-01 | Stop reason: HOSPADM

## 2020-01-01 RX ORDER — OXYCODONE AND ACETAMINOPHEN 5; 325 MG/1; MG/1
1 TABLET ORAL
Status: DISCONTINUED | OUTPATIENT
Start: 2020-01-01 | End: 2020-01-01 | Stop reason: HOSPADM

## 2020-01-01 RX ORDER — LOPERAMIDE HCL 2 MG
1000 TABLET ORAL DAILY
Status: DISCONTINUED | OUTPATIENT
Start: 2020-01-01 | End: 2020-01-01

## 2020-01-01 RX ORDER — QUETIAPINE FUMARATE 50 MG/1
50 TABLET, EXTENDED RELEASE ORAL
Status: DISCONTINUED | OUTPATIENT
Start: 2020-01-01 | End: 2020-01-01

## 2020-01-01 RX ORDER — POTASSIUM CHLORIDE 20 MEQ/1
20 TABLET, EXTENDED RELEASE ORAL DAILY
Status: DISCONTINUED | OUTPATIENT
Start: 2020-01-01 | End: 2020-01-01

## 2020-01-01 RX ORDER — SODIUM CHLORIDE 9 MG/ML
250 INJECTION, SOLUTION INTRAVENOUS AS NEEDED
Status: DISCONTINUED | OUTPATIENT
Start: 2020-01-01 | End: 2020-01-01

## 2020-01-01 RX ORDER — ALBUMIN HUMAN 50 G/1000ML
12.5 SOLUTION INTRAVENOUS ONCE
Status: COMPLETED | OUTPATIENT
Start: 2020-01-01 | End: 2020-01-01

## 2020-01-01 RX ORDER — VANCOMYCIN HYDROCHLORIDE 1 G/20ML
INJECTION, POWDER, LYOPHILIZED, FOR SOLUTION INTRAVENOUS AS NEEDED
Status: DISCONTINUED | OUTPATIENT
Start: 2020-01-01 | End: 2020-01-01 | Stop reason: HOSPADM

## 2020-01-01 RX ORDER — CHOLECALCIFEROL (VITAMIN D3) 125 MCG
5000 CAPSULE ORAL DAILY
Status: DISCONTINUED | OUTPATIENT
Start: 2020-01-01 | End: 2020-01-01 | Stop reason: HOSPADM

## 2020-01-01 RX ORDER — HYDROCODONE BITARTRATE AND ACETAMINOPHEN 5; 325 MG/1; MG/1
1-2 TABLET ORAL
Status: DISCONTINUED | OUTPATIENT
Start: 2020-01-01 | End: 2020-01-01

## 2020-01-01 RX ORDER — FUROSEMIDE 10 MG/ML
20 INJECTION INTRAMUSCULAR; INTRAVENOUS ONCE
Status: COMPLETED | OUTPATIENT
Start: 2020-01-01 | End: 2020-01-01

## 2020-01-01 RX ORDER — PAPAVERINE HYDROCHLORIDE 30 MG/ML
INJECTION INTRAMUSCULAR; INTRAVENOUS
Status: DISPENSED
Start: 2020-01-01 | End: 2020-01-01

## 2020-01-01 RX ORDER — FENTANYL CITRATE 50 UG/ML
12.5 INJECTION, SOLUTION INTRAMUSCULAR; INTRAVENOUS
Status: DISPENSED | OUTPATIENT
Start: 2020-01-01 | End: 2020-01-01

## 2020-01-01 RX ORDER — FUROSEMIDE 10 MG/ML
20 INJECTION INTRAMUSCULAR; INTRAVENOUS DAILY
Status: DISCONTINUED | OUTPATIENT
Start: 2020-01-01 | End: 2020-01-01

## 2020-01-01 RX ORDER — TAMSULOSIN HYDROCHLORIDE 0.4 MG/1
0.4 CAPSULE ORAL ONCE
Status: COMPLETED | OUTPATIENT
Start: 2020-01-01 | End: 2020-01-01

## 2020-01-01 RX ORDER — SODIUM CHLORIDE 9 MG/ML
250 INJECTION, SOLUTION INTRAVENOUS AS NEEDED
Status: DISCONTINUED | OUTPATIENT
Start: 2020-01-01 | End: 2020-01-01 | Stop reason: HOSPADM

## 2020-01-01 RX ORDER — GUAIFENESIN 100 MG/5ML
81 LIQUID (ML) ORAL DAILY
Status: DISCONTINUED | OUTPATIENT
Start: 2020-01-01 | End: 2020-01-01

## 2020-01-01 RX ORDER — CHOLECALCIFEROL (VITAMIN D3) 125 MCG
10000 CAPSULE ORAL DAILY
Status: DISCONTINUED | OUTPATIENT
Start: 2020-01-01 | End: 2020-01-01

## 2020-01-01 RX ORDER — PROPOFOL 10 MG/ML
INJECTION, EMULSION INTRAVENOUS AS NEEDED
Status: DISCONTINUED | OUTPATIENT
Start: 2020-01-01 | End: 2020-01-01 | Stop reason: HOSPADM

## 2020-01-01 RX ORDER — LORAZEPAM 2 MG/ML
INJECTION INTRAMUSCULAR
Status: DISPENSED
Start: 2020-01-01 | End: 2020-01-01

## 2020-01-01 RX ORDER — DOCUSATE SODIUM 100 MG/1
100 CAPSULE, LIQUID FILLED ORAL 2 TIMES DAILY
Status: DISCONTINUED | OUTPATIENT
Start: 2020-01-01 | End: 2020-01-01

## 2020-01-01 RX ORDER — HEPARIN SODIUM 1000 [USP'U]/ML
INJECTION, SOLUTION INTRAVENOUS; SUBCUTANEOUS
Status: COMPLETED
Start: 2020-01-01 | End: 2020-01-01

## 2020-01-01 RX ORDER — TRAMADOL HYDROCHLORIDE 50 MG/1
50 TABLET ORAL
COMMUNITY
End: 2020-01-01 | Stop reason: ALTCHOICE

## 2020-01-01 RX ORDER — LANOLIN ALCOHOL/MO/W.PET/CERES
3 CREAM (GRAM) TOPICAL
Status: DISCONTINUED | OUTPATIENT
Start: 2020-01-01 | End: 2020-01-01 | Stop reason: HOSPADM

## 2020-01-01 RX ORDER — HEPARIN SODIUM 10000 [USP'U]/100ML
11-25 INJECTION, SOLUTION INTRAVENOUS
Status: DISPENSED | OUTPATIENT
Start: 2020-01-01 | End: 2020-01-01

## 2020-01-01 RX ORDER — ACETAMINOPHEN 325 MG/1
650 TABLET ORAL
Status: DISCONTINUED | OUTPATIENT
Start: 2020-01-01 | End: 2020-01-01

## 2020-01-01 RX ORDER — ACETAMINOPHEN 500 MG
1000 TABLET ORAL
Status: DISCONTINUED | OUTPATIENT
Start: 2020-01-01 | End: 2020-01-01 | Stop reason: HOSPADM

## 2020-01-01 RX ORDER — SODIUM CHLORIDE 9 MG/ML
INJECTION INTRAMUSCULAR; INTRAVENOUS; SUBCUTANEOUS
Status: DISPENSED
Start: 2020-01-01 | End: 2020-01-01

## 2020-01-01 RX ORDER — TAMSULOSIN HYDROCHLORIDE 0.4 MG/1
CAPSULE ORAL
Qty: 90 CAP | Refills: 0 | Status: SHIPPED | OUTPATIENT
Start: 2020-01-01

## 2020-01-01 RX ORDER — CEFAZOLIN SODIUM 2 G/50ML
2 SOLUTION INTRAVENOUS EVERY 8 HOURS
Status: COMPLETED | OUTPATIENT
Start: 2020-01-01 | End: 2020-01-01

## 2020-01-01 RX ORDER — LISINOPRIL 20 MG/1
20 TABLET ORAL 2 TIMES DAILY
Qty: 180 TAB | Refills: 3 | Status: SHIPPED | OUTPATIENT
Start: 2020-01-01

## 2020-01-01 RX ORDER — ASCORBIC ACID 250 MG
250 TABLET ORAL 2 TIMES DAILY WITH MEALS
Qty: 180 TAB | Refills: 1 | Status: SHIPPED | OUTPATIENT
Start: 2020-01-01

## 2020-01-01 RX ORDER — ASPIRIN 81 MG/1
81 TABLET ORAL DAILY
Status: DISCONTINUED | OUTPATIENT
Start: 2020-01-01 | End: 2020-01-01 | Stop reason: HOSPADM

## 2020-01-01 RX ORDER — SODIUM CHLORIDE 9 MG/ML
250 INJECTION, SOLUTION INTRAVENOUS AS NEEDED
Status: DISCONTINUED | OUTPATIENT
Start: 2020-01-01 | End: 2020-01-01 | Stop reason: SDUPTHER

## 2020-01-01 RX ORDER — TRAMADOL HYDROCHLORIDE 50 MG/1
50 TABLET ORAL
Status: DISCONTINUED | OUTPATIENT
Start: 2020-01-01 | End: 2020-01-01 | Stop reason: HOSPADM

## 2020-01-01 RX ORDER — QUETIAPINE FUMARATE 50 MG/1
200 TABLET, EXTENDED RELEASE ORAL
Status: DISCONTINUED | OUTPATIENT
Start: 2020-01-01 | End: 2020-01-01

## 2020-01-01 RX ORDER — AMIODARONE HYDROCHLORIDE 200 MG/1
400 TABLET ORAL 3 TIMES DAILY
Status: DISCONTINUED | OUTPATIENT
Start: 2020-01-01 | End: 2020-01-01

## 2020-01-01 RX ORDER — QUETIAPINE FUMARATE 50 MG/1
100 TABLET, EXTENDED RELEASE ORAL
Status: DISCONTINUED | OUTPATIENT
Start: 2020-01-01 | End: 2020-01-01

## 2020-01-01 RX ORDER — FUROSEMIDE 20 MG/1
20 TABLET ORAL DAILY
Qty: 30 TAB | Refills: 2 | Status: SHIPPED | OUTPATIENT
Start: 2020-01-01 | End: 2020-01-01

## 2020-01-01 RX ORDER — ATORVASTATIN CALCIUM 40 MG/1
40 TABLET, FILM COATED ORAL
Status: DISCONTINUED | OUTPATIENT
Start: 2020-01-01 | End: 2020-01-01 | Stop reason: HOSPADM

## 2020-01-01 RX ORDER — FENTANYL CITRATE 50 UG/ML
12.5-25 INJECTION, SOLUTION INTRAMUSCULAR; INTRAVENOUS
Status: DISPENSED | OUTPATIENT
Start: 2020-01-01 | End: 2020-01-01

## 2020-01-01 RX ORDER — CALCIUM CARBONATE 600 MG
300 TABLET ORAL DAILY
Status: DISCONTINUED | OUTPATIENT
Start: 2020-01-01 | End: 2020-01-01 | Stop reason: HOSPADM

## 2020-01-01 RX ORDER — NYSTATIN 100000 [USP'U]/G
POWDER TOPICAL 2 TIMES DAILY
Status: DISCONTINUED | OUTPATIENT
Start: 2020-01-01 | End: 2020-01-01 | Stop reason: HOSPADM

## 2020-01-01 RX ORDER — FUROSEMIDE 20 MG/1
20 TABLET ORAL DAILY
Status: DISCONTINUED | OUTPATIENT
Start: 2020-01-01 | End: 2020-01-01 | Stop reason: HOSPADM

## 2020-01-01 RX ORDER — SODIUM CHLORIDE 9 MG/ML
INJECTION, SOLUTION INTRAVENOUS
Status: COMPLETED | OUTPATIENT
Start: 2020-01-01 | End: 2020-01-01

## 2020-01-01 RX ORDER — LANOLIN ALCOHOL/MO/W.PET/CERES
CREAM (GRAM) TOPICAL
Qty: 90 TAB | Refills: 0 | Status: SHIPPED | OUTPATIENT
Start: 2020-01-01

## 2020-01-01 RX ORDER — SODIUM CHLORIDE 0.9 % (FLUSH) 0.9 %
5-40 SYRINGE (ML) INJECTION AS NEEDED
Status: DISCONTINUED | OUTPATIENT
Start: 2020-01-01 | End: 2020-01-01

## 2020-01-01 RX ORDER — SODIUM CHLORIDE 0.9 % (FLUSH) 0.9 %
5-40 SYRINGE (ML) INJECTION EVERY 8 HOURS
Status: DISCONTINUED | OUTPATIENT
Start: 2020-01-01 | End: 2020-01-01

## 2020-01-01 RX ORDER — HYDRALAZINE HYDROCHLORIDE 10 MG/1
10 TABLET, FILM COATED ORAL
Status: DISCONTINUED | OUTPATIENT
Start: 2020-01-01 | End: 2020-01-01 | Stop reason: HOSPADM

## 2020-01-01 RX ORDER — ENOXAPARIN SODIUM 100 MG/ML
30 INJECTION SUBCUTANEOUS EVERY 12 HOURS
Status: DISCONTINUED | OUTPATIENT
Start: 2020-01-01 | End: 2020-01-01 | Stop reason: DRUGHIGH

## 2020-01-01 RX ORDER — POTASSIUM CHLORIDE 14.9 MG/ML
10 INJECTION INTRAVENOUS
Status: DISPENSED | OUTPATIENT
Start: 2020-01-01 | End: 2020-01-01

## 2020-01-01 RX ORDER — ASCORBIC ACID 250 MG
250 TABLET ORAL 2 TIMES DAILY WITH MEALS
Qty: 60 TAB | Refills: 1 | Status: SHIPPED | OUTPATIENT
Start: 2020-01-01 | End: 2020-01-01 | Stop reason: SDUPTHER

## 2020-01-01 RX ORDER — MIDAZOLAM HYDROCHLORIDE 1 MG/ML
INJECTION, SOLUTION INTRAMUSCULAR; INTRAVENOUS AS NEEDED
Status: DISCONTINUED | OUTPATIENT
Start: 2020-01-01 | End: 2020-01-01 | Stop reason: HOSPADM

## 2020-01-01 RX ORDER — CHLORHEXIDINE GLUCONATE 1.2 MG/ML
15 RINSE ORAL EVERY 12 HOURS
Status: DISCONTINUED | OUTPATIENT
Start: 2020-01-01 | End: 2020-01-01 | Stop reason: HOSPADM

## 2020-01-01 RX ORDER — ALBUMIN HUMAN 50 G/1000ML
12.5 SOLUTION INTRAVENOUS AS NEEDED
Status: COMPLETED | OUTPATIENT
Start: 2020-01-01 | End: 2020-01-01

## 2020-01-01 RX ORDER — ASCORBIC ACID 250 MG
250 TABLET ORAL 2 TIMES DAILY WITH MEALS
Status: DISCONTINUED | OUTPATIENT
Start: 2020-01-01 | End: 2020-01-01 | Stop reason: HOSPADM

## 2020-01-01 RX ORDER — SODIUM CHLORIDE 9 MG/ML
15 INJECTION, SOLUTION INTRAVENOUS CONTINUOUS
Status: DISCONTINUED | OUTPATIENT
Start: 2020-01-01 | End: 2020-01-01

## 2020-01-01 RX ORDER — ACETAMINOPHEN 500 MG
1000 TABLET ORAL EVERY 6 HOURS
Status: DISCONTINUED | OUTPATIENT
Start: 2020-01-01 | End: 2020-01-01

## 2020-01-01 RX ORDER — LANOLIN ALCOHOL/MO/W.PET/CERES
3 CREAM (GRAM) TOPICAL
Status: DISCONTINUED | OUTPATIENT
Start: 2020-01-01 | End: 2020-01-01

## 2020-01-01 RX ORDER — INSULIN GLARGINE 100 [IU]/ML
40 INJECTION, SOLUTION SUBCUTANEOUS DAILY
Status: DISCONTINUED | OUTPATIENT
Start: 2020-01-01 | End: 2020-01-01

## 2020-01-01 RX ORDER — MAGNESIUM SULFATE 100 %
4 CRYSTALS MISCELLANEOUS AS NEEDED
Status: DISCONTINUED | OUTPATIENT
Start: 2020-01-01 | End: 2020-01-01 | Stop reason: SDUPTHER

## 2020-01-01 RX ORDER — HEPARIN SODIUM 10000 [USP'U]/100ML
18-36 INJECTION, SOLUTION INTRAVENOUS
Status: DISCONTINUED | OUTPATIENT
Start: 2020-01-01 | End: 2020-01-01 | Stop reason: SDUPTHER

## 2020-01-01 RX ORDER — MORPHINE SULFATE 2 MG/ML
2-4 INJECTION, SOLUTION INTRAMUSCULAR; INTRAVENOUS
Status: DISPENSED | OUTPATIENT
Start: 2020-01-01 | End: 2020-01-01

## 2020-01-01 RX ORDER — METOCLOPRAMIDE HYDROCHLORIDE 5 MG/ML
10 INJECTION INTRAMUSCULAR; INTRAVENOUS EVERY 6 HOURS
Status: COMPLETED | OUTPATIENT
Start: 2020-01-01 | End: 2020-01-01

## 2020-01-01 RX ORDER — POTASSIUM CHLORIDE 20 MEQ/1
20 TABLET, EXTENDED RELEASE ORAL
Status: DISCONTINUED | OUTPATIENT
Start: 2020-01-01 | End: 2020-01-01

## 2020-01-01 RX ORDER — ACETAMINOPHEN 325 MG/1
650 TABLET ORAL
Qty: 90 TAB | Refills: 2 | Status: SHIPPED
Start: 2020-01-01

## 2020-01-01 RX ORDER — POTASSIUM CHLORIDE 14.9 MG/ML
INJECTION INTRAVENOUS
Status: DISPENSED
Start: 2020-01-01 | End: 2020-01-01

## 2020-01-01 RX ORDER — LORAZEPAM 2 MG/ML
2 INJECTION INTRAMUSCULAR
Status: DISCONTINUED | OUTPATIENT
Start: 2020-01-01 | End: 2020-01-01

## 2020-01-01 RX ORDER — NITROGLYCERIN 40 MG/100ML
5 INJECTION INTRAVENOUS AS NEEDED
Status: DISCONTINUED | OUTPATIENT
Start: 2020-01-01 | End: 2020-01-01

## 2020-01-01 RX ORDER — MUPIROCIN 20 MG/G
OINTMENT TOPICAL 2 TIMES DAILY
Status: DISCONTINUED | OUTPATIENT
Start: 2020-01-01 | End: 2020-01-01

## 2020-01-01 RX ORDER — INSULIN GLARGINE 100 [IU]/ML
10 INJECTION, SOLUTION SUBCUTANEOUS ONCE
Status: COMPLETED | OUTPATIENT
Start: 2020-01-01 | End: 2020-01-01

## 2020-01-01 RX ORDER — PROTAMINE SULFATE 10 MG/ML
INJECTION, SOLUTION INTRAVENOUS AS NEEDED
Status: DISCONTINUED | OUTPATIENT
Start: 2020-01-01 | End: 2020-01-01 | Stop reason: HOSPADM

## 2020-01-01 RX ORDER — LANOLIN ALCOHOL/MO/W.PET/CERES
1000 CREAM (GRAM) TOPICAL DAILY
Status: DISCONTINUED | OUTPATIENT
Start: 2020-01-01 | End: 2020-01-01 | Stop reason: HOSPADM

## 2020-01-01 RX ORDER — METOPROLOL SUCCINATE 50 MG/1
TABLET, EXTENDED RELEASE ORAL
Qty: 30 TAB | Refills: 3 | Status: SHIPPED | OUTPATIENT
Start: 2020-01-01 | End: 2020-01-01 | Stop reason: ALTCHOICE

## 2020-01-01 RX ORDER — CALCIUM CHLORIDE INJECTION 100 MG/ML
INJECTION, SOLUTION INTRAVENOUS AS NEEDED
Status: DISCONTINUED | OUTPATIENT
Start: 2020-01-01 | End: 2020-01-01 | Stop reason: HOSPADM

## 2020-01-01 RX ORDER — HALOPERIDOL 5 MG/ML
INJECTION INTRAMUSCULAR
Status: DISPENSED
Start: 2020-01-01 | End: 2020-01-01

## 2020-01-01 RX ORDER — FUROSEMIDE 10 MG/ML
40 INJECTION INTRAMUSCULAR; INTRAVENOUS DAILY
Status: DISCONTINUED | OUTPATIENT
Start: 2020-01-01 | End: 2020-01-01

## 2020-01-01 RX ORDER — FUROSEMIDE 10 MG/ML
40 INJECTION INTRAMUSCULAR; INTRAVENOUS 2 TIMES DAILY
Status: DISCONTINUED | OUTPATIENT
Start: 2020-01-01 | End: 2020-01-01

## 2020-01-01 RX ORDER — INSULIN LISPRO 100 [IU]/ML
INJECTION, SOLUTION INTRAVENOUS; SUBCUTANEOUS
Status: DISCONTINUED | OUTPATIENT
Start: 2020-01-01 | End: 2020-01-01 | Stop reason: HOSPADM

## 2020-01-01 RX ORDER — LANOLIN ALCOHOL/MO/W.PET/CERES
1000 CREAM (GRAM) TOPICAL DAILY
Qty: 30 TAB | Refills: 2 | Status: SHIPPED | OUTPATIENT
Start: 2020-01-01 | End: 2020-01-01

## 2020-01-01 RX ORDER — LORAZEPAM 2 MG/ML
1 INJECTION INTRAMUSCULAR ONCE
Status: COMPLETED | OUTPATIENT
Start: 2020-01-01 | End: 2020-01-01

## 2020-01-01 RX ORDER — SODIUM BICARBONATE 1 MEQ/ML
25 SYRINGE (ML) INTRAVENOUS ONCE
Status: DISPENSED | OUTPATIENT
Start: 2020-01-01 | End: 2020-01-01

## 2020-01-01 RX ORDER — POTASSIUM CHLORIDE 20 MEQ/1
20 TABLET, EXTENDED RELEASE ORAL DAILY
Qty: 30 TAB | Refills: 0 | Status: SHIPPED | OUTPATIENT
Start: 2020-01-01 | End: 2020-01-01

## 2020-01-01 RX ORDER — ONDANSETRON 2 MG/ML
4 INJECTION INTRAMUSCULAR; INTRAVENOUS
Status: COMPLETED | OUTPATIENT
Start: 2020-01-01 | End: 2020-01-01

## 2020-01-01 RX ORDER — FUROSEMIDE 40 MG/1
40 TABLET ORAL DAILY
Status: DISCONTINUED | OUTPATIENT
Start: 2020-01-01 | End: 2020-01-01

## 2020-01-01 RX ORDER — ASCORBIC ACID 250 MG
500 TABLET ORAL 3 TIMES DAILY
Status: DISCONTINUED | OUTPATIENT
Start: 2020-01-01 | End: 2020-01-01 | Stop reason: HOSPADM

## 2020-01-01 RX ORDER — ENOXAPARIN SODIUM 100 MG/ML
1 INJECTION SUBCUTANEOUS EVERY 12 HOURS
Status: DISCONTINUED | OUTPATIENT
Start: 2020-01-01 | End: 2020-01-01

## 2020-01-01 RX ORDER — ACETAMINOPHEN 650 MG/1
SUPPOSITORY RECTAL AS NEEDED
Status: DISCONTINUED | OUTPATIENT
Start: 2020-01-01 | End: 2020-01-01 | Stop reason: HOSPADM

## 2020-01-01 RX ORDER — LIDOCAINE HYDROCHLORIDE 20 MG/ML
INJECTION, SOLUTION EPIDURAL; INFILTRATION; INTRACAUDAL; PERINEURAL AS NEEDED
Status: DISCONTINUED | OUTPATIENT
Start: 2020-01-01 | End: 2020-01-01 | Stop reason: HOSPADM

## 2020-01-01 RX ORDER — LORAZEPAM 2 MG/ML
INJECTION INTRAMUSCULAR
Status: DISCONTINUED
Start: 2020-01-01 | End: 2020-01-01

## 2020-01-01 RX ORDER — FAMOTIDINE 20 MG/1
20 TABLET, FILM COATED ORAL 2 TIMES DAILY
Status: DISCONTINUED | OUTPATIENT
Start: 2020-01-01 | End: 2020-01-01

## 2020-01-01 RX ORDER — INSULIN LISPRO 100 [IU]/ML
INJECTION, SOLUTION INTRAVENOUS; SUBCUTANEOUS
Status: DISCONTINUED | OUTPATIENT
Start: 2020-01-01 | End: 2020-01-01

## 2020-01-01 RX ORDER — SILDENAFIL 100 MG/1
TABLET, FILM COATED ORAL
Qty: 6 TAB | Refills: 5 | Status: SHIPPED | OUTPATIENT
Start: 2020-01-01

## 2020-01-01 RX ORDER — BISACODYL 5 MG
10 TABLET, DELAYED RELEASE (ENTERIC COATED) ORAL DAILY
Status: DISCONTINUED | OUTPATIENT
Start: 2020-01-01 | End: 2020-01-01

## 2020-01-01 RX ORDER — ATORVASTATIN CALCIUM 40 MG/1
40 TABLET, FILM COATED ORAL
Qty: 30 TAB | Refills: 2 | Status: SHIPPED | OUTPATIENT
Start: 2020-01-01 | End: 2020-01-01

## 2020-01-01 RX ORDER — SODIUM CHLORIDE 9 MG/ML
10 INJECTION, SOLUTION INTRAVENOUS CONTINUOUS
Status: DISCONTINUED | OUTPATIENT
Start: 2020-01-01 | End: 2020-01-01

## 2020-01-01 RX ORDER — GUAIFENESIN 100 MG/5ML
324 LIQUID (ML) ORAL
Status: COMPLETED | OUTPATIENT
Start: 2020-01-01 | End: 2020-01-01

## 2020-01-01 RX ORDER — HEPARIN SODIUM 10000 [USP'U]/100ML
18-36 INJECTION, SOLUTION INTRAVENOUS
Status: DISCONTINUED | OUTPATIENT
Start: 2020-01-01 | End: 2020-01-01

## 2020-01-01 RX ORDER — FUROSEMIDE 10 MG/ML
40 INJECTION INTRAMUSCULAR; INTRAVENOUS ONCE
Status: COMPLETED | OUTPATIENT
Start: 2020-01-01 | End: 2020-01-01

## 2020-01-01 RX ORDER — SODIUM CHLORIDE 0.9 % (FLUSH) 0.9 %
5-40 SYRINGE (ML) INJECTION AS NEEDED
Status: DISCONTINUED | OUTPATIENT
Start: 2020-01-01 | End: 2020-01-01 | Stop reason: HOSPADM

## 2020-01-01 RX ORDER — LIDOCAINE HYDROCHLORIDE 10 MG/ML
INJECTION, SOLUTION EPIDURAL; INFILTRATION; INTRACAUDAL; PERINEURAL AS NEEDED
Status: DISCONTINUED | OUTPATIENT
Start: 2020-01-01 | End: 2020-01-01 | Stop reason: HOSPADM

## 2020-01-01 RX ORDER — FOLIC ACID 1 MG/1
1 TABLET ORAL DAILY
Qty: 30 TAB | Refills: 2 | Status: SHIPPED | OUTPATIENT
Start: 2020-01-01 | End: 2020-01-01

## 2020-01-01 RX ORDER — POTASSIUM CHLORIDE 20 MEQ/1
20 TABLET, EXTENDED RELEASE ORAL 2 TIMES DAILY
Status: DISCONTINUED | OUTPATIENT
Start: 2020-01-01 | End: 2020-01-01

## 2020-01-01 RX ORDER — DEXAMETHASONE SODIUM PHOSPHATE 4 MG/ML
4 INJECTION, SOLUTION INTRA-ARTICULAR; INTRALESIONAL; INTRAMUSCULAR; INTRAVENOUS; SOFT TISSUE ONCE
Status: COMPLETED | OUTPATIENT
Start: 2020-01-01 | End: 2020-01-01

## 2020-01-01 RX ORDER — ROCURONIUM BROMIDE 10 MG/ML
INJECTION, SOLUTION INTRAVENOUS AS NEEDED
Status: DISCONTINUED | OUTPATIENT
Start: 2020-01-01 | End: 2020-01-01 | Stop reason: HOSPADM

## 2020-01-01 RX ORDER — ENOXAPARIN SODIUM 100 MG/ML
40 INJECTION SUBCUTANEOUS EVERY 12 HOURS
Status: DISCONTINUED | OUTPATIENT
Start: 2020-01-01 | End: 2020-01-01

## 2020-01-01 RX ORDER — UREA 10 %
2 LOTION (ML) TOPICAL 2 TIMES DAILY
Qty: 40 TAB | Refills: 0 | Status: SHIPPED | OUTPATIENT
Start: 2020-01-01 | End: 2020-01-01

## 2020-01-01 RX ORDER — FENTANYL CITRATE 50 UG/ML
INJECTION, SOLUTION INTRAMUSCULAR; INTRAVENOUS AS NEEDED
Status: DISCONTINUED | OUTPATIENT
Start: 2020-01-01 | End: 2020-01-01 | Stop reason: HOSPADM

## 2020-01-01 RX ORDER — CHLORHEXIDINE GLUCONATE 1.2 MG/ML
10 RINSE ORAL 2 TIMES DAILY
Status: DISCONTINUED | OUTPATIENT
Start: 2020-01-01 | End: 2020-01-01 | Stop reason: HOSPADM

## 2020-01-01 RX ORDER — DEXTROSE 50 % IN WATER (D50W) INTRAVENOUS SYRINGE
25-50 AS NEEDED
Status: DISCONTINUED | OUTPATIENT
Start: 2020-01-01 | End: 2020-01-01 | Stop reason: HOSPADM

## 2020-01-01 RX ORDER — VECURONIUM BROMIDE FOR INJECTION 1 MG/ML
INJECTION, POWDER, LYOPHILIZED, FOR SOLUTION INTRAVENOUS AS NEEDED
Status: DISCONTINUED | OUTPATIENT
Start: 2020-01-01 | End: 2020-01-01 | Stop reason: HOSPADM

## 2020-01-01 RX ORDER — PREDNISONE 20 MG/1
40 TABLET ORAL EVERY 12 HOURS
Status: DISCONTINUED | OUTPATIENT
Start: 2020-01-01 | End: 2020-01-01 | Stop reason: HOSPADM

## 2020-01-01 RX ORDER — LANOLIN ALCOHOL/MO/W.PET/CERES
325 CREAM (GRAM) TOPICAL 2 TIMES DAILY WITH MEALS
Qty: 60 TAB | Refills: 2 | Status: SHIPPED | OUTPATIENT
Start: 2020-01-01

## 2020-01-01 RX ORDER — METOPROLOL SUCCINATE 50 MG/1
100 TABLET, EXTENDED RELEASE ORAL
Status: DISCONTINUED | OUTPATIENT
Start: 2020-01-01 | End: 2020-01-01

## 2020-01-01 RX ORDER — INSULIN GLARGINE 100 [IU]/ML
50 INJECTION, SOLUTION SUBCUTANEOUS DAILY
Status: DISCONTINUED | OUTPATIENT
Start: 2020-01-01 | End: 2020-01-01

## 2020-01-01 RX ORDER — SODIUM CHLORIDE 9 MG/ML
50 INJECTION, SOLUTION INTRAVENOUS CONTINUOUS
Status: DISCONTINUED | OUTPATIENT
Start: 2020-01-01 | End: 2020-01-01

## 2020-01-01 RX ORDER — SODIUM CHLORIDE 0.9 % (FLUSH) 0.9 %
5-10 SYRINGE (ML) INJECTION AS NEEDED
Status: DISCONTINUED | OUTPATIENT
Start: 2020-01-01 | End: 2020-01-01 | Stop reason: HOSPADM

## 2020-01-01 RX ORDER — ENOXAPARIN SODIUM 100 MG/ML
30 INJECTION SUBCUTANEOUS EVERY 12 HOURS
Status: DISCONTINUED | OUTPATIENT
Start: 2020-01-01 | End: 2020-01-01 | Stop reason: ALTCHOICE

## 2020-01-01 RX ORDER — SODIUM CHLORIDE 0.9 % (FLUSH) 0.9 %
5-40 SYRINGE (ML) INJECTION EVERY 8 HOURS
Status: DISCONTINUED | OUTPATIENT
Start: 2020-01-01 | End: 2020-01-01 | Stop reason: HOSPADM

## 2020-01-01 RX ORDER — METOPROLOL TARTRATE 25 MG/1
12.5 TABLET, FILM COATED ORAL EVERY 12 HOURS
Status: DISCONTINUED | OUTPATIENT
Start: 2020-01-01 | End: 2020-01-01 | Stop reason: HOSPADM

## 2020-01-01 RX ORDER — ACETAMINOPHEN 325 MG/1
650 TABLET ORAL
Status: DISCONTINUED | OUTPATIENT
Start: 2020-01-01 | End: 2020-01-01 | Stop reason: HOSPADM

## 2020-01-01 RX ORDER — LANOLIN ALCOHOL/MO/W.PET/CERES
1 CREAM (GRAM) TOPICAL
COMMUNITY

## 2020-01-01 RX ORDER — FUROSEMIDE 10 MG/ML
20 INJECTION INTRAMUSCULAR; INTRAVENOUS
Status: COMPLETED | OUTPATIENT
Start: 2020-01-01 | End: 2020-01-01

## 2020-01-01 RX ORDER — METOPROLOL TARTRATE 25 MG/1
25 TABLET, FILM COATED ORAL EVERY 12 HOURS
Status: DISCONTINUED | OUTPATIENT
Start: 2020-01-01 | End: 2020-01-01

## 2020-01-01 RX ORDER — HEPARIN SODIUM 10000 [USP'U]/100ML
18-36 INJECTION, SOLUTION INTRAVENOUS
Status: DISCONTINUED | OUTPATIENT
Start: 2020-01-01 | End: 2020-01-01 | Stop reason: HOSPADM

## 2020-01-01 RX ORDER — FOLIC ACID 1 MG/1
TABLET ORAL
Qty: 90 TAB | Refills: 0 | Status: SHIPPED | OUTPATIENT
Start: 2020-01-01

## 2020-01-01 RX ORDER — ACETAMINOPHEN 650 MG/1
650 SUPPOSITORY RECTAL
Status: DISCONTINUED | OUTPATIENT
Start: 2020-01-01 | End: 2020-01-01 | Stop reason: HOSPADM

## 2020-01-01 RX ORDER — DEXTROSE 50 % IN WATER (D50W) INTRAVENOUS SYRINGE
25-50 AS NEEDED
Status: DISCONTINUED | OUTPATIENT
Start: 2020-01-01 | End: 2020-01-01 | Stop reason: SDUPTHER

## 2020-01-01 RX ORDER — AMOXICILLIN AND CLAVULANATE POTASSIUM 875; 125 MG/1; MG/1
1 TABLET, FILM COATED ORAL 2 TIMES DAILY
Qty: 20 TAB | Refills: 0 | Status: SHIPPED | OUTPATIENT
Start: 2020-01-01 | End: 2020-01-01

## 2020-01-01 RX ORDER — ALLOPURINOL 300 MG/1
TABLET ORAL
Qty: 90 TAB | Refills: 1 | Status: SHIPPED | OUTPATIENT
Start: 2020-01-01

## 2020-01-01 RX ORDER — PROPOFOL 10 MG/ML
VIAL (ML) INTRAVENOUS
Status: DISCONTINUED | OUTPATIENT
Start: 2020-01-01 | End: 2020-01-01 | Stop reason: HOSPADM

## 2020-01-01 RX ORDER — TRAMADOL HYDROCHLORIDE 50 MG/1
50 TABLET ORAL
Qty: 15 TAB | Refills: 0 | Status: SHIPPED | OUTPATIENT
Start: 2020-01-01 | End: 2020-01-01

## 2020-01-01 RX ORDER — PROTAMINE SULFATE 10 MG/ML
INJECTION, SOLUTION INTRAVENOUS
Status: COMPLETED
Start: 2020-01-01 | End: 2020-01-01

## 2020-01-01 RX ORDER — SODIUM CHLORIDE 450 MG/100ML
75 INJECTION, SOLUTION INTRAVENOUS CONTINUOUS
Status: DISCONTINUED | OUTPATIENT
Start: 2020-01-01 | End: 2020-01-01

## 2020-01-01 RX ORDER — POLYETHYLENE GLYCOL 3350 17 G/17G
17 POWDER, FOR SOLUTION ORAL DAILY
Status: DISCONTINUED | OUTPATIENT
Start: 2020-01-01 | End: 2020-01-01

## 2020-01-01 RX ORDER — CEFAZOLIN SODIUM 2 G/50ML
2 SOLUTION INTRAVENOUS ONCE
Status: ACTIVE | OUTPATIENT
Start: 2020-01-01 | End: 2020-01-01

## 2020-01-01 RX ORDER — FACIAL-BODY WIPES
10 EACH TOPICAL DAILY PRN
Status: DISCONTINUED | OUTPATIENT
Start: 2020-01-01 | End: 2020-01-01 | Stop reason: HOSPADM

## 2020-01-01 RX ORDER — PREDNISONE 10 MG/1
TABLET ORAL
Qty: 21 TAB | Refills: 0 | Status: SHIPPED | OUTPATIENT
Start: 2020-01-01

## 2020-01-01 RX ORDER — ALBUMIN HUMAN 50 G/1000ML
25 SOLUTION INTRAVENOUS ONCE
Status: ACTIVE | OUTPATIENT
Start: 2020-01-01 | End: 2020-01-01

## 2020-01-01 RX ORDER — MUPIROCIN 20 MG/G
OINTMENT TOPICAL 2 TIMES DAILY
Status: COMPLETED | OUTPATIENT
Start: 2020-01-01 | End: 2020-01-01

## 2020-01-01 RX ORDER — INSULIN GLARGINE 100 [IU]/ML
6 INJECTION, SOLUTION SUBCUTANEOUS
Status: DISCONTINUED | OUTPATIENT
Start: 2020-01-01 | End: 2020-01-01

## 2020-01-01 RX ORDER — HEPARIN SODIUM 10000 [USP'U]/100ML
18-36 INJECTION, SOLUTION INTRAVENOUS
Status: DISCONTINUED | OUTPATIENT
Start: 2020-01-01 | End: 2020-01-01 | Stop reason: CLARIF

## 2020-01-01 RX ORDER — ADHESIVE BANDAGE
30 BANDAGE TOPICAL DAILY PRN
Status: DISCONTINUED | OUTPATIENT
Start: 2020-01-01 | End: 2020-01-01

## 2020-01-01 RX ORDER — VERAPAMIL HYDROCHLORIDE 2.5 MG/ML
INJECTION, SOLUTION INTRAVENOUS AS NEEDED
Status: DISCONTINUED | OUTPATIENT
Start: 2020-01-01 | End: 2020-01-01 | Stop reason: HOSPADM

## 2020-01-01 RX ORDER — INSULIN GLARGINE 100 [IU]/ML
4 INJECTION, SOLUTION SUBCUTANEOUS
Status: DISCONTINUED | OUTPATIENT
Start: 2020-01-01 | End: 2020-01-01

## 2020-01-01 RX ORDER — ATORVASTATIN CALCIUM 20 MG/1
20 TABLET, FILM COATED ORAL
Status: DISCONTINUED | OUTPATIENT
Start: 2020-01-01 | End: 2020-01-01

## 2020-01-01 RX ORDER — LISINOPRIL 20 MG/1
20 TABLET ORAL 2 TIMES DAILY
COMMUNITY
End: 2020-01-01 | Stop reason: SDUPTHER

## 2020-01-01 RX ORDER — HEPARIN SODIUM 5000 [USP'U]/ML
5000 INJECTION, SOLUTION INTRAVENOUS; SUBCUTANEOUS EVERY 8 HOURS
Status: DISCONTINUED | OUTPATIENT
Start: 2020-01-01 | End: 2020-01-01 | Stop reason: HOSPADM

## 2020-01-01 RX ORDER — NALOXONE HYDROCHLORIDE 0.4 MG/ML
0.4 INJECTION, SOLUTION INTRAMUSCULAR; INTRAVENOUS; SUBCUTANEOUS AS NEEDED
Status: DISCONTINUED | OUTPATIENT
Start: 2020-01-01 | End: 2020-01-01 | Stop reason: HOSPADM

## 2020-01-01 RX ORDER — SODIUM CHLORIDE 9 MG/ML
75 INJECTION, SOLUTION INTRAVENOUS CONTINUOUS
Status: DISCONTINUED | OUTPATIENT
Start: 2020-01-01 | End: 2020-01-01

## 2020-01-01 RX ORDER — ZINC SULFATE 50(220)MG
1 CAPSULE ORAL DAILY
Status: DISCONTINUED | OUTPATIENT
Start: 2020-01-01 | End: 2020-01-01 | Stop reason: HOSPADM

## 2020-01-01 RX ORDER — MELATONIN
4000 DAILY
Status: DISCONTINUED | OUTPATIENT
Start: 2020-01-01 | End: 2020-01-01 | Stop reason: HOSPADM

## 2020-01-01 RX ORDER — ATORVASTATIN CALCIUM 40 MG/1
TABLET, FILM COATED ORAL
Qty: 90 TAB | Refills: 0 | Status: SHIPPED | OUTPATIENT
Start: 2020-01-01

## 2020-01-01 RX ORDER — AMIODARONE HYDROCHLORIDE 200 MG/1
200 TABLET ORAL 3 TIMES DAILY
Status: DISCONTINUED | OUTPATIENT
Start: 2020-01-01 | End: 2020-01-01

## 2020-01-01 RX ORDER — ENOXAPARIN SODIUM 100 MG/ML
40 INJECTION SUBCUTANEOUS EVERY 24 HOURS
Status: DISCONTINUED | OUTPATIENT
Start: 2020-01-01 | End: 2020-01-01

## 2020-01-01 RX ORDER — BACITRACIN 50000 [IU]/1
INJECTION, POWDER, FOR SOLUTION INTRAMUSCULAR
Status: DISPENSED
Start: 2020-01-01 | End: 2020-01-01

## 2020-01-01 RX ORDER — VANCOMYCIN HYDROCHLORIDE 1 G/20ML
INJECTION, POWDER, LYOPHILIZED, FOR SOLUTION INTRAVENOUS
Status: DISPENSED
Start: 2020-01-01 | End: 2020-01-01

## 2020-01-01 RX ORDER — ALBUTEROL SULFATE 0.83 MG/ML
2.5 SOLUTION RESPIRATORY (INHALATION)
Status: DISCONTINUED | OUTPATIENT
Start: 2020-01-01 | End: 2020-01-01 | Stop reason: HOSPADM

## 2020-01-01 RX ORDER — NYSTATIN 100000 [USP'U]/G
POWDER TOPICAL 2 TIMES DAILY
Qty: 1 BOTTLE | Refills: 0 | Status: SHIPPED | OUTPATIENT
Start: 2020-01-01

## 2020-01-01 RX ORDER — MAGNESIUM SULFATE 100 %
4 CRYSTALS MISCELLANEOUS AS NEEDED
Status: DISCONTINUED | OUTPATIENT
Start: 2020-01-01 | End: 2020-01-01 | Stop reason: HOSPADM

## 2020-01-01 RX ORDER — DEXTROSE MONOHYDRATE AND SODIUM CHLORIDE 5; .45 G/100ML; G/100ML
75 INJECTION, SOLUTION INTRAVENOUS CONTINUOUS
Status: DISCONTINUED | OUTPATIENT
Start: 2020-01-01 | End: 2020-01-01

## 2020-01-01 RX ORDER — INSULIN GLARGINE 100 [IU]/ML
10 INJECTION, SOLUTION SUBCUTANEOUS
Status: DISCONTINUED | OUTPATIENT
Start: 2020-01-01 | End: 2020-01-01

## 2020-01-01 RX ORDER — ASPIRIN 81 MG/1
TABLET ORAL
Qty: 90 TAB | Refills: 1 | Status: SHIPPED | OUTPATIENT
Start: 2020-01-01

## 2020-01-01 RX ORDER — AMINOCAPROIC ACID 250 MG/ML
INJECTION, SOLUTION INTRAVENOUS AS NEEDED
Status: DISCONTINUED | OUTPATIENT
Start: 2020-01-01 | End: 2020-01-01 | Stop reason: HOSPADM

## 2020-01-01 RX ORDER — ONDANSETRON 2 MG/ML
4 INJECTION INTRAMUSCULAR; INTRAVENOUS
Status: DISCONTINUED | OUTPATIENT
Start: 2020-01-01 | End: 2020-01-01 | Stop reason: HOSPADM

## 2020-01-01 RX ORDER — SENNOSIDES 8.6 MG/1
2 TABLET ORAL
Status: DISCONTINUED | OUTPATIENT
Start: 2020-01-01 | End: 2020-01-01 | Stop reason: HOSPADM

## 2020-01-01 RX ORDER — LANOLIN ALCOHOL/MO/W.PET/CERES
1 CREAM (GRAM) TOPICAL 2 TIMES DAILY WITH MEALS
Status: DISCONTINUED | OUTPATIENT
Start: 2020-01-01 | End: 2020-01-01 | Stop reason: HOSPADM

## 2020-01-01 RX ORDER — EPINEPHRINE 0.1 MG/ML
INJECTION INTRACARDIAC; INTRAVENOUS
Status: DISCONTINUED
Start: 2020-01-01 | End: 2020-01-01 | Stop reason: WASHOUT

## 2020-01-01 RX ORDER — GUAIFENESIN 100 MG/5ML
81 LIQUID (ML) ORAL DAILY
Status: DISCONTINUED | OUTPATIENT
Start: 2020-01-01 | End: 2020-01-01 | Stop reason: HOSPADM

## 2020-01-01 RX ORDER — LORAZEPAM 2 MG/ML
INJECTION INTRAMUSCULAR
Status: COMPLETED
Start: 2020-01-01 | End: 2020-01-01

## 2020-01-01 RX ORDER — DIPHENHYDRAMINE HCL 25 MG
25 CAPSULE ORAL
Qty: 90 CAP | Refills: 2 | Status: SHIPPED
Start: 2020-01-01 | End: 2020-01-01

## 2020-01-01 RX ORDER — LORAZEPAM 2 MG/ML
0.5 INJECTION INTRAMUSCULAR
Status: DISCONTINUED | OUTPATIENT
Start: 2020-01-01 | End: 2020-01-01

## 2020-01-01 RX ORDER — TAMSULOSIN HYDROCHLORIDE 0.4 MG/1
0.4 CAPSULE ORAL DAILY
Status: DISCONTINUED | OUTPATIENT
Start: 2020-01-01 | End: 2020-01-01 | Stop reason: HOSPADM

## 2020-01-01 RX ORDER — NITROGLYCERIN 40 MG/100ML
0-200 INJECTION INTRAVENOUS
Status: DISCONTINUED | OUTPATIENT
Start: 2020-01-01 | End: 2020-01-01

## 2020-01-01 RX ORDER — DILTIAZEM HYDROCHLORIDE 180 MG/1
180 CAPSULE, COATED, EXTENDED RELEASE ORAL DAILY
Status: DISCONTINUED | OUTPATIENT
Start: 2020-01-01 | End: 2020-01-01

## 2020-01-01 RX ORDER — CEFAZOLIN SODIUM IN 0.9 % NACL 2 G/100 ML
PLASTIC BAG, INJECTION (ML) INTRAVENOUS AS NEEDED
Status: DISCONTINUED | OUTPATIENT
Start: 2020-01-01 | End: 2020-01-01 | Stop reason: HOSPADM

## 2020-01-01 RX ORDER — POTASSIUM CHLORIDE 20 MEQ/1
20 TABLET, EXTENDED RELEASE ORAL
Status: COMPLETED | OUTPATIENT
Start: 2020-01-01 | End: 2020-01-01

## 2020-01-01 RX ORDER — HEPARIN SODIUM 5000 [USP'U]/ML
5000 INJECTION, SOLUTION INTRAVENOUS; SUBCUTANEOUS EVERY 8 HOURS
Status: DISCONTINUED | OUTPATIENT
Start: 2020-01-01 | End: 2020-01-01

## 2020-01-01 RX ORDER — DIPHENHYDRAMINE HCL 25 MG
25 CAPSULE ORAL
Status: DISCONTINUED | OUTPATIENT
Start: 2020-01-01 | End: 2020-01-01 | Stop reason: HOSPADM

## 2020-01-01 RX ORDER — HALOPERIDOL 5 MG/ML
2-5 INJECTION INTRAMUSCULAR
Status: DISCONTINUED | OUTPATIENT
Start: 2020-01-01 | End: 2020-01-01

## 2020-01-01 RX ORDER — HEPARIN SODIUM 1000 [USP'U]/ML
4000 INJECTION, SOLUTION INTRAVENOUS; SUBCUTANEOUS ONCE
Status: COMPLETED | OUTPATIENT
Start: 2020-01-01 | End: 2020-01-01

## 2020-01-01 RX ORDER — CALCIUM CHLORIDE INJECTION 100 MG/ML
INJECTION, SOLUTION INTRAVENOUS
Status: DISPENSED
Start: 2020-01-01 | End: 2020-01-01

## 2020-01-01 RX ORDER — CARVEDILOL 6.25 MG/1
6.25 TABLET ORAL 2 TIMES DAILY WITH MEALS
Status: DISCONTINUED | OUTPATIENT
Start: 2020-01-01 | End: 2020-01-01

## 2020-01-01 RX ADMIN — DEXAMETHASONE SODIUM PHOSPHATE 4 MG: 4 INJECTION, SOLUTION INTRAMUSCULAR; INTRAVENOUS at 06:15

## 2020-01-01 RX ADMIN — FERROUS SULFATE TAB 325 MG (65 MG ELEMENTAL FE) 325 MG: 325 (65 FE) TAB at 09:46

## 2020-01-01 RX ADMIN — MELATONIN 3 MG: at 21:13

## 2020-01-01 RX ADMIN — ALBUMIN (HUMAN) 12.5 G: 12.5 INJECTION, SOLUTION INTRAVENOUS at 21:02

## 2020-01-01 RX ADMIN — DEXAMETHASONE 6 MG: 2 TABLET ORAL at 12:51

## 2020-01-01 RX ADMIN — HEPARIN SODIUM 5000 UNITS: 5000 INJECTION INTRAVENOUS; SUBCUTANEOUS at 21:29

## 2020-01-01 RX ADMIN — METHYLPREDNISOLONE SODIUM SUCCINATE 60 MG: 125 INJECTION, POWDER, FOR SOLUTION INTRAMUSCULAR; INTRAVENOUS at 21:55

## 2020-01-01 RX ADMIN — CHLORHEXIDINE GLUCONATE 0.12% ORAL RINSE 15 ML: 1.2 LIQUID ORAL at 09:23

## 2020-01-01 RX ADMIN — CEFAZOLIN SODIUM 2 G: 2 SOLUTION INTRAVENOUS at 08:58

## 2020-01-01 RX ADMIN — SENNOSIDES 17.2 MG: 8.6 TABLET, FILM COATED ORAL at 21:44

## 2020-01-01 RX ADMIN — INSULIN LISPRO 6 UNITS: 100 INJECTION, SOLUTION INTRAVENOUS; SUBCUTANEOUS at 21:47

## 2020-01-01 RX ADMIN — Medication 250 MG: at 09:20

## 2020-01-01 RX ADMIN — Medication 5000 UNITS: at 09:36

## 2020-01-01 RX ADMIN — Medication 10 ML: at 20:31

## 2020-01-01 RX ADMIN — Medication 2 G: at 08:19

## 2020-01-01 RX ADMIN — CHLORHEXIDINE GLUCONATE 0.12% ORAL RINSE 10 ML: 1.2 LIQUID ORAL at 08:47

## 2020-01-01 RX ADMIN — ENOXAPARIN SODIUM 80 MG: 80 INJECTION SUBCUTANEOUS at 09:22

## 2020-01-01 RX ADMIN — AMIODARONE HYDROCHLORIDE 200 MG: 200 TABLET ORAL at 23:06

## 2020-01-01 RX ADMIN — FENTANYL CITRATE 50 MCG: 50 INJECTION, SOLUTION INTRAMUSCULAR; INTRAVENOUS at 08:44

## 2020-01-01 RX ADMIN — AZITHROMYCIN MONOHYDRATE 500 MG: 500 INJECTION, POWDER, LYOPHILIZED, FOR SOLUTION INTRAVENOUS at 13:59

## 2020-01-01 RX ADMIN — Medication 81 MG: at 08:38

## 2020-01-01 RX ADMIN — SODIUM CHLORIDE 75 ML/HR: 900 INJECTION, SOLUTION INTRAVENOUS at 06:45

## 2020-01-01 RX ADMIN — SODIUM CHLORIDE: 900 INJECTION, SOLUTION INTRAVENOUS at 07:28

## 2020-01-01 RX ADMIN — ATORVASTATIN CALCIUM 40 MG: 40 TABLET, FILM COATED ORAL at 21:09

## 2020-01-01 RX ADMIN — ONDANSETRON 4 MG: 2 INJECTION INTRAMUSCULAR; INTRAVENOUS at 12:52

## 2020-01-01 RX ADMIN — FENTANYL CITRATE 50 MCG: 50 INJECTION, SOLUTION INTRAMUSCULAR; INTRAVENOUS at 09:52

## 2020-01-01 RX ADMIN — TAMSULOSIN HYDROCHLORIDE 0.4 MG: 0.4 CAPSULE ORAL at 09:46

## 2020-01-01 RX ADMIN — ACETAMINOPHEN 1000 MG: 500 TABLET ORAL at 18:54

## 2020-01-01 RX ADMIN — Medication 81 MG: at 09:46

## 2020-01-01 RX ADMIN — HEPARIN SODIUM 5000 UNITS: 5000 INJECTION INTRAVENOUS; SUBCUTANEOUS at 20:12

## 2020-01-01 RX ADMIN — DOCUSATE SODIUM 100 MG: 100 CAPSULE, LIQUID FILLED ORAL at 18:20

## 2020-01-01 RX ADMIN — SODIUM CHLORIDE 150 ML/HR: 900 INJECTION, SOLUTION INTRAVENOUS at 15:04

## 2020-01-01 RX ADMIN — Medication 500 MG: at 16:59

## 2020-01-01 RX ADMIN — Medication 500 MG: at 20:29

## 2020-01-01 RX ADMIN — MELATONIN 3 MG: 3 TAB ORAL at 20:29

## 2020-01-01 RX ADMIN — DEXTROSE 50 % IN WATER (D50W) INTRAVENOUS SYRINGE 25 G: at 17:04

## 2020-01-01 RX ADMIN — INSULIN LISPRO 9 UNITS: 100 INJECTION, SOLUTION INTRAVENOUS; SUBCUTANEOUS at 21:56

## 2020-01-01 RX ADMIN — METHYLPREDNISOLONE SODIUM SUCCINATE 60 MG: 125 INJECTION, POWDER, FOR SOLUTION INTRAMUSCULAR; INTRAVENOUS at 23:56

## 2020-01-01 RX ADMIN — FAMOTIDINE 20 MG: 20 TABLET, FILM COATED ORAL at 09:26

## 2020-01-01 RX ADMIN — ONDANSETRON 4 MG: 2 INJECTION INTRAMUSCULAR; INTRAVENOUS at 14:15

## 2020-01-01 RX ADMIN — CYANOCOBALAMIN TAB 1000 MCG 1000 MCG: 1000 TAB at 14:22

## 2020-01-01 RX ADMIN — Medication 81 MG: at 09:00

## 2020-01-01 RX ADMIN — ACETAMINOPHEN 650 MG: 325 TABLET ORAL at 09:26

## 2020-01-01 RX ADMIN — GUAIFENESIN 1200 MG: 600 TABLET, EXTENDED RELEASE ORAL at 12:26

## 2020-01-01 RX ADMIN — SODIUM CHLORIDE 100 MG: 900 INJECTION, SOLUTION INTRAVENOUS at 09:29

## 2020-01-01 RX ADMIN — HEPARIN SODIUM 5900 UNITS: 1000 INJECTION, SOLUTION INTRAVENOUS; SUBCUTANEOUS at 17:30

## 2020-01-01 RX ADMIN — HYDROCODONE BITARTRATE AND ACETAMINOPHEN 2 TABLET: 5; 325 TABLET ORAL at 15:19

## 2020-01-01 RX ADMIN — POTASSIUM CHLORIDE 40 MEQ: 1500 TABLET, EXTENDED RELEASE ORAL at 08:59

## 2020-01-01 RX ADMIN — TRAMADOL HYDROCHLORIDE 50 MG: 50 TABLET, FILM COATED ORAL at 20:27

## 2020-01-01 RX ADMIN — FAMOTIDINE 20 MG: 20 TABLET ORAL at 08:57

## 2020-01-01 RX ADMIN — Medication 10 ML: at 08:00

## 2020-01-01 RX ADMIN — SODIUM CHLORIDE: 900 INJECTION, SOLUTION INTRAVENOUS at 06:14

## 2020-01-01 RX ADMIN — Medication 500 MG: at 15:44

## 2020-01-01 RX ADMIN — Medication 10 ML: at 14:00

## 2020-01-01 RX ADMIN — DEXTROSE MONOHYDRATE 25 G: 25 INJECTION, SOLUTION INTRAVENOUS at 12:36

## 2020-01-01 RX ADMIN — TRAMADOL HYDROCHLORIDE 50 MG: 50 TABLET, FILM COATED ORAL at 06:10

## 2020-01-01 RX ADMIN — HEPARIN SODIUM 30000 UNITS: 1000 INJECTION, SOLUTION INTRAVENOUS; SUBCUTANEOUS at 09:33

## 2020-01-01 RX ADMIN — Medication 300 MG: at 09:35

## 2020-01-01 RX ADMIN — MUPIROCIN: 20 OINTMENT TOPICAL at 18:07

## 2020-01-01 RX ADMIN — MORPHINE SULFATE 1 MG: 2 INJECTION, SOLUTION INTRAMUSCULAR; INTRAVENOUS at 16:51

## 2020-01-01 RX ADMIN — CHOLECALCIFEROL TAB 25 MCG (1000 UNIT) 4 TABLET: 25 TAB at 09:44

## 2020-01-01 RX ADMIN — AMIODARONE HYDROCHLORIDE 200 MG: 200 TABLET ORAL at 09:19

## 2020-01-01 RX ADMIN — SODIUM CHLORIDE 1000 ML: 900 INJECTION, SOLUTION INTRAVENOUS at 14:50

## 2020-01-01 RX ADMIN — QUETIAPINE FUMARATE 50 MG: 50 TABLET, EXTENDED RELEASE ORAL at 20:12

## 2020-01-01 RX ADMIN — FOLIC ACID 1 MG: 1 TABLET ORAL at 09:46

## 2020-01-01 RX ADMIN — SODIUM CHLORIDE 150 ML/HR: 900 INJECTION, SOLUTION INTRAVENOUS at 20:28

## 2020-01-01 RX ADMIN — Medication 81 MG: at 08:42

## 2020-01-01 RX ADMIN — Medication 10 ML: at 08:44

## 2020-01-01 RX ADMIN — Medication 250 MG: at 08:38

## 2020-01-01 RX ADMIN — ROCURONIUM BROMIDE 40 MG: 10 INJECTION, SOLUTION INTRAVENOUS at 07:38

## 2020-01-01 RX ADMIN — DOCUSATE SODIUM 100 MG: 100 CAPSULE, LIQUID FILLED ORAL at 09:34

## 2020-01-01 RX ADMIN — INSULIN LISPRO 6 UNITS: 100 INJECTION, SOLUTION INTRAVENOUS; SUBCUTANEOUS at 21:52

## 2020-01-01 RX ADMIN — ACETAMINOPHEN 650 MG: 325 TABLET ORAL at 22:30

## 2020-01-01 RX ADMIN — HEPARIN SODIUM 29 UNITS/KG/HR: 10000 INJECTION, SOLUTION INTRAVENOUS at 07:20

## 2020-01-01 RX ADMIN — ACETAMINOPHEN 1000 MG: 500 TABLET ORAL at 12:05

## 2020-01-01 RX ADMIN — Medication 500 MG: at 09:36

## 2020-01-01 RX ADMIN — FUROSEMIDE 20 MG: 10 INJECTION, SOLUTION INTRAMUSCULAR; INTRAVENOUS at 09:20

## 2020-01-01 RX ADMIN — Medication 10 ML: at 17:26

## 2020-01-01 RX ADMIN — TAMSULOSIN HYDROCHLORIDE 0.4 MG: 0.4 CAPSULE ORAL at 08:46

## 2020-01-01 RX ADMIN — VECURONIUM BROMIDE 2 MG: 1 INJECTION, POWDER, LYOPHILIZED, FOR SOLUTION INTRAVENOUS at 08:40

## 2020-01-01 RX ADMIN — FENTANYL CITRATE 12.5 MCG: 50 INJECTION, SOLUTION INTRAMUSCULAR; INTRAVENOUS at 20:55

## 2020-01-01 RX ADMIN — HEPARIN SODIUM 5000 UNITS: 5000 INJECTION INTRAVENOUS; SUBCUTANEOUS at 14:00

## 2020-01-01 RX ADMIN — NYSTATIN: 100000 POWDER TOPICAL at 12:00

## 2020-01-01 RX ADMIN — Medication 500 MG: at 09:26

## 2020-01-01 RX ADMIN — INSULIN LISPRO 3 UNITS: 100 INJECTION, SOLUTION INTRAVENOUS; SUBCUTANEOUS at 12:02

## 2020-01-01 RX ADMIN — ZINC SULFATE 220 MG (50 MG) CAPSULE 1 CAPSULE: CAPSULE at 09:00

## 2020-01-01 RX ADMIN — BISACODYL 10 MG: 5 TABLET, COATED ORAL at 09:00

## 2020-01-01 RX ADMIN — FERROUS SULFATE TAB 325 MG (65 MG ELEMENTAL FE) 325 MG: 325 (65 FE) TAB at 16:40

## 2020-01-01 RX ADMIN — WATER 2 G: 1 INJECTION INTRAMUSCULAR; INTRAVENOUS; SUBCUTANEOUS at 16:00

## 2020-01-01 RX ADMIN — SENNOSIDES 17.2 MG: 8.6 TABLET, FILM COATED ORAL at 22:29

## 2020-01-01 RX ADMIN — AMIODARONE HYDROCHLORIDE 200 MG: 200 TABLET ORAL at 20:14

## 2020-01-01 RX ADMIN — Medication 81 MG: at 09:23

## 2020-01-01 RX ADMIN — MELATONIN 3 MG: 3 TAB ORAL at 20:13

## 2020-01-01 RX ADMIN — CEFAZOLIN SODIUM 2 G: 2 SOLUTION INTRAVENOUS at 16:50

## 2020-01-01 RX ADMIN — METHYLPREDNISOLONE SODIUM SUCCINATE 60 MG: 125 INJECTION, POWDER, FOR SOLUTION INTRAMUSCULAR; INTRAVENOUS at 08:29

## 2020-01-01 RX ADMIN — INSULIN GLARGINE 10 UNITS: 100 INJECTION, SOLUTION SUBCUTANEOUS at 21:57

## 2020-01-01 RX ADMIN — QUETIAPINE FUMARATE 200 MG: 50 TABLET, EXTENDED RELEASE ORAL at 21:02

## 2020-01-01 RX ADMIN — Medication 250 MG: at 09:33

## 2020-01-01 RX ADMIN — FOLIC ACID 1 MG: 1 TABLET ORAL at 09:26

## 2020-01-01 RX ADMIN — GUAIFENESIN 1200 MG: 600 TABLET, EXTENDED RELEASE ORAL at 21:28

## 2020-01-01 RX ADMIN — FOLIC ACID 1 MG: 1 TABLET ORAL at 09:39

## 2020-01-01 RX ADMIN — METOCLOPRAMIDE 10 MG: 5 INJECTION, SOLUTION INTRAMUSCULAR; INTRAVENOUS at 14:38

## 2020-01-01 RX ADMIN — FOLIC ACID 1 MG: 1 TABLET ORAL at 09:00

## 2020-01-01 RX ADMIN — CYANOCOBALAMIN TAB 1000 MCG 1000 MCG: 1000 TAB at 09:39

## 2020-01-01 RX ADMIN — DIPHENHYDRAMINE HYDROCHLORIDE 25 MG: 25 CAPSULE ORAL at 21:28

## 2020-01-01 RX ADMIN — INSULIN GLARGINE 50 UNITS: 100 INJECTION, SOLUTION SUBCUTANEOUS at 08:54

## 2020-01-01 RX ADMIN — MORPHINE SULFATE 2 MG: 2 INJECTION, SOLUTION INTRAMUSCULAR; INTRAVENOUS at 03:43

## 2020-01-01 RX ADMIN — SENNOSIDES 17.2 MG: 8.6 TABLET, FILM COATED ORAL at 20:12

## 2020-01-01 RX ADMIN — Medication 81 MG: at 09:39

## 2020-01-01 RX ADMIN — SODIUM CHLORIDE 289 ML: 900 INJECTION, SOLUTION INTRAVENOUS at 14:50

## 2020-01-01 RX ADMIN — FOLIC ACID 1 MG: 1 TABLET ORAL at 08:30

## 2020-01-01 RX ADMIN — FENTANYL CITRATE 12.5 MCG: 50 INJECTION, SOLUTION INTRAMUSCULAR; INTRAVENOUS at 07:18

## 2020-01-01 RX ADMIN — ACETAMINOPHEN 1000 MG: 500 TABLET ORAL at 00:52

## 2020-01-01 RX ADMIN — INSULIN LISPRO 6 UNITS: 100 INJECTION, SOLUTION INTRAVENOUS; SUBCUTANEOUS at 12:22

## 2020-01-01 RX ADMIN — Medication 300 MG: at 09:23

## 2020-01-01 RX ADMIN — EPINEPHRINE 1 MCG/MIN: 1 INJECTION, SOLUTION, CONCENTRATE INTRAVENOUS at 09:17

## 2020-01-01 RX ADMIN — AMIODARONE HYDROCHLORIDE 200 MG: 200 TABLET ORAL at 08:56

## 2020-01-01 RX ADMIN — Medication 250 MG: at 16:41

## 2020-01-01 RX ADMIN — POLYETHYLENE GLYCOL (3350) 17 G: 17 POWDER, FOR SOLUTION ORAL at 09:34

## 2020-01-01 RX ADMIN — Medication 500 MG: at 15:00

## 2020-01-01 RX ADMIN — METOPROLOL SUCCINATE 100 MG: 100 TABLET, EXTENDED RELEASE ORAL at 21:53

## 2020-01-01 RX ADMIN — POTASSIUM CHLORIDE 20 MEQ: 20 TABLET, EXTENDED RELEASE ORAL at 17:42

## 2020-01-01 RX ADMIN — FAMOTIDINE 20 MG: 20 TABLET, FILM COATED ORAL at 08:59

## 2020-01-01 RX ADMIN — Medication 10 ML: at 06:57

## 2020-01-01 RX ADMIN — LORAZEPAM 0.5 MG: 2 INJECTION INTRAMUSCULAR; INTRAVENOUS at 21:02

## 2020-01-01 RX ADMIN — PROTAMINE SULFATE 50 MG: 10 INJECTION, SOLUTION INTRAVENOUS at 13:07

## 2020-01-01 RX ADMIN — Medication 250 MG: at 16:52

## 2020-01-01 RX ADMIN — FERROUS SULFATE TAB 325 MG (65 MG ELEMENTAL FE) 325 MG: 325 (65 FE) TAB at 09:19

## 2020-01-01 RX ADMIN — MORPHINE SULFATE 2 MG: 2 INJECTION, SOLUTION INTRAMUSCULAR; INTRAVENOUS at 14:00

## 2020-01-01 RX ADMIN — CHLORHEXIDINE GLUCONATE 0.12% ORAL RINSE 10 ML: 1.2 LIQUID ORAL at 09:47

## 2020-01-01 RX ADMIN — ACETAMINOPHEN 1000 MG: 500 TABLET ORAL at 17:03

## 2020-01-01 RX ADMIN — POLYETHYLENE GLYCOL (3350) 17 G: 17 POWDER, FOR SOLUTION ORAL at 10:38

## 2020-01-01 RX ADMIN — Medication 250 MG: at 09:46

## 2020-01-01 RX ADMIN — DOCUSATE SODIUM 100 MG: 100 CAPSULE, LIQUID FILLED ORAL at 17:21

## 2020-01-01 RX ADMIN — FERROUS SULFATE TAB 325 MG (65 MG ELEMENTAL FE) 325 MG: 325 (65 FE) TAB at 18:19

## 2020-01-01 RX ADMIN — WATER 2 G: 1 INJECTION INTRAMUSCULAR; INTRAVENOUS; SUBCUTANEOUS at 14:50

## 2020-01-01 RX ADMIN — CYANOCOBALAMIN TAB 1000 MCG 1000 MCG: 1000 TAB at 08:43

## 2020-01-01 RX ADMIN — WATER 2 G: 1 INJECTION INTRAMUSCULAR; INTRAVENOUS; SUBCUTANEOUS at 15:43

## 2020-01-01 RX ADMIN — HEPARIN SODIUM 5000 UNITS: 5000 INJECTION INTRAVENOUS; SUBCUTANEOUS at 20:27

## 2020-01-01 RX ADMIN — ALBUTEROL SULFATE 2.5 MG: 2.5 SOLUTION RESPIRATORY (INHALATION) at 21:14

## 2020-01-01 RX ADMIN — MAGNESIUM CITRATE 296 ML: 1.75 LIQUID ORAL at 14:22

## 2020-01-01 RX ADMIN — BISACODYL 10 MG: 5 TABLET, COATED ORAL at 09:39

## 2020-01-01 RX ADMIN — METHYLPREDNISOLONE SODIUM SUCCINATE 60 MG: 125 INJECTION, POWDER, FOR SOLUTION INTRAMUSCULAR; INTRAVENOUS at 09:33

## 2020-01-01 RX ADMIN — TRAMADOL HYDROCHLORIDE 50 MG: 50 TABLET, FILM COATED ORAL at 20:14

## 2020-01-01 RX ADMIN — Medication 300 MG: at 08:18

## 2020-01-01 RX ADMIN — Medication 10 ML: at 16:48

## 2020-01-01 RX ADMIN — CHLORHEXIDINE GLUCONATE 0.12% ORAL RINSE 15 ML: 1.2 LIQUID ORAL at 21:58

## 2020-01-01 RX ADMIN — CHLORHEXIDINE GLUCONATE 0.12% ORAL RINSE 10 ML: 1.2 LIQUID ORAL at 08:34

## 2020-01-01 RX ADMIN — INSULIN LISPRO 6 UNITS: 100 INJECTION, SOLUTION INTRAVENOUS; SUBCUTANEOUS at 16:40

## 2020-01-01 RX ADMIN — INSULIN LISPRO 4 UNITS: 100 INJECTION, SOLUTION INTRAVENOUS; SUBCUTANEOUS at 17:30

## 2020-01-01 RX ADMIN — Medication 10 ML: at 21:03

## 2020-01-01 RX ADMIN — ENOXAPARIN SODIUM 30 MG: 40 INJECTION SUBCUTANEOUS at 22:40

## 2020-01-01 RX ADMIN — METOPROLOL TARTRATE 25 MG: 25 TABLET, FILM COATED ORAL at 08:36

## 2020-01-01 RX ADMIN — FENTANYL CITRATE 12.5 MCG: 50 INJECTION, SOLUTION INTRAMUSCULAR; INTRAVENOUS at 23:00

## 2020-01-01 RX ADMIN — INSULIN LISPRO 2 UNITS: 100 INJECTION, SOLUTION INTRAVENOUS; SUBCUTANEOUS at 12:51

## 2020-01-01 RX ADMIN — HYDROCODONE BITARTRATE AND ACETAMINOPHEN 2 TABLET: 5; 325 TABLET ORAL at 05:42

## 2020-01-01 RX ADMIN — CHLORHEXIDINE GLUCONATE 0.12% ORAL RINSE 10 ML: 1.2 LIQUID ORAL at 18:00

## 2020-01-01 RX ADMIN — LORAZEPAM 1 MG: 2 INJECTION INTRAMUSCULAR; INTRAVENOUS at 16:05

## 2020-01-01 RX ADMIN — HEPARIN SODIUM 5000 UNITS: 5000 INJECTION INTRAVENOUS; SUBCUTANEOUS at 14:02

## 2020-01-01 RX ADMIN — CHOLECALCIFEROL TAB 25 MCG (1000 UNIT) 4 TABLET: 25 TAB at 09:46

## 2020-01-01 RX ADMIN — NITROGLYCERIN 20 MCG/MIN: 40 INJECTION INTRAVENOUS at 08:43

## 2020-01-01 RX ADMIN — ENOXAPARIN SODIUM 40 MG: 40 INJECTION SUBCUTANEOUS at 18:54

## 2020-01-01 RX ADMIN — INSULIN GLARGINE 50 UNITS: 100 INJECTION, SOLUTION SUBCUTANEOUS at 10:25

## 2020-01-01 RX ADMIN — INSULIN GLARGINE 10 UNITS: 100 INJECTION, SOLUTION SUBCUTANEOUS at 02:22

## 2020-01-01 RX ADMIN — AMIODARONE HYDROCHLORIDE 200 MG: 200 TABLET ORAL at 19:34

## 2020-01-01 RX ADMIN — Medication 250 MG: at 17:42

## 2020-01-01 RX ADMIN — SENNOSIDES 17.2 MG: 8.6 TABLET, FILM COATED ORAL at 01:37

## 2020-01-01 RX ADMIN — AZITHROMYCIN MONOHYDRATE 500 MG: 500 INJECTION, POWDER, LYOPHILIZED, FOR SOLUTION INTRAVENOUS at 14:26

## 2020-01-01 RX ADMIN — MIDAZOLAM HYDROCHLORIDE 2 MG: 2 INJECTION, SOLUTION INTRAMUSCULAR; INTRAVENOUS at 10:12

## 2020-01-01 RX ADMIN — AMIODARONE HYDROCHLORIDE 200 MG: 200 TABLET ORAL at 16:16

## 2020-01-01 RX ADMIN — Medication 10 ML: at 23:12

## 2020-01-01 RX ADMIN — INSULIN LISPRO 6 UNITS: 100 INJECTION, SOLUTION INTRAVENOUS; SUBCUTANEOUS at 22:40

## 2020-01-01 RX ADMIN — INSULIN LISPRO 3 UNITS: 100 INJECTION, SOLUTION INTRAVENOUS; SUBCUTANEOUS at 06:35

## 2020-01-01 RX ADMIN — FOLIC ACID 1 MG: 1 TABLET ORAL at 09:22

## 2020-01-01 RX ADMIN — Medication 10 ML: at 09:00

## 2020-01-01 RX ADMIN — HEPARIN SODIUM 6020 UNITS: 1000 INJECTION, SOLUTION INTRAVENOUS; SUBCUTANEOUS at 17:40

## 2020-01-01 RX ADMIN — ONDANSETRON 4 MG: 2 INJECTION INTRAMUSCULAR; INTRAVENOUS at 09:50

## 2020-01-01 RX ADMIN — FAMOTIDINE 20 MG: 20 TABLET, FILM COATED ORAL at 09:23

## 2020-01-01 RX ADMIN — Medication 250 MG: at 17:21

## 2020-01-01 RX ADMIN — INSULIN LISPRO 3 UNITS: 100 INJECTION, SOLUTION INTRAVENOUS; SUBCUTANEOUS at 22:11

## 2020-01-01 RX ADMIN — TRAMADOL HYDROCHLORIDE 50 MG: 50 TABLET, FILM COATED ORAL at 05:54

## 2020-01-01 RX ADMIN — INSULIN GLARGINE 10 UNITS: 100 INJECTION, SOLUTION SUBCUTANEOUS at 21:44

## 2020-01-01 RX ADMIN — INSULIN LISPRO 2 UNITS: 100 INJECTION, SOLUTION INTRAVENOUS; SUBCUTANEOUS at 22:00

## 2020-01-01 RX ADMIN — Medication 10 ML: at 06:59

## 2020-01-01 RX ADMIN — Medication 300 MG: at 08:42

## 2020-01-01 RX ADMIN — METOPROLOL TARTRATE 12.5 MG: 25 TABLET ORAL at 08:45

## 2020-01-01 RX ADMIN — Medication 81 MG: at 09:21

## 2020-01-01 RX ADMIN — EPOETIN ALFA-EPBX 4000 UNITS: 4000 INJECTION, SOLUTION INTRAVENOUS; SUBCUTANEOUS at 20:14

## 2020-01-01 RX ADMIN — AMIODARONE HYDROCHLORIDE 200 MG: 200 TABLET ORAL at 08:36

## 2020-01-01 RX ADMIN — AMINOCAPROIC ACID 5 G: 250 INJECTION, SOLUTION INTRAVENOUS at 09:44

## 2020-01-01 RX ADMIN — Medication 10 ML: at 15:00

## 2020-01-01 RX ADMIN — METHYLPREDNISOLONE SODIUM SUCCINATE 60 MG: 125 INJECTION, POWDER, FOR SOLUTION INTRAMUSCULAR; INTRAVENOUS at 20:31

## 2020-01-01 RX ADMIN — FAMOTIDINE 20 MG: 10 INJECTION, SOLUTION INTRAVENOUS at 08:18

## 2020-01-01 RX ADMIN — HUMAN INSULIN 3.9 UNITS/HR: 100 INJECTION, SOLUTION SUBCUTANEOUS at 16:21

## 2020-01-01 RX ADMIN — Medication 500 MG: at 15:02

## 2020-01-01 RX ADMIN — METOPROLOL SUCCINATE 100 MG: 100 TABLET, EXTENDED RELEASE ORAL at 21:59

## 2020-01-01 RX ADMIN — Medication 500 MG: at 08:42

## 2020-01-01 RX ADMIN — PREDNISONE 40 MG: 20 TABLET ORAL at 09:23

## 2020-01-01 RX ADMIN — Medication 500 MG: at 21:59

## 2020-01-01 RX ADMIN — NYSTATIN: 100000 POWDER TOPICAL at 17:44

## 2020-01-01 RX ADMIN — INSULIN LISPRO 6 UNITS: 100 INJECTION, SOLUTION INTRAVENOUS; SUBCUTANEOUS at 12:23

## 2020-01-01 RX ADMIN — CHLORHEXIDINE GLUCONATE 0.12% ORAL RINSE 10 ML: 1.2 LIQUID ORAL at 17:42

## 2020-01-01 RX ADMIN — HEPARIN SODIUM 5730 UNITS: 1000 INJECTION, SOLUTION INTRAVENOUS; SUBCUTANEOUS at 06:21

## 2020-01-01 RX ADMIN — CYANOCOBALAMIN TAB 1000 MCG 1000 MCG: 1000 TAB at 09:46

## 2020-01-01 RX ADMIN — ZINC SULFATE 220 MG (50 MG) CAPSULE 1 CAPSULE: CAPSULE at 09:26

## 2020-01-01 RX ADMIN — DEXTROSE 50 % IN WATER (D50W) INTRAVENOUS SYRINGE 25 G: at 06:05

## 2020-01-01 RX ADMIN — DOCUSATE SODIUM 100 MG: 100 CAPSULE, LIQUID FILLED ORAL at 18:07

## 2020-01-01 RX ADMIN — FENTANYL CITRATE 12.5 MCG: 50 INJECTION, SOLUTION INTRAMUSCULAR; INTRAVENOUS at 14:47

## 2020-01-01 RX ADMIN — AZITHROMYCIN MONOHYDRATE 500 MG: 500 INJECTION, POWDER, LYOPHILIZED, FOR SOLUTION INTRAVENOUS at 16:00

## 2020-01-01 RX ADMIN — WATER 2 G: 1 INJECTION INTRAMUSCULAR; INTRAVENOUS; SUBCUTANEOUS at 13:22

## 2020-01-01 RX ADMIN — POLYETHYLENE GLYCOL (3350) 17 G: 17 POWDER, FOR SOLUTION ORAL at 08:56

## 2020-01-01 RX ADMIN — METHYLPREDNISOLONE SODIUM SUCCINATE 60 MG: 125 INJECTION, POWDER, FOR SOLUTION INTRAMUSCULAR; INTRAVENOUS at 21:38

## 2020-01-01 RX ADMIN — METOPROLOL TARTRATE 25 MG: 25 TABLET, FILM COATED ORAL at 09:00

## 2020-01-01 RX ADMIN — CHLORHEXIDINE GLUCONATE 0.12% ORAL RINSE 10 ML: 1.2 LIQUID ORAL at 10:00

## 2020-01-01 RX ADMIN — GUAIFENESIN 1200 MG: 600 TABLET, EXTENDED RELEASE ORAL at 08:36

## 2020-01-01 RX ADMIN — METOPROLOL TARTRATE 12.5 MG: 25 TABLET ORAL at 13:00

## 2020-01-01 RX ADMIN — POTASSIUM CHLORIDE 20 MEQ: 1500 TABLET, EXTENDED RELEASE ORAL at 09:33

## 2020-01-01 RX ADMIN — Medication 81 MG: at 09:25

## 2020-01-01 RX ADMIN — INSULIN GLARGINE 4 UNITS: 100 INJECTION, SOLUTION SUBCUTANEOUS at 21:52

## 2020-01-01 RX ADMIN — DEXTROSE 50 % IN WATER (D50W) INTRAVENOUS SYRINGE 25 G: at 08:00

## 2020-01-01 RX ADMIN — HEPARIN SODIUM 5900 UNITS: 1000 INJECTION INTRAVENOUS; SUBCUTANEOUS at 21:47

## 2020-01-01 RX ADMIN — ONDANSETRON 4 MG: 2 INJECTION INTRAMUSCULAR; INTRAVENOUS at 12:46

## 2020-01-01 RX ADMIN — HEPARIN SODIUM 5000 UNITS: 5000 INJECTION INTRAVENOUS; SUBCUTANEOUS at 06:00

## 2020-01-01 RX ADMIN — AMIODARONE HYDROCHLORIDE 200 MG: 200 TABLET ORAL at 10:38

## 2020-01-01 RX ADMIN — ENOXAPARIN SODIUM 30 MG: 40 INJECTION SUBCUTANEOUS at 22:03

## 2020-01-01 RX ADMIN — ACETAMINOPHEN 1000 MG: 500 TABLET ORAL at 23:02

## 2020-01-01 RX ADMIN — HEPARIN SODIUM 5000 UNITS: 5000 INJECTION INTRAVENOUS; SUBCUTANEOUS at 14:23

## 2020-01-01 RX ADMIN — MELATONIN 3 MG: 3 TAB ORAL at 21:44

## 2020-01-01 RX ADMIN — INSULIN LISPRO 3 UNITS: 100 INJECTION, SOLUTION INTRAVENOUS; SUBCUTANEOUS at 11:54

## 2020-01-01 RX ADMIN — HALOPERIDOL LACTATE 2 MG: 5 INJECTION, SOLUTION INTRAMUSCULAR at 03:25

## 2020-01-01 RX ADMIN — CEFAZOLIN SODIUM 2 G: 2 SOLUTION INTRAVENOUS at 00:25

## 2020-01-01 RX ADMIN — TAMSULOSIN HYDROCHLORIDE 0.4 MG: 0.4 CAPSULE ORAL at 09:39

## 2020-01-01 RX ADMIN — Medication 10 ML: at 18:07

## 2020-01-01 RX ADMIN — MELATONIN 3 MG: 3 TAB ORAL at 21:39

## 2020-01-01 RX ADMIN — CYANOCOBALAMIN TAB 1000 MCG 1000 MCG: 1000 TAB at 08:38

## 2020-01-01 RX ADMIN — PROPOFOL 25 MCG/KG/MIN: 10 INJECTION, EMULSION INTRAVENOUS at 06:12

## 2020-01-01 RX ADMIN — METOPROLOL TARTRATE 25 MG: 25 TABLET, FILM COATED ORAL at 20:14

## 2020-01-01 RX ADMIN — Medication 10 ML: at 05:19

## 2020-01-01 RX ADMIN — AMIODARONE HYDROCHLORIDE 200 MG: 200 TABLET ORAL at 16:32

## 2020-01-01 RX ADMIN — ATORVASTATIN CALCIUM 40 MG: 40 TABLET, FILM COATED ORAL at 20:30

## 2020-01-01 RX ADMIN — METHYLPREDNISOLONE SODIUM SUCCINATE 60 MG: 125 INJECTION, POWDER, FOR SOLUTION INTRAMUSCULAR; INTRAVENOUS at 08:40

## 2020-01-01 RX ADMIN — SODIUM CHLORIDE 75 ML/HR: 450 INJECTION, SOLUTION INTRAVENOUS at 15:17

## 2020-01-01 RX ADMIN — SODIUM CHLORIDE: 900 INJECTION, SOLUTION INTRAVENOUS at 12:57

## 2020-01-01 RX ADMIN — Medication 250 MG: at 08:46

## 2020-01-01 RX ADMIN — ATORVASTATIN CALCIUM 20 MG: 20 TABLET, FILM COATED ORAL at 21:02

## 2020-01-01 RX ADMIN — Medication 10 ML: at 17:15

## 2020-01-01 RX ADMIN — INSULIN GLARGINE 50 UNITS: 100 INJECTION, SOLUTION SUBCUTANEOUS at 10:22

## 2020-01-01 RX ADMIN — QUETIAPINE FUMARATE 50 MG: 50 TABLET, EXTENDED RELEASE ORAL at 21:02

## 2020-01-01 RX ADMIN — HEPARIN SODIUM 22 UNITS/KG/HR: 10000 INJECTION, SOLUTION INTRAVENOUS at 04:22

## 2020-01-01 RX ADMIN — AZITHROMYCIN MONOHYDRATE 500 MG: 500 INJECTION, POWDER, LYOPHILIZED, FOR SOLUTION INTRAVENOUS at 14:30

## 2020-01-01 RX ADMIN — CHLORHEXIDINE GLUCONATE 0.12% ORAL RINSE 10 ML: 1.2 LIQUID ORAL at 18:18

## 2020-01-01 RX ADMIN — Medication 10 ML: at 06:01

## 2020-01-01 RX ADMIN — MELATONIN 3 MG: 3 TAB ORAL at 21:59

## 2020-01-01 RX ADMIN — Medication 300 MG: at 08:59

## 2020-01-01 RX ADMIN — VECURONIUM BROMIDE 3 MG: 1 INJECTION, POWDER, LYOPHILIZED, FOR SOLUTION INTRAVENOUS at 09:56

## 2020-01-01 RX ADMIN — MORPHINE SULFATE 2 MG: 2 INJECTION, SOLUTION INTRAMUSCULAR; INTRAVENOUS at 23:28

## 2020-01-01 RX ADMIN — Medication 500 MG: at 16:45

## 2020-01-01 RX ADMIN — IPRATROPIUM BROMIDE AND ALBUTEROL 2 PUFF: 20; 100 SPRAY, METERED RESPIRATORY (INHALATION) at 20:00

## 2020-01-01 RX ADMIN — FAMOTIDINE 20 MG: 20 TABLET, FILM COATED ORAL at 08:29

## 2020-01-01 RX ADMIN — INSULIN LISPRO 9 UNITS: 100 INJECTION, SOLUTION INTRAVENOUS; SUBCUTANEOUS at 09:03

## 2020-01-01 RX ADMIN — FAMOTIDINE 20 MG: 20 TABLET, FILM COATED ORAL at 09:21

## 2020-01-01 RX ADMIN — Medication 81 MG: at 09:29

## 2020-01-01 RX ADMIN — EPOETIN ALFA-EPBX 4000 UNITS: 4000 INJECTION, SOLUTION INTRAVENOUS; SUBCUTANEOUS at 20:27

## 2020-01-01 RX ADMIN — Medication 10 ML: at 21:15

## 2020-01-01 RX ADMIN — FUROSEMIDE 40 MG: 10 INJECTION, SOLUTION INTRAMUSCULAR; INTRAVENOUS at 05:29

## 2020-01-01 RX ADMIN — Medication 10 ML: at 18:26

## 2020-01-01 RX ADMIN — EPINEPHRINE 1 MCG/MIN: 1 INJECTION INTRAMUSCULAR; INTRAVENOUS; SUBCUTANEOUS at 12:22

## 2020-01-01 RX ADMIN — AMIODARONE HYDROCHLORIDE 200 MG: 200 TABLET ORAL at 09:34

## 2020-01-01 RX ADMIN — Medication 10 ML: at 06:00

## 2020-01-01 RX ADMIN — FAMOTIDINE 20 MG: 20 TABLET ORAL at 09:46

## 2020-01-01 RX ADMIN — SODIUM CHLORIDE 100 MG: 900 INJECTION, SOLUTION INTRAVENOUS at 08:43

## 2020-01-01 RX ADMIN — ACETAMINOPHEN 1000 MG: 500 TABLET ORAL at 17:21

## 2020-01-01 RX ADMIN — INSULIN LISPRO 3 UNITS: 100 INJECTION, SOLUTION INTRAVENOUS; SUBCUTANEOUS at 21:29

## 2020-01-01 RX ADMIN — DEXTROSE 50 % IN WATER (D50W) INTRAVENOUS SYRINGE 25 G: at 18:36

## 2020-01-01 RX ADMIN — HEPARIN SODIUM 33 UNITS/KG/HR: 10000 INJECTION, SOLUTION INTRAVENOUS at 20:00

## 2020-01-01 RX ADMIN — FOLIC ACID 1 MG: 1 TABLET ORAL at 08:41

## 2020-01-01 RX ADMIN — Medication 500 MG: at 14:26

## 2020-01-01 RX ADMIN — GUAIFENESIN 1200 MG: 600 TABLET, EXTENDED RELEASE ORAL at 20:12

## 2020-01-01 RX ADMIN — GUAIFENESIN 1200 MG: 600 TABLET, EXTENDED RELEASE ORAL at 20:06

## 2020-01-01 RX ADMIN — ACETAMINOPHEN 650 MG: 650 SOLUTION ORAL at 10:16

## 2020-01-01 RX ADMIN — DEXAMETHASONE 6 MG: 2 TABLET ORAL at 21:59

## 2020-01-01 RX ADMIN — Medication 10 ML: at 13:31

## 2020-01-01 RX ADMIN — MUPIROCIN: 20 OINTMENT TOPICAL at 18:00

## 2020-01-01 RX ADMIN — Medication 500 MG: at 22:00

## 2020-01-01 RX ADMIN — CHLORHEXIDINE GLUCONATE 0.12% ORAL RINSE: 1.2 LIQUID ORAL at 18:08

## 2020-01-01 RX ADMIN — FOLIC ACID 1 MG: 1 TABLET ORAL at 08:39

## 2020-01-01 RX ADMIN — HEPARIN SODIUM 6000 UNITS: 1000 INJECTION, SOLUTION INTRAVENOUS; SUBCUTANEOUS at 19:55

## 2020-01-01 RX ADMIN — SENNOSIDES 17.2 MG: 8.6 TABLET, FILM COATED ORAL at 21:59

## 2020-01-01 RX ADMIN — Medication 300 MG: at 09:21

## 2020-01-01 RX ADMIN — Medication 500 MG: at 14:34

## 2020-01-01 RX ADMIN — INSULIN LISPRO 9 UNITS: 100 INJECTION, SOLUTION INTRAVENOUS; SUBCUTANEOUS at 07:30

## 2020-01-01 RX ADMIN — Medication 10 ML: at 06:25

## 2020-01-01 RX ADMIN — Medication 10 ML: at 22:00

## 2020-01-01 RX ADMIN — Medication 5000 UNITS: at 09:21

## 2020-01-01 RX ADMIN — Medication 81 MG: at 09:37

## 2020-01-01 RX ADMIN — ACETAMINOPHEN 1000 MG: 500 TABLET ORAL at 23:10

## 2020-01-01 RX ADMIN — ATORVASTATIN CALCIUM 40 MG: 40 TABLET, FILM COATED ORAL at 01:37

## 2020-01-01 RX ADMIN — METHYLPREDNISOLONE SODIUM SUCCINATE 60 MG: 125 INJECTION, POWDER, FOR SOLUTION INTRAMUSCULAR; INTRAVENOUS at 16:45

## 2020-01-01 RX ADMIN — FUROSEMIDE 40 MG: 10 INJECTION, SOLUTION INTRAMUSCULAR; INTRAVENOUS at 17:20

## 2020-01-01 RX ADMIN — SODIUM CHLORIDE 200 MG: 900 INJECTION, SOLUTION INTRAVENOUS at 09:33

## 2020-01-01 RX ADMIN — HEPARIN SODIUM 18 UNITS/KG/HR: 10000 INJECTION, SOLUTION INTRAVENOUS at 09:27

## 2020-01-01 RX ADMIN — METOPROLOL TARTRATE 25 MG: 25 TABLET, FILM COATED ORAL at 09:33

## 2020-01-01 RX ADMIN — SODIUM CHLORIDE 15 ML/HR: 900 INJECTION, SOLUTION INTRAVENOUS at 16:47

## 2020-01-01 RX ADMIN — Medication 300 MG: at 09:26

## 2020-01-01 RX ADMIN — METOPROLOL TARTRATE 25 MG: 25 TABLET, FILM COATED ORAL at 20:27

## 2020-01-01 RX ADMIN — LORAZEPAM 0.5 MG: 2 INJECTION INTRAMUSCULAR; INTRAVENOUS at 22:32

## 2020-01-01 RX ADMIN — INSULIN LISPRO 9 UNITS: 100 INJECTION, SOLUTION INTRAVENOUS; SUBCUTANEOUS at 22:25

## 2020-01-01 RX ADMIN — FENTANYL CITRATE 50 MCG: 50 INJECTION, SOLUTION INTRAMUSCULAR; INTRAVENOUS at 08:41

## 2020-01-01 RX ADMIN — BISACODYL 10 MG: 5 TABLET, COATED ORAL at 08:57

## 2020-01-01 RX ADMIN — FOLIC ACID 1 MG: 1 TABLET ORAL at 09:37

## 2020-01-01 RX ADMIN — MORPHINE SULFATE 1 MG: 2 INJECTION, SOLUTION INTRAMUSCULAR; INTRAVENOUS at 10:05

## 2020-01-01 RX ADMIN — ALBUMIN (HUMAN) 12.5 G: 12.5 INJECTION, SOLUTION INTRAVENOUS at 15:35

## 2020-01-01 RX ADMIN — CARVEDILOL 6.25 MG: 6.25 TABLET, FILM COATED ORAL at 18:25

## 2020-01-01 RX ADMIN — Medication 10 ML: at 20:12

## 2020-01-01 RX ADMIN — CALCIUM CHLORIDE 0.5 G: 100 INJECTION INTRAVENOUS; INTRAVENTRICULAR at 12:50

## 2020-01-01 RX ADMIN — TAMSULOSIN HYDROCHLORIDE 0.4 MG: 0.4 CAPSULE ORAL at 21:02

## 2020-01-01 RX ADMIN — Medication 250 MG: at 09:39

## 2020-01-01 RX ADMIN — INSULIN GLARGINE 40 UNITS: 100 INJECTION, SOLUTION SUBCUTANEOUS at 09:46

## 2020-01-01 RX ADMIN — AMIODARONE HYDROCHLORIDE 200 MG: 200 TABLET ORAL at 21:02

## 2020-01-01 RX ADMIN — AMIODARONE HYDROCHLORIDE 200 MG: 200 TABLET ORAL at 15:31

## 2020-01-01 RX ADMIN — MUPIROCIN CALCIUM: 20 OINTMENT TOPICAL at 22:12

## 2020-01-01 RX ADMIN — Medication 10 ML: at 21:39

## 2020-01-01 RX ADMIN — AMIODARONE HYDROCHLORIDE 400 MG: 200 TABLET ORAL at 22:09

## 2020-01-01 RX ADMIN — Medication 250 MG: at 18:19

## 2020-01-01 RX ADMIN — Medication 5000 UNITS: at 09:01

## 2020-01-01 RX ADMIN — SODIUM CHLORIDE 75 ML/HR: 900 INJECTION, SOLUTION INTRAVENOUS at 14:00

## 2020-01-01 RX ADMIN — FERROUS SULFATE TAB 325 MG (65 MG ELEMENTAL FE) 325 MG: 325 (65 FE) TAB at 16:55

## 2020-01-01 RX ADMIN — NITROGLYCERIN 20 MCG/MIN: 40 INJECTION INTRAVENOUS at 12:11

## 2020-01-01 RX ADMIN — AZITHROMYCIN MONOHYDRATE 500 MG: 500 INJECTION, POWDER, LYOPHILIZED, FOR SOLUTION INTRAVENOUS at 15:20

## 2020-01-01 RX ADMIN — ENOXAPARIN SODIUM 30 MG: 40 INJECTION SUBCUTANEOUS at 21:59

## 2020-01-01 RX ADMIN — Medication 10 ML: at 21:46

## 2020-01-01 RX ADMIN — VECURONIUM BROMIDE 3 MG: 1 INJECTION, POWDER, LYOPHILIZED, FOR SOLUTION INTRAVENOUS at 12:18

## 2020-01-01 RX ADMIN — Medication 500 MG: at 08:19

## 2020-01-01 RX ADMIN — WATER 2 G: 1 INJECTION INTRAMUSCULAR; INTRAVENOUS; SUBCUTANEOUS at 14:00

## 2020-01-01 RX ADMIN — HEPARIN SODIUM 10000 UNITS: 1000 INJECTION, SOLUTION INTRAVENOUS; SUBCUTANEOUS at 09:48

## 2020-01-01 RX ADMIN — INSULIN LISPRO 6 UNITS: 100 INJECTION, SOLUTION INTRAVENOUS; SUBCUTANEOUS at 07:30

## 2020-01-01 RX ADMIN — FOLIC ACID 1 MG: 1 TABLET ORAL at 08:18

## 2020-01-01 RX ADMIN — PREDNISONE 40 MG: 20 TABLET ORAL at 21:59

## 2020-01-01 RX ADMIN — METOPROLOL SUCCINATE 25 MG: 25 TABLET, EXTENDED RELEASE ORAL at 21:59

## 2020-01-01 RX ADMIN — FOLIC ACID 1 MG: 1 TABLET ORAL at 09:23

## 2020-01-01 RX ADMIN — ZINC SULFATE 220 MG (50 MG) CAPSULE 1 CAPSULE: CAPSULE at 09:23

## 2020-01-01 RX ADMIN — FENTANYL CITRATE 100 MCG: 50 INJECTION, SOLUTION INTRAMUSCULAR; INTRAVENOUS at 08:21

## 2020-01-01 RX ADMIN — INSULIN LISPRO 6 UNITS: 100 INJECTION, SOLUTION INTRAVENOUS; SUBCUTANEOUS at 16:41

## 2020-01-01 RX ADMIN — HEPARIN SODIUM 17 UNITS/KG/HR: 10000 INJECTION, SOLUTION INTRAVENOUS at 00:13

## 2020-01-01 RX ADMIN — METOPROLOL SUCCINATE 100 MG: 100 TABLET, EXTENDED RELEASE ORAL at 21:39

## 2020-01-01 RX ADMIN — METOPROLOL TARTRATE 25 MG: 25 TABLET ORAL at 13:22

## 2020-01-01 RX ADMIN — CHOLECALCIFEROL TAB 25 MCG (1000 UNIT) 4 TABLET: 25 TAB at 08:44

## 2020-01-01 RX ADMIN — DEXTROSE MONOHYDRATE AND SODIUM CHLORIDE 75 ML/HR: 5; .45 INJECTION, SOLUTION INTRAVENOUS at 06:02

## 2020-01-01 RX ADMIN — EPOETIN ALFA-EPBX 4000 UNITS: 4000 INJECTION, SOLUTION INTRAVENOUS; SUBCUTANEOUS at 14:14

## 2020-01-01 RX ADMIN — FENTANYL CITRATE 25 MCG: 50 INJECTION, SOLUTION INTRAMUSCULAR; INTRAVENOUS at 09:33

## 2020-01-01 RX ADMIN — Medication 500 MG: at 22:39

## 2020-01-01 RX ADMIN — MELATONIN 3 MG: 3 TAB ORAL at 21:58

## 2020-01-01 RX ADMIN — ALBUMIN (HUMAN) 12.5 G: 12.5 INJECTION, SOLUTION INTRAVENOUS at 15:08

## 2020-01-01 RX ADMIN — FUROSEMIDE 40 MG: 10 INJECTION, SOLUTION INTRAMUSCULAR; INTRAVENOUS at 08:55

## 2020-01-01 RX ADMIN — POTASSIUM CHLORIDE 20 MEQ: 20 TABLET, EXTENDED RELEASE ORAL at 09:38

## 2020-01-01 RX ADMIN — IOPAMIDOL 70 ML: 755 INJECTION, SOLUTION INTRAVENOUS at 15:03

## 2020-01-01 RX ADMIN — Medication 500 MG: at 09:21

## 2020-01-01 RX ADMIN — WATER 2 G: 1 INJECTION INTRAMUSCULAR; INTRAVENOUS; SUBCUTANEOUS at 15:19

## 2020-01-01 RX ADMIN — Medication 500 MG: at 21:53

## 2020-01-01 RX ADMIN — CHLORHEXIDINE GLUCONATE 0.12% ORAL RINSE 10 ML: 1.2 LIQUID ORAL at 17:21

## 2020-01-01 RX ADMIN — AMIODARONE HYDROCHLORIDE 200 MG: 200 TABLET ORAL at 16:55

## 2020-01-01 RX ADMIN — INSULIN LISPRO 12 UNITS: 100 INJECTION, SOLUTION INTRAVENOUS; SUBCUTANEOUS at 14:34

## 2020-01-01 RX ADMIN — Medication 500 MG: at 15:30

## 2020-01-01 RX ADMIN — ACETAMINOPHEN 1000 MG: 500 TABLET ORAL at 06:22

## 2020-01-01 RX ADMIN — ACETAMINOPHEN 1000 MG: 500 TABLET ORAL at 12:26

## 2020-01-01 RX ADMIN — FUROSEMIDE 20 MG: 10 INJECTION, SOLUTION INTRAMUSCULAR; INTRAVENOUS at 14:23

## 2020-01-01 RX ADMIN — Medication 300 MG: at 08:30

## 2020-01-01 RX ADMIN — METOPROLOL SUCCINATE 100 MG: 100 TABLET, EXTENDED RELEASE ORAL at 20:29

## 2020-01-01 RX ADMIN — FOLIC ACID 1 MG: 1 TABLET ORAL at 14:22

## 2020-01-01 RX ADMIN — CHOLECALCIFEROL TAB 25 MCG (1000 UNIT) 4 TABLET: 25 TAB at 08:34

## 2020-01-01 RX ADMIN — INSULIN GLARGINE 10 UNITS: 100 INJECTION, SOLUTION SUBCUTANEOUS at 09:00

## 2020-01-01 RX ADMIN — METOPROLOL TARTRATE 25 MG: 25 TABLET, FILM COATED ORAL at 20:50

## 2020-01-01 RX ADMIN — METOPROLOL SUCCINATE 100 MG: 100 TABLET, EXTENDED RELEASE ORAL at 22:39

## 2020-01-01 RX ADMIN — METOPROLOL SUCCINATE 100 MG: 100 TABLET, EXTENDED RELEASE ORAL at 20:12

## 2020-01-01 RX ADMIN — FOLIC ACID 1 MG: 1 TABLET ORAL at 09:36

## 2020-01-01 RX ADMIN — DEXTROSE 50 % IN WATER (D50W) INTRAVENOUS SYRINGE 25 G: at 16:08

## 2020-01-01 RX ADMIN — Medication 10 ML: at 14:08

## 2020-01-01 RX ADMIN — Medication 5000 UNITS: at 08:29

## 2020-01-01 RX ADMIN — BISACODYL 10 MG: 5 TABLET, COATED ORAL at 10:38

## 2020-01-01 RX ADMIN — SENNOSIDES 17.2 MG: 8.6 TABLET, FILM COATED ORAL at 22:00

## 2020-01-01 RX ADMIN — NITROGLYCERIN 25 MCG/MIN: 40 INJECTION INTRAVENOUS at 14:06

## 2020-01-01 RX ADMIN — ENOXAPARIN SODIUM 30 MG: 30 INJECTION SUBCUTANEOUS at 16:00

## 2020-01-01 RX ADMIN — ZINC SULFATE 220 MG (50 MG) CAPSULE 1 CAPSULE: CAPSULE at 08:18

## 2020-01-01 RX ADMIN — FERROUS SULFATE TAB 325 MG (65 MG ELEMENTAL FE) 325 MG: 325 (65 FE) TAB at 08:46

## 2020-01-01 RX ADMIN — FAMOTIDINE 20 MG: 20 TABLET, FILM COATED ORAL at 09:30

## 2020-01-01 RX ADMIN — INSULIN LISPRO 4 UNITS: 100 INJECTION, SOLUTION INTRAVENOUS; SUBCUTANEOUS at 18:25

## 2020-01-01 RX ADMIN — DOCUSATE SODIUM 100 MG: 100 CAPSULE, LIQUID FILLED ORAL at 10:39

## 2020-01-01 RX ADMIN — INSULIN LISPRO 3 UNITS: 100 INJECTION, SOLUTION INTRAVENOUS; SUBCUTANEOUS at 17:30

## 2020-01-01 RX ADMIN — INSULIN LISPRO 15 UNITS: 100 INJECTION, SOLUTION INTRAVENOUS; SUBCUTANEOUS at 01:39

## 2020-01-01 RX ADMIN — POTASSIUM CHLORIDE 10 MEQ: 200 INJECTION, SOLUTION INTRAVENOUS at 08:08

## 2020-01-01 RX ADMIN — INSULIN LISPRO 6 UNITS: 100 INJECTION, SOLUTION INTRAVENOUS; SUBCUTANEOUS at 20:31

## 2020-01-01 RX ADMIN — ONDANSETRON 4 MG: 2 INJECTION INTRAMUSCULAR; INTRAVENOUS at 23:34

## 2020-01-01 RX ADMIN — WATER 2 G: 1 INJECTION INTRAMUSCULAR; INTRAVENOUS; SUBCUTANEOUS at 10:27

## 2020-01-01 RX ADMIN — ACETAMINOPHEN 1000 MG: 500 TABLET ORAL at 12:22

## 2020-01-01 RX ADMIN — SODIUM CHLORIDE 100 MG: 900 INJECTION, SOLUTION INTRAVENOUS at 08:34

## 2020-01-01 RX ADMIN — HEPARIN SODIUM 5000 UNITS: 5000 INJECTION INTRAVENOUS; SUBCUTANEOUS at 05:25

## 2020-01-01 RX ADMIN — METHYLPREDNISOLONE SODIUM SUCCINATE 60 MG: 125 INJECTION, POWDER, FOR SOLUTION INTRAMUSCULAR; INTRAVENOUS at 09:00

## 2020-01-01 RX ADMIN — FAMOTIDINE 20 MG: 20 TABLET, FILM COATED ORAL at 08:42

## 2020-01-01 RX ADMIN — MORPHINE SULFATE 2 MG: 2 INJECTION, SOLUTION INTRAMUSCULAR; INTRAVENOUS at 10:59

## 2020-01-01 RX ADMIN — ATORVASTATIN CALCIUM 40 MG: 40 TABLET, FILM COATED ORAL at 21:39

## 2020-01-01 RX ADMIN — INSULIN LISPRO 9 UNITS: 100 INJECTION, SOLUTION INTRAVENOUS; SUBCUTANEOUS at 11:32

## 2020-01-01 RX ADMIN — INSULIN LISPRO 2 UNITS: 100 INJECTION, SOLUTION INTRAVENOUS; SUBCUTANEOUS at 07:30

## 2020-01-01 RX ADMIN — CYANOCOBALAMIN TAB 1000 MCG 1000 MCG: 1000 TAB at 09:20

## 2020-01-01 RX ADMIN — FERROUS SULFATE TAB 325 MG (65 MG ELEMENTAL FE) 325 MG: 325 (65 FE) TAB at 17:42

## 2020-01-01 RX ADMIN — CHLORHEXIDINE GLUCONATE 0.12% ORAL RINSE 10 ML: 1.2 LIQUID ORAL at 09:17

## 2020-01-01 RX ADMIN — ATORVASTATIN CALCIUM 40 MG: 40 TABLET, FILM COATED ORAL at 21:53

## 2020-01-01 RX ADMIN — ALBUMIN (HUMAN) 12.5 G: 12.5 INJECTION, SOLUTION INTRAVENOUS at 16:20

## 2020-01-01 RX ADMIN — PROTAMINE SULFATE 10 MG: 10 INJECTION, SOLUTION INTRAVENOUS at 12:47

## 2020-01-01 RX ADMIN — HEPARIN SODIUM 5000 UNITS: 5000 INJECTION INTRAVENOUS; SUBCUTANEOUS at 06:10

## 2020-01-01 RX ADMIN — Medication 10 ML: at 21:06

## 2020-01-01 RX ADMIN — ACETAMINOPHEN 650 MG: 325 TABLET ORAL at 06:27

## 2020-01-01 RX ADMIN — TRAMADOL HYDROCHLORIDE 50 MG: 50 TABLET, FILM COATED ORAL at 21:28

## 2020-01-01 RX ADMIN — CHLORHEXIDINE GLUCONATE 0.12% ORAL RINSE 10 ML: 1.2 LIQUID ORAL at 17:03

## 2020-01-01 RX ADMIN — ALBUTEROL SULFATE 2.5 MG: 2.5 SOLUTION RESPIRATORY (INHALATION) at 20:12

## 2020-01-01 RX ADMIN — PROPOFOL 80 MG: 10 INJECTION, EMULSION INTRAVENOUS at 07:38

## 2020-01-01 RX ADMIN — SODIUM CHLORIDE 1 G/HR: 9 INJECTION, SOLUTION INTRAVENOUS at 09:45

## 2020-01-01 RX ADMIN — MORPHINE SULFATE 1 MG: 2 INJECTION, SOLUTION INTRAMUSCULAR; INTRAVENOUS at 23:59

## 2020-01-01 RX ADMIN — DEXTROSE MONOHYDRATE AND SODIUM CHLORIDE 75 ML/HR: 5; .45 INJECTION, SOLUTION INTRAVENOUS at 12:34

## 2020-01-01 RX ADMIN — Medication 10 ML: at 14:30

## 2020-01-01 RX ADMIN — Medication 300 MG: at 09:19

## 2020-01-01 RX ADMIN — METOPROLOL TARTRATE 12.5 MG: 25 TABLET, FILM COATED ORAL at 15:19

## 2020-01-01 RX ADMIN — LIDOCAINE HYDROCHLORIDE 60 MG: 20 INJECTION, SOLUTION INTRAVENOUS at 07:38

## 2020-01-01 RX ADMIN — MELATONIN 3 MG: 3 TAB ORAL at 22:39

## 2020-01-01 RX ADMIN — AMIODARONE HYDROCHLORIDE 200 MG: 200 TABLET ORAL at 17:22

## 2020-01-01 RX ADMIN — LORAZEPAM 0.5 MG: 2 INJECTION INTRAMUSCULAR; INTRAVENOUS at 14:39

## 2020-01-01 RX ADMIN — Medication 500 MG: at 15:19

## 2020-01-01 RX ADMIN — INSULIN GLARGINE 10 UNITS: 100 INJECTION, SOLUTION SUBCUTANEOUS at 01:40

## 2020-01-01 RX ADMIN — METOPROLOL SUCCINATE 100 MG: 100 TABLET, EXTENDED RELEASE ORAL at 01:37

## 2020-01-01 RX ADMIN — INSULIN LISPRO 9 UNITS: 100 INJECTION, SOLUTION INTRAVENOUS; SUBCUTANEOUS at 16:55

## 2020-01-01 RX ADMIN — FENTANYL CITRATE 50 MCG: 50 INJECTION, SOLUTION INTRAMUSCULAR; INTRAVENOUS at 09:48

## 2020-01-01 RX ADMIN — Medication 81 MG: at 08:57

## 2020-01-01 RX ADMIN — ATORVASTATIN CALCIUM 40 MG: 40 TABLET, FILM COATED ORAL at 22:39

## 2020-01-01 RX ADMIN — FAMOTIDINE 20 MG: 20 TABLET ORAL at 09:33

## 2020-01-01 RX ADMIN — ATORVASTATIN CALCIUM 40 MG: 40 TABLET, FILM COATED ORAL at 22:00

## 2020-01-01 RX ADMIN — ACETAMINOPHEN 650 MG: 325 TABLET ORAL at 16:00

## 2020-01-01 RX ADMIN — FAMOTIDINE 20 MG: 20 TABLET, FILM COATED ORAL at 09:36

## 2020-01-01 RX ADMIN — Medication 81 MG: at 09:19

## 2020-01-01 RX ADMIN — INSULIN LISPRO 3 UNITS: 100 INJECTION, SOLUTION INTRAVENOUS; SUBCUTANEOUS at 17:38

## 2020-01-01 RX ADMIN — METHYLPREDNISOLONE SODIUM SUCCINATE 60 MG: 125 INJECTION, POWDER, FOR SOLUTION INTRAMUSCULAR; INTRAVENOUS at 09:21

## 2020-01-01 RX ADMIN — Medication 10 ML: at 09:29

## 2020-01-01 RX ADMIN — METOPROLOL TARTRATE 12.5 MG: 25 TABLET ORAL at 21:28

## 2020-01-01 RX ADMIN — ATORVASTATIN CALCIUM 20 MG: 20 TABLET, FILM COATED ORAL at 23:06

## 2020-01-01 RX ADMIN — CHOLECALCIFEROL TAB 25 MCG (1000 UNIT) 4 TABLET: 25 TAB at 09:39

## 2020-01-01 RX ADMIN — ATORVASTATIN CALCIUM 40 MG: 40 TABLET, FILM COATED ORAL at 21:28

## 2020-01-01 RX ADMIN — INSULIN LISPRO 3 UNITS: 100 INJECTION, SOLUTION INTRAVENOUS; SUBCUTANEOUS at 12:33

## 2020-01-01 RX ADMIN — FAMOTIDINE 20 MG: 10 INJECTION, SOLUTION INTRAVENOUS at 09:19

## 2020-01-01 RX ADMIN — HEPARIN SODIUM 5900 UNITS: 1000 INJECTION INTRAVENOUS; SUBCUTANEOUS at 07:20

## 2020-01-01 RX ADMIN — Medication 10 ML: at 01:41

## 2020-01-01 RX ADMIN — ACETAMINOPHEN 650 MG: 325 TABLET ORAL at 12:28

## 2020-01-01 RX ADMIN — Medication 10 ML: at 20:16

## 2020-01-01 RX ADMIN — PROTAMINE SULFATE 50 MG: 10 INJECTION, SOLUTION INTRAVENOUS at 17:10

## 2020-01-01 RX ADMIN — ACETAMINOPHEN 1000 MG: 500 TABLET ORAL at 23:06

## 2020-01-01 RX ADMIN — FUROSEMIDE 20 MG: 10 INJECTION, SOLUTION INTRAMUSCULAR; INTRAVENOUS at 09:32

## 2020-01-01 RX ADMIN — METHYLPREDNISOLONE SODIUM SUCCINATE 60 MG: 125 INJECTION, POWDER, FOR SOLUTION INTRAMUSCULAR; INTRAVENOUS at 09:26

## 2020-01-01 RX ADMIN — HEPARIN SODIUM 1197 UNITS/HR: 10000 INJECTION, SOLUTION INTRAVENOUS at 14:15

## 2020-01-01 RX ADMIN — Medication 10 ML: at 21:55

## 2020-01-01 RX ADMIN — HEPARIN SODIUM 11 UNITS/KG/HR: 10000 INJECTION, SOLUTION INTRAVENOUS at 16:28

## 2020-01-01 RX ADMIN — Medication 81 MG: at 09:33

## 2020-01-01 RX ADMIN — METOPROLOL TARTRATE 12.5 MG: 25 TABLET, FILM COATED ORAL at 10:38

## 2020-01-01 RX ADMIN — FENTANYL CITRATE 25 MCG: 50 INJECTION, SOLUTION INTRAMUSCULAR; INTRAVENOUS at 01:00

## 2020-01-01 RX ADMIN — METOCLOPRAMIDE 10 MG: 5 INJECTION, SOLUTION INTRAMUSCULAR; INTRAVENOUS at 05:43

## 2020-01-01 RX ADMIN — TRAMADOL HYDROCHLORIDE 50 MG: 50 TABLET, FILM COATED ORAL at 09:39

## 2020-01-01 RX ADMIN — AMIODARONE HYDROCHLORIDE 200 MG: 200 TABLET ORAL at 17:42

## 2020-01-01 RX ADMIN — MELATONIN 3 MG: 3 TAB ORAL at 21:53

## 2020-01-01 RX ADMIN — HALOPERIDOL LACTATE 2 MG: 5 INJECTION, SOLUTION INTRAMUSCULAR at 19:42

## 2020-01-01 RX ADMIN — FAMOTIDINE 20 MG: 10 INJECTION, SOLUTION INTRAVENOUS at 09:36

## 2020-01-01 RX ADMIN — POLYETHYLENE GLYCOL (3350) 17 G: 17 POWDER, FOR SOLUTION ORAL at 09:20

## 2020-01-01 RX ADMIN — Medication 300 MG: at 09:37

## 2020-01-01 RX ADMIN — HEPARIN SODIUM 5000 UNITS: 5000 INJECTION INTRAVENOUS; SUBCUTANEOUS at 21:10

## 2020-01-01 RX ADMIN — INSULIN LISPRO 9 UNITS: 100 INJECTION, SOLUTION INTRAVENOUS; SUBCUTANEOUS at 12:09

## 2020-01-01 RX ADMIN — CHLORHEXIDINE GLUCONATE 0.12% ORAL RINSE 10 ML: 1.2 LIQUID ORAL at 09:29

## 2020-01-01 RX ADMIN — QUETIAPINE FUMARATE 50 MG: 50 TABLET, EXTENDED RELEASE ORAL at 20:27

## 2020-01-01 RX ADMIN — ATORVASTATIN CALCIUM 20 MG: 20 TABLET, FILM COATED ORAL at 22:09

## 2020-01-01 RX ADMIN — ZINC SULFATE 220 MG (50 MG) CAPSULE 1 CAPSULE: CAPSULE at 09:36

## 2020-01-01 RX ADMIN — ACETAMINOPHEN 1000 MG: 500 TABLET ORAL at 05:53

## 2020-01-01 RX ADMIN — METOPROLOL TARTRATE 12.5 MG: 25 TABLET, FILM COATED ORAL at 19:34

## 2020-01-01 RX ADMIN — Medication 10 ML: at 06:23

## 2020-01-01 RX ADMIN — DEXTROSE 50 % IN WATER (D50W) INTRAVENOUS SYRINGE 12.5 G: at 03:19

## 2020-01-01 RX ADMIN — ACETAMINOPHEN 1000 MG: 500 TABLET ORAL at 11:04

## 2020-01-01 RX ADMIN — ONDANSETRON 4 MG: 2 INJECTION INTRAMUSCULAR; INTRAVENOUS at 11:35

## 2020-01-01 RX ADMIN — ENOXAPARIN SODIUM 30 MG: 40 INJECTION SUBCUTANEOUS at 09:36

## 2020-01-01 RX ADMIN — POTASSIUM CHLORIDE 10 MEQ: 200 INJECTION, SOLUTION INTRAVENOUS at 09:49

## 2020-01-01 RX ADMIN — ACETAMINOPHEN 650 MG: 325 TABLET ORAL at 15:20

## 2020-01-01 RX ADMIN — FENTANYL CITRATE 25 MCG: 50 INJECTION, SOLUTION INTRAMUSCULAR; INTRAVENOUS at 14:55

## 2020-01-01 RX ADMIN — ZINC SULFATE 220 MG (50 MG) CAPSULE 1 CAPSULE: CAPSULE at 09:19

## 2020-01-01 RX ADMIN — DOCUSATE SODIUM 100 MG: 100 CAPSULE, LIQUID FILLED ORAL at 09:38

## 2020-01-01 RX ADMIN — PROTAMINE SULFATE 240 MG: 10 INJECTION, SOLUTION INTRAVENOUS at 12:55

## 2020-01-01 RX ADMIN — SODIUM CHLORIDE 75 ML/HR: 900 INJECTION, SOLUTION INTRAVENOUS at 01:00

## 2020-01-01 RX ADMIN — Medication 10 ML: at 21:30

## 2020-01-01 RX ADMIN — NYSTATIN: 100000 POWDER TOPICAL at 08:47

## 2020-01-01 RX ADMIN — TAMSULOSIN HYDROCHLORIDE 0.4 MG: 0.4 CAPSULE ORAL at 08:38

## 2020-01-01 RX ADMIN — MUPIROCIN: 20 OINTMENT TOPICAL at 09:31

## 2020-01-01 RX ADMIN — Medication 5000 UNITS: at 09:23

## 2020-01-01 RX ADMIN — FENTANYL CITRATE 50 MCG: 50 INJECTION, SOLUTION INTRAMUSCULAR; INTRAVENOUS at 08:39

## 2020-01-01 RX ADMIN — SODIUM CHLORIDE 60 ML/HR: 900 INJECTION, SOLUTION INTRAVENOUS at 06:27

## 2020-01-01 RX ADMIN — FAMOTIDINE 20 MG: 10 INJECTION, SOLUTION INTRAVENOUS at 16:52

## 2020-01-01 RX ADMIN — METOPROLOL TARTRATE 25 MG: 25 TABLET, FILM COATED ORAL at 20:09

## 2020-01-01 RX ADMIN — FUROSEMIDE 40 MG: 40 TABLET ORAL at 09:00

## 2020-01-01 RX ADMIN — HEPARIN SODIUM 5000 UNITS: 5000 INJECTION INTRAVENOUS; SUBCUTANEOUS at 13:09

## 2020-01-01 RX ADMIN — LORAZEPAM 2 MG: 2 INJECTION INTRAMUSCULAR; INTRAVENOUS at 03:35

## 2020-01-01 RX ADMIN — ATORVASTATIN CALCIUM 40 MG: 40 TABLET, FILM COATED ORAL at 21:44

## 2020-01-01 RX ADMIN — METOCLOPRAMIDE 10 MG: 5 INJECTION, SOLUTION INTRAMUSCULAR; INTRAVENOUS at 18:07

## 2020-01-01 RX ADMIN — HEPARIN SODIUM 25 UNITS/KG/HR: 10000 INJECTION, SOLUTION INTRAVENOUS at 15:24

## 2020-01-01 RX ADMIN — METOCLOPRAMIDE 10 MG: 5 INJECTION, SOLUTION INTRAMUSCULAR; INTRAVENOUS at 23:39

## 2020-01-01 RX ADMIN — FOLIC ACID 1 MG: 1 TABLET ORAL at 09:33

## 2020-01-01 RX ADMIN — Medication 81 MG: at 08:18

## 2020-01-01 RX ADMIN — Medication 500 MG: at 09:01

## 2020-01-01 RX ADMIN — WATER 2 G: 1 INJECTION INTRAMUSCULAR; INTRAVENOUS; SUBCUTANEOUS at 14:26

## 2020-01-01 RX ADMIN — Medication 5000 UNITS: at 08:42

## 2020-01-01 RX ADMIN — POTASSIUM CHLORIDE 20 MEQ: 20 TABLET, EXTENDED RELEASE ORAL at 17:22

## 2020-01-01 RX ADMIN — Medication 10 ML: at 13:06

## 2020-01-01 RX ADMIN — HALOPERIDOL LACTATE 3 MG: 5 INJECTION, SOLUTION INTRAMUSCULAR at 03:00

## 2020-01-01 RX ADMIN — ASPIRIN 81 MG 324 MG: 81 TABLET ORAL at 22:00

## 2020-01-01 RX ADMIN — Medication 500 MG: at 09:20

## 2020-01-01 RX ADMIN — DOCUSATE SODIUM 100 MG: 100 CAPSULE, LIQUID FILLED ORAL at 09:19

## 2020-01-01 RX ADMIN — INSULIN GLARGINE 10 UNITS: 100 INJECTION, SOLUTION SUBCUTANEOUS at 13:00

## 2020-01-01 RX ADMIN — Medication: at 18:00

## 2020-01-01 RX ADMIN — FERROUS SULFATE TAB 325 MG (65 MG ELEMENTAL FE) 325 MG: 325 (65 FE) TAB at 17:20

## 2020-01-01 RX ADMIN — METHYLPREDNISOLONE SODIUM SUCCINATE 60 MG: 125 INJECTION, POWDER, FOR SOLUTION INTRAMUSCULAR; INTRAVENOUS at 20:12

## 2020-01-01 RX ADMIN — Medication 10 ML: at 06:11

## 2020-01-01 RX ADMIN — AMIODARONE HYDROCHLORIDE 200 MG: 200 TABLET ORAL at 15:35

## 2020-01-01 RX ADMIN — GUAIFENESIN 1200 MG: 600 TABLET, EXTENDED RELEASE ORAL at 08:45

## 2020-01-01 RX ADMIN — POTASSIUM CHLORIDE 40 MEQ: 1500 TABLET, EXTENDED RELEASE ORAL at 08:42

## 2020-01-01 RX ADMIN — Medication 500 MG: at 16:48

## 2020-01-01 RX ADMIN — WATER 2 G: 1 INJECTION INTRAMUSCULAR; INTRAVENOUS; SUBCUTANEOUS at 16:44

## 2020-01-01 RX ADMIN — AMIODARONE HYDROCHLORIDE 200 MG: 200 TABLET ORAL at 09:39

## 2020-01-01 RX ADMIN — INSULIN LISPRO 6 UNITS: 100 INJECTION, SOLUTION INTRAVENOUS; SUBCUTANEOUS at 09:37

## 2020-01-01 RX ADMIN — DOCUSATE SODIUM 100 MG: 100 CAPSULE, LIQUID FILLED ORAL at 17:03

## 2020-01-01 RX ADMIN — METOPROLOL TARTRATE 12.5 MG: 25 TABLET, FILM COATED ORAL at 08:58

## 2020-01-01 RX ADMIN — ACETAMINOPHEN 1000 MG: 500 TABLET ORAL at 06:56

## 2020-01-01 RX ADMIN — Medication 5000 UNITS: at 09:26

## 2020-01-01 RX ADMIN — HALOPERIDOL LACTATE 2 MG: 5 INJECTION, SOLUTION INTRAMUSCULAR at 02:16

## 2020-01-01 RX ADMIN — PHENYLEPHRINE HYDROCHLORIDE 10 MCG/MIN: 10 INJECTION INTRAVENOUS at 07:45

## 2020-01-01 RX ADMIN — Medication 10 ML: at 23:56

## 2020-01-01 RX ADMIN — INSULIN LISPRO 9 UNITS: 100 INJECTION, SOLUTION INTRAVENOUS; SUBCUTANEOUS at 08:31

## 2020-01-01 RX ADMIN — FUROSEMIDE 40 MG: 10 INJECTION, SOLUTION INTRAMUSCULAR; INTRAVENOUS at 09:38

## 2020-01-01 RX ADMIN — Medication 10 ML: at 14:04

## 2020-01-01 RX ADMIN — ENOXAPARIN SODIUM 30 MG: 40 INJECTION SUBCUTANEOUS at 08:17

## 2020-01-01 RX ADMIN — ATORVASTATIN CALCIUM 20 MG: 20 TABLET, FILM COATED ORAL at 20:26

## 2020-01-01 RX ADMIN — MUPIROCIN: 20 OINTMENT TOPICAL at 08:57

## 2020-01-01 RX ADMIN — HEPARIN SODIUM 5000 UNITS: 5000 INJECTION INTRAVENOUS; SUBCUTANEOUS at 21:02

## 2020-01-01 RX ADMIN — Medication 500 MG: at 09:25

## 2020-01-01 RX ADMIN — FAMOTIDINE 20 MG: 20 TABLET ORAL at 10:39

## 2020-01-01 RX ADMIN — TRAMADOL HYDROCHLORIDE 50 MG: 50 TABLET, FILM COATED ORAL at 13:29

## 2020-01-01 RX ADMIN — Medication 10 ML: at 08:32

## 2020-01-01 RX ADMIN — HEPARIN SODIUM 5000 UNITS: 5000 INJECTION INTRAVENOUS; SUBCUTANEOUS at 14:22

## 2020-01-01 RX ADMIN — Medication 10000 UNITS: at 06:14

## 2020-01-01 RX ADMIN — INSULIN LISPRO 3 UNITS: 100 INJECTION, SOLUTION INTRAVENOUS; SUBCUTANEOUS at 12:34

## 2020-01-01 RX ADMIN — AZITHROMYCIN MONOHYDRATE 500 MG: 500 INJECTION, POWDER, LYOPHILIZED, FOR SOLUTION INTRAVENOUS at 15:44

## 2020-01-01 RX ADMIN — ONDANSETRON 4 MG: 2 INJECTION INTRAMUSCULAR; INTRAVENOUS at 10:05

## 2020-01-01 RX ADMIN — INSULIN GLARGINE 6 UNITS: 100 INJECTION, SOLUTION SUBCUTANEOUS at 20:13

## 2020-01-01 RX ADMIN — PROPOFOL 25 MCG/KG/MIN: 10 INJECTION, EMULSION INTRAVENOUS at 12:16

## 2020-01-01 RX ADMIN — INSULIN LISPRO 6 UNITS: 100 INJECTION, SOLUTION INTRAVENOUS; SUBCUTANEOUS at 12:15

## 2020-01-01 RX ADMIN — ATORVASTATIN CALCIUM 40 MG: 40 TABLET, FILM COATED ORAL at 21:59

## 2020-01-01 RX ADMIN — HEPARIN SODIUM 4000 UNITS: 1000 INJECTION, SOLUTION INTRAVENOUS; SUBCUTANEOUS at 16:26

## 2020-01-01 RX ADMIN — FOLIC ACID 1 MG: 1 TABLET ORAL at 09:27

## 2020-01-01 RX ADMIN — INSULIN LISPRO 3 UNITS: 100 INJECTION, SOLUTION INTRAVENOUS; SUBCUTANEOUS at 08:43

## 2020-01-01 RX ADMIN — INSULIN LISPRO 3 UNITS: 100 INJECTION, SOLUTION INTRAVENOUS; SUBCUTANEOUS at 16:52

## 2020-01-01 RX ADMIN — Medication 5000 UNITS: at 12:51

## 2020-01-01 RX ADMIN — CHLORHEXIDINE GLUCONATE 0.12% ORAL RINSE 15 ML: 1.2 LIQUID ORAL at 13:20

## 2020-01-01 RX ADMIN — HEPARIN SODIUM 5000 UNITS: 5000 INJECTION INTRAVENOUS; SUBCUTANEOUS at 06:22

## 2020-01-01 RX ADMIN — CHLORHEXIDINE GLUCONATE 0.12% ORAL RINSE 10 ML: 1.2 LIQUID ORAL at 18:54

## 2020-01-01 RX ADMIN — MELATONIN 3 MG: 3 TAB ORAL at 01:38

## 2020-01-01 RX ADMIN — INSULIN LISPRO 6 UNITS: 100 INJECTION, SOLUTION INTRAVENOUS; SUBCUTANEOUS at 13:00

## 2020-01-01 RX ADMIN — DOCUSATE SODIUM 100 MG: 100 CAPSULE, LIQUID FILLED ORAL at 08:56

## 2020-01-01 RX ADMIN — Medication 81 MG: at 09:26

## 2020-01-01 RX ADMIN — MELATONIN 3 MG: 3 TAB ORAL at 22:01

## 2020-01-01 RX ADMIN — CHLORHEXIDINE GLUCONATE 0.12% ORAL RINSE 10 ML: 1.2 LIQUID ORAL at 17:39

## 2020-01-01 RX ADMIN — Medication 10 ML: at 22:14

## 2020-01-01 RX ADMIN — SODIUM CHLORIDE 1.8 UNITS/HR: 0.9 INJECTION INTRAVENOUS at 06:15

## 2020-01-01 RX ADMIN — Medication 500 MG: at 20:12

## 2020-01-01 RX ADMIN — BISACODYL 10 MG: 5 TABLET, COATED ORAL at 09:34

## 2020-01-01 RX ADMIN — FAMOTIDINE 20 MG: 20 TABLET ORAL at 08:45

## 2020-01-01 RX ADMIN — METHYLPREDNISOLONE SODIUM SUCCINATE 60 MG: 125 INJECTION, POWDER, FOR SOLUTION INTRAMUSCULAR; INTRAVENOUS at 21:44

## 2020-01-01 RX ADMIN — METHYLPREDNISOLONE SODIUM SUCCINATE 60 MG: 125 INJECTION, POWDER, FOR SOLUTION INTRAMUSCULAR; INTRAVENOUS at 09:37

## 2020-01-01 RX ADMIN — MORPHINE SULFATE 1 MG: 2 INJECTION, SOLUTION INTRAMUSCULAR; INTRAVENOUS at 05:54

## 2020-01-01 RX ADMIN — SODIUM CHLORIDE 6 UNITS/HR: 0.9 INJECTION INTRAVENOUS at 09:46

## 2020-01-01 RX ADMIN — Medication 10 ML: at 16:08

## 2020-01-01 RX ADMIN — Medication 10 ML: at 21:14

## 2020-01-01 RX ADMIN — INSULIN LISPRO 6 UNITS: 100 INJECTION, SOLUTION INTRAVENOUS; SUBCUTANEOUS at 09:30

## 2020-01-01 RX ADMIN — CHLORHEXIDINE GLUCONATE 0.12% ORAL RINSE 10 ML: 1.2 LIQUID ORAL at 10:38

## 2020-01-01 RX ADMIN — ATORVASTATIN CALCIUM 40 MG: 40 TABLET, FILM COATED ORAL at 20:13

## 2020-01-01 RX ADMIN — AMIODARONE HYDROCHLORIDE 200 MG: 200 TABLET ORAL at 21:09

## 2020-01-01 RX ADMIN — POTASSIUM CHLORIDE 10 MEQ: 200 INJECTION, SOLUTION INTRAVENOUS at 11:25

## 2020-01-01 RX ADMIN — INSULIN LISPRO 6 UNITS: 100 INJECTION, SOLUTION INTRAVENOUS; SUBCUTANEOUS at 16:58

## 2020-01-01 RX ADMIN — FERROUS SULFATE TAB 325 MG (65 MG ELEMENTAL FE) 325 MG: 325 (65 FE) TAB at 09:33

## 2020-01-01 RX ADMIN — THIAMINE HYDROCHLORIDE 100 MG: 100 INJECTION, SOLUTION INTRAMUSCULAR; INTRAVENOUS at 10:25

## 2020-01-01 RX ADMIN — Medication 81 MG: at 09:36

## 2020-01-01 RX ADMIN — Medication 250 MG: at 16:55

## 2020-01-01 RX ADMIN — AMIODARONE HYDROCHLORIDE 200 MG: 200 TABLET ORAL at 20:26

## 2020-01-01 RX ADMIN — GUAIFENESIN 1200 MG: 600 TABLET, EXTENDED RELEASE ORAL at 09:46

## 2020-01-01 RX ADMIN — FOLIC ACID 1 MG: 1 TABLET ORAL at 08:46

## 2020-01-01 RX ADMIN — LORAZEPAM 0.5 MG: 2 INJECTION INTRAMUSCULAR; INTRAVENOUS at 21:00

## 2020-01-01 RX ADMIN — Medication 81 MG: at 09:02

## 2020-01-01 RX ADMIN — AZITHROMYCIN MONOHYDRATE 500 MG: 500 INJECTION, POWDER, LYOPHILIZED, FOR SOLUTION INTRAVENOUS at 14:50

## 2020-01-01 RX ADMIN — Medication 10 ML: at 05:43

## 2020-01-01 RX ADMIN — SODIUM CHLORIDE 150 ML/HR: 900 INJECTION, SOLUTION INTRAVENOUS at 00:42

## 2020-01-01 RX ADMIN — ACETAMINOPHEN 1000 MG: 500 TABLET ORAL at 17:42

## 2020-01-01 RX ADMIN — FUROSEMIDE 20 MG: 10 INJECTION, SOLUTION INTRAMUSCULAR; INTRAVENOUS at 16:16

## 2020-01-01 RX ADMIN — POTASSIUM CHLORIDE 10 MEQ: 200 INJECTION, SOLUTION INTRAVENOUS at 12:05

## 2020-01-01 RX ADMIN — FENTANYL CITRATE 12.5 MCG: 50 INJECTION, SOLUTION INTRAMUSCULAR; INTRAVENOUS at 01:48

## 2020-01-01 RX ADMIN — AZITHROMYCIN MONOHYDRATE 500 MG: 500 INJECTION, POWDER, LYOPHILIZED, FOR SOLUTION INTRAVENOUS at 13:22

## 2020-01-01 RX ADMIN — PROPOFOL 25 MCG/KG/MIN: 10 INJECTION, EMULSION INTRAVENOUS at 19:09

## 2020-01-01 RX ADMIN — DOCUSATE SODIUM 100 MG: 100 CAPSULE, LIQUID FILLED ORAL at 18:54

## 2020-01-01 RX ADMIN — TAMSULOSIN HYDROCHLORIDE 0.4 MG: 0.4 CAPSULE ORAL at 09:33

## 2020-01-01 RX ADMIN — FAMOTIDINE 20 MG: 20 TABLET ORAL at 09:38

## 2020-01-01 RX ADMIN — FAMOTIDINE 20 MG: 20 TABLET ORAL at 09:19

## 2020-01-01 RX ADMIN — POTASSIUM BICARBONATE 40 MEQ: 782 TABLET, EFFERVESCENT ORAL at 21:53

## 2020-01-01 RX ADMIN — SENNOSIDES 17.2 MG: 8.6 TABLET, FILM COATED ORAL at 20:29

## 2020-01-01 RX ADMIN — SENNOSIDES 17.2 MG: 8.6 TABLET, FILM COATED ORAL at 22:39

## 2020-01-01 RX ADMIN — ACETAMINOPHEN 1000 MG: 500 TABLET ORAL at 15:25

## 2020-01-01 RX ADMIN — DEXAMETHASONE 6 MG: 2 TABLET ORAL at 09:37

## 2020-01-01 RX ADMIN — FAMOTIDINE 20 MG: 20 TABLET, FILM COATED ORAL at 09:28

## 2020-01-01 RX ADMIN — Medication 10 ML: at 13:30

## 2020-01-01 RX ADMIN — Medication 10 ML: at 22:01

## 2020-01-01 RX ADMIN — ACETAMINOPHEN 1000 MG: 500 TABLET ORAL at 18:19

## 2020-01-01 RX ADMIN — FUROSEMIDE 20 MG: 10 INJECTION, SOLUTION INTRAMUSCULAR; INTRAVENOUS at 14:02

## 2020-01-01 RX ADMIN — Medication 500 MG: at 01:37

## 2020-01-01 RX ADMIN — FENTANYL CITRATE 12.5 MCG: 50 INJECTION, SOLUTION INTRAMUSCULAR; INTRAVENOUS at 09:48

## 2020-01-01 RX ADMIN — MIDAZOLAM HYDROCHLORIDE 2 MG: 2 INJECTION, SOLUTION INTRAMUSCULAR; INTRAVENOUS at 07:28

## 2020-01-01 RX ADMIN — ACETAMINOPHEN 650 MG: 325 TABLET ORAL at 03:45

## 2020-01-01 RX ADMIN — SENNOSIDES 17.2 MG: 8.6 TABLET, FILM COATED ORAL at 21:53

## 2020-01-01 RX ADMIN — FUROSEMIDE 40 MG: 10 INJECTION, SOLUTION INTRAMUSCULAR; INTRAVENOUS at 17:42

## 2020-01-01 RX ADMIN — ATORVASTATIN CALCIUM 40 MG: 40 TABLET, FILM COATED ORAL at 21:02

## 2020-01-01 RX ADMIN — AMIODARONE HYDROCHLORIDE 400 MG: 200 TABLET ORAL at 18:20

## 2020-01-01 RX ADMIN — Medication 10 ML: at 15:20

## 2020-01-01 RX ADMIN — FERROUS SULFATE TAB 325 MG (65 MG ELEMENTAL FE) 325 MG: 325 (65 FE) TAB at 08:38

## 2020-01-01 RX ADMIN — THIAMINE HYDROCHLORIDE 100 MG: 100 INJECTION, SOLUTION INTRAMUSCULAR; INTRAVENOUS at 09:46

## 2020-01-01 RX ADMIN — FENTANYL CITRATE 150 MCG: 50 INJECTION, SOLUTION INTRAMUSCULAR; INTRAVENOUS at 07:38

## 2020-01-01 RX ADMIN — DEXTROSE MONOHYDRATE 25 G: 25 INJECTION, SOLUTION INTRAVENOUS at 07:24

## 2020-01-01 RX ADMIN — DOCUSATE SODIUM 100 MG: 100 CAPSULE, LIQUID FILLED ORAL at 18:19

## 2020-01-01 RX ADMIN — ENOXAPARIN SODIUM 30 MG: 40 INJECTION SUBCUTANEOUS at 09:20

## 2020-01-01 RX ADMIN — INSULIN LISPRO 6 UNITS: 100 INJECTION, SOLUTION INTRAVENOUS; SUBCUTANEOUS at 12:34

## 2020-01-01 RX ADMIN — HEPARIN SODIUM 31 UNITS/KG/HR: 10000 INJECTION, SOLUTION INTRAVENOUS at 17:40

## 2020-01-01 RX ADMIN — INSULIN LISPRO 2 UNITS: 100 INJECTION, SOLUTION INTRAVENOUS; SUBCUTANEOUS at 00:06

## 2020-01-01 RX ADMIN — METHYLPREDNISOLONE SODIUM SUCCINATE 60 MG: 125 INJECTION, POWDER, FOR SOLUTION INTRAMUSCULAR; INTRAVENOUS at 01:41

## 2020-01-01 RX ADMIN — POTASSIUM CHLORIDE 20 MEQ: 1500 TABLET, EXTENDED RELEASE ORAL at 09:55

## 2020-01-01 RX ADMIN — ZINC SULFATE 220 MG (50 MG) CAPSULE 1 CAPSULE: CAPSULE at 09:22

## 2020-01-01 RX ADMIN — POTASSIUM CHLORIDE 20 MEQ: 1500 TABLET, EXTENDED RELEASE ORAL at 14:22

## 2020-01-01 RX ADMIN — INSULIN LISPRO 6 UNITS: 100 INJECTION, SOLUTION INTRAVENOUS; SUBCUTANEOUS at 21:40

## 2020-01-01 RX ADMIN — Medication 500 MG: at 09:23

## 2020-01-01 RX ADMIN — FUROSEMIDE 20 MG: 10 INJECTION, SOLUTION INTRAMUSCULAR; INTRAVENOUS at 10:59

## 2020-01-01 RX ADMIN — TRAMADOL HYDROCHLORIDE 50 MG: 50 TABLET, FILM COATED ORAL at 12:53

## 2020-01-01 RX ADMIN — POLYETHYLENE GLYCOL (3350) 17 G: 17 POWDER, FOR SOLUTION ORAL at 09:38

## 2020-01-01 RX ADMIN — FERROUS SULFATE TAB 325 MG (65 MG ELEMENTAL FE) 325 MG: 325 (65 FE) TAB at 09:39

## 2020-01-01 RX ADMIN — Medication 10 ML: at 13:32

## 2020-01-01 RX ADMIN — SODIUM CHLORIDE 100 MG: 900 INJECTION, SOLUTION INTRAVENOUS at 10:15

## 2020-01-01 RX ADMIN — CARVEDILOL 6.25 MG: 6.25 TABLET, FILM COATED ORAL at 07:02

## 2020-01-01 RX ADMIN — HEPARIN SODIUM 3000 UNITS: 1000 INJECTION, SOLUTION INTRAVENOUS; SUBCUTANEOUS at 05:32

## 2020-01-01 RX ADMIN — QUETIAPINE FUMARATE 50 MG: 50 TABLET, EXTENDED RELEASE ORAL at 20:09

## 2020-01-01 RX ADMIN — THIAMINE HYDROCHLORIDE 100 MG: 100 INJECTION, SOLUTION INTRAMUSCULAR; INTRAVENOUS at 10:34

## 2020-01-01 RX ADMIN — LORAZEPAM 1 MG: 2 INJECTION INTRAMUSCULAR at 07:00

## 2020-01-01 RX ADMIN — CYANOCOBALAMIN TAB 1000 MCG 1000 MCG: 1000 TAB at 09:33

## 2020-01-01 RX ADMIN — Medication 10 ML: at 16:00

## 2020-01-01 RX ADMIN — ATORVASTATIN CALCIUM 20 MG: 20 TABLET, FILM COATED ORAL at 19:34

## 2020-01-01 RX ADMIN — Medication 500 MG: at 08:29

## 2020-01-01 RX ADMIN — ACETAMINOPHEN 1000 MG: 500 TABLET ORAL at 05:57

## 2020-01-01 RX ADMIN — ACETAMINOPHEN 1000 MG: 500 TABLET ORAL at 05:44

## 2020-01-01 RX ADMIN — HEPARIN SODIUM 5000 UNITS: 1000 INJECTION, SOLUTION INTRAVENOUS; SUBCUTANEOUS at 10:00

## 2020-01-01 RX ADMIN — CHLORHEXIDINE GLUCONATE 0.12% ORAL RINSE 10 ML: 1.2 LIQUID ORAL at 09:38

## 2020-01-01 RX ADMIN — Medication 10 ML: at 09:02

## 2020-01-01 RX ADMIN — POTASSIUM CHLORIDE 10 MEQ: 200 INJECTION, SOLUTION INTRAVENOUS at 12:00

## 2020-01-01 RX ADMIN — HEPARIN SODIUM 5000 UNITS: 5000 INJECTION INTRAVENOUS; SUBCUTANEOUS at 05:19

## 2020-01-01 RX ADMIN — LORAZEPAM 1 MG: 2 INJECTION, SOLUTION INTRAMUSCULAR; INTRAVENOUS at 07:00

## 2020-01-01 RX ADMIN — FAMOTIDINE 20 MG: 20 TABLET ORAL at 08:35

## 2020-01-01 RX ADMIN — METOPROLOL TARTRATE 25 MG: 25 TABLET, FILM COATED ORAL at 20:12

## 2020-01-01 RX ADMIN — DEXTROSE 50 % IN WATER (D50W) INTRAVENOUS SYRINGE 12.5 G: at 00:44

## 2020-01-01 RX ADMIN — Medication 10 ML: at 05:28

## 2020-01-01 RX ADMIN — TRAMADOL HYDROCHLORIDE 50 MG: 50 TABLET, FILM COATED ORAL at 02:44

## 2020-01-01 RX ADMIN — ATORVASTATIN CALCIUM 40 MG: 40 TABLET, FILM COATED ORAL at 20:12

## 2020-01-01 RX ADMIN — Medication 10 ML: at 20:28

## 2020-01-01 RX ADMIN — ROCURONIUM BROMIDE 10 MG: 10 INJECTION, SOLUTION INTRAVENOUS at 08:15

## 2020-01-01 RX ADMIN — FUROSEMIDE 40 MG: 10 INJECTION, SOLUTION INTRAMUSCULAR; INTRAVENOUS at 08:31

## 2020-01-01 RX ADMIN — INSULIN LISPRO 3 UNITS: 100 INJECTION, SOLUTION INTRAVENOUS; SUBCUTANEOUS at 08:55

## 2020-01-01 RX ADMIN — NITROGLYCERIN 60 MCG/MIN: 40 INJECTION INTRAVENOUS at 12:34

## 2020-01-01 RX ADMIN — FERROUS SULFATE TAB 325 MG (65 MG ELEMENTAL FE) 325 MG: 325 (65 FE) TAB at 16:52

## 2020-01-01 RX ADMIN — Medication 81 MG: at 10:39

## 2020-01-01 RX ADMIN — CALCIUM CHLORIDE 0.5 G: 100 INJECTION INTRAVENOUS; INTRAVENTRICULAR at 13:46

## 2020-01-01 RX ADMIN — FOLIC ACID 1 MG: 1 TABLET ORAL at 09:20

## 2020-03-02 PROBLEM — M54.9 BACK PAIN: Status: ACTIVE | Noted: 2020-01-01

## 2020-03-02 PROBLEM — R20.2 NUMBNESS AND TINGLING IN LEFT ARM: Status: ACTIVE | Noted: 2020-01-01

## 2020-03-02 PROBLEM — R77.8 ELEVATED TROPONIN: Status: ACTIVE | Noted: 2020-01-01

## 2020-03-02 PROBLEM — I24.9 ACUTE CORONARY SYNDROME (HCC): Status: ACTIVE | Noted: 2020-01-01

## 2020-03-02 PROBLEM — R20.0 NUMBNESS AND TINGLING IN LEFT ARM: Status: ACTIVE | Noted: 2020-01-01

## 2020-03-02 NOTE — Clinical Note
TRANSFER - IN REPORT:     Verbal report received from: Ricardo Gallardo RN. Report consisted of patient's Situation, Background, Assessment and   Recommendations(SBAR). Opportunity for questions and clarification was provided. Assessment completed upon patient's arrival to unit and care assumed. Patient transported with a Cardiac Cath Tech / Patient Care Tech.

## 2020-03-02 NOTE — ED PROVIDER NOTES
[facility] 227 cardiology cardiology for bed 3 EMERGENCY DEPARTMENT HISTORY AND PHYSICAL EXAM 
 
12:24 PM 
 
 
Date: 3/2/2020 Patient Name: Annmarie Archer History of Presenting Illness Chief Complaint Patient presents with  Back Pain  Nausea History Provided By: Patient Additional History (Context): Annmarie Archer is a 68 y.o. male with Past medical history of diabetes, hypertension, kidney disease, hyperlipidemia who presents with chief complaint of severe mid thoracic back pain that started when he was under the car doing work changing his oil a few hours prior to arrival to the ER. He states that he became very nauseous and threw up. He also reports that he had some tingling going down his left arm. He states that the left arm tingling subsided but the back pain persists. He states that he has had kidney stones in the past but this feels nothing like that. He states is in the middle of his back. His wife prompted him to come to the emergency department for evaluation. When he arrived to the emergency department his back pain is improving but still persist.  He denies any chest pain, shortness of breath, dizziness, abdominal pain, trauma, flank pain, dysuria, leg weakness, and no other numbness. And no other complaint. PCP: Tanner Sparrow NP Past History Past Medical History: 
Past Medical History:  
Diagnosis Date  Calculus of kidney  Cancer ([address] HX of [address]  Diabetes mellitus ([address] 8/19/2010  Gout 8/19/2010  
 HTN (hypertension) 8/19/2010  Hyperlipemia 8/19/2010  Kidney disease  Lumbar spondylosis  Proteinuria   
 Skin cancer, basal cell   
 neck  Type 2 diabetes mellitus without complication, without long-term current use of insulin ([address] 8/19/2010  Urolithiasis Past Surgical History: 
Past Surgical History:  
Procedure Laterality Date  HX GI    
 HX OTHER SURGICAL Basal cell removed from left side of face  HX UROLOGICAL    
 kidney Family History: 
Family History Problem Relation Age of Onset  Diabetes Mother Social History: 
Social History Tobacco Use  Smoking status: Never Smoker  Smokeless tobacco: Never Used Substance Use Topics  Alcohol use: No  
 Drug use: No  
 
 
Allergies: Allergies Allergen Reactions  Metformin Other (comments) Renal insufficiency Review of Systems Review of Systems Constitutional: Negative for chills and fever. HENT: Negative for congestion, rhinorrhea, sore throat and trouble swallowing. Eyes: Negative for visual disturbance. Respiratory: Negative for cough, shortness of breath and wheezing. Cardiovascular: Negative for chest pain and leg swelling. Gastrointestinal: Positive for nausea and vomiting. Negative for abdominal pain. Endocrine: Negative for polyuria. Genitourinary: Negative for difficulty urinating and dysuria. Musculoskeletal: Positive for back pain. Negative for arthralgias and neck stiffness. Skin: Negative for rash. Neurological: Negative for dizziness, weakness, numbness and headaches. Hematological: Does not bruise/bleed easily. Psychiatric/Behavioral: Negative for confusion and dysphoric mood. All other systems reviewed and are negative. Physical Exam  
 
Visit Vitals /86 Pulse 62 Temp 97.3 °F (36.3 °C) Resp 20 Ht 5' 7\" (1.702 m) Wt 90.7 kg (200 lb) SpO2 94% BMI 31.32 kg/m² Physical Exam 
Vitals signs and nursing note reviewed. Constitutional:   
   General: He is not in acute distress. Appearance: He is well-developed. He is not diaphoretic. HENT:  
   Head: Normocephalic and atraumatic. Eyes:  
   General: No scleral icterus. Conjunctiva/sclera: Conjunctivae normal.  
   Pupils: Pupils are equal, round, and reactive to light. Neck: Musculoskeletal: Normal range of motion and neck supple. Cardiovascular:  
   Rate and Rhythm: Normal rate. Comments: Capillary refill < 3 seconds Pulmonary:  
   Effort: Pulmonary effort is normal. No respiratory distress. Breath sounds: Normal breath sounds. No wheezing. Abdominal:  
   General: Bowel sounds are normal. There is no distension. Palpations: Abdomen is soft. Tenderness: There is no abdominal tenderness. Musculoskeletal: Normal range of motion. Lymphadenopathy:  
   Cervical: No cervical adenopathy. Skin: 
   General: Skin is warm and dry. Neurological:  
   Mental Status: He is alert and oriented to person, place, and time. Cranial Nerves: No cranial nerve deficit. Diagnostic Study Results Labs - Recent Results (from the past 12 hour(s)) EKG, 12 LEAD, INITIAL Collection Time: 03/02/20  1:35 PM  
Result Value Ref Range Ventricular Rate 66 BPM  
 Atrial Rate 66 BPM  
 P-R Interval 172 ms QRS Duration 80 ms  
 Q-T Interval 402 ms QTC Calculation (Bezet) 421 ms Calculated P Axis 14 degrees Calculated R Axis 31 degrees Calculated T Axis 62 degrees Diagnosis Normal sinus rhythm Normal ECG When compared with ECG of 21-SEP-2018 11:03, No significant change was found Confirmed by Jaki Buchanan MD, --- ([id number]) on 3/2/2020 2:27:55 PM 
  
CBC WITH AUTOMATED DIFF Collection Time: 03/02/20  1:45 PM  
Result Value Ref Range WBC 16.2 (H) 4.6 - 13.2 K/uL  
 RBC 4.34 (L) 4.70 - 5.50 M/uL  
 HGB 13.7 13.0 - 16.0 g/dL HCT 40.8 36.0 - 48.0 % MCV 94.0 74.0 - 97.0 FL  
 MCH 31.6 24.0 - 34.0 PG  
 MCHC 33.6 31.0 - 37.0 g/dL  
 RDW 13.1 11.6 - 14.5 % PLATELET 802 038 - 772 K/uL MPV 10.0 9.2 - 11.8 FL  
 NEUTROPHILS 88 (H) 40 - 73 % LYMPHOCYTES 7 (L) 21 - 52 % MONOCYTES 5 3 - 10 % EOSINOPHILS 0 0 - 5 % BASOPHILS 0 0 - 2 %  
 ABS. NEUTROPHILS 14.2 (H) 1.8 - 8.0 K/UL  
 ABS. LYMPHOCYTES 1.2 0.9 - 3.6 K/UL ABS. MONOCYTES 0.8 0.05 - 1.2 K/UL  
 ABS. EOSINOPHILS 0.0 0.0 - 0.4 K/UL  
 ABS. BASOPHILS 0.0 0.0 - 0.1 K/UL  
 DF AUTOMATED METABOLIC PANEL, COMPREHENSIVE Collection Time: 03/02/20  1:45 PM  
Result Value Ref Range Sodium 142 136 - 145 mmol/L Potassium 4.7 3.5 - 5.5 mmol/L Chloride 108 100 - 111 mmol/L  
 CO2 23 21 - 32 mmol/L Anion gap 11 3.0 - 18 mmol/L Glucose 160 (H) 74 - 99 mg/dL BUN 22 (H) 7.0 - 18 MG/DL Creatinine 1.46 (H) 0.6 - 1.3 MG/DL  
 BUN/Creatinine ratio 15 12 - 20 GFR est AA 58 (L) >60 ml/min/1.73m2 GFR est non-AA 48 (L) >60 ml/min/1.73m2 Calcium 9.2 8.5 - 10.1 MG/DL Bilirubin, total 0.5 0.2 - 1.0 MG/DL  
 ALT (SGPT) 18 16 - 61 U/L  
 AST (SGOT) 33 10 - 38 U/L Alk. phosphatase 90 45 - 117 U/L Protein, total 7.2 6.4 - 8.2 g/dL Albumin 3.5 3.4 - 5.0 g/dL Globulin 3.7 2.0 - 4.0 g/dL A-G Ratio 0.9 0.8 - 1.7 LIPASE Collection Time: 03/02/20  1:45 PM  
Result Value Ref Range Lipase 143 73 - 393 U/L  
CARDIAC PANEL,(CK, CKMB & TROPONIN) Collection Time: 03/02/20  1:45 PM  
Result Value Ref Range  (H) 39 - 308 U/L  
 CK - MB 25.7 (H) <3.6 ng/ml CK-MB Index 6.7 (H) 0.0 - 4.0 % Troponin-I, QT 4.57 (HH) 0.0 - 0.045 NG/ML  
PTT Collection Time: 03/02/20  1:45 PM  
Result Value Ref Range aPTT 25.2 23.0 - 36.4 SEC PROTHROMBIN TIME + INR Collection Time: 03/02/20  1:45 PM  
Result Value Ref Range Prothrombin time 13.6 11.5 - 15.2 sec INR 1.1 0.8 - 1.2 Radiologic Studies -  
CTA CHEST ABD PELV W WO CONT    (Results Pending) Preliminary reading:Waiting for 3-D reconstruction. Suboptimal contrast bolus to the thoracic aorta with better opacification to pulmonary arteries. No definite aortic dissection or aneurysm. No PE. No acute pulmonary finding. WR are provided to the ER. Bilat. renal cysts and possible small hep. cyst. 
 
 
Medical Decision Making I am the first provider for this patient. I reviewed the vital signs, available nursing notes, past medical history, past surgical history, family history and social history. Vital Signs-Reviewed the patient's vital signs. Pulse Oximetry Analysis -  95 on room air (Interpretation) normal 
 
Cardiac Monitor: 
Rate: 68 Rhythm:  Normal Sinus Rhythm EKG: Interpreted by the EP Dr. Negar Mason. Time Interpreted: 1:35 PM 
 Rate: 66 Rhythm: Normal Sinus Rhythm Interpretation: Normal QRS duration, normal QTC, some artifact, no ST elevation, no ST depressions Records Reviewed: Nursing Notes and Old Medical Records (Time of Review: 12:24 PM) Provider Notes (Medical Decision Making): DDX: Aortic dissection, PE, acute coronary syndrome/anginal equivalent, 
MDM Medications  
heparin 25,000 units in D5W 250 ml infusion (11 Units/kg/hr × 90.7 kg IntraVENous New Bag 3/2/20 1628) iopamidoL (ISOVUE-370) 76 % injection 80 mL (70 mL IntraVENous Given 3/2/20 1503)  
heparin (porcine) 1,000 unit/mL injection 4,000 Units (4,000 Units IntraVENous Given 3/2/20 1626) ED Course: Progress Notes, Reevaluation, and Consults: WBC 16 Troponin 4.6 Reassessed and patient has no chest pain, I am concerned for dissection versus PE.  CTA chest abdomen pelvis to evaluate Consult:  Discussed care with Dr. Romana Nash, Specialty: Hospitalist, standard discussion; including history of patients chief complaint, available diagnostic results, and treatment course. He accepts admission, request cardiology consult 
4:01 PM, 3/2/2020 Reassessed patient and he is in no apparent distress, he states that the back pain has subsided, denies any chest pain or shortness of breath Critical care time:  
I have spent 67 minutes of critical care time involved in lab review, consultations with specialist, family decision making, and documentation. During this entire length of time I was immediately available to the patient. Critical care: The reason for providing this level of medical care for this critically ill patient was due to a critical illness that impaired one or more vital organ systems such that there was a high probability of imminent or life threatening deterioration in the patients condition. This care involved high complexity decision making to assess, manipulate and support vital system functions, to treat this degree vital organ system failure and to prevent further life threatening deterioration of the patient's condition. Consult:  Discussed care with RHONDA Trevino, Specialty: On-call for cardiology, standard discussion; including history of patients chief complaint, available diagnostic results, and treatment course. Accepts consult, states patient n.p.o. after midnight, if still having any pain start nitro drip. 
5:44 PM, 3/2/2020  
 
6:10PM 
Pt resting, family at bedside. He denies any pain. Will give him a meal. Will be npo after midnight 7:39PM 
Pt remains pain free. I has discussed pt's case with Dr Dl York, [facility] ED attending. Pt is awaiting bed assignment. Diagnosis Clinical Impression: 1. ACS (acute coronary syndrome) ([address] 2. Acute midline thoracic back pain 3. Numbness and tingling in left arm Disposition: admitted Follow-up Information None Patient's Medications Start Taking No medications on file Continue Taking ALLOPURINOL (ZYLOPRIM) 300 MG TABLET    take 1 tablet by mouth once daily DILTIAZEM (TIAZAC) 360 MG SR CAPSULE    Take 1 Cap by mouth daily. GLYBURIDE (DIABETA) 2.5 MG TABLET    Take 1 Tab by mouth two (2) times daily (with meals). LISINOPRIL (PRINIVIL, ZESTRIL) 20 MG TABLET    Take 1 Tab by mouth two (2) times a day. These Medications have changed No medications on file Stop Taking CYCLOBENZAPRINE (FLEXERIL) 5 MG TABLET    Take 1 Tab by mouth nightly.   
 
 
 
Chloe File, DO 
 
 Dragon medical dictation software was used for portions of this report. Unintended transcription errors may occur. My signature above authenticates this document and my orders, the final   
diagnosis (es), discharge prescription (s), and instructions in the Epic   
record.

## 2020-03-02 NOTE — Clinical Note
Right groin and right radial clipped, prepped with ChloraPrep and draped. Wet prep solution applied at: 849. Wet prep solution dried at: 852. Wet prep elapsed drying time: 3 mins.

## 2020-03-02 NOTE — Clinical Note
Contrast Dose Calculator:   Patient's age: 68.   Patient's sex: Male. Patient weight (kg) = 90.7. Creatinine level (mg/dL) = 1.48. Creatinine clearance (mL/min): 59. Contrast concentration (mg/mL) = 300. MACD = 300 mL. Max Contrast dose per Creatinine Cl calculator = 132.75 mL.

## 2020-03-02 NOTE — ED TRIAGE NOTES
Pt states he crawled under his car earlier to change his oil earlier and started feeling nauseated and c/o mid back pain. Same thing happened 6 months ago. Had some left arm tingling which has subsided. Had steroid shot in left arm in January. Has had problems with that arm since falling down steps 2 years ago

## 2020-03-02 NOTE — ED NOTES
Pt may eat per Dr Gypsy Wilder. Pt to be NPO after midnight. Pt was offered Healthy Choice meal which he declined. States his son is bringing his dinner. Pt continues to denies chest or back pain. Denies left arm numbness or tingling. Denies nausea.

## 2020-03-03 PROBLEM — I21.4 NSTEMI (NON-ST ELEVATED MYOCARDIAL INFARCTION) (HCC): Status: ACTIVE | Noted: 2020-01-01

## 2020-03-03 PROBLEM — Z98.890 S/P CARDIAC CATHETERIZATION: Status: ACTIVE | Noted: 2020-01-01

## 2020-03-03 NOTE — PROGRESS NOTES
Reason for Admission:  Acute coronary syndrome ([facility] [I24.9] Elevated troponin [R79.89] Back pain [M54.9] Numbness and tingling in left arm [R20.0, R20.2] S/P cardiac catheterization [Z98.890] NSTEMI (non-ST elevated myocardial infarction) ([facility] [I21.4] RRAT Score:    14% Plan for utilizing home health:    TBD Likelihood of Readmission:   LOW Transition of Care Plan:         
 
 
Initial assessment completed with patient and spouse/SO. Cognitive status of patient: oriented to time, place, person and situation. Face sheet information confirmed:  yes. The patient designates spouse, Osmany Deleon [phone] to participate in his discharge plan and to receive any needed information. This patient lives in a single family home with spouse. Patient is able to navigate steps as needed. Prior to hospitalization, patient was considered to be independent with ADLs/IADLS : yes although needs some help with buttons r/t weakness in left hand. Patient has a current ACP document on file: no The son and spouse will be available to transport patient home upon discharge. The patient already has bed rail available in the home. Patient is not currently active with home health. Patient has not stayed in a skilled nursing facility or rehab. This patient is on dialysis :no 
 
List of available Home Health agencies were provided and reviewed with the patient prior to discharge. Freedom of choice signed: no. Currently, the discharge plan is Home and Home with [address] [place] [address] Jonathan Massey. The patient states that he can obtain his medications from the pharmacy, and take his medications as directed. Patient's current insurance is [employer]. Pt lives with wife who is currently employed as a nurse. Pt needs assist with buttoning only. His only DME is a bedrail. Family is open to home health if needed. Care Management Interventions PCP Verified by CM: Yes Mode of Transport at Discharge: Self Transition of Care Consult (CM Consult): Discharge Planning Current Support Network: Lives with Spouse Confirm Follow Up Transport: Family The Plan for Transition of Care is Related to the Following Treatment Goals : home with home health vs home Discharge Location Discharge Placement: Unable to determine at this time Jazmín Ram RN - Outcomes Manager  [phone]

## 2020-03-03 NOTE — ED NOTES
Bedside and Verbal shift change report given to Crystal Alvarez RN (oncoming nurse) by [employer] (offgoing nurse). Report included the following information SBAR, Kardex, MAR and Cardiac Rhythm Sinus Rhythm.

## 2020-03-03 NOTE — CONSULTS
[facility] - Consult Note Consultation request by Miki Boswell DO for advice/opinion related to evaluating Acute coronary syndrome ([facility] [I24.9] Elevated troponin [R79.89] Back pain [M54.9] Numbness and tingling in left arm [R20.0, R20.2] Date of  Admission: 3/2/2020 12:18 PM  
Primary Care Physician:  Betty Lauren NP Assessment:  
 
Patient Active Problem List  
Diagnosis Code  Type 2 diabetes mellitus without complication, without long-term current use of insulin (HCC) E11.9  
 HTN (hypertension) I10  
 Gout M10.9  Hyperlipemia E78.5  Nocturia R35.1  Type 2 diabetes with nephropathy (HCC) E11.21  
 Elevated troponin R79.89  Back pain M54.9  Acute coronary syndrome (HCC) I24.9  Numbness and tingling in left arm R20.0, R20.2  
 
 
-NSTEMI. Troponin peak at 20. ECG with possible septal infarct without ischemic changes, presented with back pain with nausea and vomiting. Dissection/PE ruled out by CTA per H&P (requested official report from radiology). -Hypertension. Stable. -Diabetes mellitus. HgbA1c 7.1 
-Chronic kidney disease. Stable. -H/o kidney stones, last once 3 weeks ago. -H/o vertigo. Remotely evaluated by Dr. Baldomero Valdez. Plan:  
 
Independently seen and evaluated. Agree with below. Patient presents with non-STEMI, manifested as central back discomfort, and nausea. He has multiple risk factors. Empirically, he is put on beta-blockers, statins, and aspirin. We will proceed with coronary angiography and possible revascularization. All questions answered. Patient and spouse agree. Will plan on cardiac catheterization this AM. Risks, benefits, indications and alternatives have been discussed with patient and family. They understand and wish to proceed. Continued on heparin drip. Continued on ASA, statin, BB. Will review echocardiogram once complete. History of Present Illness: This is a 68 y.o. male admitted for Acute coronary syndrome ([facility] [I24.9] Elevated troponin [R79.89] Back pain [M54.9] Numbness and tingling in left arm [R20.0, R20.2]. Patient complains of:  Back pain, N/V. Patient was working under his car yesterday when he suddenly developed back pain, N/V. Patient tried to finish his work but symptoms persisted. He went inside and rested. Symptoms persisted for 2 hours. Patient went to [facility] ER. Symptoms eventually eased up without intervention per patient. Patient denies having previous similar symptoms. Wife reports she has been concerned he may have been having recent symptoms of appearing more easily fatigued. Patient denies SOB, orthopnea, PND, MARICHUY, palp, syncope. Patient did have recent kidney stones which he passed. Cardiac risk factors: diabetes mellitus, male gender, hypertension Review of Symptoms:  Except as stated above include: 
Constitutional:  negative Respiratory:  negative Cardiovascular:  C/o back pain, N/V. Gastrointestinal: negative Genitourinary:  negative Musculoskeletal:  Negative Neurological:  Negative Dermatological:  Negative Endocrinological: Negative Psychological:  Negative Past Medical History:  
 
Past Medical History:  
Diagnosis Date  Calculus of kidney  Cancer ([facility]  of [facility]  Diabetes mellitus ([facility] 8/19/2010  Gout 8/19/2010  
 HTN (hypertension) 8/19/2010  Hyperlipemia 8/19/2010  Kidney disease  Lumbar spondylosis  Proteinuria   
 Skin cancer, basal cell   
 neck  Type 2 diabetes mellitus without complication, without long-term current use of insulin ([facility] 8/19/2010  Urolithiasis Social History:  
 
Social History Socioeconomic History  Marital status:  Spouse name: Not on file  Number of children: Not on file  Years of education: Not on file  Highest education level: Not on file Tobacco Use  Smoking status: Never Smoker  Smokeless tobacco: Never Used Substance and Sexual Activity  Alcohol use: No  
 Drug use: No  
 Sexual activity: Yes Family History:  
 
Family History Problem Relation Age of Onset  Diabetes Mother Medications: Allergies Allergen Reactions  Metformin Other (comments) Renal insufficiency Current Facility-Administered Medications Medication Dose Route Frequency  insulin lispro (HUMALOG) injection   SubCUTAneous AC&HS  
 glucose chewable tablet 16 g  4 Tab Oral PRN  
 glucagon (GLUCAGEN) injection 1 mg  1 mg IntraMUSCular PRN  
 dextrose (D50W) injection syrg 12.5-25 g  25-50 mL IntraVENous PRN  
 sodium chloride (NS) flush 5-40 mL  5-40 mL IntraVENous Q8H  
 sodium chloride (NS) flush 5-40 mL  5-40 mL IntraVENous PRN  
 magnesium hydroxide (MILK OF MAGNESIA) 400 mg/5 mL oral suspension 30 mL  30 mL Oral DAILY PRN  
 docusate sodium (COLACE) capsule 100 mg  100 mg Oral BID  [START ON 3/4/2020] aspirin chewable tablet 81 mg  81 mg Oral DAILY  carvediloL (COREG) tablet 6.25 mg  6.25 mg Oral BID WITH MEALS  
 atorvastatin (LIPITOR) tablet 20 mg  20 mg Oral QHS  heparin 25,000 units in D5W 250 ml infusion  11-25 Units/kg/hr IntraVENous TITRATE Physical Exam:  
 
Visit Vitals /67 (BP 1 Location: Left arm, BP Patient Position: At rest) Pulse 64 Temp 97 °F (36.1 °C) Resp 18 Ht 5' 7\" (1.702 m) Wt 90.7 kg (200 lb) SpO2 94% BMI 31.32 kg/m² BP Readings from Last 3 Encounters:  
03/03/20 121/67  
12/02/19 129/89  
11/12/19 130/76 Pulse Readings from Last 3 Encounters:  
03/03/20 64  
12/02/19 (!) 104  
11/12/19 71 Wt Readings from Last 3 Encounters:  
03/02/20 90.7 kg (200 lb) 12/02/19 83.9 kg (185 lb)  
11/12/19 86.7 kg (191 lb 3.2 oz) General:  alert, cooperative, no distress, appears stated age Neck:  no JVD Lungs:  clear to auscultation bilaterally Heart:  regular rate and rhythm Abdomen:  abdomen is soft without significant tenderness, masses, organomegaly or guarding Extremities:  extremities normal, atraumatic, no cyanosis or edema Skin: Warm and dry. no hyperpigmentation, vitiligo, or suspicious lesions Neuro: alert, oriented x3, affect appropriate Psych: non focal 
 
 Data Review:  
 
Recent Labs 03/03/20 
0688 03/03/20 
0259 03/02/20 
1345 WBC 11.5 12.3 16.2* HGB 12.0* 12.2* 13.7 HCT 36.6 36.5 40.8  247 240 Recent Labs 03/03/20 
0658 03/03/20 
0259 03/02/20 
1345  139 142  
K 3.8 3.9 4.7  107 108 CO2 25 23 23 * 189* 160* BUN 22* 21* 22* CREA 1.48* 1.55* 1.46* CA 8.3* 8.3* 9.2 MG 2.0  --   --   
ALB 2.9* 3.0* 3.5 SGOT 47* 52* 33 ALT 22 22 18 INR  --  1.1 1.1 Results for orders placed or performed during the hospital encounter of 03/02/20 EKG, 12 LEAD, INITIAL Result Value Ref Range Ventricular Rate 69 BPM  
 Atrial Rate 288 BPM  
 QRS Duration 78 ms Q-T Interval 418 ms QTC Calculation (Bezet) 447 ms Calculated P Axis 45 degrees Calculated R Axis 26 degrees Calculated T Axis 82 degrees Diagnosis Atrial flutter with variable AV block with premature ventricular or  
aberrantly conducted complexes Abnormal ECG When compared with ECG of 02-MAR-2020 21:01, Atrial flutter has replaced Sinus rhythm Results for orders placed or performed in visit on 11/17/17 AMB POC EKG ROUTINE W/ 12 LEADS, INTER & REP Narrative Within normal limits All Cardiac Markers in the last 24 hours:   
Lab Results Component Value Date/Time   (H) 03/03/2020 02:59 AM  
  (H) 03/02/2020 08:09 PM  
  (H) 03/02/2020 01:45 PM  
 CKMB 20.7 (H) 03/03/2020 02:59 AM  
 CKMB 38.6 (H) 03/02/2020 08:09 PM  
 CKMB 25.7 (H) 03/02/2020 01:45 PM  
 CKND1 4.1 (H) 03/03/2020 02:59 AM  
 CKND1 6.8 (H) 03/02/2020 08:09 PM  
 CKND1 6.7 (H) 03/02/2020 01:45 PM  
 TROIQ 14.10 (HH) 03/03/2020 02:59 AM  
 TROIQ 20.40 ([facility]) 03/02/2020 08:09 PM  
 TROIQ 4.57 ([facility]) 03/02/2020 01:45 PM  
 
 
Last Lipid:   
Lab Results Component Value Date/Time Cholesterol, total 189 03/20/2019 08:48 AM  
 HDL Cholesterol 32 (L) 03/20/2019 08:48 AM  
 LDL, calculated 83.4 03/20/2019 08:48 AM  
 Triglyceride 368 (H) 03/20/2019 08:48 AM  
 CHOL/HDL Ratio 5.9 (H) 03/20/2019 08:48 AM  
 
 
Signed By: RHONDA Shukla March 3, 2020

## 2020-03-03 NOTE — PROGRESS NOTES
Problem: Unstable angina/NSTEMI: Day of Admission/Day 1 Goal: Off Pathway (Use only if patient is Off Pathway) Outcome: Progressing Towards Goal 
Goal: Activity/Safety Outcome: Progressing Towards Goal 
Goal: Consults, if ordered Outcome: Progressing Towards Goal 
Goal: Diagnostic Test/Procedures Outcome: Progressing Towards Goal 
Goal: Nutrition/Diet Outcome: Progressing Towards Goal 
Goal: Discharge Planning Outcome: Progressing Towards Goal 
Goal: Medications Outcome: Progressing Towards Goal 
Goal: Respiratory Outcome: Progressing Towards Goal 
Goal: Treatments/Interventions/Procedures Outcome: Progressing Towards Goal 
Goal: Psychosocial 
Outcome: Progressing Towards Goal 
Goal: *Hemodynamically stable Outcome: Progressing Towards Goal 
Goal: *Optimal pain control at patient's stated goal 
Outcome: Progressing Towards Goal 
Goal: *Lungs clear or at baseline Outcome: Progressing Towards Goal 
  
Problem: Patient Education: Go to Patient Education Activity Goal: Patient/Family Education Outcome: Progressing Towards Goal 
  
Problem: Unstable Angina/NSTEMI: Discharge Outcomes Goal: *Hemodynamically stable Outcome: Progressing Towards Goal 
Goal: *Stable cardiac rhythm Outcome: Progressing Towards Goal 
Goal: *Lungs clear or at baseline Outcome: Progressing Towards Goal 
Goal: *Optimal pain control at patient's stated goal 
Outcome: Progressing Towards Goal 
Goal: *Identifies cardiac risk factors Outcome: Progressing Towards Goal 
Goal: *Verbalizes home exercise program, activity guidelines, cardiac precautions Outcome: Progressing Towards Goal 
Goal: *Verbalizes understanding and describes prescribed diet Outcome: Progressing Towards Goal 
Goal: *Verbalizes name, dosage, time, side effects, and number of days to continue medications Outcome: Progressing Towards Goal 
Goal: *Anxiety reduced or absent Outcome: Progressing Towards Goal 
 Goal: *Understands and describes signs and symptoms to report to providers(Stroke Metric) Outcome: Progressing Towards Goal 
Goal: *Describes follow-up/return visits to physicians Outcome: Progressing Towards Goal 
Goal: *Describes available resources and support systems Outcome: Progressing Towards Goal 
Goal: *Influenza immunization Outcome: Progressing Towards Goal 
Goal: *Pneumococcal immunization Outcome: Progressing Towards Goal 
Goal: *Describes smoking cessation resources Outcome: Progressing Towards Goal 
  
Problem: Diabetes Self-Management Goal: *Disease process and treatment process Description Define diabetes and identify own type of diabetes; list 3 options for treating diabetes. Outcome: Progressing Towards Goal 
Goal: *Incorporating nutritional management into lifestyle Description Describe effect of type, amount and timing of food on blood glucose; list 3 methods for planning meals. Outcome: Progressing Towards Goal 
Goal: *Incorporating physical activity into lifestyle Description State effect of exercise on blood glucose levels. Outcome: Progressing Towards Goal 
Goal: *Developing strategies to promote health/change behavior Description Define the ABC's of diabetes; identify appropriate screenings, schedule and personal plan for screenings. Outcome: Progressing Towards Goal 
Goal: *Using medications safely Description State effect of diabetes medications on diabetes; name diabetes medication taking, action and side effects. Outcome: Progressing Towards Goal 
Goal: *Monitoring blood glucose, interpreting and using results Description Identify recommended blood glucose targets  and personal targets. Outcome: Progressing Towards Goal 
Goal: *Prevention, detection, treatment of acute complications Description List symptoms of hyper- and hypoglycemia; describe how to treat low blood sugar and actions for lowering  high blood glucose level.  
Outcome: Progressing Towards Goal 
 Goal: *Prevention, detection and treatment of chronic complications Description Define the natural course of diabetes and describe the relationship of blood glucose levels to long term complications of diabetes. Outcome: Progressing Towards Goal 
Goal: *Developing strategies to address psychosocial issues Description Describe feelings about living with diabetes; identify support needed and support network Outcome: Progressing Towards Goal 
Goal: *Insulin pump training Outcome: Progressing Towards Goal 
Goal: *Sick day guidelines Outcome: Progressing Towards Goal 
Goal: *Patient Specific Goal (EDIT GOAL, INSERT TEXT) Outcome: Progressing Towards Goal 
  
Problem: Pain Goal: *Control of Pain Outcome: Progressing Towards Goal

## 2020-03-03 NOTE — PROGRESS NOTES
D Stat removed from pts right wrist, no bleeding or hematoma formation noted. Quick clot, tegaderm, co-band pressure dressing applied.

## 2020-03-03 NOTE — PROGRESS NOTES
[facility] Hospitalist Group Progress Note Patient: Maris Hernandez Age: 68 y.o. : 1950 MR#: [id number] SSN: [id number] Date/Time: 3/3/2020 Subjective: pt feels good, no new complaints. No back or CP. No SOB Assessment/Plan: 1. NSTEMI 2. DM type 2 
3. Hypertension 4.  leucocytosis due to stress, better now, no signs of infection 5. CKD stage 3: Crt stable 6. Back pain 
  
Plan: 
1.  s/p cath with 3 Vs CAD, CT Sx consulted, d/w mayo ELLIS.plan for CABG tomorrow 2.  cont heparin drip 3.  cont beta-blocker, aspirin and statin 4.  Echocardiogram pending 5.  cont sliding scale insulin D/w pt and wife at bedside in detail Case discussed with:  [x]Patient  [x]Family  [x]Nursing  []Case Management DVT Prophylaxis:  []Lovenox  [x]Hep IV  []SCDs  []Coumadin   []Eliquis/Xarelto Objective:  
VS:  
Visit Vitals /60 Pulse 62 Temp 97.6 °F (36.4 °C) Resp 17 Ht 5' 7\" (1.702 m) Wt 90.7 kg (200 lb) SpO2 98% BMI 31.32 kg/m² Tmax/24hrs: Temp (24hrs), Av.7 °F (36.5 °C), Min:97 °F (36.1 °C), Max:98.2 °F (36.8 °C) IOBRIEF Intake/Output Summary (Last 24 hours) at 3/3/2020 1321 Last data filed at 3/3/2020 4225 Gross per 24 hour Intake  Output 300 ml Net -300 ml General:  Alert, cooperative, no acute distress Pulmonary:  CTA Bilaterally. No Wheezing/Rales. Cardiovascular: Regular rate and Rhythm. GI:  Soft, Non distended, Non tender. + Bowel sounds. Extremities:  No edema. No calf tenderness. Psych: Good insight. Not anxious or agitated. Neurologic: Alert and oriented X 3. Moves all ext. Additional: R wrist cath site clean with dressing Medications:  
Current Facility-Administered Medications Medication Dose Route Frequency  insulin lispro (HUMALOG) injection   SubCUTAneous AC&HS  
 glucose chewable tablet 16 g  4 Tab Oral PRN  
 glucagon (GLUCAGEN) injection 1 mg  1 mg IntraMUSCular PRN  
  dextrose (D50W) injection syrg 12.5-25 g  25-50 mL IntraVENous PRN  
 sodium chloride (NS) flush 5-40 mL  5-40 mL IntraVENous Q8H  
 sodium chloride (NS) flush 5-40 mL  5-40 mL IntraVENous PRN  
 magnesium hydroxide (MILK OF MAGNESIA) 400 mg/5 mL oral suspension 30 mL  30 mL Oral DAILY PRN  
 docusate sodium (COLACE) capsule 100 mg  100 mg Oral BID  [START ON 3/4/2020] aspirin chewable tablet 81 mg  81 mg Oral DAILY  carvediloL (COREG) tablet 6.25 mg  6.25 mg Oral BID WITH MEALS  
 atorvastatin (LIPITOR) tablet 20 mg  20 mg Oral QHS  sodium chloride (NS) flush 5-40 mL  5-40 mL IntraVENous Q8H  
 sodium chloride (NS) flush 5-40 mL  5-40 mL IntraVENous PRN  
 nitroglycerin (TRIDIL) 400 mcg/ml infusion  5 mcg/min IntraVENous PRN  
 heparin 25,000 units in D5W 250 ml infusion  11-25 Units/kg/hr IntraVENous TITRATE Labs:   
Recent Results (from the past 24 hour(s)) EKG, 12 LEAD, INITIAL Collection Time: 03/02/20  1:35 PM  
Result Value Ref Range Ventricular Rate 66 BPM  
 Atrial Rate 66 BPM  
 P-R Interval 172 ms QRS Duration 80 ms  
 Q-T Interval 402 ms QTC Calculation (Bezet) 421 ms Calculated P Axis 14 degrees Calculated R Axis 31 degrees Calculated T Axis 62 degrees Diagnosis Normal sinus rhythm Normal ECG When compared with ECG of 21-SEP-2018 11:03, No significant change was found Confirmed by Dotty Johnson MD, --- ([id number]) on 3/2/2020 2:27:55 PM 
  
CBC WITH AUTOMATED DIFF Collection Time: 03/02/20  1:45 PM  
Result Value Ref Range WBC 16.2 (H) 4.6 - 13.2 K/uL  
 RBC 4.34 (L) 4.70 - 5.50 M/uL  
 HGB 13.7 13.0 - 16.0 g/dL HCT 40.8 36.0 - 48.0 % MCV 94.0 74.0 - 97.0 FL  
 MCH 31.6 24.0 - 34.0 PG  
 MCHC 33.6 31.0 - 37.0 g/dL  
 RDW 13.1 11.6 - 14.5 % PLATELET 123 451 - 248 K/uL MPV 10.0 9.2 - 11.8 FL  
 NEUTROPHILS 88 (H) 40 - 73 % LYMPHOCYTES 7 (L) 21 - 52 % MONOCYTES 5 3 - 10 % EOSINOPHILS 0 0 - 5 % BASOPHILS 0 0 - 2 % ABS. NEUTROPHILS 14.2 (H) 1.8 - 8.0 K/UL  
 ABS. LYMPHOCYTES 1.2 0.9 - 3.6 K/UL  
 ABS. MONOCYTES 0.8 0.05 - 1.2 K/UL  
 ABS. EOSINOPHILS 0.0 0.0 - 0.4 K/UL  
 ABS. BASOPHILS 0.0 0.0 - 0.1 K/UL  
 DF AUTOMATED METABOLIC PANEL, COMPREHENSIVE Collection Time: 03/02/20  1:45 PM  
Result Value Ref Range Sodium 142 136 - 145 mmol/L Potassium 4.7 3.5 - 5.5 mmol/L Chloride 108 100 - 111 mmol/L  
 CO2 23 21 - 32 mmol/L Anion gap 11 3.0 - 18 mmol/L Glucose 160 (H) 74 - 99 mg/dL BUN 22 (H) 7.0 - 18 MG/DL Creatinine 1.46 (H) 0.6 - 1.3 MG/DL  
 BUN/Creatinine ratio 15 12 - 20 GFR est AA 58 (L) >60 ml/min/1.73m2 GFR est non-AA 48 (L) >60 ml/min/1.73m2 Calcium 9.2 8.5 - 10.1 MG/DL Bilirubin, total 0.5 0.2 - 1.0 MG/DL  
 ALT (SGPT) 18 16 - 61 U/L  
 AST (SGOT) 33 10 - 38 U/L Alk. phosphatase 90 45 - 117 U/L Protein, total 7.2 6.4 - 8.2 g/dL Albumin 3.5 3.4 - 5.0 g/dL Globulin 3.7 2.0 - 4.0 g/dL A-G Ratio 0.9 0.8 - 1.7 LIPASE Collection Time: 03/02/20  1:45 PM  
Result Value Ref Range Lipase 143 73 - 393 U/L  
CARDIAC PANEL,(CK, CKMB & TROPONIN) Collection Time: 03/02/20  1:45 PM  
Result Value Ref Range  (H) 39 - 308 U/L  
 CK - MB 25.7 (H) <3.6 ng/ml CK-MB Index 6.7 (H) 0.0 - 4.0 % Troponin-I, QT 4.57 (HH) 0.0 - 0.045 NG/ML  
PTT Collection Time: 03/02/20  1:45 PM  
Result Value Ref Range aPTT 25.2 23.0 - 36.4 SEC PROTHROMBIN TIME + INR Collection Time: 03/02/20  1:45 PM  
Result Value Ref Range Prothrombin time 13.6 11.5 - 15.2 sec INR 1.1 0.8 - 1.2 CARDIAC PANEL,(CK, CKMB & TROPONIN) Collection Time: 03/02/20  8:09 PM  
Result Value Ref Range  (H) 39 - 308 U/L  
 CK - MB 38.6 (H) <3.6 ng/ml CK-MB Index 6.8 (H) 0.0 - 4.0 % Troponin-I, QT 20.40 (HH) 0.0 - 0.045 NG/ML  
EKG, 12 LEAD, SUBSEQUENT Collection Time: 03/02/20  9:01 PM  
Result Value Ref Range  Ventricular Rate 60 BPM  
 Atrial Rate 60 BPM  
 P-R Interval 176 ms QRS Duration 84 ms Q-T Interval 418 ms QTC Calculation (Bezet) 418 ms Calculated P Axis 74 degrees Calculated R Axis 51 degrees Calculated T Axis 75 degrees Diagnosis Normal sinus rhythm Septal infarct , age undetermined Abnormal ECG When compared with ECG of 02-MAR-2020 13:35, 
Septal infarct is now present PTT Collection Time: 03/02/20 10:09 PM  
Result Value Ref Range aPTT 91.1 (H) 23.0 - 36.4 SEC  
PTT Collection Time: 03/03/20  2:59 AM  
Result Value Ref Range aPTT 58.7 (H) 23.0 - 36.4 SEC HEMOGLOBIN A1C WITH EAG Collection Time: 03/03/20  2:59 AM  
Result Value Ref Range Hemoglobin A1c 7.1 (H) 4.2 - 5.6 % Est. average glucose 157 mg/dL METABOLIC PANEL, BASIC Collection Time: 03/03/20  2:59 AM  
Result Value Ref Range Sodium 139 136 - 145 mmol/L Potassium 3.9 3.5 - 5.5 mmol/L Chloride 107 100 - 111 mmol/L  
 CO2 23 21 - 32 mmol/L Anion gap 9 3.0 - 18 mmol/L Glucose 189 (H) 74 - 99 mg/dL BUN 21 (H) 7.0 - 18 MG/DL Creatinine 1.55 (H) 0.6 - 1.3 MG/DL  
 BUN/Creatinine ratio 14 12 - 20 GFR est AA 54 (L) >60 ml/min/1.73m2 GFR est non-AA 44 (L) >60 ml/min/1.73m2 Calcium 8.3 (L) 8.5 - 10.1 MG/DL  
HEPATIC FUNCTION PANEL Collection Time: 03/03/20  2:59 AM  
Result Value Ref Range Protein, total 6.2 (L) 6.4 - 8.2 g/dL Albumin 3.0 (L) 3.4 - 5.0 g/dL Globulin 3.2 2.0 - 4.0 g/dL A-G Ratio 0.9 0.8 - 1.7 Bilirubin, total 0.5 0.2 - 1.0 MG/DL Bilirubin, direct <0.1 0.0 - 0.2 MG/DL Alk. phosphatase 78 45 - 117 U/L  
 AST (SGOT) 52 (H) 10 - 38 U/L  
 ALT (SGPT) 22 16 - 61 U/L  
TSH 3RD GENERATION Collection Time: 03/03/20  2:59 AM  
Result Value Ref Range TSH 0.83 0.36 - 3.74 uIU/mL CBC W/O DIFF Collection Time: 03/03/20  2:59 AM  
Result Value Ref Range WBC 12.3 4.6 - 13.2 K/uL  
 RBC 3.94 (L) 4.70 - 5.50 M/uL  
 HGB 12.2 (L) 13.0 - 16.0 g/dL HCT 36.5 36.0 - 48.0 % MCV 92.6 74.0 - 97.0 FL  
 MCH 31.0 24.0 - 34.0 PG  
 MCHC 33.4 31.0 - 37.0 g/dL  
 RDW 13.4 11.6 - 14.5 % PLATELET 309 839 - 162 K/uL MPV 10.9 9.2 - 11.8 FL PROTHROMBIN TIME + INR Collection Time: 03/03/20  2:59 AM  
Result Value Ref Range Prothrombin time 13.8 11.5 - 15.2 sec INR 1.1 0.8 - 1.2 CARDIAC PANEL,(CK, CKMB & TROPONIN) Collection Time: 03/03/20  2:59 AM  
Result Value Ref Range  (H) 39 - 308 U/L  
 CK - MB 20.7 (H) <3.6 ng/ml CK-MB Index 4.1 (H) 0.0 - 4.0 % Troponin-I, QT 14.10 (HH) 0.0 - 8.280 NG/ML  
METABOLIC PANEL, COMPREHENSIVE Collection Time: 03/03/20  5:13 AM  
Result Value Ref Range Sodium 139 136 - 145 mmol/L Potassium 3.8 3.5 - 5.5 mmol/L Chloride 109 100 - 111 mmol/L  
 CO2 25 21 - 32 mmol/L Anion gap 5 3.0 - 18 mmol/L Glucose 138 (H) 74 - 99 mg/dL BUN 22 (H) 7.0 - 18 MG/DL Creatinine 1.48 (H) 0.6 - 1.3 MG/DL  
 BUN/Creatinine ratio 15 12 - 20 GFR est AA 57 (L) >60 ml/min/1.73m2 GFR est non-AA 47 (L) >60 ml/min/1.73m2 Calcium 8.3 (L) 8.5 - 10.1 MG/DL Bilirubin, total 0.5 0.2 - 1.0 MG/DL  
 ALT (SGPT) 22 16 - 61 U/L  
 AST (SGOT) 47 (H) 10 - 38 U/L Alk. phosphatase 78 45 - 117 U/L Protein, total 6.1 (L) 6.4 - 8.2 g/dL Albumin 2.9 (L) 3.4 - 5.0 g/dL Globulin 3.2 2.0 - 4.0 g/dL A-G Ratio 0.9 0.8 - 1.7    
CBC WITH AUTOMATED DIFF Collection Time: 03/03/20  5:13 AM  
Result Value Ref Range WBC 11.5 4.6 - 13.2 K/uL  
 RBC 3.95 (L) 4.70 - 5.50 M/uL  
 HGB 12.0 (L) 13.0 - 16.0 g/dL HCT 36.6 36.0 - 48.0 % MCV 92.7 74.0 - 97.0 FL  
 MCH 30.4 24.0 - 34.0 PG  
 MCHC 32.8 31.0 - 37.0 g/dL  
 RDW 13.5 11.6 - 14.5 % PLATELET 098 602 - 379 K/uL MPV 10.9 9.2 - 11.8 FL  
 NEUTROPHILS 58 40 - 73 % LYMPHOCYTES 30 21 - 52 % MONOCYTES 9 3 - 10 % EOSINOPHILS 2 0 - 5 % BASOPHILS 1 0 - 2 %  
 ABS. NEUTROPHILS 6.8 1.8 - 8.0 K/UL  
 ABS. LYMPHOCYTES 3.4 0.9 - 3.6 K/UL ABS. MONOCYTES 1.0 0.05 - 1.2 K/UL  
 ABS. EOSINOPHILS 0.2 0.0 - 0.4 K/UL  
 ABS. BASOPHILS 0.1 0.0 - 0.1 K/UL  
 DF AUTOMATED MAGNESIUM Collection Time: 03/03/20  5:13 AM  
Result Value Ref Range Magnesium 2.0 1.6 - 2.6 mg/dL EKG, 12 LEAD, INITIAL Collection Time: 03/03/20  7:12 AM  
Result Value Ref Range Ventricular Rate 69 BPM  
 Atrial Rate 288 BPM  
 QRS Duration 78 ms Q-T Interval 418 ms QTC Calculation (Bezet) 447 ms Calculated P Axis 45 degrees Calculated R Axis 26 degrees Calculated T Axis 82 degrees Diagnosis Atrial flutter with variable AV block with premature ventricular or  
aberrantly conducted complexes Abnormal ECG When compared with ECG of 02-MAR-2020 21:01, Atrial flutter has replaced Sinus rhythm GLUCOSE, POC Collection Time: 03/03/20  8:27 AM  
Result Value Ref Range Glucose (POC) 145 (H) 70 - 110 mg/dL POC ACTIVATED CLOTTING TIME Collection Time: 03/03/20  9:19 AM  
Result Value Ref Range Activated Clotting Time (POC) 136 79 - 138 SECS  
GLUCOSE, POC Collection Time: 03/03/20 12:03 PM  
Result Value Ref Range Glucose (POC) 140 (H) 70 - 110 mg/dL Signed By: Michael Figueroa MD   
 March 3, 2020 Disclaimer: Sections of this note are dictated using utilizing voice recognition software. Minor typographical errors may be present. If questions arise, please do not hesitate to contact me or call our department.

## 2020-03-03 NOTE — ROUTINE PROCESS
TRANSFER - IN REPORT: 
 
Verbal report received from [facility]) on Imelda Pemberton  being received from Cath Lab (unit) for routine progression of care Report consisted of patients Situation, Background, Assessment and  
Recommendations(SBAR). Information from the following report(s) SBAR, Kardex, Procedure Summary, Intake/Output, MAR, Recent Results and Cardiac Rhythm NSR was reviewed with the receiving nurse. Opportunity for questions and clarification was provided. Assessment completed upon patients arrival to unit and care assumed. 1320 Pt arrived to floor. Pt is stable with no complaints of pain. Family at bedside. 1510 Pt transported for Echo. Pt stable for transport. Will continue to monitor. 1800 Pt has viewed CABG videos #1 and #2. Questions answered and clarifications provided. Will continue to monitor. Bedside and Verbal shift change report given to [facility] RN(oncoming nurse) by Jessika Huffman (offgoing nurse). Report included the following information SBAR, Kardex, Intake/Output, MAR, Recent Results and Cardiac Rhythm SR with T wave elevation.

## 2020-03-03 NOTE — CONSULTS
[facility]  -  Consult 3/3/2020 Alayna Srivastava is a 68 y.o. male who is being seen on consult for CAD, at  Dr. Martinez Sers request. 
 
 
Assessment:  
 
 
? Severe three-vessel coronary artery disease, with preserved ejection fraction ? Non-STEMI, peak troponin 20 
? Hypertension ? Hyperlipidemia ? Diabetes mellitus, oral agent controlled. A1c 7.1 ? Chronic kidney disease, creatinine 1.4-1.5 ? Gout to toe and elbow ? Right renal calculi without obstruction. ? History of hydrocele repair ? Chronic low paravertebral pain ? History of basal cell skin resection, neck ? Intention tremor ? Paroxysmal a flutter, currently NSR Cardiac risk factors:   male sex, diabetes, hyperlipidemia Plan: 1.  Echocardiogram today to look for valvular dysfunction. 2.  The procedure and anticipated postoperative course as well as the STS calculated risk and alternative therapies including doing nothing were discussed with the patient and his family. They agree with pursuing CABG x3-4 with Dr. Brayden Rice. Will place on the schedule for tomorrow. 3.  Standard preoperative orders placed. N.p.o. after midnight. We will stop ACE inhibitor to decrease incidence of vasoplegic syndrome. Heparin off at 0400 Subjective: Chief Complaint Patient presents with  Back Pain  Nausea History of Present Illness:  
17-year-old male with sudden onset of nausea, vomiting and back pain while crawling under a car during repair. He denies any previous episode of this discomfort. Dissection and PE was ruled out by chest CT angiogram in the emergency room. He was found to have troponins of 20, possible septal infarct without ischemic changes. He is brought to the cardiac Cath Lab and found to have three-vessel coronary artery disease.   He does have a history of decreased endurance and unintentional weight loss of 10 to 15 pounds of the last 6 to 12 months. He does deny any chest pain or shortness of breath or previous episodes of nausea. Past Medical History:  
 
Past Medical History:  
Diagnosis Date  Calculus of kidney  Cancer ([facility] [address] HX of [address]  Diabetes mellitus ([facility] [address] 8/19/2010  Gout 8/19/2010  
 HTN (hypertension) 8/19/2010  Hyperlipemia 8/19/2010  Kidney disease  Lumbar spondylosis  Proteinuria   
 Skin cancer, basal cell   
 neck  Type 2 diabetes mellitus without complication, without long-term current use of insulin ([facility] [address] 8/19/2010  Urolithiasis Past Surgical History:  
 
Past Surgical History:  
Procedure Laterality Date  HX GI    
 HX OTHER SURGICAL Basal cell removed from left side of face  HX UROLOGICAL    
 kidney Social History: He  reports that he has never smoked. He has never used smokeless tobacco.  He  reports no history of alcohol use. Family History:  
 
Family History Problem Relation Age of Onset  Diabetes Mother Allergies and Intolerances: Allergies Allergen Reactions  Metformin Other (comments) Renal insufficiency Home Medications:  
 
Prior to Admission Medications Prescriptions Last Dose Informant Patient Reported? Taking?  
allopurinol (ZYLOPRIM) 300 mg tablet 3/2/2020 at Unknown time  No Yes Sig: take 1 tablet by mouth once daily  
dilTIAZem (TIAZAC) 360 mg SR capsule 3/2/2020 at Unknown time  No Yes Sig: Take 1 Cap by mouth daily. glyBURIDE (DIABETA) 2.5 mg tablet 3/2/2020 at Unknown time  No Yes Sig: Take 1 Tab by mouth two (2) times daily (with meals). lisinopril (PRINIVIL, ZESTRIL) 20 mg tablet 3/2/2020 at Unknown time  No Yes Sig: Take 1 Tab by mouth two (2) times a day. Facility-Administered Medications: None Current Facility-Administered Medications Medication Dose Route Frequency Provider Last Rate Last Dose  insulin lispro (HUMALOG) injection   SubCUTAneous AC&HS Chelsea Jasso MD      
 glucose chewable tablet 16 g  4 Tab Oral PRN Chelsea Jasso MD      
 glucagon (GLUCAGEN) injection 1 mg  1 mg IntraMUSCular PRN Chelsea Jasso MD      
 dextrose (D50W) injection syrg 12.5-25 g  25-50 mL IntraVENous PRN Chelsea Jasso MD      
 sodium chloride (NS) flush 5-40 mL  5-40 mL IntraVENous Q8H Chelsea Jasso MD      
 sodium chloride (NS) flush 5-40 mL  5-40 mL IntraVENous PRN Chelsea Jasso MD      
 magnesium hydroxide (MILK OF MAGNESIA) 400 mg/5 mL oral suspension 30 mL  30 mL Oral DAILY PRN Chelsea Jasso MD      
 docusate sodium (COLACE) capsule 100 mg  100 mg Oral BID Chelsea Jasso MD      
 [START ON 3/4/2020] aspirin chewable tablet 81 mg  81 mg Oral DAILY Chelsea Jasso MD      
 carvediloL (COREG) tablet 6.25 mg  6.25 mg Oral BID WITH MEALS Chelsea Jasso MD      
 atorvastatin (LIPITOR) tablet 20 mg  20 mg Oral QHS Chelsea Jasso MD      
 sodium chloride (NS) flush 5-40 mL  5-40 mL IntraVENous Q8H Casper De La Paz MD      
 sodium chloride (NS) flush 5-40 mL  5-40 mL IntraVENous PRN Casper Mckeon MD      
 nitroglycerin (TRIDIL) 400 mcg/ml infusion  5 mcg/min IntraVENous PRN Casper Mckeon MD      
 heparin 25,000 units in D5W 250 ml infusion  11-25 Units/kg/hr IntraVENous TITRATE Nilson Dumont DO 10.9 mL/hr at 03/03/20 0900 12 Units/kg/hr at 03/03/20 0900 Wilmer Carranza Review of Systems: As in  HPI including and: 
 
Constitutional:  Unintentional 10 pound weight loss over the last 6 to 12 months. Decreased endurance over the same time. Skin:  Positive for easy bruising and thin skin. HENT: No headache, hearing loss or nosebleeds. Eyes: No visual field cuts or double vision. yes glasses used for reading. No drainage from eyes. Respiratory: No frequent respiratory infections or pneumonia.    No history of wheezing. No hemoptysis. Cardiovascular:  No prior chest pain or shortness of breath. No known history of heart disease or heart attacks. Gastrointestinal: No constipation or diarrhea. No dark or bloody stools. No frequent nausea or vomiting. Genitourinary: No difficulty with urination. No urgency or frequency. No blood in urine. Muscularskeletal:  Left shoulder pain after fall 2 years ago. Residual left arm weakness. Received steroid injection short time ago. Endo/Heme/Allergy: yes diabetes. Neurological: No loss of consciousness, one sided weakness, difficulty with speech or eating or drinking. No foot drop or difficulty with walking. All other systems reviewed and negative. Physical Examination:  
 
Visit Vitals /60 Pulse 62 Temp 97.6 °F (36.4 °C) Resp 17 Ht 5' 7\" (1.702 m) Wt 90.7 kg (200 lb) SpO2 98% BMI 31.32 kg/m² General:  Elderly appearing man in no apparent distress. Looks as stated age. Psych:  Appropriate with exam.  
Skin  thin appearing skin to arms with multiple areas of ecchymosis and depigmentation mostly on left arm. HEENT:  PERRLA. Anicteric. Moist mucous membranes. Only a few lower teeth Neck:  No JVD or tracheal deviation, Negative bruit. Back:  could not find any bruits when he is listening no CVAT Chest:  Symmetrical.  
Lungs:  Clear to auscultation without wheezes, rales or rhonchi. Good excursion. Heart:  Regular rate and rhythm without murmur or rub. Abdominal:  Not distended. Active sounds. Soft and nontender. No organomegaly. Extremities:  Warm and well-perfused. No edema or cyanosis. Neurological:  Moves all extremities x4 strong and equal.  
Pulses  2+ DP pulses bilaterally. 2+ left radial pulse. Right radial compression device in place. Laboratory Data:  
 
Lab Results Component Value Date/Time  WBC 11.5 03/03/2020 05:13 AM  
 HGB 12.0 (L) 03/03/2020 05:13 AM  
 HCT 36.6 03/03/2020 05:13 AM  
 PLATELET 459 44/98/5793 05:13 AM  
 
Lab Results Component Value Date/Time Sodium 139 03/03/2020 05:13 AM  
 Potassium 3.8 03/03/2020 05:13 AM  
 Chloride 109 03/03/2020 05:13 AM  
 CO2 25 03/03/2020 05:13 AM  
 Glucose 138 (H) 03/03/2020 05:13 AM  
 BUN 22 (H) 03/03/2020 05:13 AM  
 Creatinine 1.48 (H) 03/03/2020 05:13 AM  
 
Lab Results Component Value Date/Time Cholesterol, total 189 03/20/2019 08:48 AM  
 HDL Cholesterol 32 (L) 03/20/2019 08:48 AM  
 LDL, calculated 83.4 03/20/2019 08:48 AM  
 Triglyceride 368 (H) 03/20/2019 08:48 AM  
 
Lab Results Component Value Date/Time Hemoglobin A1c 7.1 (H) 03/03/2020 02:59 AM  
 Hemoglobin A1c (POC) 6.8 09/08/2017 08:20 AM  
 
Recent Labs 03/03/20 
8189 03/03/20 
0259 03/02/20 
1345 CA 8.3* 8.3* 9.2 ALB 2.9* 3.0* 3.5 TP 6.1* 6.2* 7.2 SGOT 47* 52* 33  
TBILI 0.5 0.5 0.5 Recent Labs 03/03/20 
0259 03/02/20 2009 03/02/20 
1345 TROIQ 14.10* 20.40* 4.57* * 564* 383* CKMB 20.7* 38.6* 25.7* EKG: (independently reviewed) 3/3/20 Afluter 69 when previously NSR 
 
ECHO: pending CATHETERIZATION  3/3/2020: Left main, 30%. Mid segment LAD 80%, D2 99%, D1 85%, circumflex ostial 90%, proximal 70%. OM 90%, OM2 85%. EF 55% RADIOLOGY DATA: (images independently reviewed) CTA, chest abdomen pelvis, 3/2/2020. No aortic dissection or aneurysm. Mild arteriosclerotic aortic disease. No evidence of PE. Bilateral renal cysts. Nonobstructive right renal calculi. Prostatic hypertrophy. STS RISK:   
Risk of Mortality: 
2.160% Renal Failure: 3.548% Permanent Stroke: 
2.120% Prolonged Ventilation: 
7.985% DSW Infection: 
0.229% Reoperation: 
2.148% Morbidity or Mortality: 
12.161% Short Length of Stay: 
41.774% Long Length of Stay: 
5.560% Gabby Juarez PA-C

## 2020-03-03 NOTE — H&P
History & Physical 
 
Patient: Vilma Valenzuela MRN: [id number]  CSN: [id number] YOB: 1950  Age: 68 y.o. Sex: male DOA: 3/2/2020 Chief Complaint:  
Chief Complaint Patient presents with  Back Pain  Nausea HPI:  
 
Vilma Valenzuela is a 68 y.o.  male who has hx of DM, HTN, hyperlipidemia and Kidney stones presents to [facility] with complaints Of severe back pain associated with left arm pain and numbness symptoms started while changing the oil in his car and then progressed patient symptoms associated with 2 episodes of nausea and vomitting In ER CTA chest abdomen done to exclude dissection Was negative but Troponin found to be elevated Started on heparin drip/nitro with improvement of back pain Past Medical History:  
Diagnosis Date  Calculus of kidney  Cancer ([facility] [address] HX of [facility]  Diabetes mellitus ([facility] [address] 8/19/2010  Gout 8/19/2010  
 HTN (hypertension) 8/19/2010  Hyperlipemia 8/19/2010  Kidney disease  Lumbar spondylosis  Proteinuria   
 Skin cancer, basal cell   
 neck  Type 2 diabetes mellitus without complication, without long-term current use of insulin ([facility] [address] 8/19/2010  Urolithiasis Past Surgical History:  
Procedure Laterality Date  HX GI    
 HX OTHER SURGICAL Basal cell removed from left side of face  HX UROLOGICAL    
 kidney Family History Problem Relation Age of Onset  Diabetes Mother Social History Socioeconomic History  Marital status:  Spouse name: Not on file  Number of children: Not on file  Years of education: Not on file  Highest education level: Not on file Tobacco Use  Smoking status: Never Smoker  Smokeless tobacco: Never Used Substance and Sexual Activity  Alcohol use: No  
 Drug use: No  
 Sexual activity: Yes Prior to Admission medications Medication Sig Start Date End Date Taking? Authorizing Provider allopurinol (ZYLOPRIM) 300 mg tablet take 1 tablet by mouth once daily 19  Yes Tomeka Avina NP  
dilTIAZem (TIAZAC) 360 mg SR capsule Take 1 Cap by mouth daily. 19  Yes Eric LANGE NP  
glyBURIDE (DIABETA) 2.5 mg tablet Take 1 Tab by mouth two (2) times daily (with meals). 19  Yes Tomeka Avina NP  
lisinopril (PRINIVIL, ZESTRIL) 20 mg tablet Take 1 Tab by mouth two (2) times a day. 19  Yes Rosa Wiley NP Allergies Allergen Reactions  Metformin Other (comments) Renal insufficiency Review of Systems GENERAL: Patient alert, awake and oriented times 3, able to communicate full sentences and not in distress. HEENT: No change in vision, no earache, tinnitus, sore throat or sinus congestion. NECK: No pain or stiffness. PULMONARY: No shortness of breath, cough or wheeze. Cardiovascular: no pnd or orthopnea, + CP 
GASTROINTESTINAL: No abdominal pain,+ nausea, +vomiting or diarrhea, melena or bright red blood per rectum. GENITOURINARY: No urinary frequency, urgency, hesitancy or dysuria. MUSCULOSKELETAL:+ muscle pain+ back pain, no recent trauma. DERMATOLOGIC: No rash, no itching, no lesions. ENDOCRINE: No polyuria, polydipsia, no heat or cold intolerance. No recent change in weight. HEMATOLOGICAL: No anemia or easy bruising or bleeding. NEUROLOGIC: No headache, seizures, numbness, tingling or weakness. Physical Exam:  
 
Physical Exam: 
Visit Vitals /66 Pulse (!) 57 Temp 97.5 °F (36.4 °C) Resp 15 Ht 5' 7\" (1.702 m) Wt 90.7 kg (200 lb) SpO2 98% BMI 31.32 kg/m² O2 Device: Room air Temp (24hrs), Av.5 °F (36.4 °C), Min:97.3 °F (36.3 °C), Max:97.6 °F (36.4 °C) No intake/output data recorded. No intake/output data recorded. General:  Alert, cooperative, no distress, appears stated age. Head: Normocephalic, without obvious abnormality, atraumatic. Eyes:  Conjunctivae/corneas clear. PERRL, EOMs intact. Nose: Nares normal. No drainage or sinus tenderness. Neck: Supple, symmetrical, trachea midline, no adenopathy, thyroid: no enlargement, no carotid bruit and no JVD. Lungs:   Clear to auscultation bilaterally. Heart:  Regular rate and rhythm, S1, S2 normal.  
  Abdomen: Soft, non-tender. Bowel sounds normal.   
Extremities: Extremities normal, atraumatic, no cyanosis or edema. Pulses: 2+ and symmetric all extremities. Skin:  No rashes or lesions Neurologic: AAOx3, No focal motor or sensory deficit. Labs Reviewed: All lab results for the last 24 hours reviewed. Recent Results (from the past 24 hour(s)) EKG, 12 LEAD, INITIAL Collection Time: 03/02/20  1:35 PM  
Result Value Ref Range Ventricular Rate 66 BPM  
 Atrial Rate 66 BPM  
 P-R Interval 172 ms QRS Duration 80 ms  
 Q-T Interval 402 ms QTC Calculation (Bezet) 421 ms Calculated P Axis 14 degrees Calculated R Axis 31 degrees Calculated T Axis 62 degrees Diagnosis Normal sinus rhythm Normal ECG When compared with ECG of 21-SEP-2018 11:03, No significant change was found Confirmed by Zahida Muro MD, --- ([id number]) on 3/2/2020 2:27:55 PM 
  
CBC WITH AUTOMATED DIFF Collection Time: 03/02/20  1:45 PM  
Result Value Ref Range WBC 16.2 (H) 4.6 - 13.2 K/uL  
 RBC 4.34 (L) 4.70 - 5.50 M/uL  
 HGB 13.7 13.0 - 16.0 g/dL HCT 40.8 36.0 - 48.0 % MCV 94.0 74.0 - 97.0 FL  
 MCH 31.6 24.0 - 34.0 PG  
 MCHC 33.6 31.0 - 37.0 g/dL  
 RDW 13.1 11.6 - 14.5 % PLATELET 644 331 - 489 K/uL MPV 10.0 9.2 - 11.8 FL  
 NEUTROPHILS 88 (H) 40 - 73 % LYMPHOCYTES 7 (L) 21 - 52 % MONOCYTES 5 3 - 10 % EOSINOPHILS 0 0 - 5 % BASOPHILS 0 0 - 2 %  
 ABS. NEUTROPHILS 14.2 (H) 1.8 - 8.0 K/UL  
 ABS. LYMPHOCYTES 1.2 0.9 - 3.6 K/UL  
 ABS. MONOCYTES 0.8 0.05 - 1.2 K/UL  
 ABS. EOSINOPHILS 0.0 0.0 - 0.4 K/UL  
 ABS. BASOPHILS 0.0 0.0 - 0.1 K/UL  
 DF AUTOMATED METABOLIC PANEL, COMPREHENSIVE Collection Time: 03/02/20  1:45 PM  
Result Value Ref Range Sodium 142 136 - 145 mmol/L Potassium 4.7 3.5 - 5.5 mmol/L Chloride 108 100 - 111 mmol/L  
 CO2 23 21 - 32 mmol/L Anion gap 11 3.0 - 18 mmol/L Glucose 160 (H) 74 - 99 mg/dL BUN 22 (H) 7.0 - 18 MG/DL Creatinine 1.46 (H) 0.6 - 1.3 MG/DL  
 BUN/Creatinine ratio 15 12 - 20 GFR est AA 58 (L) >60 ml/min/1.73m2 GFR est non-AA 48 (L) >60 ml/min/1.73m2 Calcium 9.2 8.5 - 10.1 MG/DL Bilirubin, total 0.5 0.2 - 1.0 MG/DL  
 ALT (SGPT) 18 16 - 61 U/L  
 AST (SGOT) 33 10 - 38 U/L Alk. phosphatase 90 45 - 117 U/L Protein, total 7.2 6.4 - 8.2 g/dL Albumin 3.5 3.4 - 5.0 g/dL Globulin 3.7 2.0 - 4.0 g/dL A-G Ratio 0.9 0.8 - 1.7 LIPASE Collection Time: 03/02/20  1:45 PM  
Result Value Ref Range Lipase 143 73 - 393 U/L  
CARDIAC PANEL,(CK, CKMB & TROPONIN) Collection Time: 03/02/20  1:45 PM  
Result Value Ref Range  (H) 39 - 308 U/L  
 CK - MB 25.7 (H) <3.6 ng/ml CK-MB Index 6.7 (H) 0.0 - 4.0 % Troponin-I, QT 4.57 (HH) 0.0 - 0.045 NG/ML  
PTT Collection Time: 03/02/20  1:45 PM  
Result Value Ref Range aPTT 25.2 23.0 - 36.4 SEC PROTHROMBIN TIME + INR Collection Time: 03/02/20  1:45 PM  
Result Value Ref Range Prothrombin time 13.6 11.5 - 15.2 sec INR 1.1 0.8 - 1.2 CARDIAC PANEL,(CK, CKMB & TROPONIN) Collection Time: 03/02/20  8:09 PM  
Result Value Ref Range  (H) 39 - 308 U/L  
 CK - MB 38.6 (H) <3.6 ng/ml CK-MB Index 6.8 (H) 0.0 - 4.0 % Troponin-I, QT 20.40 (HH) 0.0 - 0.045 NG/ML  
EKG, 12 LEAD, SUBSEQUENT Collection Time: 03/02/20  9:01 PM  
Result Value Ref Range Ventricular Rate 60 BPM  
 Atrial Rate 60 BPM  
 P-R Interval 176 ms QRS Duration 84 ms Q-T Interval 418 ms QTC Calculation (Bezet) 418 ms Calculated P Axis 74 degrees Calculated R Axis 51 degrees Calculated T Axis 75 degrees Diagnosis Normal sinus rhythm Septal infarct , age undetermined Abnormal ECG When compared with ECG of 02-MAR-2020 13:35, 
Septal infarct is now present PTT Collection Time: 03/02/20 10:09 PM  
Result Value Ref Range aPTT 91.1 (H) 23.0 - 36.4 SEC  
 and EKG Procedures/imaging: see electronic medical records for all procedures/Xrays and details which were not copied into this note but were reviewed prior to creation of Plan Assessment/Plan Principal Problem: 
  Elevated troponin (3/2/2020) A ctive Problems: 1. Acute coronary syndrome ([address] (3/2/2020) 2. DM 
 
3. Hypertension 4. Back pain Plan: 1. Patient will be admitted to telemetry serial enzymes are placed 2. Started on heparin drip 3. Low-dose beta-blocker aspirin and statin started 4.  Echocardiogram obtained as well as cardiology consult 5. Placed on a sliding scale insulin DVT/GI Prophylaxis: heparin Discussed with patient at bedside about hospital admission and my plan care, who understood and agree with my plan care.  
 
Zana Golden MD 
3/2/2020 11:26 PM

## 2020-03-03 NOTE — PROGRESS NOTES
Received report on pt.from off going RN. Resting quietly in bed on rounds. Denies c/o pain or SOB at this time. No acute distress noted. Call bell at side. Heparin infusing per ACS protocol. Denies c/o CP or SOB. Kept NPO for [possible cardiac cath/stress test. Will cont to monitor for any changes in status. 4196 to cath lab per stretcher. 1200 pt will not be returning to room 203. Will be transferred to CVT post cath. No personal belonginga found left in pts room.

## 2020-03-03 NOTE — ED NOTES
TRANSFER - OUT REPORT: 
 
Verbal report given to [id number] [place] [address] (name) on William Ovalle  [facility] for routine progression of care Report consisted of patients Situation, Background, Assessment and  
Recommendations(SBAR). Information from the following report(s) SBAR, Kardex, MAR, Recent Results and Cardiac Rhythm Sinus Rhythm. was reviewed with the receiving nurse. Lines:  
Peripheral IV 03/02/20 Right Wrist (Active) Site Assessment Clean, dry, & intact 3/2/2020  1:51 PM  
Phlebitis Assessment 0 3/2/2020  1:51 PM  
Infiltration Assessment 0 3/2/2020  1:51 PM  
Dressing Status Clean, dry, & intact 3/2/2020  1:51 PM  
Dressing Type Tape;Transparent 3/2/2020  1:51 PM  
Hub Color/Line Status Pink;Flushed 3/2/2020  1:51 PM  
Action Taken Blood drawn 3/2/2020  1:51 PM  
  
 
Opportunity for questions and clarification was provided. Patient transported with: 
 Monitor Heparin Drip

## 2020-03-03 NOTE — DIABETES MGMT
GLYCEMIC CONTROL PLAN OF CARE Assessment/Recommendations: 
Pt is a 68year old male with a past medical history significant for diabetes, hypertension, hyperlipidemia, and kidney stones who presented with severe back pain. Blood glucose within hospital targets. Noted plan for CABG tomorrow. Pt will be NPO at midnight. Will continue inpatient monitoring and intervention. Most recent blood glucose values: 138, 145, 140 mg/dL Current A1C of 7.1% is equivalent to average blood glucose of 157 mg/dl over the past 2-3 months. Current hospital diabetes medications:  
Correctional Lispro insulin 4 times daily ACHS Home diabetes medications: 
Glyburide 2.5 mg two times daily Diet: Cardiac, consistent carbohydrate 1800 Kcal. NPO at midnight Education:  Pt reports no education needed at this time, pt's spouse is a certified diabetes educator. Information provided on free outpatient diabetes education classes.  
 
 
Mac El RD, CDE

## 2020-03-03 NOTE — ED NOTES
Report given to [facility] AT [facility] for ALS Transfer to [address] [facility], Room 203 on Cardiac Monitor and Heparin Drip 997.7 units/hr or 10mls/hr, verified with Gorman Spatz.

## 2020-03-04 NOTE — PROGRESS NOTES
[facility] UA, ECHO and labs reviewed. Patient denies CP or SOB. BB just given. No questions from patient or family. OK for OR.

## 2020-03-04 NOTE — ROUTINE PROCESS
- blood cultures negative from 2/13  - held daptomycin, repeat blood cultures positive  - ceftaroline started  - DVT noted and could be endovascular source  - would consider RANDA  - continue daptomycin, following CPK, and ceftaroline (limited data supports synergy)  - repeat blood cultures  - may need another line change/holiday, if possible   TRANSFER - OUT REPORT: 
 
Verbal report given to [address] RN on [facility]  being transferred to CVT ICU for routine post - op Report consisted of patients Situation, Background, Assessment and  
Recommendations(SBAR). Information from the following report(s) SBAR, Kardex, OR Summary, Procedure Summary, Intake/Output, MAR and Recent Results was reviewed with the receiving nurse. Lines:  
Peripheral IV 03/02/20 Right Wrist (Active) Site Assessment Clean, dry, & intact 3/4/2020  8:41 AM  
Phlebitis Assessment 0 3/4/2020  8:41 AM  
Infiltration Assessment 0 3/4/2020  8:41 AM  
Dressing Status Clean, dry, & intact 3/4/2020  8:41 AM  
Dressing Type Transparent 3/4/2020  8:41 AM  
Hub Color/Line Status Pink 3/4/2020  8:41 AM  
Action Taken Open ports on tubing capped 3/3/2020  8:01 PM  
Alcohol Cap Used Yes 3/3/2020  8:01 PM  
   
Peripheral IV 03/04/20 Left Antecubital (Active) Site Assessment Clean, dry, & intact 3/4/2020  8:41 AM  
Phlebitis Assessment 0 3/4/2020  8:41 AM  
Infiltration Assessment 0 3/4/2020  8:41 AM  
Dressing Status Clean, dry, & intact 3/4/2020  8:41 AM  
Dressing Type Transparent 3/4/2020  8:41 AM  
Hub Color/Line Status Green 3/4/2020  8:41 AM  
Action Taken Open ports on tubing capped 3/4/2020  7:45 AM  
Alcohol Cap Used Yes 3/4/2020  7:45 AM  
   
Arterial Line 03/04/20 Left Radial artery (Active) Opportunity for questions and clarification was provided. Patient transported with: 
 Monitor Registered Nurse

## 2020-03-04 NOTE — PROGRESS NOTES
Pt is in OR for CABG. Pt transferred under Dr. Sarah Peña. Hospitalist service will sign off. Please call us if needed

## 2020-03-04 NOTE — PROGRESS NOTES
CVT PRE-PROCEDURE CHECKLIST 1. Procedure: CABG 2. Allergies: Allergies Allergen Reactions  Metformin Other (comments) Renal insufficiency 3. Procedure consent signed by patient: Williams Ghotra 4. Procedure consent signed by surgeon: NO, to be signed prior to procedure 5. Anesthesia consent signed by patient and anesthesiologist: Williams Ghotra 6. Blood consent signed: Yes A. Type and cross match done (date): 03/03/20 B. Blood band number: [id number] C. Number of units available: 2 
D. Blood transfusion history faxed to blood bank: Yes 
 
7. History and physical in [facility] within last 30 days or with update: Yes 
 
8. 2 sheets of patient labels on daily chart: Yes 
 
9. Cardiac cath report in [facility]: Yes 10. Carotid study report available in [facility] if ordered (within last 6 months): Yes 11. CXR report available in [facility] (within last 30 days): YES 12. ABG's completed and in [facility] if ordered: Not applicable 13. Patient ID bracelet and allergy bracelet on patient and readable: Yes 13. Preop labs as ordered and available in [facility]: Yes 14. NPO since midnight:  03/04/20 @ 0000 15. ECG available in [facility] (within 30 days of surgery): Yes 16. Vital signs obtained within 1 hour of procedure (for CABG's include both arms): Yes 17. Clipped per orders at: 03/04/20 @ 0500 
 
18. Bathed with hibiclens at: 03/04/20 @ 0500 19. All jewelry, glasses, contact lenses, dentures, hearing aides removed: Yes 20. IV access: #1: #20g R wrist ;  #2: #18g L AC 
 
21. DP/ PT pulses marked bilaterally: Yes 22. Anticoagulation stop time: date 03/04/20; time 0400 
 
23. Last beta blocker dose: date 03/04/20 ; time 0702 24. Blood Glucose within last 60 mins (diabetics): Yes 25. UA within 30 days: YES 
 
26.  Heart Hugger Size: 
 
Large DUAL RN CHECK OFF: Bbo López RN and Sanket Plata RN

## 2020-03-04 NOTE — PROGRESS NOTES
1400-Received report from Gail Akbar 43. 1424-Pt in room, connect to monitors. Report received from Dr. Ania Meeks at bedside. All gtts verified at bedside with Haleigh Hawthorne RN. Chest tubes connect to suction. Lin intact. 1506-Cardiac output done. 1 bottle of Albumin given. 1525-Second bottle of albumin given. 1534-Cardiac output done. 1515-Seamus ELLIS at bedside checking chest tube drainage. 1710-RHONDA Goldsmith notified of pt's urine output and chest tube outputs. Chest tube output 240 total in 1 hour and 15 minutes. Urine output 33ml in the last hour. PT/PTT also reported. 1715-Seamus Reynoso at bedside. 1730-Chest Xray done. 1815-Dr. Kenney called and received update on pt, reported urine output and chest tube outputs, blood infusing at this time, BP stable.

## 2020-03-04 NOTE — OP NOTES
CARDIAC SURGERY OPERATIVE NOTE PREOPERATIVE DIAGNOSIS:  Coronary artery disease, NSTEMI 
  
POSTOPERATIVE DIAGNOSIS:  Coronary artery disease, NSTEMI 
  
PROCEDURE:   
1. Coronary artery bypass grafting x 3 with the left internal mammary artery to the mid left anterior descending coronary artery, and saphenous vein graft to the OM1, and SVG to diagonal 1/ramus 
  
2. Endoscopic vein harvesting    
  
ATTENDING SURGEON: Misti Godoy MD    
  
ASSISTANT: TOM Soto, [facility] 
  
ANESTHESIA:  General with endotracheal tube.   
  
ANESTHESIOLOGIST: Beryl Mathis MD 
  
ESTIMATED BLOOD LOSS: not applicable (cardiopulmonary bypass) 
  IMPLANTS: none 
  Natalie De Los Santos is a 73-year-old male who presents with coronary artery disease and  as well as a non-ST-elevation myocardial infarction.  
  
Cardiac risk factors include dyslipidemia, hypertension, and diabetes. 
  
 A cardiac catheterization was performed and revealed significant three-vessel coronary artery disease. His right coronary system was diffusely diseased but no obstructive lesion was found. The lesions were estimated to be 50%. The vessel also looked to be a very poor target with extensive diffuse calcification. Patient had a high-grade LAD lesion as well as disease of the first diagonal/ramus branch, as well as the circumflex system. It appeared that only the first obtuse marginal branch would be large enough to graft.   The first diagonal/ramus branch was considered to be the culprit lesion.   
  
We were consulted for coronary artery bypass grafting.  The procedure was discussed at length with the patient. Liban Machuca agreed to the procedure.  All risks of the procedure were explained and all questions were answered. 
  
FINDINGS: The sternum was of good quality and was not osteoporotic.  The ascending aorta was not especially calcified.  The left ventricle was somewhat hypertrophied and dilated.  
  
 PROCEDURE:  He was taken to the operating room and was placed on the table in supine position, and after being placed under general anesthesia, was intubated without difficulty.   Appropriate monitoring lines were then placed, including a radial  arterial line, a pulmonary artery catheter, a Lin catheter, and a transesophegeal echo probe.  He was then prepped and draped in sterile fashion from neck to toes.   
  
A full Time Out was performed and the Cardiac Surgery Operative Checklist was then reviewed, which included confirming the patient's identity and planned operation, and also that the preoperative antibiotics were appropriate and had been delivered in appropriate timely fashion. 
  
A median sternotomy incision was then performed with a sternal saw while greater saphenous vein was harvested from the right leg endoscopically by TOM Hobbs.  The vein appeared to be of good quality.  
  
After dividing the thymic remnant, and removing a portion of it, the left internal mammary artery was then exposed by a Rultract retractor and then harvested and was divided distally after systemic heparinization.  The LIMA appeared to be of good quality.  It was sprayed with a papaverine solution and then placed in a sponge soaked with the same solution.   
  
He was then cannulated for cardiopulmonary bypass using a single cannula in the distal ascending aorta through double purse-string sutures; and a single two-staged cannula was placed into the right atrium through a single purse-string suture, all of which were secured by tourniquets.  An antegrade cardioplegia cannula was placed into the ascending aorta and was secured by a tourniquet.  After confirming that the ACT was greater than 400 seconds, the patient was placed on cardiopulmonary bypass and cooled to 32 degrees Celsius. I inspected the targets after we were on cardiopulmonary bypass.   The first obtuse marginal was readily visible and appeared to be a good target. The second and third obtuse marginal branches appeared quite small and I felt that it did not warrant grafting and would likely cause more harm than good. It was extremely difficult to visualize the first diagonal/ramus branch. I had a sense of where it was based upon some subtle epicardial appearance and the palpation of some calcification, but it was not at all clear epicardially. The LAD appeared to be a reasonable target. 
  
After the targets were inspected and marked, a cross-clamp cross clamp was then placed on the distal ascending aorta, and I liter of cardioplegia was given.  Additional doses were given as appropriate.  Topical cooling with ice was also utilized. A dose of warm cardioplegia was administered just prior to removal of the cross clamp.   
  
First distal anastomosis performed was to the first obtuse marginal branch.    The target vessel was noted to be sizable and appeared to be relatively free of disease at the point at which it was most accessible. An arteriotomy was made and an end-to-side anastomosis with SVG was completed using 7-0 Prolene.  The vein was then coursed along the left side of the heart, measured, and divided for proximal anastomosis. An aortotomy was made and the proximal anastomosis was completed with running 6-0 Prolene. 
  
I then began to search for the ramus/diagonal 1 branch. This was ultimately found to be deeply intramyocardial as well as under a great deal of epicardial fat. Fortunately after extensive searching, the vessel was clearly identified. [place] was used to retract all of the soft tissue surrounding the very deep target. The artery itself appeared to be of good quality and of medium caliber. The vessel was opened and an end-to-side anastomosis was created with a saphenous vein graft using 7-0 Prolene.   The vein was then brought up along the left side of the heart and again a proximal anastomosis was completed to the ascending aorta with 6-0 Prolene. Of note, both of the venous grafts were tested by way of injecting cardioplegia solution through them prior to their proximal anastomoses. Both of the saphenous vein grafts were noted to have flows of approximately 90 cc/min with a pressure of 100. 
  
The left internal mammary artery was then anastomosed end-to-side to the mid left anterior descending coronary artery, using 7-0 Prolene running suture.  The LAD was also intramyocardial but was much more easily found than the diagonal/ramus branch. The LIMA had very good flow and the LAD was an excellent target. The pedicle was tacked down with 6-0s.   
The patient had been rewarmed by this time. He was weaned off of CPB easily.  The ALISSA showed improved EF from approximately 30% at the beginning of the case to approximately 45% at the end of the case. .  Decannulation and protamine administration were performed in the usual fashion. 
  
Chest tubes were placed. The chest was thoroughly irrigated. Hemostasis was ensured. Closure preceded in standard fashion. 
  
I was present in the operating room from the time the patient arrived until the patient left the room and performed the entire procedure as dictated above.

## 2020-03-04 NOTE — ROUTINE PROCESS
TRANSFER - IN REPORT: 
 
Verbal report received from [address] on Annemarie Pruett  being received from CVT Stepdown for ordered procedure Report consisted of patients Situation, Background, Assessment and  
Recommendations(SBAR). Information from the following report(s) SBAR, Kardex, Intake/Output, MAR and Recent Results was reviewed with the receiving nurse. Opportunity for questions and clarification was provided. Assessment completed upon patients arrival to unit and care assumed.

## 2020-03-04 NOTE — ANESTHESIA PREPROCEDURE EVALUATION
Relevant Problems No relevant active problems Anesthetic History No history of anesthetic complications Review of Systems / Medical History Patient summary reviewed, nursing notes reviewed and pertinent labs reviewed Pulmonary Within defined limits Neuro/Psych Within defined limits Cardiovascular Hypertension Past MI and CAD Comments: 03/2020 ECHO Estimated left ventricular ejection fraction is 55 - 60%. GI/Hepatic/Renal 
  
 
 
Renal disease: CRI Endo/Other Diabetes Obesity Other Findings Physical Exam 
 
Airway Mallampati: III 
TM Distance: 4 - 6 cm Neck ROM: normal range of motion Mouth opening: Normal 
 
 Cardiovascular Rhythm: regular Dental 
 
Dentition: Full upper dentures and Poor dentition Pulmonary Breath sounds clear to auscultation Abdominal 
GI exam deferred Other Findings Anesthetic Plan ASA: 4 Anesthesia type: general 
 
Monitoring Plan: Arterial line, BIS, CVP, [employer] and ALISSA Anesthetic plan and risks discussed with: Patient

## 2020-03-04 NOTE — ANESTHESIA PROCEDURE NOTES
Arterial Line Placement Start time: 3/4/2020 7:30 AM 
End time: 3/4/2020 7:40 AM 
Performed by: Neville Mcwilliams Authorized by: Uma Cooney MD  
 
Pre-Procedure Indications:  Arterial pressure monitoring and blood sampling Preanesthetic Checklist: patient identified, risks and benefits discussed, anesthesia consent, site marked, patient being monitored, timeout performed and patient being monitored Timeout Time: 07:30 Procedure:  
Prep:  ChloraPrep Seldinger Technique?: Yes Orientation:  Left Location:  Radial artery Catheter size:  20 G Number of attempts:  1 Cont Cardiac Output Sensor: No   
 
Assessment:  
Post-procedure:  Line secured and sterile dressing applied Patient Tolerance:  Patient tolerated the procedure well with no immediate complications

## 2020-03-04 NOTE — PROGRESS NOTES
PEEP increased from 5 to 8, by PA, to address bleeding. 03/04/20 1726 Patient Observations Pulse (Heart Rate) 87 Resp Rate 14  
O2 Sat (%) 100 % Airway - Continuous Aspiration of Subglottic Secretions (TERRI) Tube 03/04/20 Oral  
Placement Date/Time: 03/04/20 0741   Inserted By: Everton Santoyo  Present on Admission/Arrival: No  Location: Oral  Placement Verified: Auscultation;BBS;EtCO2  Airway Types: Endotracheal , uncuffed  Airway Tube Size: (c) 7.5 mm Insertion Depth (cm) 24 cm Line Juan Lips Side Secured Right Cuff Pressure  
(MLT) Site Assessment Clean, dry, & intact Respiratory Respiratory (WDL) WDL Ventilator Initiate/Discontinue Bio-Med ID # 1 Vent Settings FIO2 (%) 60 % SIMV Rate Set 14 Back-Up Rate 14 Vt Set (ml) 600 ml Pressure Support (cm H2O) 10 cm H2O  
PEEP/VENT (cm H2O) 8 cm H20 Insp Time (sec) 1 sec Insp Rise Time % 50 % Flow Trigger 3 Expiratory Sensitivity 25 % Ventilator Measurements Resp Rate Observed 14 Vt Exhaled (Machine Breath) (ml) 618 ml Ve Observed (l/min) 8.68 l/min PIP Observed (cm H2O) 27 cm H2O  
MAP (cm H2O) 12 I:E Ratio Actual 1:3.3 Safety & Alarms Circuit Temperature  
(HME) Backup Mode Checked/Apnea Yes Pressure Max 40 cm H2O Pressure Min 11 cm H2O Ve Min 3 Ve Max 20 Vt Min 250 ml Vt Max 900 ml  
RR Max 40 Ambu Bag Yes Ambu Mask Yes Weaning Parameters Spontaneous Breathing Trial Complete No (Comments) (still sedated) Age Specific Ventilator Associated Pneumonia Bundle Patient Age Group Adult Adult Ventilator Associated Pneumonia Bundle Elevation of Head to 30-45 Degrees (Unless Contraindicated) Yes Vent Method/Mode Ventilation Method Conventional  
Ventilator Mode SIMV;Pressure support;VC+

## 2020-03-04 NOTE — PROGRESS NOTES
1500 FiO2 decreased from 100% to 60% due to PaO2 242. 
 
1424 Patient received from cardiac surgical team, post CABAG, and put on vent. 03/04/20 1424 Patient Observations Pulse (Heart Rate) 86 Resp Rate 15  
O2 Sat (%) 100 % Airway - Endotracheal Tube 03/04/20 Oral  
Placement Date/Time: 03/04/20 0741   Number of Attempts: 1  Inserted By: Garrison Weldon  Location: Oral  Placement Verified: BBS;EtCO2; Auscultation  Airway Types: Endotracheal, cuffed  Airway Tube Size: 7.5 mm  DL Glottic View: 2  Technique: Stylet;Dir. .. Insertion Depth (cm) 24 cm Line Juan Lips Side Secured Right Site Assessment Clean, dry, & intact Respiratory Respiratory (WDL) WDL Ventilator Initiate/Discontinue Ventilator Initiate Yes Bio-Med ID # 1 Vent Settings FIO2 (%) 100 % SIMV Rate Set 14 Back-Up Rate 14 Vt Set (ml) 600 ml Pressure Support (cm H2O) 10 cm H2O  
PEEP/VENT (cm H2O) 5 cm H20 Insp Time (sec) 1 sec Insp Rise Time % 50 % Flow Trigger 3 Expiratory Sensitivity 25 % Ventilator Measurements Resp Rate Observed 15 Vt Exhaled (Machine Breath) (ml) 607 ml Ve Observed (l/min) 8.29 l/min PIP Observed (cm H2O) 24 cm H2O  
MAP (cm H2O) 9.4 I:E Ratio Actual 1:3.2 Safety & Alarms Circuit Temperature  
(HME) Backup Mode Checked/Apnea Yes Pressure Max 40 cm H2O Pressure Min 11 cm H2O Ve Min 3 Ve Max 20 Vt Min 250 ml Vt Max 900 ml  
RR Max 40 Ambu Bag Yes Ambu Mask Yes Weaning Parameters Spontaneous Breathing Trial Complete No (Comments) 
(just received from cardiac surgery) Age Specific Ventilator Associated Pneumonia Bundle Patient Age Group Adult Adult Ventilator Associated Pneumonia Bundle Elevation of Head to 30-45 Degrees (Unless Contraindicated) No 
(nurses receiving patient) Airway Procedures  
$$ Airway Procedures Intubation Vent Method/Mode Ventilation Method Conventional  
Ventilator Mode SIMV;Pressure support;VC+ $$ Ventilator Initial Initial Vent Day

## 2020-03-04 NOTE — ANESTHESIA PROCEDURE NOTES
Central Line Placement Start time: 3/4/2020 7:40 AM 
End time: 3/4/2020 7:52 AM 
Performed by: Shona Wray MD 
Authorized by: Shona Wray MD  
 
Indications: vascular access, central pressure monitoring and need for vasopressors Preanesthetic Checklist: patient identified, risks and benefits discussed, anesthesia consent, site marked, patient being monitored and timeout performed Timeout Time: 07:40 Pre-procedure: All elements of maximal sterile barrier technique followed? Yes   
2% Chlorhexidine for cutaneous antisepsis, Hand hygiene performed prior to catheter insertion and Ultrasound guidance Sterile Ultrasound Technique followed?: Yes Ultrasound Image Stored? Image stored Procedure:  
Prep:  ChloraPrep Location: internal jugular Orientation:  Right Patient position:  Trendelenburg Number of lumens: Cordis. Catheter size:  7 Fr 
 
Number of attempts:  1 Successful placement: Yes Assessment:  
Post-procedure:  Catheter secured, sterile dressing applied and sterile dressing with CHG applied Assessment:  Free fluid flow, blood return through all ports and guidewire removal verified Insertion:  Uncomplicated Patient tolerance:  Patient tolerated the procedure well with no immediate complications

## 2020-03-04 NOTE — ANESTHESIA POSTPROCEDURE EVALUATION
Procedure(s): CORONARY ARTERY BYPASS GRAFT (CABG) TIMES THREE. 
 
general 
 
Anesthesia Post Evaluation Multimodal analgesia: multimodal analgesia used between 6 hours prior to anesthesia start to PACU discharge Patient location during evaluation: ICU Patient participation: complete - patient cannot participate Post-procedure mental status: sedated. Pain management: adequate Airway patency: patent Anesthetic complications: no 
Cardiovascular status: acceptable and hemodynamically stable Respiratory status: ETT, intubated and ventilator Hydration status: acceptable Post anesthesia nausea and vomiting:  controlled Vitals Value Taken Time BP 84/58 3/4/2020  5:06 PM  
Temp 36.1 °C (96.98 °F) 3/4/2020  5:07 PM  
Pulse 89 3/4/2020  5:07 PM  
Resp 19 3/4/2020  5:07 PM  
SpO2 100 % 3/4/2020  5:07 PM  
Vitals shown include unvalidated device data.

## 2020-03-05 PROBLEM — Z95.1 S/P CABG X 3: Chronic | Status: ACTIVE | Noted: 2020-01-01

## 2020-03-05 NOTE — PROGRESS NOTES
03/04/20 2055 Weaning Parameters Spontaneous Breathing Trial Complete Yes Resp Rate Observed 23 Ve 11.1  RSBI 39 Will obtain ABG at 2130

## 2020-03-05 NOTE — PROGRESS NOTES
Remains on vent. Case management is following for discharge needs, awaiting PT/OT evals for recommendations on safe discharge plan. Patient has no previous DME or home needs. CAMI Rodriguez Case Management [phone]

## 2020-03-05 NOTE — PROGRESS NOTES
NUTRITION Nutrition Consult: General Nutrition Management & Supplements RECOMMENDATIONS / PLAN:  
 
- Monitor diet tolerance and ability to advance to cardiac, consistent carb diabetic 2000 kcal diet as medically appropriate.  
- Continue RD inpatient monitoring and evaluation. NUTRITION INTERVENTIONS & DIAGNOSIS:  
 
- Meals/snacks: modify composition, advancement as tolerated Nutrition Diagnosis: Inadequate oral intake related to recent postop extubation, restrictive postop diet as evidenced by clear liquid diet ordered, no meal intake since surgery. ASSESSMENT:  
 
S/p CABG x 3 yesterday and extubated this afternoon, kept intubated overnight due to concern for postoperative bleeding per chart. Recently extubated and has not eaten postop. Nutritional intake adequate to meet patients estimated nutritional needs:  No 
 
Diet: DIET CLEAR LIQUID None, No Conc. Sweets Food Allergies: NKFA Current Appetite: Unable to determine at this time Appetite/meal intake prior to admission: Unable to determine at this time Feeding Limitations:  [] Swallowing difficulty    [] Chewing difficulty    [] Other: 
Current Meal Intake:  
Patient Vitals for the past 100 hrs: 
 % Diet Eaten 03/03/20 1710 100 % BM: PTA Skin Integrity: surgical incisions to chest and leg; chest tubes Edema:   [] No     [x] Yes Pertinent Medications: Reviewed: dulcolax, colace, lantus, ondansetron, miralax, famotidine, furosemide given Recent Labs 03/05/20 
0406 03/04/20 
1445 03/04/20 
7105 03/03/20 
4064  03/03/20 
9426  140 140 139  --  139  
K 4.6 4.5 4.2 3.8  --  3.9 * 113* 110 109  --  107 CO2 23 22 24 25  --  23  
GLU 98 191* 138* 138*  --  189* BUN 29* 27* 26* 22*  --  21* CREA 1.75* 1.55* 1.51* 1.48*  --  1.55* CA 7.6* 7.9* 8.6 8.3*  --  8.3*  
MG 2.1 2.4 2.0 2.0   < >  --   
ALB  --   --  3.2* 2.9*  --  3.0*  
SGOT  --   --  34 47*  --  52* ALT  --   --  22 22  --  22  
 < > = values in this interval not displayed. Intake/Output Summary (Last 24 hours) at 3/5/2020 1352 Last data filed at 3/5/2020 1300 Gross per 24 hour Intake 5141.5 ml Output 3656 ml Net 1485.5 ml Anthropometrics: 
Ht Readings from Last 1 Encounters:  
03/03/20 5' 7\" (1.702 m) Last 3 Recorded Weights in this Encounter 03/02/20 1227 03/03/20 1534 Weight: 90.7 kg (200 lb) 90.7 kg (200 lb) Body mass index is 31.32 kg/m². Obese, Class I Weight History: weight gain per chart hx review Weight Metrics 3/3/2020 12/2/2019 11/12/2019 3/28/2019 12/27/2018 10/10/2018 9/21/2018 Weight 200 lb 185 lb 191 lb 3.2 oz 190 lb 12.8 oz 190 lb 3.2 oz 192 lb 8 oz 188 lb 8 oz BMI 31.32 kg/m2 28.98 kg/m2 29.95 kg/m2 29.88 kg/m2 29.79 kg/m2 30.15 kg/m2 29.52 kg/m2 Admitting Diagnosis: Acute coronary syndrome ([facility] [address] [I24.9] Elevated troponin [R79.89] Back pain [M54.9] Numbness and tingling in left arm [R20.0, R20.2] S/P cardiac catheterization [Z98.890] NSTEMI (non-ST elevated myocardial infarction) ([facility] [address] [I21.4] Pertinent PMHx: DM, HTN, HLD, CKD stage 3, CAD, skin cancer, kidney stones Education Needs:        [x] None identified  [] Identified - Not appropriate at this time  []  Identified and addressed - refer to education log Learning Limitations:   [x] None identified  [] Identified Cultural, Advent & ethnic food preferences:  [x] None identified    [] Identified and addressed ESTIMATED NUTRITION NEEDS:  
 
Calories: 7216-5834 kcal (MSJx1.2-1.3) based on  [x] Actual BW 90 kg     [] IBW Protein:  gm (0.8-1.2gm/kg) based on  [x] Actual BW      [] IBW Fluid: 1 mL/kcal 
  
MONITORING & EVALUATION:  
 
Nutrition Goal(s):  
- PO nutrition intake will meet >75% of patient estimated nutritional needs within the next 7 days.    Outcome: New/Initial goal  
 
Monitoring:   [x] Food and nutrient intake   [x] Food and nutrient administration  [x] Comparative standards   [x] Nutrition-focused physical findings   [x] Anthropometric Measurements   [x] Treatment/therapy   [x] Biochemical data, medical tests, and procedures Previous Recommendations (for follow-up assessments only):  Not Applicable Discharge Planning: No nutritional discharge needs at this time. Participated in care planning, discharge planning, & interdisciplinary rounds as appropriate. Vibha Mae RD, [facility] Pager: [phone]

## 2020-03-05 NOTE — PROGRESS NOTES
1424: Bedside and Verbal shift change report given to Clare Fay RN/writer(oncoming nurse) by Dr. Sukhdev Solis, anesthesiologist. Report included the following information OR Summary, Intake/Output, MAR, Accordion, Recent Results, Cardiac Rhythm . and Alarm Parameters . Epinephrine, Neosynephrine and Amicar Drips infusing- see MAR. Pt in NSR, no pacing wires in place. 1508: MAP<65mmHg, CI 1.2, continued titration of Neosynephrine infusion to meet MAP goal, 5% Albumin IV administered. Ethelene Gauss 1535: MAP<60mmHg, CI 1.5, continued titration of Neosynephrine infusion to meet MAP goal, 5% Albumin IV administered. 1620: Reviewed hemodynamics with RHONDA Khoury Additional 5% Albumin administered as per order. 1710: Increased chest tube drainage sanguineous and clotting chest Coagulation studies resulted, reported to RHONDA Khoury: 
Results for Katy Huerta (MRN [id number]) as of 3/4/2020 20:53 Ref. Range 3/4/2020 14:45 INR Latest Ref Range: 0.8 - 1.2   1.4 (H) Prothrombin time Latest Ref Range: 11.5 - 15.2 sec 17.0 (H) aPTT Latest Ref Range: 23.0 - 36.4 SEC 43.4 (H) Protamine 50mg IVPB order received by LB Khoury, administered as per order. Results for Katy Huerta (MRN [id number]) as of 3/4/2020 14:45 Ref. Range 3/4/2020 14:45 WBC Latest Ref Range: 4.6 - 13.2 K/uL 26.2 (H) RBC Latest Ref Range: 4.70 - 5.50 M/uL 3.28 (L) HGB Latest Ref Range: 13.0 - 16.0 g/dL 10.2 (L) HCT Latest Ref Range: 36.0 - 48.0 % 30.7 (L) MCV Latest Ref Range: 74.0 - 97.0 FL 93.6 MCH Latest Ref Range: 24.0 - 34.0 PG 31.1 MCHC Latest Ref Range: 31.0 - 37.0 g/dL 33.2 RDW Latest Ref Range: 11.6 - 14.5 % 13.7 PLATELET Latest Ref Range: 135 - 420 K/uL 203 Continued chest tube output, See I/O flowsheet, orders received for one unit PRBC, two units FFP, 1u platelet transfusion. See Transfusion flowsheet for administration.  Initial CBC results: 
Results for Katy Huerta (MRN [id number]) as of 3/4/2020 17:15 
 Ref. Range 3/4/2020 17:15 Hematocrit (POC) Latest Ref Range: 36 - 49 % 19 (L) Repeat Hgb/HCT t:Results for Estefania Bautista (MRN [id number]) as of 3/4/2020 17:21 Ref. Range 3/4/2020 17:21 HGB Latest Ref Range: 13.0 - 16.0 g/dL 7.7 (L) HCT Latest Ref Range: 36.0 - 48.0 % 22.9 (L) Results for Estefania Bautista (MRN [id number]) as of 3/4/2020 23:48 Ref. Range 3/4/2020 17:30  
sO2, venous (POC) Latest Ref Range: 65 - 88 % 47 (L) 1800: 2 units FFP and 1 unit PRBC transfused, awaiting platelets from blood bank. Decreased chest tube output, as per RHONDA Khoury to hold platelet administration at this time. Blood bank notified of same by ESSENCE Ayers RN. 1930: Per RHONDA Khoury pt to be weaned to extubate,PEEP to be decreased from 8 to 5 cm with FiO2 wean from 60% to 40% prior to weaning pt to extubate. 1940: Bedside and Verbal shift change report given to Maya Coles (oncoming nurse) by Fiona Mederos (offgoing nurse). Report included the following information Kardex, ED Summary, OR Summary, Procedure Summary, Intake/Output, MAR, Accordion, Recent Results, Med Rec Status, Cardiac Rhythm ., Alarm Parameters , Procedure Verification, Quality Measures and Dual Neuro Assessment. 2015: Repeat hemodynamic profile with Cardiac Output 4.1/Cardiac Index 2.1 on current therapy. Dr. Pete Bai updated on current pt condition.

## 2020-03-05 NOTE — PROGRESS NOTES
07:00  Received pt from [facility]. Pt. Sedated and intubated with 3 chest tubes to 2 atriums on constant wall suction at -20 cm. No air leak noted. Pt. Resting peacefully. Epi infusing at 2 mcg/min, Insulin and propofol infusing. 08:35  CI 2.21 
09:00  RHONDA Deleon at bedside. Orders received . 08:53  Propofol stopped in preparation for extubation. 09:17  April ELLIS at bedside. Epi reduced to 1 mcg/min. 10:15  Family at bedside. 10:30  Dr. Taylor Mathur and PAs at bedside. Pt. MENDEZ, responds appropriately to simple commands to open eyes, wiggle toes. 10:42  1 unit PRBCs infusing. 12:15  Nitro drip started to keep -120 mmHg. 12:30  Extubated to 5 liters 02 via n/c.  Pt. Alert and oriented to person only. States he is at the Dentist's office. States the year is 1950.  
15:15  12.5 lopressor (am dose) given per instruction by PA.  
16:10  ART line and SWAN d/c'd  
17:20  Pt. Up to chair with 2 person assist.  Pt. Instructed on use of heart hugger. Tolerated transfer to chair without increased pain. No dizziness noted. 17:55  Lissette Reddy notified via telephone of chest tube drainage of 210ml in pleural atrium mobilized with transfer to chair. No additional drainage noted. Per Lissette Reddy, will continue nitro overnight as needed to keep -120 mmHg. Will reassess beta blocker dose in am. 
19:00  Bedside and Verbal shift change report given to Nini Payton RN (oncoming nurse) by Keith Branch RN (offgoing nurse). Report included the following information SBAR, Kardex, OR Summary, Procedure Summary, Intake/Output, MAR, Recent Results and Cardiac Rhythm sinus tach.

## 2020-03-05 NOTE — PROGRESS NOTES
OT eval order received and chart reviewed. Unable to see patient as patient remains intubatated. Will continue to follow and see patient when available/as appropriate. Thank you for this referral, Prisca Chavez MS, OTR/L

## 2020-03-05 NOTE — DIABETES MGMT
GLYCEMIC CONTROL PLAN OF CARE  
 
T2DM with current A1c of 7.1% (03/03/2020). Patient's wife is a certified diabetes educator. Home diabetes medications: 
Glyburide 2.5 mg two times daily Post CABG 03/04/2020. Regular insulin drip via GlucoStabilizer. BG target range: . POD #1 03/05/2020: Noted first dose of basal lantus insulin 50 units given at 10:52 AM. Recommendation(s):  
1.) Proceed with plan to transition to SC insulin later today if BG and insulin drip multiplier remain stable. Assessment: 
Patient is a 68year old male with a past medical history including type 2 diabetes, hypertension, hyperlipidemia, and kidney stones who presented with severe back pain. Noted: 
NSTEMI. 
CAD. Status post CABG x3 on 03/04/2020. T2DM. Most recent blood glucose values: 
 
Results for Sue Lizarraga (MRN [id number]) as of 3/5/2020 11:49 Ref. Range 3/4/2020 12:13 3/4/2020 12:23 3/4/2020 13:02 3/4/2020 13:04 3/4/2020 13:30 3/4/2020 13:32 3/4/2020 14:51 3/4/2020 16:20 3/4/2020 17:15 3/4/2020 17:27 3/4/2020 18:30 3/4/2020 19:32 3/4/2020 20:39 3/4/2020 21:48 3/4/2020 22:54 3/4/2020 23:58 GLUCOSE,FAST - POC Latest Ref Range: 70 - 110 mg/dL 298 (H)  244 (H)  207 (H)  201 (H) 191 (H) 175 (H) 180 (H) 181 (H) 157 (H) 137 (H) 112 (H) 112 (H) 110 Results for Sue Herb (MRN [id number]) as of 3/5/2020 11:49 Ref. Range 3/5/2020 01:11 3/5/2020 02:01 3/5/2020 03:02 3/5/2020 04:05 3/5/2020 05:03 3/5/2020 06:10 3/5/2020 07:01 3/5/2020 08:28 3/5/2020 08:30 3/5/2020 09:30 3/5/2020 10:32 3/5/2020 11:50  
GLUCOSE,FAST - POC Latest Ref Range: 70 - 110 mg/dL 97 87 107 123 (H) 102 90 103 112 (H) 102 113 (H) 95 92 Current A1C of 7.1% (03/03/2020) is equivalent to estimated average blood glucose of 157 mg/dl over the past 2-3 months. Current hospital diabetes medications:  
Basal lantus insulin 50 units daily, first dose 03/05/2020. Regular insulin drip via GlucoStabilizer. BG target range: . Drip rate at time of review: 2.1 units/hr. Total dose of insulin day before: 03/04/2020 Regular insulin drip via GlucoStabilizer: 37.2 units Diet: Clear liquid; no concentrated sweets. Education:  Per RD CDE notes on 03/03/2020: pt's spouse is a certified diabetes educator. Information provided on free outpatient diabetes education classes. Julio César Zurita RN [facility] Pager: [phone]

## 2020-03-05 NOTE — PROGRESS NOTES
[facility] - Progress Note Admit Date: 3/2/2020 Assessment:  
 
Hospital Problems  Date Reviewed: 12/2/2019 Codes Class Noted POA  
 S/P cardiac catheterization ICD-10-CM: Z98.890 ICD-9-CM: V45.89  3/3/2020 Unknown NSTEMI (non-ST elevated myocardial infarction) ([facility] [address] ICD-10-CM: I21.4 ICD-9-CM: 410.70  3/3/2020 Unknown * (Principal) Elevated troponin ICD-10-CM: R79.89 ICD-9-CM: 790.6  3/2/2020 Unknown Back pain ICD-10-CM: M54.9 ICD-9-CM: 724.5  3/2/2020 Unknown Acute coronary syndrome (HCC) ICD-10-CM: I24.9 ICD-9-CM: 411.1  3/2/2020 Unknown Numbness and tingling in left arm ICD-10-CM: R20.0, R20.2 ICD-9-CM: 782.0  3/2/2020 Unknown  
   
  
 
 
-CAD s/p CABG X3 (LIMA to mid LAD, SVG to OM1, SVG to Diag1/ramus). -NSTEMI. Troponin peak at 20. ECG with possible septal infarct without ischemic changes, presented with back pain with nausea and vomiting. 
-Hypertension. Stable. -Diabetes mellitus. HgbA1c 7.1 
-Chronic kidney disease. Stable. -H/o kidney stones, last once 3 weeks ago. -H/o vertigo. 
  
Remotely evaluated by Dr. Nahum Foster. Plan:  
 
Remains intubated overnight due to concerns of post operative bleeding. Plan for extubation today. Hemodynamics stable off pressor support. Making adequate urine. Continue to follow volume status Hgb post op with transfusion and lasix as needed. Continued on ASA, statin, BB, periop amio. Continue routine post operative care. Subjective: Intubated but awake. Objective:  
  
Patient Vitals for the past 8 hrs: 
 Temp Pulse Resp BP SpO2  
03/05/20 0835 99.3 °F (37.4 °C) (!) 111 19  99 % 03/05/20 0831  (!) 110 18  98 % 03/05/20 0814  (!) 108 14 111/64 97 % 03/05/20 0800 99.3 °F (37.4 °C) (!) 106 14 93/62 98 % 03/05/20 0700 99.3 °F (37.4 °C) (!) 104 14 96/61 98 % 03/05/20 0606 99.1 °F (37.3 °C) (!) 102 14  98 % 03/05/20 0600 99.1 °F (37.3 °C) (!) 102 14 94/65 98 % 03/05/20 0500 99.1 °F (37.3 °C) (!) 104 14 102/65 98 % 03/05/20 0439  (!) 101 14  99 % 03/05/20 0419 99 °F (37.2 °C) (!) 101 14  99 % 03/05/20 0415 99.1 °F (37.3 °C) (!) 103 14  99 % 03/05/20 0400 99 °F (37.2 °C) (!) 101 14 103/67 99 % 03/05/20 0345 99.1 °F (37.3 °C) (!) 103 14  100 % 03/05/20 0330 99 °F (37.2 °C) (!) 103 14  100 % 03/05/20 0315 99 °F (37.2 °C) 100 14  100 % 03/05/20 0300 99 °F (37.2 °C) 100 14 108/73 100 % 03/05/20 0230 99 °F (37.2 °C) (!) 104 14  99 % Patient Vitals for the past 96 hrs: 
 Weight 03/03/20 1534 90.7 kg (200 lb) 03/02/20 1227 90.7 kg (200 lb) Intake/Output Summary (Last 24 hours) at 3/5/2020 1022 Last data filed at 3/5/2020 1000 Gross per 24 hour Intake 6253.68 ml Output 3383 ml Net 2870.68 ml Physical Exam: 
General:  Mild distress Neck:  no JVD Lungs:  clear to auscultation bilaterally Heart:  regular rate and rhythm, S1, S2 normal, no murmur, click, rub or gallop Abdomen:  abdomen is soft without significant tenderness, masses, organomegaly or guarding Extremities:  extremities normal, atraumatic, no cyanosis or edema Data Review:  
 
Labs: Results:  
   
Chemistry Recent Labs 03/05/20 
0406 03/04/20 
1445 03/04/20 
0307 03/03/20 
2961  03/03/20 
6749 GLU 98 191* 138* 138*  --  189*  140 140 139  --  139  
K 4.6 4.5 4.2 3.8  --  3.9 * 113* 110 109  --  107 CO2 23 22 24 25  --  23 BUN 29* 27* 26* 22*  --  21* CREA 1.75* 1.55* 1.51* 1.48*  --  1.55* CA 7.6* 7.9* 8.6 8.3*  --  8.3*  
MG 2.1 2.4 2.0 2.0   < >  --   
AGAP 6 5 6 5  --  9  
BUCR 17 17 17 15  --  14  
AP  --   --  83 78  --  78  
TP  --   --  6.7 6.1*  --  6.2* ALB  --   --  3.2* 2.9*  --  3.0*  
GLOB  --   --  3.5 3.2  --  3.2 AGRAT  --   --  0.9 0.9  --  0.9  
 < > = values in this interval not displayed. CBC w/Diff Recent Labs 03/05/20 
0406 03/04/20 2022 03/04/20 
1721 03/04/20 
1445 03/04/20 0117 03/03/20 
4800 WBC 14.2*  --   --  26.2* 12.4 11.5  
RBC 2.66*  --   --  3.28* 4.10* 3.95* HGB 8.0* 7.3* 7.7* 10.2* 12.8* 12.0*  
HCT 23.7* 21.6* 22.9* 30.7* 38.3 36.6   --   --  203 250 235 GRANS  --   --   --  76* 67 58 LYMPH  --   --   --  8* 21 30 EOS  --   --   --  0 3 2 Cardiac Enzymes No results found for: CPK, CK, CKMMB, CKMB, RCK3, CKMBT, CKNDX, CKND1, DION, TROPT, TROIQ, AFSHIN, TROPT, TNIPOC, BNP, BNPP Coagulation Recent Labs 03/04/20 
1445 03/04/20 
7188  03/03/20 
7780 PTP 17.0*  --   --  13.8 INR 1.4*  --   --  1.1 APTT 43.4* 29.5   < > 58.7*  
 < > = values in this interval not displayed. Lipid Panel Lab Results Component Value Date/Time Cholesterol, total 180 03/04/2020 05:37 AM  
 HDL Cholesterol 33 (L) 03/04/2020 05:37 AM  
 LDL, calculated 73.4 03/04/2020 05:37 AM  
 VLDL, calculated 73.6 03/04/2020 05:37 AM  
 Triglyceride 368 (H) 03/04/2020 05:37 AM  
 CHOL/HDL Ratio 5.5 (H) 03/04/2020 05:37 AM  
  
BNP No results found for: BNP, BNPP, XBNPT Liver Enzymes Recent Labs 03/04/20 
9151 TP 6.7 ALB 3.2* AP 83 SGOT 34 Digoxin Thyroid Studies Lab Results Component Value Date/Time TSH 0.83 03/03/2020 02:59 AM  
    
 
Signed By: RHONDA Wetzel March 5, 2020

## 2020-03-05 NOTE — PROGRESS NOTES
Pt was wake and follows command, PSV of 7cmh2o  For an  about 40 min, tolerated well with out any complication and ABG was collected ABG was normal, MD aware Pt was able to lift head from the pillow There was good cuff leak Pt responds to the command and opens eyes Pt extubated to 5 L NC  
SAT 93% and tolerating well so far

## 2020-03-05 NOTE — PROGRESS NOTES
OT order received and chart reviewed. Patient still intubated at 1053. Will continue to follow and see patient when available/as appropriate. Thank you for this referral, Cristóbal Ahuja MS, OTR/L

## 2020-03-05 NOTE — ROUTINE PROCESS
2000 Pt received after bedside shift report from Grayson Richardson RN. Pt resting in bed with no apparent distress. Intubated. Drips verified. Chest tubes in place and intact draining serosanguineous output. All lines in place and intact. Old drainage noted to sternal dressing. Dressing to left leg clean, dry and intact. Scd and kennedi hose to right leg. Will monitor closely. 2035 Dr. Joyceann Schilder at nurses station informed of increased output of 70cc/hr x1. States, to monitor pt closely and call with output greater 100 cc/hr. Per doctor Kenney, hold off extubation until pt is stable. Added, pt out of 4 hour window to extubate thus, ok to give some time. 2501 [facility] Doctor Kenney updated on increased output from CT #2 . States, to give platelet and continue to monitor. 0000 Pt resting comfortably without distress. Will continue to monitor. 0500 Crackles noted on auscultation Post transfusion. Per Rohit Hearn RN VS are stable. Added, doctor Joyceann Schilder will assess pt in the morning and make recommendations then 
 
0600 Partial bath and mouth care provided. Pt tolerates well. Repositioned to left. No distress noted. 0715 Bedside shift change report given to Erik Olivas RN (oncoming nurse) by Cecilia Juarez RN (offgoing nurse). Report included the following information Sbar, Mar, Kardex and Recent Results.

## 2020-03-05 NOTE — PROGRESS NOTES
CARDIAC SURGERY PROGRESS NOTE 
 
3/5/2020 Post Operative Day # 1 Chart reviewed. Interval History/Events of Past 24 hours:  
Remains intubated on minimal vent settings. HD stable - Karthik weaned off overnight, currently on Epi at 2. UOP 30-40/hr. Postoperative bleeding has slowed and over the last 6 hours. Total: 1120 mL mediastinal ,  750ml pleural. Patient received 2 units PRBC, 2 units FFP, 1 unit platelet. H/h 8.0 23.7, . Calcium 7.6, iCal ok >1.16 BUN/Cr 29/1. 75Hazy left chest on chest x-ray Assessment: 
CAD S/P  CABG x 3 -  BB, ASA, statin Respiratory parameters stable on vent Cardiac status stable on Epi 
CKD3 Plans: 
1. Wake and wean vent 2. Wean epi as BP/CI tolerates 3. CBC/BMP at noon 4. lantus Heart Hugger use encouraged to mitigate pain and will also assist with deep breathing and incentive spirometry goals. Possible discharge 5 days. Martin Rosas PA-C [facility] [phone] 
_____________________________________________________________________________________________________________________________________________ Subjective: 
Patient seen and examined on rounds today. BM: No 
 
Objective: 
Vital signs:  
Visit Vitals BP 96/61 Pulse (!) 104 Temp 99.3 °F (37.4 °C) Resp 14 Ht 5' 7\" (1.702 m) Wt 90.7 kg (200 lb) SpO2 98% BMI 31.32 kg/m² Temp (24hrs), Av.4 °F (36.9 °C), Min:96.6 °F (35.9 °C), Max:99.5 °F (37.5 °C) Admission Weight: Last Weight Weight: 90.7 kg (200 lb) Weight: 90.7 kg (200 lb) Last 3 Recorded Weights in this Encounter 20 1227 20 1534 Weight: 90.7 kg (200 lb) 90.7 kg (200 lb) Telemetry: Tachy Physical Examination:    
General: sedated/intubated Lungs: Clear to ascultation without rales, wheezes or rhonchi. Chest: Dressings clean and dry. Sternum stable. Heart: tacjhy, regular rate and rhythm, No murmur. Abdomen: Soft and non-tender without masses. Bowel sounds present. Extremities: Warm and well perfused. Edema absent. Incisions: clean and dry SUP Prophylaxis: Pepcid DVT Prophylaxis:  
pneumatic compression boots: Yes Compression stockings:  Yes Chest tubes:  present, as above Pacing wires:  not present DME needs:  tbd Labs: 
Lab Results Component Value Date/Time WBC 14.2 (H) 03/05/2020 04:06 AM  
 HCT 23.7 (L) 03/05/2020 04:06 AM  
  03/05/2020 04:06 AM  
  
Lab Results Component Value Date/Time  03/05/2020 04:06 AM  
 K 4.6 03/05/2020 04:06 AM  
  (H) 03/05/2020 04:06 AM  
 CO2 23 03/05/2020 04:06 AM  
 GLU 98 03/05/2020 04:06 AM  
 BUN 29 (H) 03/05/2020 04:06 AM  
 CREA 1.75 (H) 03/05/2020 04:06 AM  
 CREA 1.55 (H) 03/04/2020 02:45 PM  
 CREA 1.51 (H) 03/04/2020 05:37 AM  
 
Lab Results Component Value Date/Time HBA1C 7.1 (H) 03/03/2020 02:59 AM  
 
pH (POC) Date Value Ref Range Status 03/05/2020 7.428 7.35 - 7.45   Final  
 
pCO2 (POC) Date Value Ref Range Status 03/05/2020 37.0 35.0 - 45.0 MMHG Final  
 
pO2 (POC) Date Value Ref Range Status 03/05/2020 100 80 - 100 MMHG Final  
 
HCO3 (POC) Date Value Ref Range Status 03/05/2020 24.4 22 - 26 MMOL/L Final  
 
sO2 (POC) Date Value Ref Range Status 03/05/2020 98 (H) 92 - 97 % Final  
 
Base excess (POC) Date Value Ref Range Status 03/05/2020 0 mmol/L Final  
 
    
EKG:  
 
CXR: Tubes/lines ok,  hazy left chest 
 
  
PLEASE NOTE: 
This document may have been produced using voice recognition software. Unrecognized errors in transcription may be present. Please call with any questions.

## 2020-03-05 NOTE — PROGRESS NOTES
Pt off from sedation Placed on PSV, pt wake responds to voice command 03/05/20 1105 Patient Observations Pulse (Heart Rate) (!) 106 Resp Rate 24  
O2 Sat (%) 97 % Vent Settings FIO2 (%) 40 % Back-Up Rate 14 Pressure Support (cm H2O) 7 cm H2O  
PEEP/VENT (cm H2O) 5 cm H20 Ventilator Measurements Resp Rate Observed 24 Vt Spont (ml) 579 ml Ve Observed (l/min) 14.1 l/min PIP Observed (cm H2O) 13 cm H2O  
MAP (cm H2O) 8.3 I:E Ratio Actual 1:2.2 Weaning Parameters Resp Rate Observed 24 Ve 10  RSBI 64 Vent Method/Mode Ventilator Mode Pressure support;Spontaneous

## 2020-03-05 NOTE — PROGRESS NOTES
0030: Discussed current hemodynamics, current vital signs, mixed venous, H/H, chest tube and urine outputs with MD. Telephone orders to transfuse 1 unit PRBC, orders entered. Plan to keep patient intubated and sedated overnight per MD. Updated primary nurse Alma Peterson RN.

## 2020-03-05 NOTE — PROGRESS NOTES
Pt order received and chart received. Pt currently intubated. Will follow up as pt is appropriate.  Thank you for this referral.  
 
Shanna Paredes PT, DPT

## 2020-03-06 NOTE — PROGRESS NOTES
ARU/IPR REFERRAL CONTACT NOTE [id number] [facility] Physical Rehabilitation RE: Vane Daiz Referral received to review this patient's case for admission to [id number] [place] [address] for Physical Rehabilitation. Current status reviewed.  When appropriate, will need PT/OT evaluation/treatment on this patient to complete the pre-admission evaluation.  Will continue to follow. Thank you for this referral.  Should you have any questions please do not hesitate to call. Sincerely, Esthela Gómez Admissions Liaison [facility] Physical Rehabilitation 
[phone]

## 2020-03-06 NOTE — PROGRESS NOTES
[facility] - Progress Note Admit Date: 3/2/2020 Assessment:  
 
Hospital Problems  Date Reviewed: 12/2/2019 Codes Class Noted POA  
 S/P cardiac catheterization ICD-10-CM: Z98.890 ICD-9-CM: V45.89  3/3/2020 Unknown S/P CABG x 3 (Chronic) ICD-10-CM: Z95.1 ICD-9-CM: V45.81  3/5/2020 Unknown Overview Signed 3/5/2020  1:20 PM by Thuy Sandoval PA-C  
  CABG x 3, Dr. Rolando Avery, 3/4/2020, [facility] 
  
  
   
 NSTEMI (non-ST elevated myocardial infarction) [facility]) ICD-10-CM: I21.4 ICD-9-CM: 410.70  3/3/2020 Unknown * (Principal) Elevated troponin ICD-10-CM: R79.89 ICD-9-CM: 790.6  3/2/2020 Unknown Back pain ICD-10-CM: M54.9 ICD-9-CM: 724.5  3/2/2020 Unknown Acute coronary syndrome (HCC) ICD-10-CM: I24.9 ICD-9-CM: 411.1  3/2/2020 Unknown Numbness and tingling in left arm ICD-10-CM: R20.0, R20.2 ICD-9-CM: 782.0  3/2/2020 Unknown  
   
  
 
 
  
-CAD s/p CABG X3 (LIMA to mid LAD, SVG to OM1, SVG to Diag1/ramus). -NSTEMI. Troponin peak at [facility] with possible septal infarct without ischemic changes, presented with back pain with nausea and vomiting. 
-Hypertension. Stable. -Diabetes mellitus. HgbA1c 7.1 
-Chronic kidney disease. Stable. -H/o kidney stones, last once 3 weeks ago. -H/o vertigo. 
-Anemia/bleeding post op requiring transfusion. 
  
Remotely evaluated by Dr. Vincent Calderon. 
  
 
 
Plan:  
 
Noted soft BP and low urine output overnight requiring albumin overnight and lasix this AM. Noted A/CKD. Will appreciate nephrology involvement. Continued on ASA, statin. Continue BB as BP allowes. Continued on amiodarone. Continue to follow Hgb. Continue to follow volume status. Continue routine post operative care. Subjective:  
 
Confusion and agitation noted overnight. soft BP and low urine output overnight Objective:  
  
Patient Vitals for the past 8 hrs: 
 Temp Pulse Resp BP SpO2  
03/06/20 0800  92 12 106/66 92 % 03/06/20 0700  97 15 99/63 92 % 03/06/20 0613  (!) 112 23 139/63 91 % 03/06/20 0500  89 13 115/71 94 % 03/06/20 0400 98.3 °F (36.8 °C) 92 15 112/64 93 % 03/06/20 0300  97 15 103/61 93 % 03/06/20 0200  99 13 112/65 91 % 03/06/20 0100  (!) 101 20 126/76 91 % Patient Vitals for the past 96 hrs: 
 Weight 03/06/20 0603 90.3 kg (199 lb 1.2 oz) 03/03/20 1534 90.7 kg (200 lb) 03/02/20 1227 90.7 kg (200 lb) Intake/Output Summary (Last 24 hours) at 3/6/2020 0117 Last data filed at 3/6/2020 0700 Gross per 24 hour Intake 1251.31 ml Output 2046 ml Net -794.69 ml Physical Exam: 
General:  no distress Neck:  no JVD Lungs:  clear to auscultation bilaterally Heart:  regular rate and rhythm Abdomen:  abdomen is soft without significant tenderness, masses, organomegaly or guarding Extremities:  extremities normal, atraumatic, no cyanosis trace edema Data Review:  
 
Labs: Results:  
   
Chemistry Recent Labs 03/06/20 
1064 03/05/20 
1155 03/05/20 
0406 03/04/20 
1445 03/04/20 
6773 * 80 98 191* 138*  148* 145 140 140  
K 4.5 4.4 4.6 4.5 4.2 * 116* 116* 113* 110  
CO2 24 25 23 22 24 BUN 41* 32* 29* 27* 26* CREA 2.03* 1.85* 1.75* 1.55* 1.51* CA 7.8* 7.9* 7.6* 7.9* 8.6 MG  --   --  2.1 2.4 2.0 AGAP 7 7 6 5 6 BUCR 20 17 17 17 17 AP  --   --   --   --  83  
TP  --   --   --   --  6.7 ALB  --   --   --   --  3.2*  
GLOB  --   --   --   --  3.5 AGRAT  --   --   --   --  0.9 CBC w/Diff Recent Labs 03/05/20 
2110 03/05/20 
1155 03/05/20 
0406  03/04/20 
1445 03/04/20 
3949 WBC  --  15.0* 14.2*  --  26.2* 12.4 RBC  --  2.89* 2.66*  --  3.28* 4.10* HGB 8.1* 8.6*  8.6* 8.0*   < > 10.2* 12.8* HCT 24.5* 25.9*  25.7* 23.7*   < > 30.7* 38.3 PLT  --  151 172  --  203 250 GRANS  --   --   --   --  76* 67  
LYMPH  --   --   --   --  8* 21 EOS  --   --   --   --  0 3  
 < > = values in this interval not displayed. Cardiac Enzymes No results found for: CPK, CK, CKMMB, CKMB, RCK3, CKMBT, CKNDX, CKND1, DION, TROPT, TROIQ, AFSHIN, TROPT, TNIPOC, BNP, BNPP Coagulation Recent Labs 03/04/20 
1445 03/04/20 
5994 PTP 17.0*  --   
INR 1.4*  --   
APTT 43.4* 29.5 Lipid Panel Lab Results Component Value Date/Time Cholesterol, total 180 03/04/2020 05:37 AM  
 HDL Cholesterol 33 (L) 03/04/2020 05:37 AM  
 LDL, calculated 73.4 03/04/2020 05:37 AM  
 VLDL, calculated 73.6 03/04/2020 05:37 AM  
 Triglyceride 368 (H) 03/04/2020 05:37 AM  
 CHOL/HDL Ratio 5.5 (H) 03/04/2020 05:37 AM  
  
BNP No results found for: BNP, BNPP, XBNPT Liver Enzymes Recent Labs 03/04/20 
3851 TP 6.7 ALB 3.2* AP 83 SGOT 34 Digoxin Thyroid Studies Lab Results Component Value Date/Time TSH 0.83 03/03/2020 02:59 AM  
    
 
Signed By: RHONDA Aceves March 6, 2020

## 2020-03-06 NOTE — PROGRESS NOTES
Problem: Self Care Deficits Care Plan (Adult) Goal: *Acute Goals and Plan of Care (Insert Text) Description Occupational Therapy Goals Initiated 3/6/2020 within 7 day(s). 1.  Patient will perform self-feeding with modified independence. 2.  Patient will perform grooming with supervision/set-up following sternal precautions. 3.  Patient will perform upper body dressing with minimal assistance/contact guard assist. 
4.  Patient will perform lower body dressing with moderate assistance. 5.  Patient will perform toilet transfers with contact guard assist. 
6.  Patient will perform all aspects of toileting with contact guard assist. 
7.  Patient will utilize energy conservation techniques during functional activities with verbal cues. Prior Level of Function: Pt only oriented to self. Pt reports he was able to dress himself but required assistance for bathing PTA. Pt unable to state other PLOF information during OT evaluation. Outcome: Progressing Towards Goal 
 OCCUPATIONAL THERAPY EVALUATION Patient: Lesli Hearn (1 y.o. male) Date: 3/6/2020 Primary Diagnosis: Acute coronary syndrome ([facility] [I24.9] Elevated troponin [R79.89] Back pain [M54.9] Numbness and tingling in left arm [R20.0, R20.2] S/P cardiac catheterization [Z98.890] NSTEMI (non-ST elevated myocardial infarction) ([facility] [I21.4] Procedure(s) (LRB): 
CORONARY ARTERY BYPASS GRAFT (CABG) TIMES THREE (N/A) 2 Days Post-Op Precautions:   Sternal, Skin, Fall ASSESSMENT : 
Pt cleared to participate in OT evaluation by RN. Upon entering room, pt received semi-reclined in bed and alert. Pt seen in conjunction with PT to maximize safety of patient and staff members.  Based on the objective data described below, the patient presents with increased pain, decreased strength, decreased activity tolerance, increased fatigue/SOB, decreased sitting balance, decreased standing balance, decreased ability to safely perform functional transfers and mobility, decreased following of commands, confusion, and decreased independence in ADLs,  increasing the need for assistance from others. Pt on 6L O2 via NC during session. Pt only oriented to person; re-oriented pt to correct place and time. Pt educated on sternal precautions and utilizing heart hugger. Pt unable to comprehend at this time, even when RN guided pt's R hand to hold onto heart hugger. Pt was provided a pillow to hug onto in prep for bed mobility. Pt required total assist x2 for supine-sit in prep for further self-care. Max vc's provided to keep eyes opened before transfer to chair. Pt initially stood with moderate assist x2 during first attempt, however pt needed to sit. Second trial was made, with pt only requiring min assist before performing SPT to chair, simulating transfers to [facility]. At chair level, pt unable to follow commands during objective assessment, therefore provided PROM throughout [address] [place] [address] [address] while following sternal precautions. Pt is able to hold wash cloth and bring to face to initiate grooming task, but required moderate assist for thoroughness. Noted edema in B hands/wrists, therefore pillows were placed under BUEs for edema control. The patient requires skilled OT services to assess safety and increase  performance with basic self-care/ADLs, enhancing the patients quality of life by improving their ability to return to OF. At the end of the session, pt left resting comfortably in recliner, call bell in reach, with all needs met. Patient will benefit from skilled intervention to address the above impairments. Patient's rehabilitation potential is considered to be Good Factors which may influence rehabilitation potential include:  
[]             None noted [x]             Mental ability/status [x]             Medical condition [x]             Home/family situation and support systems [x]             Safety awareness [x]             Pain tolerance/management 
[]             Other: PLAN : 
Recommendations and Planned Interventions:  
[x]               Self Care Training                  [x]      Therapeutic Activities [x]               Functional Mobility Training   [x]      Cognitive Retraining 
[x]               Therapeutic Exercises           [x]      Endurance Activities [x]               Balance Training                    [x]      Neuromuscular Re-Education [x]               Visual/Perceptual Training     [x]      Home Safety Training 
[x]               Patient Education                   [x]      Family Training/Education []               Other (comment): Frequency/Duration: Patient will be followed by occupational therapy 1-2 times per day/4-7 days per week to address goals. Discharge Recommendations: Rehab Further Equipment Recommendations for Discharge: [facility], [address] [place] [address], Shower chair SUBJECTIVE:  
Patient stated [facility] OBJECTIVE DATA SUMMARY:  
 
Past Medical History:  
Diagnosis Date Calculus of kidney Cancer ([facility] HX of [address] Diabetes mellitus ([facility] 8/19/2010 Gout 8/19/2010 HTN (hypertension) 8/19/2010 Hyperlipemia 8/19/2010 Kidney disease Lumbar spondylosis Proteinuria Skin cancer, basal cell   
 neck Type 2 diabetes mellitus without complication, without long-term current use of insulin ([facility] 8/19/2010 Urolithiasis Past Surgical History:  
Procedure Laterality Date HX GI    
 HX OTHER SURGICAL Basal cell removed from left side of face HX UROLOGICAL    
 kidney Barriers to Learning/Limitations: yes;  cognitive, sensory deficits-vision/hearing/speech, and altered mental status (i.e.Sedation, Confusion) Compensate with: visual, verbal, tactile, kinesthetic cues/model Home Situation:  
Home Situation Home Environment: Private residence # Steps to Enter: 3 One/Two Story Residence: Other (Comment) # of Interior Steps: 12 Height of Each Step (in): 6 inches Interior Rails: Both Lift Chair Available: No 
Living Alone: No 
Support Systems: Family member(s) Patient Expects to be Discharged to[de-identified] Private residence Current DME Used/Available at Home: None 
[x]  Right hand dominant   []  Left hand dominant Cognitive/Behavioral Status: 
Neurologic State: Drowsy Orientation Level: Oriented to place;Oriented to person Cognition: Decreased attention/concentration;Decreased command following Safety/Judgement: Fall prevention Skin: Visible skin appeared intact Edema: B hands/wrist 
 
Vision/Perceptual:   
Pt unable to read the clock Coordination: BUE Coordination: (Pt unable to follow commands to assess coordination) Balance: 
Sitting: Impaired; With support Sitting - Static: Good (unsupported) Sitting - Dynamic: Fair (occasional) Standing: Impaired; With support Standing - Static: Fair Standing - Dynamic : Poor Strength: BUE Strength: Generally decreased, functional(following sternal precautions) Tone & Sensation: BUE Tone: Normal 
Sensation: Intact Range of Motion: BUE 
AROM: (Pt unable to follow commands to assess BUE AROM) PROM: Within functional limits(following sternal precautions) Functional Mobility and Transfers for ADLs: 
Bed Mobility: 
Supine to Sit: Total assistance;Assist x2 Scooting: Total assistance;Assist x2 Transfers: 
Sit to Stand: Moderate assistance;Assist x2(second trial MIN A x1) Stand Pivot Transfers: Minimum assistance;Assist x2 ADL Assessment:  
Feeding: Minimum assistance Oral Facial Hygiene/Grooming: Moderate assistance Bathing: Maximum assistance Upper Body Dressing: Maximum assistance Lower Body Dressing: Maximum assistance Toileting: Maximum assistance ADL Intervention: 
Grooming Grooming Assistance: Moderate assistance Washing Hands: Moderate assistance Lower Body Dressing Assistance Dressing Assistance: Total assistance(dependent) Socks: Total assistance (dependent) Cognitive Retraining Safety/Judgement: Fall prevention Therapeutic Exercise: 
 
EXERCISE Sets Reps Active Active Assist  
Passive Self- assisted ROM Comments Shoulder horizontal abduction     [] [] [] [] Shoulder flexion     [] [] [] [] Shoulder extension     [] [] [] [] Bicep curls 1  5 [] [] [x] [] L and r Shoulder external rotation     [] [] [] [] Shoulder internal rotation     [] [] [] []    
Wrist flexion/extension  1 5 [] [] [x] [] L and R Chair pushups   [] [] [] [] In preparation for ADLs Pain: 
Pain level pre-treatment: -/10 (Pt reported pain in chest but did not provide a number on the pain level scale) Pain level post-treatment: -/10 Pain Intervention(s): Medication (see MAR); Rest, Ice, Repositioning Response to intervention: Nurse notified, See doc flow Activity Tolerance:  
Fair Please refer to the flowsheet for vital signs taken during this treatment. After treatment:  
[x] Patient left in no apparent distress sitting up in chair 
[] Patient left in no apparent distress in bed 
[x] Call bell left within reach [x] Nursing notified 
[] Caregiver present [x] Chair alarm activated COMMUNICATION/EDUCATION:  
[x] Role of Occupational Therapy in the acute care setting 
[x] Home safety education was provided and the patient/caregiver indicated understanding. [x] Patient/family have participated as able in goal setting and plan of care. [x] Patient/family agree to work toward stated goals and plan of care. [] Patient understands intent and goals of therapy, but is neutral about his/her participation. [] Patient is unable to participate in goal setting and plan of care. Thank you for this referral. 
Edwige Diaz MS, OTR/L Time Calculation: 24 mins Eval Complexity: History: MEDIUM Complexity : Expanded review of history including physical, cognitive and psychosocial  history ;  
 Examination: MEDIUM Complexity : 3-5 performance deficits relating to physical, cognitive , or psychosocial skils that result in activity limitations and / or participation restrictions; Decision Making:MEDIUM Complexity : Patient may present with comorbidities that affect occupational performnce. Miniml to moderate modification of tasks or assistance (eg, physical or verbal ) with assesment(s) is necessary to enable patient to complete evaluation

## 2020-03-06 NOTE — PROGRESS NOTES
07:00  Received pt from [facility], [address] [place] [address]. Pt. Sleeping with HOB elevated at 45 degrees. Sp02 94% on 6 L 02 via n/c.  Pt. With total of 210 cc chest tube drainage over last 12 hours. No fluids or drips infusing at this time. Heart rate 95 bpm - sinus rhythm. 08:30  Dr. Billy Lacy at bedside, orders received. 10:00  Pt. Up to chair with PT/OT and RN assisting with lines. Pt. With shuffling gait. Doesn't keep eyes open while transferring. Requires a lot of cueing. Unstable on feet. C/O dizziness, nausea. zofran given. Wife at bedside 12:20  Pt up to [facility] with 2 person assist - no BM. Passed gas 15:00  Pt completed 1 hour of bipap with difficulty. 18:00  Pt po intake remains poor. Taking pills crushed in apple sauce. 19:00  Bedside and Verbal shift change report given to Nadege Piper  (oncoming nurse) by Huey Koo RN (offgoing nurse). Report included the following information SBAR, Kardex, Procedure Summary, Intake/Output, MAR, Recent Results and Cardiac Rhythm Sinus rhythm.

## 2020-03-06 NOTE — PROGRESS NOTES
CARDIAC SURGERY PROGRESS NOTE 
 
3/6/2020 Post Operative Day # 2 Chart reviewed. Interval History/Events of Past 24 hours:  
Extubated yest midday. HD stable Confusion overnight - improves with Haldol x2. OOB to chair yest and this am. BUN/Cr increased 41/2.03. Lasix 40mg IV x1 given this am.  
 
Assessment: 
CAD S/P  CABG x 3 -  BB, ASA, statin Respiratory parameters stable Cardiac status stable LOREN on CKD Plans: 1. resume PO BB, ASA, Statin 2. Advance diet 3. Start BIPAP 4. Lasix x1,  
5. PRN Haldol 6. Renal consult 7. eval CT for d/c later today 8. D/c enoxaparin -> SC hep 9. Daily BMP. CBC in am.  
 
Heart Hugger use encouraged to mitigate pain and will also assist with deep breathing and incentive spirometry goals. Possible discharge 4 days. Kaur Armenta PA-C [facility] [phone] 
_____________________________________________________________________________________________________________________________________________ Subjective: 
Patient seen and examined on rounds today. Pain level: controlled Ambulating: well Sleeping:some Objective: 
Vital signs:  
Visit Vitals /74 Pulse 98 Temp 98.3 °F (36.8 °C) Resp 12 Ht 5' 7\" (1.702 m) Wt 90.3 kg (199 lb 1.2 oz) SpO2 92% BMI 31.18 kg/m² Temp (24hrs), Av.1 °F (37.3 °C), Min:98.3 °F (36.8 °C), Max:99.5 °F (37.5 °C) Admission Weight: Last Weight Weight: 90.7 kg (200 lb) Weight: 90.3 kg (199 lb 1.2 oz) Last 3 Recorded Weights in this Encounter 20 1227 20 1534 20 1820 Weight: 90.7 kg (200 lb) 90.7 kg (200 lb) 90.3 kg (199 lb 1.2 oz) Telemetry:NSR Physical Examination:    
General:  Alert, oriented Lungs: Clear to ascultation without rales, wheezes or rhonchi. Chest: Dressings clean and dry. Sternum stable. Heart: regular rate and rhythm, No murmur. Abdomen: Soft and non-tender without masses. Bowel sounds present. Extremities: Warm and well perfused. Edema mild. Incisions: clean and dry Neuro: No deficit. SUP Prophylaxis: Pepcid DVT Prophylaxis:  
pneumatic compression boots: Yes Compression stockings:  Yes Chest tubes: #1 110ml/12h 
#2 110ml /12h 
 
 
DME needs:  tbd Labs: 
Lab Results Component Value Date/Time WBC 15.0 (H) 03/05/2020 11:55 AM  
 HCT 24.5 (L) 03/05/2020 09:10 PM  
  03/05/2020 11:55 AM  
  
Lab Results Component Value Date/Time  03/06/2020 06:45 AM  
 K 4.5 03/06/2020 06:45 AM  
  (H) 03/06/2020 06:45 AM  
 CO2 24 03/06/2020 06:45 AM  
  (H) 03/06/2020 06:45 AM  
 BUN 41 (H) 03/06/2020 06:45 AM  
 CREA 2.03 (H) 03/06/2020 06:45 AM  
 CREA 1.85 (H) 03/05/2020 11:55 AM  
 CREA 1.75 (H) 03/05/2020 04:06 AM  
 
Lab Results Component Value Date/Time HBA1C 7.1 (H) 03/03/2020 02:59 AM  
 
pH (POC) Date Value Ref Range Status 03/05/2020 7.375 7.35 - 7.45   Final  
 
pCO2 (POC) Date Value Ref Range Status 03/05/2020 38.0 35.0 - 45.0 MMHG Final  
 
pO2 (POC) Date Value Ref Range Status 03/05/2020 71 (L) 80 - 100 MMHG Final  
 
HCO3 (POC) Date Value Ref Range Status 03/05/2020 22.1 22 - 26 MMOL/L Final  
 
sO2 (POC) Date Value Ref Range Status 03/05/2020 93 92 - 97 % Final  
 
Base excess (POC) Date Value Ref Range Status 03/05/2020 0 mmol/L Final  
 
 
CXR: t/l ok ATX, effusion on LEFT 
 
 
  
PLEASE NOTE: 
This document may have been produced using voice recognition software. Unrecognized errors in transcription may be present. Please call with any questions.

## 2020-03-06 NOTE — PROGRESS NOTES
NUTRITION Nutrition Consult: General Nutrition Management & Supplements RECOMMENDATIONS / PLAN:  
 
- Change to mechanical soft solid diet and add preferences. - Add supplements: Glucerna Shake TID.  
- Continue RD inpatient monitoring and evaluation. NUTRITION INTERVENTIONS & DIAGNOSIS:  
 
- Meals/snacks: modify composition - Medical food supplement therapy: initiate Nutrition Diagnosis: Chronic disease or condition related malnutrition related to decreased appetite, lethargy as evidenced by decreased appetite/meal intake over the past year and 15 lb, 7.5% weight loss x 3 months. Patient meets criteria for Moderate Protein Calorie Malnutrition as evidenced by:  
ASPEN Malnutrition Criteria Acute Illness, Chronic Illness, or Social/Enviornmental: Chronic illness Energy Intake: <75% est energy req for greater than/equal to 1 month Weight Loss: 7.5% x 3 mos ASPEN Malnutrition Score - Chronic Illness: 2 Chronic Illness - Malnutrition Diagnosis: Moderate malnutrition. ASSESSMENT:  
 
3/6: Pt lethargic, requiring bipap and spoke with wife at bedside, who works as Diabetes Educator. Pt with poor meal intake, difficulty chewing due to poor dentition and not wearing dentures, present at bedside due to discomfort and SOB per wife. Pt usually consumes 6 small snacks throughout the day, consisting mainly of pudding, jello and applesauce. 3/5: S/p CABG x 3 yesterday and extubated this afternoon, kept intubated overnight due to concern for postoperative bleeding per chart. Recently extubated and has not eaten postop. Nutritional intake adequate to meet patients estimated nutritional needs:  No 
 
Diet: DIET DIABETIC CONSISTENT CARB Regular; AHA-LOW-CHOL FAT Food Allergies: NKFA Current Appetite: Poor Appetite/meal intake prior to admission: Fair, consuming 6 small meals per day of soft foods with decline in appetite/meal intake over the past year per spouse Feeding Limitations:  [] Swallowing difficulty    [x] Chewing difficulty: poor dentition, not wearing dentures     [x] Other: lethargy, shortness of breath Current Meal Intake:  
Patient Vitals for the past 100 hrs: 
 % Diet Eaten 03/06/20 1321 0 % 03/06/20 0950 0 % 03/05/20 1730 0 % 03/03/20 1710 100 % BM: PTA Skin Integrity: surgical incisions to chest and leg; chest tubes Edema:   [] No     [x] Yes Pertinent Medications: Reviewed Recent Labs 03/06/20 
2159 03/05/20 
1155 03/05/20 
0406 03/04/20 
1445 03/04/20 
2978  148* 145 140 140  
K 4.5 4.4 4.6 4.5 4.2 * 116* 116* 113* 110  
CO2 24 25 23 22 24 * 80 98 191* 138* BUN 41* 32* 29* 27* 26* CREA 2.03* 1.85* 1.75* 1.55* 1.51* CA 7.8* 7.9* 7.6* 7.9* 8.6 MG  --   --  2.1 2.4 2.0 ALB  --   --   --   --  3.2* SGOT  --   --   --   --  34 ALT  --   --   --   --  22 Intake/Output Summary (Last 24 hours) at 3/6/2020 1407 Last data filed at 3/6/2020 1321 Gross per 24 hour Intake 921.59 ml Output 1658 ml Net -736.41 ml Anthropometrics: 
Ht Readings from Last 1 Encounters:  
03/03/20 5' 7\" (1.702 m) Last 3 Recorded Weights in this Encounter 03/02/20 1227 03/03/20 1534 03/06/20 9730 Weight: 90.7 kg (200 lb) 90.7 kg (200 lb) 90.3 kg (199 lb 1.2 oz) Body mass index is 31.18 kg/m². Obese, Class I Weight History: weight gain per chart hx review; weight loss over the past several months PTA per wife report, 200 lb in December 2019 and down to 185 lb prior to admission (15 lb, 7.5% weight loss x 3 months) Weight Metrics 3/6/2020 12/2/2019 11/12/2019 3/28/2019 12/27/2018 10/10/2018 9/21/2018 Weight 199 lb 1.2 oz 185 lb 191 lb 3.2 oz 190 lb 12.8 oz 190 lb 3.2 oz 192 lb 8 oz 188 lb 8 oz BMI 31.18 kg/m2 28.98 kg/m2 29.95 kg/m2 29.88 kg/m2 29.79 kg/m2 30.15 kg/m2 29.52 kg/m2 Admitting Diagnosis: Acute coronary syndrome ([facility] [address] [I24.9] Elevated troponin [R79.89] Back pain [M54.9] Numbness and tingling in left arm [R20.0, R20.2] S/P cardiac catheterization [Z98.890] NSTEMI (non-ST elevated myocardial infarction) ([facility] [address] [I21.4] Pertinent PMHx: DM, HTN, HLD, CKD stage 3, CAD, skin cancer, kidney stones Education Needs:        [] None identified  [] Identified - Not appropriate at this time  [x]  Identified and addressed - refer to education log Learning Limitations:   [] None identified  [x] Identified: lethargy Cultural, Druze & ethnic food preferences:  [x] None identified    [] Identified and addressed ESTIMATED NUTRITION NEEDS:  
 
Calories: 6841-5487 kcal (MSJx1.2-1.3) based on  [x] Actual BW 90 kg     [] IBW Protein:  gm (0.8-1.2gm/kg) based on  [x] Actual BW      [] IBW Fluid: 1 mL/kcal 
  
MONITORING & EVALUATION:  
 
Nutrition Goal(s):  
- PO nutrition intake will meet >75% of patient estimated nutritional needs within the next 7 days. Outcome: Progressing towards goal  
 
Monitoring:   [x] Food and nutrient intake   [x] Food and nutrient administration  [x] Comparative standards   [x] Nutrition-focused physical findings   [x] Anthropometric Measurements   [x] Treatment/therapy   [x] Biochemical data, medical tests, and procedures Previous Recommendations (for follow-up assessments only): Implemented Discharge Planning: No nutritional discharge needs at this time. Participated in care planning, discharge planning, & interdisciplinary rounds as appropriate. Roman Murguia RD, [facility] Pager: [phone]

## 2020-03-06 NOTE — PROGRESS NOTES
Problem: Mobility Impaired (Adult and Pediatric) Goal: *Acute Goals and Plan of Care (Insert Text) Description Physical Therapy Goals Initiated 3/6/2020 and to be accomplished within 4 day(s) 1. Patient will move from supine to sit and sit to supine  and scoot up and down in bed with minimal assistance/contact guard assist.    
2.  Patient will transfer from bed to chair and chair to bed with supervision/set-up using the least restrictive device. 3.  Patient will perform sit to stand with supervision/set-up. 4.  Patient will ambulate with supervision/set-up for 100 feet with the least restrictive device. 5.  Assess as appropriate PLOF: Pt poor historian, states he lives with his spouse in 2 story home. He was independent with self-care and mobility. Outcome: Progressing Towards Goal 
 
PHYSICAL THERAPY EVALUATION Patient: Lesli Hearn (13 y.o. male) Date: 3/6/2020 Primary Diagnosis: Acute coronary syndrome ([facility] [address] [I24.9] Elevated troponin [R79.89] Back pain [M54.9] Numbness and tingling in left arm [R20.0, R20.2] S/P cardiac catheterization [Z98.890] NSTEMI (non-ST elevated myocardial infarction) ([facility] [address] [I21.4] Procedure(s) (LRB): 
CORONARY ARTERY BYPASS GRAFT (CABG) TIMES THREE (N/A) 2 Days Post-Op Precautions:   Sternal, Skin, Fall ASSESSMENT : RN cleared patient to participate in PT evaluation/treatment and assisted during session for management of multiple lines. Educated pt on sternal precautions, needed max cues to squeeze pillow for transfers as pt unable to properly use heart hugger. Patient needed total assist x 2 to mobilize for supine to sit and to scoot towards EOB. Pt performs sit to stand with mod A x2, c/o dizziness and unable to follow commands to take side steps to the chair. Pt returned to sitting EOB for brief rest break.  Second sit to stand trial, pt performed with min A x1 and complete SPT to the chair with min A x2 due to unsteadiness. Pt needed max cues to keep eyes open when engaging in conversation. He is unable verbalize recall of sternal precautions. Based on the objective data described below, the patient presents with decreased strength, decreased command following/ attention to task, confusion, impaired balance, decreased activity tolerance, and decreased independence with mobility. Patient will benefit from continued rehab in order to maximize mobility and restore PLOF. Patient left in chair with all needs met. Vitals at the end of session: BP was 136/76,  bpm, and sats in mid 90's on 6L NC. Patient will benefit from skilled intervention to address the above impairments. Patient's rehabilitation potential is considered to be Good Factors which may influence rehabilitation potential include:  
[]         None noted 
[x]         Mental ability/status [x]         Medical condition 
[x]         Home/family situation and support systems 
[x]         Safety awareness [x]         Pain tolerance/management 
[]         Other: PLAN : 
Recommendations and Planned Interventions:  
[x]           Bed Mobility Training             [x]    Neuromuscular Re-Education 
[x]           Transfer Training                   []    Orthotic/Prosthetic Training 
[x]           Gait Training                          []    Modalities [x]           Therapeutic Exercises           []    Edema Management/Control 
[x]           Therapeutic Activities            [x]    Family Training/Education 
[x]           Patient Education 
[]           Other (comment): Frequency/Duration: Patient will be followed by physical therapy 1-2 times per day/4-7 days per week to address goals. Discharge Recommendations: Rehab Further Equipment Recommendations for Discharge: TBD SUBJECTIVE:  
Patient stated 51 years.  length of marriage to his spouse. OBJECTIVE DATA SUMMARY:  
 
Past Medical History: Diagnosis Date Calculus of kidney Cancer ([facility] [address] HX of [facility] Diabetes mellitus ([facility] [address] 8/19/2010 Gout 8/19/2010 HTN (hypertension) 8/19/2010 Hyperlipemia 8/19/2010 Kidney disease Lumbar spondylosis Proteinuria Skin cancer, basal cell   
 neck Type 2 diabetes mellitus without complication, without long-term current use of insulin ([facility] [address] 8/19/2010 Urolithiasis Past Surgical History:  
Procedure Laterality Date HX GI    
 HX OTHER SURGICAL Basal cell removed from left side of face HX UROLOGICAL    
 kidney Barriers to Learning/Limitations: yes;  cognitive Compensate with: Visual Cues, Verbal Cues, and Tactile Cues Home Situation: 
Home Situation Home Environment: Private residence # Steps to Enter: 3 One/Two Story Residence: Other (Comment) # of Interior Steps: 12 Height of Each Step (in): 6 inches Interior Rails: Both Lift Chair Available: No 
Living Alone: No 
Support Systems: Family member(s) Patient Expects to be Discharged to[de-identified] Private residence Current DME Used/Available at Home: None Critical Behavior: 
Neurologic State: Drowsy Orientation Level: Oriented to place;Oriented to person Cognition: Decreased attention/concentration;Decreased command following Safety/Judgement: Fall prevention Psychosocial 
Patient Behaviors: Confused Family  Behaviors: Appropriate for situation;Calm; Cooperative;Supportive(Wife and daughter at bedside) Needs Expressed: Educational 
Purposeful Interaction: Yes Pt Identified Daily Priority: Clinical issues (comment) Caritas Process: Nurture loving kindness;Enable brittany/hope;Establish trust;Nurture spiritual self;Teaching/learning; Attend basic human needs;Create healing environment;Supportive expression Caring Interventions: Reassure; Therapeutic modalities Reassure: Therapeutic listening; Informing; Acceptance; Instilling brittany and hope;Caring rounds Therapeutic Modalities: Humor; Intentional therapeutic touch Other Caring Modalities: hourly rounds Skin Condition/Temp: Dry;Fragile Family  Behaviors: Appropriate for situation;Calm; Cooperative;Supportive(Wife and daughter at bedside) Skin Integrity: Incision (comment)(sternum, LLE,chest tubes x 3) Skin Integumentary Skin Color: Appropriate for ethnicity Skin Condition/Temp: Dry;Fragile Skin Integrity: Incision (comment)(sternum, LLE,chest tubes x 3) Turgor: Non-tenting Hair Growth: Sparce Varicosities: Absent Strength:   
Strength: Generally decreased, functional 
 
Tone & Sensation:  
Sensation: Intact Range Of Motion: 
AROM: Generally decreased, functional 
 
Functional Mobility: 
Bed Mobility: 
 
Supine to Sit: Total assistance;Assist x2 Scooting: Total assistance;Assist x2 Transfers: 
Sit to Stand: Moderate assistance;Assist x2(second trial MIN A x1) Stand Pivot Transfers: Minimum assistance;Assist x2 Pain: 
Pt c/o pain along chest.  
Pain Intervention(s) : Medication (see MAR); Rest, Ice, Repositioning Response to intervention: Nurse notified, See doc flow Activity Tolerance:  
Fair Please refer to the flowsheet for vital signs taken during this treatment. After treatment:  
[x]         Patient left in no apparent distress sitting up in chair 
[]         Patient left in no apparent distress in bed 
[x]         Call bell left within reach [x]         Nursing notified 
[]         Caregiver present 
[]         Bed alarm activated 
[]         SCDs applied COMMUNICATION/EDUCATION:  
[x]         Role of Physical Therapy in the acute care setting. [x]         Fall prevention education was provided and the patient/caregiver indicated understanding. [x]         Patient/family have participated as able in goal setting and plan of care. [x]         Patient/family agree to work toward stated goals and plan of care. []         Patient understands intent and goals of therapy, but is neutral about his/her participation. []         Patient is unable to participate in goal setting/plan of care: ongoing with therapy staff. 
[]         Other: Thank you for this referral. 
Bushra Hernandez, PT Time Calculation: 24 mins Eval Complexity: History: MEDIUM  Complexity : 1-2 comorbidities / personal factors will impact the outcome/ POC Exam:MEDIUM Complexity : 3 Standardized tests and measures addressing body structure, function, activity limitation and / or participation in recreation  Presentation: MEDIUM Complexity : Evolving with changing characteristics  Clinical Decision Making:Medium Complexity    Overall Complexity:MEDIUM 
 
  
 (2) assistive person

## 2020-03-06 NOTE — PROGRESS NOTES
1915: Received report from [facility], [address]: Patient is confused and very agitated. Patient keeps taking oxygen off and yelling. Called Lissette Hairston and received PRN order for 2-5mg Haldol Q8H.  
1945: Haldol given. Patient relaxed and is more calm. Will continue to monitor. 2000: BP soft after Haldol administration - 86/58 (68). Nitro gtt stopped. Low urine output 25cc this past hour. Will continue to monitor. 2045: RHONDA Casey and notified of patient's low BP and urine output. Orders received to draw H&H and gove 250cc albumin. Orders implemented. 2100: Urine output low this past hour - 15cc. Albumin infusing at this time. H&H drawn. 2200: Urine output improved. Haldol effective and patient resting quietly. 0000: Urine output adequate. VSS. 0330: Patient began to get agitated again and ripped off chest tube dressing and yelling. PRN haldol given. Dressing replaced. Chest tubes ok and sutured in place. No air leak. 3907: Patient c/o pain. 2 tabs Norco given. Patient holding pills in mouth, not following directions to swallow. Able to coerce patient to take pills. Post op dressings changed and cleansed. Bathed patient and changed gown. Patient cannot follow directions at this time and is not being cooperative so patient will remain in bed at this time.

## 2020-03-06 NOTE — CONSULTS
Consult Note Consult requested by: Dr. Juliocesar Chakraborty Magdalena Pinto is a 68 y.o. male [facility] [place] [facility] who is being seen on consult for acute on chronic kidney injury Chief Complaint Patient presents with  Back Pain  Nausea Admission diagnosis: Elevated troponin 67y M with PMH DM, HTN, CKD 3 , CAD, admitted for NSTEMI, s/p CABG, following for Acute on chronic Kidney injury Impression & Plan:  
IMPRESSION:  
Acute on chronic kidney injury, non oliguric, multiple risk factors including AMALIA, s/p CABG,  
CKD 3 with heavy protienuria, baseline creatinine 1.5-1.7mg/dl Diabetic nephropathy NSTEMI, s/p CABG Volume overload, H/o recurrent kidney stone Anemia , received BT  
PLAN:  
 Mr. Sruthi Ruiz has LOREN from multiple risk factor mention above, he is  non oliguric. He feels weak and tiered, requiring higher oxygenation. Agree with aggressive spirometry and mobilization. He received lasix dose today which is not unreasonable , but caution with aggressive diuresis, monitor urine output and renal function. Start him on epogen. Check PTH , urine protien study. Adjust medication current renal function status. Discussed with cardiothoracic team.  
 
Thank you very much for allowing me to participate in the care of this patient. We will continue to monitor with you and make recommendations as needed. 
  
 
 
 
HPI:  67y M with PMH DM, HTN, known CKD with heavy proteinuria, CAD, admitted to hospital for NSTEMI. His further work up shows sever ischemic heart disease required CABG during this hospitalization. Post operative period his creatinine has been trending upward, nephrology service consulted for further assistance. He has known history of CKD with heavy protienuria, I saw him back in 2018 then after he did not come for follow up.   
During my evaluation, his wife is at bed side, he appears weak and tired, not able to provide much history but denies for any chest pain, short of beath, nausea, vomiting. Past Medical History:  
Diagnosis Date  Calculus of kidney  Cancer ([facility] [address] [facility]X of [facility]  Diabetes mellitus ([facility] [address] 8/19/2010  Gout 8/19/2010  
 HTN (hypertension) 8/19/2010  Hyperlipemia 8/19/2010  Kidney disease  Lumbar spondylosis  Proteinuria   
 Skin cancer, basal cell   
 neck  Type 2 diabetes mellitus without complication, without long-term current use of insulin ([facility] [address] 8/19/2010  Urolithiasis Past Surgical History:  
Procedure Laterality Date  HX GI    
 HX OTHER SURGICAL Basal cell removed from left side of face  HX UROLOGICAL    
 kidney Social History Socioeconomic History  Marital status:  Spouse name: Not on file  Number of children: Not on file  Years of education: Not on file  Highest education level: Not on file Occupational History  Not on file Social Needs  Financial resource strain: Not on file  Food insecurity:  
  Worry: Not on file Inability: Not on file  Transportation needs:  
  Medical: Not on file Non-medical: Not on file Tobacco Use  Smoking status: Never Smoker  Smokeless tobacco: Never Used Substance and Sexual Activity  Alcohol use: No  
 Drug use: No  
 Sexual activity: Yes Lifestyle  Physical activity:  
  Days per week: Not on file Minutes per session: Not on file  Stress: Not on file Relationships  Social connections:  
  Talks on phone: Not on file Gets together: Not on file Attends Bahai service: Not on file Active member of club or organization: Not on file Attends meetings of clubs or organizations: Not on file Relationship status: Not on file  Intimate partner violence:  
  Fear of current or ex partner: Not on file Emotionally abused: Not on file Physically abused: Not on file Forced sexual activity: Not on file Other Topics Concern  Not on file Social History Narrative  Not on file Family History Problem Relation Age of Onset  Diabetes Mother Allergies Allergen Reactions  Metformin Other (comments) Renal insufficiency Home Medications:  
 
Prior to Admission Medications Prescriptions Last Dose Informant Patient Reported? Taking?  
allopurinol (ZYLOPRIM) 300 mg tablet 3/2/2020 at Unknown time  No Yes Sig: take 1 tablet by mouth once daily  
dilTIAZem (TIAZAC) 360 mg SR capsule 3/2/2020 at Unknown time  No Yes Sig: Take 1 Cap by mouth daily. glyBURIDE (DIABETA) 2.5 mg tablet 3/2/2020 at Unknown time  No Yes Sig: Take 1 Tab by mouth two (2) times daily (with meals). lisinopril (PRINIVIL, ZESTRIL) 20 mg tablet 3/2/2020 at Unknown time  No Yes Sig: Take 1 Tab by mouth two (2) times a day. Facility-Administered Medications: None Current Facility-Administered Medications Medication Dose Route Frequency  insulin glargine (LANTUS) injection 50 Units  50 Units SubCUTAneous DAILY  famotidine (PEPCID) tablet 20 mg  20 mg Oral DAILY  insulin lispro (HUMALOG) injection   SubCUTAneous AC&HS  
 glucose chewable tablet 16 g  4 Tab Oral PRN  
 glucagon (GLUCAGEN) injection 1 mg  1 mg IntraMUSCular PRN  
 dextrose (D50W) injection syrg 12.5-25 g  25-50 mL IntraVENous PRN  
 0.9% sodium chloride infusion  15 mL/hr IntraVENous CONTINUOUS  
 haloperidol lactate (HALDOL) injection 2-5 mg  2-5 mg IntraVENous Q8H PRN  
 sodium chloride (NS) flush 5-40 mL  5-40 mL IntraVENous Q8H  
 acetaminophen (TYLENOL) tablet 650 mg  650 mg Oral Q4H PRN  
 HYDROcodone-acetaminophen (NORCO) 5-325 mg per tablet 1-2 Tab  1-2 Tab Oral Q6H PRN  
 naloxone (NARCAN) injection 0.4 mg  0.4 mg IntraVENous PRN  
 amiodarone (CORDARONE) tablet 200 mg  200 mg Oral TID  albuterol (PROVENTIL VENTOLIN) nebulizer solution 2.5 mg  2.5 mg Nebulization Q4H PRN  
  metoprolol tartrate (LOPRESSOR) tablet 12.5 mg  12.5 mg Oral Q12H  
 ondansetron (ZOFRAN) injection 4 mg  4 mg IntraVENous Q4H PRN  
 nitroglycerin (TRIDIL) 400 mcg/ml infusion  0-200 mcg/min IntraVENous TITRATE  aspirin delayed-release tablet 81 mg  81 mg Oral DAILY  chlorhexidine (PERIDEX) 0.12 % mouthwash 10 mL  10 mL Oral BID  docusate sodium (COLACE) capsule 100 mg  100 mg Oral BID  ELECTROLYTE REPLACEMENT PROTOCOL - Potassium Standard Dosing  1 Each Other PRN  
 ELECTROLYTE REPLACEMENT PROTOCOL - Magnesium  1 Each Other PRN  
 enoxaparin (LOVENOX) injection 40 mg  40 mg SubCUTAneous Q24H  
 bisacodyL (DULCOLAX) tablet 10 mg  10 mg Oral DAILY  polyethylene glycol (MIRALAX) packet 17 g  17 g Oral DAILY  glucose chewable tablet 16 g  4 Tab Oral PRN  
 glucagon (GLUCAGEN) injection 1 mg  1 mg IntraMUSCular PRN  
 dextrose (D50W) injection syrg 12.5-25 g  25-50 mL IntraVENous PRN  
 sodium chloride (NS) flush 5-40 mL  5-40 mL IntraVENous PRN  
 atorvastatin (LIPITOR) tablet 20 mg  20 mg Oral QHS Review of Systems:  
 
Complete 10-point review of systems were obtained and discussed in length 
with the patient. Complete review of systems was negative/unremarkable 
except as mentioned in HPI section. Data Review: 
 
Labs: Results:  
   
Chemistry Recent Labs 03/06/20 
8798 03/05/20 
1155 03/05/20 
0406  03/04/20 
7931 * 80 98   < > 138*  148* 145   < > 140  
K 4.5 4.4 4.6   < > 4.2 * 116* 116*   < > 110 CO2 24 25 23   < > 24 BUN 41* 32* 29*   < > 26* CREA 2.03* 1.85* 1.75*   < > 1.51* CA 7.8* 7.9* 7.6*   < > 8.6 AGAP 7 7 6   < > 6  
BUCR 20 17 17   < > 17 AP  --   --   --   --  83  
TP  --   --   --   --  6.7 ALB  --   --   --   --  3.2*  
GLOB  --   --   --   --  3.5 AGRAT  --   --   --   --  0.9  
 < > = values in this interval not displayed. CBC w/Diff Recent Labs 03/05/20 
2110 03/05/20 
1155 03/05/20 
0406  03/04/20 026 848 14 90 03/04/20 
4910 WBC  --  15.0* 14.2*  --  26.2* 12.4 RBC  --  2.89* 2.66*  --  3.28* 4.10* HGB 8.1* 8.6*  8.6* 8.0*   < > 10.2* 12.8* HCT 24.5* 25.9*  25.7* 23.7*   < > 30.7* 38.3 PLT  --  151 172  --  203 250 GRANS  --   --   --   --  76* 67  
LYMPH  --   --   --   --  8* 21 EOS  --   --   --   --  0 3  
 < > = values in this interval not displayed. Coagulation Recent Labs 03/04/20 
1445 03/04/20 
2581 PTP 17.0*  --   
INR 1.4*  --   
APTT 43.4* 29.5 Iron/Ferritin No results for input(s): IRON in the last 72 hours. No lab exists for component: TIBCCALC BNP No results for input(s): BNPP in the last 72 hours. Cardiac Enzymes Recent Labs 03/03/20 
1921 03/03/20 
1437 * 415* CKND1 2.1 2.6 Liver Enzymes Recent Labs 03/04/20 
2234 TP 6.7 ALB 3.2* AP 83 SGOT 34 Thyroid Studies Lab Results Component Value Date/Time TSH 0.83 03/03/2020 02:59 AM  
    
 EKG: NSTEMI Physical Assessment:  
 
Visit Vitals /66 Pulse 99 Temp 98.3 °F (36.8 °C) Resp 14 Ht 5' 7\" (1.702 m) Wt 90.3 kg (199 lb 1.2 oz) SpO2 93% BMI 31.18 kg/m² Weight change:  
 
Intake/Output Summary (Last 24 hours) at 3/6/2020 1100 Last data filed at 3/6/2020 1000 Gross per 24 hour Intake 1178.17 ml Output 2198 ml Net -1019.83 ml Physical Exam:  
General: comfortable, no acute distress HEENT sclera anicteric, CVS: S1S2 heard,  no rub RS: + air entry b/l, Abd: Soft, Non tender, Neuro: non focal, awake, alert , CN II-XII are grossly intact Extrm: ++ edema, no cyanosis, clubbing Skin: no visible  Rash Musculoskeletal: No gross joints or bone deformities Procedures/imaging: see electronic medical records for all procedures, Xrays and details which were not copied into this note but were reviewed prior to creation of Alvarez Alvarado MD 
March 6, 2020 [place] [phone]

## 2020-03-07 PROBLEM — N17.9 AKI (ACUTE KIDNEY INJURY) (HCC): Status: ACTIVE | Noted: 2020-01-01

## 2020-03-07 PROBLEM — E11.21 TYPE 2 DIABETES WITH NEPHROPATHY (HCC): Chronic | Status: ACTIVE | Noted: 2018-03-23

## 2020-03-07 PROBLEM — M54.9 BACK PAIN: Chronic | Status: ACTIVE | Noted: 2020-01-01

## 2020-03-07 PROBLEM — R20.0 NUMBNESS AND TINGLING IN LEFT ARM: Status: RESOLVED | Noted: 2020-01-01 | Resolved: 2020-01-01

## 2020-03-07 PROBLEM — I25.118 CORONARY ARTERY DISEASE OF NATIVE ARTERY WITH STABLE ANGINA PECTORIS (HCC): Status: ACTIVE | Noted: 2020-01-01

## 2020-03-07 PROBLEM — R20.2 NUMBNESS AND TINGLING IN LEFT ARM: Status: RESOLVED | Noted: 2020-01-01 | Resolved: 2020-01-01

## 2020-03-07 NOTE — PROGRESS NOTES
CARDIAC SURGERY PROGRESS NOTE 
 
3/7/2020 Post Operative Day # 3 Chart reviewed. Interval History/Events of Past 24 hours: Worsening delirium, confusion and agitation overnight that did not respond to Haldol. Narcotics d/c'd yesterday. Family stayed with patient overnight. ABG ok. OOB yesterday. HD stable. UOP > 50-75/hr. On non-telemetry use chest tube output mediastinal 120ml/24h  pleural 310ml/24h. CXR to be done. Afeb. Assessment: 
CAD S/P  CABG x 3 - on  BB, ASA, statin Respiratory parameters stable on 6LNC, intermittent BIPAP Cardiac status stable Delirium LOREN on CKD stable - nephrology following Acute blood loss anemia s/p 4 PRBC, 2 FFP, 1 PLT Plans: 
1. IVF until taking PO regularly, replace thiamin 2. Supportive care 3. D/c georges Heart Hugger use encouraged to mitigate pain and will also assist with deep breathing and incentive spirometry goals. Possible discharge 3 days. Delvis Wright PA-C [facility] [phone] 
_____________________________________________________________________________________________________________________________________________ Subjective: 
Patient seen and examined on rounds today. - c/o back pain Objective: 
Vital signs:  
Visit Vitals /74 Pulse 90 Temp 96.8 °F (36 °C) Resp 13 Ht 5' 7\" (1.702 m) Wt 90.3 kg (199 lb 1.2 oz) SpO2 97% BMI 31.18 kg/m² Temp (24hrs), Av.3 °F (36.8 °C), Min:96.8 °F (36 °C), Max:99 °F (37.2 °C) Admission Weight: Last Weight Weight: 90.7 kg (200 lb) Weight: 90.3 kg (199 lb 1.2 oz) Last 3 Recorded Weights in this Encounter 20 1227 20 1534 20 9152 Weight: 90.7 kg (200 lb) 90.7 kg (200 lb) 90.3 kg (199 lb 1.2 oz) Telemetry: ST 
 
Physical Examination:    
General:  Oriented to person, place Lungs: Clear to ascultation without rales, wheezes or rhonchi. Chest: Dressings clean and dry. Sternum stable. Heart: regular rate and rhythm, No murmur. Abdomen: Soft and non-tender without masses. Bowel sounds present. Extremities: Warm and well perfused. Edema moderate. Incisions: clean and dry Neuro: No deficit. SUP Prophylaxis: Pepcid DVT Prophylaxis:  
pneumatic compression boots: Yes Compression stockings:  Yes 
Heparin:  Yes Chest tubes:  present Pacing wires:  not present DME needs:  tbd Labs: 
Lab Results Component Value Date/Time WBC 14.0 (H) 03/07/2020 05:45 AM  
 HCT 22.6 (L) 03/07/2020 05:45 AM  
  (L) 03/07/2020 05:45 AM  
  
Lab Results Component Value Date/Time  03/07/2020 05:45 AM  
 K 4.1 03/07/2020 05:45 AM  
  (H) 03/07/2020 05:45 AM  
 CO2 26 03/07/2020 05:45 AM  
 GLU 69 (L) 03/07/2020 05:45 AM  
 BUN 54 (H) 03/07/2020 05:45 AM  
 CREA 2.02 (H) 03/07/2020 05:45 AM  
 CREA 2.03 (H) 03/06/2020 06:45 AM  
 CREA 1.85 (H) 03/05/2020 11:55 AM  
 
Lab Results Component Value Date/Time HBA1C 7.1 (H) 03/03/2020 02:59 AM  
 
pH (POC) Date Value Ref Range Status 03/07/2020 7.375 7.35 - 7.45   Final  
 
pCO2 (POC) Date Value Ref Range Status 03/07/2020 42.0 35.0 - 45.0 MMHG Final  
 
pO2 (POC) Date Value Ref Range Status 03/07/2020 77 (L) 80 - 100 MMHG Final  
 
HCO3 (POC) Date Value Ref Range Status 03/07/2020 24.5 22 - 26 MMOL/L Final  
 
sO2 (POC) Date Value Ref Range Status 03/07/2020 95 92 - 97 % Final  
 
Base excess (POC) Date Value Ref Range Status 03/05/2020 0 mmol/L Final  
 
    
 
CXR:to be done PLEASE NOTE: 
This document may have been produced using voice recognition software. Unrecognized errors in transcription may be present. Please call with any questions.

## 2020-03-07 NOTE — PROGRESS NOTES
07:00  Received pt from [facility]. NS infusing at 75 ml/hr. Pt. Moaning, \"Oh God\". Restless. Wife at bedside. ABG obtained: 
Results for Ghanshyam Nieves (MRN [id number]) as of 3/7/2020 08:35 Ref. Range 3/7/2020 06:57  
pH (POC) Latest Ref Range: 7.35 - 7.45   7.375 pCO2 (POC) Latest Ref Range: 35.0 - 45.0 MMHG 42.0  
pO2 (POC) Latest Ref Range: 80 - 100 MMHG 77 (L) HCO3 (POC) Latest Ref Range: 22 - 26 MMOL/L 24.5  
sO2 (POC) Latest Ref Range: 92 - 97 % 95 Base deficit (POC) Latest Units: mmol/L 1  
FIO2 (POC) Latest Units: % 44 Patient temp. Latest Units:   37.0 Specimen type (POC) Latest Units:   ARTERIAL Flow rate (POC) Latest Units: L/M 6 Site Latest Units:   RIGHT RADIAL Device: Latest Units:   NASAL CANNULA Total resp. rate Latest Units:   16 Allens test (POC) Latest Units:   YES  
 
07:20  12.5 fentanyl iv administered for pain. Lit. Pleural atrium changed due to fullness. No air leak noted. 08:05  Pt. Resting peacefully. RT applied bipap. H/H trending down to 7.3 / 22.6  (from 8.1 / 24.5) Creatinine slightly down at 2.02 (from 2.03) 
09:40  Pt. Sleeping with bipap in place. Will hold off on am meds until pt awakes. 10:30  Pt. Wore bipap for 2 hrs 20 minutes. Pt. Up to chair with 3 person assist including PT/OT. Pt. With shuffling gait. Difficulty moving feet. Able to stand well. Wife at bedside. 11:00  Dr. Estephania Croft and PA at bedside. 12:45  Pt. With very little po intake. Refused breakfast and lunch. 14:00  Dr. Armando Chamorro at bedside. 14:10  Georges removed. 16:00  Pt. With 50 ml urine plus incontinence. 17:10  Pt. CBG 64 - 12.5 grams D-5- administered iv. 
17:25  Pt. With 2 episodes of incontinence with small amount of captured urine via urinal.  Bladder scanned pt for 336 ml post void. Notified [place], [place]. Will reinsert georges. 18:00  Repeat CBG 82  Georges output 315 ml clear, light yellow urine. - Sample sent to lab. 
19:00  Chest tubes to [facility]. 
 ,Bedside and Verbal shift change report given to Angie Dalton RN (oncoming nurse) by Angie Dalton RN (offgoing nurse). Report included the following information SBAR, Kardex, Procedure Summary, Intake/Output, MAR, Recent Results and Cardiac Rhythm NSR.

## 2020-03-07 NOTE — PROGRESS NOTES
[facility]  -  Progress Note Patient: Sintia William MRN: [id number]  SSN: [id number] YOB: 1950  Age: 68 y.o. Sex: male Admit Date: 3/2/2020 Assessment:  
 
Hospital Problems  Date Reviewed: 12/2/2019 Codes Class Noted POA  
 S/P cardiac catheterization ICD-10-CM: Z98.890 ICD-9-CM: V45.89  3/3/2020 Unknown S/P CABG x 3 (Chronic) ICD-10-CM: Z95.1 ICD-9-CM: V45.81  3/5/2020 Unknown Overview Signed 3/5/2020  1:20 PM by Martha Reilly PA-C  
  CABG x 3, Dr. Adriana Casillas, 3/4/2020, 1316 Connie Alarcon 
  
  
   
 NSTEMI (non-ST elevated myocardial infarction) [facility]) ICD-10-CM: I21.4 ICD-9-CM: 410.70  3/3/2020 Unknown * (Principal) Elevated troponin ICD-10-CM: R79.89 ICD-9-CM: 790.6  3/2/2020 Unknown Back pain ICD-10-CM: M54.9 ICD-9-CM: 724.5  3/2/2020 Unknown Acute coronary syndrome (HCC) ICD-10-CM: I24.9 ICD-9-CM: 411.1  3/2/2020 Unknown Numbness and tingling in left arm ICD-10-CM: R20.0, R20.2 ICD-9-CM: 782.0  3/2/2020 Unknown  
   
  
  
-CAD s/p CABG X3 (LIMA to mid LAD, SVG to OM1, SVG to Diag1/ramus). -NSTEMI. Troponin peak at [facility] with possible septal infarct without ischemic changes, presented with back pain with nausea and vomiting. 
-Hypertension. Stable. -Diabetes mellitus. HgbA1c 7.1 
-Chronic kidney disease. Stable. -H/o kidney stones, last once 3 weeks ago. -H/o vertigo. 
-Anemia/bleeding post op requiring transfusion. 
  
Remotely evaluated by Dr. Oskar Randall. Plan: 1. Continue IV fluids for now. Appreciated ongoing Nephrology involvement. 2. Continue on amiodarone. 3. Continue low dose Lopressor, aspirin, and statin 4. Follow H&H which is somewhat lower today. 5. Continue routine post op care. 6. Slow, but steady improvement post op. Subjective:  
 
Still with some confusion and agitation. Slow course. Still shortness of breath with any significant activity.  Currently getting 75 cc of saline to help with urinary output and some increasing BUN and Creatinine, but did get some Lasix for congestion yesterday. Objective:  
  
Patient Vitals for the past 8 hrs: 
 Temp Pulse Resp BP SpO2  
03/07/20 0900  90 13 136/74 97 % 03/07/20 0802     97 % 03/07/20 0800 96.8 °F (36 °C) 94 13 133/78 97 % 03/07/20 0700  97 20 (!) 145/92 94 % 03/07/20 0600  86 16 117/75 96 % 03/07/20 0500  85 15 114/73 95 % 03/07/20 0400  93 15 106/66 95 % 03/07/20 0300  (!) 102 17 155/84 92 % Patient Vitals for the past 96 hrs: 
 Weight 03/06/20 0603 90.3 kg (199 lb 1.2 oz) 03/03/20 1534 90.7 kg (200 lb) Intake/Output Summary (Last 24 hours) at 3/7/2020 1012 Last data filed at 3/7/2020 0900 Gross per 24 hour Intake 243.75 ml Output 1155 ml Net -911.25 ml Physical Exam: 
General:  alert, cooperative, no distress, appears stated age Neck:  no JVD Lungs:  clear to auscultation bilaterally Heart:  regular rate and rhythm, S1, S2 normal, no murmur, click, rub or gallop Abdomen:  abdomen is soft without significant tenderness, masses, organomegaly or guarding Extremities:  extremities normal, atraumatic, no cyanosis with 1 + edema Data Review:  
 
Labs: Results:  
   
Chemistry Recent Labs 03/07/20 
9835 03/06/20 
0645 03/05/20 
1155 03/05/20 
0406 03/04/20 
1445 GLU 69* 156* 80 98 191*  145 148* 145 140  
K 4.1 4.5 4.4 4.6 4.5  
* 114* 116* 116* 113* CO2 26 24 25 23 22 BUN 54* 41* 32* 29* 27* CREA 2.02* 2.03* 1.85* 1.75* 1.55* CA 7.7*  7.5* 7.8* 7.9* 7.6* 7.9*  
MG 2.4  --   --  2.1 2.4 AGAP 5 7 7 6 5 BUCR 27* 20 17 17 17 AP 50  --   --   --   --   
TP 4.9*  --   --   --   --   
ALB 2.5*  --   --   --   --   
GLOB 2.4  --   --   --   --   
AGRAT 1.0  --   --   --   --   
  
CBC w/Diff Recent Labs 03/07/20 
0000 03/05/20 
2110 03/05/20 
1155 03/05/20 
0406  03/04/20 
1445 WBC 14.0*  --  15.0* 14.2*  --  26.2*  
 RBC 2.46*  --  2.89* 2.66*  --  3.28* HGB 7.3* 8.1* 8.6*  8.6* 8.0*   < > 10.2* HCT 22.6* 24.5* 25.9*  25.7* 23.7*   < > 30.7* *  --  151 172  --  203 GRANS 80*  --   --   --   --  76* LYMPH 10*  --   --   --   --  8*  
EOS 0  --   --   --   --  0  
 < > = values in this interval not displayed. Cardiac Enzymes No results found for: CPK, CK, CKMMB, CKMB, RCK3, CKMBT, CKNDX, CKND1, DION, TROPT, TROIQ, AFSHIN, TROPT, TNIPOC, BNP, BNPP Coagulation Recent Labs 03/04/20 
1445 PTP 17.0* INR 1.4* APTT 43.4* Lipid Panel Lab Results Component Value Date/Time Cholesterol, total 180 03/04/2020 05:37 AM  
 HDL Cholesterol 33 (L) 03/04/2020 05:37 AM  
 LDL, calculated 73.4 03/04/2020 05:37 AM  
 VLDL, calculated 73.6 03/04/2020 05:37 AM  
 Triglyceride 368 (H) 03/04/2020 05:37 AM  
 CHOL/HDL Ratio 5.5 (H) 03/04/2020 05:37 AM  
  
BNP No results found for: BNP, BNPP, XBNPT Liver Enzymes Recent Labs 03/07/20 
0545  
TP 4.9* ALB 2.5* AP 50 SGOT 29  
  
Digoxin Thyroid Studies Lab Results Component Value Date/Time TSH 0.83 03/03/2020 02:59 AM  
    
 
Nasir Aly,  March 7, 2020

## 2020-03-07 NOTE — PROGRESS NOTES
1915: Received report from [facility] Osborne [facility]. Assisted patient back to bed and patient tolerated well. No c/o pain. Patient mildly confused, but reorientation provided. Patient seems much more calm and cooperative now than he did last night. Mental status seems to have improved. 1930: Nursing assessment performed. See flowsheet for details. 1945: Patient placed on BiPap mask per orders to wear for one hour every 4 hours. Patient cooperative and tolerating mask well at this time. Wife at bedside and supportive of patient and participating care. 2030: Patient tolerated BiPap for about 45 mins. Patient is agitated at this time and trying to pull off mask. Bipap mask removed and patient placed back on 6LNC. Wife remains at bedside. 2100: Patient sleeping. VSS. Wife wishes to not wake patient at this time for medication administration. 2315: Patient awake at this time and medications administered by crushing in applesauce. VSS. 
2340: Patient c/o nausea. PRN zofran given. 0000: Patient resting quietly. VSS. Wife remains at bedside. 0215: Patient restless and pulling at lines and gown and yelling to take gown off. 2mg Haldol given. 0300: Haldol effective for approximately 30 mins and then patient resumed previous behaviors. Additional 3 mg of Haldol given. 0330: Patient was able to rest quietly for about 30 mins before becoming agitated again. RHONDA Fair notified of patient behavior. Order received for 2mg Ativan and 12.5 mcg Fentanyl for 2 doses. Orders implemented. 0345: 2mg Ativan given. Will continue to monitor. 0400: VSS, patient resting quietly. 0600: Patient resting quietly. Spoke with wife about patient needing morning bath and up to chair. Wife requested to let patient rest at this time since he has been up all night. Will pass along to AM shift. 0679: Respiratory therapist at bedside drawing ABG.

## 2020-03-07 NOTE — PROGRESS NOTES
Problem: Self Care Deficits Care Plan (Adult) Goal: *Acute Goals and Plan of Care (Insert Text) Description Occupational Therapy Goals Initiated 3/6/2020 within 7 day(s). 1.  Patient will perform self-feeding with modified independence. 2.  Patient will perform grooming with supervision/set-up following sternal precautions. 3.  Patient will perform upper body dressing with minimal assistance/contact guard assist. 
4.  Patient will perform lower body dressing with moderate assistance. 5.  Patient will perform toilet transfers with contact guard assist. 
6.  Patient will perform all aspects of toileting with contact guard assist. 
7.  Patient will utilize energy conservation techniques during functional activities with verbal cues. Prior Level of Function: Pt only oriented to self. Pt reports he was able to dress himself but required assistance for bathing PTA. Pt unable to state other PLOF information during OT evaluation. Outcome: Progressing Towards Goal 
OCCUPATIONAL THERAPY TREATMENT Patient: Marine Dubose (31 y.o. male) Date: 3/7/2020 Diagnosis: Acute coronary syndrome ([facility] [address] [I24.9] Elevated troponin [R79.89] Back pain [M54.9] Numbness and tingling in left arm [R20.0, R20.2] S/P cardiac catheterization [Z98.890] NSTEMI (non-ST elevated myocardial infarction) ([facility] [address] [I21.4] Elevated troponin Procedure(s) (LRB): 
CORONARY ARTERY BYPASS GRAFT (CABG) TIMES THREE (N/A) 3 Days Post-Op Precautions: Sternal, Skin, Fall Chart, occupational therapy assessment, plan of care, and goals were reviewed. ASSESSMENT: 
Pt is drowsy this session, presents supine with pt's nurse preparing pt for txfr to the chair. Pt required max A to don heart hugger, and total A for LB dressing.  Pt maneuvered to sit EOB in preparation for functional txfr training with Max Ax2, following which required initially Max A to maintain sitting balance, which improved with time to Min A to remain sitting at EOB. Pt educated on use of heart hugger and his sternal precautions, pt verbalized understanding. Pt was not able to achieve functional grasp on heart hugger due to BUE weakness and Mod edema and required Max A for use during txfr for protection of surgical site, requiring max Ax2 for txfr with HHA on both sides. Pt educated on edema management techniques and on importance of performing BUE AROM within sternal precautions to decrease swelling in bilateral hands and improve functional use. Pt verbalized understanding. Pt required CGA for support of the cup during self-hydration using RUE. Progression toward goals: 
[]          Improving appropriately and progressing toward goals [x]          Improving slowly and progressing toward goals 
[]          Not making progress toward goals and plan of care will be adjusted PLAN: 
Patient continues to benefit from skilled intervention to address the above impairments. Continue treatment per established plan of care. Discharge Recommendations:  [place] Further Equipment Recommendations for Discharge:  bedside commode and rolling walker SUBJECTIVE:  
Patient stated I am ok.  OBJECTIVE DATA SUMMARY:  
Cognitive/Behavioral Status: 
Neurologic State: Confused, Drowsy Orientation Level: Oriented to person Cognition: Follows commands, Decreased attention/concentration Safety/Judgement: Fall prevention Functional Mobility and Transfers for ADLs: 
 Transfers: 
Sit to Stand: Minimum assistance;Assist x2(max A x2 for trial from lower surface) Stand to Sit: Minimum assistance;Assist x2 Toilet Transfer : Minimum assistance;Assist x2(simulated) Balance: 
Sitting: Impaired; With support Sitting - Static: Fair (occasional)(+) Sitting - Dynamic: Fair (occasional) Standing: Impaired; With support Standing - Static: Fair Standing - Dynamic : Fair(-) ADL Intervention: 
 Self-feeding CGA Upper Body Dressing Assistance Orthotics(Brace): Maximum assistance Lower Body Dressing Assistance Socks: Total assistance (dependent) Antiembolitic Stockings: Total assistance (dependent) UE Therapeutic Exercises:  
Pt educated on BUE fist pumps and wrist/elbow flexion/extension to perform for edema management and to improve activity tolerance for ADls carryover. Pain: 
Pain level pre-treatment: not rated Pain level post-treatment: not rated Activity Tolerance:   
Fair Please refer to the flowsheet for vital signs taken during this treatment. After treatment:  
[x]  Patient left in no apparent distress sitting up in chair 
[]  Patient left in no apparent distress in bed 
[x]  Call bell left within reach [x]  Nursing notified 
[x]  Caregiver present 
[]  Bed alarm activated COMMUNICATION/EDUCATION:  
[x] Role of Occupational Therapy in the acute care setting 
[x] Home safety education was provided and the patient/caregiver indicated understanding. [x] Patient/family have participated as able in working towards goals and plan of care. [] Patient/family agree to work toward stated goals and plan of care. [] Patient understands intent and goals of therapy, but is neutral about his/her participation. [] Patient is unable to participate in goal setting and plan of care. Thank you for this referral. 
ANGELIC Romero Time Calculation: 29 mins

## 2020-03-07 NOTE — PROGRESS NOTES
RENAL DAILY PROGRESS NOTE Subjective:  
Admitted for chest pains post 3V CABG seen for LOREN/CKD 3 Complaint: sitting up in chair, son at bedside, reports poor appetite, some peripheral edema but denies any SOB Current Facility-Administered Medications Medication Dose Route Frequency  LORazepam (ATIVAN) injection 2 mg  2 mg IntraVENous Q4H PRN  
 fentaNYL citrate (PF) injection 12.5 mcg  12.5 mcg IntraVENous Q4H PRN  thiamine (B-1) 100 mg in 0.9% sodium chloride 50 mL IVPB  100 mg IntraVENous DAILY  0.9% sodium chloride infusion  75 mL/hr IntraVENous CONTINUOUS  
 heparin (porcine) injection 5,000 Units  5,000 Units IntraVENous Q8H  
 ferrous sulfate tablet 325 mg  1 Tab Oral BID WITH MEALS  
 ascorbic acid (vitamin C) (VITAMIN C) tablet 250 mg  250 mg Oral BID WITH MEALS  folic acid (FOLVITE) tablet 1 mg  1 mg Oral DAILY  cyanocobalamin tablet 1,000 mcg  1,000 mcg Oral DAILY  [START ON 3/8/2020] furosemide (LASIX) injection 20 mg  20 mg IntraVENous DAILY  [START ON 3/8/2020] potassium chloride (K-DUR, KLOR-CON) SR tablet 20 mEq  20 mEq Oral DAILY  QUEtiapine SR (SEROquel XR) tablet 50 mg  50 mg Oral QHS  metoprolol tartrate (LOPRESSOR) tablet 25 mg  25 mg Oral Q12H  
 epoetin marbin-epbx (RETACRIT) injection 4,000 Units  4,000 Units SubCUTAneous Q MON, WED & FRI  acetaminophen (TYLENOL) tablet 1,000 mg  1,000 mg Oral Q6H  
 insulin glargine (LANTUS) injection 50 Units  50 Units SubCUTAneous DAILY  famotidine (PEPCID) tablet 20 mg  20 mg Oral DAILY  insulin lispro (HUMALOG) injection   SubCUTAneous AC&HS  
 glucose chewable tablet 16 g  4 Tab Oral PRN  
 glucagon (GLUCAGEN) injection 1 mg  1 mg IntraMUSCular PRN  
 dextrose (D50W) injection syrg 12.5-25 g  25-50 mL IntraVENous PRN  
 sodium chloride (NS) flush 5-40 mL  5-40 mL IntraVENous Q8H  
 acetaminophen (TYLENOL) tablet 650 mg  650 mg Oral Q4H PRN  
  naloxone (NARCAN) injection 0.4 mg  0.4 mg IntraVENous PRN  
 amiodarone (CORDARONE) tablet 200 mg  200 mg Oral TID  albuterol (PROVENTIL VENTOLIN) nebulizer solution 2.5 mg  2.5 mg Nebulization Q4H PRN  
 ondansetron (ZOFRAN) injection 4 mg  4 mg IntraVENous Q4H PRN  
 aspirin delayed-release tablet 81 mg  81 mg Oral DAILY  chlorhexidine (PERIDEX) 0.12 % mouthwash 10 mL  10 mL Oral BID  docusate sodium (COLACE) capsule 100 mg  100 mg Oral BID  ELECTROLYTE REPLACEMENT PROTOCOL - Potassium Standard Dosing  1 Each Other PRN  
 ELECTROLYTE REPLACEMENT PROTOCOL - Magnesium  1 Each Other PRN  
 bisacodyL (DULCOLAX) tablet 10 mg  10 mg Oral DAILY  polyethylene glycol (MIRALAX) packet 17 g  17 g Oral DAILY  glucose chewable tablet 16 g  4 Tab Oral PRN  
 glucagon (GLUCAGEN) injection 1 mg  1 mg IntraMUSCular PRN  
 dextrose (D50W) injection syrg 12.5-25 g  25-50 mL IntraVENous PRN  
 sodium chloride (NS) flush 5-40 mL  5-40 mL IntraVENous PRN  
 atorvastatin (LIPITOR) tablet 20 mg  20 mg Oral QHS Objective:  
 
Patient Vitals for the past 24 hrs: 
 Temp Pulse Resp BP SpO2  
03/07/20 1300  90 16 91/57 99 % 03/07/20 1210 98.8 °F (37.1 °C) 96 17 114/78 96 % 03/07/20 1100  98 22 135/75 95 % 03/07/20 1022   17  95 % 03/07/20 1000  90 15 132/74 99 % 03/07/20 0900  90 13 136/74 97 % 03/07/20 0802     97 % 03/07/20 0800 96.8 °F (36 °C) 94 13 133/78 97 % 03/07/20 0700  97 20 (!) 145/92 94 % 03/07/20 0600  86 16 117/75 96 % 03/07/20 0500  85 15 114/73 95 % 03/07/20 0400  93 15 106/66 95 % 03/07/20 0300  (!) 102 17 155/84 92 % 03/07/20 0200  99 17 155/77 94 % 03/07/20 0100  90 14 103/57 97 % 03/07/20 0000 98.6 °F (37 °C) 85 20 98/59 96 % 03/06/20 2300  99 21 144/80 94 % 03/06/20 2200  92 15 98/55 98 % 03/06/20 2100  (!) 101 17 114/57 97 % 03/06/20 2000 98.8 °F (37.1 °C) (!) 108 16 131/79 95 % 03/06/20 1900  (!) 104 17 124/84 96 % 20 1800  99 15 108/62 95 % 20 1700  (!) 107  (!) 139/95 95 % 20 1600 99 °F (37.2 °C) 97 18 96/69 95 % 20 1535  (!) 105  122/77 95 % 20 1503     96 % 20 1500  (!) 106  142/87 95 % Weight change:  
 
 1901 -  0700 In: 477.6 [P.O.:175; I.V.:302.6] Out:  [[facility]] Intake/Output Summary (Last 24 hours) at 3/7/2020 1431 Last data filed at 3/7/2020 1400 Gross per 24 hour Intake 543.75 ml Output 1425 ml Net -881.25 ml Physical Exam: weak, awake, not in any resp distress HEENT: non icteric Cardiovascular: regular, sternal dressing, CT in place C/L: dec BS both bases no wheezing/rales Abdomen: obese, hypoactive BS Ext: + peripheral edema Data Review:  
 
LABS:  
Hematology:  
Recent Labs 20 
7177 20 
2110 20 
1155 20 
0406 20 
1721 20 
1445 WBC 14.0*  --  15.0* 14.2*  --   --  26.2* HGB 7.3* 8.1* 8.6*  8.6* 8.0* 7.3* 7.7* 10.2* HCT 22.6* 24.5* 25.9*  25.7* 23.7* 21.6* 22.9* 30.7* Pl 125K Chemistry:  
Recent Labs 20 
0234 20 
0645 20 
1155 20 
0406 20 
1445 BUN 54* 41* 32* 29* 27* CREA 2.02* 2.03* 1.85* 1.75* 1.55* CA 7.5*  7.7* 7.8* 7.9* 7.6* 7.9* ALB 2.5*  --   --   --   --   
K 4.1 4.5 4.4 4.6 4.5  145 148* 145 140 * 114* 116* 116* 113* CO2 26 24 25 23 22 GLU 69* 156* 80 98 191* IPTH 95.6 UMACR 288 IMAGES:  
CXR 
1. Bilateral pleural tubes with no evidence of pneumothorax. 2.  Hypoinflation with bibasilar streaky opacities suggestive of atelectasis 
with slightly improved aeration at the left lung base 
  
 
IMPRESSION AND PLAN:  
LOREN/CKD 3 slow increase in BUN/Crea noted, prerenal from poor po intake and diuretics. Cont to trend I/O, cont  IVF ( switch to 1/2 NS) encourage po intake/protein supplements. Avoid overdiuresis. Trend for urinary retention once georges is out. Anemia Hgb stable, cont to trend, check Fe stores, cont PARTH/po fe Hypocalcemia with sl elevated IPTH. check ion cristiane, vit D and Phos CAD post CABG defer to cardiac team 
HTN well controlled DM2 well controlled Hypoalbuminemia push protein supp Maria R Myrick MD 
3/7/2020

## 2020-03-07 NOTE — PROGRESS NOTES
Problem: Mobility Impaired (Adult and Pediatric) Goal: *Acute Goals and Plan of Care (Insert Text) Description Physical Therapy Goals Initiated 3/6/2020 and to be accomplished within 4 day(s) 1. Patient will move from supine to sit and sit to supine  and scoot up and down in bed with minimal assistance/contact guard assist.    
2.  Patient will transfer from bed to chair and chair to bed with supervision/set-up using the least restrictive device. 3.  Patient will perform sit to stand with supervision/set-up. 4.  Patient will ambulate with supervision/set-up for 100 feet with the least restrictive device. 5.  Assess as appropriate PLOF: Pt poor historian, states he lives with his spouse in 2 story home. He was independent with self-care and mobility. Outcome: Progressing Towards Goal 
 PHYSICAL THERAPY TREATMENT Patient: Babs Domingo (69 y.o. male) Date: 3/7/2020 Diagnosis: Acute coronary syndrome ([facility] [address] [I24.9] Elevated troponin [R79.89] Back pain [M54.9] Numbness and tingling in left arm [R20.0, R20.2] S/P cardiac catheterization [Z98.890] NSTEMI (non-ST elevated myocardial infarction) ([facility] [address] [I21.4] Elevated troponin Procedure(s) (LRB): 
CORONARY ARTERY BYPASS GRAFT (CABG) TIMES THREE (N/A) 3 Days Post-Op Precautions: Sternal, Skin, Fall Chart, physical therapy assessment, plan of care and goals were reviewed. OBJECTIVE/ ASSESSMENT: 
Patient found supine in bed willing to work with PT. Pt does not have his heart hugger in place and was assisted to don it before sitting EOB. Pt is unable to  heart hugger at this time due to lethargy and was given manual assistance from rehab staff. Pt sat at EOB sand required assistance to stay seated upright. Attempted to provide pt with Rollator but it became apparent that we would not be able to manage it. Pt stood and took steps with 2 person assist and sat in b/s chair.  Pt briefly became more alert after ambulation, but then became lethargic again. Pt was positioned comfortably and left with needs in reach. Progression toward goals: 
[]      Improving appropriately and progressing toward goals [x]      Improving slowly and progressing toward goals 
[]      Not making progress toward goals and plan of care will be adjusted PLAN: 
Patient continues to benefit from skilled intervention to address the above impairments. Continue treatment per established plan of care. Discharge Recommendations:  [place] / TBD Further Equipment Recommendations for Discharge:  rollator / TBD SUBJECTIVE:  
Patient stated Yeah. OBJECTIVE DATA SUMMARY:  
Critical Behavior: 
Neurologic State: Rick Milder Orientation Level: Oriented to person, Disoriented to place, Disoriented to situation, Disoriented to time Cognition: Decreased attention/concentration, Decreased command following, Impaired decision making, Poor safety awareness Safety/Judgement: Fall prevention Functional Mobility Training: 
 
 Bed mobility: Max A x2 Transfers: 
Sit to Stand: Minimum assistance;Assist x2(max A x2 for trial from lower surface) Stand to Sit: Minimum assistance;Assist x2 Balance: 
Sitting: Impaired; With support Sitting - Static: Fair (occasional)(+) Sitting - Dynamic: Fair (occasional) Standing: Impaired; With support Standing - Static: Fair Standing - Dynamic : Fair(-) Ambulation/Gait Training: 
Distance (ft): 5 Feet (ft) Assistive Device: (None) Ambulation - Level of Assistance: Minimal assistance;Assist x2 Gait Abnormalities: Decreased step clearance;Trunk sway increased Base of Support: Narrowed Stance: Weight shift Speed/Alba: Slow Step Length: Left shortened;Right shortened Pain: 
Pain level pre-treatment: Not rated Pain level post-treatment: Not rated Activity Tolerance:  
Fair Please refer to the flowsheet for vital signs taken during this treatment. After treatment:  
[x] Patient left in no apparent distress sitting up in chair 
[] Patient left in no apparent distress in bed 
[x] Call bell left within reach 
[] Nursing notified 
[] Caregiver present 
[] Bed alarm activated 
[] SCDs applied COMMUNICATION/EDUCATION:  
[x]         Role of Physical Therapy 
[]         Fall prevention 
[x]         Bed mobility [x]         Transfers 
[x]         Ambulation / gait []         Assistive device management 
[]         Stairs [x]         Body mechanics [x]         Position change  
[]         Therapeutic exercise [x]         Activity pacing / energy conservation 
[]         Other: 
   
Arthurine Ofelia, PTA Time Calculation: 29 mins

## 2020-03-08 NOTE — PROGRESS NOTES
5631 notified Eusebio Mejia about critical glucose and hgb of 6.9 and hct of 21.2. Changed lantus order to 40 units per order. Started electrolyte protocol for potassium of 3.3

## 2020-03-08 NOTE — PROGRESS NOTES
visited with the family of Donna Foley, who is a 68 y.o.,male. The  provided the following Interventions: 
Initiated a relationship of care and support. Plan: 
Chaplains will continue to follow and will provide pastoral care on an as needed/requested basis. [address] [facility]  
[phone]

## 2020-03-08 NOTE — PROGRESS NOTES
CARDIAC SURGERY PROGRESS NOTE 
 
3/8/2020 Post Operative Day # 5 Chart reviewed. D/w Dr. Juliocesar Chakraborty Interval History/Events of Past 24 hours: Lin reinserted for retention. Flomax started. Mentation improving. H/h 6.9/21. 3. Renal function improving. Low blood sugar this morning improved to 82. Chest tube output tapering Assessment: 
CAD S/P  CABG x 3 - on  BB, ASA, statin Respiratory parameters stable on 6LNC, intermittent BIPAP Cardiac status stable Delirium LOREN on CKD stable - nephrology following Acute blood loss anemia s/p 5 PRBC, 2 FFP, 1 PLT Postoperative pain Plans: 
1. Encourage p.o., continue IVF for now. 2. Add Ultram as needed 3. Continue cathartics 4. Transfuse PRBC, additional Lasix 5. Decrease Lantus 6. D/c  chest tubes->done 7. Voiding trial in a.m. 
8. OOB, ambulate, PT/OT Heart Hugger use encouraged to mitigate pain and will also assist with deep breathing and incentive spirometry goals. Possible discharge 3 days. Opal Parks PA-C [facility] [phone] 
_____________________________________________________________________________________________________________________________________________ Subjective: 
Patient seen and examined on rounds today. - c/o back pain, in bed Objective: 
Vital signs:  
Visit Vitals /73 Pulse (!) 104 Temp 97.6 °F (36.4 °C) Resp 26 Ht 5' 7\" (1.702 m) Wt 90.6 kg (199 lb 11.8 oz) SpO2 97% BMI 31.28 kg/m² Temp (24hrs), Av.1 °F (36.7 °C), Min:97.6 °F (36.4 °C), Max:98.6 °F (37 °C) Admission Weight: Last Weight Weight: 90.7 kg (200 lb) Weight: 90.6 kg (199 lb 11.8 oz) Last 3 Recorded Weights in this Encounter 20 1534 20 0603 20 0600 Weight: 90.7 kg (200 lb) 90.3 kg (199 lb 1.2 oz) 90.6 kg (199 lb 11.8 oz) Telemetry: ST 
 
Physical Examination:    
General:  Oriented to person, place Lungs: rales at bases bilaterally, , wheezes or rhonchi. Chest: Dressings clean and dry. Sternum stable. Heart: regular rate and rhythm, No murmur. Abdomen: Soft and non-tender without masses. Bowel sounds present. Extremities: Warm and well perfused. Edema moderate. Incisions: clean and dry Neuro: No deficit. SUP Prophylaxis: Pepcid DVT Prophylaxis:  
pneumatic compression boots: Yes Compression stockings:  Yes 
Heparin:  Yes Chest tubes:  present Pacing wires:  not present DME needs:  tbd Labs: 
Lab Results Component Value Date/Time WBC 12.8 03/08/2020 05:25 AM  
 HCT 21.2 (L) 03/08/2020 05:25 AM  
  03/08/2020 05:25 AM  
  
Lab Results Component Value Date/Time  03/08/2020 05:25 AM  
 K 3.3 (L) 03/08/2020 05:25 AM  
  (H) 03/08/2020 05:25 AM  
 CO2 27 03/08/2020 05:25 AM  
 GLU 31 (LL) 03/08/2020 05:25 AM  
 BUN 46 (H) 03/08/2020 05:25 AM  
 CREA 1.55 (H) 03/08/2020 05:25 AM  
 CREA 2.02 (H) 03/07/2020 05:45 AM  
 CREA 2.03 (H) 03/06/2020 06:45 AM  
 
Lab Results Component Value Date/Time HBA1C 7.1 (H) 03/03/2020 02:59 AM  
 
pH (POC) Date Value Ref Range Status 03/07/2020 7.375 7.35 - 7.45   Final  
 
pCO2 (POC) Date Value Ref Range Status 03/07/2020 42.0 35.0 - 45.0 MMHG Final  
 
pO2 (POC) Date Value Ref Range Status 03/07/2020 77 (L) 80 - 100 MMHG Final  
 
HCO3 (POC) Date Value Ref Range Status 03/07/2020 24.5 22 - 26 MMOL/L Final  
 
sO2 (POC) Date Value Ref Range Status 03/07/2020 95 92 - 97 % Final  
 
Base excess (POC) Date Value Ref Range Status 03/05/2020 0 mmol/L Final  
 
    
 
CXR: Improved aeration left base. PLEASE NOTE: 
This document may have been produced using voice recognition software. Unrecognized errors in transcription may be present. Please call with any questions.

## 2020-03-08 NOTE — PROGRESS NOTES
07:00  [facility] Pt. In bed with HOB at 45 degrees. Pt. With eyes closed and moaning. Chest tubes x 3 to 2 atriums to water seal - no air leak detected. 07:30  Received call from lab with critical glucose of 31 - Re-checked with glucometer at 42. D-50 25 grams glucose administered iv. Will re-check in 15 minutes. 07:45   
07:53  RHONDA Bautista notified of critical glucose, administration of D-50 and recheck. New orders to reduce lantus to 40 units sc. Alerted PA of H/H 6.9 / 21.2 
11:15  Chest tubes removed by PA 
12:15  CBG 69 - OJ given along with pills crushed and in apple sauce 12:30  CBG 59 - 1 amp D50 (25 grams) administered. PA notified - IVF changed to D5 1/2 NS at 75 ml/hr. Pt. Refusing food. 13:16  1 unit PRBC infusing 15:30  Blood transfusion completed 16:15  CBG 39, rechecked at 43 - 1 amp D50 (25 grams) administered 16:35   
17:20  [place], [place], alerted to 3rd episode of hypogycemic event with administration of D50. Pt's PIVs non-functional and leaking. PA alerted - will address possible PIC in am.   
18:30  Pt. 2 person assist OOB. Pt. Ambulated to door of room with rolator and 2 person assist with maximum cueing. Pt tolerated ambulation with no dyspnea. 19:00  Bedside and Verbal shift change report given to Atilio Sykes (oncoming nurse) by Luly Webb RN (offgoing nurse). Report included the following information SBAR, Kardex, Procedure Summary, Intake/Output, MAR, Recent Results and Cardiac Rhythm NSR. English

## 2020-03-08 NOTE — PROGRESS NOTES
[facility]  -  Progress Note Patient: Kayla Kohler MRN: [id number]  SSN: [id number] YOB: 1950  Age: 68 y.o. Sex: male Admit Date: 3/2/2020 Assessment:  
 
Hospital Problems  Date Reviewed: 12/2/2019 Codes Class Noted POA  
 S/P cardiac catheterization ICD-10-CM: Z98.890 ICD-9-CM: V45.89  3/3/2020 No  
   
 Stage 3 chronic kidney disease (HCC) (Chronic) ICD-10-CM: N18.3 ICD-9-CM: 364. 3  Unknown Yes S/P CABG x 3 (Chronic) ICD-10-CM: Z95.1 ICD-9-CM: V45.81  3/5/2020 No  
 Overview Signed 3/5/2020  1:20 PM by Caleb Chanel PA-C  
  CABG x 3, Dr. Conor Hernandez, 3/4/2020, [address] LOREN (acute kidney injury) [facility] ICD-10-CM: N17.9 ICD-9-CM: 584.9  3/5/2020 No  
   
 NSTEMI (non-ST elevated myocardial infarction) ([facility] ICD-10-CM: I21.4 ICD-9-CM: 410.70  3/3/2020 Yes Coronary artery disease of native artery with stable angina pectoris [facility]) ICD-10-CM: I25.118 
ICD-9-CM: 414.01, 413.9  3/3/2020 Yes * (Principal) Elevated troponin ICD-10-CM: R79.89 ICD-9-CM: 790.6  3/2/2020 Yes Back pain (Chronic) ICD-10-CM: M54.9 ICD-9-CM: 724.5  3/2/2020 Yes Acute coronary syndrome (HCC) ICD-10-CM: I24.9 ICD-9-CM: 411.1  3/2/2020 Yes Type 2 diabetes with nephropathy (HCC) (Chronic) ICD-10-CM: E11.21 
ICD-9-CM: 250.40, 583.81  3/23/2018 Yes Nocturia ICD-10-CM: R35.1 ICD-9-CM: 788.43  10/10/2014 Yes HTN (hypertension) (Chronic) ICD-10-CM: I10 
ICD-9-CM: 401.9  8/19/2010 Yes Hyperlipemia (Chronic) ICD-10-CM: L76.4 ICD-9-CM: 272.4  8/19/2010 Yes  
   
  
-CAD s/p CABG X3 (LIMA to mid LAD, SVG to OM1, SVG to Diag1/ramus). -NSTEMI. Troponin peak at [facility] with possible septal infarct without ischemic changes, presented with back pain with nausea and vomiting. 
-Hypertension. Stable. -Diabetes mellitus. HgbA1c 7.1 
-Chronic kidney disease. Stable. -H/o kidney stones, last once 3 weeks ago. -H/o vertigo. 
-Anemia/bleeding post op requiring transfusion. 
  
Remotely evaluated by Dr. Celestine Cedeño. Plan: 1. Continue aspirin, BB, amiodarone, Lipitor. 2. Continue IV fluids per Nephrology. 3. Possible unit of blood per CVT surgery. 4. Continue routine post op care. Subjective: No new complaints. Appears less confused today. Renal function improving with BUN and creatinine 46/1.55 today down from 54/2.02 yesterday. Hct down to 21.2 today from 22.6 yesterday. Decision on any blood transfusion per CVT surgery. Objective:  
  
Patient Vitals for the past 8 hrs: 
 Temp Pulse Resp BP SpO2  
03/08/20 0815  (!) 104   97 % 03/08/20 0800 97.6 °F (36.4 °C) 98 26 133/73 97 % 03/08/20 0745  (!) 107 22  95 % 03/08/20 0730  (!) 104 16  93 % 03/08/20 0715  100 18  94 % 03/08/20 0700  (!) 105 18 (!) 124/91 94 % 03/08/20 0645  (!) 105 19  92 % 03/08/20 0630  91 15  94 % 03/08/20 0615  91 15  93 % 03/08/20 0600  90 13 90/58 93 % 03/08/20 0500  94 16 113/62 93 % 03/08/20 0400 98.2 °F (36.8 °C) 95 16 95/60 92 % 03/08/20 0300  97 18 94/54 94 % 03/08/20 0100  90 16 104/64 94 % Patient Vitals for the past 96 hrs: 
 Weight 03/08/20 0600 90.6 kg (199 lb 11.8 oz) 03/06/20 0603 90.3 kg (199 lb 1.2 oz) Intake/Output Summary (Last 24 hours) at 3/8/2020 9366 Last data filed at 3/8/2020 0600 Gross per 24 hour Intake 2157.5 ml Output 1620 ml Net 537.5 ml Physical Exam: 
General:  alert, cooperative, no distress, appears stated age Neck:  no JVD Lungs:  clear to auscultation bilaterally Heart:  regular rate and rhythm, S1, S2 normal, no murmur, click, rub or gallop Abdomen:  abdomen is soft without significant tenderness, masses, organomegaly or guarding Extremities:  extremities normal, atraumatic, no cyanosis or trace edema Data Review:  
 
Labs: Results:  
   
Chemistry Recent Labs 03/08/20 
3199 03/07/20 
7180 03/06/20 
6718 GLU 31* 69* 156*  145 145  
K 3.3* 4.1 4.5  
* 114* 114* CO2 27 26 24 BUN 46* 54* 41* CREA 1.55* 2.02* 2.03* CA 7.7* 7.5*  7.7* 7.8*  
MG  --  2.4  --   
PHOS  --  3.2  --   
AGAP 5 5 7 BUCR 30* 27* 20  
AP  --  50  --   
TP  --  4.9*  --   
ALB  --  2.5*  --   
GLOB  --  2.4  --   
AGRAT  --  1.0  --   
  
CBC w/Diff Recent Labs 03/08/20 
3215 03/07/20 
0545 03/05/20 
2110 03/05/20 
1155 WBC 12.8 14.0*  --  15.0*  
RBC 2.30* 2.46*  --  2.89* HGB 6.9* 7.3* 8.1* 8.6*  8.6* HCT 21.2* 22.6* 24.5* 25.9*  25.7*  
 125*  --  151 GRANS 68 80*  --   --   
LYMPH 19* 10*  --   --   
EOS 2 0  --   --   
  
Cardiac Enzymes No results found for: CPK, CK, CKMMB, CKMB, RCK3, CKMBT, CKNDX, CKND1, DION, TROPT, TROIQ, AFSHIN, TROPT, TNIPOC, BNP, BNPP Coagulation No results for input(s): PTP, INR, APTT, INREXT in the last 72 hours. Lipid Panel Lab Results Component Value Date/Time Cholesterol, total 180 03/04/2020 05:37 AM  
 HDL Cholesterol 33 (L) 03/04/2020 05:37 AM  
 LDL, calculated 73.4 03/04/2020 05:37 AM  
 VLDL, calculated 73.6 03/04/2020 05:37 AM  
 Triglyceride 368 (H) 03/04/2020 05:37 AM  
 CHOL/HDL Ratio 5.5 (H) 03/04/2020 05:37 AM  
  
BNP No results found for: BNP, BNPP, XBNPT Liver Enzymes Recent Labs 03/07/20 
0545  
TP 4.9* ALB 2.5* AP 50 SGOT 29  
  
Digoxin Thyroid Studies Lab Results Component Value Date/Time TSH 0.83 03/03/2020 02:59 AM  
    
 
Mary Irizarry DO March 8, 2020

## 2020-03-08 NOTE — PROGRESS NOTES
RENAL DAILY PROGRESS NOTE Subjective:  
Admitted for chest pains post 3V CABG seen for LOREN/CKD 3 Complaint: moaning c/o back pains, CT out, per nurse georges reinserted due to retention, appetite reported to be poor. No obvious blood loss Hgb down BT in progress Current Facility-Administered Medications Medication Dose Route Frequency  [START ON 3/9/2020] insulin glargine (LANTUS) injection 40 Units  40 Units SubCUTAneous DAILY  0.9% sodium chloride infusion 250 mL  250 mL IntraVENous PRN  
 traMADoL (ULTRAM) tablet 50 mg  50 mg Oral Q6H PRN  
 dextrose 5 % - 0.45% NaCl infusion  75 mL/hr IntraVENous CONTINUOUS  
 fentaNYL citrate (PF) injection 12.5 mcg  12.5 mcg IntraVENous Q4H PRN  thiamine (B-1) 100 mg in 0.9% sodium chloride 50 mL IVPB  100 mg IntraVENous DAILY  heparin (porcine) injection 5,000 Units  5,000 Units IntraVENous Q8H  
 ferrous sulfate tablet 325 mg  1 Tab Oral BID WITH MEALS  
 ascorbic acid (vitamin C) (VITAMIN C) tablet 250 mg  250 mg Oral BID WITH MEALS  folic acid (FOLVITE) tablet 1 mg  1 mg Oral DAILY  cyanocobalamin tablet 1,000 mcg  1,000 mcg Oral DAILY  furosemide (LASIX) injection 20 mg  20 mg IntraVENous DAILY  potassium chloride (K-DUR, KLOR-CON) SR tablet 20 mEq  20 mEq Oral DAILY  QUEtiapine SR (SEROquel XR) tablet 50 mg  50 mg Oral QHS  metoprolol tartrate (LOPRESSOR) tablet 25 mg  25 mg Oral Q12H  tamsulosin (FLOMAX) capsule 0.4 mg  0.4 mg Oral DAILY  LORazepam (ATIVAN) injection 0.5 mg  0.5 mg IntraVENous Q6H PRN  
 epoetin marbin-epbx (RETACRIT) injection 4,000 Units  4,000 Units SubCUTAneous Q MON, WED & FRI  acetaminophen (TYLENOL) tablet 1,000 mg  1,000 mg Oral Q6H  
 famotidine (PEPCID) tablet 20 mg  20 mg Oral DAILY  insulin lispro (HUMALOG) injection   SubCUTAneous AC&HS  
 glucose chewable tablet 16 g  4 Tab Oral PRN  
 glucagon (GLUCAGEN) injection 1 mg  1 mg IntraMUSCular PRN  
  dextrose (D50W) injection syrg 12.5-25 g  25-50 mL IntraVENous PRN  
 sodium chloride (NS) flush 5-40 mL  5-40 mL IntraVENous Q8H  
 acetaminophen (TYLENOL) tablet 650 mg  650 mg Oral Q4H PRN  
 naloxone (NARCAN) injection 0.4 mg  0.4 mg IntraVENous PRN  
 amiodarone (CORDARONE) tablet 200 mg  200 mg Oral TID  albuterol (PROVENTIL VENTOLIN) nebulizer solution 2.5 mg  2.5 mg Nebulization Q4H PRN  
 ondansetron (ZOFRAN) injection 4 mg  4 mg IntraVENous Q4H PRN  
 aspirin delayed-release tablet 81 mg  81 mg Oral DAILY  chlorhexidine (PERIDEX) 0.12 % mouthwash 10 mL  10 mL Oral BID  docusate sodium (COLACE) capsule 100 mg  100 mg Oral BID  ELECTROLYTE REPLACEMENT PROTOCOL - Potassium Standard Dosing  1 Each Other PRN  
 ELECTROLYTE REPLACEMENT PROTOCOL - Magnesium  1 Each Other PRN  
 bisacodyL (DULCOLAX) tablet 10 mg  10 mg Oral DAILY  polyethylene glycol (MIRALAX) packet 17 g  17 g Oral DAILY  glucose chewable tablet 16 g  4 Tab Oral PRN  
 glucagon (GLUCAGEN) injection 1 mg  1 mg IntraMUSCular PRN  
 dextrose (D50W) injection syrg 12.5-25 g  25-50 mL IntraVENous PRN  
 sodium chloride (NS) flush 5-40 mL  5-40 mL IntraVENous PRN  
 atorvastatin (LIPITOR) tablet 20 mg  20 mg Oral QHS Objective:  
 
Patient Vitals for the past 24 hrs: 
 Temp Pulse Resp BP SpO2  
03/08/20 1350  92 18  96 % 03/08/20 1345 98.3 °F (36.8 °C) 91 17  97 % 03/08/20 1340  93 18  97 % 03/08/20 1335  90 20  96 % 03/08/20 1331  91 23 120/68 96 % 03/08/20 1330  92 19  96 % 03/08/20 1325  89 18  95 % 03/08/20 1320  88 19  97 % 03/08/20 1315  87 20  97 % 03/08/20 1311 98.5 °F (36.9 °C) 88 15 97/60 97 % 03/08/20 1310  86 20  97 % 03/08/20 1305  86 18  97 % 03/08/20 1300  89 19 101/54 96 % 03/08/20 1255  87 19  96 % 03/08/20 1250  85 18  96 % 03/08/20 1245  86 19  96 % 03/08/20 1240  88 20  95 % 03/08/20 1235  91 22  97 % 03/08/20 1230  97 16 109/48 95 % 03/08/20 1225  94 20  95 % 03/08/20 1220  95 23  98 % 03/08/20 1215  91 18  97 % 03/08/20 1210  91 22  96 % 03/08/20 1205  90 (!) 33  95 % 03/08/20 1200 98.2 °F (36.8 °C) 85 17 98/62 96 % 03/08/20 1130  85 17 95/59 97 % 03/08/20 1100  85 16 103/62 98 % 03/08/20 1039  85 22 101/58 99 % 03/08/20 1000  92 22 123/73 99 % 03/08/20 0930  (!) 103   99 % 03/08/20 0925  (!) 103   99 % 03/08/20 0920  100   99 % 03/08/20 0915  (!) 105   97 % 03/08/20 0900  99  126/78 97 % 03/08/20 0815  (!) 104   97 % 03/08/20 0800 97.6 °F (36.4 °C) 98 26 133/73 97 % 03/08/20 0745  (!) 107 22  95 % 03/08/20 0730  (!) 104 16  93 % 03/08/20 0715  100 18  94 % 03/08/20 0700  (!) 105 18 (!) 124/91 94 % 03/08/20 0645  (!) 105 19  92 % 03/08/20 0630  91 15  94 % 03/08/20 0615  91 15  93 % 03/08/20 0600  90 13 90/58 93 % 03/08/20 0500  94 16 113/62 93 % 03/08/20 0400 98.2 °F (36.8 °C) 95 16 95/60 92 % 03/08/20 0300  97 18 94/54 94 % 03/08/20 0100  90 16 104/64 94 % 03/08/20 0000 98.2 °F (36.8 °C) 92 18 108/51 95 % 03/07/20 2300  92 20 104/61 95 % 03/07/20 2200  92 20 104/65 95 % 03/07/20 2100  (!) 105 21 152/85 95 % 03/07/20 2000 98 °F (36.7 °C) (!) 104 16 119/71 99 % 03/07/20 1900  99 16 128/67 98 % 03/07/20 1800  (!) 105 17 132/83 98 % 03/07/20 1700  98 19 149/86 97 % 03/07/20 1600 98.6 °F (37 °C) 100 16 139/89 97 % 03/07/20 1500  95 17 114/64 97 % Weight change:  
 
 03/06 1901 - 03/08 0700 In: 2326.3 [P.O.:535; I.V.:1791.3] Out: 2345 [VUBSX:1960] Intake/Output Summary (Last 24 hours) at 3/8/2020 1401 Last data filed at 3/8/2020 1300 Gross per 24 hour Intake 2232.5 ml Output 1555 ml Net 677.5 ml Physical Exam: weak, moaning, not in any resp distress HEENT: non icteric Cardiovascular: regular, sternal dressing, 
C/L: dec BS both bases no wheezing/rales Abdomen: obese, hypoactive BS Ext: + peripheral edema Data Review:  
 
LABS:  
Hematology:  
Recent Labs 03/08/20 
3961 03/07/20 
0852 03/05/20 
2110 WBC 12.8 14.0*  --   
HGB 6.9* 7.3* 8.1* HCT 21.2* 22.6* 24.5* Pl 166K Chemistry:  
Recent Labs 03/08/20 
5972 03/07/20 
0796 03/06/20 
0496 BUN 46* 54* 41* CREA 1.55* 2.02* 2.03* CA 7.7* 7.5*  7.7* 7.8* ALB  --  2.5*  --   
K 3.3* 4.1 4.5  145 145 * 114* 114* CO2 27 26 24 PHOS  --  3.2  --   
GLU 31* 69* 156* IPTH 95.6 Vit D 25 OH 10.8 UMACR 288 Fe 17 sat 11 teri 382 Ion cristiane 1.21 IMAGES:  
CXR 
1. Bilateral pleural tubes with no evidence of pneumothorax. 2.  Hypoinflation with bibasilar streaky opacities suggestive of atelectasis 
with slightly improved aeration at the left lung base 
  
 
IMPRESSION AND PLAN:  
LOREN/CKD 3 BUN/Crea improving. Cont to trend I/O, cont  IVF, replace K as needed po. Anemia Hgb down, low Fe stores, BT in progress, cont PARTH/po fe. Hypocalcemia with sl elevated IPTH from  vit D def start supplement CAD post CABG defer to cardiac team 
HTN well controlled DM2 well controlled Hypoalbuminemia push protein supp Darcy Jackson MD 
3/8/2020

## 2020-03-08 NOTE — PROGRESS NOTES
Shift Summary: Pain and restlessness controlled with PRN fentanyl and ativan IV. Discussed orders for 0.5mg ativan Q6h PRN with Dr. Scooter Odom. Patient rested well with intermittent episodes of pain. CT to H2O seal and minimal drainage. Lin draining adequate amounts clear, yellow urine. Dressings removed and incisions cleaned. Incisions approximated and healing well.

## 2020-03-09 PROBLEM — R33.9 URINARY RETENTION: Status: ACTIVE | Noted: 2020-01-01

## 2020-03-09 NOTE — PROGRESS NOTES
0800:  Report received, care assumed. In bed sleeping soundly when undisturbed. Complete assessment performed. NSR. VSS. Accucheck=65. D50 given per PRN orders, see mar. Blood sent to lab for testing as ordered. Monitor. 6347:  Recheck blood glucose =137.  
0930:  Lantus Insulin held by provider, not given, see MAR.  
1000:  Routine morning meds and breakfast given to patient by RN. Tolerated well with max assist. Arms weak and edematous. Wife and daughter in to visit, updated to status and plan of care, family verbalizes understanding all info. Voided 200cc clear vibha urine without difficulty in urinal. 
1030:  Physical therapy here. PT and RN assisted pt OOB, ambulate to nurse station and return to room, sitting in cardiac recliner chair. Passed much flatus, no BM yet. 1100:  Assisted with BSC for passing flatus, no BM. Family remains at side. 1230:  Assisted with lunch; tolerated well as fed to him. 1800: Wife and visitor remain at side. Tolerated dinner well. Passing urine and flatus without difficulty. No BM yet. Repositioned. Monitor. 1900:  Returned to bed with max assist. Bedside and Verbal shift change report given to 1200 CHER Jones (oncoming nurse) by Britt Whitt RN(offgoing nurse). Report included the following information SBAR, Kardex, Intake/Output, MAR, Accordion, Recent Results, Cardiac Rhythm NSR. and Alarm Parameters .

## 2020-03-09 NOTE — PROGRESS NOTES
CARDIAC SURGERY PROGRESS NOTE 
 
3/9/2020 Post Operative Day # 6 Chart reviewed. D/w Dr. Brayden Rice Interval History/Events of Past 24 hours:  
Continues to have low blood sugars yesterda and this morning. Still with agitation and confusion at night. BUN/Cr 40/1. 52. H/h 8.3/25. 5. Ambulated a few feet with assistance this a.m. Assessment: 
CAD S/P  CABG x 3 - on  BB, ASA, statin Respiratory parameters stable on 6LNC, intermittent BIPAP Cardiac status stable Delirium LOREN on CKD stable - nephrology following Acute blood loss anemia s/p 5 PRBC, 2 FFP, 1 PLT - stable on retacrit Postoperative pain - PRn Ultram, Plans: 
1. Discontinue Lantus 2. Scrotal sling 3. Increase Seroquel 4. Increase Lasix to twice daily 5. OOB, ambulate, PT/OT Heart Hugger use encouraged to mitigate pain and will also assist with deep breathing and incentive spirometry goals. Possible discharge 3 days. Annalise Hahn PA-C [facility] [phone] 
_____________________________________________________________________________________________________________________________________________ Subjective: 
Patient seen and examined on rounds today. 
-Up to chair Objective: 
Vital signs:  
Visit Vitals /59 Pulse 93 Temp 98.2 °F (36.8 °C) Resp 16 Ht 5' 7\" (1.702 m) Wt 90.4 kg (199 lb 4.7 oz) SpO2 96% BMI 31.21 kg/m² Temp (24hrs), Av.3 °F (36.8 °C), Min:98.1 °F (36.7 °C), Max:98.5 °F (36.9 °C) Admission Weight: Last Weight Weight: 90.7 kg (200 lb) Weight: 90.4 kg (199 lb 4.7 oz) Last 3 Recorded Weights in this Encounter 20 0603 20 0600 20 06 Weight: 90.3 kg (199 lb 1.2 oz) 90.6 kg (199 lb 11.8 oz) 90.4 kg (199 lb 4.7 oz) Telemetry: NSR Physical Examination:    
General:  Oriented to person, place Lungs: rales at bases bilaterally, , wheezes or rhonchi. Chest: Dressings clean and dry. Sternum stable. Heart: regular rate and rhythm, No murmur. Abdomen: Soft and non-tender without masses. Bowel sounds present. Extremities: Warm and well perfused. Edema moderate. + scrotal edema. Incisions: clean and dry Neuro: No deficit. SUP Prophylaxis: Pepcid DVT Prophylaxis:  
pneumatic compression boots: Yes Compression stockings:  Yes 
Heparin:  Yes Chest tubes:  present Pacing wires:  not present DME needs:  tbd Labs: 
Lab Results Component Value Date/Time WBC 10.3 03/09/2020 08:00 AM  
 HCT 25.5 (L) 03/09/2020 08:00 AM  
  03/09/2020 08:00 AM  
  
Lab Results Component Value Date/Time  (H) 03/09/2020 08:00 AM  
 K 4.1 03/09/2020 08:00 AM  
  (H) 03/09/2020 08:00 AM  
 CO2 26 03/09/2020 08:00 AM  
 GLU 53 (LL) 03/09/2020 08:00 AM  
 BUN 40 (H) 03/09/2020 08:00 AM  
 CREA 1.52 (H) 03/09/2020 08:00 AM  
 CREA 1.55 (H) 03/08/2020 05:25 AM  
 CREA 2.02 (H) 03/07/2020 05:45 AM  
 
Lab Results Component Value Date/Time HBA1C 7.1 (H) 03/03/2020 02:59 AM  
 
pH (POC) Date Value Ref Range Status 03/07/2020 7.375 7.35 - 7.45   Final  
 
pCO2 (POC) Date Value Ref Range Status 03/07/2020 42.0 35.0 - 45.0 MMHG Final  
 
pO2 (POC) Date Value Ref Range Status 03/07/2020 77 (L) 80 - 100 MMHG Final  
 
HCO3 (POC) Date Value Ref Range Status 03/07/2020 24.5 22 - 26 MMOL/L Final  
 
sO2 (POC) Date Value Ref Range Status 03/07/2020 95 92 - 97 % Final  
 
Base excess (POC) Date Value Ref Range Status 03/05/2020 0 mmol/L Final  
 
    
 
CXR: Improved aeration left base. PLEASE NOTE: 
This document may have been produced using voice recognition software. Unrecognized errors in transcription may be present. Please call with any questions.

## 2020-03-09 NOTE — DIABETES MGMT
GLYCEMIC CONTROL PLAN OF CARE  
 
T2DM with current A1c of 7.1% (03/03/2020). Patient's wife is a certified diabetes educator. Home diabetes medications: 
Glyburide 2.5 mg two times daily Post CABG 03/04/2020 POC BG range on 03/08/2020: . Patient had multiple episodes of hypoglycemia and was treated with D50 x4, reduced lantus insulin dose from 40 to 20 units daily, and placed on IVF D5 1/2 NS at 75 ml/hr cont infusion. POC BG range on 03/09/2020 at time of review: . Patient seen this morning and noted that he cont to have episodes of hypoglycemia. Treated with D50 x3 doses, cont IVF with dextrose, patient confused and noted report of poor po intake. [facility] rec to help prevent further hypoglycemia. Recommendation(s):  
1.) obtained order to hold lantus insulin at this time. 2.) cont sliding scale insulin as ordered. 3.) cont glycemic monitoring and eval before resuming lantus insulin. Assessment: 
Patient is a 68year old male with a past medical history including type 2 diabetes, hypertension, hyperlipidemia, and kidney stones who presented with severe back pain. Noted: 
NSTEMI. 
CAD. Status post CABG x3 on 03/04/2020. T2DM. Most recent blood glucose values: 
 
Results for Erica Marin (MRN [id number]) as of 3/9/2020 10:37 Ref. Range 3/8/2020 07:20 3/8/2020 07:46 3/8/2020 09:14 3/8/2020 12:11 3/8/2020 12:27 3/8/2020 12:30 3/8/2020 13:12 3/8/2020 16:04 3/8/2020 16:07 3/8/2020 16:35 3/8/2020 18:34 3/8/2020 19:10 3/8/2020 20:46 GLUCOSE,FAST - POC Latest Ref Range: 70 - 110 mg/dL 42 (LL) 102 108 69 (L) 57 (L) 59 (L) 120 (H) 39 (LL) 43 (LL) 131 (H) 78 131 (H) 91 Results for Erica Marin (MRN [id number]) as of 3/9/2020 10:37 Ref.  Range 3/9/2020 00:42 3/9/2020 00:59 3/9/2020 03:14 3/9/2020 03:35 3/9/2020 06:04 3/9/2020 06:27 3/9/2020 07:56 3/9/2020 08:50  
 GLUCOSE,FAST - POC Latest Ref Range: 70 - 110 mg/dL 71 133 (H) 69 (L) 117 (H) 60 (L) 76 65 (L) 137 (H) Current A1C of 7.1% (03/03/2020) is equivalent to estimated average blood glucose of 157 mg/dl over the past 2-3 months. Current hospital diabetes medications:  
Basal lantus insulin 20 units daily - placed on hold 3/09/2020. Correctional lispro insulin ACHS. Very resistant dose. Total dose of insulin day before: 03/08/2020 Lantus: 20 units Lispro: none. TDD insulin: 20 units Diet: Diabetic consistent mechanical soft; no concentrated sweets; nutr suppl: glucerna shake with all meals. Goal: Patient's blood glucose will be within target range of  mg/dL by 03/12/2020. Education:  Per RD CDE notes on 03/03/2020: pt's spouse is a certified diabetes educator. Information provided on free outpatient diabetes education classes. Essie High RN [facility] Pager: [phone]

## 2020-03-09 NOTE — PROGRESS NOTES
Shift Summary: VSS. Patient better oriented this shift, but still has periods of severe restlessness and anxiety. Fentanyl given x1 and ativan given x1 for c/o of severe pain and anxiety. FSBS low multiple times this shift and 12.5mg D50 given x3. Adequate urine output, georges removed at 0600 per orders. Dressing to chest tube sites soiled with large amount of bloody drainage, changed this morning during bath.

## 2020-03-09 NOTE — PROGRESS NOTES
1045-Critical lab: blood glucose 53 reported by lab from morning labs that were drawn. Lab result report to Dr. Derek Egan while he was at bedside. Pt's most recent blood glucose @ 0850 is 137. No new orders given at this time.

## 2020-03-09 NOTE — PROGRESS NOTES
[facility]  -  Progress Note Patient: Eve Harris MRN: [id number]  SSN: [id number] YOB: 1950  Age: 68 y.o. Sex: male Admit Date: 3/2/2020 Assessment:  
 
-CAD s/p CABG X3 on 3/4/2020 (LIMA to mid LAD, SVG to OM1, SVG to Diag1/ramus). -NSTEMI. Troponin peak at [facility] with possible septal infarct without ischemic changes, presented with back pain with nausea and vomiting. 
-Hypertension. Stable. -Diabetes mellitus. HgbA1c 7.1 
-Chronic kidney disease. Stable. -H/o kidney stones, last once 3 weeks ago. -H/o vertigo. 
-Anemia/bleeding post op requiring transfusion. 
  
Remotely evaluated by Dr. Vincent Calderon. Plan:  
 
-Volume management per nephrology team, appreciate assistance. 
-Continued on PO Amiodarone, ASA, Lipitor, Lopressor. Will increase Lipitor dose to 40 mg. 
-Increase activity as tolerated. Subjective:  
 
Reports epigastric pain after eating today. Objective:  
  
Patient Vitals for the past 8 hrs: 
 Temp Pulse Resp BP SpO2  
03/09/20 0800 98.2 °F (36.8 °C) 86 21 95/57 92 % 03/09/20 0600  89 18 129/77 95 % 03/09/20 0500  84 20 117/75 96 % 03/09/20 0400 98.2 °F (36.8 °C) 79 18 114/66 95 % 03/09/20 0300  77 15 92/61 96 % Patient Vitals for the past 96 hrs: 
 Weight 03/09/20 0600 199 lb 4.7 oz (90.4 kg) 03/08/20 0600 199 lb 11.8 oz (90.6 kg) 03/06/20 0603 199 lb 1.2 oz (90.3 kg) Intake/Output Summary (Last 24 hours) at 3/9/2020 1016 Last data filed at 3/9/2020 0700 Gross per 24 hour Intake 2035 ml Output 1700 ml Net 335 ml Physical Exam: 
General:  alert, cooperative, no distress, appears stated age Neck:  supple Lungs:  clear to auscultation bilaterally to anterolateral lung fields Heart:  Regular rate and rhythm Abdomen:  abdomen is soft without significant tenderness, masses, organomegaly or guarding Extremities:  Atraumatic, compression stocking to R leg, no significant edema Data Review:  
 
Labs: Results:  
   
Chemistry Recent Labs 03/08/20 1907 03/08/20 
0525 03/07/20 
5542 GLU  --  31* 69* NA  --  144 145  
K 4.2 3.3* 4.1 CL  --  112* 114* CO2  --  27 26 BUN  --  46* 54* CREA  --  1.55* 2.02* CA  --  7.7* 7.5*  7.7* MG  --   --  2.4 PHOS  --   --  3.2 AGAP  --  5 5 BUCR  --  30* 27* AP  --   --  50  
TP  --   --  4.9* ALB  --   --  2.5*  
GLOB  --   --  2.4 AGRAT  --   --  1.0  
  
CBC w/Diff Recent Labs 03/08/20 1907 03/08/20 0525 03/07/20 
3638 WBC  --  12.8 14.0*  
RBC  --  2.30* 2.46* HGB 9.2* 6.9* 7.3* HCT 27.9* 21.2* 22.6* PLT  --  166 125* GRANS  --  68 80* LYMPH  --  19* 10* EOS  --  2 0 Lipid Panel Lab Results Component Value Date/Time Cholesterol, total 180 03/04/2020 05:37 AM  
 HDL Cholesterol 33 (L) 03/04/2020 05:37 AM  
 LDL, calculated 73.4 03/04/2020 05:37 AM  
 VLDL, calculated 73.6 03/04/2020 05:37 AM  
 Triglyceride 368 (H) 03/04/2020 05:37 AM  
 CHOL/HDL Ratio 5.5 (H) 03/04/2020 05:37 AM  
  
Liver Enzymes Recent Labs 03/07/20 
0545  
TP 4.9* ALB 2.5* AP 50 SGOT 29 Thyroid Studies Lab Results Component Value Date/Time  TSH 0.83 03/03/2020 02:59 AM

## 2020-03-09 NOTE — PROGRESS NOTES
RENAL DAILY PROGRESS NOTE Subjective:  
Admitted for chest pains post 3V CABG seen for LOREN/CKD 3 Current Facility-Administered Medications Medication Dose Route Frequency  heparin (porcine) injection 5,000 Units  5,000 Units SubCUTAneous Q8H  
 QUEtiapine SR (SEROquel XR) tablet 200 mg  200 mg Oral QHS  furosemide (LASIX) injection 40 mg  40 mg IntraVENous BID  potassium chloride (K-DUR, KLOR-CON) SR tablet 20 mEq  20 mEq Oral BID  atorvastatin (LIPITOR) tablet 40 mg  40 mg Oral QHS  
 0.9% sodium chloride infusion 250 mL  250 mL IntraVENous PRN  
 traMADoL (ULTRAM) tablet 50 mg  50 mg Oral Q6H PRN  
 dextrose 5 % - 0.45% NaCl infusion  75 mL/hr IntraVENous CONTINUOUS  cholecalciferol (VITAMIN D3) (1000 Units /25 mcg) tablet 4 Tab  4,000 Units Oral DAILY  [Held by provider] insulin glargine (LANTUS) injection 20 Units  20 Units SubCUTAneous DAILY  ferrous sulfate tablet 325 mg  1 Tab Oral BID WITH MEALS  
 ascorbic acid (vitamin C) (VITAMIN C) tablet 250 mg  250 mg Oral BID WITH MEALS  folic acid (FOLVITE) tablet 1 mg  1 mg Oral DAILY  cyanocobalamin tablet 1,000 mcg  1,000 mcg Oral DAILY  metoprolol tartrate (LOPRESSOR) tablet 25 mg  25 mg Oral Q12H  tamsulosin (FLOMAX) capsule 0.4 mg  0.4 mg Oral DAILY  LORazepam (ATIVAN) injection 0.5 mg  0.5 mg IntraVENous Q6H PRN  
 epoetin marbin-epbx (RETACRIT) injection 4,000 Units  4,000 Units SubCUTAneous Q MON, WED & FRI  acetaminophen (TYLENOL) tablet 1,000 mg  1,000 mg Oral Q6H  
 famotidine (PEPCID) tablet 20 mg  20 mg Oral DAILY  insulin lispro (HUMALOG) injection   SubCUTAneous AC&HS  
 glucose chewable tablet 16 g  4 Tab Oral PRN  
 glucagon (GLUCAGEN) injection 1 mg  1 mg IntraMUSCular PRN  
 dextrose (D50W) injection syrg 12.5-25 g  25-50 mL IntraVENous PRN  
 sodium chloride (NS) flush 5-40 mL  5-40 mL IntraVENous Q8H  
 acetaminophen (TYLENOL) tablet 650 mg  650 mg Oral Q4H PRN  
  naloxone (NARCAN) injection 0.4 mg  0.4 mg IntraVENous PRN  
 amiodarone (CORDARONE) tablet 200 mg  200 mg Oral TID  albuterol (PROVENTIL VENTOLIN) nebulizer solution 2.5 mg  2.5 mg Nebulization Q4H PRN  
 ondansetron (ZOFRAN) injection 4 mg  4 mg IntraVENous Q4H PRN  
 aspirin delayed-release tablet 81 mg  81 mg Oral DAILY  chlorhexidine (PERIDEX) 0.12 % mouthwash 10 mL  10 mL Oral BID  docusate sodium (COLACE) capsule 100 mg  100 mg Oral BID  ELECTROLYTE REPLACEMENT PROTOCOL - Potassium Standard Dosing  1 Each Other PRN  
 ELECTROLYTE REPLACEMENT PROTOCOL - Magnesium  1 Each Other PRN  
 bisacodyL (DULCOLAX) tablet 10 mg  10 mg Oral DAILY  polyethylene glycol (MIRALAX) packet 17 g  17 g Oral DAILY  sodium chloride (NS) flush 5-40 mL  5-40 mL IntraVENous PRN Objective:  
 
Patient Vitals for the past 24 hrs: 
 Temp Pulse Resp BP SpO2  
03/09/20 1300  93 17 133/74 97 % 03/09/20 1216 98 °F (36.7 °C) 88 22 122/68 98 % 03/09/20 1101  93 16 102/59 96 % 03/09/20 1000  87 16 126/72 96 % 03/09/20 0900  81 14 111/65 96 % 03/09/20 0808  81 16 115/65 92 % 03/09/20 0800 98.2 °F (36.8 °C) 86 21 95/57 92 % 03/09/20 0700  90 16 119/59 95 % 03/09/20 0600  89 18 129/77 95 % 03/09/20 0500  84 20 117/75 96 % 03/09/20 0400 98.2 °F (36.8 °C) 79 18 114/66 95 % 03/09/20 0300  77 15 92/61 96 % 03/09/20 0200  77 16 90/55 96 % 03/09/20 0100  83 21 110/69 97 % 03/09/20 0000 98.2 °F (36.8 °C) 87 18 100/66 95 % 03/08/20 2300  96 17 117/68 95 % 03/08/20 2200  (!) 104 23 147/78 95 % 03/08/20 2100  97 21 122/79 96 % 03/08/20 2000 98.1 °F (36.7 °C) (!) 104 18 90/77 98 % 03/08/20 1900  (!) 108 17 114/58 97 % 03/08/20 1845  (!) 106 22  96 % 03/08/20 1830  (!) 104 19 121/69 96 % 03/08/20 1818  100 15 127/65 93 % 03/08/20 1815  99 25 142/77 94 % 03/08/20 1811  (!) 102 17  94 % 03/08/20 1800  100 22 (!) 134/92 96 % 03/08/20 1745  97 20  95 % 03/08/20 1730  97 16 142/88 95 % 03/08/20 1715  91 16  96 % 03/08/20 1700  92 30 (!) 127/97 96 % 03/08/20 1630  82 17 102/68 95 % 03/08/20 1600 98.3 °F (36.8 °C) 90 18 131/69 95 % 03/08/20 1530  84 19 119/64 92 % 03/08/20 1500  82 17 106/65 95 % 03/08/20 1430  87 17 101/63 95 % Weight change: -0.2 kg (-7.1 oz) 03/07 1901 - 03/09 0700 In: 3185 [I.V.:2850] Out: 2380 [Urine:2270] Intake/Output Summary (Last 24 hours) at 3/9/2020 1425 Last data filed at 3/9/2020 1232 Gross per 24 hour Intake 2375 ml Output 1740 ml Net 635 ml Physical Exam: weak, moaning, not in any resp distress HEENT: non icteric Cardiovascular: regular, sternal dressing, 
C/L: dec BS both bases no wheezing/rales Abdomen: obese, hypoactive BS Ext: + peripheral edema Data Review:  
 
LABS:  
Hematology:  
Recent Labs 03/09/20 
0800 03/08/20 1907 03/08/20 
0525 03/07/20 
0244 WBC 10.3  --  12.8 14.0* HGB 8.3* 9.2* 6.9* 7.3* HCT 25.5* 27.9* 21.2* 22.6* Pl 166K Chemistry:  
Recent Labs 03/09/20 
0800 03/08/20 1907 03/08/20 
0525 03/07/20 
1727 BUN 40*  --  46* 54* CREA 1.52*  --  1.55* 2.02* CA 7.4*  --  7.7* 7.5*  7.7* ALB  --   --   --  2.5*  
K 4.1 4.2 3.3* 4.1 *  --  144 145 *  --  112* 114* CO2 26  --  27 26 PHOS  --   --   --  3.2 GLU 53*  --  31* 69* IPTH 95.6 Vit D 25 OH 10.8 UMACR 288 Fe 17 sat 11 teri 382 Ion cristiane 1.21 IMAGES:  
CXR 
1. Bilateral pleural tubes with no evidence of pneumothorax. 2.  Hypoinflation with bibasilar streaky opacities suggestive of atelectasis 
with slightly improved aeration at the left lung base 
  
 
IMPRESSION AND PLAN:  
LOREN/CKD 3 BUN/Crea improving. Cont to trend I/O, replace K as needed po. Anemia Hgb down, low Fe stores, BT in progress, cont PARTH/po fe. Hypocalcemia with sl elevated IPTH from  vit D def start supplement CAD post CABG defer to cardiac team 
 HTN well controlled DM2 well controlled Hypoalbuminemia push protein supp Mild hypernatremia,encourage po fluids Shy Dias MD 
3/9/2020

## 2020-03-09 NOTE — PROGRESS NOTES
Problem: Mobility Impaired (Adult and Pediatric) Goal: *Acute Goals and Plan of Care (Insert Text) Description Physical Therapy Goals Initiated 3/6/2020 and to be accomplished within 4 day(s) 1. Patient will move from supine to sit and sit to supine  and scoot up and down in bed with minimal assistance/contact guard assist.    
2.  Patient will transfer from bed to chair and chair to bed with supervision/set-up using the least restrictive device. 3.  Patient will perform sit to stand with supervision/set-up. 4.  Patient will ambulate with supervision/set-up for 100 feet with the least restrictive device. 5.  Assess as appropriate PLOF: Pt poor historian, states he lives with his spouse in 2 story home. He was independent with self-care and mobility. Outcome: Progressing Towards Goal 
  
PHYSICAL THERAPY TREATMENT Patient: Twila Catherine (11 y.o. male) Date: 3/9/2020 Diagnosis: Acute coronary syndrome ([facility] [address] [I24.9] Elevated troponin [R79.89] Back pain [M54.9] Numbness and tingling in left arm [R20.0, R20.2] S/P cardiac catheterization [Z98.890] NSTEMI (non-ST elevated myocardial infarction) ([facility] [address] [I21.4] Elevated troponin Procedure(s) (LRB): 
CORONARY ARTERY BYPASS GRAFT (CABG) TIMES THREE (N/A) 5 Days Post-Op Precautions: Sternal, Skin, Fall PLOF: see above ASSESSMENT: 
Pt cleared for PT treatment session per nursing. Pt received in supine, family at bedside, and agreeable to treatment session. Appreciate nursing assistance throughout session as pt requires 2 person assistance due to lethargy and weakness. Pt demos adequate ROM to reach heart hugger, however unable to maintain grasp requiring max manual/verbal cues. Pt required max A x2 for supine to sit with managing trunk and lower extremities to maneuver to EOB. Mod A x2 for sit to stand from EOB, demos fair balance upon standing.  Pt ambulated 15 ft with max A x1, CGA of 2nd person with RW. Facilitation at pelvis to encourage larger steps as pt taking short, shuffling steps. Pt demos increased posterior lean with increased ambulation requiring max cues throughout. Max cues required for mod x2 stand to sit for use of heart hugger and safety awareness to chair position. Pt scooted posteriorly in chair with mod x2 and left sitting up with feet elevated, nursing and family at bedside, and all needs met/within reach. Progression toward goals:  
[]      Improving appropriately and progressing toward goals [x]      Improving slowly and progressing toward goals 
[]      Not making progress toward goals and plan of care will be adjusted PLAN: 
Patient continues to benefit from skilled intervention to address the above impairments. Continue treatment per established plan of care. Discharge Recommendations:  Inpatient Rehab Further Equipment Recommendations for Discharge:  rolling walker SUBJECTIVE:  
Patient stated My back is killing me.  OBJECTIVE DATA SUMMARY:  
Critical Behavior: 
Neurologic State: Alert, Eyes open to voice Orientation Level: Oriented X4 Cognition: Decreased attention/concentration, Follows commands Safety/Judgement: Fall prevention Functional Mobility Training: 
Bed Mobility: 
Supine to Sit: Maximum assistance;Assist x2 Transfers: 
Sit to Stand: Moderate assistance;Assist x2 Stand to Sit: Moderate assistance;Assist x2 Balance: 
Sitting: Impaired Sitting - Static: Fair (occasional)(-) Sitting - Dynamic: Poor (constant support) Standing: Impaired; With support Standing - Static: Fair Standing - Dynamic : Fair(-) Ambulation/Gait Training: 
Distance (ft): 15 Feet (ft) Assistive Device: Walker, rolling Ambulation - Level of Assistance: Maximum assistance(max A x1, CGA of 2nd person) Gait Abnormalities: Shuffling gait; Decreased step clearance Stance: Weight shift Pain: 
Pt verbalizes pain at his incision site and in low back, but does not rate numerically. Activity Tolerance:  
Fair- 
Please refer to the flowsheet for vital signs taken during this treatment. After treatment:  
[x] Patient left in no apparent distress sitting up in chair 
[] Patient left in no apparent distress in bed 
[x] Call bell left within reach [x] Nursing notified 
[x] Caregiver present: Nursing at bedside 
[] Bed alarm activated 
[] SCDs applied COMMUNICATION/EDUCATION:  
[x]         Role of Physical Therapy in the acute care setting. [x]         Fall prevention education was provided and the patient/caregiver indicated understanding. [x]         Patient/family have participated as able in working toward goals and plan of care. []         Patient/family agree to work toward stated goals and plan of care. []         Patient understands intent and goals of therapy, but is neutral about his/her participation. []         Patient is unable to participate in stated goals/plan of care: ongoing with therapy staff. 
[]         Other: 
 
   
Gema Montenegro PTA Time Calculation: 25 mins

## 2020-03-10 NOTE — CDMP QUERY
Patient admitted with NSTEMI. Pt noted to have \"multiple episodes of hypoglycemia\" documented by diabetes management nurse. Please c document in progress notes and discharge summary further specificity regarding the control status of DM: 
 
=> Uncontrolled DM with hypoglycemia 
=> Other, please specify 
=> Clinically unable to determine The medical record reflects the following: 
 
Risk Factors: diabetes Clinical Indicators: blood sugar 39,  43 Treatment: per diabetes management: treated with D50 x4, reduced lantus insulin dose from 40 to 20 units daily, and placed on IVF D5 1/2 NS at 75 ml/hr cont infusion Thank you, [address] [place] [address], [facility] 
[phone]

## 2020-03-10 NOTE — PROGRESS NOTES
CARDIAC SURGERY PROGRESS NOTE 
 
3/10/2020 Post Operative Day # 6 Chart reviewed. D/w Dr. Wright Sat Interval History/Events of Past 24 hours:  
Good sleep 2 hours after Seroquel, agitated prior to that. Urinary retention this a.m. straight cath for 550 mL's. Currently has condom cath. Renal function at baseline. + Cough Rounded with Dr. Adriana Casillas. Wife at bedside and very tearful with slow progress. Assessment: 
CAD S/P  CABG x 3 - on  BB, ASA, statin Respiratory parameters stable on 6LNC, intermittent BIPAP Cardiac status stable Delirium  slowly improving. LOREN on CKD stable - nephrology following Acute blood loss anemia s/p 5 PRBC, 2 FFP, 1 PLT - stable on retacrit Postoperative pain - PRn Ultram, On BID Lasix/K Plans: 1. CBC, BMP tomorrow 2. Check chest x-ray - done 3. Add guaifenesin, bronchial hygiene, chest PT 
4. Add Mg Citrate 5. Seroquel adjusted to give at 8 PM. Sleep hygeine 6. OOB, ambulate, PT/OT Heart Hugger use encouraged to mitigate pain and will also assist with deep breathing and incentive spirometry goals. Possible discharge 3 days. Drucilla Lesch, PA-C [facility] [phone] 
_____________________________________________________________________________________________________________________________________________ Subjective: 
Patient seen and examined on rounds today. 
-Up to chair No BM since OR Objective: 
Vital signs:  
Visit Vitals /61 Pulse 94 Temp 97.4 °F (36.3 °C) Resp 16 Ht 5' 7\" (1.702 m) Wt 90.5 kg (199 lb 8 oz) SpO2 97% BMI 31.25 kg/m² Temp (24hrs), Av °F (36.7 °C), Min:97.4 °F (36.3 °C), Max:98.2 °F (36.8 °C) Admission Weight: Last Weight Weight: 90.7 kg (200 lb) Weight: 90.5 kg (199 lb 8 oz) Last 3 Recorded Weights in this Encounter 20 0600 20 0600 03/10/20 0530 Weight: 90.6 kg (199 lb 11.8 oz) 90.4 kg (199 lb 4.7 oz) 90.5 kg (199 lb 8 oz) Telemetry: NSR Physical Examination:    
General:  Oriented to person, place Lungs: decreased with rales at bases bilaterally, , wheezes or rhonchi. Chest: Dressings clean and dry. Sternum stable. Heart: regular rate and rhythm, No murmur. Abdomen: Soft and non-tender without masses. Bowel sounds present. Extremities: Warm and well perfused. Edema moderate. + scrotal edema. Incisions: clean and dry Neuro: No deficit. SUP Prophylaxis: Pepcid DVT Prophylaxis:  
pneumatic compression boots: Yes Compression stockings:  Yes 
Heparin:  Yes Chest tubes:  present Pacing wires:  not present DME needs:  tbd Labs: 
Lab Results Component Value Date/Time WBC 10.3 03/09/2020 08:00 AM  
 HCT 25.5 (L) 03/09/2020 08:00 AM  
  03/09/2020 08:00 AM  
  
Lab Results Component Value Date/Time  03/10/2020 05:21 AM  
 K 4.4 03/10/2020 05:21 AM  
  03/10/2020 05:21 AM  
 CO2 27 03/10/2020 05:21 AM  
 GLU 88 03/10/2020 05:21 AM  
 BUN 38 (H) 03/10/2020 05:21 AM  
 CREA 1.53 (H) 03/10/2020 05:21 AM  
 CREA 1.52 (H) 03/09/2020 08:00 AM  
 CREA 1.55 (H) 03/08/2020 05:25 AM  
 
Lab Results Component Value Date/Time HBA1C 7.1 (H) 03/03/2020 02:59 AM  
 
 
 
PLEASE NOTE: 
This document may have been produced using voice recognition software. Unrecognized errors in transcription may be present. Please call with any questions.

## 2020-03-10 NOTE — PROGRESS NOTES
Problem: Self Care Deficits Care Plan (Adult) Goal: *Acute Goals and Plan of Care (Insert Text) Description Occupational Therapy Goals Initiated 3/6/2020 within 7 day(s). 1.  Patient will perform self-feeding with modified independence. 2.  Patient will perform grooming with supervision/set-up following sternal precautions. 3.  Patient will perform upper body dressing with minimal assistance/contact guard assist. 
4.  Patient will perform lower body dressing with moderate assistance. 5.  Patient will perform toilet transfers with contact guard assist. 
6.  Patient will perform all aspects of toileting with contact guard assist. 
7.  Patient will utilize energy conservation techniques during functional activities with verbal cues. Prior Level of Function: Pt only oriented to self. Pt reports he was able to dress himself but required assistance for bathing PTA. Pt unable to state other PLOF information during OT evaluation. Outcome: Progressing Towards Goal 
 OCCUPATIONAL THERAPY TREATMENT Patient: Dillan Duran (86 y.o. male) Date: 3/10/2020 Diagnosis: Acute coronary syndrome ([facility] [address] [I24.9] Elevated troponin [R79.89] Back pain [M54.9] Numbness and tingling in left arm [R20.0, R20.2] S/P cardiac catheterization [Z98.890] NSTEMI (non-ST elevated myocardial infarction) ([facility] [address] [I21.4] Elevated troponin Procedure(s) (LRB): 
CORONARY ARTERY BYPASS GRAFT (CABG) TIMES THREE (N/A) 6 Days Post-Op Precautions: Sternal, Skin, Fall Chart, occupational therapy assessment, plan of care, and goals were reviewed. ASSESSMENT: 
Pt OOB seated in chair upon entry. Supportive family, spouse and daughter reports they are both RNs. BUE edematous. Educated family on AAROM TherEx and importance of performing throughout the day. Pt co-treated w/PT to maximize safety w/functional transfer training. Pt drowsy, requiring max verbal/tactile cues to attend to task.  Pt requires hand over hand assist for hand placement on heart hugger w/functional transfer to standing . Generalized weakness requires 2 person assist w/functional transfer to [facility]. Poor dynamic standing balance requires Max Assist w/toileting ADL. Pt tolerates standing for kirsten-care and mepilex to sacrum. Pt returned to chair and repositioned for comfort. Family educated on importance of pacing and safety. BP 85/56 at rest, pt c/o dizziness s/p standing activity BP 84/61 Progression toward goals: 
[]          Improving appropriately and progressing toward goals [x]          Improving slowly and progressing toward goals 
[]          Not making progress toward goals and plan of care will be adjusted PLAN: 
Patient continues to benefit from skilled intervention to address the above impairments. Continue treatment per established plan of care. Discharge Recommendations:  [place] Further Equipment Recommendations for Discharge:  TBD by next level of care SUBJECTIVE:  
Patient stated Kiesha Spain are we doing ? Walking? Dorothea Donahue OBJECTIVE DATA SUMMARY:  
Cognitive/Behavioral Status: 
Neurologic State: Drowsy Orientation Level: Oriented to person Cognition: Decreased attention/concentration, Decreased command following Safety/Judgement: Fall prevention Functional Mobility and Transfers for ADLs: 
 Transfers: 
Sit to Stand: Moderate assistance;Assist x2 Bed to Chair: Moderate assistance;Assist x2 Toilet Transfer : Moderate assistance;Assist x2 Balance: 
Sitting: Impaired; With support Sitting - Static: Poor (constant support) Sitting - Dynamic: Poor (constant support) Standing: Impaired; With support Standing - Static: Fair(fair minus) Standing - Dynamic : Poor ADL Intervention: Toileting Toileting Assistance: Maximum assistance Bowel Hygiene: Maximum assistance Clothing Management: Maximum assistance UE Therapeutic Exercises:  
AAROM BUE elbow flexion/extension AAROM BUE wrist flexion/extension, ulnar deviation L/R 
AROM BUE hand squeezes Spouse returns demo and verbalizes understanding to perform throughout the day Pain: 
Pain level pre-treatment: 0/10 Pain level post-treatment: 0/10 Activity Tolerance:   
Poor Please refer to the flowsheet for vital signs taken during this treatment. After treatment:  
[x]  Patient left in no apparent distress sitting up in chair 
[]  Patient left in no apparent distress in bed 
[]  Call bell left within reach [x]  Nursing notified 
[x]  family present 
[]  Bed alarm activated COMMUNICATION/EDUCATION:  
[] Role of Occupational Therapy in the acute care setting 
[] Home safety education was provided and the patient/caregiver indicated understanding. [] Patient/family have participated as able in working towards goals and plan of care. [x] family agree to work toward stated goals and plan of care. [] Patient understands intent and goals of therapy, but is neutral about his/her participation. [] Patient is unable to participate in goal setting and plan of care. Thank you for this referral. 
FIORELLA Villavicencio Time Calculation: 40 mins

## 2020-03-10 NOTE — PROGRESS NOTES
Wife, friend and son at bedside upset and aggressive at times over post operative course. Reviewed CXR and labs with wife to reassure. Son still upset that dad is not ready for discharge because it is his understanding \"7 days\" is the maximum time after heart surgery. They were also concerned with oversedation. Asked Dr. Derek Egan to come to speak to family who arrived at bedside. PRN Ativan d/c'd 
Seroquel dose decreased. Encouraged family to stay at bedside especially in evening when delirium, agitation and confusion is worse. Will ask RN to notify family when patient is agitated if they are not at bedside. Note: georges reinserted due to retention despite Flomax. Will consult Urology in am. 
 
Feli Kearney PA-C [facility] [phone]

## 2020-03-10 NOTE — PROGRESS NOTES
NUTRITION Nutrition Consult: General Nutrition Management & Supplements RECOMMENDATIONS / PLAN:  
 
- Check magnesium and phosphorus.   
- Continue bowel regimen. 
- Consider NGT placement to provide supplemental enteral nutrition if meal intake does not improve in the next 24-48 hours. - Continue RD inpatient monitoring and evaluation. NUTRITION INTERVENTIONS & DIAGNOSIS:  
 
- Meals/snacks: modified composition, assistance with meals - Medical food supplement therapy: Glucerna Shake TID 
- Nutrition-related medication management: colace BID, miralax, magnesium citrate given - Collaboration and referral of nutrition care: patient discussed with nursing Nutrition Diagnosis: Chronic disease or condition related malnutrition related to decreased appetite, lethargy as evidenced by decreased appetite/meal intake over the past year and 15 lb, 7.5% weight loss x 3 months. Altered GI function related to impaired gut motility after surgery, decreased mobility and poor po intake as evidenced by constipation, no BM x week. Patient meets criteria for Moderate Protein Calorie Malnutrition as evidenced by:  
ASPEN Malnutrition Criteria Acute Illness, Chronic Illness, or Social/Enviornmental: Chronic illness Energy Intake: <75% est energy req for greater than/equal to 1 month Weight Loss: 7.5% x 3 mos ASPEN Malnutrition Score - Chronic Illness: 2 Chronic Illness - Malnutrition Diagnosis: Moderate malnutrition. ASSESSMENT:  
 
3/10: Pt remains confused and lethargic but mentation improving compared to past several days per spouse report. Given mag citrate today, passing flatus yesterday per wife and able to have BM this afternoon for the first time since surgery/admission (usually has BM every 1-2 days per spouse). Consuming 10-30% of meals, lantus discontinued for persistent hypoglycemia- now resolved. Consuming some of nutritional supplements with encouragement per nursing. 3/6: Pt lethargic, requiring bipap and spoke with wife at bedside, who works as Diabetes Educator. Pt with poor meal intake, difficulty chewing due to poor dentition and not wearing dentures, present at bedside due to discomfort and SOB per wife. Pt usually consumes 6 small snacks throughout the day, consisting mainly of pudding, jello and applesauce. 3/5: S/p CABG x 3 yesterday and extubated this afternoon, kept intubated overnight due to concern for postoperative bleeding per chart. Recently extubated and has not eaten postop. Nutritional intake adequate to meet patients estimated nutritional needs:  No 
 
Diet: DIET DIABETIC CONSISTENT CARB Mechanical Soft; AHA-LOW-CHOL FAT 
DIET NUTRITIONAL SUPPLEMENTS Breakfast, Lunch, Dinner; [address] Food Allergies: NKFA Current Appetite: Poor Appetite/meal intake prior to admission: Fair, consuming 6 small meals per day of soft foods with decline in appetite/meal intake over the past year per spouse Feeding Limitations:  [] Swallowing difficulty    [x] Chewing difficulty: poor dentition, not wearing dentures     [x] Other: lethargy Current Meal Intake:  
Patient Vitals for the past 100 hrs: 
 % Diet Eaten 03/09/20 1800 30 % 03/09/20 1232 30 % 03/09/20 1000 30 % 03/07/20 1300 0 % 03/07/20 1022 0 % 03/06/20 1321 0 % BM: 3/10 Skin Integrity: surgical incisions to chest and leg Edema:   [] No     [x] Yes Pertinent Medications: Reviewed: ascorbic acid, cholecalciferol, cyanocobalamin, ferrous sulfate, folic acid, furosemide, SSI, 20 mEq KCl po BID; 100 mg thiamine x 3 days (3/7-3/9) Recent Labs  
  03/10/20 
0521 03/09/20 
0800 03/08/20 
1907 03/08/20 
0525  146*  --  144  
K 4.4 4.1 4.2 3.3*  
 113*  --  112* CO2 27 26  --  27  
GLU 88 53*  --  31* BUN 38* 40*  --  46* CREA 1.53* 1.52*  --  1.55* CA 7.9* 7.4*  --  7.7* Intake/Output Summary (Last 24 hours) at 3/10/2020 1443 Last data filed at 3/10/2020 0730 Gross per 24 hour Intake 1091.25 ml Output 1900 ml Net -808.75 ml Anthropometrics: 
Ht Readings from Last 1 Encounters:  
03/03/20 5' 7\" (1.702 m) Last 3 Recorded Weights in this Encounter 03/08/20 0600 03/09/20 0600 03/10/20 0530 Weight: 90.6 kg (199 lb 11.8 oz) 90.4 kg (199 lb 4.7 oz) 90.5 kg (199 lb 8 oz) Body mass index is 31.25 kg/m². Obese, Class I Weight History: weight gain per chart hx review; weight loss over the past several months PTA per wife report, 200 lb in December 2019 and down to 185 lb prior to admission (15 lb, 7.5% weight loss x 3 months) Weight Metrics 3/10/2020 12/2/2019 11/12/2019 3/28/2019 12/27/2018 10/10/2018 9/21/2018 Weight 199 lb 8 oz 185 lb 191 lb 3.2 oz 190 lb 12.8 oz 190 lb 3.2 oz 192 lb 8 oz 188 lb 8 oz BMI 31.25 kg/m2 28.98 kg/m2 29.95 kg/m2 29.88 kg/m2 29.79 kg/m2 30.15 kg/m2 29.52 kg/m2 Admitting Diagnosis: [facility] syndrome [facility] [I24.9] Elevated troponin [R79.89] Back pain [M54.9] Numbness and tingling in left arm [R20.0, R20.2] S/P cardiac catheterization [Z98.890] NSTEMI (non-ST elevated myocardial infarction) ([facility] [I21.4] Pertinent PMHx: DM, HTN, HLD, CKD stage 3, CAD, skin cancer, kidney stones Education Needs:        [] None identified  [] Identified - Not appropriate at this time  [x]  Identified and addressed - refer to education log Learning Limitations:   [] None identified  [x] Identified: lethargy, confusion/agitation- improving Cultural, Church & ethnic food preferences:  [x] None identified    [] Identified and addressed ESTIMATED NUTRITION NEEDS:  
 
Calories: 3339-7365 kcal (MSJx1.2-1.3) based on  [x] Actual BW 90 kg     [] IBW Protein:  gm (0.8-1.2gm/kg) based on  [x] Actual BW      [] IBW Fluid: 1 mL/kcal 
  
MONITORING & EVALUATION:  
 
Nutrition Goal(s):  
- PO nutrition intake will meet >75% of patient estimated nutritional needs within the next 7 days. Outcome: Progressing towards goal  
 
Monitoring:   [x] Food and nutrient intake   [x] Food and nutrient administration  [x] Comparative standards   [x] Nutrition-focused physical findings   [x] Anthropometric Measurements   [x] Treatment/therapy   [x] Biochemical data, medical tests, and procedures Previous Recommendations (for follow-up assessments only): Implemented Discharge Planning: No nutritional discharge needs at this time. Participated in care planning, discharge planning, & interdisciplinary rounds as appropriate. Farhad Young RD, [facility] Pager: [phone]

## 2020-03-10 NOTE — DIABETES MGMT
GLYCEMIC CONTROL PLAN OF CARE  
 
T2DM with current A1c of 7.1% (03/03/2020). Patient's wife is a certified diabetes educator. Home diabetes medications: 
Glyburide 2.5 mg two times daily Post CABG 03/04/2020 POC BG range on 03/09/2020: . Patient cont to have episodes of hypoglycemia. Treated with D50 x3 doses, pt cont'd on IVF with dextrose at 75 ml/hr until it was discontinued at 5:36 PM. Obtained order to hold daily lantus insulin. POC BG report on 03/10/2020 at time of review: 128, 113, 103, 127. Wife/family member visiting. Recommendation(s):  
1.) cont sliding scale insulin as ordered. 2.) cont glycemic monitoring and eval before resuming lantus insulin. Assessment: 
Patient is a 68year old male with a past medical history including type 2 diabetes, hypertension, hyperlipidemia, and kidney stones who presented with severe back pain. Noted: 
NSTEMI. 
CAD. Status post CABG x3 on 03/04/2020. T2DM. Most recent blood glucose values: 
 
Results for Cindy Gudino (MRN [id number]) as of 3/10/2020 12:06 Ref. Range 3/9/2020 00:42 3/9/2020 00:59 3/9/2020 03:14 3/9/2020 03:35 3/9/2020 06:04 3/9/2020 06:27 3/9/2020 07:56 3/9/2020 08:50 3/9/2020 11:53 3/9/2020 16:39 3/9/2020 21:12 3/9/2020 23:16 GLUCOSE,FAST - POC Latest Ref Range: 70 - 110 mg/dL 71 133 (H) 69 (L) 117 (H) 60 (L) 76 65 (L) 137 (H) 120 (H) 123 (H) 120 (H) 121 (H) Results for Cindy Gudino (MRN [id number]) as of 3/10/2020 12:06 Ref. Range 3/10/2020 01:10 3/10/2020 03:57 3/10/2020 05:23 3/10/2020 08:48 GLUCOSE,FAST - POC Latest Ref Range: 70 - 110 mg/dL 128 (H) 113 (H) 103 127 (H) Current A1C of 7.1% (03/03/2020) is equivalent to estimated average blood glucose of 157 mg/dl over the past 2-3 months. Current hospital diabetes medications:  
Basal lantus insulin 20 units daily -on hold since 3/09/2020. Correctional lispro insulin ACHS. Very resistant dose. Total dose of insulin day before: 03/10/2020 Lantus: None. Lispro: none. Diet: Diabetic consistent mechanical soft; no concentrated sweets; nutr suppl: glucerna shake with all meals. Goal: Patient's blood glucose will be within target range of  mg/dL by 03/13/2020. Education:  Per RD CDE notes on 03/03/2020: pt's spouse is a certified diabetes educator. Information provided on free outpatient diabetes education classes. Xander Wilson RN [facility] Pager: [phone]

## 2020-03-10 NOTE — PROGRESS NOTES
RESPIRATORY CARE ASSESSMENT FOR BRONCHIAL HYGIENE OR LUNG EXPANSION THERAPY Patient  Skye White     68 y.o.   male     3/10/2020  4:04 PM 
Patient Active Problem List  
Diagnosis Code  Type 2 diabetes mellitus without complication, without long-term current use of insulin ([facility]) E11.9  
 HTN (hypertension) I10  
 Gout M10.9  Hyperlipemia E78.5  Nocturia R35.1  Type 2 diabetes with nephropathy ([facility]) E11.21  
 Elevated troponin R79.89  Back pain M54.9  Acute coronary syndrome ([facility]) I24.9  S/P cardiac catheterization Z98.890  
 NSTEMI (non-ST elevated myocardial infarction) ([facility]) I21.4  
 S/P CABG x 3 Z95.1  Stage 3 chronic kidney disease ([facility]) N18.3  LOREN (acute kidney injury) ([facility] N17.9  Coronary artery disease of native artery with stable angina pectoris ([facility] I25.118  
 Urinary retention R33.9 ABG: 
Date:3/10/2020 No results found for: PH, PHI, PCO2, PCO2I, PO2, PO2I, HCO3, HCO3I, FIO2, FIO2I Chest X-ray: 
Date:3/10/2020 Results from [facility] Encounter encounter on 03/02/20 XR CHEST PORT Narrative EXAM: XR CHEST PORT INDICATION: post op COMPARISON: Recent prior FINDINGS: A portable AP radiograph of the chest was obtained at 1126 hours. The 
patient is on a cardiac monitor. Patient is rotated which limits evaluation. Probable patchy bibasilar opacity, left greater than right, persists. . Stable 
cardiac silhouette. .  No acute bone findings. Sequela of previous surgical 
intervention to the chest and mediastinum, stable. Interval removal of chest 
tubes. Question right neck line versus other tubing. Correlate clinically. Impression IMPRESSION:  
 
Limited evaluation due to patient rotation. Probable persistent bibasilar opacities, left greater than right. Interval removal of chest tubes. Question right neck line versus other tubing. Correlate clinically. Lab Test: 
Date:3/10/2020 WBC:  
Lab Results Component Value Date/Time WBC 10.3 03/09/2020 08:00 AM  
HGB:  
Lab Results Component Value Date/Time HGB 8.3 (L) 03/09/2020 08:00 AM  
 PLTS:  
Lab Results Component Value Date/Time PLATELET 291 47/66/2032 08:00 AM  
 
 
SaO2%/flow: @LASTSAO2(1)@ Vital Signs:    
Patient Vitals for the past 8 hrs: 
 Temp Pulse Resp BP SpO2  
03/10/20 1500  (!) 106 18 91/58 95 % 03/10/20 1400  94 14  95 % 03/10/20 1300  97 19 109/68 95 % 03/10/20 1200 98 °F (36.7 °C) (!) 101 23 97/52   
03/10/20 1100  94 19  96 % 03/10/20 1000  94 16 106/61 97 % 03/10/20 0936  90 17 90/60 95 % 03/10/20 0900  86 17 92/59 95 % RA/O2 flow/device:NC 4lpm   
 
 
First Inital Assesment:    
[x]Yes []No  
Reevaluation/Reassessment:   
[]Yes []No  
 
CHART REVIEW Points 0 X 1 X 2 X 3 X 4 X Points Pulmonary History Smoking History (1) none  Recent Smoking History <1 PPD  Recent Smoking History >1 PPD  Pulmonary Disease or Impairment  Severe Pulmonary Disease  1 Surgical History No Surgery  General Surgery  Lower Abdominal  Thoracic or Upper Abdominal  Thoracic & Pulmonary Disease  3 CXR Clear or not indicated  Chronic changes or CXR Pending  Infiltrate, atelectasis or pleural effusions  Infiltrates in more than 1lobe  Infiltrates +atelectasis +/or pleural effusions  2 PATIENT ASSESSMENT  
 0 X 1 X 2 X 3 X 4 X Points Respiratory Pattern Regular pattern RR 12-20  Increased RR 21-27   Mild Dyspnea at rest, irregular pattern RR 28-32  Moderate Dyspnea at rest, Use of accessory muscles, RR 33-36  Severe Dyspnea, Use of accessory muscles RR >36  1 Mental Status Alert Oriented cooperative  Confused, Follows commands  Lethargic, Does not follow commands  Obtunded  Unresponsive  1 Breath Sounds Clear  Decreased Unilaterally  Decreased Bilaterally  Mild Scattered wheezing or Crackles in bases  Severe Wheezing or rhonchi  0 Cough Strong dry NPC  Strong Productive  Weak NPC  Weak productive or weak with rhonchi  No cough or may require suctioning  1 Level of Activity Ambulatory  Ambulatory with assistance  Temporarily Non-ambulatory  Non-Ambulatory, able to position self  Unable to position self, confined to bed  1 Total Points/Score:   10 Specific Intervention Chart(IS via nursing, and flutter valve) Bronchial Hygiene/Secretion Clearance:   
[]EZPAP []Rotation bed with vibration [x]CPT with percussor []CPT via vest  
[]Oscillastiang positive pressure expiratory device Lung Expansion:   
[]Incentive Spirometer w/RT visits [x]Incentive Spirometer w/nursing []EZPAP [] Jorge Records *Suctioning:   
[]Nasal Tracheal []Tracheal   
 *suctioning will be ordered and done PRN with an associated frequency such as QID/PRN based on score Other:   
Care Plan Level # Score Modality Frequency Comment Level 1 >17 - - Level 2 14-17 - - Level 3 10-13 Flutter, cpt and IS Q4wa Level 4 1-9 - -   
 
BRONCHIAL HYGIENE SCORING AND FREQUENCY GUIDELINES Frequency Indications/Findings Level # Q4 ATC Copious secretions, SOB, unable to sleep 1 QID & PRN at night Moderate amounts of secretions 2  
TID or Q6 wa Small amounts of secretions and poor cough: recent history of secretions 3 BID or Q8 wa Unable to deep breathe and cough effectively 4 Comments:  Patient has been instructed on flutter, IS, cpt.    
 
Respiratory Therapist: Juan C Mahoney, RT

## 2020-03-10 NOTE — PROGRESS NOTES
Problem: Mobility Impaired (Adult and Pediatric) Goal: *Acute Goals and Plan of Care (Insert Text) Description Physical Therapy Goals Initiated 3/6/2020 and to be accomplished within 4 day(s) 1. Patient will move from supine to sit and sit to supine  and scoot up and down in bed with minimal assistance/contact guard assist.    
2.  Patient will transfer from bed to chair and chair to bed with supervision/set-up using the least restrictive device. 3.  Patient will perform sit to stand with supervision/set-up. 4.  Patient will ambulate with supervision/set-up for 100 feet with the least restrictive device. 5.  Assess as appropriate PLOF: Pt poor historian, states he lives with his spouse in 2 story home. He was independent with self-care and mobility. 3/10/2020 1058 by George Martinez PTA Outcome: Progressing Towards Goal 
 PHYSICAL THERAPY TREATMENT Patient: Christiano Avilez (4 y.o. male) Date: 3/10/2020 Diagnosis: Acute coronary syndrome ([facility] [I24.9] Elevated troponin [R79.89] Back pain [M54.9] Numbness and tingling in left arm [R20.0, R20.2] S/P cardiac catheterization [Z98.890] NSTEMI (non-ST elevated myocardial infarction) ([facility] [I21.4] Elevated troponin Procedure(s) (LRB): 
CORONARY ARTERY BYPASS GRAFT (CABG) TIMES THREE (N/A) 6 Days Post-Op Precautions: Sternal, Skin, Fall Chart, physical therapy assessment, plan of care and goals were reviewed. OBJECTIVE/ ASSESSMENT: 
Patient found sitting in b/s chair to work with PT. Patient seen with OT to maximize safety of patient and staff. Pt extremely drowsy throughout tx and requires constant VCs to stay awake. Pt noted to be slouched with his gluts nearly sliding out of chair. Pt was scooted back with draw sheet assistance then stood to Rollator. Pt was instructed to take steps forward to ambulate to toilet, but pt was only able to march in place with increased posterior lean.  Pt was instructed to sit and rest while b/s commode was brought into place. Pt then stood and was able to take steps to b/s commode when cued to \"keep stepping\" over and over again. Pt sat on commode and presented with poor seated balance, unable to use commode. Pt stood and ambulated back to b/s chair and positioned comfortably. Needs left in reach. Pt reports dizziness this tx. BP prior to mobility found to be 85/56. After mobility BP is 84/61. Pt requires constant VCs for safety and heart hugger usage to maintain sternal precautions. Pt's wife is hypervigilant and would like PT to visit \"4 or 5 times a day, no matter what the cost.\" Pt's wife educated in therex of UEs and LEs for pt to perform throughout the day. She was also educated in the importance of rest after activity. Progression toward goals: 
[x]      Improving appropriately and progressing toward goals 
[]      Improving slowly and progressing toward goals 
[]      Not making progress toward goals and plan of care will be adjusted PLAN: 
Patient continues to benefit from skilled intervention to address the above impairments. Continue treatment per established plan of care. Discharge Recommendations:  [place] Further Equipment Recommendations for Discharge:  rolling walker /TBD SUBJECTIVE:  
Patient stated Sit me up.  OBJECTIVE DATA SUMMARY:  
Critical Behavior: 
Neurologic State: Drowsy Orientation Level: Oriented to person Cognition: Decreased attention/concentration, Decreased command following Safety/Judgement: Fall prevention Functional Mobility Training: 
Bed Mobility: 
Transfers: 
Sit to Stand: Moderate assistance;Assist x2 Stand to Sit: Moderate assistance;Assist x2 Bed to Chair: Moderate assistance;Assist x2 Balance: 
Sitting: Impaired; With support Sitting - Static: Poor (constant support) Sitting - Dynamic: Poor (constant support) Standing: Impaired; With support Standing - Static: Fair(fair minus) Standing - Dynamic : Poor Ambulation/Gait Training: 
Distance (ft): 6 Feet (ft) Assistive Device: Walker, rolling Ambulation - Level of Assistance: Moderate assistance;Assist x2 Gait Abnormalities: Path deviations Stance: Weight shift Pain: 
Pain level pre-treatment: Not rated Pain level post-treatment: Not rated Please refer to the flowsheet for vital signs taken during this treatment. After treatment:  
[x] Patient left in no apparent distress sitting up in chair 
[] Patient left in no apparent distress in bed 
[x] Call bell left within reach 
[] Nursing notified 
[] Caregiver present 
[] Bed alarm activated 
[] SCDs applied COMMUNICATION/EDUCATION:  
[x]         Role of Physical Therapy 
[]         Fall prevention 
[]         Bed mobility [x]         Transfers 
[x]         Ambulation / gait [x]         Assistive device management 
[]         Stairs [x]         Body mechanics [x]         Position change  
[]         Therapeutic exercise [x]         Activity pacing / energy conservation 
[]         Other: 
   
Duran Torres PTA Time Calculation: 39 mins

## 2020-03-10 NOTE — CDMP QUERY
Pt admitted with NSTEMI. Noted documentation of moderate protein calorie malnutrition on 3/6 dietitian note. If possible, please document in progress notes and discharge summary: 
 
=> Moderate protein calorie malnutrition confirmed 
=> Moderate protein calorie malnutrition ruled out 
=> Other, please specify  
=> Clinically unable to determine The medical record reflects the following: 
 
Risk Factors: age 68, diabetes, CKD Clinical Indicators: per dietitian note: 
- Nutrition Diagnosis: Chronic disease or condition related malnutrition related to decreased appetite, lethargy as evidenced by decreased appetite/meal intake over the past year and 15 lb, 7.5% weight loss x 3 months. - Patient meets criteria for Moderate Protein Calorie Malnutrition as evidenced by:  
ASPEN Malnutrition Criteria Acute Illness, Chronic Illness, or Social/Enviornmental: Chronic illness Energy Intake: <75% est energy req for greater than/equal to 1 month Weight Loss: 7.5% x 3 mos ASPEN Malnutrition Score - Chronic Illness: 2 Chronic Illness - Malnutrition Diagnosis: Moderate malnutrition. Treatment: dietitian following: 
- Change to mechanical soft solid diet and add preferences. - Add supplements: Glucerna Shake TID.  
- Continue RD inpatient monitoring and evaluation Thank you, [id number] [facility], [facility] 
[phone]

## 2020-03-10 NOTE — PROGRESS NOTES
1915 - Assumed care of pt from [address] [place], RN. Assisted in changing bed pad and repositioning pt to left side. Son at bedside and states will remain at bedside for awhile. Assessment completed, pt knows name, states in [facility], reminded in [facility], gave right month and year and states in hospital because had a heart attack. On 0.5 L NC with clear diminished breath sounds. ST in low 100s on monitor, BP slightly elevated at 142/78 (92). Pt still remains edematous to BL UE and BL LE. Georges catheter inserted on day shift for retention. BM today 3/10. DAMON hose and SCDs in place. Bruising noted to left groin and hip area, right arm and redness to sacrum area. Call bell within reach, denies any other needs. 2015 - All evening medications given at this time. Pt is fidgety, messing with the cords, wanting them to be untangled, son and this nurse btoh assured pt cords are laying freely next to him. Son states pt is like this at home too. Pt did have a coughing episode after drinking water, sat pt all the way up to 45 degree angle, pt states he just needs to not drink too fast and get choked, per son pt has this problem at home sometimes too. After sitting up high no other problems and able to take medications whole, some coaching needed to finish taking medication. 2039 - CBC resulted, H/H higher than previous, no major changes. 2100 - Assisted pt in turning to right side, picking at georges catheter stat lock, explained reasoning on why it is there, pt states \"understand that but wants it off\", son interjected and told his father \"it was fine dad it is just tape, it isn't bothering you\". Placed on RA 
2130 - RT came by and worked with pt on flutter valve and IS.  
2230 - Pt wants to get up and get dressed to leave, son at bedside trying to calm pt, wants to get pt up and walk around, thinks that will help to get him sleepy.  Advised to son he will need to assist me as pt is not as strong yet and only me, son agreed. Pt to sitting on edge of bed, then stood with walker, pt requested to go to chair. As pt ambulating pt has large incontinent episode of stool. Able to get pt to toilet to get cleaned up, changed socks, SCDs, DAMON hose, gown, cleaned with bath wipes and CHG wipes, new heart hugger. Linens changed, floor cleaned. Pt exhausted after and returned to bed.  
2330 - Pt getting very irritated and angry, yelling at son. Trying to take everything off. Talking to people that are not in the room, per son the people he is naming are people from his past that have passed away. [phone] - Pt states is nervous, can't do this, needs his clothes and go home. Pt made a comment about \"killing someone\" his son laughed and said \"no dad you haven't killed anyone before you aren't going to now\". This nurse advised inappropriate comment and not something nice to say. 0000 - Large incontinent amount of stool. Son assisted and then stated that he needed to leave because he had work/meeting in the morning. He thought his sister was going to come up but he texted her and no reply. [id number] - Pt remains awake and agitated, constantly removing gown and equipment. Pt is confused thinks is on a train, this nurse sat at bedside most of the time reorienting pt, pt argumentive and feels I am lying to him and keeps trying to get out of bed, becoming more aggressive. 0 - Called pt's wife, Jl Chandler, to see if she would talk to him on the phone and if that might be helpful. Pt's wife was upset, she did state she would come up here but feels like it might be better to take him elsewhere. Attempted to assure her we are doing everything for him. 3415 - Updated RHONDA Briseno of situation, stat labs ordered.   
Musa Xavier RN assisted with pt in drawing labs and repositioning pt. 
5258 - Pt now calm and is A/O x4, at moments will say things that does not make sense, no recollection of prior events, still has not slept 1 - Pt's wife and daughter arrived at bedside stating is going to take him out, called and spoke to Dr. Jem Damian. Supervisor aware. 4723 - When came back into room with supplies to remove Cordis, wife informed will be staying until 0730 at least, for that is when she is supposed to talk to her cardiologist. Franchesca Luna remains at this time. Attempted to talk with wife but interrupted and stated thank you but we have it now. Daughter did make comment about perhaps they should stay over night versus being here during the day. Wife appears very nervous and kept repeating that same info about talking to her cardiologist and getting their opinion. Left family at bedside with pt.  
0630 - Son arrived at bedside. Son asked for update of events after he left, provided, he went into room and closed the glass door to speak with family and pt. Merlinda Downs yelling from pt towards family about getting him out of here, son trying to talk to pt 
5 - Son came to desk and asked if I could give the patient some Ativan. Called Dr. Jem Damian, ativan 1 mg ordered.

## 2020-03-10 NOTE — PROGRESS NOTES
[facility] - Progress Note Admit Date: 3/2/2020 The patient was seen, examined, and independently evaluated and I agree with the below assessment and plan by Meng Tracy PA-C with the following comments. This patient continues be somewhat confused and making very slow improvement. Suspect he will need discharge to Rehab unit after some further improvement. Assessment:  
 
Hospital Problems  Date Reviewed: 12/2/2019 Codes Class Noted POA  
 S/P cardiac catheterization ICD-10-CM: Z98.890 ICD-9-CM: V45.89  3/3/2020 No  
   
 Stage 3 chronic kidney disease (HCC) (Chronic) ICD-10-CM: N18.3 ICD-9-CM: 508. 3  Unknown Yes Urinary retention ICD-10-CM: R33.9 ICD-9-CM: 788.20  3/6/2020 No  
   
 S/P CABG x 3 (Chronic) ICD-10-CM: Z95.1 ICD-9-CM: V45.81  3/5/2020 No  
 Overview Signed 3/5/2020  1:20 PM by Blaine Urrutia PA-C  
  CABG x 3, Dr. Sergio Cushing, 3/4/2020, [facility] (acute kidney injury) ([facility] [address] ICD-10-CM: N17.9 ICD-9-CM: 584.9  3/5/2020 No  
   
 NSTEMI (non-ST elevated myocardial infarction) ([facility] [address] ICD-10-CM: I21.4 ICD-9-CM: 410.70  3/3/2020 Yes Coronary artery disease of native artery with stable angina pectoris [facility]) ICD-10-CM: I25.118 
ICD-9-CM: 414.01, 413.9  3/3/2020 Yes * (Principal) Elevated troponin ICD-10-CM: R79.89 ICD-9-CM: 790.6  3/2/2020 Yes Back pain (Chronic) ICD-10-CM: M54.9 ICD-9-CM: 724.5  3/2/2020 Yes Acute coronary syndrome (HCC) ICD-10-CM: I24.9 ICD-9-CM: 411.1  3/2/2020 Yes Type 2 diabetes with nephropathy (HCC) (Chronic) ICD-10-CM: E11.21 
ICD-9-CM: 250.40, 583.81  3/23/2018 Yes Nocturia ICD-10-CM: R35.1 ICD-9-CM: 788.43  10/10/2014 Yes HTN (hypertension) (Chronic) ICD-10-CM: I10 
ICD-9-CM: 401.9  8/19/2010 Yes Hyperlipemia (Chronic) ICD-10-CM: E41.2 ICD-9-CM: 272.4  8/19/2010 Yes  
   
  
 
 
-CAD s/p CABG X3 on 3/4/2020 (LIMA to mid LAD, SVG to OM1, SVG to Diag1/ramus). -NSTEMI. Troponin peak at [facility] with possible septal infarct without ischemic changes, presented with back pain with nausea and vomiting. 
-Normal EF 55-60% by echo 3/3/2020. 
-Hypertension. Stable. -Diabetes mellitus. HgbA1c 7.1 
-Chronic kidney disease. Stable. -H/o kidney stones, last once 3 weeks ago. -H/o vertigo. 
-Anemia/bleeding post op requiring transfusion. 
-Delirium/encephalopathy. Remotely evaluated by Dr. Acosta Carranza. Plan:  
 
Continues to have confusion/agitation. Family reports mental status better in the afternoons. Hemodynamics stable. Hemoglobin remains stable. Renal function appears stable. Continued on ASA, statin, BB. Continued on perioperative amio. Subjective:  
 
Continues to have confusion/agitation. Family at bedside. Wife concerned about mental status. Objective:  
  
Patient Vitals for the past 8 hrs: 
 Temp Pulse Resp BP SpO2  
03/10/20 1000  94 16 106/61 97 % 03/10/20 0936  90 17 90/60 95 % 03/10/20 0900  86 17 92/59 95 % 03/10/20 0800 97.4 °F (36.3 °C) 87 14 97/55 90 % 03/10/20 0700  87 16 (!) 88/53 93 % 03/10/20 0652  86 14 90/65 91 % 03/10/20 0619  87 15 (!) 85/49 91 % 03/10/20 0604  94 16 (!) 82/51 90 % 03/10/20 0500  82 15 123/62 95 % 03/10/20 0400 98.1 °F (36.7 °C) 82 15 102/59 93 % Patient Vitals for the past 96 hrs: 
 Weight 03/10/20 0530 90.5 kg (199 lb 8 oz) 03/09/20 0600 90.4 kg (199 lb 4.7 oz) 03/08/20 0600 90.6 kg (199 lb 11.8 oz) Intake/Output Summary (Last 24 hours) at 3/10/2020 1158 Last data filed at 3/10/2020 0730 Gross per 24 hour Intake 1331.25 ml Output 2100 ml Net -768.75 ml Physical Exam: 
General:  confused Neck:  no JVD Lungs:  clear to auscultation bilaterally Heart:  regular rate and rhythm Abdomen:  abdomen is soft without significant tenderness, masses, organomegaly or guarding Extremities:  extremities normal, atraumatic, no cyanosis + edema Data Review:  
 
Labs: Results:  
   
Chemistry Recent Labs  
  03/10/20 
0521 03/09/20 
0800 03/08/20 
1907 03/08/20 
8986 GLU 88 53*  --  31*  146*  --  144  
K 4.4 4.1 4.2 3.3*  
 113*  --  112* CO2 27 26  --  27 BUN 38* 40*  --  46* CREA 1.53* 1.52*  --  1.55* CA 7.9* 7.4*  --  7.7* AGAP 4 7  --  5  
BUCR 25* 26*  --  30* CBC w/Diff Recent Labs 03/09/20 
0800 03/08/20 
1907 03/08/20 
0525 WBC 10.3  --  12.8 RBC 2.76*  --  2.30* HGB 8.3* 9.2* 6.9*  
HCT 25.5* 27.9* 21.2*  
  --  166 GRANS 62  --  68  
LYMPH 20*  --  19* EOS 5  --  2 Cardiac Enzymes No results found for: CPK, CK, CKMMB, CKMB, RCK3, CKMBT, CKNDX, CKND1, DION, TROPT, TROIQ, AFSHIN, TROPT, TNIPOC, BNP, BNPP Coagulation No results for input(s): PTP, INR, APTT, INREXT, INREXT in the last 72 hours. Lipid Panel Lab Results Component Value Date/Time Cholesterol, total 180 03/04/2020 05:37 AM  
 HDL Cholesterol 33 (L) 03/04/2020 05:37 AM  
 LDL, calculated 73.4 03/04/2020 05:37 AM  
 VLDL, calculated 73.6 03/04/2020 05:37 AM  
 Triglyceride 368 (H) 03/04/2020 05:37 AM  
 CHOL/HDL Ratio 5.5 (H) 03/04/2020 05:37 AM  
  
BNP No results found for: BNP, BNPP, XBNPT Liver Enzymes No results for input(s): TP, ALB, TBIL, AP, SGOT, GPT in the last 72 hours. No lab exists for component: DBIL Digoxin Thyroid Studies Lab Results Component Value Date/Time TSH 0.83 03/03/2020 02:59 AM  
    
 
Signed By: Jorge Luis Murphy,  March 10, 2020

## 2020-03-10 NOTE — PROGRESS NOTES
1000 Angelita Cr, RN and Paulino Strauss, WAYNE to get patient back to bed, as pt stood from recliner pt had urinary incontinence. Pt cleaned, new gown, new DAMON hose and SCDs applied. Wife at bedside. 2000 - Spoke to Annalise Hahn, [id number] Shafer about labs for in AM and Chest Xray ordered daily, okay to D/C chest xray order. Pt moaning in bed, pulling at sheets, when went into room, pt states had to pee, assisted with urinal and voided 100 cc. Assisted pt to right side lying with pillow under back. Assessment completed at this time. Pt does answer questions correctly, after some time and is A/O x4.  
2014 - Pain medication given 
2100 - Stayed in patients room from 2000 to 2100, pt restless, stating back hurts, wants to constantly be turned from side to side, states sleep on side at home. Assisted patient multiple times within the hour to turn on his left or right side, always after each turn he states it didn't help, tried to talk with patient about allowing time for medication and repositioning to work. Patient getting worked up, anxious and agitated, but is alert and will have conversations appropriately. HR increasing to 100s, and BP elevated at 160/83. 0.5 mg of ativan given, pt has expiratory wheezing, PRN albuterol given as well as remaining evening medications. See MAR.  
2140 - Pt finally resting. 2210 - Pt back awake, taking off heart hugger, keeps saying \"please help me\" and \"oh God\", when at bedside pt saying untangle me, advised he is not tangled, he has monitoring equipment attached to him and none of the cords are tangled against him, advised I am unable to remove monitoring equipment, pt states he can't do it, discussed trying to do some relaxation methods and pt flat out refuses and keeps saying help me and oh god even with me at bedside talking to him. 2230 - This nurse still remaining at bedside, as pt still yelling out and wanting \"the stuff\" off of him.  Tried again to reason with pt and explain unable to remove. Pt then ask for me to go to the store to get some \"narcotic stuff\". 2300 - This nurse left the room to get scheduled medications and came back to pt taking off his gown and BP cuff off. Pt still insisting he wants everything off, advised he could not take the gown off and he states he can do what he wants at home. Reminded him he is at the hospital. Advised it was 11pm at night and he states something is wrong and does not believe it is night time. Will remain at bedside for safety 2345 - Incont of urine, performed incont care and new bed pad placed, placed condom catheter on.  
0020 - Pt finally sleeping, no S/S of distress HR 87, /68 
0100 - Pt sleeping soundly, blood sugar checked, 128. 
0200 - Pt still sleeping, BP marginal; however MAP is 66.  
0240 - Pt came awake coughing, stating it hurts. Checked BP prior to giving pain medication. /63 (78) 
0330 - Pt drifted back to sleep. No S/S of distress. 0515 - Bed weight taken. Pt bathed with CHG wipes. Chest tubes incision sites still with drainage, dressing changed. Pt up to chair. Scheduled meds given. 0630 - Pt still drowsy and drifts off to sleep easily, will awake with voice or stimulation, since up to chair BP lower 85/49 (61), pt denies dizziness or lightheadedness. Will monitor. 0700 - Pt has not voided since midnight. Bladder scan shows 450ml, stood pt up to assist in trying to urinate, pt says just can't go, Pt has been voiding all night without difficulty, when palpate abdomen pt states tender and to not push on abdomen. Passed along to WAYNE Mooney in shift change. Pt fell back asleep in chair within moments of leaving room.

## 2020-03-10 NOTE — PROGRESS NOTES
0700 - Report received from Clara Villasenor. Patient resting in recliner, confused to time and situation. 0730 - No urine output since midnight. Bladder scan reveal 450cc. PA, notified. Orders received for straight catheter. 0745 - Straight catheter done. 550ml of clear yellow urine drained. 1000 - PT/OT at bedside, able to stand patient 1300 - Unable to void post lasix IVP. Bladder scan reveals 310cc. PA notified, Orders received to insert georges catheter. 1315 - Georges Catheter inserted. 300ml of clear yellow urine drained. 1500 - Able to pivot to bedside commode 1730 - Family at bedside. Concerned with dosing of seroquel. MD RHONDA at bedside. 1745 - Ambulated to hallway. Tolerated to nursing station. Rolled back to room and assisted into bed. 
1900 - End of Shift.  Report given to Clara Villasenor

## 2020-03-10 NOTE — PROGRESS NOTES
Patient has been instructed on flutter and patient used it 10x and did well. IS also used and instructed. Pt pulled 250 to 500cc. Pt does need encouragement. CPT was done with patient in chair, however pt will be done in bed when he is back in bed.

## 2020-03-11 NOTE — CONSULTS
Interventional Radiology Consulted for PICC vs midline needs access post surgery unable to obtain IV access. Will place on schedule for today for PICC line as schedule allows.   
 
Lucille Chirinos PA-C

## 2020-03-11 NOTE — PROGRESS NOTES
CARDIAC SURGERY PROGRESS NOTE 
 
3/11/2020 Post Operative Day # 7 Chart reviewed. D/w Dr. Lana Richard Interval History/Events of Past 24 hours:  
Agitation, confusion overnight,  -> no sleep, unlike night before. Seroquel decreased yesterday, Ativan d/c'd Mild increase in BUN/Cr 38/1.53 > 40/1. 77. LFTs, ammonia nl. Multiple BMs - 2.5L yest.  
 
Assessment: 
· CAD S/P  CABG x 3 - on  BB, ASA, statin · Respiratory parameters stable cont guaifenesin, bronchial hygiene, chest PT 
· Cardiac status stable · Encephalopathy /Delirium - no metabolic cause. slowly improving, likely just related to bypass, anesthesia on Seroquel, Thiamine · LOREN on CKD stable - nephrology following · Acute blood loss anemia s/p 5 PRBC, 2 FFP, 1 PLT - stable on Retacrit, PO Fe, Vit C, Folate · Postoperative pain - ATC APAP, PRN Ultram 
· On BID Lasix/K Plans: 1. CBC every other day, BMP tomorrow 2. Urology consult for urinary retention 3. Add Melatonin, promote Sleep hygeine and daytime alertness, lights on, blinds open 4. OOB, ambulate, PT/OT Heart Hugger use encouraged to mitigate pain and will also assist with deep breathing and incentive spirometry goals. Possible discharge 3 days. Timi Mckee PA-C [facility] [phone] 
_____________________________________________________________________________________________________________________________________________ Subjective: 
Patient seen and examined on rounds today. 
-Up to chair and BS commode. Tearful 
+ multiple BMs 
Pain controlled Objective: 
Vital signs:  
Visit Vitals BP (!) 169/93 (BP 1 Location: Right arm, BP Patient Position: At rest;Sitting) Pulse (!) 120 Temp 97.8 °F (36.6 °C) Resp 23 Ht 5' 7\" (1.702 m) Wt 89.2 kg (196 lb 11.2 oz) SpO2 96% BMI 30.81 kg/m² Temp (24hrs), Av °F (36.7 °C), Min:97.8 °F (36.6 °C), Max:98.2 °F (36.8 °C) Admission Weight: Last Weight Weight: 90.7 kg (200 lb) Weight: 89.2 kg (196 lb 11.2 oz) Last 3 Recorded Weights in this Encounter 03/09/20 0600 03/10/20 0530 03/11/20 1761 Weight: 90.4 kg (199 lb 4.7 oz) 90.5 kg (199 lb 8 oz) 89.2 kg (196 lb 11.2 oz) Telemetry: NSR Physical Examination:    
General:  Oriented to person Lungs: few rales at bases bilaterally, wheezes or rhonchi. Chest: Dressings clean and dry. Sternum stable. Heart: regular rate and rhythm, No murmur. Abdomen: Soft and non-tender without masses. Bowel sounds present. Extremities: Warm and well perfused. Edema mild. + less scrotal edema. Incisions: clean and dry Neuro: No deficit. SUP Prophylaxis: Pepcid DVT Prophylaxis:  
pneumatic compression boots: Yes Compression stockings:  Yes 
Heparin:  Yes Chest tubes:  present Pacing wires:  not present DME needs:  tbd Labs: 
Lab Results Component Value Date/Time WBC 11.7 03/10/2020 06:45 PM  
 HCT 26.8 (L) 03/10/2020 06:45 PM  
  03/10/2020 06:45 PM  
  
Lab Results Component Value Date/Time  03/11/2020 04:27 AM  
 K 4.6 03/11/2020 04:27 AM  
  03/11/2020 04:27 AM  
 CO2 26 03/11/2020 04:27 AM  
  (H) 03/11/2020 04:27 AM  
 BUN 40 (H) 03/11/2020 04:27 AM  
 CREA 1.77 (H) 03/11/2020 04:27 AM  
 CREA 1.53 (H) 03/10/2020 05:21 AM  
 CREA 1.52 (H) 03/09/2020 08:00 AM  
 
Lab Results Component Value Date/Time HBA1C 7.1 (H) 03/03/2020 02:59 AM  
 
 
 
PLEASE NOTE: 
This document may have been produced using voice recognition software. Unrecognized errors in transcription may be present. Please call with any questions.

## 2020-03-11 NOTE — CDMP QUERY
Pt admitted with NSTEMI. Encephalopathy documented on 3/10 progress notes by cardiology. Please further specify type of Encephalopathy in the medical record 
 
=> Metabolic Encephalopathy 
=> Toxic Encephalopathy 
=> Encephalopathy due to medications or drugs (please specify) 
=> Other Encephalopathy (please specify) 
=> Other, please specify 
=> Clinically unable to determine The medical record reflects the following: 
 
Risk Factors: NSTEMI, ABLA, LOREN, DM Clinical Indicators: per 3/10 cardiology progress note: Delirium/encephalopathy ; Continues to have confusion/agitation Treatment:  
- per cardiology: His wife remains very tearful, as she is concerned about his delirium. I have repeatedly tried to explain to her that this was not not an ominous situation and that it will pass with time ; Continues to have confusion/agitation. Family reports mental status better in the afternoons 
- receiving Seroquel 
- given Ativan Thank you, [id number] [facility], [facility] 
[phone]

## 2020-03-11 NOTE — PROGRESS NOTES
Problem: Self Care Deficits Care Plan (Adult) Goal: *Acute Goals and Plan of Care (Insert Text) Description: Occupational Therapy Goals Initiated 3/6/2020 within 7 day(s). 1.  Patient will perform self-feeding with modified independence. 2.  Patient will perform grooming with supervision/set-up following sternal precautions. 3.  Patient will perform upper body dressing with minimal assistance/contact guard assist. 
4.  Patient will perform lower body dressing with moderate assistance. 5.  Patient will perform toilet transfers with contact guard assist. 
6.  Patient will perform all aspects of toileting with contact guard assist. 
7.  Patient will utilize energy conservation techniques during functional activities with verbal cues. Prior Level of Function: Pt only oriented to self. Pt reports he was able to dress himself but required assistance for bathing PTA. Pt unable to state other PLOF information during OT evaluation. Outcome: Progressing Towards Goal 
 OCCUPATIONAL THERAPY TREATMENT Patient: Alayna Srivastava (37 y.o. male) Date: 3/11/2020 Diagnosis: Acute coronary syndrome ([facility] [address] [I24.9] Elevated troponin [R79.89] Back pain [M54.9] Numbness and tingling in left arm [R20.0, R20.2] S/P cardiac catheterization [Z98.890] NSTEMI (non-ST elevated myocardial infarction) ([facility] [address] [I21.4] Elevated troponin Procedure(s) (LRB): 
CORONARY ARTERY BYPASS GRAFT (CABG) TIMES THREE (N/A) 7 Days Post-Op Precautions: Sternal, Skin, Fall Chart, occupational therapy assessment, plan of care, and goals were reviewed. ASSESSMENT: 
Pt OOB seated in chair upon entry. Pt alert, although remains confused. Pt requires max verbal/tactile cues for use of heart hugger w/ADLs and functional tranfers. Pt performs simple ADL grooming tasks and functional mobility for increase activity tolerance w/ADLs. Pt c/o fatigue at end of session requesting return to supine.  HR 100s w/activity, 90s at rest 
 Progression toward goals: 
[]          Improving appropriately and progressing toward goals [x]          Improving slowly and progressing toward goals 
[]          Not making progress toward goals and plan of care will be adjusted PLAN: 
Patient continues to benefit from skilled intervention to address the above impairments. Continue treatment per established plan of care. Discharge Recommendations:  Rehab Further Equipment Recommendations for Discharge:  shower chair and rolling walker SUBJECTIVE:  
Patient stated I've been taking care of my wife since she had eye surgery.  OBJECTIVE DATA SUMMARY:  
Cognitive/Behavioral Status: 
Neurologic State: Alert, Confused Orientation Level: Oriented to person Cognition: Decreased attention/concentration, Decreased command following Safety/Judgement: Fall prevention Functional Mobility and Transfers for ADLs: 
 Transfers: 
Sit to Stand: Moderate assistance;Assist x2(from low chair, CG from from elevated bed) Balance: 
Sitting: Impaired Sitting - Static: Fair (occasional)(+) Sitting - Dynamic: Fair (occasional) Standing: Impaired; With support Standing - Static: Fair(+) Standing - Dynamic : Fair ADL Intervention: 
Grooming Washing Face: Stand-by assistance Washing Hands: Stand-by assistance Upper Body Dressing Assistance Shirt simulation with hospital gown: Moderate assistance UE Therapeutic Exercises: AROM BUE elbow flexion/extension Pt performs functional mobility w/rollator for increase activity tolerance w/ADLs. Pt requires vc's for pacing. Pain: 
Pain level pre-treatment: 0/10 Pain level post-treatment: 0/10 Activity Tolerance:   
Fair Please refer to the flowsheet for vital signs taken during this treatment. After treatment:  
[]  Patient left in no apparent distress sitting up in chair 
[x]  Patient left in no apparent distress in bed 
[x]  Call bell left within reach [x]  Nursing notified 
[x]  spouse notified []  Bed alarm activated COMMUNICATION/EDUCATION:  
[] Role of Occupational Therapy in the acute care setting 
[] Home safety education was provided and the patient/caregiver indicated understanding. [] Patient/family have participated as able in working towards goals and plan of care. [x] Patient/family agree to work toward stated goals and plan of care. [] Patient understands intent and goals of therapy, but is neutral about his/her participation. [] Patient is unable to participate in goal setting and plan of care. Thank you for this referral. 
FIORELLA Emery Time Calculation: 25 mins

## 2020-03-11 NOTE — PROGRESS NOTES
8373: Bedside and Verbal shift change report given to writer (oncoming nurse) by Ferny Cotton (offgoing nurse). Report included the following information Kardex, OR Summary, Procedure Summary, Intake/Output, MAR, Accordion, Recent Results, Med Rec Status, Cardiac Rhythm ., Alarm Parameters , Procedure Verification, Quality Measures and Dual Neuro Assessment. 0755: Pt repeatedly attempting to get out of bed, placing legs over siderails. Two person assist utilized to aid pt in standing to walk five steps to bedside recliner. Chair alarm on.  
0800: Pt oriented to self, able to state his date of birth and name the daughter sitting at his bedside. Pt able to state that he was in a \"building near my home\", not able to state day, date or time, not able to state that he was in a hospital or reason for being at hospital. 
1615: Pt able to swallow pills with applesauce, able to drink oral supplement. Pt ate 1/2 of applesauce and 1/4 cup of scrambled eggs. Pt able to feed self with a spoon. 0845: Pt stood at side of recliner and walked few steps to bedside commode with two assists. Pt had small amount loose dark stool. 0940: Pt restless, sitting in recliner and frequently repositioning self and moving hands and legs while \"trying to get some sleep\". 4518: Pt stood in room, walked five steps with two assists, remains very weak with unsteady gait. Pt returned to recliner, chair alarm activated. 1016: Pt has dry, nonproductive cough. Pt encouraged to use Heart Hugger to splint sternum, not always doing so prior to activity and coughing despite reminding patient. Pt complaining of \" my chest hurts\", upon further inquiry pt stated \". ..when I cough\". Pt medicated with Tylenol. 1045: Pt called out \"I have to pee\", georges catheter intact and draining clear yellow urine. Pt noted to be incontinent of loose dark brown stool.  Pt ambulated from chair to bedside commode, moderate amount of loose dark stool passed. Perianal/gluteal cleft area reddened, silicone cream applied to site. . Pt returned to recliner with pt sitting on air cushion, chair alarm activated. 1120: Pt requested to \"talk to God, I need a second chance\".  at bedside, praying with patient. 1230: Pt ambulated to bathroom, had moderate amount loose dark brown stool. 1355: Pt ambulated in hallway with PT utilizing walker. 1430: Pt in bed after ambulated by PT department, spouse at bedside. 1545: Pt appears to be asleep, spouse no longer at bedside. 1605: Pt awoke agitated, pulling off Heart Hugger and EKG leads, pt gown. When attempts made to replace EKG leads and gown pt grabbed writers' wrist and squeezed wrist roughly. Pt screaming spouses' name and writer told pt that she went to lunch (this is what she told pt when she left room), pt screamed \"someone call police, they are holding me hostage\". Writer, pt has legs over bed rails and attempting to sit up, writer called Messi Short for assistance. Dr. Derek Egan received pt condition update, Lorazepam 1mg IV ordered. While writer was obtaining Lorazepam from medication, ALVA GarvinRN attending pt, pt climbed out of bed and ran into hallway. Pt accompanied back to bed. Murali Newby administered per MD order. PCS notified of pt situation, safety partner/ sitter request faxed to supervisor Morgan//PCS paged and message relayed. 1730: Sitter assigned, at patients' bedside. 1915: Bedside and Verbal shift change report given to Juan Kim (oncoming nurse) by Scar Alegria (offgoing nurse). Report included the following information Kardex, OR Summary, Procedure Summary, Intake/Output, MAR, Accordion, Recent Results, Med Rec Status, Cardiac Rhythm ., Alarm Parameters , Procedure Verification, Quality Measures and Dual Neuro Assessment.

## 2020-03-11 NOTE — PROGRESS NOTES
conducted a Follow up consultation and Spiritual Assessment for Kim Santos, who is a 68 y.o.,male. The  provided the following Interventions: 
Continued the relationship of care and support. Listened empathically as patient verbally expressed  his fears of dying. He was holding his tears, coughing, and was asking God to give him another chance. Compassionate listening and talking about our [place] belief that there is life after death, patient had calmed down. Offered prayer and assurance of continued prayer on patient's behalf. Chart reviewed. The following outcomes were achieved: 
Patient expressed gratitude for 's visit. Assessment: 
There are no further spiritual or Worship issues which require Spiritual Care Services interventions at this time. Plan: 
Chaplains will continue to follow and will provide pastoral care on an as needed/requested basis.  recommends bedside caregivers page  on duty if patient shows signs of acute spiritual or emotional distress. [facility] [address] [facility]  
[phone]

## 2020-03-11 NOTE — CONSULTS
[address] [facility], [address] [place] [address] Phone: [phone] Urology Consult Note 1. ACS (acute coronary syndrome) ([facility] [address] 2. Acute midline thoracic back pain 3. Numbness and tingling in left arm 4. NSTEMI (non-ST elevated myocardial infarction) ([address] 5. Type 2 diabetes mellitus without complication, without long-term current use of insulin ([address] Assessment & Plan Assessment 68year old male Post Operative Coronary artery bypass grafting x 3 on 3/4/2020 with: 
 
Post op Urinary Retention 
- georges placed 3/10/2020 with 300 cc's return after being s/c for 500 ccs earlier LOREN on CKD 
- creat trending up 
- CT 3/2 no hydro Hx BPH Left Hydrocele s/p repair by Dr. Nicola Wright 4/29/15 Non-obstructing right Kidney Stones measuring 3 mm each by CT Plan: 
Maintain Georges until AMS improved and patient more ambulatory Continue Flomax Ucx reviewed- NG 
MEGAN to eval cause of worsening creat - ordered Will need PSA and MELISSA as outpt Follow Up arranged: NO Will see again on Friday, please contact sooner with any questions or concerns Loren MARTIN-BC [facility] Pager # [phone] Available M-F 7:30- 5pm .  After 5pm and weekends please call answering service at [phone] Chief Complaint Patient presents with  Back Pain  Nausea HISTORY OF PRESENT ILLNESS:   
Skye White is a 68 y.o. male who is seen in consultation as referred by JOSE Jacobs for AUR. Patient has been seen by a urologist and is not currently receiving urological care with Dr. Nicola Wright. Urological history is significant for BPH and Left Hydrocele s/p repair by Dr. Nicola Wright 4/29/15 HPI: Obtained from chart review only 2/2 confusion.  Patient is Post Operative Coronary artery bypass grafting x 3 on 3/4/2020. Nursing noted No urine output since midnight yesterday. Bladder scan reveal 450cc. Straight catheter done. 550ml of clear yellow urine drained. He was given lasix and still unable to urinate. Bladder scan reveals 310cc. Lin Catheter inserted. 300ml of clear yellow urine drained. Patient also with LOREN and worsening creat. CT 3/2 w/o hydro however does have non-obstructing stones. Location Bladder/ prostate Duration day Associated signs/symptoms as above Modifying factors as above Severity unknown No flowsheet data found. Past Medical History:  
Diagnosis Date  Acute coronary syndrome ([address] 3/2/2020  LOREN (acute kidney injury) ([facility] [address] 3/5/2020  Calculus of kidney  Cancer ([facility] [address] HX of [facility]  Coronary artery disease of native artery with stable angina pectoris ([facility] [address] 3/3/2020  Diabetes mellitus ([facility] [address] 8/19/2010  Gout 8/19/2010  
 HTN (hypertension) 8/19/2010  Hyperlipemia 8/19/2010  Kidney disease  Lumbar spondylosis  Proteinuria  S/P CABG x 3 3/5/2020 CABG x 3, Dr. Rolando Avery, 3/4/2020, [facility]  
 Skin cancer, basal cell   
 neck  Stage 3 chronic kidney disease ([address]  Type 2 diabetes mellitus without complication, without long-term current use of insulin ([facility] [address] 8/19/2010  Urolithiasis Past Surgical History:  
Procedure Laterality Date  HX CORONARY ARTERY BYPASS GRAFT  03/04/2020 CABG x3, Dr. Rolando Avery, [facility]  
 HX GI    
 HX OTHER SURGICAL Basal cell removed from left side of face  HX UROLOGICAL    
 kidney Social History Tobacco Use  Smoking status: Never Smoker  Smokeless tobacco: Never Used Substance Use Topics  Alcohol use: No  
 Drug use: No  
 
 
Allergies Allergen Reactions  Metformin Other (comments) Renal insufficiency Family History Problem Relation Age of Onset  Diabetes Mother Current Facility-Administered Medications Medication Dose Route Frequency Provider Last Rate Last Dose  acetaminophen (TYLENOL) solution 650 mg  650 mg Oral Q4H PRN Bianka Esposito MD   650 mg at 03/11/20 1016  
 [START ON 3/12/2020] furosemide (LASIX) injection 20 mg  20 mg IntraVENous DAILY Delores Morales MD      
 melatonin tablet 3 mg  3 mg Oral QHS Sara Guo PA-C      
 guaiFENesin ER [facility]) tablet 1,200 mg  1,200 mg Oral Q12H Richard Krause PA-C   1,200 mg at 03/11/20 4232  QUEtiapine SR (SEROquel XR) tablet 50 mg  50 mg Oral QHS Richard Krause PA-C   50 mg at 03/10/20 2009  heparin (porcine) injection 5,000 Units  5,000 Units SubCUTAneous Q8H Bianka Esposito MD   5,000 Units at 03/11/20 0525  
 atorvastatin (LIPITOR) tablet 40 mg  40 mg Oral QHS Theresa Hightower   40 mg at 03/10/20 2013  traMADoL (ULTRAM) tablet 50 mg  50 mg Oral Q6H PRN Richard Krause PA-C   50 mg at 03/10/20 7554  cholecalciferol (VITAMIN D3) (1000 Units /25 mcg) tablet 4 Tab  4,000 Units Oral DAILY Uriel Yi MD   4 Tab at 03/11/20 3854  [Held by provider] insulin glargine (LANTUS) injection 20 Units  20 Units SubCUTAneous DAILY Sara Guo PA-C   Stopped at 03/09/20 0900  ferrous sulfate tablet 325 mg  1 Tab Oral BID WITH MEALS Richard Krause PA-C   325 mg at 03/11/20 9313  ascorbic acid (vitamin C) (VITAMIN C) tablet 250 mg  250 mg Oral BID WITH MEALS Sara Guo PA-C   250 mg at 03/11/20 4185  folic acid (FOLVITE) tablet 1 mg  1 mg Oral DAILY Lyly Guo PA-C   1 mg at 03/11/20 4229  cyanocobalamin tablet 1,000 mcg  1,000 mcg Oral DAILY Yasmany Peter MD   1,000 mcg at 03/11/20 1904  metoprolol tartrate (LOPRESSOR) tablet 25 mg  25 mg Oral Q12H Sara Guo PA-C   25 mg at 03/11/20 7798  tamsulosin (FLOMAX) capsule 0.4 mg  0.4 mg Oral DAILY Sara Guo PA-C   0.4 mg at 03/11/20 5730  epoetin marbin-epbx (RETACRIT) injection 4,000 Units  4,000 Units SubCUTAneous Q MON, WED & Aydin Scott MD   4,000 Units at 03/09/20 2014  acetaminophen (TYLENOL) tablet 1,000 mg  1,000 mg Oral Q6H Tamar Guo PA-C   Stopped at 03/11/20 1200  famotidine (PEPCID) tablet 20 mg  20 mg Oral DAILY Cristiane Roblero PA-C   20 mg at 03/11/20 7423  
 insulin lispro (HUMALOG) injection   SubCUTAneous AC&HS Cristiane Roblero PA-C   Stopped at 03/10/20 1630  
 glucose chewable tablet 16 g  4 Tab Oral PRN Cristiane Roblero PA-C      
 glucagon (GLUCAGEN) injection 1 mg  1 mg IntraMUSCular PRN Flaquito WIGGINS PA-C      
 dextrose (D50W) injection syrg 12.5-25 g  25-50 mL IntraVENous PRN Cristiane Roblero PA-C   25 g at 03/09/20 0800  
 sodium chloride (NS) flush 5-40 mL  5-40 mL IntraVENous Q8H Cristiane Roblero PA-C   10 mL at 03/11/20 0528  
 naloxone (NARCAN) injection 0.4 mg  0.4 mg IntraVENous PRN Cristiane Roblero PA-C      
 amiodarone (CORDARONE) tablet 200 mg  200 mg Oral TID Cristiane Roblero PA-C   200 mg at 03/11/20 0836  
 albuterol (PROVENTIL VENTOLIN) nebulizer solution 2.5 mg  2.5 mg Nebulization Q4H PRN Cristiane Roblero PA-C   2.5 mg at 03/09/20 2114  
 ondansetron (ZOFRAN) injection 4 mg  4 mg IntraVENous Q4H PRN Cristiane Roblero PA-C   4 mg at 03/06/20 2334  aspirin delayed-release tablet 81 mg  81 mg Oral DAILY Cristiane Roblero PA-C   81 mg at 03/11/20 5675  chlorhexidine (PERIDEX) 0.12 % mouthwash 10 mL  10 mL Oral BID Cristiane Roblero PA-C   10 mL at 03/11/20 5525  
 docusate sodium (COLACE) capsule 100 mg  100 mg Oral BID Vanessa Salazar   Stopped at 03/10/20 1800  ELECTROLYTE REPLACEMENT PROTOCOL - Potassium Standard Dosing  1 Each Other PRCHER Roblero PA-C      
 ELECTROLYTE REPLACEMENT PROTOCOL - Magnesium  1 Each Other PRN Cristiane Roblero PA-C      
  sodium chloride (NS) flush 5-40 mL  5-40 mL IntraVENous PRN Diomedes Jasso MD      
 
 
Review of Systems Not obtainable due to patient factors: Confusion PHYSICAL EXAMINATION:  
Visit Vitals BP (!) 169/93 (BP 1 Location: Right arm, BP Patient Position: At rest;Sitting) Pulse (!) 120 Temp 97.8 °F (36.6 °C) Resp 23 Ht 5' 7\" (1.702 m) Wt 196 lb 11.2 oz (89.2 kg) SpO2 96% BMI 30.81 kg/m² Constitutional: Well developed, well nourished elderly male. No acute distress. HEENT: Normocephalic, Atraumatic CV:  Tachy Pulm: No respiratory distress or difficulties breathing GI:  No abdominal masses or tenderness. :  No CVA tenderness. Lin draining clear yellow urine MELISSA: unable to obtain patient sitting in chair, unable to stand SCROTUM:  No scrotal rash or lesions noticed. Normal bilateral testes and epididymis. PENIS: Urethral meatus normal in location and size. No urethral discharge. Skin: No evidence of jaundice. Normal color Neuro/Psych:  Alert and confused Lymphatic:    No inguinal lymphadenopathy MSK: Limited ROM 
 
 
 
REVIEW OF LABS AND IMAGING:   
 
CT chest/abd/ pelvis 3/2/2020 IMPRESSION:  
  
1. No aortic dissection or aneurysm. Very mild atherosclerotic aortic disease. 
  
2. 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 
  
3. Short segmental stenosis at proximal left the main renal artery, estimated 
about 75%. 
  
4. No evidence of PE. 
  
5. Bilateral renal cysts. Nonobstructive right renal calculi. 
  
6. Moderate prostate enlargement. Labs: Results:  
Chemistry Recent Labs  
  03/11/20 
0427 03/10/20 
0521 03/09/20 
0800 * 88 53*  142 146*  
K 4.6 4.4 4.1  111 113* CO2 26 27 26 BUN 40* 38* 40* CREA 1.77* 1.53* 1.52* CA 8.4* 7.9* 7.4* AGAP 7 4 7 BUCR 23* 25* 26* AP 66  --   --   
TP 6.0*  --   --   
ALB 2.6*  --   --   
GLOB 3.4  --   --   
AGRAT 0.8  --   --   
  
 CBC w/Diff Recent Labs  
  03/10/20 
1845 03/09/20 
0800 03/08/20 
1907 WBC 11.7 10.3  --   
RBC 2.92* 2.76*  --   
HGB 8.6* 8.3* 9.2* HCT 26.8* 25.5* 27.9*  
 193  --   
GRANS 73 62  --   
LYMPH 13* 20*  --   
EOS 4 5  --   
  
Cultures No results for input(s): CULT in the last 72 hours. All Micro Results Procedure Component Value Units Date/Time CULTURE, URINE [[id number]] Collected:  03/07/20 1900 Order Status:  Completed Specimen:  Urine, Straight catheter Updated:  03/09/20 1008 Special Requests: NO SPECIAL REQUESTS Culture result: NO GROWTH 2 DAYS Urinalysis Color Date Value Ref Range Status 03/03/2020 YELLOW   Final  
 
Appearance Date Value Ref Range Status 03/03/2020 CLEAR   Final  
 
Specific gravity Date Value Ref Range Status 03/03/2020 >1.030 (H) 1.005 - 1.030 Final  
 
pH (UA) Date Value Ref Range Status 03/03/2020 5.5 5.0 - 8.0   Final  
 
Protein Date Value Ref Range Status 03/03/2020 300 (A) NEG mg/dL Final  
 
Ketone Date Value Ref Range Status 03/03/2020 NEGATIVE  NEG mg/dL Final  
 
Bilirubin Date Value Ref Range Status 03/03/2020 NEGATIVE  NEG   Final  
 
Blood Date Value Ref Range Status 03/03/2020 SMALL (A) NEG   Final  
 
Urobilinogen Date Value Ref Range Status 03/03/2020 1.0 0.2 - 1.0 EU/dL Final  
 
Nitrites Date Value Ref Range Status 03/03/2020 NEGATIVE  NEG   Final  
 
Leukocyte Esterase Date Value Ref Range Status 03/03/2020 NEGATIVE  NEG   Final  
 
Potassium Date Value Ref Range Status 03/11/2020 4.6 3.5 - 5.5 mmol/L Final  
 
Creatinine Date Value Ref Range Status 03/11/2020 1.77 (H) 0.6 - 1.3 MG/DL Final  
 
BUN Date Value Ref Range Status 03/11/2020 40 (H) 7.0 - 18 MG/DL Final  
  
PSA No results for input(s): PSA in the last 72 hours. Coagulation Lab Results Component Value Date/Time  Prothrombin time 17.0 (H) 03/04/2020 02:45 PM  
 Prothrombin time 13.8 03/03/2020 02:59 AM  
 INR 1.4 (H) 03/04/2020 02:45 PM  
 INR 1.1 03/03/2020 02:59 AM  
 aPTT 43.4 (H) 03/04/2020 02:45 PM  
 aPTT 29.5 03/04/2020 05:37 AM

## 2020-03-11 NOTE — DIABETES MGMT
GLYCEMIC CONTROL PLAN OF CARE  
 
T2DM with current A1c of 7.1% (03/03/2020). Patient's wife is a certified diabetes educator. Home diabetes medications: 
Glyburide 2.5 mg two times daily Post CABG 03/04/2020. Seen patient this morning with wife visiting at bedside. Patient is awake and more alert. Current Meal Intake: 
Patient Vitals for the past 100 hrs: 
 % Diet Eaten 03/11/20 0900 50 % 03/10/20 1600 10 % 03/10/20 1200 10 % 03/10/20 0800 10 % 03/09/20 1800 30 % 03/09/20 1232 30 % 03/09/20 1000 30 % 03/07/20 1300 0 % 03/07/20 1022 0 % POC BG range on 03/10/2020: 103-194. POC BG report on 03/11/2020 at time of review: 126, 218. Current hospital diabetes medications:  
Basal lantus insulin 20 units daily -on hold since 3/09/2020. Correctional lispro insulin ACHS. Very resistant dose. Total dose of insulin day before: 03/10/2020 Lantus: None. Lispro: 3 units Recommendation(s):  
1.) cont to monitor BG values and consistent adequate po intake before resuming basal lantus insulin - start at 8 units daily. Assessment: 
Patient is a 68year old male with a past medical history including type 2 diabetes, hypertension, hyperlipidemia, and kidney stones who presented with severe back pain. Noted: 
NSTEMI. 
CAD. Status post CABG x3 on 03/04/2020. T2DM. Most recent blood glucose values: 
 
Results for Mikael Ortega (MRN [id number]) as of 3/11/2020 13:10 Ref. Range 3/10/2020 01:10 3/10/2020 03:57 3/10/2020 05:23 3/10/2020 08:48 3/10/2020 12:19 3/10/2020 18:03 3/10/2020 21:01 GLUCOSE,FAST - POC Latest Ref Range: 70 - 110 mg/dL 128 (H) 113 (H) 103 127 (H) 194 (H) 129 (H) 133 (H) Results for Mikael Ortega (MRN [id number]) as of 3/11/2020 13:10 Ref. Range 3/11/2020 08:46 3/11/2020 11:54 GLUCOSE,FAST - POC Latest Ref Range: 70 - 110 mg/dL 126 (H) 218 (H) Current A1C of 7.1% (03/03/2020) is equivalent to estimated average blood glucose of 157 mg/dl over the past 2-3 months. Diet: Diabetic consistent mechanical soft; no concentrated sweets; nutr suppl: glucerna shake with all meals. Goal: Patient's blood glucose will be within target range of  mg/dL by 03/14/2020. Education:  Per RD CDE notes on 03/03/2020: pt's spouse is a certified diabetes educator. Information provided on free outpatient diabetes education classes. Jonetta Blizzard, RN [facility] Pager: [phone]

## 2020-03-11 NOTE — PROGRESS NOTES
[facility] - Progress Note Admit Date: 3/2/2020 Assessment:  
 
Hospital Problems  Date Reviewed: 12/2/2019 Codes Class Noted POA  
 S/P cardiac catheterization ICD-10-CM: Z98.890 ICD-9-CM: V45.89  3/3/2020 No  
   
 Stage 3 chronic kidney disease (HCC) (Chronic) ICD-10-CM: N18.3 ICD-9-CM: 798. 3  Unknown Yes Urinary retention ICD-10-CM: R33.9 ICD-9-CM: 788.20  3/6/2020 No  
   
 S/P CABG x 3 (Chronic) ICD-10-CM: Z95.1 ICD-9-CM: V45.81  3/5/2020 No  
 Overview Signed 3/5/2020  1:20 PM by Caleb Chanel PA-C  
  CABG x 3, Dr. Conor Hernandez, 3/4/2020, [facility] (acute kidney injury) ([facility] [address] ICD-10-CM: N17.9 ICD-9-CM: 584.9  3/5/2020 No  
   
 NSTEMI (non-ST elevated myocardial infarction) ([facility] [address] ICD-10-CM: I21.4 ICD-9-CM: 410.70  3/3/2020 Yes Coronary artery disease of native artery with stable angina pectoris [facility]) ICD-10-CM: I25.118 
ICD-9-CM: 414.01, 413.9  3/3/2020 Yes * (Principal) Elevated troponin ICD-10-CM: R79.89 ICD-9-CM: 790.6  3/2/2020 Yes Back pain (Chronic) ICD-10-CM: M54.9 ICD-9-CM: 724.5  3/2/2020 Yes Acute coronary syndrome (HCC) ICD-10-CM: I24.9 ICD-9-CM: 411.1  3/2/2020 Yes Type 2 diabetes with nephropathy (HCC) (Chronic) ICD-10-CM: E11.21 
ICD-9-CM: 250.40, 583.81  3/23/2018 Yes Nocturia ICD-10-CM: R35.1 ICD-9-CM: 788.43  10/10/2014 Yes HTN (hypertension) (Chronic) ICD-10-CM: I10 
ICD-9-CM: 401.9  8/19/2010 Yes Hyperlipemia (Chronic) ICD-10-CM: Y40.6 ICD-9-CM: 272.4  8/19/2010 Yes  
   
  
 
 
  
  
  
-CAD s/p CABG X3 on 3/4/2020 (LIMA to mid LAD, SVG to OM1, SVG to Diag1/ramus). -NSTEMI. Troponin peak at [facility] with possible septal infarct without ischemic changes, presented with back pain with nausea and vomiting. 
-Normal EF 55-60% by echo 3/3/2020. 
-Hypertension. Stable. -Diabetes mellitus. HgbA1c 7.1 
-Chronic kidney disease. Stable. -H/o kidney stones, last once 3 weeks ago. -H/o vertigo. 
-Anemia/bleeding post op requiring transfusion. 
-Delirium/encephalopathy. 
  
Remotely evaluated by Dr. Christine Clay. Plan:  
 
Cardiac status remains stable. Confusion/agitation continues. Continued on ASA, statin, BB, amio. Hgb remains stable. Continued on IV lasix, continues to make urine, noted rise in creatinine this AM, appreciate nephrology involvement. Subjective:  
 
Confusion/agitation continues Objective:  
  
Patient Vitals for the past 8 hrs: 
 Temp Pulse Resp BP SpO2  
03/11/20 0900  (!) 120 23    
03/11/20 0818 97.8 °F (36.6 °C) (!) 110 22 (!) 169/93 96 % 03/11/20 0700  (!) 113 28  94 % 03/11/20 0600  97 17  94 % 03/11/20 0500  (!) 101 15  92 % 03/11/20 0427 98 °F (36.7 °C) (!) 103 24 159/83 92 % 03/11/20 0400  (!) 106 (!) 31    
03/11/20 0300  100 16    
03/11/20 0200  99 22   Patient Vitals for the past 96 hrs: 
 Weight 03/11/20 0514 89.2 kg (196 lb 11.2 oz) 03/10/20 0530 90.5 kg (199 lb 8 oz) 03/09/20 0600 90.4 kg (199 lb 4.7 oz) 03/08/20 0600 90.6 kg (199 lb 11.8 oz) Intake/Output Summary (Last 24 hours) at 3/11/2020 0957 Last data filed at 3/11/2020 0700 Gross per 24 hour Intake 780 ml Output 2875 ml Net -2095 ml Physical Exam: 
General:  no distress Neck:  no JVD Lungs:  clear to auscultation bilaterally Heart:  regular rate and rhythm Abdomen:  abdomen is soft Extremities:  extremities normal, atraumatic, no cyanosis + edema Data Review:  
 
Labs: Results:  
   
Chemistry Recent Labs  
  03/11/20 
0427 03/10/20 
0521 03/09/20 
0800 * 88 53*  142 146*  
K 4.6 4.4 4.1  111 113* CO2 26 27 26 BUN 40* 38* 40* CREA 1.77* 1.53* 1.52* CA 8.4* 7.9* 7.4* MG 2.3 2.3  --   
PHOS 3.3 3.9  --   
AGAP 7 4 7 BUCR 23* 25* 26* AP 66  --   --   
TP 6.0*  --   --   
ALB 2.6*  --   --   
GLOB 3.4  --   --   
AGRAT 0.8  --   --   
  
CBC w/Diff Recent Labs 03/10/20 
1845 03/09/20 
0800 03/08/20 
1907 WBC 11.7 10.3  --   
RBC 2.92* 2.76*  --   
HGB 8.6* 8.3* 9.2* HCT 26.8* 25.5* 27.9*  
 193  --   
GRANS 73 62  --   
LYMPH 13* 20*  --   
EOS 4 5  --   
  
Cardiac Enzymes No results found for: CPK, CK, CKMMB, CKMB, RCK3, CKMBT, CKNDX, CKND1, DION, TROPT, TROIQ, AFSHIN, TROPT, TNIPOC, BNP, BNPP Coagulation No results for input(s): PTP, INR, APTT, INREXT in the last 72 hours. Lipid Panel Lab Results Component Value Date/Time Cholesterol, total 180 03/04/2020 05:37 AM  
 HDL Cholesterol 33 (L) 03/04/2020 05:37 AM  
 LDL, calculated 73.4 03/04/2020 05:37 AM  
 VLDL, calculated 73.6 03/04/2020 05:37 AM  
 Triglyceride 368 (H) 03/04/2020 05:37 AM  
 CHOL/HDL Ratio 5.5 (H) 03/04/2020 05:37 AM  
  
BNP No results found for: BNP, BNPP, XBNPT Liver Enzymes Recent Labs  
  03/11/20 
0427 TP 6.0* ALB 2.6* AP 66 SGOT 33 Digoxin Thyroid Studies Lab Results Component Value Date/Time TSH 0.83 03/03/2020 02:59 AM  
    
 
Signed By: RHONDA Larson March 11, 2020

## 2020-03-11 NOTE — PROGRESS NOTES
Problem: Mobility Impaired (Adult and Pediatric) Goal: *Acute Goals and Plan of Care (Insert Text) Description: Physical Therapy Goals Initiated 3/6/2020 and to be accomplished within 4 day(s) 1. Patient will move from supine to sit and sit to supine  and scoot up and down in bed with minimal assistance/contact guard assist.    
2.  Patient will transfer from bed to chair and chair to bed with supervision/set-up using the least restrictive device. 3.  Patient will perform sit to stand with supervision/set-up. 4.  Patient will ambulate with supervision/set-up for 100 feet with the least restrictive device. 5.  Assess as appropriate PLOF: Pt poor historian, states he lives with his spouse in 2 story home. He was independent with self-care and mobility. Outcome: Progressing Towards Goal 
 PHYSICAL THERAPY TREATMENT Patient: Annalise Sainz (34 y.o. male) Date: 3/11/2020 Diagnosis: Acute coronary syndrome ([facility] [address] [I24.9] Elevated troponin [R79.89] Back pain [M54.9] Numbness and tingling in left arm [R20.0, R20.2] S/P cardiac catheterization [Z98.890] NSTEMI (non-ST elevated myocardial infarction) ([facility] [address] [I21.4] Elevated troponin Procedure(s) (LRB): 
CORONARY ARTERY BYPASS GRAFT (CABG) TIMES THREE (N/A) 7 Days Post-Op Precautions: Sternal, Skin, Fall Chart, physical therapy assessment, plan of care and goals were reviewed. OBJECTIVE/ ASSESSMENT: 
Patient found in chair (reclined position) willing to work with PT. Patient seen with OT to maximize safety of patient and staff. Pt was scooted backward in chair by lying chair flat and utilizing 2 person draw sheet transfer. PT then sat back upright and was able to stand to Rollator. Pt ambulated into hallway this tx and was able to increase distance greatly. However, pt present with confusion and requires multiple cues for re-direction and to take rest breaks.  Pt returned to EOB post ambulation then returned to supine. Pt was positioned comfortably with with needs in reach. Pt much more alert today. Progressing toward goals well. Progression toward goals: 
[x]      Improving appropriately and progressing toward goals 
[]      Improving slowly and progressing toward goals 
[]      Not making progress toward goals and plan of care will be adjusted PLAN: 
Patient continues to benefit from skilled intervention to address the above impairments. Continue treatment per established plan of care. Discharge Recommendations:  Inpatient Rehab (wife wants pt to DC to home.) Further Equipment Recommendations for Discharge:  rolling walker SUBJECTIVE:  
Patient stated [facility] can I hold that and this at the same time. Referring to heart hugger and Rollator.  OBJECTIVE DATA SUMMARY:  
Critical Behavior: 
Neurologic State: Drowsy, Eyes open to voice Orientation Level: Oriented X4 Cognition: Decreased attention/concentration, Decreased command following, Impaired decision making Safety/Judgement: Fall prevention Functional Mobility Training:  
Transfers: 
Sit to Stand: Moderate assistance;Assist x2(from low chair, CG from from elevated bed) Stand to Sit: Contact guard assistance Balance: 
Sitting: Impaired Sitting - Static: Fair (occasional)(+) Sitting - Dynamic: Fair (occasional) Standing: Impaired; With support Standing - Static: Fair(+) Standing - Dynamic : Fair Ambulation/Gait Training: 
Distance (ft): 200 Feet (ft) Assistive Device: Walker, rollator Ambulation - Level of Assistance: Contact guard assistance;Minimal assistance Gait Abnormalities: Decreased step clearance Base of Support: Narrowed Stance: Weight shift Speed/Alba: Slow Step Length: Left shortened;Right shortened Pain: 
Pain level pre-treatment: Not rated Pain level post-treatment: Not rated Activity Tolerance:  
Fair Please refer to the flowsheet for vital signs taken during this treatment. After treatment:  
[] Patient left in no apparent distress sitting up in chair 
[x] Patient left in no apparent distress in bed 
[x] Call bell left within reach [x] Nursing notified (Nurse Ginny present through most of tx session.) [] Caregiver present 
[] Bed alarm activated 
[] SCDs applied COMMUNICATION/EDUCATION:  
[x]         Role of Physical Therapy 
[]         Fall prevention 
[x]         Bed mobility [x]         Transfers 
[x]         Ambulation / gait [x]         Assistive device management 
[]         Stairs [x]         Body mechanics [x]         Position change  
[]         Therapeutic exercise [x]         Activity pacing / energy conservation 
[]         Other: 
   
Elena Richter PTA Time Calculation: 25 mins

## 2020-03-11 NOTE — PROGRESS NOTES
RENAL DAILY PROGRESS NOTE Subjective:  
Admitted for chest pains post 3V CABG seen for LOREN/CKD 3 Current Facility-Administered Medications Medication Dose Route Frequency  acetaminophen (TYLENOL) solution 650 mg  650 mg Oral Q4H PRN  
 [START ON 3/12/2020] furosemide (LASIX) injection 40 mg  40 mg IntraVENous DAILY  [START ON 3/12/2020] potassium chloride (K-DUR, KLOR-CON) SR tablet 20 mEq  20 mEq Oral DAILY  guaiFENesin ER (MUCINEX) tablet 1,200 mg  1,200 mg Oral Q12H  
 QUEtiapine SR (SEROquel XR) tablet 50 mg  50 mg Oral QHS  heparin (porcine) injection 5,000 Units  5,000 Units SubCUTAneous Q8H  
 atorvastatin (LIPITOR) tablet 40 mg  40 mg Oral QHS  traMADoL (ULTRAM) tablet 50 mg  50 mg Oral Q6H PRN  cholecalciferol (VITAMIN D3) (1000 Units /25 mcg) tablet 4 Tab  4,000 Units Oral DAILY  [Held by provider] insulin glargine (LANTUS) injection 20 Units  20 Units SubCUTAneous DAILY  ferrous sulfate tablet 325 mg  1 Tab Oral BID WITH MEALS  
 ascorbic acid (vitamin C) (VITAMIN C) tablet 250 mg  250 mg Oral BID WITH MEALS  folic acid (FOLVITE) tablet 1 mg  1 mg Oral DAILY  cyanocobalamin tablet 1,000 mcg  1,000 mcg Oral DAILY  metoprolol tartrate (LOPRESSOR) tablet 25 mg  25 mg Oral Q12H  tamsulosin (FLOMAX) capsule 0.4 mg  0.4 mg Oral DAILY  epoetin marbin-epbx (RETACRIT) injection 4,000 Units  4,000 Units SubCUTAneous Q MON, WED & FRI  acetaminophen (TYLENOL) tablet 1,000 mg  1,000 mg Oral Q6H  
 famotidine (PEPCID) tablet 20 mg  20 mg Oral DAILY  insulin lispro (HUMALOG) injection   SubCUTAneous AC&HS  
 glucose chewable tablet 16 g  4 Tab Oral PRN  
 glucagon (GLUCAGEN) injection 1 mg  1 mg IntraMUSCular PRN  
 dextrose (D50W) injection syrg 12.5-25 g  25-50 mL IntraVENous PRN  
 sodium chloride (NS) flush 5-40 mL  5-40 mL IntraVENous Q8H  
 naloxone (NARCAN) injection 0.4 mg  0.4 mg IntraVENous PRN  
  amiodarone (CORDARONE) tablet 200 mg  200 mg Oral TID  albuterol (PROVENTIL VENTOLIN) nebulizer solution 2.5 mg  2.5 mg Nebulization Q4H PRN  
 ondansetron (ZOFRAN) injection 4 mg  4 mg IntraVENous Q4H PRN  
 aspirin delayed-release tablet 81 mg  81 mg Oral DAILY  chlorhexidine (PERIDEX) 0.12 % mouthwash 10 mL  10 mL Oral BID  docusate sodium (COLACE) capsule 100 mg  100 mg Oral BID  ELECTROLYTE REPLACEMENT PROTOCOL - Potassium Standard Dosing  1 Each Other PRN  
 ELECTROLYTE REPLACEMENT PROTOCOL - Magnesium  1 Each Other PRN  
 sodium chloride (NS) flush 5-40 mL  5-40 mL IntraVENous PRN Objective:  
 
Patient Vitals for the past 24 hrs: 
 Temp Pulse Resp BP SpO2  
03/11/20 0900  (!) 120 23    
03/11/20 0818 97.8 °F (36.6 °C) (!) 110 22 (!) 169/93 96 % 03/11/20 0700  (!) 113 28  94 % 03/11/20 0600  97 17  94 % 03/11/20 0500  (!) 101 15  92 % 03/11/20 0427 98 °F (36.7 °C) (!) 103 24 159/83 92 % 03/11/20 0400  (!) 106 (!) 31    
03/11/20 0300  100 16    
03/11/20 0200  99 22    
03/11/20 0100  97 19  93 % 03/11/20 0000 98.2 °F (36.8 °C) 92 22 134/84 92 % 03/10/20 2300  92 22  93 % 03/10/20 2236  92 20 93/61 93 % 03/10/20 2200  94 22  94 % 03/10/20 2108  (!) 103 18 (!) 112/93   
03/10/20 2100  (!) 107 21  94 % 03/10/20 2000 98 °F (36.7 °C) (!) 104 26 149/73 95 % 03/10/20 1900  (!) 105 25  95 % 03/10/20 1800    142/78 95 % 03/10/20 1700  98 16 107/71 100 % 03/10/20 1600 98.2 °F (36.8 °C) 97 16 110/79 97 % 03/10/20 1500  (!) 106 18 91/58 98 % 03/10/20 1400  94 14  95 % 03/10/20 1300  97 19 109/68 95 % 03/10/20 1200 98 °F (36.7 °C) (!) 101 23 97/52   
03/10/20 1100  94 19  96 % Weight change: -1.27 kg (-2 lb 12.8 oz) 03/09 1901 - 03/11 0700 In: 840 [P.O.:840] Out: [address] [HPRWT:[id number]] Intake/Output Summary (Last 24 hours) at 3/11/2020 1057 Last data filed at 3/11/2020 0830 Gross per 24 hour Intake 1020 ml Output 2875 ml Net -1855 ml Physical Exam:agitated,confused HEENT: non icteric Cardiovascular: regular, sternal dressing, 
C/L: dec BS both bases no wheezing/rales Abdomen: obese, hypoactive BS Ext: + peripheral edema Data Review:  
 
LABS:  
Hematology:  
Recent Labs  
  03/10/20 
1845 03/09/20 
0800 03/08/20 
1907 WBC 11.7 10.3  --   
HGB 8.6* 8.3* 9.2* HCT 26.8* 25.5* 27.9* Pl 166K Chemistry:  
Recent Labs  
  03/11/20 
0427 03/10/20 
0521 03/09/20 
0800 03/08/20 
1907 BUN 40* 38* 40*  --   
CREA 1.77* 1.53* 1.52*  --   
CA 8.4* 7.9* 7.4*  --   
ALB 2.6*  --   --   --   
K 4.6 4.4 4.1 4.2  142 146*  --   
 111 113*  --   
CO2 26 27 26  --   
PHOS 3.3 3.9  --   --   
* 88 53*  --   
IPTH 95.6 Vit D 25 OH 10.8 UMACR 288 Fe 17 sat 11 teri 382 Ion cristiane 1.21 IMAGES:  
CXR 
1. Bilateral pleural tubes with no evidence of pneumothorax. 2.  Hypoinflation with bibasilar streaky opacities suggestive of atelectasis 
with slightly improved aeration at the left lung base 
  
 
IMPRESSION AND PLAN:  
LOREN/CKD 3,worsening again probably related to diuretics. decrease lasix Anemia Hgb  cont PARTH/po fe. Hypocalcemia with sl elevated IPTH from  vit D def on supplement CAD post CABG  
HTN well controlled DM2 well controlled Hypoalbuminemia push protein supp Sree Aguirre MD 
3/11/2020

## 2020-03-12 NOTE — PROGRESS NOTES
[facility] - Progress Note Admit Date: 3/2/2020 Assessment:  
 
Hospital Problems  Date Reviewed: 12/2/2019 Codes Class Noted POA  
 S/P cardiac catheterization ICD-10-CM: Z98.890 ICD-9-CM: V45.89  3/3/2020 No  
   
 Stage 3 chronic kidney disease (HCC) (Chronic) ICD-10-CM: N18.3 ICD-9-CM: 267. 3  Unknown Yes Urinary retention ICD-10-CM: R33.9 ICD-9-CM: 788.20  3/6/2020 No  
   
 S/P CABG x 3 (Chronic) ICD-10-CM: Z95.1 ICD-9-CM: V45.81  3/5/2020 No  
 Overview Signed 3/5/2020  1:20 PM by Stepan Hernandez PA-C  
  CABG x 3, Dr. Sotero Marroquin, 3/4/2020, [facility] [place] (acute kidney injury) ([facility] [address] ICD-10-CM: N17.9 ICD-9-CM: 584.9  3/5/2020 No  
   
 NSTEMI (non-ST elevated myocardial infarction) ([facility] [address] ICD-10-CM: I21.4 ICD-9-CM: 410.70  3/3/2020 Yes Coronary artery disease of native artery with stable angina pectoris [facility]) ICD-10-CM: I25.118 
ICD-9-CM: 414.01, 413.9  3/3/2020 Yes * (Principal) Elevated troponin ICD-10-CM: R79.89 ICD-9-CM: 790.6  3/2/2020 Yes Back pain (Chronic) ICD-10-CM: M54.9 ICD-9-CM: 724.5  3/2/2020 Yes Acute coronary syndrome (HCC) ICD-10-CM: I24.9 ICD-9-CM: 411.1  3/2/2020 Yes Type 2 diabetes with nephropathy (HCC) (Chronic) ICD-10-CM: E11.21 
ICD-9-CM: 250.40, 583.81  3/23/2018 Yes Nocturia ICD-10-CM: R35.1 ICD-9-CM: 788.43  10/10/2014 Yes HTN (hypertension) (Chronic) ICD-10-CM: I10 
ICD-9-CM: 401.9  8/19/2010 Yes Hyperlipemia (Chronic) ICD-10-CM: I90.2 ICD-9-CM: 272.4  8/19/2010 Yes  
   
  
 
 
  
  
  
-CAD s/p CABG X3 on 3/4/2020 (LIMA to mid LAD, SVG to OM1, SVG to Diag1/ramus). -NSTEMI. Troponin peak at [facility] with possible septal infarct without ischemic changes, presented with back pain with nausea and vomiting. 
-Normal EF 55-60% by echo 3/3/2020. 
-Hypertension. Stable. -Diabetes mellitus. HgbA1c 7.1 
-Chronic kidney disease. Stable. -H/o kidney stones, last once 3 weeks ago. -H/o vertigo. 
-Anemia/bleeding post op requiring transfusion. 
-Delirium/encephalopathy. 
  
Remotely evaluated by Dr. Angelina Ross. Plan:  
 
Cardiac status remains stable. Continues to have confusion/agitation. Continued on ASA, statin, BB. Renal function improved, good urine output, continued on IV lasix. Subjective:  
 
Continues to have confusion/agitation. Discussed with wife in [place], plan to transfer to SNF possibly tomorrow. Objective:  
  
Patient Vitals for the past 8 hrs: 
 Temp Pulse Resp BP SpO2  
03/12/20 1100  100  125/68 90 % 03/12/20 1030  (!) 105 27  (!) 88 % 03/12/20 1014  (!) 103 25 97/56 (!) 89 % 03/12/20 1000  100 28 98/57 (!) 88 % 03/12/20 0818 98.1 °F (36.7 °C) (!) 103 22 138/74 93 % 03/12/20 0708  (!) 105 16 153/74 92 % 03/12/20 0600  (!) 105 25  92 % 03/12/20 0500  (!) 102 26 139/83 92 % 03/12/20 0400 98.6 °F (37 °C)   143/74 99 % Patient Vitals for the past 96 hrs: 
 Weight 03/12/20 0700 88.5 kg (195 lb)  
03/11/20 0514 89.2 kg (196 lb 11.2 oz) 03/10/20 0530 90.5 kg (199 lb 8 oz) 03/09/20 0600 90.4 kg (199 lb 4.7 oz) Intake/Output Summary (Last 24 hours) at 3/12/2020 1118 Last data filed at 3/12/2020 1100 Gross per 24 hour Intake 240 ml Output 2945 ml Net -2705 ml Physical Exam: 
General: no distress Neck:  no JVD Lungs:  clear to auscultation bilaterally Heart:  regular rate and rhythm Abdomen:  abdomen is soft without significant tenderness, masses, organomegaly or guarding Extremities:  extremities normal, atraumatic, no cyanosis + edema Data Review:  
 
Labs: Results:  
   
Chemistry Recent Labs  
  03/12/20 
0443 03/11/20 
0427 03/10/20 
6827 * 114* 88  
 141 142  
K 4.3 4.6 4.4  
 108 111 CO2 24 26 27 BUN 34* 40* 38* CREA 1.58* 1.77* 1.53* CA 8.2* 8.4* 7.9*  
MG 2.3 2.3 2.3 PHOS 3.4 3.3 3.9 AGAP 10 7 4 BUCR 22* 23* 25* AP  --  66  --   
 TP  --  6.0*  --   
ALB  --  2.6*  --   
GLOB  --  3.4  --   
AGRAT  --  0.8  --   
  
CBC w/Diff Recent Labs  
  03/10/20 
1845 WBC 11.7 RBC 2.92* HGB 8.6* HCT 26.8*  
 GRANS 73 LYMPH 13* EOS 4 Cardiac Enzymes No results found for: CPK, CK, CKMMB, CKMB, RCK3, CKMBT, CKNDX, CKND1, DION, TROPT, TROIQ, AFSHIN, TROPT, TNIPOC, BNP, BNPP Coagulation No results for input(s): PTP, INR, APTT, INREXT in the last 72 hours. Lipid Panel Lab Results Component Value Date/Time Cholesterol, total 180 03/04/2020 05:37 AM  
 HDL Cholesterol 33 (L) 03/04/2020 05:37 AM  
 LDL, calculated 73.4 03/04/2020 05:37 AM  
 VLDL, calculated 73.6 03/04/2020 05:37 AM  
 Triglyceride 368 (H) 03/04/2020 05:37 AM  
 CHOL/HDL Ratio 5.5 (H) 03/04/2020 05:37 AM  
  
BNP No results found for: BNP, BNPP, XBNPT Liver Enzymes Recent Labs  
  03/11/20 
0427 TP 6.0* ALB 2.6* AP 66 SGOT 33 Digoxin Thyroid Studies Lab Results Component Value Date/Time TSH 0.83 03/03/2020 02:59 AM  
    
 
Signed By: RHONDA Aceves March 12, 2020

## 2020-03-12 NOTE — ROUTINE PROCESS
1930 Pt received after shift change report from Opal Mark RN. Sitter at bedside. Pt awake and oriented to self only. Georges draining to gravity with clear, yellow urine. Emptied 400 cc. Repositioned pt up in bed with assist. Pt ttempted to hit nurse x 2. Bed alarm activated. Call bell in reach. Will monitor frequently. 2000 Assessment complete. Pt wheezing. 2012 PRN Breathing treatment administered as ordered. Tolerates well. 2033 Patient attempting to climb out of bed. Does not follow directions/commands. Repositioned in bed with assist. 
 
2200 Pt's son at bedside. Calm and cooperative. 2205 Per son, [place] Layer to hold insulin for blood sugar of 156. States, he would rather have blood sugar elevated than low due to recent episodes of low blood sugar. Informed that low blood was due to administration of lantus. Still insist to hold humalog 2300 Sitter at bedside. 0030 Pt agitated and kicked sitter in stomach. Removed gown and ekg leads. Also pulling on georges. Difficult to redirect. 0200 Pt calm and remains confused. 0400 Pt up agitated and attempting to climb out of bed. Pt also swinging at staff. 0430 Pt agrees to bed bath. Linens, pads and gown changed. 0500 Pt asleep. NAD 
 
0630 Pt awake. Transfer from bed to recliner with assist. Pt tolerates well. Bedside shift change report given to Scott Rodriguez RN (oncoming nurse) by Prashanth Villegas RN (offgoing nurse). Report included the following information SBAR, MAR, KARDEX AND RECENT RESULTS

## 2020-03-12 NOTE — DIABETES MGMT
GLYCEMIC CONTROL PLAN OF CARE  
 
T2DM with current A1c of 7.1% (03/03/2020). Patient's wife is a certified diabetes educator. Home diabetes medications: 
Glyburide 2.5 mg two times daily Post CABG 03/04/2020. Seen patient this morning he was sleeping in recliner and sitter at bedside. Noted pending disch disposition. Current Meal Intake: 
Patient Vitals for the past 100 hrs: 
 % Diet Eaten 03/11/20 1730 0 % 03/11/20 1654 0 % 03/11/20 0900 50 % 03/10/20 1600 10 % 03/10/20 1200 10 % 03/10/20 0800 10 % 03/09/20 1800 30 % 03/09/20 1232 30 % 03/09/20 1000 30 % POC BG range on 03/11/2020: 126-218. One BG reading above target range. Basal lantus insulin discont'd. POC BG report on 03/12/2020 at time of review: 149. Current hospital diabetes medications:  
Correctional lispro insulin ACHS. Very resistant dose. Total dose of insulin day before: 03/11/2020 Lispro: 6 units Recommendation(s):  
1.) cont to monitor BG and sliding scale as ordered. Assessment: 
Patient is a 68year old male with a past medical history including type 2 diabetes, hypertension, hyperlipidemia, and kidney stones who presented with severe back pain. Noted: 
NSTEMI. 
CAD. Status post CABG x3 on 03/04/2020. T2DM. Most recent blood glucose values: 
 
Results for Pat Child (MRN [id number]) as of 3/12/2020 11:51 Ref. Range 3/11/2020 08:46 3/11/2020 11:54 3/11/2020 16:59 3/11/2020 21:20  
GLUCOSE,FAST - POC Latest Ref Range: 70 - 110 mg/dL 126 (H) 218 (H) 146 (H) 156 (H) Results for Pat Child (MRN [id number]) as of 3/12/2020 11:51 Ref. Range 3/12/2020 08:22 GLUCOSE,FAST - POC Latest Ref Range: 70 - 110 mg/dL 149 (H) Current A1C of 7.1% (03/03/2020) is equivalent to estimated average blood glucose of 157 mg/dl over the past 2-3 months. Diet: Diabetic consistent mechanical soft; no concentrated sweets; nutr suppl: glucerna shake with all meals. Goal: Patient's blood glucose will be within target range of  mg/dL by 03/15/2020. Education:  Per RD CDE notes on 03/03/2020: pt's spouse is a certified diabetes educator. Information provided on free outpatient diabetes education classes. Xander Wilson RN [facility] Pager: [phone]

## 2020-03-12 NOTE — PROGRESS NOTES
Met with pt and wife at bedside to discuss recommended discharge to SNF. Gave wife SNF list and she chose [facility]. Referrals sent out via [facility]. Anticipate discharge tomorrow. Nicolas Manjarrezn RN - Outcomes Manager  [phone]

## 2020-03-12 NOTE — PROGRESS NOTES
1300: Report received, care assumed. Complete assessment performed. Received in cardiac chair, wife at side. AAO to person, situation, time, states he's in [facility]. Follow command x4. Denies pain. CVT PA provider gave ok to give morning dose IV Lasix now as it was held due to low BP this morning. See MAR. Lunch served and assisted by wife. Monitor. 1330:  Renal provider on rounds, spoke with pt and wife, no new orders noted. 1400:  Cardiology provider on rounds, no new orders noted. 1430:  Occupational Therapy on rounds, worked with patient still eating lunch. [id number]:  Ambulated in CVT slade with rollator, monitor, oxygen, RN at side. Ambulated around entire CVT ICU unit without difficulty, unsteady gait. Tolerated well. Returned to cardiac recliner chair, legs elevated. Denies pain or SOB. Oxygen decreased to 1L/min NC for sats 100%. BP decreased after ambulation, see flowsheets. Denies dizzy, lightheaded or discomforts. Monitor closely. Right IJ Cordis dressing loose; changed per policy. Wife and son at side. 1630:  CVT PA provider notified of ambulation in halls with asymptomatic decrease of BP and all meds given today. New orders noted. Will continue monitor closely. 1648:  Oxygen removed to room air as pulse ox sats 100% on 1L/min. Wife and son at side. Monitor. 1830:  Ambulated in CVT halls with monitor, rollator, RN at side. Tolerated well. Returned to bed. NSR. VSS. Denies pain. Son at side, assisted with supplement and dinner intake. Call bell at side. Monitor. 1900: Son and daughter remain at side. Bedside and Verbal shift change report given to Elvin (oncoming nurse) by Kelvin Perez RN(offgoing nurse). Report included the following information SBAR, Kardex, Intake/Output, MAR, Accordion, Recent Results, Cardiac Rhythm NSR. and Alarm Parameters .

## 2020-03-12 NOTE — PROGRESS NOTES
ARU to make decision today regarding acceptance. Left message for wife for back up SNF choice. Sitter discontinued this AM. CAMI Hernandez Case Management [phone]

## 2020-03-12 NOTE — PROGRESS NOTES
3500 [facility] Coretta Baig in room administering meds. Will follow up later in day. 1110 - Pt sleeping heavily, loudly snoring and does not wake to name being called. Will follow up at later date.

## 2020-03-12 NOTE — PROGRESS NOTES
RENAL DAILY PROGRESS NOTE Subjective:  
Admitted for chest pains post 3V CABG seen for LOREN/CKD 3 Current Facility-Administered Medications Medication Dose Route Frequency  acetaminophen (TYLENOL) tablet 1,000 mg  1,000 mg Oral Q6H PRN  
 diphenhydrAMINE (BENADRYL) capsule 25 mg  25 mg Oral QHS PRN  
 metoprolol tartrate (LOPRESSOR) tablet 12.5 mg  12.5 mg Oral Q12H  nystatin (MYCOSTATIN) 100,000 unit/gram powder   Topical BID  furosemide (LASIX) injection 20 mg  20 mg IntraVENous DAILY  guaiFENesin ER (MUCINEX) tablet 1,200 mg  1,200 mg Oral Q12H  
 QUEtiapine SR (SEROquel XR) tablet 50 mg  50 mg Oral QHS  heparin (porcine) injection 5,000 Units  5,000 Units SubCUTAneous Q8H  
 atorvastatin (LIPITOR) tablet 40 mg  40 mg Oral QHS  traMADoL (ULTRAM) tablet 50 mg  50 mg Oral Q6H PRN  cholecalciferol (VITAMIN D3) (1000 Units /25 mcg) tablet 4 Tab  4,000 Units Oral DAILY  ferrous sulfate tablet 325 mg  1 Tab Oral BID WITH MEALS  
 ascorbic acid (vitamin C) (VITAMIN C) tablet 250 mg  250 mg Oral BID WITH MEALS  folic acid (FOLVITE) tablet 1 mg  1 mg Oral DAILY  cyanocobalamin tablet 1,000 mcg  1,000 mcg Oral DAILY  tamsulosin (FLOMAX) capsule 0.4 mg  0.4 mg Oral DAILY  epoetin marbin-epbx (RETACRIT) injection 4,000 Units  4,000 Units SubCUTAneous Q MON, WED & FRI  famotidine (PEPCID) tablet 20 mg  20 mg Oral DAILY  insulin lispro (HUMALOG) injection   SubCUTAneous AC&HS  
 glucose chewable tablet 16 g  4 Tab Oral PRN  
 glucagon (GLUCAGEN) injection 1 mg  1 mg IntraMUSCular PRN  
 dextrose (D50W) injection syrg 12.5-25 g  25-50 mL IntraVENous PRN  
 sodium chloride (NS) flush 5-40 mL  5-40 mL IntraVENous Q8H  
 naloxone (NARCAN) injection 0.4 mg  0.4 mg IntraVENous PRN  
 albuterol (PROVENTIL VENTOLIN) nebulizer solution 2.5 mg  2.5 mg Nebulization Q4H PRN  
 ondansetron (ZOFRAN) injection 4 mg  4 mg IntraVENous Q4H PRN  
  aspirin delayed-release tablet 81 mg  81 mg Oral DAILY  chlorhexidine (PERIDEX) 0.12 % mouthwash 10 mL  10 mL Oral BID  ELECTROLYTE REPLACEMENT PROTOCOL - Potassium Standard Dosing  1 Each Other PRN  
 ELECTROLYTE REPLACEMENT PROTOCOL - Magnesium  1 Each Other PRN  
 sodium chloride (NS) flush 5-40 mL  5-40 mL IntraVENous PRN Objective:  
 
Patient Vitals for the past 24 hrs: 
 Temp Pulse Resp BP SpO2  
03/12/20 1300  97 25 118/65 92 % 03/12/20 1200  99 24 118/62 (!) 88 % 03/12/20 1100  100  125/68 90 % 03/12/20 1030  (!) 105 27  (!) 88 % 03/12/20 1014  (!) 103 25 97/56 (!) 89 % 03/12/20 1000  100 28 98/57 (!) 88 % 03/12/20 0818 98.1 °F (36.7 °C) (!) 103 22 138/74 93 % 03/12/20 0708  (!) 105 16 153/74 92 % 03/12/20 0600  (!) 105 25  92 % 03/12/20 0500  (!) 102 26 139/83 92 % 03/12/20 0400 98.6 °F (37 °C)   143/74 99 % 03/12/20 0000 98 °F (36.7 °C)      
03/11/20 2300  (!) 101 24 161/81 95 % 03/11/20 2200    149/86 94 % 03/11/20 2115  (!) 110 23  93 % 03/11/20 2000 98.6 °F (37 °C) (!) 108 (!) 32 142/85 93 % 03/11/20 1700  (!) 106 21    
03/11/20 1629 98.1 °F (36.7 °C) (!) 109 20 (!) 143/99 95 % 03/11/20 1616  (!) 112 27  94 % 03/11/20 1547  (!) 102 21    
03/11/20 1531  98     
03/11/20 1500  98 19   Weight change: -0.771 kg (-1 lb 11.2 oz) 03/10 1901 - 03/12 0700 In: 900 [P.O.:900] Out: 5355 [ZFQUO:1365] Intake/Output Summary (Last 24 hours) at 3/12/2020 1408 Last data filed at 3/12/2020 1100 Gross per 24 hour Intake 240 ml Output 2545 ml Net -2305 ml Physical Exam:more alert today HEENT: non icteric Cardiovascular: regular, sternal dressing, 
C/L: dec BS both bases no wheezing/rales Abdomen: obese, hypoactive BS Ext: + peripheral edema Data Review:  
 
LABS:  
Hematology:  
Recent Labs  
  03/10/20 
1845 WBC 11.7 HGB 8.6* HCT 26.8* Pl 166K Chemistry:  
Recent Labs  
  03/12/20 4108 03/11/20 
0270 03/10/20 
3667 BUN 34* 40* 38* CREA 1.58* 1.77* 1.53* CA 8.2* 8.4* 7.9* ALB  --  2.6*  --   
K 4.3 4.6 4.4  141 142  108 111 CO2 24 26 27 PHOS 3.4 3.3 3.9 * 114* 88  
IPTH 95.6 Vit D 25 OH 10.8 UMACR 288 Fe 17 sat 11 teri 382 I 
  
 
IMPRESSION AND PLAN:  
LOREN/CKD 3,improved by decreasing lasix Urinary retention,georges cath Anemia Hgb  cont PARTH/po fe. Hypocalcemia with sl elevated IPTH from  vit D def on supplement CAD post CABG  
HTN well controlled DM2 well controlled Hypoalbuminemia push protein supp Mary Diaz MD 
3/12/2020

## 2020-03-12 NOTE — PROGRESS NOTES
CARDIAC SURGERY PROGRESS NOTE 
 
3/12/2020 
8:30 AM 
  
Post Operative Day # 8 Chart reviewed. Interval History/Events of Past 24 hours:  
Walked in slade without supplemental oxygen. Little to no sleep reported by RN. Fidgety. Did not participate in IS. Flutter valve with RT. Dry cough. Weight at preop. Morning Creat pending. Very good urine output with Lin. Long talk with wife and then daughter in the room as to progress and disposition options. Wife is agreeable for SNF or rehab. Subjective: 
Patient seen and examined on rounds today. Denies pain, SOB or nausea. Wants to drink his meal supplement. BM: Yes Objective: 
Vital signs:  
Visit Vitals /74 Pulse (!) 105 Temp 98.6 °F (37 °C) Resp 16 Ht 5' 7\" (1.702 m) Wt 89.2 kg (196 lb 11.2 oz) SpO2 92% BMI 30.81 kg/m² Temp (24hrs), Av.3 °F (36.8 °C), Min:98 °F (36.7 °C), Max:98.6 °F (37 °C) Admission Weight: Last Weight Weight: 90.7 kg (200 lb) Weight: 89.2 kg (196 lb 11.2 oz) Physical Examination:    
General:  Alert, oriented to year and month. Thinks he is in [facility] Lungs: Clear to ascultation without rales, wheezes or rhonchi. Chest:   Midline incision healing well. Sternum stable. Heart: regular rate and rhythm, No murmur. Abdomen: Soft and non-tender without masses. Bowel sounds present. Extremities: Warm and well perfused. Edema absent. Incisions: clean, dry, no drainage. Neuro:  MENDEZ x 4 strong and equal.   No focal deficit. Alert, oriented to year and month. Thinks he is in [facility] Beta-blocker: Yes ASA: Yes  
Statin: Yes ACE-I: No 
Diuretic: Yes DVT Prophylaxis:  
pneumatic compression boots: Yes Compression stockings:  Yes 
Heparin:  Yes Assessment: S/P  CABG x 3 Respiratory parameters stable Cardiac status stable Plans: 
1. Continue BB, statin, ASA. Amio prophylaxis. 2.  Await today's BMP. ? Stop diuretic or change to PO per nephrology. 3.  Encouraged activity and participation with therapy. 4. Sleep hygiene. Will try Benadryl q hs. Seroquel as ordered. 5.  Pulmonary hygiene to continue 6. Urology consulted for retention. Lin back in. Flomax started. 7.  Await decision on disposition:  Home with [place] [id number] or SNF. Appreciate any input from case management. Should be ready for SNF discharge within a day or so. Heart Hugger use encouraged to mitigate pain and will also assist with deep breathing and incentive spirometry goals. Possible discharge 1-2 days.  
 
Howard Quiles PA-C

## 2020-03-12 NOTE — PROGRESS NOTES
NUTRITION Nutrition Consult: General Nutrition Management & Supplements RECOMMENDATIONS / PLAN:  
 
- Continue current nutrition interventions. - Monitor and encourage po intake. - Continue RD inpatient monitoring and evaluation. NUTRITION INTERVENTIONS & DIAGNOSIS:  
 
- Meals/snacks: modified composition, assistance with meals - Medical food supplement therapy: Glucerna Shake TID 
- Collaboration and referral of nutrition care: patient discussed with nursing Nutrition Diagnosis: Chronic disease or condition related malnutrition related to decreased appetite, lethargy as evidenced by decreased appetite/meal intake over the past year and 15 lb, 7.5% weight loss x 3 months. Altered GI function related to impaired gut motility after surgery, decreased mobility and poor po intake as evidenced by constipation, no BM x week. Patient meets criteria for Moderate Protein Calorie Malnutrition as evidenced by:  
ASPEN Malnutrition Criteria Acute Illness, Chronic Illness, or Social/Enviornmental: Chronic illness Energy Intake: <75% est energy req for greater than/equal to 1 month Weight Loss: 7.5% x 3 mos ASPEN Malnutrition Score - Chronic Illness: 2 Chronic Illness - Malnutrition Diagnosis: Moderate malnutrition. ASSESSMENT:  
 
3/12: Fair appetite/meal intake, eating very little of meals but consuming nutritional supplements and pudding; pt states he would not like more than 3 supplements daily. Agitated and required sitter overnight, mentation improved today and noted plan for possible discharge to skill nursing facility tomorrow, did not meet criteria for admission to ARU.  
 
3/10: Pt remains confused and lethargic but mentation improving compared to past several days per spouse report. Given mag citrate today, passing flatus yesterday per wife and able to have BM this afternoon for the first time since surgery/admission (usually has BM every 1-2 days per spouse). Consuming 10-30% of meals, lantus discontinued for persistent hypoglycemia- now resolved. Consuming some of nutritional supplements with encouragement per nursing. 3/6: Pt lethargic, requiring bipap and spoke with wife at bedside, who works as Diabetes Educator. Pt with poor meal intake, difficulty chewing due to poor dentition and not wearing dentures, present at bedside due to discomfort and SOB per wife. Pt usually consumes 6 small snacks throughout the day, consisting mainly of pudding, jello and applesauce. 3/5: S/p CABG x 3 yesterday and extubated this afternoon, kept intubated overnight due to concern for postoperative bleeding per chart. Recently extubated and has not eaten postop. Nutritional intake adequate to meet patients estimated nutritional needs:  No 
 
Diet: DIET NUTRITIONAL SUPPLEMENTS Breakfast, Lunch, Dinner; GLUCERNA SHAKE 
DIET DIABETIC CONSISTENT CARB Mechanical Soft; AHA-LOW-CHOL FAT Food Allergies: NKFA Current Appetite: Fair Appetite/meal intake prior to admission: Fair, consuming 6 small meals per day of soft foods with decline in appetite/meal intake over the past year per spouse Feeding Limitations:  [] Swallowing difficulty    [x] Chewing difficulty: poor dentition, not wearing dentures     [x] Other: lethargy Current Meal Intake:  
Patient Vitals for the past 100 hrs: 
 % Diet Eaten 03/11/20 1730 0 % 03/11/20 1654 0 % 03/11/20 0900 50 % 03/10/20 1600 10 % 03/10/20 1200 10 % 03/10/20 0800 10 % 03/09/20 1800 30 % 03/09/20 1232 30 % 03/09/20 1000 30 % BM: 3/11 Skin Integrity: surgical incisions to chest and leg Edema:   [] No     [x] Yes Pertinent Medications: Reviewed: ascorbic acid, cholecalciferol, cyanocobalamin, ferrous sulfate, folic acid, furosemide, SSI Recent Labs  
  03/12/20 
0443 03/11/20 
0427 03/10/20 
1723  141 142  
K 4.3 4.6 4.4  
 108 111 CO2 24 26 27 * 114* 88 BUN 34* 40* 38*  
 CREA 1.58* 1.77* 1.53* CA 8.2* 8.4* 7.9*  
MG 2.3 2.3 2.3 PHOS 3.4 3.3 3.9 ALB  --  2.6*  --   
SGOT  --  33  --   
ALT  --  19  -- Intake/Output Summary (Last 24 hours) at 3/12/2020 1427 Last data filed at 3/12/2020 1100 Gross per 24 hour Intake 240 ml Output 2545 ml Net -2305 ml Anthropometrics: 
Ht Readings from Last 1 Encounters:  
03/03/20 5' 7\" (1.702 m) Last 3 Recorded Weights in this Encounter 03/10/20 0530 03/11/20 0514 03/12/20 0700 Weight: 90.5 kg (199 lb 8 oz) 89.2 kg (196 lb 11.2 oz) 88.5 kg (195 lb) Body mass index is 30.54 kg/m². Obese, Class I Weight History: weight gain per chart hx review; weight loss over the past several months PTA per wife report, 200 lb in December 2019 and down to 185 lb prior to admission (15 lb, 7.5% weight loss x 3 months) Weight Metrics 3/12/2020 12/2/2019 11/12/2019 3/28/2019 12/27/2018 10/10/2018 9/21/2018 Weight 195 lb 185 lb 191 lb 3.2 oz 190 lb 12.8 oz 190 lb 3.2 oz 192 lb 8 oz 188 lb 8 oz BMI 30.54 kg/m2 28.98 kg/m2 29.95 kg/m2 29.88 kg/m2 29.79 kg/m2 30.15 kg/m2 29.52 kg/m2 Admitting Diagnosis: [facility] [I24.9] Elevated troponin [R79.89] Back pain [M54.9] Numbness and tingling in left arm [R20.0, R20.2] S/P cardiac catheterization [Z98.890] NSTEMI (non-ST elevated myocardial infarction) [facility] [I21.4] Pertinent PMHx: DM, HTN, HLD, CKD stage 3, CAD, skin cancer, kidney stones Education Needs:        [] None identified  [] Identified - Not appropriate at this time  [x]  Identified and addressed - refer to education log Learning Limitations:   [] None identified  [x] Identified: lethargy, confusion/agitation- improving Cultural, Mosque & ethnic food preferences:  [x] None identified    [] Identified and addressed ESTIMATED NUTRITION NEEDS:  
 
Calories: 6299-9336 kcal (MSJx1.2-1.3) based on  [x] Actual BW 90 kg     [] IBW Protein:  gm (0.8-1.2gm/kg) based on  [x] Actual BW      [] IBW Fluid: 1 mL/kcal 
  
MONITORING & EVALUATION:  
 
Nutrition Goal(s):  
- PO nutrition intake will meet >75% of patient estimated nutritional needs within the next 7 days. Outcome: Progressing towards goal  
 
Monitoring:   [x] Food and nutrient intake   [x] Food and nutrient administration  [x] Comparative standards   [x] Nutrition-focused physical findings   [x] Anthropometric Measurements   [x] Treatment/therapy   [x] Biochemical data, medical tests, and procedures Previous Recommendations (for follow-up assessments only): Implemented Discharge Planning: No nutritional discharge needs at this time. Participated in care planning, discharge planning, & interdisciplinary rounds as appropriate. Lulu Olmos RD, [facility] Pager: [phone]

## 2020-03-12 NOTE — PROGRESS NOTES
ARU/IPR REFERRAL CONTACT NOTE [address] [place] [address] for Physical Rehabilitation RE: Leticia Matamoros Thank you for the opportunity to review this patient's case for admission to [address] [place] [address] for Physical Rehabilitation. Based on our pre-admission screening:  
 
[ x] This patient does not meet criteria for admission to [facility] for Physical        Rehabilitation due to: 
 
[x ] Too functional, per documentation, patient does not require both Physical and Occupational Therapy Services at an acute rehabilitation level of intensity. We are also concerned about patients mentation, agitation and the need to require a sitter. Again, Thank you for this referral. Should you have any questions please do not hesitate to call. Sincerely, Esthela Gudino [facility] 
[phone]

## 2020-03-13 NOTE — PROGRESS NOTES
Problem: Self Care Deficits Care Plan (Adult) Goal: *Acute Goals and Plan of Care (Insert Text) Description: Occupational Therapy Goals Initiated 3/6/2020 within 7 day(s). 1.  Patient will perform self-feeding with modified independence. (MET 3/13/2020) 2. Patient will perform grooming with supervision/set-up following sternal precautions. (MET 3/13/2020) 3. Patient will perform upper body dressing with minimal assistance/contact guard assist. (MET 3/13/2020) 4. Patient will perform lower body dressing with moderate assistance. (MET 3/13/2020) 5. Patient will perform toilet transfers with contact guard assist. (MET 3/13/2020) 6. Patient will perform all aspects of toileting with contact guard assist. (MET 3/13/2020) 7. Patient will utilize energy conservation techniques during functional activities with verbal cues. (MET 3/13/2020) Pt re-evaluated 3/13/2020, therefore initiated new goals to be achieved within 7 days. 1.  Patient will perform grooming with modified independence. 2.  Patient will perform upper body dressing with supervision/setup 3. Patient will perform lower body dressing with supervision/setup. 4.  Patient will perform toilet transfers with supervision/set-up. 5.  Patient will perform all aspects of toileting with supervision/set-up. 6.  Patient will participate in upper extremity therapeutic exercise/activities with supervision/set-up for 8 minutes while following sternal precautions. Prior Level of Function: Pt only oriented to self. Pt reports he was able to dress himself but required assistance for bathing PTA. Pt unable to state other PLOF information during OT evaluation. 3/13/2020- Pt and wife report he was (I) with basic self-care/ADLs PTA Outcome: Progressing Towards Goal 
 OCCUPATIONAL THERAPY RE-EVALUATION Patient: Farshad Natarajan (83 y.o. male) Date: 3/13/2020 Primary Diagnosis: Acute coronary syndrome ([facility] [address] [I24.9] Elevated troponin [R79.89] Back pain [M54.9] Numbness and tingling in left arm [R20.0, R20.2] S/P cardiac catheterization [Z98.890] NSTEMI (non-ST elevated myocardial infarction) ([facility] [address] [I21.4] Procedure(s) (LRB): 
CORONARY ARTERY BYPASS GRAFT (CABG) TIMES THREE (N/A) 9 Days Post-Op Precautions:   Sternal, Skin, Fall ASSESSMENT : 
Pt cleared to participate in OT re-evaluation by RN. Upon entering room, pt seated in recliner, alert, and agreeable to therapy session with his wife present. Based on the objective data described below, the patient presents with  decreased functional balance, decreased functional mobility, and decreased independence with ADLs. Pt only able to recall 1 precaution, therefore provided re-education and how it applies to basic self-care. Pt educated on grooming, UB/LB dressing, and toileting techniques while following precautions. Provided re-enforcement for pt to utilize heart hugger for sit<>stand. Pt is able to stand with SBA and transfer to toilet using RW; cueing provided to utilize hear hugger before sitting on raised toilet seat. Recommended to pt and wife on having a shower chair (when bathing is appropriate) and raised toilet seat for easier transition on/off toilet; pt and wife agreeable to recommendation. RN Ellison Caller requested for pt to return to supine position in bed as pt had central line. Sitting EOB, pt reports he prefers using pillow rather than heart hugger. Pt was educated on log roll technique with pt requiring moderate assist.  The pt will benefit from further OT services, in order to maximize his ADL performance and decrease the risk for complications associated with decreased functional activity. At the end of the session, pt left resting comfortably in bed, call-bell in reach, with all needs met. As pt has met all previous goals, pt's goals have been updated for him to achieve. Patient will benefit from skilled intervention to address the above impairments. Patient's rehabilitation potential is considered to be Good Factors which may influence rehabilitation potential include:  
[]             None noted []             Mental ability/status [x]             Medical condition []             Home/family situation and support systems []             Safety awareness []             Pain tolerance/management 
[]             Other: PLAN : 
Recommendations and Planned Interventions:  
[x]               Self Care Training                  [x]      Therapeutic Activities [x]               Functional Mobility Training   []      Cognitive Retraining 
[x]               Therapeutic Exercises           [x]      Endurance Activities [x]               Balance Training                    []      Neuromuscular Re-Education []               Visual/Perceptual Training     [x]      Home Safety Training 
[x]               Patient Education                   [x]      Family Training/Education []               Other (comment): Frequency/Duration: Patient will be followed by occupational therapy 1-2 times per day/4-7 days per week to address goals. Discharge Recommendations: Home health Further Equipment Recommendations for Discharge: Shower chair, raised toilet seat. SUBJECTIVE:  
Patient stated I know, I know\" OBJECTIVE DATA SUMMARY:  
Hospital course since last seen and reason for reevaluation: Pt is due for re-evaluation. Pt appears to be benefiting from receiving OT services during this hospitalization stay. Pt presents with increased improvement in ADL performance. Goals have been updated for the pt to achieve. OT anticipates pt will continue demonstrating functional gains in his ADL performance & overall functional independence, as a result of continued OT services. Past Medical History:  
Diagnosis Date Acute coronary syndrome ([facility] [address] 3/2/2020 LOREN (acute kidney injury) ([facility] [address] 3/5/2020 Calculus of kidney Cancer ([facility] [address]  [facility]  
 Coronary artery disease of native artery with stable angina pectoris ([facility] [place] [address] 3/3/2020 Diabetes mellitus ([facility] [place] [address] 8/19/2010 Gout 8/19/2010 HTN (hypertension) 8/19/2010 Hyperlipemia 8/19/2010 Kidney disease Lumbar spondylosis Proteinuria S/P CABG x 3 3/5/2020 CABG x 3, Dr. Maynor Echols, 3/4/2020, [facility] Skin cancer, basal cell   
 neck Stage 3 chronic kidney disease ([facility] [place] [address] Type 2 diabetes mellitus without complication, without long-term current use of insulin ([facility] [place] [address] 8/19/2010 Urolithiasis Past Surgical History:  
Procedure Laterality Date HX CORONARY ARTERY BYPASS GRAFT  03/04/2020 CABG x3, Dr. Maynor Echols, [facility]  
 HX GI    
 HX OTHER SURGICAL Basal cell removed from left side of face HX UROLOGICAL    
 kidney Barriers to Learning/Limitations: None Compensate with: visual, verbal, tactile, kinesthetic cues/model Home Situation:  
Home Situation Home Environment: Private residence # Steps to Enter: 3 One/Two Story Residence: Other (Comment) # of Interior Steps: 12 Height of Each Step (in): 6 inches Interior Rails: Both Lift Chair Available: No 
Living Alone: No 
Support Systems: Family member(s) Patient Expects to be Discharged to[de-identified] Private residence Current DME Used/Available at Home: None 
[]  Right hand dominant   []  Left hand dominant Cognitive/Behavioral Status: 
Neurologic State: Alert Orientation Level: Oriented X4 Cognition: Follows commands Safety/Judgement: Fall prevention Skin: Noticeable bruising throughout BUEs Edema: None noted Coordination: BUE Coordination: Within functional limits Fine Motor Skills-Upper: Left Intact; Right Intact Gross Motor Skills-Upper: Left Intact; Right Intact Balance: 
Sitting: Intact Sitting - Static: Good (unsupported) Sitting - Dynamic: Good (unsupported) Standing: Impaired; With support Standing - Static: Good Standing - Dynamic : Fair Strength: BUE Strength: Within functional limits Tone & Sensation: BUE Tone: Normal 
Sensation: Intact Range of Motion: BUE 
AROM: Within functional limits(RUE; decreased L shld ROM, but functional) Functional Mobility and Transfers for ADLs: 
Bed Mobility: 
Rolling: Moderate assistance (Per PT) Supine to Sit: Moderate assistance (Per PT) Sit to Supine: Moderate assistance Scooting: Contact guard assistance(CGA first part scooting back in chair; mod to get fully back) Transfers: 
Sit to Stand: Stand-by assistance Stand to Sit: Stand-by assistance Toilet Transfer : Stand-by assistance ADL Assessment:  
Feeding: Modified independent Oral Facial Hygiene/Grooming: Stand-by assistance Bathing: Minimum assistance Upper Body Dressing: Minimum assistance Lower Body Dressing: Minimum assistance Toileting: Stand by assistance ADL Intervention: 
Pt re-educated on sternal precautions and how it applies to basic self-care. Pt educated on grooming, UB dressing techniques, LB dressing techniques, and proper sit-stand while utilizing heart hugger in prep for functional transfers to commode. Lower Body Dressing Assistance Dressing Assistance: Stand-by assistance Socks: Stand-by assistance (simulated) Position Performed: Seated in chair Toileting Toileting Assistance: Stand-by assistance(for toilet transfer) Cognitive Retraining Safety/Judgement: Fall prevention Pain: 
Pain level pre-treatment: 0/10 Pain level post-treatment: 0/10 Pt denied pain during session. Pain Intervention(s): Medication (see MAR); Rest, Ice, Repositioning Response to intervention: Nurse notified, See doc flow Activity Tolerance:  
Good Please refer to the flowsheet for vital signs taken during this treatment. After treatment:  
[] Patient left in no apparent distress sitting up in chair 
[x] Patient left in no apparent distress in bed 
[x] Call bell left within reach [x] Nursing notified 
[] Caregiver present 
[] Bed alarm activated COMMUNICATION/EDUCATION:  
[x] Role of Occupational Therapy in the acute care setting 
[] Home safety education was provided and the patient/caregiver indicated understanding. [x] Patient/family have participated as able in goal setting and plan of care. [x] Patient/family agree to work toward stated goals and plan of care. [] Patient understands intent and goals of therapy, but is neutral about his/her participation. [] Patient is unable to participate in goal setting and plan of care. Thank you for this referral. 
Fabrizio Navarro MS, OTR/L Time Calculation: 38 mins

## 2020-03-13 NOTE — PROGRESS NOTES
Problem: Mobility Impaired (Adult and Pediatric) Goal: *Acute Goals and Plan of Care (Insert Text) Description: Physical Therapy Goals Initiated 3/6/2020, re-evaluated 3/13/2020 and to be accomplished within 7 day(s) 1. Patient will move from supine to sit and sit to supine  and scoot up and down in bed with minimal assist.    
2.  Patient will transfer from bed to chair and chair to bed with supervision/set-up using the least restrictive device. 3.  Patient will perform sit to stand with supervision/set-up. 4.  Patient will ambulate with supervision/set-up for 150 feet with the least restrictive device. 5.  Patient will ascend/descend 2 stairs with single hand rail on right with supervision. PLOF: Patient lives with wife in [place] home. Per wife, patient will stay on first floor with single step/threshold to enter. Has bedroom and bathroom downstairs. He was independent with self-care and mobility. Outcome: Progressing Towards Goal 
 PHYSICAL THERAPY RE-EVALUATION Patient: Carmelo Stoll (12 y.o. male) Date: 3/13/2020 Primary Diagnosis: Acute coronary syndrome ([address] [I24.9] Elevated troponin [R79.89] Back pain [M54.9] Numbness and tingling in left arm [R20.0, R20.2] S/P cardiac catheterization [Z98.890] NSTEMI (non-ST elevated myocardial infarction) ([address] [I21.4] Procedure(s) (LRB): 
CORONARY ARTERY BYPASS GRAFT (CABG) TIMES THREE (N/A) 9 Days Post-Op Precautions: Sternal, Skin, Fall ASSESSMENT : 
Based on the objective data described below, the patient presents with s/p CABG POD #9. Patient received reclined in bed, heart monitor, BP cuff, and pulse oximeter connected, wife in room. Nurse consulted prior to treatment. Wife present in room. Patient re-educated on all sternal precautions. Patient able to recall \"I can't use my arms at all\" but needing frequent cues to maintain sternal precautions.  Vitals at rest: HR 97 bpm, SpO2 96% on room air. Training provided on supine to sit transfer from flat bed with moderate assist and max verbal cues to complete log roll technique. Good sitting balance and scooting to EOB. Sit to stand from EOB with CGA and cues for not using arms to push up. Patient ambulated x 250 feet CGA with rollator on room air. Gait training provided on upright posture and not pushing through UE with rollator. Verbal cues needed for breathing technique while ambulating. Stair training provided with CGA with right hand rail x 2 steps. LOB after stairs while turning back to rollator with min assist for recovery. Patient returned to room. Sit<>stand transfer training provided from chair with verbal cues to maintain sternal precautions and avoid pushing through arms. Patient able to scoot partial way back in chair with CGA/supervision and mod assist to complete full scoot back in chair. Patient left reclined in chair, monitor on, call bell in reach, chair alarm on, wife in room. Patient will benefit from skilled intervention to address the above impairments. Patient's rehabilitation potential is considered to be Good Factors which may influence rehabilitation potential include:  
[]         None noted 
[x]         Mental ability/status [x]         Medical condition 
[]         Home/family situation and support systems 
[x]         Safety awareness 
[]         Pain tolerance/management 
[]         Other: PLAN : 
Recommendations and Planned Interventions:  
[x]           Bed Mobility Training             [x]    Neuromuscular Re-Education 
[x]           Transfer Training                   []    Orthotic/Prosthetic Training 
[x]           Gait Training                          []    Modalities [x]           Therapeutic Exercises           []    Edema Management/Control 
[x]           Therapeutic Activities            [x]    Family Training/Education 
[x]           Patient Education []           Other (comment): Frequency/Duration: Patient will be followed by physical therapy 1-2 times per day/4-7 days per week to address goals. Discharge Recommendations: Home Health with 24-hr supervision/assist 
Further Equipment Recommendations for Discharge: bedside commode, shower chair, rollator SUBJECTIVE:  
Patient stated But my arms are so strong I want to use them.  OBJECTIVE DATA SUMMARY:  
Hospital course since last seen and reason for re-evaluation: Patient has progressed with therapy. Requiring less assistance throughout this week. Continues to require less assistance as he progresses. Continue skilled physical therapy. Past Medical History:  
Diagnosis Date  Acute coronary syndrome ([facility] [address] 3/2/2020  LOREN (acute kidney injury) ([facility] [address] 3/5/2020  Calculus of kidney  Cancer ([facility] [address] HX of [facility]  Coronary artery disease of native artery with stable angina pectoris ([facility] [address] 3/3/2020  Diabetes mellitus ([facility] [address] 8/19/2010  Gout 8/19/2010  
 HTN (hypertension) 8/19/2010  Hyperlipemia 8/19/2010  Kidney disease  Lumbar spondylosis  Proteinuria  S/P CABG x 3 3/5/2020 CABG x 3, Dr. Vilma Messina, 3/4/2020, [facility]  
 Skin cancer, basal cell   
 neck  Stage 3 chronic kidney disease ([facility] [address]  Type 2 diabetes mellitus without complication, without long-term current use of insulin ([facility] [address] 8/19/2010  Urolithiasis Past Surgical History:  
Procedure Laterality Date  HX CORONARY ARTERY BYPASS GRAFT  03/04/2020 CABG x3, Dr. Vlima Messina, [facility]  
 HX GI    
 HX OTHER SURGICAL Basal cell removed from left side of face  HX UROLOGICAL    
 kidney Barriers to Learning/Limitations: None Compensate with: N/A Home Situation:  
Home Situation Home Environment: Private residence # Steps to Enter: 3 One/Two Story Residence: Other (Comment) # of Interior Steps: 12 Height of Each Step (in): 6 inches Interior Rails: Both Lift Chair Available: No 
Living Alone: No 
 Support Systems: Family member(s) Patient Expects to be Discharged to[de-identified] Private residence Current DME Used/Available at Home: None Critical Behavior: 
Neurologic State: Alert Orientation Level: Oriented X4 Cognition: Follows commands(with repeated cues) Psychosocial 
Patient Behaviors: Calm; Cooperative Family  Behaviors: Cooperative;Supportive Skin Condition/Temp: Dry;Warm Family  Behaviors: Cooperative;Supportive Skin Integrity: Incision (comment)(midsternal and left leg surgical incisions.) Strength:   
Strength: Generally decreased, functional 
 
 Tone & Sensation:  
Tone: Normal 
 Sensation: (NT) Range Of Motion: 
AROM: Within functional limits Functional Mobility: 
Bed Mobility: 
Rolling: Moderate assistance Supine to Sit: Moderate assistance Scooting: Contact guard assistance(CGA first part scooting back in chair; mod to get fully back) Transfers: 
Sit to Stand: Contact guard assistance Stand to Sit: Contact guard assistance Bed to Chair: Contact guard assistance Balance:  
Sitting: Intact Sitting - Static: Good (unsupported) Sitting - Dynamic: Good (unsupported) Standing: With support(rollator) Standing - Static: Fair Standing - Dynamic : Fair(LOB x 1 at stairs with min assist for recovery) Ambulation/Gait Training: 
Distance (ft): 250 Feet (ft) Assistive Device: Walker, rollator Ambulation - Level of Assistance: Contact guard assistance Gait Description (WDL): Exceptions to [facility] Gait Abnormalities: Shuffling gait; Decreased step clearance Speed/Alba: Pace decreased (<100 feet/min) Step Length: Left shortened;Right shortened Stairs: 
Number of Stairs Trained: 2 Stairs - Level of Assistance: Contact guard assistance Rail Use: Right Pain: 
Pain level pre-treatment: 0/10 Pain level post-treatment: 0/10 Pain Intervention(s) : Medication (see MAR); Rest,  Repositioning for comfort in chair Response to intervention: Nurse notified, See doc flow Activity Tolerance:  
Progressing daily. Tolerated session with stable vitals on room air. Heart rate up to 110 with exertion. Returned to 91 bpm after session. SpO2 94-96% on room air. Please refer to the flowsheet for vital signs taken during this treatment. After treatment:  
[x]         Patient left in no apparent distress sitting up in chair 
[]         Patient left in no apparent distress in bed 
[x]         Call bell left within reach [x]         Nursing notified 
[x]         Wife present [x]         Chair alarm activated 
[x]         SCDs applied COMMUNICATION/EDUCATION:  
[x]         Role of Physical Therapy in the acute care setting. []         Fall prevention education was provided and the patient/caregiver indicated understanding. [x]         Patient/family have participated as able in goal setting and plan of care. [x]         Patient/family agree to work toward stated goals and plan of care. []         Patient understands intent and goals of therapy, but is neutral about his/her participation. []         Patient is unable to participate in goal setting/plan of care: ongoing with therapy staff. 
[]         Other: Thank you for this referral. 
Finesse Gibson, DPT, CLT Time Calculation: 39 mins

## 2020-03-13 NOTE — PROGRESS NOTES
Problem: Patient Education: Go to Patient Education Activity Goal: Patient/Family Education 3/13/2020 0406 by Kasey Mcmillan RN Outcome: Progressing Towards Goal 
3/13/2020 0358 by Kasey Mcmillan RN Outcome: Progressing Towards Goal 
  
Problem: Unstable angina/NSTEMI: Day of Admission/Day 1 Goal: Off Pathway (Use only if patient is Off Pathway) 3/13/2020 0406 by Kasey Mcmillan RN Outcome: Progressing Towards Goal 
3/13/2020 0358 by Kasey Mcmillan RN Outcome: Progressing Towards Goal 
Goal: Activity/Safety 3/13/2020 0406 by Kasey Mcmillan RN Outcome: Progressing Towards Goal 
3/13/2020 0358 by Kasey Mcmillan RN Outcome: Progressing Towards Goal 
Goal: Consults, if ordered 3/13/2020 0406 by Kasey Mcmillan RN Outcome: Progressing Towards Goal 
3/13/2020 0358 by Kasey Mcmillan RN Outcome: Progressing Towards Goal 
Goal: Diagnostic Test/Procedures 3/13/2020 0406 by Kasey Mcmillan RN Outcome: Progressing Towards Goal 
3/13/2020 0358 by Kasey Mcmillan RN Outcome: Progressing Towards Goal 
Goal: Nutrition/Diet 3/13/2020 0406 by Kasey Mcmillan RN Outcome: Progressing Towards Goal 
3/13/2020 0358 by Kasey Mcmillan RN Outcome: Progressing Towards Goal 
Goal: Discharge Planning 3/13/2020 0406 by Kasey Mcmillan RN Outcome: Progressing Towards Goal 
3/13/2020 0358 by Kasey Mcmillan RN Outcome: Progressing Towards Goal 
Goal: Medications 3/13/2020 0406 by Kasey Mcmillan RN Outcome: Progressing Towards Goal 
3/13/2020 0358 by Kasey Mcmlilan RN Outcome: Progressing Towards Goal 
Goal: Respiratory 3/13/2020 0406 by Kasey Mcmillan RN Outcome: Progressing Towards Goal 
3/13/2020 0358 by Kasey Mcmillan RN Outcome: Progressing Towards Goal 
Goal: Treatments/Interventions/Procedures 3/13/2020 0406 by Kasey Mcmillan RN Outcome: Progressing Towards Goal 
3/13/2020 0358 by Kasey Mcmillan RN 
 Outcome: Progressing Towards Goal 
Goal: Psychosocial 
3/13/2020 0406 by Mayda Mario RN Outcome: Progressing Towards Goal 
3/13/2020 0358 by Mayda Mario RN Outcome: Progressing Towards Goal 
Goal: *Hemodynamically stable 3/13/2020 0406 by Mayda Mario RN Outcome: Progressing Towards Goal 
3/13/2020 0358 by Mayda Mario RN Outcome: Progressing Towards Goal 
Goal: *Optimal pain control at patient's stated goal 
3/13/2020 0406 by Mayda Mario RN Outcome: Progressing Towards Goal 
3/13/2020 0358 by Mayda Mario RN Outcome: Progressing Towards Goal 
Goal: *Lungs clear or at baseline 3/13/2020 0406 by Mayda Mario RN Outcome: Progressing Towards Goal 
3/13/2020 0358 by Mayda Mario RN Outcome: Progressing Towards Goal 
  
Problem: Unstable angina/NSTEMI: Day 2 Goal: Off Pathway (Use only if patient is Off Pathway) 3/13/2020 0406 by Mayda Mario RN Outcome: Progressing Towards Goal 
3/13/2020 0358 by Mayda Mario RN Outcome: Progressing Towards Goal 
Goal: Activity/Safety 3/13/2020 0406 by Mayda Mario RN Outcome: Progressing Towards Goal 
3/13/2020 0358 by Mayda Mario RN Outcome: Progressing Towards Goal 
Goal: Consults, if ordered 3/13/2020 0406 by Mayda Mario RN Outcome: Progressing Towards Goal 
3/13/2020 0358 by Mayda Mario RN Outcome: Progressing Towards Goal 
Goal: Diagnostic Test/Procedures 3/13/2020 0406 by Mayda Mario RN Outcome: Progressing Towards Goal 
3/13/2020 0358 by Mayda Mario RN Outcome: Progressing Towards Goal 
Goal: Nutrition/Diet 3/13/2020 0406 by Mayda Mario RN Outcome: Progressing Towards Goal 
3/13/2020 0358 by Mayda Mario RN Outcome: Progressing Towards Goal 
Goal: Discharge Planning 3/13/2020 0406 by Mayda Mario RN Outcome: Progressing Towards Goal 
3/13/2020 0358 by Mayda Mario RN Outcome: Progressing Towards Goal 
 Goal: Medications 3/13/2020 0406 by Deja Nguyen RN Outcome: Progressing Towards Goal 
3/13/2020 0358 by Deja Nguyen RN Outcome: Progressing Towards Goal 
Goal: Respiratory 3/13/2020 0406 by Deja Nguyen RN Outcome: Progressing Towards Goal 
3/13/2020 0358 by Deja Nguyen RN Outcome: Progressing Towards Goal 
Goal: Treatments/Interventions/Procedures 3/13/2020 0406 by Deja Nguyen RN Outcome: Progressing Towards Goal 
3/13/2020 0358 by Deja Nguyen RN Outcome: Progressing Towards Goal 
Goal: Psychosocial 
3/13/2020 0406 by Deja Nguyen RN Outcome: Progressing Towards Goal 
3/13/2020 0358 by Deja Nguyen RN Outcome: Progressing Towards Goal 
Goal: *Hemodynamically stable 3/13/2020 0406 by Deja Nguyen RN Outcome: Progressing Towards Goal 
3/13/2020 0358 by Deja Nguyen RN Outcome: Progressing Towards Goal 
Goal: *Optimal pain control at patient's stated goal 
3/13/2020 0406 by Deja Nguyen RN Outcome: Progressing Towards Goal 
3/13/2020 0358 by Deja Nguyen RN Outcome: Progressing Towards Goal 
Goal: *Lungs clear or at baseline 3/13/2020 0406 by Deja Nguyen, RN Outcome: Progressing Towards Goal 
3/13/2020 0358 by Deja Nguyen RN Outcome: Progressing Towards Goal 
  
Problem: Unstable Angina/NSTEMI: Discharge Outcomes Goal: *Hemodynamically stable 3/13/2020 0406 by Deja Nguyen, RN Outcome: Progressing Towards Goal 
3/13/2020 0358 by Deja Nguyen RN Outcome: Progressing Towards Goal 
Goal: *Stable cardiac rhythm 3/13/2020 0406 by Deja Nguyen, RN Outcome: Progressing Towards Goal 
3/13/2020 0358 by Deja Nguyen RN Outcome: Progressing Towards Goal 
Goal: *Lungs clear or at baseline 3/13/2020 0406 by Deja Nguyen, RN Outcome: Progressing Towards Goal 
3/13/2020 0358 by Deja Nguyen RN Outcome: Progressing Towards Goal 
 Goal: *Optimal pain control at patient's stated goal 
3/13/2020 0406 by Latisha Degroot RN Outcome: Progressing Towards Goal 
3/13/2020 0358 by Latisha Degroot RN Outcome: Progressing Towards Goal 
Goal: *Identifies cardiac risk factors 3/13/2020 0406 by Latisha Degroot RN Outcome: Progressing Towards Goal 
3/13/2020 0358 by Latisha Degroot RN Outcome: Progressing Towards Goal 
Goal: *Verbalizes home exercise program, activity guidelines, cardiac precautions 3/13/2020 0406 by Latisha Degroot RN Outcome: Progressing Towards Goal 
3/13/2020 0358 by Latisha Degroot RN Outcome: Progressing Towards Goal 
Goal: *Verbalizes understanding and describes prescribed diet 3/13/2020 0406 by Latisha Degroot RN Outcome: Progressing Towards Goal 
3/13/2020 0358 by Latisha Degroot RN Outcome: Progressing Towards Goal 
Goal: *Verbalizes name, dosage, time, side effects, and number of days to continue medications 3/13/2020 0406 by Latisha Degroot RN Outcome: Progressing Towards Goal 
3/13/2020 0358 by Latisha Degroot RN Outcome: Progressing Towards Goal 
Goal: *Anxiety reduced or absent 3/13/2020 0406 by Latisha Degroot RN Outcome: Progressing Towards Goal 
3/13/2020 0358 by Latisha Degroot RN Outcome: Progressing Towards Goal 
Goal: *Understands and describes signs and symptoms to report to providers(Stroke Metric) 3/13/2020 0406 by Latisha Degroot RN Outcome: Progressing Towards Goal 
3/13/2020 0358 by Latisha Degroot RN Outcome: Progressing Towards Goal 
Goal: *Describes follow-up/return visits to physicians 3/13/2020 0406 by Latisha Degroot RN Outcome: Progressing Towards Goal 
3/13/2020 0358 by Latisha Degroot RN Outcome: Progressing Towards Goal 
Goal: *Describes available resources and support systems 3/13/2020 0406 by Latisha Degroot RN Outcome: Progressing Towards Goal 
3/13/2020 0358 by Latisha Degroot RN 
 Outcome: Progressing Towards Goal 
Goal: *Influenza immunization 3/13/2020 0406 by Cyndy Mena RN Outcome: Progressing Towards Goal 
3/13/2020 0358 by Cyndy Mena RN Outcome: Progressing Towards Goal 
Goal: *Pneumococcal immunization 3/13/2020 0406 by Cyndy Mena RN Outcome: Progressing Towards Goal 
3/13/2020 0358 by Cyndy Mena RN Outcome: Progressing Towards Goal 
Goal: *Describes smoking cessation resources 3/13/2020 0406 by Cyndy Mena RN Outcome: Progressing Towards Goal 
3/13/2020 0358 by Cyndy Mena RN Outcome: Progressing Towards Goal 
  
Problem: Falls - Risk of 
Goal: *Absence of Falls Description: Document Hari Mojica Fall Risk and appropriate interventions in the flowsheet. 3/13/2020 0406 by Cyndy Mena RN Outcome: Progressing Towards Goal 
Note: Fall Risk Interventions: 
Mobility Interventions: Bed/chair exit alarm, PT Consult for mobility concerns, OT consult for ADLs Mentation Interventions: Bed/chair exit alarm Medication Interventions: Bed/chair exit alarm, Patient to call before getting OOB Elimination Interventions: Call light in reach, Patient to call for help with toileting needs History of Falls Interventions: Bed/chair exit alarm 3/13/2020 0358 by Cyndy Mena RN Outcome: Progressing Towards Goal 
Note: Fall Risk Interventions: 
Mobility Interventions: Bed/chair exit alarm, PT Consult for mobility concerns, OT consult for ADLs Mentation Interventions: Bed/chair exit alarm Medication Interventions: Bed/chair exit alarm, Patient to call before getting OOB Elimination Interventions: Call light in reach, Patient to call for help with toileting needs History of Falls Interventions: Bed/chair exit alarm Problem: Patient Education: Go to Patient Education Activity Goal: Patient/Family Education 3/13/2020 0406 by Cyndy Mena RN Outcome: Progressing Towards Goal 
 3/13/2020 0358 by Anabel Garcia RN Outcome: Progressing Towards Goal 
  
Problem: Pain Goal: *Control of Pain 3/13/2020 0406 by Anabel Garcia RN Outcome: Progressing Towards Goal 
3/13/2020 0358 by Anabel Garcia RN Outcome: Progressing Towards Goal 
  
Problem: Patient Education: Go to Patient Education Activity Goal: Patient/Family Education 3/13/2020 0406 by Anabel Garcia RN Outcome: Progressing Towards Goal 
3/13/2020 0358 by Anabel Garcia RN Outcome: Progressing Towards Goal 
  
Problem: Diabetes Self-Management Goal: *Disease process and treatment process Description: Define diabetes and identify own type of diabetes; list 3 options for treating diabetes. 3/13/2020 0406 by Anabel Garcia RN Outcome: Progressing Towards Goal 
3/13/2020 0358 by Anabel Garcia RN Outcome: Progressing Towards Goal 
Goal: *Incorporating nutritional management into lifestyle Description: Describe effect of type, amount and timing of food on blood glucose; list 3 methods for planning meals. 3/13/2020 0406 by Anabel Garcia RN Outcome: Progressing Towards Goal 
3/13/2020 0358 by Anabel Garcia RN Outcome: Progressing Towards Goal 
Goal: *Incorporating physical activity into lifestyle Description: State effect of exercise on blood glucose levels. 3/13/2020 0406 by Anabel Garcia RN Outcome: Progressing Towards Goal 
3/13/2020 0358 by Anabel Garcia RN Outcome: Progressing Towards Goal 
Goal: *Developing strategies to promote health/change behavior Description: Define the ABC's of diabetes; identify appropriate screenings, schedule and personal plan for screenings. 3/13/2020 0406 by Anabel Garcia RN Outcome: Progressing Towards Goal 
3/13/2020 0358 by Anabel Garcia RN Outcome: Progressing Towards Goal 
Goal: *Using medications safely Description: State effect of diabetes medications on diabetes; name diabetes medication taking, action and side effects. 3/13/2020 0406 by Latisha Degroot RN Outcome: Progressing Towards Goal 
3/13/2020 0358 by Latisha Degroot RN Outcome: Progressing Towards Goal 
Goal: *Monitoring blood glucose, interpreting and using results Description: Identify recommended blood glucose targets  and personal targets. 3/13/2020 0406 by Latisha Degroot RN Outcome: Progressing Towards Goal 
3/13/2020 0358 by Latisha Degroot RN Outcome: Progressing Towards Goal 
Goal: *Prevention, detection, treatment of acute complications Description: List symptoms of hyper- and hypoglycemia; describe how to treat low blood sugar and actions for lowering  high blood glucose level. 3/13/2020 0406 by Latisha Degroot RN Outcome: Progressing Towards Goal 
3/13/2020 0358 by Latisha Degroot RN Outcome: Progressing Towards Goal 
Goal: *Prevention, detection and treatment of chronic complications Description: Define the natural course of diabetes and describe the relationship of blood glucose levels to long term complications of diabetes. 3/13/2020 0406 by Latisha Degroot RN Outcome: Progressing Towards Goal 
3/13/2020 0358 by Latisha Degroot RN Outcome: Progressing Towards Goal 
Goal: *Developing strategies to address psychosocial issues Description: Describe feelings about living with diabetes; identify support needed and support network 3/13/2020 0406 by Latisha Degroot RN Outcome: Progressing Towards Goal 
3/13/2020 0358 by Latisha Degroot RN Outcome: Progressing Towards Goal 
Goal: *Insulin pump training 3/13/2020 0406 by Latisha Degroot RN Outcome: Progressing Towards Goal 
3/13/2020 0358 by Latisha Degroot RN Outcome: Progressing Towards Goal 
Goal: *Sick day guidelines 3/13/2020 0406 by Latisha Degroot RN Outcome: Progressing Towards Goal 
3/13/2020 0358 by Latisha Degroot RN Outcome: Progressing Towards Goal 
 Goal: *Patient Specific Goal (EDIT GOAL, INSERT TEXT) 3/13/2020 0406 by Corey Sims RN Outcome: Progressing Towards Goal 
3/13/2020 0358 by Corey Sims RN Outcome: Progressing Towards Goal 
  
Problem: Patient Education: Go to Patient Education Activity Goal: Patient/Family Education 3/13/2020 0406 by Corey Sims RN Outcome: Progressing Towards Goal 
3/13/2020 0358 by Corey Sims RN Outcome: Progressing Towards Goal 
  
Problem: Diabetes Maintenance:Admission Goal: *Blood glucose 80 to 180 mg/dl Outcome: Progressing Towards Goal 
  
Problem: Patient Education: Go to Patient Education Activity Goal: Patient/Family Education 3/13/2020 0406 by Corey Sims RN Outcome: Progressing Towards Goal 
3/13/2020 0358 by Corey Sims RN Outcome: Progressing Towards Goal 
  
Problem: Pressure Injury - Risk of 
Goal: *Prevention of pressure injury Description: Document Tr Scale and appropriate interventions in the flowsheet. Outcome: Progressing Towards Goal 
Note: Pressure Injury Interventions: 
Sensory Interventions: Pressure redistribution bed/mattress (bed type) Moisture Interventions: Absorbent underpads, Maintain skin hydration (lotion/cream) Activity Interventions: Increase time out of bed, Pressure redistribution bed/mattress(bed type), PT/OT evaluation Mobility Interventions: HOB 30 degrees or less, Pressure redistribution bed/mattress (bed type), Turn and reposition approx. every two hours(pillow and wedges) Nutrition Interventions: Document food/fluid/supplement intake, Discuss nutritional consult with provider Friction and Shear Interventions: HOB 30 degrees or less, Foam dressings/transparent film/skin sealants Problem: Patient Education: Go to Patient Education Activity Goal: Patient/Family Education Outcome: Progressing Towards Goal 
  
Problem: Nutrition Deficit Goal: *Optimize nutritional status Outcome: Progressing Towards Goal 
  
Problem: Patient Education: Go to Patient Education Activity Goal: Patient/Family Education Outcome: Progressing Towards Goal 
  
Problem: Patient Education: Go to Patient Education Activity Goal: Patient/Family Education Outcome: Progressing Towards Goal 
  
Problem: Delirium Treatment Goal: *Level of consciousness restored to baseline Outcome: Progressing Towards Goal 
Goal: *Level of environmental perceptions restored to baseline Outcome: Progressing Towards Goal 
Goal: *Sensory perception restored to baseline Outcome: Progressing Towards Goal 
Goal: *Emotional stability restored to baseline Outcome: Progressing Towards Goal 
Goal: *Functional assessment restored to baseline Outcome: Progressing Towards Goal 
Goal: *Absence of falls Outcome: Progressing Towards Goal 
Goal: *Will remain free of delirium, CAM Score negative Outcome: Progressing Towards Goal 
Goal: *Cognitive status will be restored to baseline Outcome: Progressing Towards Goal 
Goal: Interventions Outcome: Progressing Towards Goal 
  
Problem: Patient Education: Go to Patient Education Activity Goal: Patient/Family Education Outcome: Progressing Towards Goal

## 2020-03-13 NOTE — PROGRESS NOTES
Discharge order noted for today. Pt has been accepted to [facility] agency. Met with patient and wife and are agreeable to the transition plan today. Transport has been arranged through family. Patient's discharge summary and home health  orders have been forwarded to [facility] health  agency via [facility]. Updated bedside RN, Tari Ann,  to the transition plan. Discharge information has been documented on the AVS. Rollator ordered through [facility] and will be delivered to home at [facility] request.  Wife will be obtaining a BSC and shower chair herself. Lyn Hensley RN - Outcomes Manager  [phone]

## 2020-03-13 NOTE — PROGRESS NOTES
RENAL DAILY PROGRESS NOTE Subjective:  
Admitted for chest pains post 3V CABG seen for LOREN/CKD 3 Current Facility-Administered Medications Medication Dose Route Frequency  furosemide (LASIX) tablet 20 mg  20 mg Oral DAILY  acetaminophen (TYLENOL) tablet 1,000 mg  1,000 mg Oral Q6H PRN  
 diphenhydrAMINE (BENADRYL) capsule 25 mg  25 mg Oral QHS PRN  
 metoprolol tartrate (LOPRESSOR) tablet 12.5 mg  12.5 mg Oral Q12H  nystatin (MYCOSTATIN) 100,000 unit/gram powder   Topical BID  
 guaiFENesin ER (MUCINEX) tablet 1,200 mg  1,200 mg Oral Q12H  
 heparin (porcine) injection 5,000 Units  5,000 Units SubCUTAneous Q8H  
 atorvastatin (LIPITOR) tablet 40 mg  40 mg Oral QHS  traMADoL (ULTRAM) tablet 50 mg  50 mg Oral Q6H PRN  cholecalciferol (VITAMIN D3) (1000 Units /25 mcg) tablet 4 Tab  4,000 Units Oral DAILY  ferrous sulfate tablet 325 mg  1 Tab Oral BID WITH MEALS  
 ascorbic acid (vitamin C) (VITAMIN C) tablet 250 mg  250 mg Oral BID WITH MEALS  folic acid (FOLVITE) tablet 1 mg  1 mg Oral DAILY  cyanocobalamin tablet 1,000 mcg  1,000 mcg Oral DAILY  tamsulosin (FLOMAX) capsule 0.4 mg  0.4 mg Oral DAILY  epoetin marbin-epbx (RETACRIT) injection 4,000 Units  4,000 Units SubCUTAneous Q MON, WED & FRI  famotidine (PEPCID) tablet 20 mg  20 mg Oral DAILY  insulin lispro (HUMALOG) injection   SubCUTAneous AC&HS  
 glucose chewable tablet 16 g  4 Tab Oral PRN  
 glucagon (GLUCAGEN) injection 1 mg  1 mg IntraMUSCular PRN  
 dextrose (D50W) injection syrg 12.5-25 g  25-50 mL IntraVENous PRN  
 sodium chloride (NS) flush 5-40 mL  5-40 mL IntraVENous Q8H  
 naloxone (NARCAN) injection 0.4 mg  0.4 mg IntraVENous PRN  
 albuterol (PROVENTIL VENTOLIN) nebulizer solution 2.5 mg  2.5 mg Nebulization Q4H PRN  
 ondansetron (ZOFRAN) injection 4 mg  4 mg IntraVENous Q4H PRN  
 aspirin delayed-release tablet 81 mg  81 mg Oral DAILY  chlorhexidine (PERIDEX) 0.12 % mouthwash 10 mL  10 mL Oral BID  ELECTROLYTE REPLACEMENT PROTOCOL - Potassium Standard Dosing  1 Each Other PRN  
 ELECTROLYTE REPLACEMENT PROTOCOL - Magnesium  1 Each Other PRN  
 sodium chloride (NS) flush 5-40 mL  5-40 mL IntraVENous PRN Objective:  
 
Patient Vitals for the past 24 hrs: 
 Temp Pulse Resp BP SpO2  
03/13/20 1200 98 °F (36.7 °C) 96 23 103/65 93 % 03/13/20 1100  95 20 103/62 91 % 03/13/20 1000  97 18 111/69 93 % 03/13/20 0900  95 26 133/62 90 % 03/13/20 0800 98 °F (36.7 °C) 93 22 135/76 93 % 03/13/20 0704  92 25 127/74 94 % 03/13/20 0600  99 14 157/89 96 % 03/13/20 0500  95 23 148/88 98 % 03/13/20 0400  91 26 140/68 98 % 03/13/20 0300  88 22 134/68 95 % 03/13/20 0200  92 18 124/80 99 % 03/13/20 0100  85 24 100/56 96 % 03/13/20 0000 98.5 °F (36.9 °C)      
03/12/20 2300  96 29 125/63 98 % 03/12/20 2200  93 13 143/67 96 % 03/12/20 2100  91 22 136/76 97 % 03/12/20 2000 98.8 °F (37.1 °C) 92 26 (!) 88/65 97 % 03/12/20 1900  92 23 94/58 (!) 87 % 03/12/20 1818  96 23 92/62 94 % 03/12/20 1800  90 26 90/64 99 % 03/12/20 1700  94 16 103/56 99 % 03/12/20 1604 98 °F (36.7 °C) 95 20 90/57 100 % 03/12/20 1600  92 28 (!) 87/46 100 % 03/12/20 1552  93 20 (!) 89/51 90 % 03/12/20 1400  (!) 103 23 98/63 95 % 03/12/20 1358  (!) 101 24 106/66 91 % Weight change: -4.944 kg (-10 lb 14.4 oz) 03/11 1901 - 03/13 0700 In: 660 [P.O.:660] Out: 2995 [LSCKE:1756] Intake/Output Summary (Last 24 hours) at 3/13/2020 1320 Last data filed at 3/13/2020 0900 Gross per 24 hour Intake 900 ml Output 950 ml Net -50 ml Physical Exam:more alert today HEENT: non icteric Cardiovascular: regular, sternal dressing, 
C/L: dec BS both bases no wheezing/rales Abdomen: obese, hypoactive BS Ext: + peripheral edema Data Review:  
 
LABS:  
Hematology:  
Recent Labs  
  03/13/20 
0420 03/10/20 [address] WBC 14.5* 11.7 HGB 8.8* 8.6* HCT 28.2* 26.8* Pl 166K Chemistry:  
Recent Labs  
  03/13/20 
0420 03/12/20 
0443 03/11/20 
0427 BUN 39* 34* 40* CREA 1.83* 1.58* 1.77* CA 8.1* 8.2* 8.4* ALB  --   --  2.6*  
K 4.2 4.3 4.6  145 141  111 108 CO2 25 24 26 PHOS  --  3.4 3.3 * 136* 114* IPTH 95.6 Vit D 25 OH 10.8 UMACR 288 Fe 17 sat 11 teri 382 I 
  
 
IMPRESSION AND PLAN:  
LOREN/CKD 3,discharge home on lasix 20 mg daily. will follow in the office. discussed with wife Urinary retention,georges cath Anemia Hgb  cont PARTH/po fe. Hypocalcemia with sl elevated IPTH from  vit D def on supplement CAD post CABG  
HTN well controlled DM2 well controlled Hypoalbuminemia push protein supp Everton Elmore MD 
3/13/2020

## 2020-03-13 NOTE — ROUTINE PROCESS
1915 Pt received after shift change report from Lyle Perez RN. Family at bedside. Pt awake and oriented to self, person and time. Lin draining to gravity with clear, yellow urine. Denies pain Call bell in reach. Will monitor frequently. 2300 Repositioned to right side with help from daughter. Call bell in reach. 0015 Repositioned up and to right side again per pt's request with assist from daughter. Pt conversing appropriately. Got emotional when talking about the loss of a family member. Nurse empathetic and expressed condolence while holding pt's hand. 0210 Pt oriented x 4. Adjust bed pads. Repositioned up in bed and to left side with assist from daughter prior to going home. 0400 Pt asleep, no distress noted. Call bell in reach. 0610 Bed bath provided. Performed own facial and mouth care. Gown changed. Up in recliner after weight obtained. Appropriate conversation with intermittent confusion. 0630 Bed linens and pads changed. ICS at 500. Encouraged to use at least 10 times an hour. Remains oriented x 4 with periodic confusion. Call bell in reach. Bedside shift change report given to Lyle Perez RN (oncoming nurse) by Arnulfo Irene RN (offgoing nurse). Report included the following information SBAR, MAR, KARDEX AND RECENT RESULTS

## 2020-03-13 NOTE — PROGRESS NOTES
CARDIAC SURGERY PROGRESS NOTE 
 
3/13/2020 
9:33 AM 
  
Post Operative Day # 9 Chart reviewed. Interval History/Events of Past 24 hours:  
Seen with wife in room. Walks in slade without supplemental oxygen. 1-2 l/m O2 sometimes needed with deep sleep. His daughter stayed with him until 0200. He had a good night with intermittent sleep. Seroquel stopped and benadryl q hs added. Very alert and oriented and in good spirits this AM.  His sense of humor has returned. 1.1 liters urine last 24 hours through Lin. BUN/Creat up slightly with decreasing lasix dose. IS to 1000 cc. Subjective: 
Patient seen and examined on rounds today. No complaints Pain level: controlled Ambulating: well Sleeping: better Eating:  Better BM: Yes Objective: 
Vital signs:  
Visit Vitals /76 Pulse 93 Temp 98.5 °F (36.9 °C) Resp 22 Ht 5' 7\" (1.702 m) Wt 83.5 kg (184 lb 1.6 oz) SpO2 93% BMI 28.83 kg/m² Temp (24hrs), Av.3 °F (36.8 °C), Min:98 °F (36.7 °C), Max:98.8 °F (37.1 °C) Admission Weight: Last Weight Weight: 90.7 kg (200 lb) Weight: 83.5 kg (184 lb 1.6 oz) Telemetry: SR 98 Physical Examination:    
General:  Alert, oriented Lungs: Clear to ascultation without rales, wheezes or rhonchi. Chest:  Midline incision healing well. Sternum stable. Heart: regular rate and rhythm, no murmur. Abdomen: Soft and non-tender without masses. Bowel sounds present. Extremities: Warm and well perfused. Edema absent. Incisions: clean, dry, no drainage. Neuro: No deficit. Beta-blocker: Yes ASA: Yes  
Statin: Yes ACE-I: No 
Diuretic: Yes DVT Prophylaxis:  
pneumatic compression boots: Yes Compression stockings:  Yes 
Heparin:  Yes Labs: 
Lab Results Component Value Date/Time WBC 14.5 (H) 2020 04:20 AM  
 HCT 28.2 (L) 2020 04:20 AM  
  2020 04:20 AM  
  
Lab Results Component Value Date/Time   2020 04:20 AM  
 K 4.2 03/13/2020 04:20 AM  
  03/13/2020 04:20 AM  
 CO2 25 03/13/2020 04:20 AM  
  (H) 03/13/2020 04:20 AM  
 BUN 39 (H) 03/13/2020 04:20 AM  
 CREA 1.83 (H) 03/13/2020 04:20 AM  
 CREA 1.58 (H) 03/12/2020 04:43 AM  
 CREA 1.77 (H) 03/11/2020 04:27 AM  
 
 
Assessment: S/P  CABG x 3 Respiratory parameters stable Cardiac status stable Plans: 
1. D/c Lasix. 2.  Lin for retention per urology. Continue Flomax. 3.  OK for discharge to SNF or home today. Will follow up creat at our 7 day follow up. 4.  Discussed plans in detail with wife. Heart Hugger use encouraged to mitigate pain and will also assist with deep breathing and incentive spirometry goals.  
 
 
Nidhi Martinez PA-C

## 2020-03-13 NOTE — PROGRESS NOTES
[facility] - Progress Note Admit Date: 3/2/2020 Assessment:  
 
Hospital Problems  Date Reviewed: 12/2/2019 Codes Class Noted POA  
 S/P cardiac catheterization ICD-10-CM: Z98.890 ICD-9-CM: V45.89  3/3/2020 No  
   
 Stage 3 chronic kidney disease (HCC) (Chronic) ICD-10-CM: N18.3 ICD-9-CM: 273. 3  Unknown Yes Urinary retention ICD-10-CM: R33.9 ICD-9-CM: 788.20  3/6/2020 No  
   
 S/P CABG x 3 (Chronic) ICD-10-CM: Z95.1 ICD-9-CM: V45.81  3/5/2020 No  
 Overview Signed 3/5/2020  1:20 PM by Mili Vora PA-C  
  CABG x 3, Dr. Tara Sanders, 3/4/2020, [facility] LOREN (acute kidney injury) ([facility] ICD-10-CM: N17.9 ICD-9-CM: 584.9  3/5/2020 No  
   
 NSTEMI (non-ST elevated myocardial infarction) ([facility] ICD-10-CM: I21.4 ICD-9-CM: 410.70  3/3/2020 Yes Coronary artery disease of native artery with stable angina pectoris [facility]) ICD-10-CM: I25.118 
ICD-9-CM: 414.01, 413.9  3/3/2020 Yes * (Principal) Elevated troponin ICD-10-CM: R79.89 ICD-9-CM: 790.6  3/2/2020 Yes Back pain (Chronic) ICD-10-CM: M54.9 ICD-9-CM: 724.5  3/2/2020 Yes Acute coronary syndrome (HCC) ICD-10-CM: I24.9 ICD-9-CM: 411.1  3/2/2020 Yes Type 2 diabetes with nephropathy (HCC) (Chronic) ICD-10-CM: E11.21 
ICD-9-CM: 250.40, 583.81  3/23/2018 Yes Nocturia ICD-10-CM: R35.1 ICD-9-CM: 788.43  10/10/2014 Yes HTN (hypertension) (Chronic) ICD-10-CM: I10 
ICD-9-CM: 401.9  8/19/2010 Yes Hyperlipemia (Chronic) ICD-10-CM: Y92.4 ICD-9-CM: 272.4  8/19/2010 Yes  
   
  
 
 
 
-CAD s/p CABG X3 on 3/4/2020 (LIMA to mid LAD, SVG to OM1, SVG to Diag1/ramus). -NSTEMI. Troponin peak at [facility] with possible septal infarct without ischemic changes, presented with back pain with nausea and vomiting. 
-Normal EF 55-60% by echo 3/3/2020. 
-Hypertension. Stable. -Diabetes mellitus. HgbA1c 7.1 
-Acute on Chronic kidney disease improved. -H/o kidney stones, last once 3 weeks ago. -H/o vertigo. 
-Anemia/bleeding post op requiring transfusion. 
-Delirium/encephalopathy. 
  
Remotely evaluated by Dr. Aguila Gottlieb. Plan:  
 
Patient appears to be doing well this AM. Continued on ASA, statin, BB. Lasix discontinued with plans on close lab follow up. Plan for transition to SNF today. Subjective: No new complaints. Objective:  
  
Patient Vitals for the past 8 hrs: 
 Temp Pulse Resp BP SpO2  
03/13/20 0900  95 26 133/62 90 % 03/13/20 0800 98 °F (36.7 °C) 93 22 135/76 93 % 03/13/20 0704  92 25 127/74 94 % 03/13/20 0600  99 14 157/89 96 % 03/13/20 0500  95 23 148/88 98 % 03/13/20 0400  91 26 140/68 98 % 03/13/20 0300  88 22 134/68 95 % Patient Vitals for the past 96 hrs: 
 Weight 03/13/20 0610 83.5 kg (184 lb 1.6 oz) 03/12/20 0700 88.5 kg (195 lb)  
03/11/20 0514 89.2 kg (196 lb 11.2 oz) 03/10/20 0530 90.5 kg (199 lb 8 oz) Intake/Output Summary (Last 24 hours) at 3/13/2020 1004 Last data filed at 3/13/2020 0900 Gross per 24 hour Intake 1020 ml Output 985 ml Net 35 ml Physical Exam: 
 
 
Data Review:  
 
Labs: Results:  
   
Chemistry Recent Labs  
  03/13/20 
0420 03/12/20 
0443 03/11/20 
0427 * 136* 114*  145 141  
K 4.2 4.3 4.6  111 108 CO2 25 24 26 BUN 39* 34* 40* CREA 1.83* 1.58* 1.77* CA 8.1* 8.2* 8.4* MG  --  2.3 2.3 PHOS  --  3.4 3.3 AGAP 8 10 7 BUCR 21* 22* 23* AP  --   --  66  
TP  --   --  6.0* ALB  --   --  2.6*  
GLOB  --   --  3.4 AGRAT  --   --  0.8 CBC w/Diff Recent Labs  
  03/13/20 
0420 03/10/20 
1845 WBC 14.5* 11.7 RBC 3.01* 2.92* HGB 8.8* 8.6* HCT 28.2* 26.8*  
 255 GRANS  --  73 LYMPH  --  13* EOS  --  4 Cardiac Enzymes No results found for: CPK, CK, CKMMB, CKMB, RCK3, CKMBT, CKNDX, CKND1, DION, TROPT, TROIQ, AFSHIN, TROPT, TNIPOC, BNP, BNPP Coagulation No results for input(s): PTP, INR, APTT, INREXT in the last 72 hours. Lipid Panel Lab Results Component Value Date/Time Cholesterol, total 180 03/04/2020 05:37 AM  
 HDL Cholesterol 33 (L) 03/04/2020 05:37 AM  
 LDL, calculated 73.4 03/04/2020 05:37 AM  
 VLDL, calculated 73.6 03/04/2020 05:37 AM  
 Triglyceride 368 (H) 03/04/2020 05:37 AM  
 CHOL/HDL Ratio 5.5 (H) 03/04/2020 05:37 AM  
  
BNP No results found for: BNP, BNPP, XBNPT Liver Enzymes Recent Labs  
  03/11/20 
0427 TP 6.0* ALB 2.6* AP 66 SGOT 33 Digoxin Thyroid Studies Lab Results Component Value Date/Time TSH 0.83 03/03/2020 02:59 AM  
    
 
Signed By: RHONDA Sierra March 13, 2020

## 2020-03-13 NOTE — PROGRESS NOTES
Met with pt and wife at bedside along with Asuncion ELLIS. Pts wife wants to take him home and \"see if I can handle it\". If she is not able to, she understands she will need to contact SNFs to get him admitted. Referral sent to [facility], notified David Octave of pt being placed in the queue. PT recommends a rolling walker; wife wants a rollator. Instructed her that it would be the regular copay plus a $50 upcharge for the upgrade. She is fine with that. Spoke with Erin at [employer] to order a rollator. They will deliver one here today. All info faxed to [employer] for the rollator. Will continue to follow as needed. Ted Acuna RN - Outcomes Manager  [phone]

## 2020-03-13 NOTE — PROGRESS NOTES
[address] [facility], [address] [place] [address] Street Phone: [phone] Urology Daily Progress Note Patient Active Problem List  
Diagnosis Code  Type 2 diabetes mellitus without complication, without long-term current use of insulin (HCC) E11.9  
 HTN (hypertension) I10  
 Gout M10.9  Hyperlipemia E78.5  Nocturia R35.1  Type 2 diabetes with nephropathy (HCC) E11.21  
 Elevated troponin R79.89  Back pain M54.9  Acute coronary syndrome (HCC) I24.9  S/P cardiac catheterization Z98.890  
 NSTEMI (non-ST elevated myocardial infarction) ([facility]) I21.4  
 S/P CABG x 3 Z95.1  Stage 3 chronic kidney disease ([facility]) N18.3  LOREN (acute kidney injury) ([facility] N17.9  Coronary artery disease of native artery with stable angina pectoris ([facility] I25.118  
 Urinary retention R33.9 Assessment & Plan Assessment 68year old male Post Operative Coronary artery bypass grafting x 3 on 3/4/2020 with: 
  
Post op Urinary Retention 
- georges placed 3/10/2020 with 300 cc's return after being s/c for 500 ccs earlier  
  
LOREN on CKD 
- creat trending up 
- CT 3/2 no hydro, MEGAN 3/12 w/o hydro - Ucx 3/7/2020 NG 
  
Hx BPH 
  
Left Hydrocele s/p repair by Dr. Holly Penn 4/29/15 
  
Non-obstructing right Kidney Stones measuring 3 mm each by CT 
  
Plan: 
Maintain Georges until patient more ambulatory. Continue Flomax MEGAN reviewed with patient and family Will need PSA and MELISSA as outpt Follow Up arranged: YES message sent to office to arrange f/up VT in 1 week and to see Nicolás Rosenthal NP in 4-6 weeks Will sign off at this time. Please contact with any questions or concerns.  
 
  
 
 
Amanda L. Doreatha Osler [facility] Pager # [phone] Available M-F 7:30- 5pm .  After 5pm and weekends please call answering service at [phone] Subjective Tolerating georges, now alert and oriented x 4 Objective PHYSICAL EXAMINATION:  
Visit Vitals /62 Pulse 95 Temp 98 °F (36.7 °C) Resp 26 Ht 5' 7\" (1.702 m) Wt 184 lb 1.6 oz (83.5 kg) SpO2 90% BMI 28.83 kg/m² Constitutional: Well developed, well nourished elderly male. No acute distress. HEENT: Normocephalic, Atraumatic CV:  RRR Pulm: No respiratory distress or difficulties breathing GI:  No abdominal masses or tenderness. [de-identified]   CVA tenderness: None SCROTUM:  Normal 
       PENIS: Normal   Georges: Draining clear yellow urine Skin: No evidence of jaundice. Normal color Neuro/Psych:  Alert and oriented. Affect appropriate. Lymphatic:   No enlarged supraclavicular lymphnodes MSK: Limited ROM 
 
REVIEW OF LABS AND IMAGING:   
 
Renal US IMPRESSION: 
  
Chronic renal disease with no hydronephrosis. 
  
Bilateral renal cysts. Labs:  
 
Labs: Results:  
Chemistry Recent Labs  
  03/13/20 0420 03/12/20 
0443 03/11/20 
0427 * 136* 114*  145 141  
K 4.2 4.3 4.6  111 108 CO2 25 24 26 BUN 39* 34* 40* CREA 1.83* 1.58* 1.77* CA 8.1* 8.2* 8.4* AGAP 8 10 7 BUCR 21* 22* 23* AP  --   --  66  
TP  --   --  6.0* ALB  --   --  2.6*  
GLOB  --   --  3.4 AGRAT  --   --  0.8 CBC w/Diff Recent Labs  
  03/13/20 
0420 03/10/20 
1845 WBC 14.5* 11.7 RBC 3.01* 2.92* HGB 8.8* 8.6* HCT 28.2* 26.8*  
 255 GRANS  --  73 LYMPH  --  13* EOS  --  4 Cultures No results for input(s): CULT in the last 72 hours. All Micro Results Procedure Component Value Units Date/Time CULTURE, URINE [[id number]] Collected:  03/07/20 1900 Order Status:  Completed Specimen:  Urine, Straight catheter Updated:  03/09/20 1004 Special Requests: NO SPECIAL REQUESTS Culture result: NO GROWTH 2 DAYS Urinalysis Color Date Value Ref Range Status 03/03/2020 YELLOW   Final  
 
 Appearance Date Value Ref Range Status 03/03/2020 CLEAR   Final  
 
Specific gravity Date Value Ref Range Status 03/03/2020 >1.030 (H) 1.005 - 1.030 Final  
 
pH (UA) Date Value Ref Range Status 03/03/2020 5.5 5.0 - 8.0   Final  
 
Protein Date Value Ref Range Status 03/03/2020 300 (A) NEG mg/dL Final  
 
Ketone Date Value Ref Range Status 03/03/2020 NEGATIVE  NEG mg/dL Final  
 
Bilirubin Date Value Ref Range Status 03/03/2020 NEGATIVE  NEG   Final  
 
Blood Date Value Ref Range Status 03/03/2020 SMALL (A) NEG   Final  
 
Urobilinogen Date Value Ref Range Status 03/03/2020 1.0 0.2 - 1.0 EU/dL Final  
 
Nitrites Date Value Ref Range Status 03/03/2020 NEGATIVE  NEG   Final  
 
Leukocyte Esterase Date Value Ref Range Status 03/03/2020 NEGATIVE  NEG   Final  
 
Potassium Date Value Ref Range Status 03/13/2020 4.2 3.5 - 5.5 mmol/L Final  
 
Creatinine Date Value Ref Range Status 03/13/2020 1.83 (H) 0.6 - 1.3 MG/DL Final  
 
BUN Date Value Ref Range Status 03/13/2020 39 (H) 7.0 - 18 MG/DL Final  
  
PSA No results for input(s): PSA in the last 72 hours. Coagulation Lab Results Component Value Date/Time  Prothrombin time 17.0 (H) 03/04/2020 02:45 PM  
 Prothrombin time 13.8 03/03/2020 02:59 AM  
 INR 1.4 (H) 03/04/2020 02:45 PM  
 INR 1.1 03/03/2020 02:59 AM  
 aPTT 43.4 (H) 03/04/2020 02:45 PM  
 aPTT 29.5 03/04/2020 05:37 AM

## 2020-03-13 NOTE — PROGRESS NOTES
0800:  Report received, care assumed. Complete assessment performed. 0900:  Took 50% breakfast, 100% supplement as fed to himself. Using Incentive Spirometer appropriately for 1000ml with reminder and encouragement to do so. Wife at side. Returned to bed for Ultrasound testing as ordered. 0930:  Ultrasound testing complete. Resp therapy initiated CPT. CVT Provider updated to pt status. New order noted. 1000:  PT, OT services worked with patient; tolerated well. Returned to cardiac recliner chair, legs elevated. Wife at side. 1100:  Discharge to home orders noted with Home Health Consult. Patient will be discharged home with georges. Patient and wife agreeable and pleased with plan of care. Wife states their son will come to [facility] after his work today to assist with discharge home via private vehicle. [facility]  is working on obtaining rollator for patient, will be delivered to pt's home. Wife states she has a walker for pt to use at home until rollator arrives. 1200:  OT worked with patient. Returned to bed for Right IJ Cordis removal. Lunch served. Video #3 of Open Heart Surgery, Discharge from [address] [place] [address] started in pt's room with patient and wife watching video. 1300:  Right IJ Cordis removed per policy, patient remains on bedrest. Monitor. 1600:  OOB to chair using rock/ stand method appropriately with standby assist. 
[id number]:  Discharge instructions reviewed at length with wife and patient, including all follow up appointments; medications; prescriptions; georges catheter care and changing georges drainage bag from collection bag to leg bag as approved via phone call to [facility] for Urology service; heart hugger use and sternal wound precautions; continued use of Incentive Spirometer at home; actions for concerns/ questions; actions for emergency; CVT [facility] [place] red bracelet applied to patient left arm and rationale/ explanation discussed.   Supplies for georges bag change and emptying provided. Wife verbalizes understanding all info. Assisted with dressing in street clothes for home. Discharged home via wheelchair with indwelling georges intact. Patients wife at side. Patients son driving. Remained AAOx4, using heart hugger and rock/ stand method to mobilize. Denies pain.

## 2020-03-13 NOTE — DISCHARGE INSTRUCTIONS
Nystatin (On the skin)   Nystatin (jade-STAT-in)  Treats infections caused by fungus. Belongs to a class of drugs called antifungals. Brand Name(s): Nyamyc, Nyata, Nystop   There may be other brand names for this medicine. When This Medicine Should Not Be Used: You should not use this medication if you have ever had an allergic reaction to nystatin. How to Use This Medicine:   Cream, Powder, Ointment  · Your doctor will tell you how much to use and how often. · Wash your hands before and after using this medicine. · Use this medicine on your skin only. Do not get the medicine in your eyes. · Put the medicine on the affected area and rub in gently. · Do not put a bandage on the area unless your doctor tells you to. Avoid tight-fitting diapers and plastic pants if using on diaper area of children. · It is important to use this medicine for as long as your doctor tells you to. · If your infection does not begin to clear after a few days of using this medicine, call your doctor. · If using nystatin for an infection of the feet, apply the powder to the affected area and sprinkle it in your socks and shoes as well. If a dose is missed:   · Apply the missed dose as soon as possible unless it is almost time for next dose. · If it is almost time for your next regular dose, wait until then to use your medicine and skip the missed dose. · Do not apply two doses at the same time. How to Store and Dispose of This Medicine:   · Store away from heat, moisture, and direct light. Do not freeze. · Ask your pharmacist, doctor, or health caregiver about the best way to dispose of any outdated medicine or medicine no longer needed. · Keep all medicine out of the reach of children. Drugs and Foods to Avoid:   Ask your doctor or pharmacist before using any other medicine, including over-the-counter medicines, vitamins, and herbal products.   · Make sure your doctor knows before you use any other skin medicine (prescription or nonprescription) on the same area you are treating with nystatin. Warnings While Using This Medicine:   · If you are pregnant or breastfeeding, talk with your doctor before using nystatin. Possible Side Effects While Using This Medicine: If you notice these less serious side effects, talk with your doctor:  · Mild stinging or burning where you use the medicine  If you notice other side effects that you think are caused by this medicine, tell your doctor. Call your doctor for medical advice about side effects. You may report side effects to FDA at 9-893-FDA-8053  © 2017 [address] Information is for End User's use only and may not be sold, redistributed or otherwise used for commercial purposes. The above information is an  only. It is not intended as medical advice for individual conditions or treatments. Talk to your doctor, nurse or pharmacist before following any medical regimen to see if it is safe and effective for you. Epoetin Emile (By injection)   Epoetin Emile (e-SO-e-tin AL-fa)  Treats anemia. Brand Name(s): Epogen, Procrit   There may be other brand names for this medicine. When This Medicine Should Not Be Used: This medicine is not right for everyone. You should not receive it if you had an allergic reaction to epoetin emile or if you had pure red cell aplasia after receiving epoetin emile or similar medicines. How to Use This Medicine:   Injectable  · Your doctor will prescribe your dose and schedule. This medicine is given as a shot under your skin or into a vein through a dialysis port. · A nurse or other health provider will give you this medicine. · You may be taught how to give your medicine at home. Make sure you understand all instructions before giving yourself an injection. Do not use more medicine or use it more often than your doctor tells you to. · Do not shake the medicine.  Do not use the medicine if it is cloudy or discolored, or has particles in it. · You will be shown the body areas where this shot can be given. Use a different body area each time you give yourself a shot. Keep track of where you give each shot to make sure you rotate body areas. · Use a new needle and syringe each time you inject your medicine. · This medicine should come with a Medication Guide. Ask your pharmacist for a copy if you do not have one. · Missed dose: Call your doctor or pharmacist for instructions. · If you store this medicine at home, keep it in the refrigerator. Do not freeze. · Throw away used needles in a hard, closed container that the needles cannot poke through. Keep this container away from children and pets. Drugs and Foods to Avoid:      Ask your doctor or pharmacist before using any other medicine, including over-the-counter medicines, vitamins, and herbal products. Warnings While Using This Medicine:   · Tell your doctor if you are pregnant or breastfeeding. This medicine contains benzyl alcohol, which can be harmful to infants and unborn babies. · Tell your doctor if you have heart failure, heart disease, high blood pressure, kidney disease, or history of cancer, blood clots, or seizures. Also tell your doctor if you are scheduled for any type of surgery. · This medicine may cause the following problems:   ¨ Serious heart and blood vessel problems, including heart attack, heart failure, stroke, and blood clots  ¨ For cancer patients, increased risk of the cancer worsening or coming back  ¨ Seizures  ¨ High blood pressure  · Your doctor will do lab tests at regular visits to check on the effects of this medicine. Keep all appointments. Your doctor will also monitor your blood pressure. · This medicine is made from donated human blood. All donated blood is tested for certain viruses. Although your risk for getting a virus from the medicine is very low, talk with your doctor if you have concerns.   · Keep all medicine out of the reach of children. Never share your medicine with anyone. Possible Side Effects While Using This Medicine:   Call your doctor right away if you notice any of these side effects:  · Allergic reaction: Itching or hives, swelling in your face or hands, swelling or tingling in your mouth or throat, chest tightness, trouble breathing  · Chest pain that may spread, trouble breathing, nausea, unusual sweating, fainting  · Fever, chills, cough, stuffy or runny nose, sore throat  · Numbness or weakness on one side of your body, sudden or severe headache, problems with vision, speech, or walking  · Numbness, tingling, or burning pain in your hands, arms, legs, or feet  · Pain or swelling in your lower leg (calf)  · Seizures  · Rapid weight gain, swelling in your hands, ankles, or feet  · Unusual bleeding, bruising, tiredness, or weakness  If you notice these less serious side effects, talk with your doctor:   · Nausea, vomiting  · Muscle or joint pain  · Pain, itching, burning, or swelling where the shot is given  If you notice other side effects that you think are caused by this medicine, tell your doctor. Call your doctor for medical advice about side effects. You may report side effects to FDA at 3-968-FDA-9519  © 2017 [facility] Information is for End User's use only and may not be sold, redistributed or otherwise used for commercial purposes. The above information is an  only. It is not intended as medical advice for individual conditions or treatments. Talk to your doctor, nurse or pharmacist before following any medical regimen to see if it is safe and effective for you.

## 2020-03-13 NOTE — HOME CARE
Discharge noted for today. Received home health referral for [facility] for SN, PT, and OT - CABG protocol. Met with patient and wife, explained services and answered all questions. Demographics verified. Order processed and emailed to central office. Patient has the following DME: standard walker. Rollator ordered from [employer]. Family will purchase bedside commode and shower chair. Mario Martines, [facility] Liaison

## 2020-03-16 NOTE — PROGRESS NOTES
[facility]         Call from wife concerned about cough and low grade fever <100 resulting in poor sleep. She states she has tried OTC combo cough syrup and acetaminophen every 2 hours. When counciled on dosing of acetaminophen she stated she is \"just trying to save his life\"  I expressed to her several times that if she was uncomfortable with his medical condition or if she thought he was getting worse then go to the ED immediately. She terminated the call saying that home health had just arrived. Follow up scheduled on 3/20/20.

## 2020-03-16 NOTE — TELEPHONE ENCOUNTER
Mrs. Robert Rivera called stating that  Mr. Alondra Lizarraga is running a fever, is not sleeping well and has a really bad cough. Advised her that I would transfer her to [place].

## 2020-03-17 PROBLEM — E44.0 MODERATE PROTEIN-CALORIE MALNUTRITION (HCC): Status: ACTIVE | Noted: 2020-01-01

## 2020-03-17 NOTE — DISCHARGE INSTRUCTIONS
Patient Education      Be conscious about your hand and respiratory hygiene. Cough and your  arm and wash your hands frequently. Stay away from high risk family members (very old and young, and those with chronic illness like cancer/COPD) and perform social isolation by not going to work or going around places until your symptoms have completely resolved. Pneumonia: Care Instructions  Your Care Instructions    Pneumonia is an infection of the lungs. Most cases are caused by infections from bacteria or viruses. Pneumonia may be mild or very severe. If it is caused by bacteria, you will be treated with antibiotics. It may take a few weeks to a few months to recover fully from pneumonia, depending on how sick you were and whether your overall health is good. Follow-up care is a key part of your treatment and safety. Be sure to make and go to all appointments, and call your doctor if you are having problems. It's also a good idea to know your test results and keep a list of the medicines you take. How can you care for yourself at home? · Take your antibiotics exactly as directed. Do not stop taking the medicine just because you are feeling better. You need to take the full course of antibiotics. · Take your medicines exactly as prescribed. Call your doctor if you think you are having a problem with your medicine. · Get plenty of rest and sleep. You may feel weak and tired for a while, but your energy level will improve with time. · To prevent dehydration, drink plenty of fluids, enough so that your urine is light yellow or clear like water. Choose water and other caffeine-free clear liquids until you feel better. If you have kidney, heart, or liver disease and have to limit fluids, talk with your doctor before you increase the amount of fluids you drink. · Take care of your cough so you can rest. A cough that brings up mucus from your lungs is common with pneumonia.  It is one way your body gets rid of the infection. But if coughing keeps you from resting or causes severe fatigue and chest-wall pain, talk to your doctor. He or she may suggest that you take a medicine to reduce the cough. · Use a vaporizer or humidifier to add moisture to your bedroom. Follow the directions for cleaning the machine. · Do not smoke or allow others to smoke around you. Smoke will make your cough last longer. If you need help quitting, talk to your doctor about stop-smoking programs and medicines. These can increase your chances of quitting for good. · Take an over-the-counter pain medicine, such as acetaminophen (Tylenol), ibuprofen (Advil, Motrin), or naproxen (Aleve). Read and follow all instructions on the label. · Do not take two or more pain medicines at the same time unless the doctor told you to. Many pain medicines have acetaminophen, which is Tylenol. Too much acetaminophen (Tylenol) can be harmful. · If you were given a spirometer to measure how well your lungs are working, use it as instructed. This can help your doctor tell how your recovery is going. · To prevent pneumonia in the future, talk to your doctor about getting a flu vaccine (once a year) and a pneumococcal vaccine (one time only for most people). When should you call for help? Call 911 anytime you think you may need emergency care. For example, call if:    · You have severe trouble breathing.    Call your doctor now or seek immediate medical care if:    · You cough up dark brown or bloody mucus (sputum).     · You have new or worse trouble breathing.     · You are dizzy or lightheaded, or you feel like you may faint.    Watch closely for changes in your health, and be sure to contact your doctor if:    · You have a new or higher fever.     · You are coughing more deeply or more often.     · You are not getting better after 2 days (48 hours).     · You do not get better as expected. Where can you learn more?   Go to http://kari-jesus.info/  Enter D336 in the search box to learn more about \"Pneumonia: Care Instructions. \"  Current as of: June 9, 2019Content Version: 12.4  © 9890-7009 [employer]. Care instructions adapted under license by [employer] (which disclaims liability or warranty for this information). If you have questions about a medical condition or this instruction, always ask your healthcare professional. [place] [address] any warranty or liability for your use of this information. Patient Education        Learning About High White Blood Cell Counts  What are white blood cells? White blood cells (leukocytes) help protect your body from infection. Normally, when germs get inside your body, your body makes more white blood cells that search for and destroy the germs. Less often, there are medical problems where the body may make a lot more white blood cells than it needs. What happens when you have a high white blood cell count? Your white blood cell count may be high because your body is fighting an infection. But other things can cause it, such as some medicines, burns, an illness, or other health problems. When your doctor sees that your white blood cell count is high, he or she will try to find out why, and then treat the cause. What are the symptoms? A high white blood cell count alone doesn't cause any symptoms. The symptoms you feel may come from the medical problem that your white blood cells are fighting. For example, if you have pneumonia, you may have a fever and trouble breathing. These are symptoms of pneumonia, not of a high white blood cell count. How is it treated? · Your doctor may do more tests to find the problem that's making your white blood cell count high. Once your doctor finds the problem, he or she may be able to treat it. · Part of your treatment may be telling your doctor if you feel worse.  Watch your temperature, and call your doctor if your fever goes up and stays up. Follow-up care is a key part of your treatment and safety. Be sure to make and go to all appointments, and call your doctor if you are having problems. It's also a good idea to know your test results and keep a list of the medicines you take. Where can you learn more? Go to http://kari-jesus.info/  Enter V383 in the search box to learn more about \"Learning About High White Blood Cell Counts. \"  Current as of: December 8, 2019Content Version: 12.4  © 6959-9462 [employer]. Care instructions adapted under license by [employer] (which disclaims liability or warranty for this information). If you have questions about a medical condition or this instruction, always ask your healthcare professional. [address] any warranty or liability for your use of this information.

## 2020-03-17 NOTE — ED PROVIDER NOTES
EMERGENCY DEPARTMENT HISTORY AND PHYSICAL EXAM 
 
9:14 AM 
 
 
Date: 3/17/2020 Patient Name: Bud Naranjo History of Presenting Illness Chief Complaint Patient presents with  Cough  Fever  Back Pain History Provided By: Patient Location/Duration/Severity/Modifying factors Patient is a 78-year-old male with a history of three-vessel CABG done on March 4 with a 1 week stay in the hospital now been home for less than a week the presents emergency department with nasal congestion and cough with some pain in his back. Patient notes that he has been having a lot of postnasal drip and coughing frequently and having a hard time sleeping. Patient called his physician yesterday and was looking to get an antibiotic and cough medication but was not called in. Since then the patient's been having increasing cough and some mild shortness of breath and came to the hospital for evaluation. Patient feels like if he could sleep and have his cough under control he be fine. Patient denies any fevers or chills. Patient denies any difficulty urinating and has a Lin catheter in since the procedure. Patient has been eating and drinking well. Patient denies any swelling or calf pain. Patient denies any change in the wound. Patient has not seen his surgeon in follow-up yet. Patient is otherwise been compliant with his care. Patient denies being a smoker or drinker. Patient denies any recent travel. Patient denies any aggravating or alleviating factors. PCP: Poncho Beauchamp NP Current Outpatient Medications Medication Sig Dispense Refill  acetaminophen (TYLENOL) 325 mg tablet Take 2 Tabs by mouth every six (6) hours as needed for Pain or Fever. 90 Tab 2  
 ascorbic acid, vitamin C, (VITAMIN C) 250 mg tablet Take 1 Tab by mouth two (2) times daily (with meals). 60 Tab 1  
 aspirin delayed-release 81 mg tablet Take 1 Tab by mouth daily.  30 Tab 2  
  atorvastatin (LIPITOR) 40 mg tablet Take 1 Tab by mouth nightly. 30 Tab 2  cholecalciferol (VITAMIN D3) (1000 Units /25 mcg) tablet Take 4 Tabs by mouth daily. 120 Tab 2  cyanocobalamin 1,000 mcg tablet Take 1 Tab by mouth daily. 30 Tab 2  
 diphenhydrAMINE (BENADRYL) 25 mg capsule Take 1 Cap by mouth nightly as needed for Sleep for up to 10 days. 90 Cap 2  
 epoetin marbin-epbx (RETACRIT) 4,000 unit/mL injection 1 mL by SubCUTAneous route every Monday, Wednesday, Friday. Indications: anemia due to kidney failure 12 Vial 1  
 ferrous sulfate 325 mg (65 mg iron) tablet Take 1 Tab by mouth two (2) times daily (with meals). 60 Tab 2  
 folic acid (FOLVITE) 1 mg tablet Take 1 Tab by mouth daily. 30 Tab 2  
 metoprolol tartrate (LOPRESSOR) 25 mg tablet Take 0.5 Tabs by mouth every twelve (12) hours. 60 Tab 2  
 nystatin (MYCOSTATIN) powder Apply  to affected area two (2) times a day. 1 Bottle 0  
 tamsulosin (FLOMAX) 0.4 mg capsule Take 1 Cap by mouth daily. 30 Cap 2  furosemide (LASIX) 20 mg tablet Take 1 Tab by mouth daily. 30 Tab 2  
 allopurinol (ZYLOPRIM) 300 mg tablet take 1 tablet by mouth once daily 90 Tab 1  
 glyBURIDE (DIABETA) 2.5 mg tablet Take 1 Tab by mouth two (2) times daily (with meals). 180 Tab 1 Past History Past Medical History: 
Past Medical History:  
Diagnosis Date  Acute coronary syndrome ([address] 3/2/2020  LOREN (acute kidney injury) ([address] 3/5/2020  Calculus of kidney  Cancer ([address]  of [facility]  Coronary artery disease of native artery with stable angina pectoris ([address] 3/3/2020  Diabetes mellitus ([address] 8/19/2010  Gout 8/19/2010  
 HTN (hypertension) 8/19/2010  Hyperlipemia 8/19/2010  Kidney disease  Lumbar spondylosis  Proteinuria  S/P CABG x 3 3/5/2020 CABG x 3, Dr. Pete Bai, 3/4/2020, [facility]  
 Skin cancer, basal cell   
 neck  Stage 3 chronic kidney disease [facility]  Type 2 diabetes mellitus without complication, without long-term current use of insulin ([facility] [address] 8/19/2010  Urolithiasis Past Surgical History: 
Past Surgical History:  
Procedure Laterality Date  HX CORONARY ARTERY BYPASS GRAFT  03/04/2020 CABG x3, Dr. Bud Naranjo, [facility]  
 HX GI    
 HX OTHER SURGICAL Basal cell removed from left side of face  HX UROLOGICAL    
 kidney Family History: 
Family History Problem Relation Age of Onset  Diabetes Mother Social History: 
Social History Tobacco Use  Smoking status: Never Smoker  Smokeless tobacco: Never Used Substance Use Topics  Alcohol use: No  
 Drug use: No  
 
 
Allergies: Allergies Allergen Reactions  Metformin Other (comments) Renal insufficiency Review of Systems Review of Systems Constitutional: Negative for activity change, fatigue and fever. HENT: Positive for congestion, postnasal drip and rhinorrhea. Eyes: Negative for visual disturbance. Respiratory: Positive for cough. Negative for shortness of breath. Cardiovascular: Negative for chest pain and palpitations. Gastrointestinal: Negative for abdominal pain, diarrhea, nausea and vomiting. Genitourinary: Negative for dysuria and hematuria. Musculoskeletal: Positive for back pain. Skin: Negative for rash. Neurological: Negative for dizziness, weakness and light-headedness. All other systems reviewed and are negative. Physical Exam  
 
Visit Vitals /85 (BP 1 Location: Left arm) Pulse (!) 112 Temp 98 °F (36.7 °C) Resp 18 Ht 5' 7\" (1.702 m) Wt 83.9 kg (185 lb) SpO2 95% BMI 28.98 kg/m² Physical Exam 
Vitals signs and nursing note reviewed. Constitutional:   
   General: He is not in acute distress. Appearance: He is well-developed. HENT:  
   Head: Normocephalic and atraumatic.   
   Right Ear: Tympanic membrane and external ear normal.  
 Left Ear: Tympanic membrane and external ear normal.  
   Nose: Congestion and rhinorrhea present. Eyes:  
   General: No scleral icterus. Conjunctiva/sclera: Conjunctivae normal.  
   Pupils: Pupils are equal, round, and reactive to light. Neck: Musculoskeletal: Normal range of motion and neck supple. Thyroid: No thyromegaly. Vascular: No JVD. Trachea: No tracheal deviation. Cardiovascular:  
   Rate and Rhythm: Normal rate and regular rhythm. Heart sounds: Normal heart sounds. No murmur. No friction rub. No gallop. Comments: Midline sternotomy with crusting without drainage or dehiscence Pulmonary:  
   Effort: Pulmonary effort is normal.  
   Breath sounds: Rhonchi present. Chest:  
   Chest wall: No tenderness. Abdominal:  
   General: Bowel sounds are normal. There is no distension. Palpations: Abdomen is soft. Tenderness: There is no abdominal tenderness. There is no guarding or rebound. Musculoskeletal: Normal range of motion. General: No tenderness. Comments: Bruising to his upper extremities from previous blood draws Lymphadenopathy:  
   Cervical: No cervical adenopathy. Skin: 
   General: Skin is warm and dry. Capillary Refill: Capillary refill takes less than 2 seconds. Neurological:  
   Mental Status: He is alert and oriented to person, place, and time. Cranial Nerves: No cranial nerve deficit. Coordination: Coordination normal.  
   Comments: Global weakness, no arm or leg drift, gait steady Psychiatric:     
   Behavior: Behavior normal.     
   Thought Content: Thought content normal.     
   Judgment: Judgment normal.  
   Comments: No family currently at the bedside Diagnostic Study Results Labs - Recent Results (from the past 12 hour(s)) EKG, 12 LEAD, INITIAL Collection Time: 03/17/20  8:20 AM  
Result Value Ref Range  Ventricular Rate 106 BPM  
 Atrial Rate 106 BPM  
 P-R Interval 152 ms QRS Duration 82 ms Q-T Interval 344 ms QTC Calculation (Bezet) 456 ms Calculated P Axis 54 degrees Calculated R Axis 30 degrees Calculated T Axis 135 degrees Diagnosis Sinus tachycardia T wave abnormality, consider lateral ischemia Abnormal ECG When compared with ECG of 05-MAR-2020 03:34, 
ST no longer elevated in Anterior leads Radiologic Studies -  
XR CHEST PORT Final Result IMPRESSION:   
  
Improved aeration at right lung base. Slight interval increase in left basilar airspace opacity. Please see report for additional findings and details. Medical Decision Making I am the first provider for this patient. I reviewed the vital signs, available nursing notes, past medical history, past surgical history, family history and social history. Vital Signs-Reviewed the patient's vital signs. EKG: ST at 106 interpreted by me Records Reviewed: Nursing Notes and Old Medical Records (Time of Review: 9:14 AM) ED Course: Progress Notes, Reevaluation, and Consults: 
 
 Patient has a elevated white count however normal lactic acid. Patient's renal function is trended down. Patient's chest x-ray suggestive of possible early pneumonia. Patient's wife is now at the bedside and notes that he has been having a cough and low-grade fever. Patient is due tomorrow to have his Lin catheter removed and asked that we can pull the Lin catheter here so she has not taken to the office tomorrow and is not feeling well. I discussed the case twice with Dr. Veronika Dewitt and asked me to do a bedside echo which have done that shows no evidence of a large pericardial effusion and notes that if the patient would like to go home would like him to go home with some respiratory coverage is for antibiotics and follow him closely in the office.   After a long talk with the patient and family they prefer to go home and take oral antibiotics because he prefer to be home. Patient's wife is a registered nurse and said that she will watch him for any changes and return if at all worsened or concerned. In addition we will remove the catheter and if he is able to void will keep it out. Sylvester Isbell, DO 12:32 PM 
 
 
Provider Notes (Medical Decision Making): MDM Number of Diagnoses or Management Options Diagnosis management comments: Patient 68-year-old male with a history of hypertension, diabetes, renal disease, coronary disease status post three-vessel bypass done in the past 3 weeks, urinary retention with Lin, dyslipidemia, and gout, the presents emergency department complaint of nasal congestion with postnasal drip with cough at night specially making it difficult to sleep. Patient very well may have an upper respiratory infection or postnasal drip however given his recent surgery will follow chest x-ray, trend his renal function, cardiac labs, and then reevaluate. Sylvester Isbell, DO 9:20 AM 
 
 
 
Procedures Diagnosis Clinical Impression: 1. Pneumonia of left lower lobe due to infectious organism [facility]) 2. Leukocytosis, unspecified type 3. Post-nasal drainage Disposition: DC Follow-up Information None Patient's Medications Start Taking No medications on file Continue Taking ACETAMINOPHEN (TYLENOL) 325 MG TABLET    Take 2 Tabs by mouth every six (6) hours as needed for Pain or Fever. ALLOPURINOL (ZYLOPRIM) 300 MG TABLET    take 1 tablet by mouth once daily ASCORBIC ACID, VITAMIN C, (VITAMIN C) 250 MG TABLET    Take 1 Tab by mouth two (2) times daily (with meals). ASPIRIN DELAYED-RELEASE 81 MG TABLET    Take 1 Tab by mouth daily. ATORVASTATIN (LIPITOR) 40 MG TABLET    Take 1 Tab by mouth nightly. CHOLECALCIFEROL (VITAMIN D3) (1000 UNITS /25 MCG) TABLET    Take 4 Tabs by mouth daily. CYANOCOBALAMIN 1,000 MCG TABLET    Take 1 Tab by mouth daily. DIPHENHYDRAMINE (BENADRYL) 25 MG CAPSULE    Take 1 Cap by mouth nightly as needed for Sleep for up to 10 days. EPOETIN THIAGO-EPBX (RETACRIT) 4,000 UNIT/ML INJECTION    1 mL by SubCUTAneous route every Monday, Wednesday, Friday. Indications: anemia due to kidney failure FERROUS SULFATE 325 MG (65 MG IRON) TABLET    Take 1 Tab by mouth two (2) times daily (with meals). FOLIC ACID (FOLVITE) 1 MG TABLET    Take 1 Tab by mouth daily. FUROSEMIDE (LASIX) 20 MG TABLET    Take 1 Tab by mouth daily. GLYBURIDE (DIABETA) 2.5 MG TABLET    Take 1 Tab by mouth two (2) times daily (with meals). METOPROLOL TARTRATE (LOPRESSOR) 25 MG TABLET    Take 0.5 Tabs by mouth every twelve (12) hours. NYSTATIN (MYCOSTATIN) POWDER    Apply  to affected area two (2) times a day. TAMSULOSIN (FLOMAX) 0.4 MG CAPSULE    Take 1 Cap by mouth daily. These Medications have changed No medications on file Stop Taking No medications on file Disclaimer: Sections of this note are dictated using utilizing voice recognition software. Minor typographical errors may be present. If questions arise, please do not hesitate to contact me or call our department.

## 2020-03-17 NOTE — ED NOTES
Vivek Ziegler is a 68 y.o. male that was discharged in good condition. The patients diagnosis, condition and treatment were explained to  patient and aftercare instructions were given. The patient verbalized understanding.

## 2020-03-17 NOTE — PROGRESS NOTES
[facility] Patient was severe 1. Patient with severe protein calorie malnutrition. 2.  Patient wears several episodes of postoperative hypoglycemia as diet and insulin were adjusted. A1c of 7.1. Glucose stabilized prior to discharge.

## 2020-03-17 NOTE — Clinical Note
Thank you for the update.  ----- Message -----  From: Pedro Pablo Nielson OT  Sent: 3/17/2020   5:13 PM EDT  To: Francis Contreras RN      client wife says he was just dx with PNA today and she does not want anyone to come in right now.

## 2020-03-17 NOTE — ED TRIAGE NOTES
Patient c/o productive cough, mid back pain and fever. He states he's had difficulty sleeping last night because of his cough.   Patient recently discharged from hospital.

## 2020-03-18 NOTE — PROGRESS NOTES
COVID-19 Screening Initial Follow-up Note Patient contacted regarding COVID-19  risk. Care Transition Nurse/ Ambulatory Care Manager contacted the patient by telephone to perform post discharge assessment. Spoke with Pina Leach, wife. Verified name and  with wife as identifiers. Provided introduction to self, and explanation of the CTN/ACM role, and reason for call due to risk factors for infection and/or exposure to COVID-19. Wife voiced patient was asleep. Reported erich of 99.6 today. Per wife patient is taking new medications and has an appt with the cardiologist on Friday (3/20/20). Attempted to assess s/s but was interrupted by wife. Wife voiced the phone was about to give out and stated, \"why don't we just wait and see what the cardiologist says on Friday\". Wife again voiced the phone was about to give out and ended call. Plan for follow-up call in 3-5 days based on severity of symptoms and risk factors

## 2020-03-20 NOTE — PATIENT INSTRUCTIONS
Continue to take medications as directed. Follow-up with Urology, Nephrology, Primary Care Provider and Cardiologist in the future as directed. Return to [address] in three weeks. Call [phone] with any questions. No heavy lifting, and wear sternal harness all the time. Coronary Artery Bypass Graft: What to Expect at Home Your Recovery Coronary artery bypass graft (CABG) is surgery to treat coronary artery disease. The surgery helps blood make a detour, or bypass, around one or more narrowed or blocked coronary arteries. Coronary arteries are the blood vessels that bring blood to the heart. Your doctor did the surgery through a cut, called an incision, in your chest. 
You will feel tired and sore for the first few weeks after surgery. You may have some brief, sharp pains on either side of your chest. Your chest, shoulders, and upper back may ache. The incision in your chest and the area where the healthy vein was taken may be sore or swollen. These symptoms usually get better after 4 to 6 weeks. You will probably be able to do many of your usual activities after 4 to 6 weeks. But for 2 to 3 months you will not be able to lift heavy objects or do activities that strain your chest or upper arm muscles. At first you may notice that you get tired easily and need to rest often. It may take 1 to 2 months to get your energy back. Some people find that they are more emotional after this surgery. You may cry easily or show emotion in ways that are unusual for you. This is common and may last for up to a year. Some people get depressed after CABG surgery. Talk with your doctor if you have sadness that continues or you are concerned about how you are feeling. Treatment and other support can help you feel better.  
Even though the surgery may improve your symptoms, you will still need to make changes in your lifestyle to lower your risk of a heart attack or stroke. It will be important to eat a heart-healthy diet, get regular exercise, not smoke, take your heart medicines, and reduce stress. You will likely start a cardiac rehabilitation (rehab) program in the hospital. You will continue with this rehab program after you go home to help you recover and prevent problems with your heart. Talk to your doctor about whether rehab is right for you. This care sheet gives you a general idea about how long it will take for you to recover. But each person recovers at a different pace. Follow the steps below to get better as quickly as possible. How can you care for yourself at home? Activity 
  · Rest when you feel tired. Getting enough sleep will help you recover. Try to sleep on your back for 4 to 6 weeks while your breastbone (sternum) heals. This usually takes about 4 to 6 weeks.  
  · Try to walk each day. Start by walking a little more than you did the day before. Bit by bit, increase the amount you walk. Walking boosts blood flow and helps prevent pneumonia and constipation.  
  · Avoid strenuous activities, such as bicycle riding, jogging, weight lifting, or heavy aerobic exercise, until your doctor says it is okay.  
  · For 3 months, avoid activities that strain your chest or upper arm muscles. This includes pushing a  or vacuum, mopping floors, or swinging a golf club or tennis racquet.  
  · For 2 to 3 months, avoid lifting anything that would make you strain. This may include a child, heavy grocery bags and milk containers, a heavy briefcase or backpack, or cat litter or dog food bags.  
  · Hold a pillow firmly over your chest incision when you cough or take deep breaths. This will support your chest and reduce your pain.  
  · Do breathing exercises at home as instructed by your doctor. This will help prevent pneumonia.  
  · Ask your doctor when you can drive again.  
  · You will probably need to take 4 to 12 weeks off from work.  It depends on the type of work you do and how you feel.  
  · You may shower as usual. Pat the incision dry. Do not take a bath for the first 3 weeks, or until your doctor tells you it is okay.  
  · Do not swim or use a hot tub for at least 1 month, or until your doctor says it is okay.  
  · Ask your doctor when it is okay for you to have sex. Diet 
  · Eat a heart-healthy diet. If you have not been eating this way, talk to your doctor. You also may want to talk to a dietitian. A dietitian can help you learn about healthy foods.  
  · Drink plenty of fluids (unless your doctor tells you not to).  
  · You may notice that your bowel movements are not regular right after your surgery. This is common. Try to avoid constipation and straining with bowel movements. You may want to take a fiber supplement every day. If you have not had a bowel movement after a couple of days, ask your doctor about taking a mild laxative. Medicines 
  · Your doctor will tell you if and when you can restart your medicines. He or she will also give you instructions about taking any new medicines.  
  · If you take aspirin or some other blood thinner, ask your doctor if and when to start taking it again. Make sure that you understand exactly what your doctor wants you to do.  
  · Your doctor may give you medicines to prevent blood clots, keep your heartbeat steady, and lower your blood pressure and cholesterol. Take your medicines exactly as prescribed. Call your doctor if you think you are having a problem with your medicine.  
  · Be safe with medicines. Take pain medicines exactly as directed. ? If the doctor gave you a prescription medicine for pain, take it as prescribed. ? If you are not taking a prescription pain medicine, ask your doctor if you can take an over-the-counter medicine.  
? Do not take aspirin, ibuprofen (Advil, Motrin), naproxen (Aleve), or other nonsteroidal anti-inflammatory drugs (NSAIDs) unless your doctor says it is okay.  
  · If you think your pain medicine is making you sick to your stomach: 
? Take your medicine after meals (unless your doctor has told you not to). ? Ask your doctor for a different pain medicine.  
  · If your doctor prescribed antibiotics, take them as directed. Do not stop taking them just because you feel better. You need to take the full course of antibiotics. Incision care 
  · If you have strips of tape on the incisions the doctor made, leave the tape on for a week or until it falls off.  
  · Wash the area daily with warm, soapy water, and pat it dry. Don't use hydrogen peroxide or alcohol, which can slow healing. You may cover the area with a gauze bandage if it weeps or rubs against clothing. Change the bandage every day.  
  · Keep the area clean and dry.  
  · Do not use any creams, lotions, powders, ointments, or oils unless your doctor tells you it is okay.  
  · If you have an incision in your leg: 
? Wear support stockings on your legs during the day for the first 2 weeks. You can take the stockings off at night while you sleep. ? Raise your legs above the level of your heart whenever you lay down for the first 4 to 6 weeks. Other instructions 
  · Keep track of your weight. Weigh yourself every day at the same time of day, on the same scale, in the same amount of clothing. A sudden increase in weight can be a sign of a problem with your heart. Tell your doctor if you suddenly gain weight, such as 3 pounds or more in 2 to 3 days.  
  · Do not smoke. Smoking can make it harder for you to recover and it will raise the chances of your arteries narrowing again. If you need help quitting, talk to your doctor about stop-smoking programs and medicines. These can increase your chances of quitting for good. Follow-up care is a key part of your treatment and safety.  Be sure to make and go to all appointments, and call your doctor if you are having problems. It's also a good idea to know your test results and keep a list of the medicines you take. When should you call for help? Call 911 anytime you think you may need emergency care. For example, call if: 
  · You passed out (lost consciousness).  
  · You have severe trouble breathing.  
  · You have sudden chest pain and shortness of breath, or you cough up blood.  
  · You have severe pain in your chest.  
  · You have symptoms of a heart attack. These may include: 
? Chest pain or pressure, or a strange feeling in the chest. 
? Sweating. ? Shortness of breath. ? Nausea or vomiting. ? Pain, pressure, or a strange feeling in the back, neck, jaw, or upper belly or in one or both shoulders or arms. ? Lightheadedness or sudden weakness. ? A fast or irregular heartbeat. After you call  911, the  may tell you to chew 1 adult-strength or 2 to 4 low-dose aspirin. Wait for an ambulance. Do not try to drive yourself.  
  · You have angina symptoms (such as chest pain or pressure) that do not go away with rest or are not getting better within 5 minutes after you take a dose of nitroglycerin.  
 Call your doctor now or seek immediate medical care if: 
  · You have pain that does not get better after you take pain medicine.  
  · You have a fever over 100°F.  
  · You have loose stitches, or your incision comes open.  
  · Bright red blood has soaked through the bandage over your incision.  
  · You have signs of infection, such as: 
? Increased pain, swelling, warmth, or redness. ? Red streaks leading from the incision. ? Pus draining from the incision. ? Swollen lymph nodes in your neck, armpits, or groin. ? A fever.  
  · You have signs of a blood clot in a leg. If you had a vein removed from your leg, you may have tenderness and swelling while your leg heals. But signs of a blood clot may be in a different part of your leg and may include: 
? Pain in your calf, back of the knee, thigh, or groin. ? Redness and swelling in your leg or groin.  
  · Your heartbeat feels very fast or slow, skips beats, or flutters.  
  · You are dizzy or lightheaded, or you feel like you may faint.  
  · You have new or increased shortness of breath.  
 Watch closely for changes in your health, and be sure to contact your doctor if: 
  · You gain weight suddenly, such as 3 pounds or more in 2 to 3 days.  
  · You have increased swelling in your legs, ankles, or feet.  
  · You have any concerns about your incision.  
  · You feel very sad or have other signs of depression, such as trouble sleeping or eating.  
  · You have questions about diet, exercise, quitting smoking, or stress reduction after surgery. Where can you learn more? Go to http://kari-jesus.info/ Enter F759 in the search box to learn more about \"Coronary Artery Bypass Graft: What to Expect at Home. \" Current as of: December 15, 2019Content Version: 12.4 © 4322-2201 [employer]. Care instructions adapted under license by [employer] [facility] (which disclaims liability or warranty for this information). If you have questions about a medical condition or this instruction, always ask your healthcare professional. [address] any warranty or liability for your use of this information.

## 2020-03-20 NOTE — PROGRESS NOTES
CARDIAC SURGERY FOLLOW-UP NOTE 
 
3/20/2020 Subjective:  
Carmelo Stoll returns for a follow-up visit following CABG x  on 3/4/2020 by Dr. Bud Naranjo. Subjective: 
Patient comes to clinic for 7-day follow-up after discharge from the hospital on 3/14/2020. He went to the ED on 3/17 complaining of cough and fever. He did have some leukocytosis with a WBC of 29.7. BUN/Cr improved 17/1. 44. CXR with possible left lower lobe infiltrate vs. bronchitis. He was prescribed Augmentin. -  He was discharged home with a Lin for recurrent urinary retention. While in the ED per chart review the family asked the ED physician to remove the Lin. - Follow-up appointments with urology and nephrology were canceled by the family. Home health visits were canceled by the family after the ED. He is seen in the office with his wife today. He is alert and interactive and reports feeling better. They deny any recurrent fevers. He still has some cough with deep breaths. He is wearing his sternal harness. He denies significant leg swelling. He reports a poor appetite but is trying to eat. He had some nausea and vomiting last night. It has not recurred. Current medications include: 
Current Outpatient Medications Medication Sig Dispense Refill  potassium chloride (K-DUR, KLOR-CON) 20 mEq tablet Take 1 Tab by mouth daily. 30 Tab 0  
 amoxicillin-clavulanate (Augmentin) 875-125 mg per tablet Take 1 Tab by mouth two (2) times a day for 10 days. 20 Tab 0  
 Lactobacillus Acidoph & Bulgar (Floranex) 1 million cell tab tablet Take 2 Tabs by mouth two (2) times a day for 10 days. 40 Tab 0  
 acetaminophen (TYLENOL) 325 mg tablet Take 2 Tabs by mouth every six (6) hours as needed for Pain or Fever. 90 Tab 2  
 ascorbic acid, vitamin C, (VITAMIN C) 250 mg tablet Take 1 Tab by mouth two (2) times daily (with meals). 60 Tab 1  
 aspirin delayed-release 81 mg tablet Take 1 Tab by mouth daily.  30 Tab 2  
  atorvastatin (LIPITOR) 40 mg tablet Take 1 Tab by mouth nightly. 30 Tab 2  cholecalciferol (VITAMIN D3) (1000 Units /25 mcg) tablet Take 4 Tabs by mouth daily. 120 Tab 2  cyanocobalamin 1,000 mcg tablet Take 1 Tab by mouth daily. 30 Tab 2  
 diphenhydrAMINE (BENADRYL) 25 mg capsule Take 1 Cap by mouth nightly as needed for Sleep for up to 10 days. 90 Cap 2  ferrous sulfate 325 mg (65 mg iron) tablet Take 1 Tab by mouth two (2) times daily (with meals). 60 Tab 2  
 folic acid (FOLVITE) 1 mg tablet Take 1 Tab by mouth daily. 30 Tab 2  
 metoprolol tartrate (LOPRESSOR) 25 mg tablet Take 0.5 Tabs by mouth every twelve (12) hours. 60 Tab 2  
 nystatin (MYCOSTATIN) powder Apply  to affected area two (2) times a day. 1 Bottle 0  
 tamsulosin (FLOMAX) 0.4 mg capsule Take 1 Cap by mouth daily. 30 Cap 2  furosemide (LASIX) 20 mg tablet Take 1 Tab by mouth daily. 30 Tab 2  
 allopurinol (ZYLOPRIM) 300 mg tablet take 1 tablet by mouth once daily 90 Tab 1  
 glyBURIDE (DIABETA) 2.5 mg tablet Take 1 Tab by mouth two (2) times daily (with meals). 180 Tab 1  
 epoetin marbin-epbx (RETACRIT) 4,000 unit/mL injection 1 mL by SubCUTAneous route every Monday, Wednesday, Friday. Indications: anemia due to kidney failure 12 Vial 1 Objective:  
Vital signs:   
BP Readings from Last 3 Encounters:  
03/20/20 146/85  
03/17/20 154/78  
03/16/20 141/86 Wt Readings from Last 3 Encounters:  
03/20/20 84.4 kg (186 lb)  
03/17/20 83.9 kg (185 lb)  
03/13/20 83.5 kg (184 lb 1.6 oz) @DPZT(55)@ Wt Readings from Last 3 Encounters:  
03/20/20 84.4 kg (186 lb)  
03/17/20 83.9 kg (185 lb)  
03/13/20 83.5 kg (184 lb 1.6 oz) Ht Readings from Last 3 Encounters:  
03/20/20 5' 7\" (1.702 m) 03/17/20 5' 7\" (1.702 m) 03/03/20 5' 7\" (1.702 m) Respiratory exam: clear to auscultation bilaterally. Cardiac exam: regular rate and rhythm Sternum: stable. Sternal wound: clean, dry, healing. Leg wounds: clean, dry, healing. Pedal edema: mild. EKG: normal sinus rhythm with no ischemic changes CXR: clear lungs bilaterally , improved aeration left base, mild blunting of CTA on left Assessment: 
Overall he is doing ok following open-heart surgery. Bronchitis Plans / Recommendations:  
-Continue p.o. antibiotics. 
-Home health and PT visits encouraged. 
-Encouraged to follow-up with other providers as indicated. Follow-up with Primary Care Provider and Cardiologist in the future as directed. Has regularly scheduled appointment with Cardiothoracic Surgery clinic in three weeks. No heavy lifting, and wear sternal harness all the time. Patient verbalizes understanding and agreement with the plan. Marianne Phan PA-C [facility] [phone]

## 2020-03-20 NOTE — PROGRESS NOTES
Twila Catherine is a 68 y.o. male presenting for post op visit. 1. Have you been to the ER, urgent care clinic since your last visit? Yes, ED on 3/17   Hospitalized since your last visit? No 
 
2. Have you seen or consulted any other health care providers outside of the [address] [place] [address] since your last visit? Include any pap smears or colon screening. No 
Mr. Esha Wu has a reminder for a \"due or due soon\" health maintenance. I have asked that he contact his primary care provider for follow-up on this health maintenance.

## 2020-03-22 NOTE — PROGRESS NOTES
OK, thanks, MD  ----- Message -----  From: Alden Hicks OT  Sent: 3/17/2020   5:13 PM EDT  To: Kendy Parker, PT      client wife says he was just dx with PNA today and she does not want anyone to come in right now.

## 2020-03-23 NOTE — PROGRESS NOTES
COVID-19 Screening Initial Follow-up Note Patient contacted regarding COVID-19  risk. Care Transition Nurse/ Ambulatory Care Manager contacted the Venice Lopez, wife by telephone to perform post discharge assessment. Verified name and  with family as identifiers. Provided introduction to self, and explanation of the CTN/ACM role, and reason for call due to risk factors for infection and/or exposure to COVID-19. Symptoms reviewed with family who verbalized the following symptoms:  
Fever temp last night 99 (axillary); 97.3 (axillary) at noon today Fatigue no Pain or aching joints no Cough yes Shortness of breath no Confusion or unusual change in mental status no Chills or shaking no Sweating no Fast heart rate HR has been between 96-98 Fast breathing no Dizziness/lightheadedness no Less urine output not assessed Cold, clammy, and pale skin not assessed Low body temperature no Due to onset/worsening of new symptoms, encounter routed to provider for escalation: no Wife reported still has a cough but is getting better and he he can almostt talk without coughing/. Patient taking Mucinex pills. Taking Augmentin at 9am and 9 pm.  
  
Patient has following risk factors of: age. Wife not receptive to [employer]. Wife dd verbalized patient is supposed to have telehealth. No order for telehealth noted. Contacted Anaya Colvin RN telehealth nurse for [facility]. Left message including introduction, 2 patient identifiers, reason for call and direct contact info. Plan for follow-up call in 8 days based on severity of symptoms and risk factors

## 2020-03-24 NOTE — PROGRESS NOTES
Spoke with Elvira Oh RN with [facility]. Verified order for telehealth. Eusebio Aguilera voiced she will contact patient/wife today to schedule visit.

## 2020-03-25 NOTE — TELEPHONE ENCOUNTER
Pt has been complaining that he's in a lot of pain below shoulder blades down the sides of his body. pain level 10/10 at night. Cant sleep. Requesting pain medication. Advised that I would notify PA's and surgeon.

## 2020-03-25 NOTE — PROGRESS NOTES
Called by  PT that pt was having significant pain with movement in addition to his chronic back pain and unable to participate in therapy. Also, he is having difficulty sleeping. He tolerated Tramadol in hospital without worsening his mental status. He also had difficulty sleeping in hospital and this may be a chronic problem for him. Trial of tramadol x5 days and Melatonin for sleep.     Jacinta Loyola PA-C  [facility]  [phone]

## 2020-04-01 NOTE — PROGRESS NOTES
COVID-19 Follow-up Note    Patient contacted for COVID-19 follow up. No answer. Left message introducing self, role and reason for call. Contact information provided. Episode resolved.

## 2020-04-03 PROBLEM — I25.10 ATHEROSCLEROSIS OF NATIVE CORONARY ARTERY OF NATIVE HEART WITHOUT ANGINA PECTORIS: Status: ACTIVE | Noted: 2020-01-01

## 2020-04-03 NOTE — PROGRESS NOTES
Mandi Cook presents today for No chief complaint on file. Mandi Cook preferred language for health care discussion is english/other. Is someone accompanying this pt? Yes, family Is the patient using any DME equipment during [address]? no 
 
Depression Screening: 
3 most recent PHQ Screens 4/3/2020 PHQ Not Done - Little interest or pleasure in doing things Not at all Feeling down, depressed, irritable, or hopeless Not at all Total Score PHQ 2 0 Learning Assessment: 
Learning Assessment 3/28/2019 PRIMARY LEARNER Patient HIGHEST LEVEL OF EDUCATION - PRIMARY LEARNER  2 YEARS OF COLLEGE  
BARRIERS PRIMARY LEARNER NONE  
CO-LEARNER CAREGIVER No  
PRIMARY LANGUAGE ENGLISH  NEED -  
LEARNER PREFERENCE PRIMARY LISTENING  
  -  
ANSWERED BY self RELATIONSHIP SELF Abuse Screening: 
Abuse Screening Questionnaire 4/3/2020 Do you ever feel afraid of your partner? Isaak Milton Are you in a relationship with someone who physically or mentally threatens you? Isaak Milton Is it safe for you to go home? Mana Merlos Fall Risk Fall Risk Assessment, last 12 mths 4/3/2020 Able to walk? Yes Fall in past 12 months? No  
 
 
Pt currently taking Anticoagulant therapy? ASA 81 mg 
 
Coordination of Care: 1. Have you been to the ER, urgent care clinic since your last visit? Hospitalized since your last visit? 3- for cough, fever, back pain. S/p cabg x3 3-2-2020 to 3- 
 
2. Have you seen or consulted any other health care providers outside of the [address] [place] [address] since your last visit? Include any pap smears or colon screening.  no

## 2020-04-03 NOTE — PROGRESS NOTES
HISTORY OF PRESENT ILLNESS Benigno Ferreira is a 65 y.o. male. HPI Patient presents for a post hospital follow-up office visit. Patient presented to [facility] at the beginning of March 2013 with a non-ST elevation myocardial infarction which led to a cardiac catheterization diagnosing multivessel coronary disease. He subsequently underwent a three-vessel bypass surgery the following day using a LIMA to LAD, SVG to OM1 and SVG to diagonal 1. His RCA demonstrated moderate nonobstructive distal disease, so it was not bypassed at that time. His preoperative ejection fraction was lower limits of normal.  Patient had a somewhat rahel postoperative course primarily due to sundowning and significant confusion. He was discharged home approximately 3 weeks ago. According to the family, he was diagnosed with a pneumonia in the emergency room and treated with a 10-day course of antibiotics. He states his breathing is now back to baseline. His biggest complaint now is heart palpitations which occur primarily at night and difficulty sleeping. Home physical therapy recently came out to his house and noted that his heart rate easily increased to 120 bpm with minimal activity. Patient denies any leg swelling, no orthopnea, PND. No further chest pain or pressure. He continues to feel somewhat weak, but admits his energy level has improved each week since he was discharged. He has lost approximately 25 pounds in weight since his bypass surgery. Past Medical History:  
Diagnosis Date  Acute coronary syndrome ([address] 3/2/2020  LOREN (acute kidney injury) ([address] 3/5/2020  Calculus of kidney  Cancer ([address] [facility]  Coronary artery disease of native artery with stable angina pectoris ([address] 3/3/2020  Diabetes mellitus ([address] 8/19/2010  Gout 8/19/2010  
 HTN (hypertension) 8/19/2010  Hyperlipemia 8/19/2010  Kidney disease  Lumbar spondylosis  Proteinuria  S/P CABG x 3 03/04/2020 CABG x 3, LIMA to LAD, SVG to OM1, SVG to diagonal 1  
 Skin cancer, basal cell   
 neck  Stage 3 chronic kidney disease ([facility]  Type 2 diabetes mellitus without complication, without long-term current use of insulin [facility] 8/19/2010  Urolithiasis Current Outpatient Medications Medication Sig Dispense Refill  traMADoL (ULTRAM) 50 mg tablet Take 50 mg by mouth every eight (8) hours as needed for Pain.  metoprolol succinate (TOPROL-XL) 50 mg XL tablet Take 1 tablet by mouth once nightly. 30 Tab 3  
 melatonin 3 mg tablet Take 1 Tab by mouth nightly. 30 Tab 2  
 acetaminophen (TYLENOL) 325 mg tablet Take 2 Tabs by mouth every six (6) hours as needed for Pain or Fever. 90 Tab 2  
 ascorbic acid, vitamin C, (VITAMIN C) 250 mg tablet Take 1 Tab by mouth two (2) times daily (with meals). 60 Tab 1  
 aspirin delayed-release 81 mg tablet Take 1 Tab by mouth daily. 30 Tab 2  
 atorvastatin (LIPITOR) 40 mg tablet Take 1 Tab by mouth nightly. 30 Tab 2  cholecalciferol (VITAMIN D3) (1000 Units /25 mcg) tablet Take 4 Tabs by mouth daily. 120 Tab 2  cyanocobalamin 1,000 mcg tablet Take 1 Tab by mouth daily. 30 Tab 2  
 ferrous sulfate 325 mg (65 mg iron) tablet Take 1 Tab by mouth two (2) times daily (with meals). 60 Tab 2  
 folic acid (FOLVITE) 1 mg tablet Take 1 Tab by mouth daily. 30 Tab 2  
 tamsulosin (FLOMAX) 0.4 mg capsule Take 1 Cap by mouth daily. 30 Cap 2  
 allopurinol (ZYLOPRIM) 300 mg tablet take 1 tablet by mouth once daily 90 Tab 1  
 glyBURIDE (DIABETA) 2.5 mg tablet Take 1 Tab by mouth two (2) times daily (with meals). 180 Tab 1  
 epoetin marbin-epbx (RETACRIT) 4,000 unit/mL injection 1 mL by SubCUTAneous route every Monday, Wednesday, Friday. Indications: anemia due to kidney failure 12 Vial 1  
 nystatin (MYCOSTATIN) powder Apply  to affected area two (2) times a day. 1 Bottle 0 Allergies Allergen Reactions  Metformin Other (comments) Renal insufficiency Social History Tobacco Use  Smoking status: Never Smoker  Smokeless tobacco: Never Used Substance Use Topics  Alcohol use: No  
 Drug use: No  
 
Family History Problem Relation Age of Onset  Diabetes Mother Review of Systems Constitutional: Negative for chills, fever and weight loss. HENT: Negative for nosebleeds. Eyes: Negative for blurred vision and double vision. Respiratory: Positive for shortness of breath. Negative for cough and wheezing. Cardiovascular: Positive for palpitations. Negative for chest pain, orthopnea, claudication, leg swelling and PND. Gastrointestinal: Negative for abdominal pain, heartburn, nausea and vomiting. Genitourinary: Negative for dysuria and hematuria. Musculoskeletal: Negative for falls and myalgias. Skin: Negative for rash. Neurological: Negative for dizziness, focal weakness and headaches. Endo/Heme/Allergies: Does not bruise/bleed easily. Psychiatric/Behavioral: Negative for substance abuse. Visit Vitals /66 (BP 1 Location: Left arm, BP Patient Position: Sitting) Pulse 97 Ht 5' 7\" (1.702 m) Wt 79.8 kg (176 lb) SpO2 98% BMI 27.57 kg/m² Physical Exam  
Constitutional: He is oriented to person, place, and time. He appears well-developed and well-nourished. HENT:  
Head: Normocephalic and atraumatic. Eyes: Conjunctivae are normal.  
Neck: Neck supple. No JVD present. Carotid bruit is not present. Cardiovascular: Normal rate, regular rhythm, S1 normal, S2 normal and normal pulses. Exam reveals no gallop and no S3. No murmur heard. Pulmonary/Chest: Breath sounds normal. He has no wheezes. He has no rales. Midline surgical scar well-healed. Chest tube incisions healing. Abdominal: Soft. Bowel sounds are normal. There is no abdominal tenderness. Musculoskeletal:     
   General: No tenderness, deformity or edema. Neurological: He is alert and oriented to person, place, and time. Skin: Skin is warm and dry. Psychiatric: He has a normal mood and affect. His behavior is normal. Thought content normal.  
 
EKG: Normal sinus rhythm, normal axis, normal QTc interval, nonspecific lateral T wave inversions. Compared to the previous EKG, the lateral T wave inversions are slightly more pronounced. ASSESSMENT and PLAN Encounter Diagnoses Name Primary?  Atherosclerosis of native coronary artery of native heart without angina pectoris Yes  S/P CABG x 3   
 S/P cardiac catheterization  Type 2 diabetes with nephropathy ([facility]  Essential hypertension  Mixed hyperlipidemia  Stage 3 chronic kidney disease ([facility] Multivessel coronary artery disease. Status post three-vessel CABG at the beginning of March 2020 using a LIMA to LAD, SVG to OM1 and SVG to a diagonal branch 1. His RCA demonstrated moderate nonobstructive distal disease and was not bypassed. Patient continues to progress well postoperatively. He is having issues with tachycardia which I suspect is a sinus tachycardia, so I recommended changing his metoprolol tartrate to a higher dose of metoprolol XL 50 mg daily to be taken at night. I would continue his current dosage of aspirin and potent statin. If cardiac rehab is available in 2 months I would pursue this. No concerning symptoms for angina. Essential hypertension. Patient's blood pressures been running normal on just a very low dose of metoprolol postoperatively. He was previously taking both diltiazem and lisinopril preoperatively. At this point I would just continue on a beta-blocker as monotherapy but would consider restarting a low-dose ACE inhibitor in the future if his blood pressure will tolerate. Dyslipidemia. Patient is now taking atorvastatin 40 mg daily. His LDL should be less than 70. This can be checked at his next office visit. Diabetes mellitus, type II. Managed with oral agents. Is followed by his PCP. His hemoglobin A1c should be less than 7 from a cardiac standpoint. Chronic kidney disease, stage III. Patient's baseline creatinine is between 1.4 and 1.5. Follow-up in 2 to 3 months, sooner if needed.

## 2020-04-06 NOTE — TELEPHONE ENCOUNTER
S/w patient to R/s his appointment to this Thursday via a phone call since pt does not have a smart phone or computer with camera.

## 2020-04-06 NOTE — PROGRESS NOTES
Dr. Fer Fajardo and Home care nurses,  On my PT Reassessement -4/2/2020, Ms. Valdo Osborn reported that Mr. Clarice De Anda resting HR was 125 - 135 over the past night. She reports that she had reported it to the MD.  I am requesting evaluation or assessment of pt's HR for medical management as this pt has had several challenges since surgery. Pt is on hold for extrenuous exercises outside of gentle walking in home. Thank you for your time and attenti on.   Millie Hidalgo, MPT

## 2020-04-07 NOTE — PROGRESS NOTES
He has had issues with tachycardia ever since surgery. He is needs to have his medications titrated and I believe that the most prudent action would be to get in touch with his cardiologist to ensure that he is on the correct regimen. Our team will get in contact with him tomorrow to ensure that everything is okay.

## 2020-04-08 NOTE — PROGRESS NOTES
Spoke with patient. He stated that the cardiologist office is suppose to call him tomorrow. He did not want to schedule an appt with NP Sintia Loaiza because he 'wanted to get all these other appts out of the way' and he do not have a smart phone for the virtual visit. Advised him to schedule an appt as soon as he could.

## 2020-04-08 NOTE — PROGRESS NOTES
Please call this patient and have him call cardiology to set up an appointment (this may need to be a virtual visit). I would also like for a virtual appointment to be set with me as I have not seen him since he was hospitalized.  Reny Guzman

## 2020-04-09 NOTE — PROGRESS NOTES
1. Have you been to the ER, urgent care clinic since your last visit? Hospitalized since your last visit? No 
2. Have you seen or consulted any other health care providers outside of the [address] [place] [address] since your last visit? Include any pap smears or colon screening. No 
Mr. Dionisio Emanuel has a reminder for a \"due or due soon\" health maintenance. I have asked that he contact his primary care provider for follow-up on this health maintenance. Reviewed allergies and medication via phone call with patient.

## 2020-04-09 NOTE — PROGRESS NOTES
[facility] 
 
4/9/2020 Subjective:  
Bettye Hodges ifollow-up visit following CABG x 3 on 3/4/20 by Dr. Lisa Sanders. Assessment: 
Overall he is doing well following open-heart surgery. Cardiology, Dr. Meng Yuan, increased metoprolol XL to 50 mg daily. Plans / Recommendations:  
Has no regularly scheduled appointment to see us, but may call with any questions or concerns. Recommend entrance into cardiac rehabilitation program when it reopens. Referral made. Adhere to low-cholesterol, low-fat diet. Begin regular exercise, preferably 30 minutes daily. Patient verbalizes understanding and agreement with the plan. Anjana Houston PA-C [facility] [phone] 
_______________________________________________________________________________________________________________________________________________________ Subjective: He feels well. C/o back pain. Angina is absent. Shortness of breath is absent. Sternal pain is absent. Stamina is improving, able to perform many daily activities without restriction. Eating well. Bowel movements regular. Current medications include: 
Current Outpatient Medications Medication Sig Dispense Refill  traMADoL (ULTRAM) 50 mg tablet Take 50 mg by mouth every eight (8) hours as needed for Pain.  metoprolol succinate (TOPROL-XL) 50 mg XL tablet Take 1 tablet by mouth once nightly. 30 Tab 3  
 melatonin 3 mg tablet Take 1 Tab by mouth nightly. 30 Tab 2  
 acetaminophen (TYLENOL) 325 mg tablet Take 2 Tabs by mouth every six (6) hours as needed for Pain or Fever. 90 Tab 2  
 ascorbic acid, vitamin C, (VITAMIN C) 250 mg tablet Take 1 Tab by mouth two (2) times daily (with meals). 60 Tab 1  
 aspirin delayed-release 81 mg tablet Take 1 Tab by mouth daily. 30 Tab 2  
 atorvastatin (LIPITOR) 40 mg tablet Take 1 Tab by mouth nightly.  30 Tab 2  
  cholecalciferol (VITAMIN D3) (1000 Units /25 mcg) tablet Take 4 Tabs by mouth daily. 120 Tab 2  cyanocobalamin 1,000 mcg tablet Take 1 Tab by mouth daily. 30 Tab 2  
 epoetin marbin-epbx (RETACRIT) 4,000 unit/mL injection 1 mL by SubCUTAneous route every Monday, Wednesday, Friday. Indications: anemia due to kidney failure 12 Vial 1  
 ferrous sulfate 325 mg (65 mg iron) tablet Take 1 Tab by mouth two (2) times daily (with meals). 60 Tab 2  
 folic acid (FOLVITE) 1 mg tablet Take 1 Tab by mouth daily. 30 Tab 2  
 nystatin (MYCOSTATIN) powder Apply  to affected area two (2) times a day. 1 Bottle 0  
 tamsulosin (FLOMAX) 0.4 mg capsule Take 1 Cap by mouth daily. 30 Cap 2  
 allopurinol (ZYLOPRIM) 300 mg tablet take 1 tablet by mouth once daily 90 Tab 1  
 glyBURIDE (DIABETA) 2.5 mg tablet Take 1 Tab by mouth two (2) times daily (with meals). 180 Tab 1 Objective:  
Vital signs:   
BP Readings from Last 3 Encounters:  
04/08/20 119/61  
04/07/20 130/90  
04/03/20 100/66 Wt Readings from Last 3 Encounters:  
04/03/20 79.8 kg (176 lb)  
03/20/20 84.4 kg (186 lb)  
03/17/20 83.9 kg (185 lb) @[facility](51)@ Wt Readings from Last 3 Encounters:  
04/03/20 79.8 kg (176 lb)  
03/20/20 84.4 kg (186 lb)  
03/17/20 83.9 kg (185 lb) Ht Readings from Last 3 Encounters:  
04/03/20 5' 7\" (1.702 m) 03/20/20 5' 7\" (1.702 m) 03/17/20 5' 7\" (1.702 m) PE:  nodyspnea or sob with conversation. Alert and interactive to questions. Consent: Jocelineyvonne Haider, who was seen by audio conference and/or his healthcare decision maker, is aware that this patient-initiated, Telehealth encounter on 4/9/2020 is a billable service, with coverage as determined by his insurance carrier. He is aware that he may receive a bill and has provided verbal consent to proceed: No - Not billable. Jnaina Maloney is a 65 y.o. male being evaluated by a video visit encounter for concerns as above.   A caregiver was present when appropriate. Due to this being a TeleHealth encounter (During [place]-19 public health emergency), evaluation of the following organ systems was limited: Vitals/Constitutional/EENT/Resp/CV/GI//MS/Neuro/Skin/Heme-Lymph-Imm. Pursuant to the emergency declaration under the [address] [place] [address] waiver authority and the [employer] and [employer] Act, this Virtual  Visit was conducted, with patient's (and/or legal guardian's) consent, to reduce the patient's risk of exposure to COVID-19 and provide necessary medical care. Services were provided through a video synchronous discussion virtually to substitute for in-person clinic visit. Patient and provider were located at their individual homes. Claritza Tapia PA-C [facility] [phone]

## 2020-04-09 NOTE — PATIENT INSTRUCTIONS
- Make PCP appt. You may stop wearing the white sternal harness. Continue all of your medications as scheduled. You are cleared to start Cardiac Rehab, which we recommend for our patients who have heart vessel bypass surgery. We will make a referral and they will be calling you to discuss the program. 
 
You may begin regular exercise, working up over time to 30 minutes of vigorous each day. Please adhere to low-cholesterol, low-fat diet. If you need more information of what and what not to eat, please call our office and we will provide this for you. You are cleared to drive as of as today. You may resume all physical activities as your stamina allows, but be careful for the next few weeks in lifting any heavy objects greater than 5 - 10 pounds. Your chest muscles may be weaker than before surgery and will take some time to build back up to their normal strength. Until then, lift slowly and with caution. Please follow-up with your Cardiologist and Primary Care Physician as scheduled. Although you have no regularly scheduled appointments to see us in the future, please feel free to call our office with any questions or concerns.

## 2020-05-06 NOTE — TELEPHONE ENCOUNTER
. Duane Zamora states that he recently had a MI and he needs to be seen and get his medications refilled. He said that he cannot do a video visit and he wants to know when you can see him. His wife Tiki Rain also wants to be seen at the same time, it sounds like she we be bringing him and accompanying him anyway.   He said afternoon wound be best.

## 2020-05-08 NOTE — TELEPHONE ENCOUNTER
Patient was restarted on lisinopril 20 mg daily last week due to high blood pressures even with the toprol 50 mg daily     Patient is still having dizziness and blood pressure are high. 2 hours after lisinopril the blood pressure is 168/120  With heart rate 90. Patient is taking blood pressures 6 times a day and was told not to do that . He was instructed he can take blood pressures before and 2 hours after medications or at any time he feels bad but not to take it every 2 hours.     Patient takes lisinopril in the am with breakfast and toprol in the pm before bed time    Verbal order and read back per Gustavo Nathan MD  Increase toprol to 100 mg daily   And increase lisinopril to 40 mg daily       Spoke with patient   He is aware to double up on toprol and lisinopril     He will call if any further issues

## 2020-05-11 NOTE — PROGRESS NOTES
HPI 
Ralph Mehta is a 65 y.o. male Chief Complaint Patient presents with  Hypertension  Diabetes [facility] Annual Wellness Visit Medicare Reports he has been talking to cardiology and they have changed his blood pressure medications twice this week. He reports some dizziness when he rolls over in the bed. He reports cardiology is aware of the dizziness and they adjusted his medications. He states he is spinning when he is dizzy. He reports he has taken his blood pressure at home when this occurs and his numbers are high. He states he has a wrist blood pressure cuff and a manual and they both come up with the same readings which are usually high. He denies any chest pain and shortness of breath. He denies any swelling in his lower extremities. He reports 220/120 for his blood pressure at home. He reports he has had some vertigo in the past and he had physical therapy and this helped. He states the dizziness only last a minute or two and his blood pressure is high when he has the dizziness so they thought it was blood pressure related and not vertigo. He is seeing Dr. Libby Sullivan. He denies any falls or weakness during the dizzy episodes. Reports he did check his blood pressure at home this morning and it was 170/100 this morning. Reports he is planning to go to Dr. Antionette Casas office tomorrow to have his blood pressure checked. He denies any recent gout flares. He has not been checking his blood glucose. He reports he gets his blood glucose testing strips from his wife as she is able to get this at very little or no cost so he would like for her to continue to get this. Past Medical History Past Medical History:  
Diagnosis Date  Acute coronary syndrome ([address] 3/2/2020  LOREN (acute kidney injury) ([address] 3/5/2020  Calculus of kidney  Cancer ([address] [facility]  Coronary artery disease of native artery with stable angina pectoris ([address] 3/3/2020  Diabetes mellitus ([address] 8/19/2010  Gout 8/19/2010  
 HTN (hypertension) 8/19/2010  Hyperlipemia 8/19/2010  Kidney disease  Lumbar spondylosis  Proteinuria  S/P CABG x 3 03/04/2020 CABG x 3, LIMA to LAD, SVG to OM1, SVG to diagonal 1  
 Skin cancer, basal cell   
 neck  Stage 3 chronic kidney disease ([facility]  Type 2 diabetes mellitus without complication, without long-term current use of insulin ([facility] 8/19/2010  Urolithiasis Surgical History Past Surgical History:  
Procedure Laterality Date  HX CORONARY ARTERY BYPASS GRAFT  03/04/2020 CABG x3, Dr. Gricel Arellano, [facility]  
 HX GI    
 HX OTHER SURGICAL Basal cell removed from left side of face  HX UROLOGICAL    
 kidney Medications Current Outpatient Medications Medication Sig Dispense Refill  lisinopriL (PRINIVIL, ZESTRIL) 20 mg tablet Take  by mouth daily.  allopurinoL (ZYLOPRIM) 300 mg tablet take 1 tablet by mouth once daily 90 Tab 1  
 ascorbic acid, vitamin C, (VITAMIN C) 250 mg tablet Take 1 Tab by mouth two (2) times daily (with meals). 180 Tab 1  
 melatonin 3 mg tablet Take 1 Tab by mouth nightly. 30 Tab 2  
 acetaminophen (TYLENOL) 325 mg tablet Take 2 Tabs by mouth every six (6) hours as needed for Pain or Fever. 90 Tab 2  
 aspirin delayed-release 81 mg tablet Take 1 Tab by mouth daily. 30 Tab 2  
 atorvastatin (LIPITOR) 40 mg tablet Take 1 Tab by mouth nightly. 30 Tab 2  cholecalciferol (VITAMIN D3) (1000 Units /25 mcg) tablet Take 4 Tabs by mouth daily. 120 Tab 2  cyanocobalamin 1,000 mcg tablet Take 1 Tab by mouth daily. 30 Tab 2  
 ferrous sulfate 325 mg (65 mg iron) tablet Take 1 Tab by mouth two (2) times daily (with meals). 60 Tab 2  
 folic acid (FOLVITE) 1 mg tablet Take 1 Tab by mouth daily. 30 Tab 2  
 nystatin (MYCOSTATIN) powder Apply  to affected area two (2) times a day. 1 Bottle 0  
 tamsulosin (FLOMAX) 0.4 mg capsule Take 1 Cap by mouth daily.  30 Cap 2  
  glyBURIDE (DIABETA) 2.5 mg tablet Take 1 Tab by mouth two (2) times daily (with meals). 180 Tab 1 Allergies Allergies Allergen Reactions  Metformin Other (comments) Renal insufficiency Family History Family History Problem Relation Age of Onset  Diabetes Mother Social History Social History Socioeconomic History  Marital status:  Spouse name: Not on file  Number of children: Not on file  Years of education: Not on file  Highest education level: Not on file Occupational History  Not on file Social Needs  Financial resource strain: Not on file  Food insecurity Worry: Not on file Inability: Not on file  Transportation needs Medical: Not on file Non-medical: Not on file Tobacco Use  Smoking status: Never Smoker  Smokeless tobacco: Never Used Substance and Sexual Activity  Alcohol use: No  
 Drug use: No  
 Sexual activity: Yes Lifestyle  Physical activity Days per week: Not on file Minutes per session: Not on file  Stress: Not on file Relationships  Social connections Talks on phone: Not on file Gets together: Not on file Attends [employer] service: Not on file Active member of club or organization: Not on file Attends meetings of clubs or organizations: Not on file Relationship status: Not on file  Intimate partner violence Fear of current or ex partner: Not on file Emotionally abused: Not on file Physically abused: Not on file Forced sexual activity: Not on file Other Topics Concern  Not on file Social History Narrative  Not on file Problem List 
Patient Active Problem List  
Diagnosis Code  Type 2 diabetes mellitus without complication, without long-term current use of insulin (HCC) E11.9  
 HTN (hypertension) I10  
 Gout M10.9  Hyperlipemia E78.5  Nocturia R35.1  Type 2 diabetes with nephropathy (HCC) E11.21  
  Elevated troponin R79.89  Back pain M54.9  Acute coronary syndrome ([facility]) I24.9  S/P cardiac catheterization Z98.890  
 NSTEMI (non-ST elevated myocardial infarction) ([facility]) I21.4  
 S/P CABG x 3 Z95.1  Stage 3 chronic kidney disease (HCC) N18.3  LOREN (acute kidney injury) [facility] N17.9  Atherosclerosis of native coronary artery of native heart without angina pectoris I25.10  Urinary retention R33.9  Moderate protein-calorie malnutrition ([facility]) E44.0 Review of Systems Review of Systems Constitutional: Negative for fever. Eyes: Negative for blurred vision. Respiratory: Negative for cough and shortness of breath. Cardiovascular: Negative for chest pain, palpitations, orthopnea and leg swelling. Gastrointestinal: Negative for abdominal pain, nausea and vomiting. Musculoskeletal: Negative for falls. Neurological: Positive for dizziness. Negative for weakness and headaches. Vital Signs Vitals:  
 05/11/20 1439 05/11/20 1555 BP: 124/74 101/69 Pulse: 96   
Resp: 20 Temp: 97.2 °F (36.2 °C) TempSrc: Oral   
Weight: 174 lb (78.9 kg) Height: 5' 7\" (1.702 m) PainSc:   0 - No pain Physical Exam 
Physical Exam 
Vitals signs reviewed. Eyes:  
   Pupils: Pupils are equal, round, and reactive to light. Neck: Musculoskeletal: Normal range of motion. Cardiovascular:  
   Rate and Rhythm: Normal rate and regular rhythm. Heart sounds: Normal heart sounds. Pulmonary:  
   Effort: Pulmonary effort is normal.  
   Breath sounds: Normal breath sounds. Abdominal:  
   General: There is no distension. Palpations: Abdomen is soft. Tenderness: There is no right CVA tenderness or left CVA tenderness. Musculoskeletal:  
   Right lower leg: No edema. Left lower leg: No edema. Skin: 
   General: Skin is warm and dry. Neurological:  
   Mental Status: He is alert and oriented to person, place, and time. Cranial Nerves: No cranial nerve deficit. Gait: Gait normal.  
Psychiatric:     
   Mood and Affect: Mood normal.  
 
 
 
Diagnostics Orders Placed This Encounter  METABOLIC PANEL, COMPREHENSIVE Standing Status:   Future Standing Expiration Date:   5/12/2021  LIPID PANEL Standing Status:   Future Standing Expiration Date:   5/12/2021  CBC WITH AUTOMATED DIFF Standing Status:   Future Standing Expiration Date:   5/12/2021  
 TSH 3RD GENERATION Standing Status:   Future Standing Expiration Date:   5/12/2021  
 HEPATITIS C AB Standing Status:   Future Standing Expiration Date:   5/12/2021  OCCULT BLOOD IMMUNOASSAY,DIAGNOSTIC Standing Status:   Future Standing Expiration Date:   5/12/2021 Order Specific Question:   QUEST SOURCE Answer:   Stool [1161]  lisinopriL (PRINIVIL, ZESTRIL) 20 mg tablet Sig: Take  by mouth daily.  allopurinoL (ZYLOPRIM) 300 mg tablet Sig: take 1 tablet by mouth once daily Dispense:  90 Tab Refill:  1  
 ascorbic acid, vitamin C, (VITAMIN C) 250 mg tablet Sig: Take 1 Tab by mouth two (2) times daily (with meals). Dispense:  180 Tab Refill:  1 Results Results for orders placed or performed during the hospital encounter of 03/20/20 EKG, 12 LEAD, INITIAL Result Value Ref Range Ventricular Rate 91 BPM  
 Atrial Rate 91 BPM  
 P-R Interval 138 ms QRS Duration 82 ms Q-T Interval 356 ms  
 QTC Calculation (Bezet) 437 ms Calculated P Axis 35 degrees Calculated R Axis 29 degrees Calculated T Axis 136 degrees Diagnosis Normal sinus rhythm T wave abnormality, consider lateral ischemia Abnormal ECG When compared with ECG of 17-MAR-2020 08:20, No significant change was found Confirmed by Sofia Camargo MD, Jax Barragan ([id number]) on 3/20/2020 4:11:20 PM 
  
 
 
 
 
Assessment and Plan Diagnoses and all orders for this visit: 1. Essential hypertension -     METABOLIC PANEL, COMPREHENSIVE; Future -     LIPID PANEL; Future -     CBC WITH AUTOMATED DIFF; Future 
-     TSH 3RD GENERATION; Future 2. Gout, unspecified cause, unspecified chronicity, unspecified site 
-     allopurinoL (ZYLOPRIM) 300 mg tablet; take 1 tablet by mouth once daily 3. Type 2 diabetes mellitus without complication, without long-term current use of insulin ([facility] -     METABOLIC PANEL, COMPREHENSIVE; Future -     LIPID PANEL; Future -     CBC WITH AUTOMATED DIFF; Future 
-     TSH 3RD GENERATION; Future 4. Encounter for hepatitis C screening test for low risk patient 
-     HEPATITIS C AB; Future 5. Colon cancer screening -     OCCULT BLOOD IMMUNOASSAY,DIAGNOSTIC; Future 6. Dizziness Other orders 
-     ascorbic acid, vitamin C, (VITAMIN C) 250 mg tablet; Take 1 Tab by mouth two (2) times daily (with meals). Declines blood glucose test strips being ordered. He should be checking his blood glucose fasting and then two hours after a meal. I have also educated patient on vertigo and informed him that his dizziness may be coming from his blood glucose level and or vertigo since he has had a history. After care summary printed and reviewed with patient. Plan reviewed with patient. Questions answered. Patient verbalized understanding of plan and is in agreement with plan. Patient to follow up in one month or earlier if symptoms worsen. Follow-up and Dispositions · Return in about 1 month (around 6/11/2020), or if symptoms worsen or fail to improve, for dizziness, diabetes, hypertension, Gout.  
  
 
Gautam Everett, FNP-C

## 2020-05-11 NOTE — PROGRESS NOTES
Advance Care Planning Advance Care Planning (ACP) Physician/NP/PA (Provider) [facility] Date of ACP Conversation: 5/11/2020 Conversation Conducted with:   Patient with Decision Making Capacity Health Care Decision Maker: 
 
Current Designated Health Care Decision Maker:  
(If there is a valid [place] named in the [facility] Makers\" box in the ACP activity, but it is not visible above, be sure to open that field and then select the health care decision maker relationship (ie \"primary\") in the blank space to the right of the name.) Note: Assess and validate information in current ACP documents, as indicated. If no Authorized Decision Maker has previously been identified, then patient chooses [place]: \"Who would you like to name as your primary health care decision-maker? \" Name: Grupo Mckeon   Relationship: wife Phone number: [phone] \"Can this person be reached easily? \" Ruth Dy \"Who would you like to name as your back-up decision maker? \" Name: Garrick Calvillo  Relationship: Son Phone number:[phone] \"Can this person be reached easily? \" Ruth Dy Note: If the relationship of these Decision-Makers to the patient does NOT follow your state's Next of Kin hierarchy, recommend that patient complete ACP document that meets state-specific requirements to allow them to act on the patient's behalf when appropriate. Care Preferences: Hospitalization: \"If your health worsens and it becomes clear that your chance of recovery is unlikely, what would your preference be regarding hospitalization? \" If the patient would want hospitalization, answer \"yes\". If the patient would prefer comfort-focused treatment without hospitalization, answer \"no\". no 
 
 
Ventilation: \"If you were in your present state of health and suddenly became very ill and were unable to breathe on your own, what would your preference be about the use of a ventilator (breathing machine) if it was available to you? \" If patient would desire the use of a ventilator (breathing machine), answer \"yes\", if not answer \"no\": He would like to leave this decision to his wife. \"If your health worsens and it becomes clear that your chance of recovery is unlikely, what would your preference be about the use of a ventilator (breathing machine) if it was available to you? \" He would like to leave this decision to his wife Resuscitation: \"CPR works best to restart the heart when there is a sudden event, like a heart attack, in someone who is otherwise healthy. Unfortunately, CPR does not typically restart the heart for people who have serious health conditions or who are very sick. \" \"In the event your heart stopped as a result of an underlying serious health condition, would you want attempts to be made to restart your heart (answer \"yes\" for attempt to resuscitate) or would you prefer a natural death (answer \"no\" for do not attempt to resuscitate)? \" He would like to defer this decision to his wife. NOTE: If the patient has a valid advance directive AND provides care preference(s) that are inconsistent with that prior directive, advise the patient to consider either: creating a new advance directive that complies with state-specific requirements; or, if that is not possible, orally revoking that prior directive in accordance with state-specific requirements, which must be documented in the EHR. Conversation Outcomes / Follow-Up Plan:  
Recommended completion of Advance Directive He does not want to be an organ donor. He does not have this on his 's license. We have discussed [place]'s Advance Directive. Length of Voluntary ACP Conversation in minutes:  20 minutes Abdon Sandoval NP

## 2020-05-11 NOTE — PROGRESS NOTES
Feliciano Jolly presents today for Chief Complaint Patient presents with  Hypertension  Diabetes [facility] Jesse Annual Wellness Visit Medicare Is someone accompanying this pt? no 
 
Is the patient using any DME equipment during OV? no 
 
Depression Screening: 
3 most recent PHQ Screens 5/11/2020 PHQ Not Done - Little interest or pleasure in doing things Not at all Feeling down, depressed, irritable, or hopeless Not at all Total Score PHQ 2 0 Learning Assessment: 
Learning Assessment 5/11/2020 PRIMARY LEARNER Patient HIGHEST LEVEL OF EDUCATION - PRIMARY LEARNER  2 YEARS OF COLLEGE  
BARRIERS PRIMARY LEARNER NONE  
CO-LEARNER CAREGIVER No  
PRIMARY LANGUAGE ENGLISH  NEED No  
LEARNER PREFERENCE PRIMARY DEMONSTRATION  
  -  
ANSWERED BY patient RELATIONSHIP SELF Abuse Screening: 
Abuse Screening Questionnaire 5/11/2020 Do you ever feel afraid of your partner? Dayanna Blare Are you in a relationship with someone who physically or mentally threatens you? Dayanna Blamichaela Is it safe for you to go home? [place] Short Fall Screening Fall Risk Assessment, last 12 mths 5/11/2020 Able to walk? Yes Fall in past 12 months? No  
 
 
Generalized Anxiety No flowsheet data found. Health Maintenance Due Topic Date Due  
 Hepatitis C Screening  1950  Shingrix Vaccine Age 50> (1 of 2) 03/21/1998  
 FOBT Q1Y Age 54-65  03/21/1998  Pneumococcal 65+ years (1 of 1 - PPSV23) 03/21/2013  Medicare Yearly Exam  05/06/2018  Foot Exam Q1  06/22/2019 Karol Garces [facility] reviewed and discussed and ordered per Provider. Coordination of Care 1. Have you been to the ER, urgent care clinic since your last visit? Hospitalized since your last visit? Yes, [facility] 
 
2. Have you seen or consulted any other health care providers outside of the [address] [place] [address] since your last visit? Include any pap smears or colon screening. no 
 
 
Advance Directive: 1. Do you have an advance directive in place? Patient Reply:no

## 2020-05-11 NOTE — PATIENT INSTRUCTIONS
Medicare Wellness Visit, Male The best way to live healthy is to have a lifestyle where you eat a well-balanced diet, exercise regularly, limit alcohol use, and quit all forms of tobacco/nicotine, if applicable. Regular preventive services are another way to keep healthy. Preventive services (vaccines, screening tests, monitoring & exams) can help personalize your care plan, which helps you manage your own care. Screening tests can find health problems at the earliest stages, when they are easiest to treat. [facility] follows the current, evidence-based guidelines published by the [facility] R[facility] ([facility]) when recommending preventive services for our patients. Because we follow these guidelines, sometimes recommendations change over time as research supports it. (For example, a prostate screening blood test is no longer routinely recommended for men with no symptoms). Of course, you and your doctor may decide to screen more often for some diseases, based on your risk and co-morbidities (chronic disease you are already diagnosed with). Preventive services for you include: - Medicare offers their members a free annual wellness visit, which is time for you and your primary care provider to discuss and plan for your preventive service needs. Take advantage of this benefit every year! 
-All adults over age 72 should receive the recommended pneumonia vaccines. Current USPSTF guidelines recommend a series of two vaccines for the best pneumonia protection.  
-All adults should have a flu vaccine yearly and tetanus vaccine every 10 years. 
-All adults age 48 and older should receive the shingles vaccines (series of two vaccines).       
-All adults age 38-68 who are overweight should have a diabetes screening test once every three years.  
-Other screening tests & preventive services for persons with diabetes include: an eye exam to screen for diabetic retinopathy, a kidney function test, a foot exam, and stricter control over your cholesterol.  
-Cardiovascular screening for adults with routine risk involves an electrocardiogram (ECG) at intervals determined by the provider.  
-Colorectal cancer screening should be done for adults age 54-65 with no increased risk factors for colorectal cancer. There are a number of acceptable methods of screening for this type of cancer. Each test has its own benefits and drawbacks. Discuss with your provider what is most appropriate for you during your annual wellness visit. The different tests include: colonoscopy (considered the best screening method), a fecal occult blood test, a fecal DNA test, and sigmoidoscopy. 
-All adults born between [facility] should be screened once for Hepatitis C. 
-An Abdominal Aortic Aneurysm (AAA) Screening is recommended for men age 73-68 who has ever smoked in their lifetime. Here is a list of your current Health Maintenance items (your personalized list of preventive services) with a due date: 
Health Maintenance Due Topic Date Due  
 Hepatitis C Test  1950  Shingles Vaccine (1 of 2) 03/21/1998  Colon Cancer Stool Test  03/21/1998  Pneumococcal Vaccine (1 of 1 - PPSV23) 03/21/2013 [address] [place] [address] Annual Well Visit  05/06/2018  Diabetic Foot Care  06/22/2019

## 2020-05-11 NOTE — PROGRESS NOTES
This is the Subsequent Medicare Annual Wellness Exam, performed 12 months or more after the Initial AWV or the last Subsequent AWV I have reviewed the patient's medical history in detail and updated the computerized patient record. History Patient Active Problem List  
Diagnosis Code  Type 2 diabetes mellitus without complication, without long-term current use of insulin (HCC) E11.9  
 HTN (hypertension) I10  
 Gout M10.9  Hyperlipemia E78.5  Nocturia R35.1  Type 2 diabetes with nephropathy (HCC) E11.21  
 Elevated troponin R79.89  Back pain M54.9  Acute coronary syndrome (HCC) I24.9  S/P cardiac catheterization Z98.890  
 NSTEMI (non-ST elevated myocardial infarction) (HCC) I21.4  
 S/P CABG x 3 Z95.1  Stage 3 chronic kidney disease (HCC) N18.3  LOREN (acute kidney injury) ([address] N17.9  Atherosclerosis of native coronary artery of native heart without angina pectoris I25.10  Urinary retention R33.9  Moderate protein-calorie malnutrition (HCC) E44.0 Past Medical History:  
Diagnosis Date  Acute coronary syndrome ([address] 3/2/2020  LOREN (acute kidney injury) ([address] 3/5/2020  Calculus of kidney  Cancer ([address] HX [facility]  Coronary artery disease of native artery with stable angina pectoris ([address] 3/3/2020  Diabetes mellitus ([address] 8/19/2010  Gout 8/19/2010  
 HTN (hypertension) 8/19/2010  Hyperlipemia 8/19/2010  Kidney disease  Lumbar spondylosis  Proteinuria  S/P CABG x 3 03/04/2020 CABG x 3, LIMA to LAD, SVG to OM1, SVG to diagonal 1  
 Skin cancer, basal cell   
 neck  Stage 3 chronic kidney disease ([address]  Type 2 diabetes mellitus without complication, without long-term current use of insulin ([address] 8/19/2010  Urolithiasis Past Surgical History:  
Procedure Laterality Date  HX CORONARY ARTERY BYPASS GRAFT  03/04/2020 CABG x3, Dr. Barbara Fierro, [facility]  
 HX GI    
 HX OTHER SURGICAL Basal cell removed from left side of face  HX UROLOGICAL    
 kidney Current Outpatient Medications Medication Sig Dispense Refill  lisinopriL (PRINIVIL, ZESTRIL) 20 mg tablet Take  by mouth daily.  metoprolol succinate (TOPROL-XL) 50 mg XL tablet Take 1 tablet by mouth once nightly. 30 Tab 3  
 melatonin 3 mg tablet Take 1 Tab by mouth nightly. 30 Tab 2  
 acetaminophen (TYLENOL) 325 mg tablet Take 2 Tabs by mouth every six (6) hours as needed for Pain or Fever. 90 Tab 2  
 ascorbic acid, vitamin C, (VITAMIN C) 250 mg tablet Take 1 Tab by mouth two (2) times daily (with meals). 60 Tab 1  
 aspirin delayed-release 81 mg tablet Take 1 Tab by mouth daily. 30 Tab 2  
 atorvastatin (LIPITOR) 40 mg tablet Take 1 Tab by mouth nightly. 30 Tab 2  cholecalciferol (VITAMIN D3) (1000 Units /25 mcg) tablet Take 4 Tabs by mouth daily. 120 Tab 2  cyanocobalamin 1,000 mcg tablet Take 1 Tab by mouth daily. 30 Tab 2  
 ferrous sulfate 325 mg (65 mg iron) tablet Take 1 Tab by mouth two (2) times daily (with meals). 60 Tab 2  
 folic acid (FOLVITE) 1 mg tablet Take 1 Tab by mouth daily. 30 Tab 2  
 nystatin (MYCOSTATIN) powder Apply  to affected area two (2) times a day. 1 Bottle 0  
 tamsulosin (FLOMAX) 0.4 mg capsule Take 1 Cap by mouth daily. 30 Cap 2  
 allopurinol (ZYLOPRIM) 300 mg tablet take 1 tablet by mouth once daily 90 Tab 1  
 glyBURIDE (DIABETA) 2.5 mg tablet Take 1 Tab by mouth two (2) times daily (with meals). 180 Tab 1 Allergies Allergen Reactions  Metformin Other (comments) Renal insufficiency Family History Problem Relation Age of Onset  Diabetes Mother Social History Tobacco Use  Smoking status: Never Smoker  Smokeless tobacco: Never Used Substance Use Topics  Alcohol use: No  
 
 
Depression Risk Factor Screening:  
 
3 most recent PHQ Screens 5/11/2020 PHQ Not Done - Little interest or pleasure in doing things Not at all Feeling down, depressed, irritable, or hopeless Not at all Total Score PHQ 2 0 Alcohol Risk Factor Screening (MALE > 65): Do you average more 1 drink per night or more than 7 drinks a week: No 
 
In the past three months have you have had more than 4 drinks containing alcohol on one occasion: No 
 
 
Functional Ability and Level of Safety:  
Hearing: Hearing is good. Activities of Daily Living: The home contains: grab bars Patient does total self care Ambulation: with no difficulty Fall Risk: 
Fall Risk Assessment, last 12 mths 5/11/2020 Able to walk? Yes Fall in past 12 months? No  
 
 
Abuse Screen: 
Patient is not abused Cognitive Screening Has your family/caregiver stated any concerns about your memory: yes - wife mentioned to him Cognitive Screening: Normal - Animal Naming Test 
 
Patient Care Team  
Patient Care Team: 
Chanelle Peters NP as PCP - General (Nurse Practitioner) Chanelle Peters NP as PCP - [facility] Empaneled Provider Briana Crabtree MD as Physician (Urology) Jennifer Triplett MD (Inactive) (Cardiology) Tila Phan MD as Consulting Provider (Nephrology) Mikael Daigle MD as Consulting Provider (Nephrology) Assessment/Plan Education and counseling provided: 
Are appropriate based on today's review and evaluation End-of-Life planning (with patient's consent) Pneumococcal Vaccine Influenza Vaccine Hepatitis B Vaccine Prostate cancer screening tests (PSA, covered annually) Colorectal cancer screening tests Cardiovascular screening blood test 
Screening for glaucoma Diabetes screening test 
Medical nutrition therapy for individuals with diabetes or renal disease Prostate screening offered - patient declines at this time. Diagnoses and all orders for this visit: 
 
1. Gout, unspecified cause, unspecified chronicity, unspecified site 
-     allopurinoL (ZYLOPRIM) 300 mg tablet; take 1 tablet by mouth once daily Other orders -     ascorbic acid, vitamin C, (VITAMIN C) 250 mg tablet; Take 1 Tab by mouth two (2) times daily (with meals). Health Maintenance Due Topic Date Due  
 Hepatitis C Screening  1950  Shingrix Vaccine Age 50> (1 of 2) 03/21/1998  
 FOBT Q1Y Age 54-65  03/21/1998  Pneumococcal 65+ years (1 of 1 - PPSV23) 03/21/2013  Medicare Yearly Exam  05/06/2018  Foot Exam Q1  06/22/2019 Mohsen Franz, FNP-C

## 2020-05-18 NOTE — TELEPHONE ENCOUNTER
Call to patient. Patient verified his name, , and address. Labs reviewed. He continues to call cardiology and they are adjusting his blood pressure medications. He will go to have his TSH repeated. I have placed a referral to nephrology.  Reny Guzman

## 2020-05-19 NOTE — TELEPHONE ENCOUNTER
Cardiac Rehab called patient and left a message about the program. Additional attempts at contact will be made.     Thank you,  Grace Parada

## 2020-05-26 NOTE — TELEPHONE ENCOUNTER
Call to patient to discuss labs. No answer. Message left requesting a return call to the office.  KARTHIK Rutledge

## 2020-05-29 NOTE — PROGRESS NOTES
Initial Cardiac ITP/RX for Review and Signature Treatment Diagnosis Treatment Diagnosis 1 NSTEMI  
NSTEMI Date 03/02/20 Treatment Diagnosis 2 CABG  
CABG Date 03/04/20 Referral Date 05/20/20 Co-morbidities Renal disease  [HTN] Individual Treatment Plan ITP Visit Type Initial Assessment 1st Date of Exercise  --  [To be scheduled] ITP Next Review Date 06/29/20 Visit #/Total Visits 1/36 EF % 55 % Risk Stratification High ITP Exercise, Psychosocial, Tobacco, Nutrition, Education Exercise Stages of Change Preparation DASI Total Score 32.2 Assisted Devices None Total Score 0 Safety Level II  Test Six minute walk test  
 
Exercise Prescription Mode Treadmill, Ergometer, Bike, [employer] Frequency per week 2-3 Duration per session 35-45 Intensity  METS       1.5-2.5  
RPE 11-13 Progression exercise as tolerated Target Heart Rate  Resistance Training Yes Exercise Blood Pressures Resting /72 Peak /72 Is BP WDL? Yes Exercise Activity at Home Type status post CABG Exercise Education Education Equipment orientation, Exercise safety, Signs/Symptoms to report Exercise Target Goal  
Target Goal(s) Individual exercise RX Patient Stated Exercise Goals muscular endurance Psychosocial  
Stages of Change Maintenance [facility] Total Score 21 PHQ 9 Score 0 Psychosocial Intervention Interventions No intervention indicated Currently Taking Psychotropic Meds No  
Medication Changes No  
Psychosocial Education Education Impact self care behaviors on health Psychosocial Target Goals Nutrition Stages of Change Maintenance Diabetes Yes HbA1C 7.1 HbA1C Date 03/03/20 BG at Home No  
Diabetes Med(s) glyburide Diabetes Med(s) Change No  
Lipids Date Lipids Drawn 05/19/20 Total 84 HDL 32 LDL 22.8 Triglycerides 146 Lipid Med(s) lipitor Lipid Med Change(s) No  
Weight Management Weight  77.1 kg (170 lb) Height  5' 7\" (1.702 m) BMI 26.68 Waist Circumference  38\" Alcohol None Rate Your Plate Total Score 43 Nutrition Intervention Dietitian Consult Yes Nurse/Patient Discussion --  [to be scheduled with dietician] Nutrition Class Yes Diabetes Education Referral No  
Lipid Clinic Referral No  
Weight Management Referral No  
Nutrition Education Education --  [good for your heart book given] Nutrition Target Goals Target Goals BMI less than 25 Education Learning Barrier Ready to learn Education Intervention Education Schedule Given Yes  [employer] availability discussed] Patient Education Education CAD, Med Compliance  [cardiac wellness book given] Hypertension Yes Hypertension Controlled Yes Is BP WDL? Yes  
Med(s) Change No  
Education Target Goals Target Goals Understand target guidelines for B/P Physician Response Exercise CR INTAKE from 5/29/2020 in [facility] Re[facility] Common Questions Any symptoms while exercising none Resting EKG rhythm ST Tobacco Use None  
ITP Next Review Date 06/29/20 Visit Number/Total Visits 1/36 What is plan for next session start exercising On Call Medical Director Immediately Available [employer] Exercise Treatment Log Target Heart Rate(Range)  Resting  Resting /72 Recovery  Recovery BP 97/58 Weight 77.1 kg (170 lb) Exercise EKG Rhythm ST Exercise Duration 6 minutes Peak  Peak /72 Peak RPE 13 Peak Mets 1.5 SOB none Asymptomatic yes Cardiac Rehab Initial Treatment Note/Plan Sachin Barlow 65 y.o. presented to cardiac rehab for an intake and a six minute walk test today with a primary diagnosis of CABG x3. Patient's EF is 55%. Sachin Barlow has a history of Hypertension, Diabetes Mellitus, Coronary artery disease, history of prior MI and status post CABG.  Cardiac risk factors include diabetes mellitus, hypertension and these were reviewed with him. Chinmay Harris is  and lives with spouse. PHQ-9, depression score, is 0. The result was discussed with patient who confirms. Patient denied chest pain or SOB during 6 minute walk and the cardiac rhythm was in Sinus Tachycardia. Chinmay Harris will attend exercise and educational sessions 2-3 days a week in cardiac rehab for 36 visits. Goals for Rehab: 
 
Patient name: Chinmay Harris : 1950 Goals Comments 1. Maintain BP within normal limits [x] initial 
[] met                 
[] not met 
[] progressing Keep BP WDL by next recert. 2. Increase muscular endurance. [x] initial 
[] met                 
[] not met 
[] progressing Increase METS to 2.5 by next recert and start using free weights.   
 
 
 
 
 
 
 
Karoline Gracia RN 2020 11:44 AM

## 2020-06-04 NOTE — PROGRESS NOTES
Outpatient Behavioral Health Evaluation Order Meridith Canavan is a 65y.o. year old male with Presence of aortocoronary bypass graft [Z95.1] Non-ST elevation (NSTEMI) myocardial infarction [I21.4]. He is enrolled in cardiac rehabilitation and the treating clinician believes the patient is demonstrating signs of anxiety. Rosemary Martinez admitting PHQ-9 depression score was 0 which is considered none, but patient is showing signs of anxiety. Please co-sign this note if you would like your patient to be evaluated by a LCSW in our clinic. Thank you.

## 2020-06-05 NOTE — TELEPHONE ENCOUNTER
Patient called today to report that he was unable to fill his prescription for Metoprolol XL because original script was for 50 mg daily and according to the patient's insurance it was too soon to fill this prescription. However this was recently changed on 05/08/2020 when he called into our office reporting high blood pressure with dizziness and we advised him to \"double up\" on his Metoprolol. I called [facility] this morning and spoke to [facility] and was able to get this script changed for 100 mg XL daily with a 90 day supply.

## 2020-06-05 NOTE — PROGRESS NOTES
He reports he found out his blood pressure machine was off. He states his blood pressure has been fine. He states cardiology told him his numbers were fine. He has had 5-6 blood pressure checks in the last week. He is going to heart therapy and this is helping. He states he still has dizziness. He reports he did see an ENT in the past and this did help him with his dizziness. Will refer to ENT for dizziness and thyroid.  [facility]

## 2020-06-11 NOTE — PROGRESS NOTES
Outpatient Nutritional Counseling Order Luann Olvera is a 65y.o. year old male with Presence of aortocoronary bypass graft [Z95.1] Non-ST elevation (NSTEMI) myocardial infarction [I21.4]. He is enrolled in cardiac rehabilitation and the treating clinician believes the patient would benefit from nutritional counseling services due to his diagnosis of Type 2 Diabetes. Please co-sign this note if you would like your patient to be evaluated by a Registered Dietician in our clinic. Thank you.

## 2020-06-25 NOTE — PROGRESS NOTES
CARDIAC ITP REASSESSMENT FOR REVIEW AND SIGNATURE Patient name: Kimberli Kimble : 1950 Visits from Start of Care: 10 Reporting Period:  to  Subjective Reports: No complaints, wants to strengthen back muscles Goals Comments 1. Strengthen upper back muscles [] met                 
[] not met [x] progressing Continue with back exercises and increase weights by 1lb by next recert 2. Increase endurance 
 [] met                 
[] not met [x] progressing Increase METS from 1.9 to 2.0 by next recert Key functional changes: Patient has increased METS from 1.5 to 1.9 Problems/ barriers to goal attainment: occasional low BP Assessment / Recommendations: Continue w/ cardiac rehab Rx Ray Montez RN 2020 1:08 PM 
 
Cardiac ITP  
 
 CR PHASE II from 2020 in [facility] Treatment Diagnosis 1 NSTEMI  
NSTEMI Date 20 Treatment Diagnosis 2 CABG  
CABG Date 20 Referral Date 20 Co-morbidities Renal disease ITP Visit Type Re-Assessment 1st Date of Exercise   ITP Next Review Date 20 Visit #/Total Visits 10/36 EF % 55 % Risk Stratification High ITP Exercise, Psychosocial, Tobacco, Nutrition, Education Stages of Change Action DASI Total Score  Assisted Devices None Total Score  Test Six minute walk test  
Mode Treadmill, Ergometer, Bike, Stepper, Other (comment)  [weights] Frequency per week 2-3 Duration per session 35-45 Intensity  METS       1.5-2.5  
RPE 11-13 Progression exercise as tolerated Target Heart Rate  Resistance Training Yes Resting /72 Peak /63 Is BP WDL? Yes Type  Education Equipment orientation, Exercise safety, Signs/Symptoms to report Target Goal(s) Individual exercise RX Patient Stated Exercise Goals muscular endurance Stages of Change [facility] Total Score   
PHQ 9 Score  Interventions   
 Currently Taking Psychotropic Meds  Medication Changes No  
Education Impact self care behaviors on health Stages of Change Maintenance Diabetes Yes HbA1C 7.1 HbA1C Date  BG at Home No  
Diabetes Med(s) glyburide Diabetes Med(s) Change No  
Date Lipids Drawn 05/19/20 Total 84 HDL 32 LDL 22.8 Triglycerides 146 Lipid Med(s) lipitor Lipid Med Change(s) No  
Weight  77.1 kg (170 lb) Height  5' 7\" (1.702 m) BMI  Waist Circumference  38\" Alcohol None Rate Your Plate Total Score  Dietitian Consult Yes Nurse/Patient Discussion --  [to be scheduled with dietician] Nutrition Class Yes Diabetes Education Referral No  
Lipid Clinic Referral No  
Weight Management Referral No  
Education  Target Goals BMI less than 25 Learning Barrier Ready to learn Education Schedule Given Yes  [employer] availability discussed] Education CAD, Med Compliance  [cardiac wellness book given] Hypertension Yes Hypertension Controlled Yes Is BP WDL? Yes  
Med(s) Change No  
Target Goals Understand target guidelines for B/P Exercise CR PHASE II from 6/25/2020 in [facility] Any problems changes since your last visit Denies Any symptoms while exercising denies Psychosocial/Stress Level 0 Resting EKG rhythm SR Tobacco Use None  
ITP Next Review Date 07/29/20 Visit Number/Total Visits 9/36 What is plan for next session  On Call Medical Director Immediately Available Torin Kenyon Target Heart Rate(Range)  Resting HR 89 Resting /72 Recovery HR 98 Recovery /63 Weight 76.2 kg (168 lb) Exercise EKG Rhythm SR-ST Exercise Duration 40 Peak  Peak RPE 13 Peak Mets 1.9 Asymptomatic yes Total Minutes 55

## 2020-06-29 NOTE — PROGRESS NOTES
HISTORY OF PRESENT ILLNESS Adonis Peacock is a 65 y.o. male. Follow-up Pertinent negatives include no chest pain, no abdominal pain, no headaches and no shortness of breath. Patient presents for a follow-up office visit. Patient presented to [facility] at the beginning of March 2020 with a non-ST elevation myocardial infarction which led to a cardiac catheterization diagnosing multivessel coronary disease. He subsequently underwent a three-vessel bypass surgery the following day using a LIMA to LAD, SVG to OM1 and SVG to diagonal 1. His RCA demonstrated moderate nonobstructive distal disease, so it was not bypassed at that time. His preoperative ejection fraction was lower limits of normal.  Patient had a somewhat rahel postoperative course primarily due to sundowning and significant confusion. He was discharged home approximately 3 weeks ago. Patient was last seen in the office approximately 3 months ago, complaining of heart palpitations which occur primarily at night and difficulty sleeping. Home physical therapy recently came out to his house and noted that his heart rate easily increased to 120 bpm with minimal activity. As result his metoprolol dosage was increased which he now takes metoprolol XL 50 mg twice daily. With the dose change, he states his heart palpitations improved and he has not noted any increased heart rates. He has been going to cardiac rehab about twice a week and is feeling better. He denies any exertional chest pain or shortness of breath. No leg swelling, no orthopnea, no PND. He has noted a increased resting tremor which is gradually worsened since his bypass surgery. Past Medical History:  
Diagnosis Date  Acute coronary syndrome ([address] 3/2/2020  LOREN (acute kidney injury) ([address] 3/5/2020  Calculus of kidney  Cancer ([address] HX of [facility]  Coronary artery disease of native artery with stable angina pectoris ([address] 3/3/2020  Diabetes mellitus ([facility] 8/19/2010  Gout 8/19/2010  
 HTN (hypertension) 8/19/2010  Hyperlipemia 8/19/2010  Kidney disease  Lumbar spondylosis  Proteinuria  S/P CABG x 3 03/04/2020 CABG x 3, LIMA to LAD, SVG to OM1, SVG to diagonal 1  
 Skin cancer, basal cell   
 neck  Stage 3 chronic kidney disease ([facility]  Type 2 diabetes mellitus without complication, without long-term current use of insulin ([facility] 8/19/2010  Urolithiasis Current Outpatient Medications Medication Sig Dispense Refill  lisinopriL (PRINIVIL, ZESTRIL) 20 mg tablet Take 1 Tab by mouth two (2) times a day. 180 Tab 3  
 sildenafil citrate (Viagra) 100 mg tablet Take tablet by mouth once daily as needed. 6 Tab 5  
 aspirin delayed-release 81 mg tablet take 1 tablet by mouth once daily 90 Tab 1  
 atorvastatin (LIPITOR) 40 mg tablet take 1 tablet by mouth nightly 90 Tab 0  
 cyanocobalamin 1,000 mcg tablet take 1 tablet by mouth once daily 90 Tab 0  
 folic acid (FOLVITE) 1 mg tablet take 1 tablet by mouth once daily 90 Tab 0  
 tamsulosin (FLOMAX) 0.4 mg capsule take 1 capsule by mouth once daily 90 Cap 0  
 metoprolol succinate (TOPROL-XL) 50 mg XL tablet take 1 tablet by mouth nightly (Patient taking differently: Take 50 mg by mouth two (2) times a day. take 1 tablet by mouth nightly) 90 Tab 3  
 allopurinoL (ZYLOPRIM) 300 mg tablet take 1 tablet by mouth once daily 90 Tab 1  
 ascorbic acid, vitamin C, (VITAMIN C) 250 mg tablet Take 1 Tab by mouth two (2) times daily (with meals). 180 Tab 1  
 acetaminophen (TYLENOL) 325 mg tablet Take 2 Tabs by mouth every six (6) hours as needed for Pain or Fever. 90 Tab 2  cholecalciferol (VITAMIN D3) (1000 Units /25 mcg) tablet Take 4 Tabs by mouth daily. 120 Tab 2  
 ferrous sulfate 325 mg (65 mg iron) tablet Take 1 Tab by mouth two (2) times daily (with meals).  60 Tab 2  
 nystatin (MYCOSTATIN) powder Apply  to affected area two (2) times a day. 1 Bottle 0  
 glyBURIDE (DIABETA) 2.5 mg tablet Take 1 Tab by mouth two (2) times daily (with meals). 180 Tab 1 Allergies Allergen Reactions  Metformin Other (comments) Renal insufficiency Social History Tobacco Use  Smoking status: Never Smoker  Smokeless tobacco: Never Used Substance Use Topics  Alcohol use: No  
 Drug use: No  
 
Family History Problem Relation Age of Onset  Diabetes Mother Review of Systems Constitutional: Negative for chills, fever and weight loss. HENT: Negative for nosebleeds. Eyes: Negative for blurred vision and double vision. Respiratory: Negative for cough, shortness of breath and wheezing. Cardiovascular: Negative for chest pain, palpitations, orthopnea, claudication, leg swelling and PND. Gastrointestinal: Negative for abdominal pain, heartburn, nausea and vomiting. Genitourinary: Negative for dysuria and hematuria. Musculoskeletal: Negative for falls and myalgias. Skin: Negative for rash. Neurological: Positive for tremors and weakness. Negative for dizziness, focal weakness and headaches. Endo/Heme/Allergies: Does not bruise/bleed easily. Psychiatric/Behavioral: Negative for substance abuse. Visit Vitals /76 (BP 1 Location: Right arm, BP Patient Position: Sitting) Pulse 83 Ht 5' 7\" (1.702 m) Wt 78 kg (172 lb) SpO2 96% BMI 26.94 kg/m² Physical Exam  
Constitutional: He is oriented to person, place, and time. He appears well-developed and well-nourished. HENT:  
Head: Normocephalic and atraumatic. Eyes: Conjunctivae are normal.  
Neck: Neck supple. No JVD present. Carotid bruit is not present. Cardiovascular: Normal rate, regular rhythm, S1 normal, S2 normal and normal pulses. Exam reveals no gallop and no S3. No murmur heard. Pulmonary/Chest: Breath sounds normal. He has no wheezes. He has no rales. Abdominal: Soft.  Bowel sounds are normal. There is no abdominal tenderness. Musculoskeletal:     
   General: No tenderness, deformity or edema. Neurological: He is alert and oriented to person, place, and time. Resting tremor left upper extremity Skin: Skin is warm and dry. Psychiatric: He has a normal mood and affect. His behavior is normal. Thought content normal.  
 
EKG: Atypical atrial flutter with 4-1 AV conduction, nonspecific T wave abnormality. Compared to the previous EKG, atrial flutter has replaced sinus rhythm. ASSESSMENT and PLAN Encounter Diagnoses Name Primary?  Atrial flutter, unspecified type ([address] Yes  Atherosclerosis of native coronary artery of native heart without angina pectoris  Essential hypertension  Mixed hyperlipidemia  Type 2 diabetes with nephropathy ([address]  Stage 3 chronic kidney disease ([facility]  S/P CABG x 3 Atrial flutter. New diagnosis today. My suspicion is that the patient may be having intermittent paroxysmal atrial flutter at last visit, but the heart rate improved significantly since increasing the metoprolol dosage. I did discuss starting oral anticoagulation, however, the patient prefers to stay on aspirin at this point. I would like to further assess his arrhythmia burden with a 7-day event monitor which will be mailed to the patient. Multivessel coronary artery disease. Status post three-vessel CABG at the beginning of March 2020 using a LIMA to LAD, SVG to OM1 and SVG to a diagonal branch 1. His RCA demonstrated moderate nonobstructive distal disease and was not bypassed. Patient is now participating in cardiac rehab at least twice a week and states his activity tolerance is slowly improving. No new anginal type symptoms. Essential hypertension. This now appears to be well controlled on his current medical regimen, all of which I would continue. Dyslipidemia. Patient is now taking atorvastatin 40 mg daily.   His most recent lipid profile from May 2020 looked ideal: LDL 23 total non-HDL cholesterol 52. I would continue this regimen. Diabetes mellitus, type II. Managed with oral agents. Is followed by his PCP. His hemoglobin A1c should be less than 7 from a cardiac standpoint. Chronic kidney disease, stage III. Patient's baseline creatinine is between 1.4 and 1.5. Follow-up in 4 months, sooner if needed.

## 2020-06-29 NOTE — PROGRESS NOTES
Lashanda Marc presents today for Chief Complaint Patient presents with  Follow-up  
  2 month follow up Lashanda Marc preferred language for health care discussion is english/other. Is someone accompanying this pt? no 
 
Is the patient using any DME equipment during OV? no 
 
Depression Screening: 
3 most recent PHQ Screens 6/29/2020 PHQ Not Done - Little interest or pleasure in doing things Not at all Feeling down, depressed, irritable, or hopeless Not at all Total Score PHQ 2 0 Trouble falling or staying asleep, or sleeping too much - Feeling tired or having little energy - Poor appetite, weight loss, or overeating - Feeling bad about yourself - or that you are a failure or have let yourself or your family down - Trouble concentrating on things such as school, work, reading, or watching TV - Moving or speaking so slowly that other people could have noticed; or the opposite being so fidgety that others notice - Thoughts of being better off dead, or hurting yourself in some way -  
PHQ 9 Score - Learning Assessment: 
Learning Assessment 5/11/2020 PRIMARY LEARNER Patient HIGHEST LEVEL OF EDUCATION - PRIMARY LEARNER  2 YEARS OF COLLEGE  
BARRIERS PRIMARY LEARNER NONE  
CO-LEARNER CAREGIVER No  
PRIMARY LANGUAGE ENGLISH  NEED No  
LEARNER PREFERENCE PRIMARY DEMONSTRATION  
  -  
ANSWERED BY patient RELATIONSHIP SELF Abuse Screening: 
Abuse Screening Questionnaire 6/29/2020 Do you ever feel afraid of your partner? Anna Bell Are you in a relationship with someone who physically or mentally threatens you? Anna Bell Is it safe for you to go home? Brad Goode Fall Risk Fall Risk Assessment, last 12 mths 6/29/2020 Able to walk? Yes Fall in past 12 months? No  
 
 
Pt currently taking Anticoagulant therapy? ASA 81 mg once a day Coordination of Care: 1. Have you been to the ER, urgent care clinic since your last visit?  Hospitalized since your last visit? no 
 
 2. Have you seen or consulted any other health care providers outside of the [address] [place] [address] since your last visit? Include any pap smears or colon screening.  no

## 2020-07-23 NOTE — PROGRESS NOTES
CARDIAC ITP REASSESSMENT FOR REVIEW AND SIGNATURE       Patient name: Ty Thomas : 1950     Visits from Start of Care: 18      Reporting Period:  to     Subjective Reports: no complaints, progressing well     Goals Comments   1. Strengthen upper back muscles   [] met                  [] not met  [x] progressing Patient has started resistance training exercises     2. Increase Intensity   [] met                  [] not met  [x] progressing Increase METS from 1.9 to 2.1      Key functional changes: Feels like back is getting stronger    Problems/ barriers to goal attainment: None    Assessment / Recommendations:Continue w/ rehab Rx    Mima Medina RN 2020 4:00 PM      Cardiac ITP      CR EDU CLASS from 2020 in [id number]   Most recent reading at 2020  4:08 PM    Treatment Diagnosis 1  NSTEMI    NSTEMI Date  20    Treatment Diagnosis 2  CABG    CABG Date  20    Referral Date  20    Co-morbidities  Renal disease    ITP Visit Type  Re-Assessment    ITP Next Review Date  20    Visit #/Total Visits      EF %  55 %    Risk Stratification  High    ITP  Exercise, Psychosocial, Tobacco, Nutrition, Education    Stages of Change  Action    Assisted Devices  None    Test  Six minute walk test    Mode  Treadmill, Ergometer, Bike, Stepper, Other (comment)  [weights]    Frequency per week  2-3    Duration per session  35-45    Intensity  METS        1.5-2.5    RPE  11-13    Progression  exercise as tolerated    Target Heart Rate      Resistance Training  Yes    Resting BP  117/75    Peak BP  117/75    Is BP WDL?   Yes    Education  Equipment orientation, Exercise safety, Signs/Symptoms to report    Target Goal(s)  Individual exercise RX    Patient Stated Exercise Goals  muscular endurance    Stages of Change  Maintenance    Medication Changes  No    Education  Impact self care behaviors on health    Stages of Change  Maintenance    Diabetes  Yes    HbA1C 7.1    BG at Home  No    Diabetes Med(s)  glyburide    Diabetes Med(s) Change  No    Date Lipids Drawn  05/19/20    Total  84    HDL  32    LDL  22.8    Triglycerides  146    Lipid Med(s)  lipitor    Lipid Med Change(s)  No    Weight   77.1 kg (170 lb)    Height   5' 7\" (1.702 m)    Waist Circumference   38\"    Alcohol  None    Dietitian Consult  Yes    Nurse/Patient Discussion  --  [to be scheduled with dietician]    Nutrition Class  Yes    Diabetes Education Referral  No    Lipid Clinic Referral  No    Weight Management Referral  No    Target Goals  BMI less than 25    Learning Barrier  Ready to learn    Education Schedule Given  Yes  [employer] availability discussed]    Education  CAD, Med Compliance  [cardiac wellness book given]    Hypertension  Yes    Hypertension Controlled  Yes    Is BP WDL?   Yes    Med(s) Change  No    Target Goals  Understand target guidelines for B/P    Exercise      CR PHASE II from 7/23/2020 in [facility]   Most recent reading at 7/23/2020  9:40 AM    Any problems changes since your last visit  Denies    Any symptoms while exercising  denies    Resting EKG rhythm  SR    Tobacco Use  None    ITP Next Review Date  07/29/20    Visit Number/Total Visits  18/36  [education class (nutrition)]    On Call Medical Director Immediately Available  Janny Hines    Target Heart Rate(Range)      Resting HR  86    Resting BP  114/81    Recovery HR  83    Recovery BP  114/81    Weight  76.7 kg (169 lb)    Exercise EKG Rhythm  SR-ST    Exercise Duration  55    Peak HR  103    Peak RPE  13    Peak Mets  1.9    Asymptomatic  yes    Total Minutes  60

## 2020-07-28 NOTE — PATIENT INSTRUCTIONS
Electromyogram (EMG) and Nerve Conduction Studies: About These Tests What are they? An electromyogram (EMG) measures the electrical activity of your muscles when you are not using them (at rest) and when you tighten them (muscle contraction). Nerve conduction studies (NCS) measure how well and how fast the nerves can send electrical signals. EMG and nerve conduction studies are often done together. If they are done together, the nerve conduction studies are done before the EMG. Why are they done? You may need an EMG to find diseases that damage your muscles or nerves or to find why you can't move your muscles (paralysis), why they feel weak, or why they twitch. These problems may include a herniated disc, amyotrophic lateral sclerosis (ALS), or myasthenia gravis (MG). You may need nerve conduction studies to find damage to the nerves that lead from the brain and spinal cord to the rest of the body. (This is called the peripheral nervous system.) These studies are often used to help find nerve disorders, such as carpal tunnel syndrome. How do you prepare for these tests? · Wear loose-fitting clothing. You may be given a hospital gown to wear. · The electrodes for the test are attached to your skin. Your skin needs to be clean and free of sprays, oils, creams, and lotions. · You may be asked to sign a consent form that says you understand the risks of the test and agree to have it done. · Tell your doctor ALL the medicines, vitamins, supplements, and herbal remedies you take. Some may increase the risk of problems during your test. Your doctor will tell you if you should stop taking any of them before the test and how soon to do it. · If you take aspirin or some other blood thinner, ask your doctor if you should stop taking it before your test. Make sure that you understand exactly what your doctor wants you to do. These medicines increase the risk of bleeding. How are the tests done? You lie on a table or bed or sit in a reclining chair so your muscles are relaxed. For an EMG:  
· Your doctor will insert a needle electrode into a muscle. This will record the electrical activity while the muscle is at rest. 
· Your doctor will ask you to tighten the same muscle slowly and steadily while the electrical activity is recorded. · Your doctor may move the electrode to a different area of the muscle or a different muscle. For nerve conduction studies:  
· Your doctor will attach two types of electrodes to your skin. ? One type of electrode is placed over a nerve and will give the nerve an electrical pulse. ? The other type of electrode is placed over the muscle that the nerve controls. It will record how long it takes the muscle to react to the electrical pulse. How does having electromyogram (EMG) and nerve conduction studies feel? During an EMG test, you may feel a quick, sharp pain when the needle electrode is put into a muscle. With nerve conduction studies, you will be able to feel the electrical pulses. The tests make some people anxious. Keep in mind that only a very low-voltage electrical current is used. And each electrical pulse is very quick. It lasts less than a second. How long do they take? · An EMG may take 30 to 60 minutes. · Nerve conduction tests may take from 15 minutes to 1 hour or more. It depends on how many nerves and muscles your doctor tests. What happens after these tests? · After the test, you may be sore and feel a tingling in your muscles. This may last for up to 2 days. · If any of the test areas are sore: 
? Put ice or a cold pack on the area for 10 to 20 minutes at a time. Put a thin cloth between the ice and your skin. ? Take an over-the-counter pain medicine, such as acetaminophen (Tylenol), ibuprofen (Advil, Motrin), or naproxen (Aleve). Be safe with medicines. Read and follow all instructions on the label. · You will probably be able to go home right away. It depends on the reason for the test. 
· You can go back to your usual activities right away. When should you call for help? Watch closely for changes in your health, and be sure to contact your doctor if: · Muscle pain from an EMG test gets worse or you have swelling, tenderness, or pus at any of the needle sites. · You have any problems that you think may be from the test. 
· You have any questions about the test or have not received your results. Follow-up care is a key part of your treatment and safety. Be sure to make and go to all appointments, and call your doctor if you are having problems. It's also a good idea to keep a list of the medicines you take. Ask your doctor when you can expect to have your test results. Where can you learn more? Go to http://kari-jesus.info/ Enter L576 in the search box to learn more about \"Electromyogram (EMG) and Nerve Conduction Studies: About These Tests. \" Current as of: November 20, 2019               Content Version: 12.5 © 0332-1419 [employer]. Care instructions adapted under license by [employer] (which disclaims liability or warranty for this information). If you have questions about a medical condition or this instruction, always ask your healthcare professional. [address] any warranty or liability for your use of this information. Rotator Cuff: Exercises Introduction Here are some examples of exercises for you to try. The exercises may be suggested for a condition or for rehabilitation. Start each exercise slowly. Ease off the exercises if you start to have pain. You will be told when to start these exercises and which ones will work best for you. How to do the exercises Pendulum swing If you have pain in your back, do not do this exercise. 1. Hold on to a table or the back of a chair with your good arm.  Then bend forward a little and let your sore arm hang straight down. This exercise does not use the arm muscles. Rather, use your legs and your hips to create movement that makes your arm swing freely. 2. Use the movement from your hips and legs to guide the slightly swinging arm back and forth like a pendulum (or elephant trunk). Then guide it in circles that start small (about the size of a dinner plate). Make the circles a bit larger each day, as your pain allows. 3. Do this exercise for 5 minutes, 5 to 7 times each day. 4. As you have less pain, try bending over a little farther to do this exercise. This will increase the amount of movement at your shoulder. Posterior stretching exercise 1. Hold the elbow of your injured arm with your other hand. 2. Use your hand to pull your injured arm gently up and across your body. You will feel a gentle stretch across the back of your injured shoulder. 3. Hold for at least 15 to 30 seconds. Then slowly lower your arm. 4. Repeat 2 to 4 times. Up-the-back stretch Your doctor or physical therapist may want you to wait to do this stretch until you have regained most of your range of motion and strength. You can do this stretch in different ways. Hold any of these stretches for at least 15 to 30 seconds. Repeat them 2 to 4 times. 1. Light stretch: Put your hand in your back pocket. Let it rest there to stretch your shoulder. 2. Moderate stretch: With your other hand, hold your injured arm (palm outward) behind your back by the wrist. Pull your arm up gently to stretch your shoulder. 3. Advanced stretch: Put a towel over your other shoulder. Put the hand of your injured arm behind your back. Now hold the back end of the towel. With the other hand, hold the front end of the towel in front of your body. Pull gently on the front end of the towel. This will bring your hand farther up your back to stretch your shoulder. Overhead stretch 1. Standing about an arm's length away, grasp onto a solid surface. You could use a countertop, a doorknob, or the back of a sturdy chair. 2. With your knees slightly bent, bend forward with your arms straight. Lower your upper body, and let your shoulders stretch. 3. As your shoulders are able to stretch farther, you may need to take a step or two backward. 4. Hold for at least 15 to 30 seconds. Then stand up and relax. If you had stepped back during your stretch, step forward so you can keep your hands on the solid surface. 5. Repeat 2 to 4 times. Shoulder flexion (lying down) To make a wand for this exercise, use a piece of PVC pipe or a broom handle with the broom removed. Make the wand about a foot wider than your shoulders. 1. Lie on your back, holding a wand with both hands. Your palms should face down as you hold the wand. 2. Keeping your elbows straight, slowly raise your arms over your head. Raise them until you feel a stretch in your shoulders, upper back, and chest. 
3. Hold for 15 to 30 seconds. 4. Repeat 2 to 4 times. Shoulder rotation (lying down) To make a wand for this exercise, use a piece of PVC pipe or a broom handle with the broom removed. Make the wand about a foot wider than your shoulders. 1. Lie on your back. Hold a wand with both hands with your elbows bent and palms up. 2. Keep your elbows close to your body, and move the wand across your body toward the sore arm. 3. Hold for 8 to 12 seconds. 4. Repeat 2 to 4 times. Wall climbing (to the side) Avoid any movement that is straight to your side, and be careful not to arch your back. Your arm should stay about 30 degrees to the front of your side. 1. Stand with your side to a wall so that your fingers can just touch it at an angle about 30 degrees toward the front of your body. 2. Walk the fingers of your injured arm up the wall as high as pain permits.  Try not to shrug your shoulder up toward your ear as you move your arm up. 3. Hold that position for a count of at least 15 to 20. 
4. Walk your fingers back down to the starting position. 5. Repeat at least 2 to 4 times. Try to reach higher each time. Wall climbing (to the front) During this stretching exercise, be careful not to arch your back. 1. Face a wall, and stand so your fingers can just touch it. 2. Keeping your shoulder down, walk the fingers of your injured arm up the wall as high as pain permits. (Don't shrug your shoulder up toward your ear.) 3. Hold your arm in that position for at least 15 to 30 seconds. 4. Slowly walk your fingers back down to where you started. 5. Repeat at least 2 to 4 times. Try to reach higher each time. Shoulder blade squeeze 1. Stand with your arms at your sides, and squeeze your shoulder blades together. Do not raise your shoulders up as you squeeze. 2. Hold 6 seconds. 3. Repeat 8 to 12 times. Scapular exercise: Arm reach 1. Lie flat on your back. This exercise is a very slight motion that starts with your arms raised (elbows straight, arms straight). 2. From this position, reach higher toward the maritza or ceiling. Keep your elbows straight. All motion should be from your shoulder blade only. 3. Relax your arms back to where you started. 4. Repeat 8 to 12 times. Arm raise to the side During this strengthening exercise, your arm should stay about 30 degrees to the front of your side. 1. Slowly raise your injured arm to the side, with your thumb facing up. Raise your arm 60 degrees at the most (shoulder level is 90 degrees). 2. Hold the position for 3 to 5 seconds. Then lower your arm back to your side. If you need to, bring your \"good\" arm across your body and place it under the elbow as you lower your injured arm. Use your good arm to keep your injured arm from dropping down too fast. 
3. Repeat 8 to 12 times. 4. When you first start out, don't hold any extra weight in your hand.  As you get stronger, you may use a 1-pound to 2-pound dumbbell or a small can of food. Shoulder flexor and extensor exercise These are isometric exercises. That means you contract your muscles without actually moving. 1. Push forward (flex): Stand facing a wall or doorjamb, about 6 inches or less back. Hold your injured arm against your body. Make a closed fist with your thumb on top. Then gently push your hand forward into the wall with about 25% to 50% of your strength. Don't let your body move backward as you push. Hold for about 6 seconds. Relax for a few seconds. Repeat 8 to 12 times. 2. Push backward (extend): Stand with your back flat against a wall. Your upper arm should be against the wall, with your elbow bent 90 degrees (your hand straight ahead). Push your elbow gently back against the wall with about 25% to 50% of your strength. Don't let your body move forward as you push. Hold for about 6 seconds. Relax for a few seconds. Repeat 8 to 12 times. Scapular exercise: Wall push-ups This exercise is best done with your fingers somewhat turned out, rather than straight up and down. 1. Stand facing a wall, about 12 inches to 18 inches away. 2. Place your hands on the wall at shoulder height. 3. Slowly bend your elbows and bring your face to the wall. Keep your back and hips straight. 4. Push back to where you started. 5. Repeat 8 to 12 times. 6. When you can do this exercise against a wall comfortably, you can try it against a counter. You can then slowly progress to the end of a couch, then to a sturdy chair, and finally to the floor. Scapular exercise: Retraction For this exercise, you will need elastic exercise material, such as surgical tubing or Thera-Band. 1. Put the band around a solid object at about waist level. (A bedpost will work well.) Each hand should hold an end of the band.  
2. With your elbows at your sides and bent to 90 degrees, pull the band back. Your shoulder blades should move toward each other. Then move your arms back where you started. 3. Repeat 8 to 12 times. 4. If you have good range of motion in your shoulders, try this exercise with your arms lifted out to the sides. Keep your elbows at a 90-degree angle. Raise the elastic band up to about shoulder level. Pull the band back to move your shoulder blades toward each other. Then move your arms back where you started. Internal rotator strengthening exercise 1. Start by tying a piece of elastic exercise material to a doorknob. You can use surgical tubing or Thera-Band. 2. Stand or sit with your shoulder relaxed and your elbow bent 90 degrees. Your upper arm should rest comfortably against your side. Squeeze a rolled towel between your elbow and your body for comfort. This will help keep your arm at your side. 3. Hold one end of the elastic band in the hand of the painful arm. 4. Slowly rotate your forearm toward your body until it touches your belly. Slowly move it back to where you started. 5. Keep your elbow and upper arm firmly tucked against the towel roll or at your side. 6. Repeat 8 to 12 times. External rotator strengthening exercise 1. Start by tying a piece of elastic exercise material to a doorknob. You can use surgical tubing or Thera-Band. (You may also hold one end of the band in each hand.) 2. Stand or sit with your shoulder relaxed and your elbow bent 90 degrees. Your upper arm should rest comfortably against your side. Squeeze a rolled towel between your elbow and your body for comfort. This will help keep your arm at your side. 3. Hold one end of the elastic band with the hand of the painful arm. 4. Start with your forearm across your belly. Slowly rotate the forearm out away from your body.  Keep your elbow and upper arm tucked against the towel roll or the side of your body until you begin to feel tightness in your shoulder. Slowly move your arm back to where you started. 5. Repeat 8 to 12 times. Follow-up care is a key part of your treatment and safety. Be sure to make and go to all appointments, and call your doctor if you are having problems. It's also a good idea to know your test results and keep a list of the medicines you take. Where can you learn more? Go to http://kari-jesus.info/ Enter Pelon Liriano in the search box to learn more about \"Rotator Cuff: Exercises. \" Current as of: March 2, 2020               Content Version: 12.5 © 4366-9258 [employer]. Care instructions adapted under license by [employer] (which disclaims liability or warranty for this information). If you have questions about a medical condition or this instruction, always ask your healthcare professional. [address] any warranty or liability for your use of this information.

## 2020-07-28 NOTE — PROGRESS NOTES
[address] 
[address], [address] Phone: [phone] Fax: [phone] Glory Flores : 1950 PCP: Zunilda Cannon, NP 
2020 NEW PATIENT HISTORY OF PRESENT ILLNESS Marykay Mcardle is a 65 y.o. male c/o midback and left shoulder and LUE pain into the tricep. He notes that he feels better when he raises his left arm above his head. He had a MI in March had required open heart surgery. His left shoulder and arm pain began following a fall 2 years ago when he fell down a set of stairs. He notes that if feels like something is pinched in his left shoulder and he experiences tremors in his LUE and LLE, but mostly in the LUE. Pt notes that it feels better to sleep on the left shoulder rather than the R. He has difficulty with fine motor movements on the L. Pain Score: 8/10. Treatments patient has tried: 
Physical therapy: Currently in cardiac rehab Doing HEP: No 
Non-opioid medications: Yes Spinal injections: No 
Spinal surgery- No.  
Last Spine MRI: None PmHx: MI s/p cardiac catheterization; stage 3 CKD, HTN, DM 
 
ASSESSMENT This is a 65year-old male with history of MI and s/p cardiac catheterization (2020) who presents today with mid to upper back, left shoulder, and left arm pain to the elbow. He has pain in a left C5 distribution, but displays some weakness in a C8/T1 distribution. He finds relief with raising his left arm overhead (shoulder abduction). He had a positive Vo sign on the L. His symptoms may be due to a left C8 radiculopathy vs CTS with a component of a rotator cuff pathology. PLAN 1. LUE EMG - evaluate left C8 radiculopathy vs CTS 2. 10 mg Prednisone dose pack. 3. We may consider a referral to PT after he completes cardiac rehab. Pt will f/u for EMG or sooner if needed. Diagnoses and all orders for this visit: 1. Cervical radiculopathy -     EMG ONE EXTREMITY UPPER LT; Future -     predniSONE (STERAPRED DS) 10 mg dose pack; See administration instruction per 10mg dose pack 2. Thoracic spine pain -     AMB POC XRAY, SPINE; THORACIC, 2 VIEW 3. Tremor 
-     EMG ONE EXTREMITY UPPER LT; Future 4. Left arm pain 5. Chronic left shoulder pain CHIEF COMPLAINT Kya Field is seen today in consultation at the request of Angela Asencio NP for complaints of left shoulder, arm, and upper back pain. PAST MEDICAL HISTORY Past Medical History:  
Diagnosis Date  Acute coronary syndrome ([address] 3/2/2020  LOREN (acute kidney injury) ([address] 3/5/2020  Calculus of kidney  Cancer ([address] HX of [facility]  Coronary artery disease of native artery with stable angina pectoris ([address] 3/3/2020  Diabetes mellitus ([address] 8/19/2010  Gout 8/19/2010  
 HTN (hypertension) 8/19/2010  Hyperlipemia 8/19/2010  Kidney disease  Lumbar spondylosis  Proteinuria  S/P CABG x 3 03/04/2020 CABG x 3, LIMA to LAD, SVG to OM1, SVG to diagonal 1  
 Skin cancer, basal cell   
 neck  Stage 3 chronic kidney disease ([address]  Type 2 diabetes mellitus without complication, without long-term current use of insulin ([address] 8/19/2010  Urolithiasis Past Surgical History:  
Procedure Laterality Date  HX CORONARY ARTERY BYPASS GRAFT  03/04/2020 CABG x3, Dr. Stephanie Nguyen, [facility]  
 HX GI    
 HX OTHER SURGICAL Basal cell removed from left side of face  HX UROLOGICAL    
 kidney MEDICATIONS Current Outpatient Medications Medication Sig Dispense Refill  lisinopriL (PRINIVIL, ZESTRIL) 20 mg tablet Take 1 Tab by mouth two (2) times a day. 180 Tab 3  
 sildenafil citrate (Viagra) 100 mg tablet Take tablet by mouth once daily as needed.  6 Tab 5  
 aspirin delayed-release 81 mg tablet take 1 tablet by mouth once daily 90 Tab 1  
 atorvastatin (LIPITOR) 40 mg tablet take 1 tablet by mouth nightly 90 Tab 0  
  cyanocobalamin 1,000 mcg tablet take 1 tablet by mouth once daily 90 Tab 0  
 folic acid (FOLVITE) 1 mg tablet take 1 tablet by mouth once daily 90 Tab 0  
 tamsulosin (FLOMAX) 0.4 mg capsule take 1 capsule by mouth once daily 90 Cap 0  
 metoprolol succinate (TOPROL-XL) 50 mg XL tablet take 1 tablet by mouth nightly (Patient taking differently: Take 50 mg by mouth two (2) times a day. take 1 tablet by mouth nightly) 90 Tab 3  
 allopurinoL (ZYLOPRIM) 300 mg tablet take 1 tablet by mouth once daily 90 Tab 1  
 ascorbic acid, vitamin C, (VITAMIN C) 250 mg tablet Take 1 Tab by mouth two (2) times daily (with meals). 180 Tab 1  
 acetaminophen (TYLENOL) 325 mg tablet Take 2 Tabs by mouth every six (6) hours as needed for Pain or Fever. 90 Tab 2  cholecalciferol (VITAMIN D3) (1000 Units /25 mcg) tablet Take 4 Tabs by mouth daily. 120 Tab 2  
 ferrous sulfate 325 mg (65 mg iron) tablet Take 1 Tab by mouth two (2) times daily (with meals). 60 Tab 2  
 nystatin (MYCOSTATIN) powder Apply  to affected area two (2) times a day. 1 Bottle 0  
 glyBURIDE (DIABETA) 2.5 mg tablet Take 1 Tab by mouth two (2) times daily (with meals). 180 Tab 1 ALLERGIES Allergies Allergen Reactions  Metformin Other (comments) Renal insufficiency SOCIAL HISTORY Social History Socioeconomic History  Marital status:  Spouse name: Not on file  Number of children: Not on file  Years of education: Not on file  Highest education level: Not on file Tobacco Use  Smoking status: Never Smoker  Smokeless tobacco: Never Used Substance and Sexual Activity  Alcohol use: No  
 Drug use: No  
 Sexual activity: Yes FAMILY HISTORY Family History Problem Relation Age of Onset  Diabetes Mother REVIEW OF SYSTEMS Review of Systems Musculoskeletal: Positive for back pain and myalgias. Left shoulder pain LUE pain Upper to mid back pain Neurological: Positive for tremors. PHYSICAL EXAMINATION Visit Vitals /84 (BP 1 Location: Right arm, BP Patient Position: Sitting) Pulse 80 Temp (!) 96.5 °F (35.8 °C) (Oral) Ht 5' 7\" (1.702 m) Wt 173 lb (78.5 kg) SpO2 96% BMI 27.10 kg/m² Pain Assessment  7/28/2020 Location of Pain Back;Leg;Arm Location Modifiers Left Severity of Pain 8 Quality of Pain Aching Quality of Pain Comment shaking Duration of Pain - Frequency of Pain Intermittent Aggravating Factors - Limiting Behavior -  
Relieving Factors - Relieving Factors Comment - Result of Injury - Work-Related Injury - Type of Injury - Constitutional:  Well developed, well nourished, in no acute distress. Psychiatric: Affect and mood are appropriate. HEENT: Normocephalic, atraumatic. Extraocular movements intact. Integumentary: No rashes or abrasions noted on exposed areas. Cardiovascular: Regular rate and rhythm. Pulmonary: Clear to auscultation bilaterally. SPINE/MUSCULOSKELETAL EXAM 
 
Cervical spine: 
Neck is midline. Normal muscle tone. No focal atrophy is noted. ROM pain free. Shoulder ROM intact. Tenderness to palpation of left trapezius Negative Spurling's sign. Negative Tinel's sign. Negative Martinez's sign. Positive Vo sign on the L Sensation in the bilateral arms grossly intact to light touch. Hands Relative atrophy of FDI and ADM on the L MOTOR:   
  Biceps  Triceps Deltoids Wrist Ext Wrist Flex Hand Intrin Right 5/5 5/5 5/5 5/5 5/5 5/5 Left 5/5 5/5 5/5 5/5 5/5 +4/5 Hip Flex  Quads Hamstrings Ankle DF EHL Ankle PF Right 5/5 5/5 5/5 5/5 5/5 5/5 Left 5/5 5/5 5/5 5/5 5/5 5/5 DTRs are 1+ biceps, triceps, brachioradialis, patella, and Achilles. Negative Straight Leg raise. Squat not tested. No difficulty with tandem gait. Ambulation without assistive device. FWB. RADIOGRAPHS/DATA 2V Thoracic XR images taken on 7/28/2020 personally reviewed with patient: 
Wire in sternum s/p open heart surgery Thoracic kyphosis Spondylitic change  reviewed Mr. Ramandeep Goldberg has a reminder for a \"due or due soon\" health maintenance. I have asked that he contact his primary care provider for follow-up on this health maintenance. 20 minutes of face-to-face contact were spent with the patient during today's visit extensively discussing symptoms and treatment plan. All questions were answered. More than half of this visit today was spent on counseling. Written by Kalli Cruz, as dictated by Dr. Patti Gonsalves. I, Dr. Patti Gonsalves, confirm that all documentation is accurate.

## 2020-08-10 PROBLEM — J18.9 PNEUMONIA: Status: ACTIVE | Noted: 2020-01-01

## 2020-08-10 NOTE — ED TRIAGE NOTES
Patient states he thinks he has pneumonia again. He c/o productive cough and generalized body weakness x one week. He states he and his wife have both been sick but she is somewhat better today. He denies fever.

## 2020-08-10 NOTE — ED PROVIDER NOTES
EMERGENCY DEPARTMENT HISTORY AND PHYSICAL EXAM 
 
2:17 PM 
 
 
Date: 8/10/2020 Patient Name: Nadeem Christianson History of Presenting Illness Chief Complaint Patient presents with  Cough  Fatigue History Provided By: Patient Additional History (Context): Nadeem Christianson is a 65 y.o. male with diabetes, hypertension, hyperlipidemia and renal insufficiency who presents with cough for 2 weeks. Patient states he is low risk for COVID as he is only been around his wife who is also sick for 2 weeks. Patient is feeling extremely weak. He denies chest pain. Cough is dry. Patient denies vomiting or diarrhea. Patient has had decreased appetite. He denies any urinary symptoms. Patient denies smoking, alcohol recreational drug use. PCP: Wilner Meza NP Current Facility-Administered Medications Medication Dose Route Frequency Provider Last Rate Last Dose  sodium chloride (NS) flush 5-10 mL  5-10 mL IntraVENous PRN Tc Codya Manuel, DO      
 cefTRIAXone (ROCEPHIN) 2 g in sterile water (preservative free) 20 mL IV syringe  2 g IntraVENous Q24H Corbin Cody, DO   2 g at 08/10/20 1450  
 azithromycin (ZITHROMAX) 500 mg in  mL  500 mg IntraVENous Q24H Corbin Cody, DO   500 mg at 08/10/20 1450  enoxaparin (LOVENOX) injection 30 mg  30 mg SubCUTAneous Q12H Corbin Cody J, DO   30 mg at 08/10/20 1600  acetaminophen (TYLENOL) tablet 650 mg  650 mg Oral Q6H PRN Corbin Cody, DO   650 mg at 08/10/20 1600 Or  acetaminophen (TYLENOL) suppository 650 mg  650 mg Rectal Q6H PRN Tc Codya Mayine, DO      
 
Current Outpatient Medications Medication Sig Dispense Refill  predniSONE (STERAPRED DS) 10 mg dose pack See administration instruction per 10mg dose pack 21 Tab 0  
 lisinopriL (PRINIVIL, ZESTRIL) 20 mg tablet Take 1 Tab by mouth two (2) times a day. 180 Tab 3  
 sildenafil citrate (Viagra) 100 mg tablet Take tablet by mouth once daily as needed. 6 Tab 5  aspirin delayed-release 81 mg tablet take 1 tablet by mouth once daily 90 Tab 1  
 atorvastatin (LIPITOR) 40 mg tablet take 1 tablet by mouth nightly 90 Tab 0  
 cyanocobalamin 1,000 mcg tablet take 1 tablet by mouth once daily 90 Tab 0  
 folic acid (FOLVITE) 1 mg tablet take 1 tablet by mouth once daily 90 Tab 0  
 tamsulosin (FLOMAX) 0.4 mg capsule take 1 capsule by mouth once daily 90 Cap 0  
 metoprolol succinate (TOPROL-XL) 50 mg XL tablet take 1 tablet by mouth nightly (Patient taking differently: Take 50 mg by mouth two (2) times a day. take 1 tablet by mouth nightly) 90 Tab 3  
 allopurinoL (ZYLOPRIM) 300 mg tablet take 1 tablet by mouth once daily 90 Tab 1  
 ascorbic acid, vitamin C, (VITAMIN C) 250 mg tablet Take 1 Tab by mouth two (2) times daily (with meals). 180 Tab 1  
 acetaminophen (TYLENOL) 325 mg tablet Take 2 Tabs by mouth every six (6) hours as needed for Pain or Fever. 90 Tab 2  cholecalciferol (VITAMIN D3) (1000 Units /25 mcg) tablet Take 4 Tabs by mouth daily. 120 Tab 2  
 ferrous sulfate 325 mg (65 mg iron) tablet Take 1 Tab by mouth two (2) times daily (with meals). 60 Tab 2  
 nystatin (MYCOSTATIN) powder Apply  to affected area two (2) times a day. 1 Bottle 0  
 glyBURIDE (DIABETA) 2.5 mg tablet Take 1 Tab by mouth two (2) times daily (with meals). 180 Tab 1 Past History Past Medical History: 
Past Medical History:  
Diagnosis Date  Acute coronary syndrome ([address] 3/2/2020  LOREN (acute kidney injury) ([address] 3/5/2020  Calculus of kidney  Cancer ([address]  of [facility]  Coronary artery disease of native artery with stable angina pectoris ([address] 3/3/2020  Diabetes mellitus ([address] 8/19/2010  Gout 8/19/2010  
 HTN (hypertension) 8/19/2010  Hyperlipemia 8/19/2010  Kidney disease  Lumbar spondylosis  Proteinuria  S/P CABG x 3 03/04/2020 CABG x 3, LIMA to LAD, SVG to OM1, SVG to diagonal 1  
 Skin cancer, basal cell   
 neck  Stage 3 chronic kidney disease [facility]  Type 2 diabetes mellitus without complication, without long-term current use of insulin ([facility] 8/19/2010  Urolithiasis Past Surgical History: 
Past Surgical History:  
Procedure Laterality Date  HX CORONARY ARTERY BYPASS GRAFT  03/04/2020 CABG x3, Dr. Sherry Cervantes, [facility]  
 HX GI    
 HX OTHER SURGICAL Basal cell removed from left side of face  HX UROLOGICAL    
 kidney Family History: 
Family History Problem Relation Age of Onset  Diabetes Mother Social History: 
Social History Tobacco Use  Smoking status: Never Smoker  Smokeless tobacco: Never Used Substance Use Topics  Alcohol use: No  
 Drug use: No  
 
 
Allergies: Allergies Allergen Reactions  Metformin Other (comments) Renal insufficiency Review of Systems Review of Systems Constitutional: Positive for activity change and appetite change. Negative for chills, diaphoresis and fever. HENT: Negative. Negative for congestion, rhinorrhea and sore throat. Eyes: Negative. Negative for pain, discharge and redness. Respiratory: Positive for cough and shortness of breath. Negative for chest tightness and wheezing. Cardiovascular: Negative. Negative for chest pain. Gastrointestinal: Negative. Negative for abdominal pain, constipation, diarrhea, nausea and vomiting. Genitourinary: Negative. Negative for dysuria, flank pain, frequency, hematuria and urgency. Musculoskeletal: Negative. Negative for back pain and neck pain. Skin: Negative. Negative for rash. Neurological: Positive for weakness. Negative for syncope, numbness and headaches. Psychiatric/Behavioral: Negative. All other systems reviewed and are negative. Physical Exam  
 
Visit Vitals /81 Pulse (!) 102 Temp (!) 100.5 °F (38.1 °C) Resp (!) 31 SpO2 92% Physical Exam 
Vitals signs and nursing note reviewed. Constitutional: General: He is not in acute distress. Appearance: He is cachectic. He is ill-appearing and toxic-appearing. He is not diaphoretic. Interventions: Nasal cannula in place. HENT:  
   Head: Normocephalic and atraumatic. Mouth/Throat:  
   Pharynx: No oropharyngeal exudate. Eyes:  
   General: No scleral icterus. Conjunctiva/sclera: Conjunctivae normal.  
   Pupils: Pupils are equal, round, and reactive to light. Neck: Musculoskeletal: Normal range of motion and neck supple. Thyroid: No thyromegaly. Vascular: No hepatojugular reflux or JVD. Trachea: No tracheal deviation. Cardiovascular:  
   Rate and Rhythm: Regular rhythm. Tachycardia present. Pulses: Normal pulses. Radial pulses are 2+ on the right side and 2+ on the left side. Dorsalis pedis pulses are 2+ on the right side and 2+ on the left side. Heart sounds: Normal heart sounds, S1 normal and S2 normal. No murmur. No gallop. No S3 or S4 sounds. Pulmonary:  
   Effort: Tachypnea and accessory muscle usage present. No respiratory distress. Breath sounds: Examination of the right-lower field reveals rhonchi. Examination of the left-lower field reveals rhonchi. Rhonchi present. No decreased breath sounds, wheezing or rales. Abdominal:  
   General: Bowel sounds are normal. There is no distension. Palpations: Abdomen is soft. Abdomen is not rigid. There is no mass. Tenderness: There is no abdominal tenderness. There is no guarding or rebound. Negative signs include Ken's sign and McBurney's sign. Musculoskeletal: Normal range of motion. Comments: Strength 2 out of 5 throughout. Lymphadenopathy:  
   Head:  
   Right side of head: No submental, submandibular, preauricular or occipital adenopathy. Left side of head: No submental, submandibular, preauricular or occipital adenopathy. Cervical: No cervical adenopathy. Upper Body: Right upper body: No supraclavicular adenopathy. Left upper body: No supraclavicular adenopathy. Skin: 
   General: Skin is warm and dry. Findings: No rash. Neurological:  
   Mental Status: He is lethargic. He is not disoriented. GCS: GCS eye subscore is 4. GCS verbal subscore is 5. GCS motor subscore is 6. Cranial Nerves: No cranial nerve deficit. Sensory: No sensory deficit. Coordination: Coordination normal.  
   Gait: Gait normal.  
   Deep Tendon Reflexes: Reflexes are normal and symmetric. Psychiatric:     
   Speech: Speech normal.     
   Behavior: Behavior normal.     
   Thought Content: Thought content normal.     
   Judgment: Judgment normal.  
 
 
 
 
Diagnostic Study Results Labs - Recent Results (from the past 12 hour(s)) POC LACTIC ACID Collection Time: 08/10/20  1:58 PM  
Result Value Ref Range Lactic Acid (POC) 2.44 (HH) 0.40 - 2.00 mmol/L METABOLIC PANEL, COMPREHENSIVE Collection Time: 08/10/20  2:03 PM  
Result Value Ref Range Sodium 135 (L) 136 - 145 mmol/L Potassium 3.8 3.5 - 5.5 mmol/L Chloride 106 100 - 111 mmol/L  
 CO2 22 21 - 32 mmol/L Anion gap 7 3.0 - 18 mmol/L Glucose 196 (H) 74 - 99 mg/dL BUN 53 (H) 7.0 - 18 MG/DL Creatinine 2.21 (H) 0.6 - 1.3 MG/DL  
 BUN/Creatinine ratio 24 (H) 12 - 20 GFR est AA 36 (L) >60 ml/min/1.73m2 GFR est non-AA 29 (L) >60 ml/min/1.73m2 Calcium 7.1 (L) 8.5 - 10.1 MG/DL Bilirubin, total 0.8 0.2 - 1.0 MG/DL  
 ALT (SGPT) 29 16 - 61 U/L  
 AST (SGOT) 44 (H) 10 - 38 U/L Alk. phosphatase 51 45 - 117 U/L Protein, total 5.8 (L) 6.4 - 8.2 g/dL Albumin 1.9 (L) 3.4 - 5.0 g/dL Globulin 3.9 2.0 - 4.0 g/dL A-G Ratio 0.5 (L) 0.8 - 1.7    
CBC WITH AUTOMATED DIFF Collection Time: 08/10/20  2:03 PM  
Result Value Ref Range WBC 15.2 (H) 4.6 - 13.2 K/uL  
 RBC 4.23 (L) 4.70 - 5.50 M/uL  
 HGB 12.6 (L) 13.0 - 16.0 g/dL HCT 38.1 36.0 - 48.0 % MCV 90.1 74.0 - 97.0 FL  
 MCH 29.8 24.0 - 34.0 PG  
 MCHC 33.1 31.0 - 37.0 g/dL  
 RDW 13.7 11.6 - 14.5 % PLATELET 961 375 - 925 K/uL MPV 10.8 9.2 - 11.8 FL  
 NEUTROPHILS 83 (H) 42 - 75 % LYMPHOCYTES 9 (L) 20 - 51 % MONOCYTES 7 2 - 9 % EOSINOPHILS 1 0 - 5 % BASOPHILS 0 0 - 3 %  
 ABS. NEUTROPHILS 12.5 (H) 1.8 - 8.0 K/UL  
 ABS. LYMPHOCYTES 1.4 0.8 - 3.5 K/UL  
 ABS. MONOCYTES 1.1 (H) 0 - 1.0 K/UL  
 ABS. EOSINOPHILS 0.2 0.0 - 0.4 K/UL  
 ABS. BASOPHILS 0.0 0.0 - 0.06 K/UL  
 DF MANUAL PLATELET COMMENTS ADEQUATE PLATELETS    
 RBC COMMENTS ANISOCYTOSIS 1+ 
    
EKG, 12 LEAD, INITIAL Collection Time: 08/10/20  2:11 PM  
Result Value Ref Range Ventricular Rate 98 BPM  
 Atrial Rate 98 BPM  
 P-R Interval 146 ms  
 QRS Duration 78 ms Q-T Interval 398 ms QTC Calculation (Bezet) 508 ms Calculated P Axis 41 degrees Calculated R Axis 42 degrees Calculated T Axis 94 degrees Diagnosis Normal sinus rhythm T wave abnormality, consider lateral ischemia Prolonged QT Abnormal ECG When compared with ECG of 20-MAR-2020 09:43, 
QT has lengthened SARS-COV-2 Collection Time: 08/10/20  3:52 PM  
Result Value Ref Range SARS-CoV-2 PENDING Specimen source Nasopharyngeal    
 Specimen type NP Swab Health status Nasopharyngeal    
 COVID-19 PENDING   
URINALYSIS W/ RFLX MICROSCOPIC Collection Time: 08/10/20  3:53 PM  
Result Value Ref Range Color YELLOW Appearance CLEAR Specific gravity 1.020 1.003 - 1.030    
 pH (UA) 5.5 5.0 - 8.0 Protein >300 (A) NEG mg/dL Glucose Negative NEG mg/dL Ketone Negative NEG mg/dL Bilirubin Negative NEG Blood MODERATE (A) NEG Urobilinogen 0.2 0.2 - 1.0 EU/dL Nitrites Negative NEG Leukocyte Esterase Negative NEG PROTHROMBIN TIME + INR Collection Time: 08/10/20  3:53 PM  
Result Value Ref Range Prothrombin time 14.9 11.5 - 15.2 sec INR 1.2 0.8 - 1.2 PTT Collection Time: 08/10/20  3:53 PM  
Result Value Ref Range aPTT 24.1 23.0 - 36.4 SEC  
D DIMER Collection Time: 08/10/20  3:53 PM  
Result Value Ref Range D DIMER 2.46 (H) <0.46 ug/ml(FEU) URINE MICROSCOPIC ONLY Collection Time: 08/10/20  3:53 PM  
Result Value Ref Range WBC 0 to 3 0 - 4 /hpf  
 RBC 0 to 3 0 - 5 /hpf Epithelial cells 1+ 0 - 5 /lpf Granular cast 0 to 3 NEG /lpf Radiologic Studies -  
XR CHEST PORT Final Result IMPRESSION:   
  
The cardiac enlargement is no longer evident. Patchy opacities at the lateral left lower lung, concerning infiltration rather  
than atelectasis. Recommend clinical correlation and follow-up studies. Thank you for your referral.  
  
 
 
 
Medical Decision Making Provider Notes (Medical Decision Making): MDM Number of Diagnoses or Management Options Pneumonia of left lower lobe due to infectious organism: Suspected COVID-19 virus infection:  
Diagnosis management comments: Sepsis Pneumonia COVID I am the first provider for this patient. I reviewed the vital signs, available nursing notes, past medical history, past surgical history, family history and social history. Vital Signs-Reviewed the patient's vital signs. Records Reviewed: Nursing Notes (Time of Review: 2:17 PM) ED Course: Progress Notes, Reevaluation, and Consults: 
 
Labs essentially normal with the exception of WBC of 15.2, and creatinine 2.21. Lactic of 2.44 Chest X-Ray showed left lower side infiltrates. EKG showed NSR with rate of 98 bpm. With no ST elevations or depression and non specific T wave changes. 4:17 PM 8/10/2020 Consult:  Discussed care with Dr Herberth Vasquez. Hospitalist. Standard discussion; including history of patients chief complaint, available diagnostic results, and treatment course. Reason with admission. Telemetry bed. 
4:25 PM 
 
 
 
Diagnosis Clinical Impression: 1. Pneumonia of left lower lobe due to infectious organism 2. Suspected COVID-19 virus infection Disposition: Admitted Attestation Provider Attestation:    
I personally performed the services described in the documentation, reviewed the documentation and it accurately and completely records my words and actions utilizing the [address] August 10, 2020 at 4:26 PM - Mynor Cody DO Disclaimer. It is dictated using utilizing voice recognition software. Unfortunately this leads to occasional typographical errors. I apologize in advance if the situation occurs. If questions arise please do not hesitate to contact me or call our department.

## 2020-08-10 NOTE — REMOTE MONITORING
Spoke to [employer] regarding sepsis bundle. Mari Valle RN [id number] [facility] Nurse [phone]

## 2020-08-11 PROBLEM — Z20.822 SUSPECTED COVID-19 VIRUS INFECTION: Status: ACTIVE | Noted: 2020-01-01

## 2020-08-11 PROBLEM — A41.9 SEPSIS WITH ACUTE HYPOXIC RESPIRATORY FAILURE (HCC): Status: ACTIVE | Noted: 2020-01-01

## 2020-08-11 PROBLEM — E11.22 TYPE 2 DIABETES MELLITUS WITH CHRONIC KIDNEY DISEASE (HCC): Status: ACTIVE | Noted: 2020-01-01

## 2020-08-11 PROBLEM — J96.01 SEPSIS WITH ACUTE HYPOXIC RESPIRATORY FAILURE (HCC): Status: ACTIVE | Noted: 2020-01-01

## 2020-08-11 PROBLEM — N25.81 SECONDARY HYPERPARATHYROIDISM OF RENAL ORIGIN (HCC): Status: ACTIVE | Noted: 2020-01-01

## 2020-08-11 PROBLEM — N17.9 ACUTE-ON-CHRONIC KIDNEY INJURY (HCC): Status: ACTIVE | Noted: 2020-01-01

## 2020-08-11 PROBLEM — R65.20 SEPSIS WITH ACUTE HYPOXIC RESPIRATORY FAILURE (HCC): Status: ACTIVE | Noted: 2020-01-01

## 2020-08-11 PROBLEM — N18.9 ACUTE-ON-CHRONIC KIDNEY INJURY (HCC): Status: ACTIVE | Noted: 2020-01-01

## 2020-08-11 NOTE — PROGRESS NOTES
Patient admitted earlier today by my colleague. Pneumonia Suspected COVID-19 infection Type 2 diabetes Hypertension Coronary artery disease Dyslipidemia Continue antibiotics. Pulmonary input noted. On dexamethasone. Follow cultures. Droplet plus precautions. COVID-19 test result is pending. Monitor sugars, add sliding scale insulin Mobilize, PT OT

## 2020-08-11 NOTE — CONSULTS
[facility] Pulmonary, Critical Care, and Sleep Medicine Name: Judson Shea MRN: [id number] : 1950 Hospital: [facility] Date: 2020 [facility] Initial Patient Consult Consult requesting physician: Binta Leavitt NP Reason for Consult: cough I have reviewed the flowsheet and notes. Events, vitals, medications and notes from last 24 hours reviewed. Care plan discussed with staff History of Present Illness: 
 
Judson Shea has been seen and evaluated in ICU/ER. Patient is a 65 y.o. male with PMHx significant for HTN and DM with recent MI in 3/2020 and s/p 3V CABG who presented to the Ed for several days of cough. He and his wife had been coughing for several days. Denies sick contacts. Nonsmoker. No history of lung disease. He was previously doing well in cardiac rehab. In the ED, he was found to febrile and mildly hypoxic. CXR with B/L infiltrates. He was started on ABX and admitted as a covid pui. He appears well on presentation. On NC. States his symptoms have improved. Patient Active Problem List  
Diagnosis Code  
 HTN (hypertension) I10  
 Gout M10.9  Hyperlipemia E78.5  Nocturia R35.1  Elevated troponin R79.89  Back pain M54.9  Acute coronary syndrome (HCC) I24.9  S/P cardiac catheterization Z98.890  
 NSTEMI (non-ST elevated myocardial infarction) (HCC) I21.4  
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  
 Secondary hyperparathyroidism of renal origin ([address] N25.81  
 Acute hypoxemic respiratory failure (HCC) J96.01  
  Suspected COVID-19 virus infection Z20.828  Acute-on-chronic kidney injury ([facility] N17.9, N18.9 Assessment: 
Acute Hypoxic Respiratory Failure - 2/2 B/L Pneumonia vs. covid vs. Aspiration - currently on NC Covid19 PUI 
- B/L infiltrates, fevers. Mildly elevated inflammatory markers Hx of recent 3V CABG LOREN on CKD 
- improving. Impression/Plan: - Await covid testing - ABX and steroids (dc if covid negative) - Home O2 evaluation 
- Daily CXR Will follow Review of Systems: A comprehensive review of systems was negative. Medication Reviewed Allergies Allergen Reactions  Metformin Other (comments) Renal insufficiency Past Medical History:  
Diagnosis Date  Acute coronary syndrome ([facility] 3/2/2020  LOREN (acute kidney injury) ([address] 3/5/2020  Calculus of kidney  Cancer ([facility] HX of [facility]  Coronary artery disease of native artery with stable angina pectoris ([facility] 3/3/2020  Diabetes mellitus ([address] 8/19/2010  Gout 8/19/2010  
 HTN (hypertension) 8/19/2010  Hyperlipemia 8/19/2010  Kidney disease  Lumbar spondylosis  Proteinuria  S/P CABG x 3 03/04/2020 CABG x 3, LIMA to LAD, SVG to OM1, SVG to diagonal 1  
 Skin cancer, basal cell   
 neck  Stage 3 chronic kidney disease ([address]  Type 2 diabetes mellitus without complication, without long-term current use of insulin ([address] 8/19/2010  Urolithiasis Past Surgical History:  
Procedure Laterality Date  HX CORONARY ARTERY BYPASS GRAFT  03/04/2020 CABG x3, Dr. Jeni Hernandez, [facility]  
 HX GI    
 HX OTHER SURGICAL Basal cell removed from left side of face  HX UROLOGICAL    
 kidney Social History Tobacco Use  Smoking status: Never Smoker  Smokeless tobacco: Never Used Substance Use Topics  Alcohol use: No  
  
Family History Problem Relation Age of Onset  Diabetes Mother Prior to Admission medications Medication Sig Start Date End Date Taking? Authorizing Provider  
metoprolol succinate (TOPROL-XL) 100 mg tablet Take 1 Tab by mouth nightly. 6/5/20  Yes Provider, Historical  
melatonin 3 mg tablet Take 1 Tab by mouth nightly. Yes Provider, Historical  
aspirin-calcium carbonate 81 mg-300 mg calcium(777 mg) tab Take 81 mg by mouth daily. Yes Provider, Historical  
lisinopriL (PRINIVIL, ZESTRIL) 20 mg tablet Take 1 Tab by mouth two (2) times a day. 6/29/20  Yes Bev Oliva MD  
aspirin delayed-release 81 mg tablet take 1 tablet by mouth once daily 6/5/20  Yes Angie Abdullahi NP  
atorvastatin (LIPITOR) 40 mg tablet take 1 tablet by mouth nightly 6/5/20  Yes Sophie LANGE NP  
cyanocobalamin 1,000 mcg tablet take 1 tablet by mouth once daily 6/5/20  Yes Sophie LANGE NP  
folic acid (FOLVITE) 1 mg tablet take 1 tablet by mouth once daily 6/5/20  Yes Sophie LANGE NP  
tamsulosin [facility]) 0.4 mg capsule take 1 capsule by mouth once daily 6/5/20  Yes Sophie LANGE NP  
allopurinoL (ZYLOPRIM) 300 mg tablet take 1 tablet by mouth once daily 5/11/20  Yes Sophie LANGE NP  
ascorbic acid, vitamin C, (VITAMIN C) 250 mg tablet Take 1 Tab by mouth two (2) times daily (with meals). 5/11/20  Yes Sophie LANGE NP  
acetaminophen (TYLENOL) 325 mg tablet Take 2 Tabs by mouth every six (6) hours as needed for Pain or Fever. 3/13/20  Yes Serenity Gasca PA-C  
cholecalciferol (VITAMIN D3) (1000 Units /25 mcg) tablet Take 4 Tabs by mouth daily. 3/14/20  Yes Serenity Gasca PA-C  
ferrous sulfate 325 mg (65 mg iron) tablet Take 1 Tab by mouth two (2) times daily (with meals). 3/13/20  Yes Serenity Gasca PA-C  
nystatin (MYCOSTATIN) powder Apply  to affected area two (2) times a day. 3/13/20  Yes Serenity Reek, PA-C  
glyBURIDE (DIABETA) 2.5 mg tablet Take 1 Tab by mouth two (2) times daily (with meals).  11/12/19  Yes Angie Abdullahi NP  
 predniSONE (STERAPRED DS) 10 mg dose pack See administration instruction per 10mg dose pack 7/28/20   Marina Lugo MD  
sildenafil citrate (Viagra) 100 mg tablet Take tablet by mouth once daily as needed. 6/29/20   Veda Thomas MD  
 
Current Facility-Administered Medications Medication Dose Route Frequency  ipratropium-albuterol (COMBIVENT RESPIMAT) 20 mcg-100 mcg inhalation spray  2 Puff Inhalation QID RT  
 ascorbic acid (vitamin C) (VITAMIN C) tablet 500 mg  500 mg Oral TID  cholecalciferol (VITAMIN D3) capsule 10,000 Units  10,000 Units Oral DAILY  zinc sulfate (ZINCATE) 220 (50) mg capsule 1 Cap  1 Cap Oral DAILY  [START ON 8/12/2020] dexAMETHasone (DECADRON) tablet 6 mg  6 mg Oral Q12H  
 atorvastatin (LIPITOR) tablet 40 mg  40 mg Oral QHS  folic acid (FOLVITE) tablet 1 mg  1 mg Oral DAILY  melatonin tablet 3 mg  3 mg Oral QHS  metoprolol succinate (TOPROL-XL) tablet 100 mg  100 mg Oral QHS  aspirin chewable tablet 81 mg  81 mg Oral DAILY And  
 calcium carbonate (CALTREX) tablet 300 mg [elemental]  300 mg Oral DAILY  cefTRIAXone (ROCEPHIN) 2 g in sterile water (preservative free) 20 mL IV syringe  2 g IntraVENous Q24H  
 azithromycin (ZITHROMAX) 500 mg in  mL  500 mg IntraVENous Q24H  
 0.9% sodium chloride infusion  150 mL/hr IntraVENous CONTINUOUS  
 sodium chloride (NS) flush 5-40 mL  5-40 mL IntraVENous Q8H  
 senna (SENOKOT) tablet 17.2 mg  2 Tab Oral QHS  famotidine (PF) (PEPCID) 20 mg in 0.9% sodium chloride 10 mL injection  20 mg IntraVENous DAILY  enoxaparin (LOVENOX) injection 30 mg  30 mg SubCUTAneous Q12H Lines: All central lines examined by me. No signs of erythema, induration, discharge. Feeding Tube:                    
  
Peripherally Inserted Central Catheter: 
  
Central Venous Catheter: 
  
Peripheral Intravenous Line: 
Peripheral IV 08/10/20 Right Hand (Active) Site Assessment Clean, dry, & intact 08/11/20 8952 Phlebitis Assessment 0 2039 Infiltration Assessment 0 2039 Dressing Status Clean, dry, & intact 20 Dressing Type Tape;Transparent 20 Hub Color/Line Status Pink 20 Action Taken Blood drawn 08/10/20 1356 Alcohol Cap Used Yes 20 Arterial Line: 
  
Drain(s): 
  
Airway: 
  
 
Objective: 
Vital Signs:   
Visit Vitals /85 (BP 1 Location: Right arm, BP Patient Position: At rest) Pulse 91 Temp 97.8 °F (36.6 °C) Resp 16 Wt 76.3 kg (168 lb 3.4 oz) Comment: 2020 SpO2 96% BMI 26.35 kg/m² O2 Device: Nasal cannula O2 Flow Rate (L/min): 4 l/min Temp (24hrs), Av.5 °F (36.9 °C), Min:97.3 °F (36.3 °C), Max:100.2 °F (37.9 °C) Intake/Output:  
Last shift:      701 - 1900 In: 1200 [I.V.:1200] Out: 1400 [GGHRQ:8389] Intake/Output Summary (Last 24 hours) at 2020 1640 Last data filed at 2020 1536 Gross per 24 hour Intake 3015 ml Output 1400 ml Net 1615 ml Physical Exam:  
General/Neurology: Alert, Awake, Head:   Normocephalic, without obvious abnormality Eye:   PERRL, EOM intact, no scleral icterus, no pallor Oral:   Mucus membranes moist 
Neck:   Supple Lung:   B/l air entry is fair Heart:   Regular rate & rhythm. S1 S2 present. Abdomen: Soft, non tender, BS+nt Extremities:  No pedal edema Skin:   Dry, intact Data: 
   
Recent Results (from the past 24 hour(s)) POC LACTIC ACID Collection Time: 08/10/20  5:41 PM  
Result Value Ref Range Lactic Acid (POC) 1.37 0.40 - 2.00 mmol/L  
CBC WITH AUTOMATED DIFF Collection Time: 20  2:58 AM  
Result Value Ref Range WBC 11.5 4.6 - 13.2 K/uL  
 RBC 4.07 (L) 4.70 - 5.50 M/uL  
 HGB 12.2 (L) 13.0 - 16.0 g/dL HCT 36.8 36.0 - 48.0 % MCV 90.4 74.0 - 97.0 FL  
 MCH 30.0 24.0 - 34.0 PG  
 MCHC 33.2 31.0 - 37.0 g/dL  
 RDW 14.0 11.6 - 14.5 % PLATELET 332 311 - 746 K/uL  MPV 10.7 9.2 - 11.8 FL  
 NEUTROPHILS 88 (H) 42 - 75 % LYMPHOCYTES 7 (L) 20 - 51 % MONOCYTES 4 2 - 9 % EOSINOPHILS 1 0 - 5 % BASOPHILS 0 0 - 3 %  
 ABS. NEUTROPHILS 10.1 (H) 1.8 - 8.0 K/UL  
 ABS. LYMPHOCYTES 0.8 0.8 - 3.5 K/UL  
 ABS. MONOCYTES 0.5 0 - 1.0 K/UL  
 ABS. EOSINOPHILS 0.1 0.0 - 0.4 K/UL  
 ABS. BASOPHILS 0.0 0.0 - 0.06 K/UL  
 DF MANUAL PLATELET COMMENTS ADEQUATE PLATELETS    
 RBC COMMENTS NORMOCYTIC, NORMOCHROMIC METABOLIC PANEL, COMPREHENSIVE Collection Time: 08/11/20  2:58 AM  
Result Value Ref Range Sodium 141 136 - 145 mmol/L Potassium 2.9 (LL) 3.5 - 5.5 mmol/L Chloride 109 100 - 111 mmol/L  
 CO2 23 21 - 32 mmol/L Anion gap 9 3.0 - 18 mmol/L Glucose 99 74 - 99 mg/dL BUN 43 (H) 7.0 - 18 MG/DL Creatinine 1.95 (H) 0.6 - 1.3 MG/DL  
 BUN/Creatinine ratio 22 (H) 12 - 20 GFR est AA 41 (L) >60 ml/min/1.73m2 GFR est non-AA 34 (L) >60 ml/min/1.73m2 Calcium 8.3 (L) 8.5 - 10.1 MG/DL Bilirubin, total 1.0 0.2 - 1.0 MG/DL  
 ALT (SGPT) 25 16 - 61 U/L  
 AST (SGOT) 34 10 - 38 U/L Alk. phosphatase 64 45 - 117 U/L Protein, total 6.7 6.4 - 8.2 g/dL Albumin 2.2 (L) 3.4 - 5.0 g/dL Globulin 4.5 (H) 2.0 - 4.0 g/dL A-G Ratio 0.5 (L) 0.8 - 1.7 PROTHROMBIN TIME + INR Collection Time: 08/11/20  2:58 AM  
Result Value Ref Range Prothrombin time 14.9 11.5 - 15.2 sec INR 1.2 0.8 - 1.2 PTT Collection Time: 08/11/20  2:58 AM  
Result Value Ref Range aPTT 35.8 23.0 - 36.4 SEC FIBRINOGEN Collection Time: 08/11/20  2:58 AM  
Result Value Ref Range Fibrinogen 777 (H) 210 - 451 mg/dL D DIMER Collection Time: 08/11/20  2:58 AM  
Result Value Ref Range D DIMER 3.72 (H) <0.46 ug/ml(FEU) LACTIC ACID Collection Time: 08/11/20  2:58 AM  
Result Value Ref Range Lactic acid 1.4 0.4 - 2.0 MMOL/L  
MAGNESIUM Collection Time: 08/11/20  2:58 AM  
Result Value Ref Range Magnesium 1.9 1.6 - 2.6 mg/dL PHOSPHORUS  
 Collection Time: 08/11/20  2:58 AM  
Result Value Ref Range Phosphorus 2.1 (L) 2.5 - 4.9 MG/DL FERRITIN Collection Time: 08/11/20  2:58 AM  
Result Value Ref Range Ferritin 841 (H) 8 - 388 NG/ML  
C REACTIVE PROTEIN, QT Collection Time: 08/11/20  2:58 AM  
Result Value Ref Range C-Reactive protein 23.6 (H) 0 - 0.3 mg/dL PROCALCITONIN Collection Time: 08/11/20  2:58 AM  
Result Value Ref Range Procalcitonin 1.91 ng/mL LD Collection Time: 08/11/20  2:58 AM  
Result Value Ref Range  (H) 81 - 234 U/L  
POC G3 Collection Time: 08/11/20  5:25 AM  
Result Value Ref Range Device: NASAL CANNULA Flow rate (POC) 4 L/M  
 FIO2 (POC) 36 % pH (POC) 7.44 7.35 - 7.45    
 pCO2 (POC) 27.4 (L) 35.0 - 45.0 MMHG  
 pO2 (POC) 66 (L) 80 - 100 MMHG  
 HCO3 (POC) 18.5 (L) 22 - 26 MMOL/L  
 sO2 (POC) 94 92 - 97 % Base deficit (POC) 6 mmol/L Allens test (POC) YES Total resp. rate 18 Site RIGHT RADIAL Specimen type (POC) ARTERIAL Performed by [facility] GLUCOSE, POC Collection Time: 08/11/20 10:57 AM  
Result Value Ref Range Glucose (POC) 207 (H) 70 - 110 mg/dL METABOLIC PANEL, BASIC Collection Time: 08/11/20  2:33 PM  
Result Value Ref Range Sodium 140 136 - 145 mmol/L Potassium 4.0 3.5 - 5.5 mmol/L Chloride 110 100 - 111 mmol/L  
 CO2 23 21 - 32 mmol/L Anion gap 7 3.0 - 18 mmol/L Glucose 203 (H) 74 - 99 mg/dL BUN 39 (H) 7.0 - 18 MG/DL Creatinine 1.67 (H) 0.6 - 1.3 MG/DL  
 BUN/Creatinine ratio 23 (H) 12 - 20 GFR est AA 49 (L) >60 ml/min/1.73m2 GFR est non-AA 41 (L) >60 ml/min/1.73m2 Calcium 7.7 (L) 8.5 - 10.1 MG/DL  
PHOSPHORUS Collection Time: 08/11/20  2:33 PM  
Result Value Ref Range Phosphorus 4.4 2.5 - 4.9 MG/DL Chemistry Recent Labs 08/11/20 
1433 08/11/20 
0258 08/10/20 
1403 * 99 196*  141 135* K 4.0 2.9* 3.8  109 106 CO2 23 23 22 BUN 39* 43* 53* CREA 1.67* 1.95* 2.21* CA 7.7* 8.3* 7.1*  
MG  --  1.9  --   
PHOS 4.4 2.1*  --   
AGAP 7 9 7 BUCR 23* 22* 24* AP  --  64 51 TP  --  6.7 5.8* ALB  --  2.2* 1.9*  
GLOB  --  4.5* 3.9 AGRAT  --  0.5* 0.5* CBC w/Diff Recent Labs 08/11/20 
0258 08/10/20 
1403 WBC 11.5 15.2*  
RBC 4.07* 4.23* HGB 12.2* 12.6* HCT 36.8 38.1  239 GRANS 88* 83* LYMPH 7* 9* EOS 1 1 ABG Recent Labs 08/11/20 
1922 PHI 7.44  
PCO2I 27.4*  
PO2I 66* HCO3I 18.5*  
FIO2I 36 Micro Recent Labs 08/10/20 
1403 CULT NO GROWTH AFTER 16 HOURS  NO GROWTH AFTER 16 HOURS Recent Labs 08/10/20 
1403 CULT NO GROWTH AFTER 16 HOURS  NO GROWTH AFTER 16 HOURS  
 
 
CT (Most Recent) Results from [facility] Encounter encounter on 03/02/20 CTA CHEST ABD PELV W WO CONT Narrative CT without contrast and CT angiogram of the chest, abdomen and pelvis HISTORY: Severe acute midthoracic back pain and vomiting. Elevated troponin. Clinical concern of aortic dissection and PE 
 
COMPARISON: None TECHNIQUE: Multidetector helical CT is carried out from the thoracic inlet to 
the lesser trochanters before and after nonionic IV contrast administration. 3D postprocessed images, including surface shaded display, will produce for this 
exam, permanently archived, and interpreted. Parenchymal organ imaging will be 
limited by arterial phase contrast administration. All CT scans at this facility performed using dose optimization techniques as 
appreciated to a performed exam, to include automated exposure control, 
adjustment of the mA and or KU according to patient size (including appropriate 
matching for site specific examination), or use of iterative reconstruction 
technique. CONTRAST: 100 cc Isovue 370. FINDINGS: 
 
CT ANGIOGRAM: The contrast bolus is mainly in the pulmonary arteries with suboptimal opacification to the aorta with somehow limited vascular evaluation. THORACIC AORTA: Patent and normal in caliber. No evidence of aortic dissection 
or aneurysmal dilatation. A few atherosclerotic calcified plaques scattered at 
the arch and descending. The caliber measurements of the aorta: 
 3.4 x 3.9 x 4.4 cm at the sinus of Valsalva; 
 3.4 x 3.4 cm at the sinotubular junction of the aortic root; 
 3.7 x 3.8 cm at maximal ascending aorta; 
 3.3 x 3.3 cm at mid aortic arch after the last vessel takeoff; 
 2.7 x 2.7 cm at proximal descending aorta at the level of left atrium; 
 2.6 x 2.7 cm at the mid descending aorta; 
 2.3 x 2.6 cm at distal descending aorta at the diaphragmatic hiatus. THE GREAT VESSELS IN THE ARCH: Not well opacified by contrast due to the early 
scan as mentioned above. There are grossly patent with normal caliber. No 
significant atherosclerotic disease or stenosis. THE PULMONARY ARTERIES: Patent. No evidence of intraluminal filling defect to 
suggest pulmonary embolism. No pulmonary arterial dilatation. ABDOMINAL AORTA: Normal in caliber. No dissection or aneurysmal dilatation. Mild 
atherosclerotic calcified plaques at the mid and distal portion. ILIAC ARTERIES: Patent. Mild atherosclerotic calcified plaques present without 
stenosis. CELIAC ARTERY: Patent. Few small calcified plaques at the origin without 
stenosis. SMA: THERE IS A FOCAL STENOSIS at the ostium which estimated about 50-75%, 
likely due to soft plaque. RENAL ARTERIES: There are left main and accessory left of renal arteries. Short 
segmental stenosis of proximal left main renal artery is estimated about 75%. Patent left accessory artery. Right main and accessoryrenal arteries appear 
patent. [facility] CT CHEST:  
 
LUNG PARENCHYMA: The lung parenchyma appears clear. No pulmonary nodule, mass or 
infiltration identified. THYROID: Unremarkable. MEDIASTINUM:  No adenopathy. THE HEART AND THE PERICARDIUM: . Unremarkable. PLEURAL SPACES AND CHEST WALL: No significant pleural pathology. CT ABDOMEN AND PELVIS:  
 
ABDOMINAL VISCERA: The liver, spleen, pancreas, gallbladder and both adrenal 
glands appear unremarkable. The stomach is poorly distended. The small and large 
bowel are nondilated. PERITONEUM/RETROPERITONEUM: No significant adenopathy. GENITOURINARY ORGANS: Benign cortical cysts identified in bilateral kidneys, the 
larger one in right kidney in posterior midpole measures 3.2 x 3.5 cm and the 
left renal cyst in lower pole measures 1.9 x 2.2 cm. Two 3 mm nonobstructive 
calculi in the lower pole of right kidney. No hydronephrosis. The bladder 
appears normal. The prostate is moderately enlarged. OTHERS: No free air or free fluid. CT OSSEOUS STRUCTURES:  Moderate spondylosis along the spine and moderate facet 
arthropathy at lower lumbar spine. Impression IMPRESSION:  
 
1. No aortic dissection or aneurysm. Very mild atherosclerotic aortic disease. 2. 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 3. Short segmental stenosis at proximal left the main renal artery, estimated 
about 75%. 4. No evidence of PE. 
 
5. Bilateral renal cysts. Nonobstructive right renal calculi. 6. Moderate prostate enlargement. The preliminary read without 3-D reconstruction was provided to the  the 
ordering physician by me upon the initial interpretation at approximately  1506  
hours, 3/2/20. Thank you for your referral.   
 
 
XR (Most Recent). CXR reviewed by me and compared with previous CXR Results from [facility] Encounter encounter on 08/10/20 XR CHEST PORT Narrative EXAM:  XR CHEST PORT INDICATION:   SOB, covid 19 COMPARISON: 8/10/2020 and priors. FINDINGS: 
Hypoinflation. Within normal cardiac silhouette.  Interval worsening of bilateral 
multifocal infiltrate left greater than right with more confluent consolidation 
in the left lung and patchy opacities in the right lung. No pneumothorax. Stable 
sternal wires and osseous structures. Impression IMPRESSION: 
 
Interval worsening of bilateral multifocal infiltrates with more consolidations 
as discussed. High complexity decision making was performed during the evaluation of this patient at high risk for decompensation with multiple organ involvement Above mentioned total time spent on reviewing the case/medical record/data/notes/EMR/patient examination/documentation/coordinating care with nurse/consultants, exclusive of procedures with complex decision making performed and > 50% time spent in face to face evaluation. Gregg Nunez MD 
8/11/2020

## 2020-08-11 NOTE — ROUTINE PROCESS
Bedside and Verbal shift change report given to [address] [place] [address] (oncoming nurse) by Stephany Morales (offgoing nurse). Report included the following information SBAR, Kardex, Intake/Output, MAR and Recent Results.

## 2020-08-11 NOTE — PROGRESS NOTES
5504- MEWS score r/t [id number] and hr 112. Dr. Navneet Bradford notified HR now 92. She is ordering gases

## 2020-08-11 NOTE — ROUTINE PROCESS
End of Shift Note Bedside and verbal shift change report given to Clayton Bowman RN (On coming nurse) by Cecy Goodwin RN (Off going nurse). Report included the following information:  
   --Procedure Summary 
   --MAR, 
   --Recent Results --Med Rec Status SBAR Recommendations: resp diff Issues for Provider to address Activity This Shift 
 
 [] Bed Rest Order 
 [] Refused 
 [] Dangled  
 [] TDWB Ambulating: 
   [] Bathroom [] BSC [] Room/Hallway Up in Chair for meals []Yes [x] No  
Voiding       [x] Yes  [] No 
Lin          [] Yes  [] No 
Incontinent [] Yes  [] No 
 
DUE TO VOID POUR        [] Yes [] No 
Purewick    [] Yes [] No 
New Onset [] Yes [] No Straight Cath   []Yes  [] No 
Condom Cath  [x] Yes [] No 
MD Called      [] Yes  [] No  
Blood Sugars Managed []Yes [x] No   
Bowels Moved [] Yes [x] No 
 
Incontinent     [] Yes [] No Passed Gas []Yes [] No 
 
New Onset  []Yes [] No 
  
 
 MD Called []Yes  [] No 
  
CHG Bath Done Before Surgery After Surgery  
  
[] Yes  [x] No 
[] Yes  [x] No   
  
Drain Removed [] Yes  [] No [x] N/A Dressing Changed [] Yes   [] No [x] N/A Nausea/Vomiting [x] Yes   [] No    
Ice Packs Changed [] Yes   [] No  [x] N/A Incentive Spirometer  [] Yes  [x] No     
SCD Pumps On Ankle Pumping  [] Yes   [x] No  
  
[] Yes   [x] No    
  
Telemetry Monitoring [x] Yes   [] No   Rhythm STach

## 2020-08-11 NOTE — PROGRESS NOTES
Reason for Admission:   Pneumonia [J18.9] RUR Score:     29% Resources/supports as identified by patient/family:    
 
Top Challenges facing patient (as identified by patient/family and CM): Finances/Medication cost?      
Transportation      Self/wife Support system or lack thereof? Supportive family Living arrangements? With spouse Self-care/ADLs/Cognition? Indep, aaox4 Current Advanced Directive/Advance Care Plan:   no 
                       
Plan for utilizing home health:    yes , freedom of choice signed for [facility]. Likelihood of readmission:   HIGH Transition of Care Plan:               
 
 
Initial assessment completed with patient. Cognitive status of patient: oriented to time, place, person and situation. Face sheet information confirmed:  yes. The patient designates spouse, Snow Tejeda and son Abdias Hidalgo ([phone]) to participate in his discharge plan and to receive any needed information. This patient lives in a single family home with spouse. Patient is able to navigate steps as needed. Prior to hospitalization, patient was considered to be independent with ADLs/IADLS : yes . Patient has a current ACP document on file: no The spouse will be available to transport patient home upon discharge. The patient has no DME available in the home. Patient is not currently active with home health. Patient has not stayed in a skilled nursing facility or rehab. This patient is on dialysis :no 
 
List of available Home Health agencies were provided and reviewed with the patient prior to discharge. Freedom of choice signed: yes, for [address] Currently, the discharge plan is Home with [address] [place] [address] Jonathan Massey. The patient states that he can obtain his medications from the pharmacy, and take his medications as directed. Patient's current insurance is Medicare and Federal BC. Care Management Interventions PCP Verified by CM: Yes Mode of Transport at Discharge: Self Transition of Care Consult (CM Consult): Discharge Planning, Home Health [address]: Yes Current Support Network: Lives with Spouse Confirm Follow Up Transport: Family The Patient and/or Patient Representative was Provided with a Choice of Provider and Agrees with the Discharge Plan?: Yes Freedom of Choice List was Provided with Basic Dialogue that Supports the Patient's Individualized Plan of Care/Goals, Treatment Preferences and Shares the Quality Data Associated with the Providers?: Yes Discharge Location Discharge Placement: Home with home health Debra Speaker RN - Outcomes Manager  [phone]

## 2020-08-11 NOTE — H&P
History & Physical 
 
Patient: Lyla Pollock MRN: [id number]  CSN: [id number] YOB: 1950  Age: 65 y.o. Sex: male DOA: 8/10/2020 HPI:  
 
Lyla Pollock is a 65 y.o. male with a history of diabetes mellitus with chronic renal sufficiency, coronary artery disease s/p CABG, basal cell carcinoma and hypertension. His wife developed an upper respiratory illness with cough weakness 2 weeks prior and he developed increased productive cough with malaise and increasing shortness of breath over 7 days and presented to the [facility] emergency room on 8/10/2020. In emergency room the patient was febrile with temperature 100.5, tachycardic 102, /81, respiratory rate was increased to 31 with a pulse ox of 92%. Lactic acid was 2.44, BUN 53, creatinine 2.21, GFR 36, albumin 1.9, protein 5.8, WBC 15.2, d-dimer 2.46. Chest x-ray with patchy opacities at the lateral left lung. A COVID-19 sample was sent from the emergency room. Patient was admitted to [facility] with sepsis, left lower lobe pneumonia and concern for Covid 19 infection. Past Medical History:  
Diagnosis Date  Acute coronary syndrome ([address] 3/2/2020  LOREN (acute kidney injury) ([address] 3/5/2020  Calculus of kidney  Cancer ([address] [facility] [address] [facility]  Coronary artery disease of native artery with stable angina pectoris ([address] 3/3/2020  Diabetes mellitus ([address] 8/19/2010  Gout 8/19/2010  
 HTN (hypertension) 8/19/2010  Hyperlipemia 8/19/2010  Kidney disease  Lumbar spondylosis  Proteinuria  S/P CABG x 3 03/04/2020 CABG x 3, LIMA to LAD, SVG to OM1, SVG to diagonal 1  
 Skin cancer, basal cell   
 neck  Stage 3 chronic kidney disease ([address]  Type 2 diabetes mellitus without complication, without long-term current use of insulin ([address] 8/19/2010  Urolithiasis Past Surgical History:  
Procedure Laterality Date  HX CORONARY ARTERY BYPASS GRAFT  03/04/2020 CABG x3, Dr. Jeni Hernandez, [facility]  
 HX GI    
 HX OTHER SURGICAL Basal cell removed from left side of face  HX UROLOGICAL    
 kidney Family History Problem Relation Age of Onset  Diabetes Mother Social History Socioeconomic History  Marital status:  Spouse name: Not on file  Number of children: Not on file  Years of education: Not on file  Highest education level: Not on file Tobacco Use  Smoking status: Never Smoker  Smokeless tobacco: Never Used Substance and Sexual Activity  Alcohol use: No  
 Drug use: No  
 Sexual activity: Yes Prior to Admission medications Medication Sig Start Date End Date Taking? Authorizing Provider  
predniSONE (STERAPRED DS) 10 mg dose pack See administration instruction per 10mg dose pack 7/28/20   Marina Lugo MD  
lisinopriL (PRINIVIL, ZESTRIL) 20 mg tablet Take 1 Tab by mouth two (2) times a day. 6/29/20   Veda Thomas MD  
sildenafil citrate (Viagra) 100 mg tablet Take tablet by mouth once daily as needed. 6/29/20   Veda Thomas MD  
aspirin delayed-release 81 mg tablet take 1 tablet by mouth once daily 6/5/20   [facility] Floor B, NP  
atorvastatin (LIPITOR) 40 mg tablet take 1 tablet by mouth nightly 6/5/20   [facility], NP  
cyanocobalamin 1,000 mcg tablet take 1 tablet by mouth once daily 6/5/20   [facility] B, NP  
folic acid (FOLVITE) 1 mg tablet take 1 tablet by mouth once daily 6/5/20   [facility] hospital B, NP  
tamsulosin [facility]) 0.4 mg capsule take 1 capsule by mouth once daily 6/5/20   [facility] hospital B, NP  
metoprolol succinate (TOPROL-XL) 50 mg XL tablet take 1 tablet by mouth nightly Patient taking differently: Take 50 mg by mouth two (2) times a day.  take 1 tablet by mouth nightly 6/1/20   Dulce Lopes Head, NP  
allopurinoL (ZYLOPRIM) 300 mg tablet take 1 tablet by mouth once daily 5/11/20   Kamar Anderson NP  
 ascorbic acid, vitamin C, (VITAMIN C) 250 mg tablet Take 1 Tab by mouth two (2) times daily (with meals). 5/11/20   Mita LANGE, JOSE  
acetaminophen (TYLENOL) 325 mg tablet Take 2 Tabs by mouth every six (6) hours as needed for Pain or Fever. 3/13/20   Jordan De La Rosa PA-C  
cholecalciferol (VITAMIN D3) (1000 Units /25 mcg) tablet Take 4 Tabs by mouth daily. 3/14/20   Jordan De La Rosa PA-C  
ferrous sulfate 325 mg (65 mg iron) tablet Take 1 Tab by mouth two (2) times daily (with meals). 3/13/20   Jacklyn Reynoso PA-C  
nystatin (MYCOSTATIN) powder Apply  to affected area two (2) times a day. 3/13/20   Jordan De La Rosa PA-C  
glyBURIDE (DIABETA) 2.5 mg tablet Take 1 Tab by mouth two (2) times daily (with meals). 11/12/19   Courtney Jaswant, NP Allergies Allergen Reactions  Metformin Other (comments) Renal insufficiency Review of systems A 9 point review of systems was performed and as stated in the HPI Physical Exam:  
  
Visit Vitals /78 (BP Patient Position: At rest) Pulse 93 Temp 97.3 °F (36.3 °C) Resp 28 Wt 76.3 kg (168 lb 3.4 oz) SpO2 93% BMI 26.35 kg/m² Physical Exam: 
GENERAL: fatigued, cooperative, mild distress HEENT head is atraumatic, conjunctiva clear, neck is supple Chest coarse breath sounds with rhonchi both lower fields, heart rate regular 93 Abdomen soft, bowel sounds positive, nontender Extremities generalized weakness, calves are soft, no edema positive pulses Lab/Data Review: 
Labs: Results:  
   
Chemistry Recent Labs 08/10/20 
1403 * * K 3.8  CO2 22 BUN 53* CREA 2.21* CA 7.1* AGAP 7  
BUCR 24* AP 51  
TP 5.8* ALB 1.9*  
GLOB 3.9 AGRAT 0.5* CBC w/Diff Recent Labs 08/10/20 
1403 WBC 15.2*  
RBC 4.23* HGB 12.6* HCT 38.1  GRANS 83* LYMPH 9* EOS 1 Coagulation Recent Labs 08/10/20 
1553 PTP 14.9 INR 1.2 APTT 24.1 Iron/Ferritin No results for input(s): IRON in the last 72 hours. No lab exists for component: TIBCCALC BNP No results for input(s): BNPP in the last 72 hours. Cardiac Enzymes No results for input(s): CPK, CKND1, DION in the last 72 hours. No lab exists for component: Aris Ort Liver Enzymes Recent Labs 08/10/20 
1403 TP 5.8* ALB 1.9* AP 51 Thyroid Studies Lab Results Component Value Date/Time TSH 0.01 (L) 05/19/2020 08:26 AM  
    
 
All Micro Results Procedure Component Value Units Date/Time CULTURE, URINE [[id number]] Collected:  08/10/20 1553 Order Status:  Completed Specimen:  Cath Urine Updated:  08/10/20 2309 CULTURE, BLOOD [[id number]] Collected:  08/10/20 1403 Order Status:  Completed Specimen:  Blood Updated:  08/10/20 1508 CULTURE, BLOOD [[id number]] Collected:  08/10/20 1403 Order Status:  Completed Specimen:  Blood Updated:  08/10/20 1508 Imaging Reviewed: XR Results (most recent): 
Results from [address] [facility] Encounter encounter on 08/10/20 XR CHEST PORT Narrative Chest AP single view CPT CODE: 55960 HISTORY: Productive cough and generalized body weakness. Pneumonia concerned COMPARISON: 3/20/20 FINDINGS: Postop changes from prior median sternotomy again noted. The cardiac 
silhouette is no longer enlarged. The lungs are hypoinflated with new mild 
patchy opacities at the lateral left lower lung. .  The pulmonary vascularity 
and the mediastinum appear unremarkable. Julio Cesar Kurtis Impression IMPRESSION:  
 
The cardiac enlargement is no longer evident. Patchy opacities at the lateral left lower lung, concerning infiltration rather 
than atelectasis. Recommend clinical correlation and follow-up studies. Thank you for your referral.  
 
 
 
Assessment:  
Principal Problem: 
  Pneumonia (8/10/2020) Active Problems: 
  HTN (hypertension) (8/19/2010) S/P CABG x 3 (3/5/2020)     Overview: CABG x 3, Dr. Simone Lawrence, 3/4/2020, [facility] 
 
 Acute hypoxemic respiratory failure ([facility] (8/10/2020) Suspected COVID-19 virus infection (8/10/2020) Acute-on-chronic kidney injury ([facility] (8/10/2020) Plan:  
Admit to [facility] telemetry Add Decadron Add vitamin C, vitamin D, and zinc 
Continue empiric antibiotics Repeat chest film in a.m. Consult pulmonary in a.m. Continue home meds, avoid all nephrotoxins Discussed advanced directives with the patient who wants all full aggressive measures Full code Patient designates his medical decision-maker to be his wife Ty Venegas [phone], patient states that Nancy Katz has been an RN and diabetic educator Laureen Mendoza DO 
8/10/2020, 11:10 PM

## 2020-08-11 NOTE — ROUTINE PROCESS
TRANSFER - OUT REPORT: 
 
Verbal report given to Mandy Beckham RN on Kishan Lira  being transferred to [address] room 506 for routine progression of care Report consisted of patients Situation, Background, Assessment and  
Recommendations(SBAR). Information from the following report(s) ED Summary, MAR and Cardiac Rhythm NSR was reviewed with the receiving nurse. Lines:  
Peripheral IV 08/10/20 Right Hand (Active) Site Assessment Clean, dry, & intact 08/10/20 1356 Phlebitis Assessment 0 08/10/20 1356 Infiltration Assessment 0 08/10/20 1356 Dressing Status Clean, dry, & intact 08/10/20 1356 Dressing Type Transparent;Tape 08/10/20 1356 Hub Color/Line Status Flushed 08/10/20 1356 Action Taken Blood drawn 08/10/20 1356 Opportunity for questions and clarification was provided.    
 
Patient transported with: 
 Monitor and 3L O2 NC

## 2020-08-11 NOTE — ED NOTES
[facility] transport at bedside for transport to [facility] for admission.

## 2020-08-12 NOTE — PROGRESS NOTES
Problem: Self Care Deficits Care Plan (Adult) Goal: *Acute Goals and Plan of Care (Insert Text) Description: Occupational Therapy Goals Initiated 8/12/2020 within 7 day(s). 1.  Patient will perform grooming with modified independence. 2.  Patient will perform upper body dressing with supervision/set-up. 3.  Patient will perform lower body dressing with supervision/set-up. 4.  Patient will perform toilet transfers with supervision/set-up. 5.  Patient will perform all aspects of toileting with supervision/set-up. 6.  Patient will participate in upper extremity therapeutic exercise/activities with modified independence for 8 minutes. 7.  Patient will utilize energy conservation techniques during functional activities with verbal cues. Prior Level of Function: Pt reports he was (I) with basic self-care/ADLs PTA. Pt lives with his wife. Pt does not have any AD/DME at home. Outcome: Progressing Towards Goal 
 OCCUPATIONAL THERAPY EVALUATION Patient: Judson Shea (40 y.o. male) Date: 8/12/2020 Primary Diagnosis: Pneumonia [J18.9] Precautions: Falls; Aspiration ASSESSMENT : 
Upon entering room, pt received semi-reclined in bed, alert, and agreeable to OT eval. Pt seen in conjunction with PT to increase pt participation and to maximize safety of patient and staff members. Based on the objective data described below, the patient presents with decreased strength, decreased activity tolerance, increased fatigue/SOB, decreased dynamic standing balance, decreased ability to safely perform functional transfers and mobility, and decreased independence in ADLs, increasing the need for assistance from others. Pt on O2 via NC. Pt requiring max assist x2 for sup>sit to participate in further self-care. Pt anxious about standing this session, reporting he has had multiple falls PTA, but became agreeable after max encouragement.  Pt requiring min assist x2 to stand with RW in prep for transfers to [facility]; cueing needed for RW management. Pt was returned supine with min assist. Pt able to able to complete toileting using urinal with SBA/setup at bed level. The patient requires skilled OT services to assess safety and increase independence with basic self-care/ADLs, enhancing the patients quality of life by improving their ability to return to OF. At the end of the session, pt left resting comfortably in bed, call bell in reach, with all needs met. Patient will benefit from skilled intervention to address the above impairments. Patient's rehabilitation potential is considered to be Good Factors which may influence rehabilitation potential include:  
[]             None noted []             Mental ability/status [x]             Medical condition []             Home/family situation and support systems []             Safety awareness []             Pain tolerance/management 
[]             Other: PLAN : 
Recommendations and Planned Interventions:  
[x]               Self Care Training                  [x]      Therapeutic Activities [x]               Functional Mobility Training   []      Cognitive Retraining 
[x]               Therapeutic Exercises           [x]      Endurance Activities [x]               Balance Training                    []      Neuromuscular Re-Education []               Visual/Perceptual Training     [x]      Home Safety Training 
[x]               Patient Education                   [x]      Family Training/Education []               Other (comment): Frequency/Duration: Patient will be followed by occupational therapy 1-2 times per day/4-7 days per week to address goals. Discharge Recommendations: Home Health with 24/7 Supervision/Assist vs Rehab pending pt's progress in therapy. Further Equipment Recommendations for Discharge: Shower chair, RW, grab bars to decrease the risk of falls SUBJECTIVE:  
 Patient stated Sherman Catsro fell three times (PTA) OBJECTIVE DATA SUMMARY:  
 
Past Medical History:  
Diagnosis Date Acute coronary syndrome ([facility] 3/2/2020 LOREN (acute kidney injury) ([address] 3/5/2020 Calculus of kidney Cancer ([address] [facility] of [facility] Coronary artery disease of native artery with stable angina pectoris ([facility] 3/3/2020 Diabetes mellitus ([address] 8/19/2010 Gout 8/19/2010 HTN (hypertension) 8/19/2010 Hyperlipemia 8/19/2010 Kidney disease Lumbar spondylosis Proteinuria S/P CABG x 3 03/04/2020 CABG x 3, LIMA to LAD, SVG to OM1, SVG to diagonal 1 Skin cancer, basal cell   
 neck Stage 3 chronic kidney disease ([address] Type 2 diabetes mellitus without complication, without long-term current use of insulin ([facility] 8/19/2010 Urolithiasis Past Surgical History:  
Procedure Laterality Date HX CORONARY ARTERY BYPASS GRAFT  03/04/2020 CABG x3, Dr. Maria L Jackson, [facility]  
 HX GI    
 HX OTHER SURGICAL Basal cell removed from left side of face HX UROLOGICAL    
 kidney Barriers to Learning/Limitations: None Compensate with: visual, verbal, tactile, kinesthetic cues/model Home Situation:  
Home Situation Home Environment: Private residence One/Two Story Residence: Two story Living Alone: No 
Support Systems: Spouse/Significant Other/Partner Patient Expects to be Discharged to[de-identified] Private residence Current DME Used/Available at Home: None 
[]  Right hand dominant   []  Left hand dominant Cognitive/Behavioral Status: 
Neurologic State: Alert Orientation Level: Oriented X4 Cognition: Follows commands Safety/Judgement: Fall prevention Skin: Visible skin appeared intact. Edema: None noted Coordination: BUE Coordination: Generally decreased, functional 
Fine Motor Skills-Upper: Left Intact; Right Intact Gross Motor Skills-Upper: Left Intact; Right Intact Balance: 
Sitting: Impaired; With support Sitting - Static: Good (unsupported) Sitting - Dynamic: Fair (occasional) Standing: Impaired; With support Standing - Static: Good Standing - Dynamic : Fair Strength: BUE Strength: Generally decreased, functional (LUE more decreased than RUE) Tone & Sensation: BUE Tone: Normal 
Sensation: Intact Range of Motion: BUE 
AROM: Generally decreased, functional 
 
Functional Mobility and Transfers for ADLs: 
Bed Mobility: 
Supine to Sit: Maximum assistance;Assist x2 Sit to Supine: Minimal assistance Scooting: Maximum assistance;Assist x2 Transfers: 
Sit to Stand: Minimum assistance;Assist x2 Stand to Sit: Minimum assistance;Assist x2 ADL Assessment:  
Feeding: Setup Oral Facial Hygiene/Grooming: Stand-by assistance Bathing: Moderate assistance Upper Body Dressing: Minimum assistance Lower Body Dressing: Moderate assistance;Maximum assistance Toileting: Moderate assistance ADL Intervention: Lower Body Dressing Assistance Dressing Assistance: Maximum assistance Socks: Maximum assistance Toileting Toileting Assistance: Stand-by assistance;Set-up(using urinal at bed level) Cognitive Retraining Safety/Judgement: Fall prevention Pain: 
Pain level pre-treatment: 0/10 Pain level post-treatment: 0/10 Pain Intervention(s): Medication (see MAR); Rest, Ice, Repositioning Response to intervention: Nurse notified, See doc flow Activity Tolerance:  
Fair Please refer to the flowsheet for vital signs taken during this treatment. After treatment:  
[] Patient left in no apparent distress sitting up in chair 
[x] Patient left in no apparent distress in bed 
[x] Call bell left within reach 
[] Nursing notified 
[] Caregiver present 
[] Bed alarm activated COMMUNICATION/EDUCATION:  
[x] Role of Occupational Therapy in the acute care setting 
[] Home safety education was provided and the patient/caregiver indicated understanding.  
[x] Patient/family have participated as able in goal setting and plan of care. 
[x] Patient/family agree to work toward stated goals and plan of care. [] Patient understands intent and goals of therapy, but is neutral about his/her participation. [] Patient is unable to participate in goal setting and plan of care. Thank you for this referral. 
Sally Harada, MS, OTR/L Time Calculation: 31 mins Eval Complexity: History: MEDIUM Complexity : Expanded review of history including physical, cognitive and psychosocial  history ; Examination: MEDIUM Complexity : 3-5 performance deficits relating to physical, cognitive , or psychosocial skils that result in activity limitations and / or participation restrictions; Decision Making:MEDIUM Complexity : Patient may present with comorbidities that affect occupational performnce. Miniml to moderate modification of tasks or assistance (eg, physical or verbal ) with assesment(s) is necessary to enable patient to complete evaluation

## 2020-08-12 NOTE — PROGRESS NOTES
Problem: Falls - Risk of 
Goal: *Absence of Falls Description: Document Yordy Lou Fall Risk and appropriate interventions in the flowsheet. Outcome: Progressing Towards Goal 
Note: Fall Risk Interventions: 
Mobility Interventions: Assess mobility with egress test, Communicate number of staff needed for ambulation/transfer, Patient to call before getting OOB, PT Consult for mobility concerns Medication Interventions: Assess postural VS orthostatic hypotension, Evaluate medications/consider consulting pharmacy, Patient to call before getting OOB, Teach patient to arise slowly Elimination Interventions: Call light in reach, Patient to call for help with toileting needs, Toilet paper/wipes in reach, Toileting schedule/hourly rounds History of Falls Interventions: Consult care management for discharge planning, Evaluate medications/consider consulting pharmacy, Room close to nurse's station Problem: Patient Education: Go to Patient Education Activity Goal: Patient/Family Education Outcome: Progressing Towards Goal 
  
Problem: Pressure Injury - Risk of 
Goal: *Prevention of pressure injury Description: Document Tr Scale and appropriate interventions in the flowsheet. Outcome: Progressing Towards Goal 
Note: Pressure Injury Interventions: 
  
 
Moisture Interventions: Absorbent underpads, Assess need for specialty bed, Check for incontinence Q2 hours and as needed, Internal/External urinary devices Activity Interventions: Assess need for specialty bed, Increase time out of bed, Pressure redistribution bed/mattress(bed type), PT/OT evaluation Mobility Interventions: Assess need for specialty bed, HOB 30 degrees or less, Pressure redistribution bed/mattress (bed type), PT/OT evaluation Nutrition Interventions: Document food/fluid/supplement intake, Offer support with meals,snacks and hydration Friction and Shear Interventions: Apply protective barrier, creams and emollients, Foam dressings/transparent film/skin sealants, HOB 30 degrees or less Problem: Patient Education: Go to Patient Education Activity Goal: Patient/Family Education Outcome: Progressing Towards Goal

## 2020-08-12 NOTE — PROGRESS NOTES
[facility] Hospitalist Group Progress Note Patient: Nadeem Christianson Age: 65 y.o. : 1950 MR#: [id number] SSN: [id number] Date/Time: 2020 Subjective:  
 
Patient is laying in bed in no apparent distress, awake, has some dyspnea on exertion, denies chest pain Assessment/Plan:  
 
Pneumonia Suspected COVID-19 infection Type 2 diabetes Hypertension Coronary artery disease Dyslipidemia Acute kidney injury, present on admission, on top of chronic kidney disease stage II. Patient's baseline creatinine appears to be around 1.2 Plan Continue antibiotics, follow cultures Pulmonary is following COVID-19 test result is pending, continue droplet plus precautions, on steroids Continue aspirin and statin and beta-blocker Monitor renal function, check urine studies, check renal ultrasound Discussed with patient Palliative care consult PT OT Dispositionbased on PT OT recommendations I offered to call patient's family but patient declined and stated he would let his family know himself. Case discussed with:  [x]Patient  []Family  [x]Nursing  []Case Management DVT Prophylaxis:  [x]Lovenox  []Hep SQ  []SCDs  []Coumadin   []On Heparin gtt Objective:  
VS:  
Visit Vitals /87 Pulse 83 Temp 97.7 °F (36.5 °C) Resp 18 Wt 76.3 kg (168 lb 3.4 oz) Comment: 2020 SpO2 96% BMI 26.35 kg/m² Tmax/24hrs: Temp (24hrs), Av.7 °F (36.5 °C), Min:97 °F (36.1 °C), Max:98.1 °F (36.7 °C) Input/Output:  
 
Intake/Output Summary (Last 24 hours) at 2020 Last data filed at 2020 1734 Gross per 24 hour Intake 3556.25 ml Output 400 ml Net 3156.25 ml General:  Awake, alert Cardiovascular:  S1S2+, RRR Pulmonary: Coarse breath sounds bilaterally GI:  Soft, BS+, NT, ND Extremities:  trace edema Labs:   
Recent Results (from the past 24 hour(s)) GLUCOSE, POC Collection Time: 20 10:51 PM  
Result Value Ref Range Glucose (POC) 184 (H) 70 - 110 mg/dL CBC WITH AUTOMATED DIFF Collection Time: 08/12/20  2:21 AM  
Result Value Ref Range WBC 10.6 4.6 - 13.2 K/uL  
 RBC 3.57 (L) 4.70 - 5.50 M/uL  
 HGB 10.6 (L) 13.0 - 16.0 g/dL HCT 32.3 (L) 36.0 - 48.0 % MCV 90.5 74.0 - 97.0 FL  
 MCH 29.7 24.0 - 34.0 PG  
 MCHC 32.8 31.0 - 37.0 g/dL  
 RDW 14.1 11.6 - 14.5 % PLATELET 461 970 - 333 K/uL MPV 10.5 9.2 - 11.8 FL  
 NEUTROPHILS 95 (H) 42 - 75 % LYMPHOCYTES 3 (L) 20 - 51 % MONOCYTES 2 2 - 9 % EOSINOPHILS 0 0 - 5 % BASOPHILS 0 0 - 3 %  
 ABS. NEUTROPHILS 10.1 (H) 1.8 - 8.0 K/UL  
 ABS. LYMPHOCYTES 0.3 (L) 0.8 - 3.5 K/UL  
 ABS. MONOCYTES 0.2 0 - 1.0 K/UL  
 ABS. EOSINOPHILS 0.0 0.0 - 0.4 K/UL  
 ABS. BASOPHILS 0.0 0.0 - 0.06 K/UL  
 DF MANUAL PLATELET COMMENTS ADEQUATE PLATELETS    
 RBC COMMENTS NORMOCYTIC, NORMOCHROMIC METABOLIC PANEL, COMPREHENSIVE Collection Time: 08/12/20  2:21 AM  
Result Value Ref Range Sodium 144 136 - 145 mmol/L Potassium 3.8 3.5 - 5.5 mmol/L Chloride 114 (H) 100 - 111 mmol/L  
 CO2 24 21 - 32 mmol/L Anion gap 6 3.0 - 18 mmol/L Glucose 116 (H) 74 - 99 mg/dL BUN 40 (H) 7.0 - 18 MG/DL Creatinine 1.65 (H) 0.6 - 1.3 MG/DL  
 BUN/Creatinine ratio 24 (H) 12 - 20 GFR est AA 50 (L) >60 ml/min/1.73m2 GFR est non-AA 41 (L) >60 ml/min/1.73m2 Calcium 7.8 (L) 8.5 - 10.1 MG/DL Bilirubin, total 0.5 0.2 - 1.0 MG/DL  
 ALT (SGPT) 22 16 - 61 U/L  
 AST (SGOT) 22 10 - 38 U/L Alk. phosphatase 51 45 - 117 U/L Protein, total 5.6 (L) 6.4 - 8.2 g/dL Albumin 1.6 (L) 3.4 - 5.0 g/dL Globulin 4.0 2.0 - 4.0 g/dL A-G Ratio 0.4 (L) 0.8 - 1.7 PROTHROMBIN TIME + INR Collection Time: 08/12/20  2:21 AM  
Result Value Ref Range Prothrombin time 14.9 11.5 - 15.2 sec INR 1.2 0.8 - 1.2 PTT Collection Time: 08/12/20  2:21 AM  
Result Value Ref Range aPTT 39.5 (H) 23.0 - 36.4 SEC FIBRINOGEN  Collection Time: 08/12/20  2:21 AM  
 Result Value Ref Range Fibrinogen 648 (H) 210 - 451 mg/dL D DIMER Collection Time: 08/12/20  2:21 AM  
Result Value Ref Range D DIMER 3.14 (H) <0.46 ug/ml(FEU) FERRITIN Collection Time: 08/12/20  2:21 AM  
Result Value Ref Range Ferritin 782 (H) 8 - 388 NG/ML  
C REACTIVE PROTEIN, QT Collection Time: 08/12/20  2:21 AM  
Result Value Ref Range C-Reactive protein 17.3 (H) 0 - 0.3 mg/dL LD Collection Time: 08/12/20  2:21 AM  
Result Value Ref Range  (H) 81 - 234 U/L  
PROCALCITONIN Collection Time: 08/12/20  2:21 AM  
Result Value Ref Range Procalcitonin 1.47 ng/mL HEMOGLOBIN A1C WITH EAG Collection Time: 08/12/20  2:21 AM  
Result Value Ref Range Hemoglobin A1c 6.6 (H) 4.2 - 5.6 % Est. average glucose 143 mg/dL GLUCOSE, POC Collection Time: 08/12/20  4:59 AM  
Result Value Ref Range Glucose (POC) 135 (H) 70 - 110 mg/dL GLUCOSE, POC Collection Time: 08/12/20 12:27 PM  
Result Value Ref Range Glucose (POC) 179 (H) 70 - 110 mg/dL GLUCOSE, POC Collection Time: 08/12/20  5:24 PM  
Result Value Ref Range Glucose (POC) 206 (H) 70 - 110 mg/dL Additional Data Reviewed:   
 
Signed By: Mariano Favre, MD   
 August 12, 2020

## 2020-08-12 NOTE — PROGRESS NOTES
[facility] Pulmonary, Critical Care, and Sleep Medicine Pulmonary Medicine Progress Note Name: Nadeem Christianson MRN: [id number] : 1950 Hospital: [facility] Date: 2020 Subjective: 
Pt states he feels about the same. Denies worsening dyspnea. Cough without production. Nurse increased to 6L NC. Patient Active Problem List  
Diagnosis Code  
 HTN (hypertension) I10  
 Gout M10.9  Hyperlipemia E78.5  Nocturia R35.1  Elevated troponin R79.89  Back pain M54.9  Acute coronary syndrome (HCC) I24.9  S/P cardiac catheterization Z98.890  
 NSTEMI (non-ST elevated myocardial infarction) ([facility]) I21.4  
 S/P CABG x 3 Z95.1  Stage 3 chronic kidney disease (HCC) N18.3  LOREN (acute kidney injury) ([facility] [address] N17.9  Atherosclerosis of native coronary artery of native heart without angina pectoris I25.10  Urinary retention R33.9  Moderate protein-calorie malnutrition ([facility]) E44.0  Pneumonia J18.9  Type 2 diabetes mellitus with chronic kidney disease ([facility]) E11.22  
 Secondary hyperparathyroidism of renal origin ([facility] [address] N25.81  
 Acute hypoxemic respiratory failure ([facility]) J96.01  
 Suspected COVID-19 virus infection Z20.828  Acute-on-chronic kidney injury ([facility] [address] N17.9, N18.9 Assessment: 
Acute Hypoxic Respiratory Failure - 2/2 B/L Pneumonia vs. covid vs. Aspiration - currently on NC Covid19 PUI 
- B/L infiltrates, fevers. Mildly elevated inflammatory markers Hx of recent 3V CABG LOREN on CKD 
- improving.  
  
Impression/Plan: - Await covid testing - ABX and steroids (dc if covid negative) - Home O2 evaluation - Cr for tomorrow, FiO2 to keep SpO2 >=92%, HOB >=30 degree, aspiration precautions, aggressive pulmonary toileting, incentive spirometry. Other issues management by primary team and respective consultants. Events and notes from last 24 hours reviewed. Discussed with patient and family, answered all questions to their satisfaction. Care plan discussed with nursing. Labs and images personally seen and available reports reviewed All current medicines are reviewed Medications- Current: 
Current Facility-Administered Medications Medication Dose Route Frequency  ipratropium-albuterol (COMBIVENT RESPIMAT) 20 mcg-100 mcg inhalation spray  2 Puff Inhalation QID RT  
 ascorbic acid (vitamin C) (VITAMIN C) tablet 500 mg  500 mg Oral TID  zinc sulfate (ZINCATE) 220 (50) mg capsule 1 Cap  1 Cap Oral DAILY  dexAMETHasone (DECADRON) tablet 6 mg  6 mg Oral Q12H  
 atorvastatin (LIPITOR) tablet 40 mg  40 mg Oral QHS  folic acid (FOLVITE) tablet 1 mg  1 mg Oral DAILY  melatonin tablet 3 mg  3 mg Oral QHS  metoprolol succinate (TOPROL-XL) tablet 100 mg  100 mg Oral QHS  aspirin chewable tablet 81 mg  81 mg Oral DAILY And  
 calcium carbonate (CALTREX) tablet 300 mg [elemental]  300 mg Oral DAILY  cholecalciferol (VITAMIN D3) capsule 5,000 Units  5,000 Units Oral DAILY  insulin lispro (HUMALOG) injection   SubCUTAneous AC&HS  
 glucose chewable tablet 16 g  4 Tab Oral PRN  
 glucagon (GLUCAGEN) injection 1 mg  1 mg IntraMUSCular PRN  
 dextrose (D50W) injection syrg 12.5-25 g  25-50 mL IntraVENous PRN  
 sodium chloride (NS) flush 5-10 mL  5-10 mL IntraVENous PRN  
 cefTRIAXone (ROCEPHIN) 2 g in sterile water (preservative free) 20 mL IV syringe  2 g IntraVENous Q24H  
 azithromycin (ZITHROMAX) 500 mg in  mL  500 mg IntraVENous Q24H  
 acetaminophen (TYLENOL) tablet 650 mg  650 mg Oral Q6H PRN Or  
 acetaminophen (TYLENOL) suppository 650 mg  650 mg Rectal Q6H PRN  
 0.9% sodium chloride infusion  75 mL/hr IntraVENous CONTINUOUS  
 sodium chloride (NS) flush 5-40 mL  5-40 mL IntraVENous Q8H  
 sodium chloride (NS) flush 5-40 mL  5-40 mL IntraVENous PRN  
 senna (SENOKOT) tablet 17.2 mg  2 Tab Oral QHS  bisacodyL (DULCOLAX) suppository 10 mg  10 mg Rectal DAILY PRN  
 ondansetron (ZOFRAN) injection 4 mg  4 mg IntraVENous Q4H PRN  
 famotidine (PF) (PEPCID) 20 mg in 0.9% sodium chloride 10 mL injection  20 mg IntraVENous DAILY  enoxaparin (LOVENOX) injection 30 mg  30 mg SubCUTAneous Q12H Objective: 
Vital Signs:   
Visit Vitals /78 Pulse (!) 102 Temp 97.6 °F (36.4 °C) Resp 18 Wt 76.3 kg (168 lb 3.4 oz) Comment: 2020 SpO2 92% BMI 26.35 kg/m² O2 Device: Nasal cannula O2 Flow Rate (L/min): 6 l/min Temp (24hrs), Av.7 °F (36.5 °C), Min:97 °F (36.1 °C), Max:98.1 °F (36.7 °C) Intake/Output:  
Last shift:       07 -  1900 In: 360 [P.O.:360] Out: 200 [Urine:200] Last 3 shifts: 08/10 190 -  0700 In: 5331.3 [P.O.:2040; I.V.:3291.3] Out: 1600 [Urine:1600] Intake/Output Summary (Last 24 hours) at 2020 1531 Last data filed at 2020 1323 Gross per 24 hour Intake 4876.25 ml Output 900 ml Net 3976.25 ml Physical Exam:  
 
General/Neurology: Alert, Awake Head:   Normocephalic, without obvious abnormality Eye:   PERRL, EOM intact Oral:  Mucus membranes moist 
Lung:   B/l air entry fair. No rales. No rhonchi. No wheezing Heart:   S1 S2 present Abdomen:  Soft, non tender, BS+nt Extremities:  No pedal edema. Skin:   Dry, intact Data: 
   
Recent Results (from the past 24 hour(s)) GLUCOSE, POC Collection Time: 20 10:51 PM  
Result Value Ref Range Glucose (POC) 184 (H) 70 - 110 mg/dL CBC WITH AUTOMATED DIFF Collection Time: 20  2:21 AM  
Result Value Ref Range WBC 10.6 4.6 - 13.2 K/uL  
 RBC 3.57 (L) 4.70 - 5.50 M/uL  
 HGB 10.6 (L) 13.0 - 16.0 g/dL HCT 32.3 (L) 36.0 - 48.0 % MCV 90.5 74.0 - 97.0 FL  
 MCH 29.7 24.0 - 34.0 PG  
 MCHC 32.8 31.0 - 37.0 g/dL  
 RDW 14.1 11.6 - 14.5 % PLATELET 692 322 - 270 K/uL MPV 10.5 9.2 - 11.8 FL  
 NEUTROPHILS 95 (H) 42 - 75 % LYMPHOCYTES 3 (L) 20 - 51 % MONOCYTES 2 2 - 9 % EOSINOPHILS 0 0 - 5 % BASOPHILS 0 0 - 3 %  
 ABS. NEUTROPHILS 10.1 (H) 1.8 - 8.0 K/UL  
 ABS. LYMPHOCYTES 0.3 (L) 0.8 - 3.5 K/UL  
 ABS. MONOCYTES 0.2 0 - 1.0 K/UL  
 ABS. EOSINOPHILS 0.0 0.0 - 0.4 K/UL  
 ABS. BASOPHILS 0.0 0.0 - 0.06 K/UL  
 DF MANUAL PLATELET COMMENTS ADEQUATE PLATELETS    
 RBC COMMENTS NORMOCYTIC, NORMOCHROMIC METABOLIC PANEL, COMPREHENSIVE Collection Time: 08/12/20  2:21 AM  
Result Value Ref Range Sodium 144 136 - 145 mmol/L Potassium 3.8 3.5 - 5.5 mmol/L Chloride 114 (H) 100 - 111 mmol/L  
 CO2 24 21 - 32 mmol/L Anion gap 6 3.0 - 18 mmol/L Glucose 116 (H) 74 - 99 mg/dL BUN 40 (H) 7.0 - 18 MG/DL Creatinine 1.65 (H) 0.6 - 1.3 MG/DL  
 BUN/Creatinine ratio 24 (H) 12 - 20 GFR est AA 50 (L) >60 ml/min/1.73m2 GFR est non-AA 41 (L) >60 ml/min/1.73m2 Calcium 7.8 (L) 8.5 - 10.1 MG/DL Bilirubin, total 0.5 0.2 - 1.0 MG/DL  
 ALT (SGPT) 22 16 - 61 U/L  
 AST (SGOT) 22 10 - 38 U/L Alk. phosphatase 51 45 - 117 U/L Protein, total 5.6 (L) 6.4 - 8.2 g/dL Albumin 1.6 (L) 3.4 - 5.0 g/dL Globulin 4.0 2.0 - 4.0 g/dL A-G Ratio 0.4 (L) 0.8 - 1.7 PROTHROMBIN TIME + INR Collection Time: 08/12/20  2:21 AM  
Result Value Ref Range Prothrombin time 14.9 11.5 - 15.2 sec INR 1.2 0.8 - 1.2 PTT Collection Time: 08/12/20  2:21 AM  
Result Value Ref Range aPTT 39.5 (H) 23.0 - 36.4 SEC FIBRINOGEN Collection Time: 08/12/20  2:21 AM  
Result Value Ref Range Fibrinogen 648 (H) 210 - 451 mg/dL D DIMER Collection Time: 08/12/20  2:21 AM  
Result Value Ref Range D DIMER 3.14 (H) <0.46 ug/ml(FEU) FERRITIN Collection Time: 08/12/20  2:21 AM  
Result Value Ref Range Ferritin 782 (H) 8 - 388 NG/ML  
C REACTIVE PROTEIN, QT Collection Time: 08/12/20  2:21 AM  
Result Value Ref Range C-Reactive protein 17.3 (H) 0 - 0.3 mg/dL LD  Collection Time: 08/12/20  2:21 AM  
 Result Value Ref Range  (H) 81 - 234 U/L  
PROCALCITONIN Collection Time: 08/12/20  2:21 AM  
Result Value Ref Range Procalcitonin 1.47 ng/mL HEMOGLOBIN A1C WITH EAG Collection Time: 08/12/20  2:21 AM  
Result Value Ref Range Hemoglobin A1c 6.6 (H) 4.2 - 5.6 % Est. average glucose 143 mg/dL GLUCOSE, POC Collection Time: 08/12/20  4:59 AM  
Result Value Ref Range Glucose (POC) 135 (H) 70 - 110 mg/dL GLUCOSE, POC Collection Time: 08/12/20 12:27 PM  
Result Value Ref Range Glucose (POC) 179 (H) 70 - 110 mg/dL Chemistry Recent Labs 08/12/20 0221 08/11/20 
1433 08/11/20 
0258 08/10/20 
1403 * 203* 99 196*  140 141 135* K 3.8 4.0 2.9* 3.8 * 110 109 106 CO2 24 23 23 22 BUN 40* 39* 43* 53* CREA 1.65* 1.67* 1.95* 2.21* CA 7.8* 7.7* 8.3* 7.1*  
MG  --   --  1.9  --   
PHOS  --  4.4 2.1*  --   
AGAP 6 7 9 7 BUCR 24* 23* 22* 24* AP 51  --  64 51  
TP 5.6*  --  6.7 5.8* ALB 1.6*  --  2.2* 1.9*  
GLOB 4.0  --  4.5* 3.9 AGRAT 0.4*  --  0.5* 0.5* CBC w/Diff Recent Labs 08/12/20 0221 08/11/20 
0258 08/10/20 
1403 WBC 10.6 11.5 15.2*  
RBC 3.57* 4.07* 4.23* HGB 10.6* 12.2* 12.6* HCT 32.3* 36.8 38.1  291 239 GRANS 95* 88* 83* LYMPH 3* 7* 9* EOS 0 1 1 ABG Recent Labs 08/11/20 
3485 PHI 7.44  
PCO2I 27.4*  
PO2I 66* HCO3I 18.5*  
FIO2I 36 Micro Recent Labs 08/10/20 
1553 08/10/20 
1403 CULT No growth (<1,000 CFU/ML) NO GROWTH 2 DAYS  NO GROWTH 2 DAYS Recent Labs 08/10/20 
1553 08/10/20 
1403 CULT No growth (<1,000 CFU/ML) NO GROWTH 2 DAYS  NO GROWTH 2 DAYS  
 
 
CT (Most Recent) Results from [facility] [place] Encounter encounter on 03/02/20 CTA CHEST ABD PELV W WO CONT Narrative CT without contrast and CT angiogram of the chest, abdomen and pelvis HISTORY: Severe acute midthoracic back pain and vomiting. Elevated troponin. Clinical concern of aortic dissection and PE 
 
COMPARISON: None TECHNIQUE: Multidetector helical CT is carried out from the thoracic inlet to 
the lesser trochanters before and after nonionic IV contrast administration. 3D postprocessed images, including surface shaded display, will produce for this 
exam, permanently archived, and interpreted. Parenchymal organ imaging will be 
limited by arterial phase contrast administration. All CT scans at this facility performed using dose optimization techniques as 
appreciated to a performed exam, to include automated exposure control, 
adjustment of the mA and or KU according to patient size (including appropriate 
matching for site specific examination), or use of iterative reconstruction 
technique. CONTRAST: 100 cc Isovue 370. FINDINGS: 
 
CT ANGIOGRAM: The contrast bolus is mainly in the pulmonary arteries with 
suboptimal opacification to the aorta with somehow limited vascular evaluation. THORACIC AORTA: Patent and normal in caliber. No evidence of aortic dissection 
or aneurysmal dilatation. A few atherosclerotic calcified plaques scattered at 
the arch and descending. The caliber measurements of the aorta: 
 3.4 x 3.9 x 4.4 cm at the sinus of Valsalva; 
 3.4 x 3.4 cm at the sinotubular junction of the aortic root; 
 3.7 x 3.8 cm at maximal ascending aorta; 
 3.3 x 3.3 cm at mid aortic arch after the last vessel takeoff; 
 2.7 x 2.7 cm at proximal descending aorta at the level of left atrium; 
 2.6 x 2.7 cm at the mid descending aorta; 
 2.3 x 2.6 cm at distal descending aorta at the diaphragmatic hiatus. THE GREAT VESSELS IN THE ARCH: Not well opacified by contrast due to the early 
scan as mentioned above. There are grossly patent with normal caliber. No 
significant atherosclerotic disease or stenosis. THE PULMONARY ARTERIES: Patent.  No evidence of intraluminal filling defect to 
 suggest pulmonary embolism. No pulmonary arterial dilatation. ABDOMINAL AORTA: Normal in caliber. No dissection or aneurysmal dilatation. Mild 
atherosclerotic calcified plaques at the mid and distal portion. ILIAC ARTERIES: Patent. Mild atherosclerotic calcified plaques present without 
stenosis. CELIAC ARTERY: Patent. Few small calcified plaques at the origin without 
stenosis. SMA: THERE IS A FOCAL STENOSIS at the ostium which estimated about 50-75%, 
likely due to soft plaque. RENAL ARTERIES: There are left main and accessory left of renal arteries. Short 
segmental stenosis of proximal left main renal artery is estimated about 75%. Patent left accessory artery. Right main and accessoryrenal arteries appear 
patent. Naye Sow CT CHEST:  
 
LUNG PARENCHYMA: The lung parenchyma appears clear. No pulmonary nodule, mass or 
infiltration identified. THYROID: Unremarkable. MEDIASTINUM:  No adenopathy. THE HEART AND THE PERICARDIUM: . Unremarkable. PLEURAL SPACES AND CHEST WALL: No significant pleural pathology. CT ABDOMEN AND PELVIS:  
 
ABDOMINAL VISCERA: The liver, spleen, pancreas, gallbladder and both adrenal 
glands appear unremarkable. The stomach is poorly distended. The small and large 
bowel are nondilated. PERITONEUM/RETROPERITONEUM: No significant adenopathy. GENITOURINARY ORGANS: Benign cortical cysts identified in bilateral kidneys, the 
larger one in right kidney in posterior midpole measures 3.2 x 3.5 cm and the 
left renal cyst in lower pole measures 1.9 x 2.2 cm. Two 3 mm nonobstructive 
calculi in the lower pole of right kidney. No hydronephrosis. The bladder 
appears normal. The prostate is moderately enlarged. OTHERS: No free air or free fluid. CT OSSEOUS STRUCTURES:  Moderate spondylosis along the spine and moderate facet 
arthropathy at lower lumbar spine.   
 
  
 Impression IMPRESSION:  
 
 1. No aortic dissection or aneurysm. Very mild atherosclerotic aortic disease. 2. 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 3. Short segmental stenosis at proximal left the main renal artery, estimated 
about 75%. 4. No evidence of PE. 
 
5. Bilateral renal cysts. Nonobstructive right renal calculi. 6. Moderate prostate enlargement. The preliminary read without 3-D reconstruction was provided to the  the 
ordering physician by me upon the initial interpretation at approximately  1506  
hours, 3/2/20. Thank you for your referral.   
 
 
XR (Most Recent). CXR reviewed by me and compared with previous CXR Results from [facility] [place] on 08/10/20 XR CHEST PORT Narrative EXAM:  XR CHEST PORT INDICATION:   SOB, covid 19 COMPARISON: 8/10/2020 and priors. FINDINGS: 
Hypoinflation. Within normal cardiac silhouette. Interval worsening of bilateral 
multifocal infiltrate left greater than right with more confluent consolidation 
in the left lung and patchy opacities in the right lung. No pneumothorax. Stable 
sternal wires and osseous structures. Impression IMPRESSION: 
 
Interval worsening of bilateral multifocal infiltrates with more consolidations 
as discussed. See my orders for details Total care time exclusive of procedures with complex decision making, coordination of care and counseling patient performed and > 50% time spent in face to face evaluation as mentioned above. David Mesa MD 
Critical Care Medicine

## 2020-08-12 NOTE — ROUTINE PROCESS
Received report from [address] [facility] [address]. Pt AAOxself and place, NAD, breathing non labored, on room air, HOB up. IV site clean, dry and intact. IVF going per order. Bed at the lowest level on lock position, call bell w/i reach. Bed alarm on. Bedside and Verbal shift change report given to [id number] [facility] (oncoming nurse) by me (offgoing nurse).  Report included the following information SBAR, Kardex, Intake/Output, MAR, Recent Results and Cardiac Rhythm SR.

## 2020-08-12 NOTE — PROGRESS NOTES
Problem: Mobility Impaired (Adult and Pediatric) Goal: *Acute Goals and Plan of Care (Insert Text) Description: Physical Therapy Goals Initiated 8/12/2020 and to be accomplished within 7 day(s) 1. Patient will move from supine to sit and sit to supine  in bed with modified independence. 2.  Patient will transfer from bed to chair and chair to bed with modified independence using the least restrictive device. 3.  Patient will perform sit to stand with modified independence. 4.  Patient will ambulate with modified independence for 100 feet with the least restrictive device. 5.  Patient will ascend/descend 4 stairs with handrail(s) with contact guard assist 
 
 
PLOF: Patient reports he was independent with self care and functional mobility. Outcome: Progressing Towards Goal 
  
PHYSICAL THERAPY EVALUATION Patient: Madie Benjamin (12 y.o. male) Date: 8/12/2020 Primary Diagnosis: Pneumonia [J18.9] Precautions:   Aspiration, Skin, Fall, Contact ASSESSMENT : 
Based on the objective data described below, the patient presents with decreased strength, impaired balance, decreased endurance, and decreased independence with functional mobility. Patient received semi reclined in bed agreeable to PT evaluation. Patient seen in conjunction with OT to maximize patient participation and mobility. He c/o generalized weakness,  encouragement provided for OOB mobility. Pt performs supine to sit with maximum assistance, he denies dizziness sitting EOB. Sit to stand to RW with minimal assistance x2. He requires minimal assistance to ambulate 3 ft laterally and cues for management of RW. Minimal assistance for stand to sit due to uncontrolled descent. Educated patient to reach back for bed/rail. At end of session, patient returned to bed and left with call bell within reach. Patient will benefit from skilled PT to maximize independence with functional mobility. Patient will benefit from skilled intervention to address the above impairments. Patient's rehabilitation potential is considered to be Good Factors which may influence rehabilitation potential include:  
[]         None noted 
[]         Mental ability/status [x]         Medical condition 
[x]         Home/family situation and support systems 
[x]         Safety awareness 
[]         Pain tolerance/management 
[]         Other: PLAN : 
Recommendations and Planned Interventions:  
[x]           Bed Mobility Training             [x]    Neuromuscular Re-Education 
[x]           Transfer Training                   []    Orthotic/Prosthetic Training 
[x]           Gait Training                          []    Modalities [x]           Therapeutic Exercises           []    Edema Management/Control 
[x]           Therapeutic Activities            []    Family Training/Education 
[x]           Patient Education 
[]           Other (comment): Frequency/Duration: Patient will be followed by physical therapy 1-2 times per day/4-7 days per week to address goals. Discharge Recommendations: Rehab Further Equipment Recommendations for Discharge: shower chair and rolling walker SUBJECTIVE:  
Patient stated Last night was a wild night, I tried to get out of bed.  OBJECTIVE DATA SUMMARY:  
 
Past Medical History:  
Diagnosis Date Acute coronary syndrome ([address] 3/2/2020 LOREN (acute kidney injury) ([address] 3/5/2020 Calculus of kidney Cancer ([address] [facility] Coronary artery disease of native artery with stable angina pectoris ([address] 3/3/2020 Diabetes mellitus ([address] 8/19/2010 Gout 8/19/2010 HTN (hypertension) 8/19/2010 Hyperlipemia 8/19/2010 Kidney disease Lumbar spondylosis Proteinuria S/P CABG x 3 03/04/2020 CABG x 3, LIMA to LAD, SVG to OM1, SVG to diagonal 1 Skin cancer, basal cell   
 neck Stage 3 chronic kidney disease ([address] Type 2 diabetes mellitus without complication, without long-term current use of insulin ([facility] 8/19/2010 Urolithiasis Past Surgical History:  
Procedure Laterality Date HX CORONARY ARTERY BYPASS GRAFT  03/04/2020 CABG x3, Dr. Saw Manjarrez, [facility]  
 HX GI    
 HX OTHER SURGICAL Basal cell removed from left side of face HX UROLOGICAL    
 kidney Barriers to Learning/Limitations: None Compensate with: Visual Cues, Verbal Cues, and Tactile Cues Home Situation: 
Home Situation Home Environment: Private residence One/Two Story Residence: Two story Living Alone: No 
Support Systems: Spouse/Significant Other/Partner Patient Expects to be Discharged to[de-identified] Private residence Current DME Used/Available at Home: None Critical Behavior: 
Neurologic State: Alert Orientation Level: Oriented X4 Cognition: Follows commands Safety/Judgement: Fall prevention Psychosocial 
Patient Behaviors: Calm; Cooperative Purposeful Interaction: Yes Pt Identified Daily Priority: Clinical issues (comment) [employer] Process: Nurture loving kindness; Teaching/learning; Attend basic human needs;Create healing environment Caring Interventions: Reassure; Therapeutic modalities Reassure: Therapeutic listening;Caring rounds Therapeutic Modalities: Humor; Intentional therapeutic touch Strength:   
Strength: Generally decreased, functional 
 
Tone & Sensation:  
Tone: Normal 
Sensation: Intact Range Of Motion: 
AROM: Generally decreased, functional 
  
 
  
Functional Mobility: 
Bed Mobility: 
  
Supine to Sit: Maximum assistance Sit to Supine: Minimum assistance;Assist x1 Scooting: Maximum assistance;Assist x2 Transfers: 
Sit to Stand: Minimum assistance;Assist x2 Stand to Sit: Minimum assistance;Assist x2 Balance:  
Sitting: Impaired; With support Sitting - Static: Good (unsupported) Sitting - Dynamic: Fair (occasional) Standing: Impaired; With support Standing - Static: Fair((+)) Standing - Dynamic : Fair Ambulation/Gait Training: 
Distance (ft): 3 Feet (ft) Assistive Device: Walker, rolling Ambulation - Level of Assistance: Minimal assistance Gait Abnormalities: Decreased step clearance Base of Support: Narrowed Speed/Alba: Slow Interventions: Verbal cues;Manual cues Pain: 
Pain level pre-treatment: 0/10 Pain level post-treatment: 0/10 Pain Intervention(s) : Medication (see MAR); Rest, Ice, Repositioning Response to intervention: Nurse notified, See doc flow Activity Tolerance:  
Fair Please refer to the flowsheet for vital signs taken during this treatment. After treatment:  
[]         Patient left in no apparent distress sitting up in chair 
[x]         Patient left in no apparent distress in bed 
[x]         Call bell left within reach 
[]         Nursing notified 
[]         Caregiver present 
[]         Bed alarm activated 
[]         SCDs applied COMMUNICATION/EDUCATION:  
[x]         Role of Physical Therapy in the acute care setting. [x]         Fall prevention education was provided and the patient/caregiver indicated understanding. [x]         Patient/family have participated as able in goal setting and plan of care. [x]         Patient/family agree to work toward stated goals and plan of care. []         Patient understands intent and goals of therapy, but is neutral about his/her participation. []         Patient is unable to participate in goal setting/plan of care: ongoing with therapy staff. 
[]         Other: Thank you for this referral. 
Sandi Coon, PT Time Calculation: 31 mins Eval Complexity: History: MEDIUM  Complexity : 1-2 comorbidities / personal factors will impact the outcome/ POC Exam:MEDIUM Complexity : 3 Standardized tests and measures addressing body structure, function, activity limitation and / or participation in recreation  Presentation: MEDIUM Complexity : Evolving with changing characteristics  Clinical Decision Making:Medium Complexity    Overall Complexity:MEDIUM

## 2020-08-13 NOTE — CONSULTS
Palliative Medicine Consult Patient Name: Silvia Leventhal YOB: 1950 Date of Initial Consult: 8/13/2020 Reason for Consult: care decisions Requesting Provider: Adriana Simeon MD  
Primary Care Physician: Lee Ramos NP 
  
 SUMMARY:  
Silvia Leventhal is a 65 y.o. with a past history of CAD, CABG x 3, LOREN, DM type 2, HTN, CKD, who was admitted on 8/10/2020 from home with a diagnosis of COVID pneumonia. Current medical issues leading to Palliative Medicine involvement include: support and care decisions. PALLIATIVE DIAGNOSES:  
1. Goals of care discussions 2. COVID-19 3. Pneumonia 4. LOREN PLAN:  
1. Goals of care discussions: Palliative medicine team including ESSENCE Braxton LCSW and I met with patient at patient's bedside. Patient is awake, alert, and in NAD. States he has a headache and needs his condom cath replaced, and his nurse is on her way. Introduced our role and agreed to speak with us regarding goals of care. Medical update provided to patient. He is not aware of known COVID exposure, but he run errands, grocery shops, and goes to [employer] while wearing a mask and practicing social distancing. Support offered as he shares his wife is sick too, but he would not elaborate further regarding her illness. Patient has no AMD on file, and did not wish to complete today, stating he trusts his wife and 2 children Darline Stokes and Stephan Armijo to make medical decisions if he could not. Discussed the benefits and burdens of CPR in the event of cardiopulmonary arrest. Discussed the benefits and burdens of intubation in the event of respiratory distress pre-arrest. Patient states \"I don't think I want those things done. \" Introduced a POST form but did not want to commit to his decisions at this time. He wishes for more time to think about his code status. He is aware that in the absence of firm decisions, he continues to be a full code with full interventions.  Patient aware and agrees. Will continue to follow for support and goals of care discussions. 2. COVID-19: Positive result 8/10/2020. No known exposure, but runs errands, grocery shops, and goes to [employer] while wearing a mask and obeys social distancing guidelines. Per patient, wife is sick too. On antibiotics and steroids. 3. Pneumonia: See number 2. Bilateral infiltrates. On NC at 4 lpm now. Dyspnea on exertion. Nonproductive cough. 4. LOREN: Known history of chronic kidney disease stage II. Monitoring by attending. 5. Initial consult note routed to primary continuity provider 6. Communicated plan of care with: Palliative IDT, patient, chart GOALS OF CARE / TREATMENT PREFERENCES:  
[====Goals of Care====] GOALS OF CARE: Full code with full interventions Patient/Health Care Proxy Stated Goals: Prolong life TREATMENT PREFERENCES:  
Code Status: Full Code Advance Care Planning: 
Advance Care Planning 8/10/2020 Patient's Healthcare Decision Maker is: Legal Next of Kin Confirm Advance Directive None Patient Would Like to Complete Advance Directive - Does the patient have other document types - Medical Interventions: Full interventions The palliative care team has discussed with patient / health care proxy about goals of care / treatment preferences for patient. 
[====Goals of Care====] HISTORY:  
 
History obtained from: patient, chart CHIEF COMPLAINT: headache HPI/SUBJECTIVE: The patient is:  
[x] Verbal and participatory [] Non-participatory due to:  
oriented Clinical Pain Assessment (nonverbal scale for severity on nonverbal patients):  
Clinical Pain Assessment Severity: 3 FUNCTIONAL ASSESSMENT:  
 
Palliative Performance Scale (PPS): PPS: 60 PSYCHOSOCIAL/SPIRITUAL SCREENING:  
 
Advance Care Planning: 
Advance Care Planning 8/10/2020 Patient's Healthcare Decision Maker is: Legal Next of Kin Confirm Advance Directive None Patient Would Like to Complete Advance Directive - Does the patient have other document types - Any spiritual / Druze concerns: no 
[] Yes /  [] No 
 
Caregiver Burnout: 
[] Yes /  [] No /  [x] No Caregiver Present Anticipatory grief assessment:  
[x] Normal  / [] Maladaptive REVIEW OF SYSTEMS:  
 
Positive and pertinent negative findings in ROS are noted above in HPI. The following systems were [x] reviewed / [] unable to be reviewed as noted in HPI Other findings are noted below. Systems: constitutional, ears/nose/mouth/throat, respiratory, gastrointestinal, genitourinary, musculoskeletal, integumentary, neurologic, psychiatric, endocrine. Positive findings noted below. Modified ESAS Completed by: provider Pain: 3(head) Anxiety: 0 Nausea: 0 Dyspnea: 0 Constipation: No  
  Stool Occurrence(s): 1 PHYSICAL EXAM:  
 
From RN flowsheet: 
Wt Readings from Last 3 Encounters:  
08/10/20 76.3 kg (168 lb 3.4 oz) 07/30/20 76.3 kg (168 lb 4.8 oz)  
07/28/20 78.5 kg (173 lb) Blood pressure 139/76, pulse 70, temperature 96.9 °F (36.1 °C), resp. rate 17, weight 76.3 kg (168 lb 3.4 oz), SpO2 92 %. Pain Scale 1: Numeric (0 - 10) Pain Intensity 1: 0 Constitutional: Awake, alert, NAD Eyes: pupils equal, anicteric ENMT: no nasal discharge, moist mucous membranes Cardiovascular: regular rhythm, distal pulses intact Respiratory: breathing not labored, symmetric, on 4 lpm via NC Musculoskeletal: no deformity, no tenderness to palpation Skin: warm, dry Neurologic: following commands, moving all extremities Psychiatric: full affect, no hallucinations HISTORY:  
 
Principal Problem: 
  Pneumonia (8/10/2020) Active Problems: 
  HTN (hypertension) (8/19/2010) S/P CABG x 3 (3/5/2020) Overview: CABG x 3, Dr. Derrick Daily, 3/4/2020, [facility] Acute hypoxemic respiratory failure [address] (8/10/2020) Suspected COVID-19 virus infection (8/10/2020) Acute-on-chronic kidney injury ([facility] (8/10/2020) Past Medical History:  
Diagnosis Date  Acute coronary syndrome ([facility] 3/2/2020  LOREN (acute kidney injury) ([facility] 3/5/2020  Calculus of kidney  Cancer ([facility] HX of [facility]  Coronary artery disease of native artery with stable angina pectoris ([facility] 3/3/2020  Diabetes mellitus ([facility] 8/19/2010  Gout 8/19/2010  
 HTN (hypertension) 8/19/2010  Hyperlipemia 8/19/2010  Kidney disease  Lumbar spondylosis  Proteinuria  S/P CABG x 3 03/04/2020 CABG x 3, LIMA to LAD, SVG to OM1, SVG to diagonal 1  
 Skin cancer, basal cell   
 neck  Stage 3 chronic kidney disease ([facility]  Type 2 diabetes mellitus without complication, without long-term current use of insulin ([facility] 8/19/2010  Urolithiasis Past Surgical History:  
Procedure Laterality Date  HX CORONARY ARTERY BYPASS GRAFT  03/04/2020 CABG x3, Dr. Stephanie Nguyen, [facility]  
 HX GI    
 HX OTHER SURGICAL Basal cell removed from left side of face  HX UROLOGICAL    
 kidney Family History Problem Relation Age of Onset  Diabetes Mother History reviewed, no pertinent family history. Social History Tobacco Use  Smoking status: Never Smoker  Smokeless tobacco: Never Used Substance Use Topics  Alcohol use: No  
 
Allergies Allergen Reactions  Metformin Other (comments) Renal insufficiency Current Facility-Administered Medications Medication Dose Route Frequency  ipratropium-albuterol (COMBIVENT RESPIMAT) 20 mcg-100 mcg inhalation spray  2 Puff Inhalation Q4H PRN  
 methylPREDNISolone (PF) (SOLU-MEDROL) injection 60 mg  60 mg IntraVENous Q12H  
 ascorbic acid (vitamin C) (VITAMIN C) tablet 500 mg  500 mg Oral TID  zinc sulfate (ZINCATE) 220 (50) mg capsule 1 Cap  1 Cap Oral DAILY  atorvastatin (LIPITOR) tablet 40 mg  40 mg Oral QHS  folic acid (FOLVITE) tablet 1 mg  1 mg Oral DAILY  melatonin tablet 3 mg  3 mg Oral QHS  metoprolol succinate (TOPROL-XL) tablet 100 mg  100 mg Oral QHS  aspirin chewable tablet 81 mg  81 mg Oral DAILY And  
 calcium carbonate (CALTREX) tablet 300 mg [elemental]  300 mg Oral DAILY  cholecalciferol (VITAMIN D3) capsule 5,000 Units  5,000 Units Oral DAILY  insulin lispro (HUMALOG) injection   SubCUTAneous AC&HS  
 glucose chewable tablet 16 g  4 Tab Oral PRN  
 glucagon (GLUCAGEN) injection 1 mg  1 mg IntraMUSCular PRN  
 dextrose (D50W) injection syrg 12.5-25 g  25-50 mL IntraVENous PRN  
 sodium chloride (NS) flush 5-10 mL  5-10 mL IntraVENous PRN  
 cefTRIAXone (ROCEPHIN) 2 g in sterile water (preservative free) 20 mL IV syringe  2 g IntraVENous Q24H  
 azithromycin (ZITHROMAX) 500 mg in  mL  500 mg IntraVENous Q24H  
 acetaminophen (TYLENOL) tablet 650 mg  650 mg Oral Q6H PRN Or  
 acetaminophen (TYLENOL) suppository 650 mg  650 mg Rectal Q6H PRN  
 0.9% sodium chloride infusion  60 mL/hr IntraVENous CONTINUOUS  
 sodium chloride (NS) flush 5-40 mL  5-40 mL IntraVENous Q8H  
 sodium chloride (NS) flush 5-40 mL  5-40 mL IntraVENous PRN  
 senna (SENOKOT) tablet 17.2 mg  2 Tab Oral QHS  bisacodyL (DULCOLAX) suppository 10 mg  10 mg Rectal DAILY PRN  
 ondansetron (ZOFRAN) injection 4 mg  4 mg IntraVENous Q4H PRN  
 famotidine (PF) (PEPCID) 20 mg in 0.9% sodium chloride 10 mL injection  20 mg IntraVENous DAILY  enoxaparin (LOVENOX) injection 30 mg  30 mg SubCUTAneous Q12H  
 
 
 
 LAB AND IMAGING FINDINGS:  
 
Lab Results Component Value Date/Time WBC 10.4 08/13/2020 03:39 AM  
 HGB 12.0 (L) 08/13/2020 03:39 AM  
 PLATELET 971 48/51/2052 03:39 AM  
 
Lab Results Component Value Date/Time  Sodium 141 08/13/2020 03:39 AM  
 Potassium 3.9 08/13/2020 03:39 AM  
 Chloride 111 08/13/2020 03:39 AM  
 CO2 23 08/13/2020 03:39 AM  
 BUN 41 (H) 08/13/2020 03:39 AM  
 Creatinine 1.67 (H) 08/13/2020 03:39 AM  
 Calcium 8.7 08/13/2020 03:39 AM  
 Magnesium 1.9 08/11/2020 02:58 AM  
 Phosphorus 4.4 08/11/2020 02:33 PM  
  
Lab Results Component Value Date/Time Alk. phosphatase 51 08/12/2020 02:21 AM  
 Protein, total 5.6 (L) 08/12/2020 02:21 AM  
 Albumin 1.6 (L) 08/12/2020 02:21 AM  
 Globulin 4.0 08/12/2020 02:21 AM  
 
Lab Results Component Value Date/Time INR 1.2 08/13/2020 03:39 AM  
 Prothrombin time 15.4 (H) 08/13/2020 03:39 AM  
 aPTT 27.5 08/13/2020 03:39 AM  
  
Lab Results Component Value Date/Time Iron 17 (L) 03/07/2020 06:00 PM  
 TIBC 151 (L) 03/07/2020 06:00 PM  
 Iron % saturation 11 (L) 03/07/2020 06:00 PM  
 Ferritin 715 (H) 08/13/2020 03:39 AM  
  
No results found for: PH, PCO2, PO2 No components found for: [place] Lab Results Component Value Date/Time  (H) 03/03/2020 07:21 PM  
 CK - MB 8.5 (H) 03/03/2020 07:21 PM  
  
 
 
   
 
Total time: 30 minutes Counseling / coordination time, spent as noted above: 25 minutes 
> 50% counseling / coordination?: yes, patient, RN Prolonged service was provided for  []30 min   []75 min in face to face time in the presence of the patient, spent as noted above. Time Start:  
Time End:  
Note: this can only be billed with 48551 (initial) or 94087 (follow up). If multiple start / stop times, list each separately.

## 2020-08-13 NOTE — DIABETES MGMT
Glycemic Control Plan of Care COVID-19 (+) Phone contact to help minimize spread of infection. Recommendation(s): consider adding 5 units lantus insulin daily Glycemic assessment: 
POC BG 8/12: 206 POC BG 8/13 at time of review: 195 Lab FBG 8/13: 200 Current hospital diabetes medications: 
Correctional lispro insulin ACHS. Modified to very resistant dose. Total daily dose insulin requirement previous day: 8/12/2020: 
Lispro: 10 units Most recent blood glucose values: 
 
 
 
Current A1C: 6.6% (8/12/2020) which is equivalent to estimated average blood glucose of 143 mgd/L during the past 2-3 months. Home diabetes medication: Patient reported on 8/13/2020 Glyburide 2.5 mg BID Diet: Diabetic consistent carb regular Goals:  Blood glucose will be within target range of  mg/dL by 8/16/2020 Education:  ___  Refer to Diabetes Education Record _X__  Education not indicated at this time Keily Park RN [facility] Pager: [phone]

## 2020-08-13 NOTE — PROGRESS NOTES
ARU/IPR REFERRAL CONTACT NOTE [id number] [place] [address] for Physical Rehabilitation RE:  Suraj Tapia Thank you for the opportunity to review this patient's case for admission to [id number] [place] [address] for Physical Rehabilitation. Based on our pre-admission screening:  
 
[ Ania Dumont Team/Medical Director is following this case. Comments:  Referral received. Please be advised admission to ARU will be based on meeting admission requirements and  negative COVID screen collected within five days of admission to IPR. Thank you for this referral.  
Please do not hesitate to call if you need further information or have additional questions. Regards, 
 
SAMEER Hinkle PTA [facility] 
[phone]

## 2020-08-13 NOTE — PROGRESS NOTES
[facility] Progress Note Patient: Porfirio Parmar Age: 65 y.o. : 1950 MR#: [id number] SSN: [id number] Date/Time: 2020 Subjective:  
 
Patient is laying in bed in no apparent distress, denies shortness of breath, follows simple commands Assessment/Plan:  
 
COVID-19 pneumonia Hypoxia in the setting of COVID-19 pneumonia Type 2 diabetes Hypertension Coronary artery disease, history of CABG Dyslipidemia Acute kidney injury, present on admission, on top of chronic kidney disease stage II. Patient's baseline creatinine appears to be around 1.2 Plan Continue antibiotics, follow cultures Pulmonary is following Continue droplet plus precautions, on steroids ID has been consulted Continue aspirin and statin and beta-blocker Monitor renal function, follow renal ultrasound, nephrology consulted Discussed with patient Palliative care input notedpatient is full code PT OT Disposition pending With patient's permission I called his wife Anupama Bagley at phone #[phone] and discussed the patient's medical care with her. Discussed with RN Case discussed with:  [x]Patient  []Family  [x]Nursing  []Case Management DVT Prophylaxis:  [x]Lovenox  []Hep SQ  []SCDs  []Coumadin   []On Heparin gtt Objective:  
VS:  
Visit Vitals BP (!) 165/93 Pulse 89 Temp (!) 96.6 °F (35.9 °C) Resp 17 Wt 76.3 kg (168 lb 3.4 oz) Comment: 2020 SpO2 91% BMI 26.35 kg/m² Tmax/24hrs: Temp (24hrs), Av °F (36.1 °C), Min:96.6 °F (35.9 °C), Max:97.4 °F (36.3 °C) Input/Output:  
 
Intake/Output Summary (Last 24 hours) at 2020 1820 Last data filed at 2020 1510 Gross per 24 hour Intake  Output 400 ml Net -400 ml General:  Awake, follows commands Cardiovascular:  S1S2+, RRR Pulmonary: Coarse breath sounds bilaterally GI:  Soft, BS+, NT, ND Extremities: Trace edema Labs:   
Recent Results (from the past 24 hour(s)) GLUCOSE, POC Collection Time: 08/12/20 11:24 PM  
Result Value Ref Range Glucose (POC) 180 (H) 70 - 110 mg/dL PROTHROMBIN TIME + INR Collection Time: 08/13/20  3:39 AM  
Result Value Ref Range Prothrombin time 15.4 (H) 11.5 - 15.2 sec INR 1.2 0.8 - 1.2 PTT Collection Time: 08/13/20  3:39 AM  
Result Value Ref Range aPTT 27.5 23.0 - 36.4 SEC FIBRINOGEN Collection Time: 08/13/20  3:39 AM  
Result Value Ref Range Fibrinogen 597 (H) 210 - 451 mg/dL D DIMER Collection Time: 08/13/20  3:39 AM  
Result Value Ref Range D DIMER 6.42 (H) <0.46 ug/ml(FEU) FERRITIN Collection Time: 08/13/20  3:39 AM  
Result Value Ref Range Ferritin 715 (H) 8 - 388 NG/ML  
C REACTIVE PROTEIN, QT Collection Time: 08/13/20  3:39 AM  
Result Value Ref Range C-Reactive protein 8.4 (H) 0 - 0.3 mg/dL LD Collection Time: 08/13/20  3:39 AM  
Result Value Ref Range  (H) 81 - 234 U/L  
PROCALCITONIN Collection Time: 08/13/20  3:39 AM  
Result Value Ref Range Procalcitonin 0.67 ng/mL CBC WITH AUTOMATED DIFF Collection Time: 08/13/20  3:39 AM  
Result Value Ref Range WBC 10.4 4.6 - 13.2 K/uL  
 RBC 4.02 (L) 4.70 - 5.50 M/uL  
 HGB 12.0 (L) 13.0 - 16.0 g/dL HCT 36.5 36.0 - 48.0 % MCV 90.8 74.0 - 97.0 FL  
 MCH 29.9 24.0 - 34.0 PG  
 MCHC 32.9 31.0 - 37.0 g/dL  
 RDW 14.1 11.6 - 14.5 % PLATELET 645 187 - 621 K/uL MPV 10.2 9.2 - 11.8 FL  
 NEUTROPHILS 95 (H) 42 - 75 % LYMPHOCYTES 5 (L) 20 - 51 % MONOCYTES 0 (L) 2 - 9 % EOSINOPHILS 0 0 - 5 % BASOPHILS 0 0 - 3 %  
 ABS. NEUTROPHILS 9.9 (H) 1.8 - 8.0 K/UL  
 ABS. LYMPHOCYTES 0.5 (L) 0.8 - 3.5 K/UL  
 ABS. MONOCYTES 0.0 0 - 1.0 K/UL  
 ABS. EOSINOPHILS 0.0 0.0 - 0.4 K/UL  
 ABS. BASOPHILS 0.0 0.0 - 0.06 K/UL  
 DF MANUAL PLATELET COMMENTS ADEQUATE PLATELETS    
 RBC COMMENTS NORMOCYTIC, NORMOCHROMIC METABOLIC PANEL, BASIC Collection Time: 08/13/20  3:39 AM  
Result Value Ref Range Sodium 141 136 - 145 mmol/L Potassium 3.9 3.5 - 5.5 mmol/L Chloride 111 100 - 111 mmol/L  
 CO2 23 21 - 32 mmol/L Anion gap 7 3.0 - 18 mmol/L Glucose 200 (H) 74 - 99 mg/dL BUN 41 (H) 7.0 - 18 MG/DL Creatinine 1.67 (H) 0.6 - 1.3 MG/DL  
 BUN/Creatinine ratio 25 (H) 12 - 20 GFR est AA 49 (L) >60 ml/min/1.73m2 GFR est non-AA 41 (L) >60 ml/min/1.73m2 Calcium 8.7 8.5 - 10.1 MG/DL  
GLUCOSE, POC Collection Time: 08/13/20  8:01 AM  
Result Value Ref Range Glucose (POC) 195 (H) 70 - 110 mg/dL GLUCOSE, POC Collection Time: 08/13/20 12:42 PM  
Result Value Ref Range Glucose (POC) 249 (H) 70 - 110 mg/dL GLUCOSE, POC Collection Time: 08/13/20  5:25 PM  
Result Value Ref Range Glucose (POC) 228 (H) 70 - 110 mg/dL Additional Data Reviewed:   
 
Signed By: Asad Painter MD   
 August 13, 2020

## 2020-08-13 NOTE — PROGRESS NOTES
Chart reviewed. Pt is being evaluated for ARU. FOC was signed for [facility] but reading PT/OT notes, pt is going to need higher level of care. Will reach out to wife and pt about back up SNFs in case he doesn't get accepted to ARU. Natasha Lung RN - Outcomes Manager  [phone]

## 2020-08-13 NOTE — PROGRESS NOTES
Palliative Medicine Palliative Medicine Team consisting of this author and NP Sandra Phillips met with Pt at bedside. Pt was laying in bed. Introduced the team and described our role in his care. Pt was alert and oriented X4. His eye contact was intermittent, he was engaged in the conversation and was cooperative most of the time. Pt stated he \"isn't doing that well because my catheter keeps coming out. \"  He stated that the nurse  Jamey was coming back to replace it. He also stated he needed aspirin because he has a headache. Pt stated he came to the hospital because he has COVID. Prior to coming to the hospital, he was living at home with his wife of over 36 years. He stated that his wife is sick too. Prior to admission, Pt was doing the grocery shopping, running errands, and attending [employer]. He has two children; a daughter, Nichole Tanner, who lives in [place], and a son, Lindsey Vora, who lives locally. Discussed AMD and Pt stated he does not have an AMD, but that his wife would be the person he trusted to make medical decision on his behalf. He stated that if she was unable to make decisions that it would be okay to contact his children. Asked Pt about his thoughts about being intubated and having CPR. Pt stated he does not want CPR or to be intubated, but he did not want to commit to completing paperwork stating those things at this time. It was explained to Pt that without paperwork in place that he would be considered a FULL CODE WITH FULL AGGRESSIVE MEASURES. Pt stated he understood. While talking to Pt he moved from one side to the other while laying in bed as if he were trying to get comfortable. Pt stated, \"I don't want to answer anymore questions. I'm tired and my head hurts. \" Thank you for the Palliative Medicine consult and allowing us to participate in the care of [facility]. Will continue to monitor and provide support. Nilson Ross LCSW Palliative Medicine Inpatient  [facility] Line:  [phone])

## 2020-08-13 NOTE — PROGRESS NOTES
[facility] Pulmonary, Critical Care, and Sleep Medicine Pulmonary Medicine Progress Note Name: Rosa Reid MRN: [id number] : 1950 Hospital: [address] [place] [address] Date: 2020 Subjective: 
Pt states he feels about the same. Denies worsening dyspnea. Cough without production. Nurse increased to 6L NC. Patient Active Problem List  
Diagnosis Code  
 HTN (hypertension) I10  
 Gout M10.9  Hyperlipemia E78.5  Nocturia R35.1  Elevated troponin R79.89  Back pain M54.9  Acute coronary syndrome (HCC) I24.9  S/P cardiac catheterization Z98.890  
 NSTEMI (non-ST elevated myocardial infarction) (HCC) I21.4  
 S/P CABG x 3 Z95.1  Stage 3 chronic kidney disease (HCC) N18.3  LOREN (acute kidney injury) ([place]) N17.9  Atherosclerosis of native coronary artery of native heart without angina pectoris I25.10  Urinary retention R33.9  Moderate protein-calorie malnutrition (HCC) E44.0  Pneumonia J18.9  Type 2 diabetes mellitus with chronic kidney disease (HCC) E11.22  
 Secondary hyperparathyroidism of renal origin ([place]) N25.81  
 Acute hypoxemic respiratory failure ([facility]) J96.01  
 Suspected COVID-19 virus infection Z20.828  Acute-on-chronic kidney injury ([place] [place]) N17.9, N18.9 Assessment: 
Acute Hypoxic Respiratory Failure - 2/2 covid19 pneumonia - currently on NC [id number] Pneumonia - B/L infiltrates, fevers. Mildly elevated inflammatory markers Hx of recent 3V CABG LOREN on CKD 
- improving.  
  
Impression/Plan: - Increase steroids to solumedrol 
- consult to ID for potential covid therapy and ABX 
- Wean fio2 as tolerated 
- Ddimer elevated, will order LE dopplers. Already on intermediate dose lovenox 
- Home O2 evaluation - CXR stable FiO2 to keep SpO2 >=92%, HOB >=30 degree, aspiration precautions, aggressive pulmonary toileting, incentive spirometry. Other issues management by primary team and respective consultants. Events and notes from last 24 hours reviewed. Discussed with patient and family, answered all questions to their satisfaction. Care plan discussed with nursing. Labs and images personally seen and available reports reviewed All current medicines are reviewed Medications- Current: 
Current Facility-Administered Medications Medication Dose Route Frequency  ipratropium-albuterol (COMBIVENT RESPIMAT) 20 mcg-100 mcg inhalation spray  2 Puff Inhalation Q4H PRN  
 methylPREDNISolone (PF) (SOLU-MEDROL) injection 60 mg  60 mg IntraVENous Q12H  
 ascorbic acid (vitamin C) (VITAMIN C) tablet 500 mg  500 mg Oral TID  zinc sulfate (ZINCATE) 220 (50) mg capsule 1 Cap  1 Cap Oral DAILY  atorvastatin (LIPITOR) tablet 40 mg  40 mg Oral QHS  folic acid (FOLVITE) tablet 1 mg  1 mg Oral DAILY  melatonin tablet 3 mg  3 mg Oral QHS  metoprolol succinate (TOPROL-XL) tablet 100 mg  100 mg Oral QHS  aspirin chewable tablet 81 mg  81 mg Oral DAILY And  
 calcium carbonate (CALTREX) tablet 300 mg [elemental]  300 mg Oral DAILY  cholecalciferol (VITAMIN D3) capsule 5,000 Units  5,000 Units Oral DAILY  insulin lispro (HUMALOG) injection   SubCUTAneous AC&HS  
 glucose chewable tablet 16 g  4 Tab Oral PRN  
 glucagon (GLUCAGEN) injection 1 mg  1 mg IntraMUSCular PRN  
 dextrose (D50W) injection syrg 12.5-25 g  25-50 mL IntraVENous PRN  
 sodium chloride (NS) flush 5-10 mL  5-10 mL IntraVENous PRN  
 cefTRIAXone (ROCEPHIN) 2 g in sterile water (preservative free) 20 mL IV syringe  2 g IntraVENous Q24H  
 azithromycin (ZITHROMAX) 500 mg in  mL  500 mg IntraVENous Q24H  
 acetaminophen (TYLENOL) tablet 650 mg  650 mg Oral Q6H PRN  Or  
 acetaminophen (TYLENOL) suppository 650 mg  650 mg Rectal Q6H PRN  
 0.9% sodium chloride infusion  60 mL/hr IntraVENous CONTINUOUS  
 sodium chloride (NS) flush 5-40 mL  5-40 mL IntraVENous Q8H  
  sodium chloride (NS) flush 5-40 mL  5-40 mL IntraVENous PRN  
 senna (SENOKOT) tablet 17.2 mg  2 Tab Oral QHS  bisacodyL (DULCOLAX) suppository 10 mg  10 mg Rectal DAILY PRN  
 ondansetron (ZOFRAN) injection 4 mg  4 mg IntraVENous Q4H PRN  
 famotidine (PF) (PEPCID) 20 mg in 0.9% sodium chloride 10 mL injection  20 mg IntraVENous DAILY  enoxaparin (LOVENOX) injection 30 mg  30 mg SubCUTAneous Q12H Objective: 
Vital Signs:   
Visit Vitals /76 (BP 1 Location: Left arm, BP Patient Position: At rest) Pulse 70 Temp 96.9 °F (36.1 °C) Resp 17 Wt 76.3 kg (168 lb 3.4 oz) Comment: 2020 SpO2 92% BMI 26.35 kg/m² O2 Device: Nasal cannula O2 Flow Rate (L/min): 6 l/min Temp (24hrs), Av.2 °F (36.2 °C), Min:96.9 °F (36.1 °C), Max:97.7 °F (36.5 °C) Intake/Output:  
Last shift:      No intake/output data recorded. Last 3 shifts:  1901 -  0700 In: 3796.3 [P.O.:0; I.V.:1756.3] Out: 800 [Urine:800] Intake/Output Summary (Last 24 hours) at 2020 1319 Last data filed at 2020 3203 Gross per 24 hour Intake 360 ml Output 600 ml Net -240 ml Physical Exam:  
 
General/Neurology: Alert, Awake Head:   Normocephalic, without obvious abnormality Eye:   PERRL, EOM intact Oral:  Mucus membranes moist 
Lung:   B/l air entry fair. No rales. No rhonchi. No wheezing Heart:   S1 S2 present Abdomen:  Soft, non tender, BS+nt Extremities:  No pedal edema. Skin:   Dry, intact Data: 
   
Recent Results (from the past 24 hour(s)) GLUCOSE, POC Collection Time: 20  5:24 PM  
Result Value Ref Range Glucose (POC) 206 (H) 70 - 110 mg/dL GLUCOSE, POC Collection Time: 20 11:24 PM  
Result Value Ref Range Glucose (POC) 180 (H) 70 - 110 mg/dL PROTHROMBIN TIME + INR Collection Time: 20  3:39 AM  
Result Value Ref Range Prothrombin time 15.4 (H) 11.5 - 15.2 sec INR 1.2 0.8 - 1.2 PTT Collection Time: 08/13/20  3:39 AM  
Result Value Ref Range aPTT 27.5 23.0 - 36.4 SEC FIBRINOGEN Collection Time: 08/13/20  3:39 AM  
Result Value Ref Range Fibrinogen 597 (H) 210 - 451 mg/dL D DIMER Collection Time: 08/13/20  3:39 AM  
Result Value Ref Range D DIMER 6.42 (H) <0.46 ug/ml(FEU) FERRITIN Collection Time: 08/13/20  3:39 AM  
Result Value Ref Range Ferritin 715 (H) 8 - 388 NG/ML  
C REACTIVE PROTEIN, QT Collection Time: 08/13/20  3:39 AM  
Result Value Ref Range C-Reactive protein 8.4 (H) 0 - 0.3 mg/dL LD Collection Time: 08/13/20  3:39 AM  
Result Value Ref Range  (H) 81 - 234 U/L  
PROCALCITONIN Collection Time: 08/13/20  3:39 AM  
Result Value Ref Range Procalcitonin 0.67 ng/mL CBC WITH AUTOMATED DIFF Collection Time: 08/13/20  3:39 AM  
Result Value Ref Range WBC 10.4 4.6 - 13.2 K/uL  
 RBC 4.02 (L) 4.70 - 5.50 M/uL  
 HGB 12.0 (L) 13.0 - 16.0 g/dL HCT 36.5 36.0 - 48.0 % MCV 90.8 74.0 - 97.0 FL  
 MCH 29.9 24.0 - 34.0 PG  
 MCHC 32.9 31.0 - 37.0 g/dL  
 RDW 14.1 11.6 - 14.5 % PLATELET 552 899 - 271 K/uL MPV 10.2 9.2 - 11.8 FL  
 NEUTROPHILS 95 (H) 42 - 75 % LYMPHOCYTES 5 (L) 20 - 51 % MONOCYTES 0 (L) 2 - 9 % EOSINOPHILS 0 0 - 5 % BASOPHILS 0 0 - 3 %  
 ABS. NEUTROPHILS 9.9 (H) 1.8 - 8.0 K/UL  
 ABS. LYMPHOCYTES 0.5 (L) 0.8 - 3.5 K/UL  
 ABS. MONOCYTES 0.0 0 - 1.0 K/UL  
 ABS. EOSINOPHILS 0.0 0.0 - 0.4 K/UL  
 ABS. BASOPHILS 0.0 0.0 - 0.06 K/UL  
 DF MANUAL PLATELET COMMENTS ADEQUATE PLATELETS    
 RBC COMMENTS NORMOCYTIC, NORMOCHROMIC METABOLIC PANEL, BASIC Collection Time: 08/13/20  3:39 AM  
Result Value Ref Range Sodium 141 136 - 145 mmol/L Potassium 3.9 3.5 - 5.5 mmol/L Chloride 111 100 - 111 mmol/L  
 CO2 23 21 - 32 mmol/L Anion gap 7 3.0 - 18 mmol/L Glucose 200 (H) 74 - 99 mg/dL BUN 41 (H) 7.0 - 18 MG/DL  Creatinine 1.67 (H) 0.6 - 1.3 MG/DL  
 BUN/Creatinine ratio 25 (H) 12 - 20 GFR est AA 49 (L) >60 ml/min/1.73m2 GFR est non-AA 41 (L) >60 ml/min/1.73m2 Calcium 8.7 8.5 - 10.1 MG/DL  
GLUCOSE, POC Collection Time: 08/13/20  8:01 AM  
Result Value Ref Range Glucose (POC) 195 (H) 70 - 110 mg/dL GLUCOSE, POC Collection Time: 08/13/20 12:42 PM  
Result Value Ref Range Glucose (POC) 249 (H) 70 - 110 mg/dL Chemistry Recent Labs 08/13/20 
2113 08/12/20 
0221 08/11/20 
1433 08/11/20 
0258 08/10/20 
1403 * 116* 203* 99 196*  144 140 141 135* K 3.9 3.8 4.0 2.9* 3.8  114* 110 109 106 CO2 23 24 23 23 22 BUN 41* 40* 39* 43* 53* CREA 1.67* 1.65* 1.67* 1.95* 2.21* CA 8.7 7.8* 7.7* 8.3* 7.1*  
MG  --   --   --  1.9  --   
PHOS  --   --  4.4 2.1*  --   
AGAP 7 6 7 9 7 BUCR 25* 24* 23* 22* 24* AP  --  51  --  64 51 TP  --  5.6*  --  6.7 5.8* ALB  --  1.6*  --  2.2* 1.9*  
GLOB  --  4.0  --  4.5* 3.9 AGRAT  --  0.4*  --  0.5* 0.5* CBC w/Diff Recent Labs 08/13/20 
5793 08/12/20 
0221 08/11/20 
5704 WBC 10.4 10.6 11.5  
RBC 4.02* 3.57* 4.07* HGB 12.0* 10.6* 12.2* HCT 36.5 32.3* 36.8  303 291 GRANS 95* 95* 88* LYMPH 5* 3* 7* EOS 0 0 1 ABG Recent Labs 08/11/20 
7235 PHI 7.44  
PCO2I 27.4*  
PO2I 66* HCO3I 18.5*  
FIO2I 36 Micro Recent Labs 08/10/20 
1553 08/10/20 
1403 CULT No growth (<1,000 CFU/ML) NO GROWTH 3 DAYS  NO GROWTH 3 DAYS Recent Labs 08/10/20 
1553 08/10/20 
1403 CULT No growth (<1,000 CFU/ML) NO GROWTH 3 DAYS  NO GROWTH 3 DAYS  
 
 
CT (Most Recent) [facility]20 CTA CHEST ABD PELV W WO CONT Narrative CT without contrast and CT angiogram of the chest, abdomen and pelvis HISTORY: Severe acute midthoracic back pain and vomiting. Elevated troponin. Clinical concern of aortic dissection and PE 
 
COMPARISON: None TECHNIQUE: Multidetector helical CT is carried out from the thoracic inlet to 
the lesser trochanters before and after nonionic IV contrast administration. 3D postprocessed images, including surface shaded display, will produce for this 
exam, permanently archived, and interpreted. Parenchymal organ imaging will be 
limited by arterial phase contrast administration. All CT scans at this facility performed using dose optimization techniques as 
appreciated to a performed exam, to include automated exposure control, 
adjustment of the mA and or KU according to patient size (including appropriate 
matching for site specific examination), or use of iterative reconstruction 
technique. CONTRAST: 100 cc Isovue 370. FINDINGS: 
 
CT ANGIOGRAM: The contrast bolus is mainly in the pulmonary arteries with 
suboptimal opacification to the aorta with somehow limited vascular evaluation. THORACIC AORTA: Patent and normal in caliber. No evidence of aortic dissection 
or aneurysmal dilatation. A few atherosclerotic calcified plaques scattered at 
the arch and descending. The caliber measurements of the aorta: 
 3.4 x 3.9 x 4.4 cm at the sinus of Valsalva; 
 3.4 x 3.4 cm at the sinotubular junction of the aortic root; 
 3.7 x 3.8 cm at maximal ascending aorta; 
 3.3 x 3.3 cm at mid aortic arch after the last vessel takeoff; 
 2.7 x 2.7 cm at proximal descending aorta at the level of left atrium; 
 2.6 x 2.7 cm at the mid descending aorta; 
 2.3 x 2.6 cm at distal descending aorta at the diaphragmatic hiatus. THE GREAT VESSELS IN THE ARCH: Not well opacified by contrast due to the early 
scan as mentioned above. There are grossly patent with normal caliber. No 
significant atherosclerotic disease or stenosis. THE PULMONARY ARTERIES: Patent. No evidence of intraluminal filling defect to 
suggest pulmonary embolism. No pulmonary arterial dilatation. ABDOMINAL AORTA: Normal in caliber. No dissection or aneurysmal dilatation. Mild 
atherosclerotic calcified plaques at the mid and distal portion. ILIAC ARTERIES: Patent. Mild atherosclerotic calcified plaques present without 
stenosis. CELIAC ARTERY: Patent. Few small calcified plaques at the origin without 
stenosis. SMA: THERE IS A FOCAL STENOSIS at the ostium which estimated about 50-75%, 
likely due to soft plaque. RENAL ARTERIES: There are left main and accessory left of renal arteries. Short 
segmental stenosis of proximal left main renal artery is estimated about 75%. Patent left accessory artery. Right main and accessoryrenal arteries appear 
patent. [facility] CT CHEST:  
 
LUNG PARENCHYMA: The lung parenchyma appears clear. No pulmonary nodule, mass or 
infiltration identified. THYROID: Unremarkable. MEDIASTINUM:  No adenopathy. THE HEART AND THE PERICARDIUM: . Unremarkable. PLEURAL SPACES AND CHEST WALL: No significant pleural pathology. CT ABDOMEN AND PELVIS:  
 
ABDOMINAL VISCERA: The liver, spleen, pancreas, gallbladder and both adrenal 
glands appear unremarkable. The stomach is poorly distended. The small and large 
bowel are nondilated. PERITONEUM/RETROPERITONEUM: No significant adenopathy. GENITOURINARY ORGANS: Benign cortical cysts identified in bilateral kidneys, the 
larger one in right kidney in posterior midpole measures 3.2 x 3.5 cm and the 
left renal cyst in lower pole measures 1.9 x 2.2 cm. Two 3 mm nonobstructive 
calculi in the lower pole of right kidney. No hydronephrosis. The bladder 
appears normal. The prostate is moderately enlarged. OTHERS: No free air or free fluid. CT OSSEOUS STRUCTURES:  Moderate spondylosis along the spine and moderate facet 
arthropathy at lower lumbar spine. Impression IMPRESSION:  
 
1. No aortic dissection or aneurysm. Very mild atherosclerotic aortic disease. 2. 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 3. Short segmental stenosis at proximal left the main renal artery, estimated 
about 75%. 4. No evidence of PE. 
 
5. Bilateral renal cysts. Nonobstructive right renal calculi. 6. Moderate prostate enlargement. The preliminary read without 3-D reconstruction was provided to the  the 
ordering physician by me upon the initial interpretation at approximately  1506  
hours, 3/2/20. Thank you for your referral.   
 
 
XR (Most Recent). CXR reviewed by me and compared with previous CXR Results from [facility] Encounter encounter on 08/10/20 XR CHEST SNGL V  
 Narrative EXAM: CHEST PORTABLE CLINICAL HISTORY/INDICATION: Respiratory failure, COVID-19 infection COMPARISON: 8/10/2020. TECHNIQUE: Portable AP view was obtained. FINDINGS:  
 
LINES/DEVICES: Median sternotomy wires and multiple mediastinal surgical clips. Multiple EKG leads overlie the patient. HEART/MEDIASTINUM: Cardiac silhouette is borderline enlarged, unchanged. LUNGS: Redemonstration of patchy bilateral airspace opacities, including more 
confluent consolidation at the peripheral left upper to mid lung. No pleural 
effusion or pneumothorax. SOFT TISSUES: Unremarkable. BONES: Unremarkable for age. Impression IMPRESSION: 
1. No significant interval change in multifocal bilateral infiltrates, left 
greater than right. Thank you for enabling us to participate in the care of this patient. See my orders for details Total care time exclusive of procedures with complex decision making, coordination of care and counseling patient performed and > 50% time spent in face to face evaluation as mentioned above. Mabel Hidalgo MD 
Critical Care Medicine

## 2020-08-13 NOTE — PROGRESS NOTES
End of Shift Note Bedside and verbal shift change report given to  [facility] (On coming nurse) by  Sia Clifton (Off going nurse). Report included the following information:  
   --Procedure Summary 
   --MAR, 
   --Recent Results --Med Rec Status SBAR Recommendations:  
 
Issues for Provider to address Activity This Shift [x] Bed Rest Order 
 [] Refused 
 [] Dangled  
 [] TDWB Ambulating: 
   [] Bathroom [] BSC [] Room/Hallway Up in Chair for meals []Yes [] No  
Voiding       [x] Yes  [] No 
Lin          [] Yes  [] No 
Incontinent [x] Yes  [] No 
 
DUE TO VOID POUR        [] Yes [] No 
Purewick    [] Yes [] No 
New Onset [] Yes [] No Straight Cath   []Yes  [] No 
Condom Cath  [] Yes [] No 
MD Called      [] Yes  [] No  
Blood Sugars Managed [x]Yes [] No   
Bowels Moved [] Yes [] No 
 
Incontinent     [] Yes [] No Passed Gas []Yes [] No 
 
New Onset  []Yes [] No 
  
 
 MD Called []Yes  [] No 
  
CHG Bath Done Before Surgery After Surgery  
  
[] Yes  [] No 
[] Yes  [] No   
  
Drain Removed [] Yes  [] No [x] N/A Dressing Changed [] Yes   [] No [x] N/A Nausea/Vomiting [] Yes   [x] No    
Ice Packs Changed [] Yes   [] No  [] N/A Incentive Spirometer  [x] Yes  [] No     
SCD Pumps On Ankle Pumping  [x] Yes   [] No  
  
[] Yes   [x] No    
  
Telemetry Monitoring [x] Yes   [] No   RhythmNsr

## 2020-08-13 NOTE — ROUTINE PROCESS
Received report from [address]. Pt AAO1-self only, mouths words/ whispers when talked to. NAD, breathing non labored, on O2 NC at 2L, [facility] up.georg[place] cath in place. Bed at the lowest level on lock position, call bell w/i reach. Bedside and Verbal shift change report given to [facility] (oncoming nurse) by me (offgoing nurse). Report included the following information SBAR, Kardex, Intake/Output, MAR, Recent Results and Cardiac Rhythm ST.

## 2020-08-13 NOTE — CDMP QUERY
Pt admitted with pneumonia. Pt noted to have positive COVID test. If possible, please document in the progress notes and discharge summary if you are evaluating and/or treating any of the following: ·         Gram positive pneumonia ·         Bacterial pneumonia ·         Viral pneumonia ·         COVID 19 pneumonia ·         Other, please specify ·         Clinically unable to determine ·         Unknown The medical record reflects the following: 
 
Risk Factors: acute kidney failure, chronic kidney failure, acute respiratory failure Clinical Indicators:  
COVID 19 positive 8/10/2020 BUN:  41 on 8/13/2020 Cr:  1.67 o ON: 
  
\"Interval worsening of bilateral multifocal infiltrates with more consolidations as discussed. \"  Per PCXR report, 8/10/2020. Treatment:  
Normal saline 0.9% IV 60 ml/hr start 8/102020 Normal saline 0.9% 1L IV x2 on 8/10/2020 Normal saline 0.9% 289 ml IV x1 on 8/10/2020 on 8/10/2020 Azithromycin 500mg IV q24hr start 8/10/2020 
cefriaxone 2g IV q24h start 8/10/2020 Dexamethasone 4mg IV x1 on 8/11/2020 Dexamethasone 6mg po q12h start 8/12/2020 Note: CAP, HAP, and HCAP indicate where the pneumonia was acquired, not a specific type. Thank you, SAUD Mccall RN, [email or web address] 
Office:  [phone] (8/14/2020) Cell # for Maura Milton RN, CDS supervisor:  [phone]

## 2020-08-14 NOTE — PROGRESS NOTES
ARU/IPR REFERRAL CONTACT NOTE [id number] [place] [address] for Physical Rehabilitation RE:  Carla Rosas Thank you for the opportunity to review this patient's case for admission to [id number] [place] [address] for Physical Rehabilitation. Based on our pre-admission screening:  
 
[ ] Our Team/Medical Director is following this case. Comments:  Need therapy assessments and recommendations Please be advised admission to ARU will be based on meeting admission requirements and  negative COVID screen collected within five days of admission to IPR. Thank you for this referral.  
Please do not hesitate to call if you need further information or have additional questions. Regards, 
 
SAMEER Hinkle PTA for Esthela CAMERON [facility] Admissions [facility] for Physical Rehabilitation 
[phone]

## 2020-08-14 NOTE — CDMP QUERY
Please clarify the underlying cause of patient's altered mental status and relate that cause to the alteration in mental status: »    Due to medication »    Cardiac condition »    Electrolyte/metabolic imbalance »    Infectious process »    Neurological condition »    Psychiatric condition »    Respiratory condition »    Other condition (please specify)____________________ »    Clinically unable to determine »    Unknown Risk Factors:  acute hypoxic respiratory failure, COVID 19, pneumonia Clinical Indicator: \"Worsening hypoxia, tachypnea noted on today's exam.  Switch to 15 L oxygen overnight\"  per Dr. Dhruv Villagran, progress note, 8/14/2020. \"ROS not possible due to patient's confusion\"  per Dr. Shalonda Justice, consult note, 8/14/2020. oxygen saturation range:  90-93% on oxygen at 15 L/min hiflo oxygen \"IMPRESSION: 
1. No significant interval change in multifocal bilateral infiltrates, left 
greater than right. \"  per CXR report, 8/13/2020 Treatment:  oxygen at 15 L/min hi ilda NC 
remdisivir 200mg IV x1 
remdisivir 100mg IV q24h x4 
methylprednisolone 60 mg q12h 
ceftriaxone 2g IV q24h 
azithromycin 500mg q24h Thank you, SAUD Mccall RN, [email or web address]

## 2020-08-14 NOTE — CONSULTS
Consult Note Consult requested by: Dr.Thakril Crawford William is a 65 y.o. male [facility] who is being seen on consult for LOREN on CKD stage 3. Chief Complaint Patient presents with  Cough  Fatigue Admission diagnosis: Pneumonia HPI:   
68-year-old male with past medical history of diabetes with diabetic nephropathy, chronic kidney disease stage III because of diabetes, hypertension, coronary artery disease status post coronary artery bypass surgery, hyperlipidemia who presents into the hospital for some shortness of breath and hypoxia. Was found to have COVID-19 positive status and pneumonia. His baseline creatinine is closer to 1.4 and 1.5 in the past.  Had significant proteinuria in the past.  He has seen our group in the past during 1 of the admission but has not followed up with any nephrologist as outpatient. Past Medical History:  
Diagnosis Date  Acute coronary syndrome ([address] 3/2/2020  LOREN (acute kidney injury) ([address] 3/5/2020  Calculus of kidney  Cancer ([address] HX of [facility]  Coronary artery disease of native artery with stable angina pectoris ([address] 3/3/2020  Diabetes mellitus ([address] 8/19/2010  Gout 8/19/2010  
 HTN (hypertension) 8/19/2010  Hyperlipemia 8/19/2010  Kidney disease  Lumbar spondylosis  Proteinuria  S/P CABG x 3 03/04/2020 CABG x 3, LIMA to LAD, SVG to OM1, SVG to diagonal 1  
 Skin cancer, basal cell   
 neck  Stage 3 chronic kidney disease ([address]  Type 2 diabetes mellitus without complication, without long-term current use of insulin ([address] 8/19/2010  Urolithiasis Past Surgical History:  
Procedure Laterality Date  HX CORONARY ARTERY BYPASS GRAFT  03/04/2020 CABG x3, Dr. Yanci Murillo,  [facility]  
 HX GI    
 HX OTHER SURGICAL Basal cell removed from left side of face  HX UROLOGICAL    
 kidney Social History Socioeconomic History  Marital status:  Spouse name: Not on file  Number of children: Not on file  Years of education: Not on file  Highest education level: Not on file Occupational History  Not on file Social Needs  Financial resource strain: Not on file  Food insecurity Worry: Not on file Inability: Not on file  Transportation needs Medical: Not on file Non-medical: Not on file Tobacco Use  Smoking status: Never Smoker  Smokeless tobacco: Never Used Substance and Sexual Activity  Alcohol use: No  
 Drug use: No  
 Sexual activity: Yes Lifestyle  Physical activity Days per week: Not on file Minutes per session: Not on file  Stress: Not on file Relationships  Social connections Talks on phone: Not on file Gets together: Not on file Attends Taoist service: Not on file Active member of club or organization: Not on file Attends meetings of clubs or organizations: Not on file Relationship status: Not on file  Intimate partner violence Fear of current or ex partner: Not on file Emotionally abused: Not on file Physically abused: Not on file Forced sexual activity: Not on file Other Topics Concern  Not on file Social History Narrative  Not on file Family History Problem Relation Age of Onset  Diabetes Mother Allergies Allergen Reactions  Metformin Other (comments) Renal insufficiency Home Medications:  
 
Prior to Admission Medications Prescriptions Last Dose Informant Patient Reported? Taking?  
acetaminophen (TYLENOL) 325 mg tablet 8/4/2020 at Unknown time  No Yes Sig: Take 2 Tabs by mouth every six (6) hours as needed for Pain or Fever. allopurinoL (ZYLOPRIM) 300 mg tablet 8/4/2020 at Unknown time  No Yes Sig: take 1 tablet by mouth once daily  
ascorbic acid, vitamin C, (VITAMIN C) 250 mg tablet 8/4/2020 at Unknown time  No Yes Sig: Take 1 Tab by mouth two (2) times daily (with meals). aspirin delayed-release 81 mg tablet 8/10/2020 at Unknown time  No Yes Sig: take 1 tablet by mouth once daily  
aspirin-calcium carbonate 81 mg-300 mg calcium(777 mg) tab 8/10/2020 at Unknown time  Yes Yes Sig: Take 81 mg by mouth daily. atorvastatin (LIPITOR) 40 mg tablet 8/10/2020 at Unknown time  No Yes Sig: take 1 tablet by mouth nightly  
cholecalciferol (VITAMIN D3) (1000 Units /25 mcg) tablet 8/10/2020 at Unknown time  No Yes Sig: Take 4 Tabs by mouth daily. cyanocobalamin 1,000 mcg tablet 8/10/2020 at Unknown time  No Yes Sig: take 1 tablet by mouth once daily  
ferrous sulfate 325 mg (65 mg iron) tablet 8/4/2020 at Unknown time  No Yes Sig: Take 1 Tab by mouth two (2) times daily (with meals). folic acid (FOLVITE) 1 mg tablet 8/4/2020 at Unknown time  No Yes Sig: take 1 tablet by mouth once daily  
glyBURIDE (DIABETA) 2.5 mg tablet 8/4/2020 at Unknown time  No Yes Sig: Take 1 Tab by mouth two (2) times daily (with meals). lisinopriL (PRINIVIL, ZESTRIL) 20 mg tablet 8/4/2020 at Unknown time  No Yes Sig: Take 1 Tab by mouth two (2) times a day. melatonin 3 mg tablet 8/4/2020 at Unknown time  Yes Yes Sig: Take 1 Tab by mouth nightly. metoprolol succinate (TOPROL-XL) 100 mg tablet 8/10/2020 at Unknown time  Yes Yes Sig: Take 1 Tab by mouth nightly. nystatin (MYCOSTATIN) powder 8/4/2020 at Unknown time  No Yes Sig: Apply  to affected area two (2) times a day. predniSONE (STERAPRED DS) 10 mg dose pack Unknown at Unknown time  No No  
Sig: See administration instruction per 10mg dose pack  
sildenafil citrate (Viagra) 100 mg tablet Unknown at Unknown time  No No  
Sig: Take tablet by mouth once daily as needed. tamsulosin (FLOMAX) 0.4 mg capsule 8/4/2020 at Unknown time  No Yes Sig: take 1 capsule by mouth once daily Facility-Administered Medications: None Current Facility-Administered Medications Medication Dose Route Frequency  remdesivir 200 mg in 0.9% sodium chloride 250 mL IVPB  200 mg IntraVENous ONCE Followed by  
Karson Rapp ON 8/15/2020] remdesivir 100 mg in 0.9% sodium chloride 250 mL IVPB  100 mg IntraVENous Q24H  
 heparin 25,000 units in D5W 250 ml infusion  18-36 Units/kg/hr IntraVENous TITRATE  ipratropium-albuterol (COMBIVENT RESPIMAT) 20 mcg-100 mcg inhalation spray  2 Puff Inhalation Q4H PRN  
 methylPREDNISolone (PF) (SOLU-MEDROL) injection 60 mg  60 mg IntraVENous Q12H  
 famotidine (PEPCID) tablet 20 mg  20 mg Oral DAILY  hydrALAZINE (APRESOLINE) tablet 10 mg  10 mg Oral Q6H PRN  
 ascorbic acid (vitamin C) (VITAMIN C) tablet 500 mg  500 mg Oral TID  zinc sulfate (ZINCATE) 220 (50) mg capsule 1 Cap  1 Cap Oral DAILY  atorvastatin (LIPITOR) tablet 40 mg  40 mg Oral QHS  folic acid (FOLVITE) tablet 1 mg  1 mg Oral DAILY  melatonin tablet 3 mg  3 mg Oral QHS  metoprolol succinate (TOPROL-XL) tablet 100 mg  100 mg Oral QHS  aspirin chewable tablet 81 mg  81 mg Oral DAILY And  
 calcium carbonate (CALTREX) tablet 300 mg [elemental]  300 mg Oral DAILY  cholecalciferol (VITAMIN D3) capsule 5,000 Units  5,000 Units Oral DAILY  insulin lispro (HUMALOG) injection   SubCUTAneous AC&HS  
 glucose chewable tablet 16 g  4 Tab Oral PRN  
 glucagon (GLUCAGEN) injection 1 mg  1 mg IntraMUSCular PRN  
 dextrose (D50W) injection syrg 12.5-25 g  25-50 mL IntraVENous PRN  
 sodium chloride (NS) flush 5-10 mL  5-10 mL IntraVENous PRN  
 cefTRIAXone (ROCEPHIN) 2 g in sterile water (preservative free) 20 mL IV syringe  2 g IntraVENous Q24H  
 azithromycin (ZITHROMAX) 500 mg in  mL  500 mg IntraVENous Q24H  
 acetaminophen (TYLENOL) tablet 650 mg  650 mg Oral Q6H PRN  Or  
 acetaminophen (TYLENOL) suppository 650 mg  650 mg Rectal Q6H PRN  
 0.9% sodium chloride infusion  50 mL/hr IntraVENous CONTINUOUS  
 sodium chloride (NS) flush 5-40 mL  5-40 mL IntraVENous Q8H  
  sodium chloride (NS) flush 5-40 mL  5-40 mL IntraVENous PRN  
 senna (SENOKOT) tablet 17.2 mg  2 Tab Oral QHS  bisacodyL (DULCOLAX) suppository 10 mg  10 mg Rectal DAILY PRN  
 ondansetron (ZOFRAN) injection 4 mg  4 mg IntraVENous Q4H PRN Review of Systems:  
 
ROS not possible due to patient's confusion Data Review: 
 
Labs: Results:  
   
Chemistry Recent Labs 08/14/20 
8664 08/13/20 
9757 08/12/20 0221 * 200* 116*  141 144  
K 3.8 3.9 3.8 * 111 114* CO2 22 23 24 BUN 41* 41* 40* CREA 1.59* 1.67* 1.65* CA 8.5 8.7 7.8* AGAP 8 7 6 BUCR 26* 25* 24* AP  --   --  51  
TP  --   --  5.6* ALB  --   --  1.6*  
GLOB  --   --  4.0 AGRAT  --   --  0.4* CBC w/Diff Recent Labs 08/14/20 
2711 08/13/20 
9617 08/12/20 0221 WBC 20.4* 10.4 10.6 RBC 3.96* 4.02* 3.57* HGB 12.1* 12.0* 10.6* HCT 35.1* 36.5 32.3*  
* 377 303 GRANS 88* 95* 95* LYMPH 1* 5* 3* EOS 0 0 0 Coagulation Recent Labs 08/14/20 
3593 08/13/20 
6546 PTP 14.9 15.4* INR 1.2 1.2 APTT 26.9 27.5 Iron/Ferritin No results for input(s): IRON in the last 72 hours. No lab exists for component: TIBCCALC BNP No results for input(s): BNPP in the last 72 hours. Cardiac Enzymes No results for input(s): CPK, CKND1, DION in the last 72 hours. No lab exists for component: Mort Player Liver Enzymes Recent Labs 08/12/20 0221 TP 5.6* ALB 1.6* AP 51 Thyroid Studies Lab Results Component Value Date/Time TSH 0.01 (L) 05/19/2020 08:26 AM  
    
 
 
Physical Assessment:  
 
Visit Vitals /90 Pulse (!) 104 Temp 97 °F (36.1 °C) Resp 28 Wt 76.3 kg (168 lb 3.4 oz) SpO2 91% BMI 26.35 kg/m² Weight change:  
 
Intake/Output Summary (Last 24 hours) at 8/14/2020 9490 Last data filed at 8/14/2020 3060 Gross per 24 hour Intake 960 ml Output 800 ml Net 160 ml  
 
 Patient was seen during the [place] pandemic. Patient with COVID infection . Patient is in isolation room due to [facility] status and PPE is in short supply hence seen through door and d/w patients RN. Full contact physical exam was not possible due to patient's clinical condition, key findings seen by me are documented. In addition, I have also used findings documented by physicians who have recently examined thee patient as they are relevant to the patient's renal care. Confused No edema Limited Physical Exam  
 
General: in restraints Pulm:  equal chest rise b/l, :no georges present Skin: appears pink, no obvious wounds or lesions. Extremities: NO EDEMA Procedures/imaging: see electronic medical records for all procedures, Xrays and details which were not copied into this note but were reviewed prior to creation of Plan Assessment · Acute kidney injury on chronic kidney disease stage III due to septic LOREN, direct viral injury from COVID-19. · Diabetic nephropathy with chronic kidney disease stage III · Hypertension · History of coronary artery disease status post coronary artery bypass surgery · COVID-19 positive status Recommendations · Can use as needed Lasix if needed but would be cautious with diuresis · I's and O's along with daily weights · Avoid other nephrotoxins including ACE inhibitor and ARB · Dose medications appropriately according to the creatinine clearance · Will get protein to creatinine ratio in the urine · Follow laboratory work-up closely · Will need follow-up as an outpatient for his chronic kidney disease 
  
Alton Castellanos MD 
August 14, 2020 [facility] [phone]

## 2020-08-14 NOTE — PROGRESS NOTES
Alerted by nursing that patient is desaturating on 6 liters nasal cannula. Nurse placed on high flow cannula (salter) and increased the liter flow to 11 liters with saturations of 88-91%. Went in the room to evaluate and and increased liter flow to 15 liters to increase saturations to 92% and above. Dr. Surya Hernandez was notified and an ABG was ordered, retrieved and given to Dr. Surya Hernandez for further evaluation.

## 2020-08-14 NOTE — CONSULTS
Infectious Disease Consultation Note Reason: COVID-19 pneumonia Current abx Prior abx Ceftriaxone, azithromycin since 8/10/20 Lines:  
 
 
Assessment : 
 
65 y.o. male with type 2 DM,  hypertension, hyperlipidemia and renal insufficiency who presented to ED on 8/10/20 with cough for 2 weeks. Chest x-ray 8/10, 8/13/2020-multifocal infiltrates Clinical presentation consistent with confirmed COVID-19 pneumonia Labs on 8/11-ferritin 841, CRP 23.6, procalcitonin 1.91, , d-dimer 3.72 Labs on 8/12-ferritin 782, CRP 17.3, procalcitonin 1.47, , d-dimer 3.14 Labs on 8/13-ferritin 715, CRP 8.4, procalcitonin 0.67 , d-dimer 6.42 
 
covid test 8/10- positive Acute renal insufficiency-likely secondary to volume depletion. Monitor for COVID nephropathy Hypoxia likely secondary to COVID-19 pneumonia. Monitor for coagulopathy especially since increasing d-dimer. Pulmonary follow-up appreciated. Plans for venous duplex of lower extremity noted. recent LOREN, currently appears comfortable on 6L - hence, will hold off on remdesivir for now. Will attempt to titrate oxygen down and monitor clinically to make further decisions. Recommendations: 1. Continue ceftriaxone, azithromycin 2. Agree with pulmonary recommendations regarding Solu-Medrol 3. Consider switching to full dose anticoagulation if persistent hypoxia and/or increasing d-dimer noted 4. Follow-up the results of venous duplex 5. Titrate oxygen as tolerated 6. Monitor inflammatory parameters-ferritin, CRP, d-dimer, procalcitonin 7. Follow-up pulmonary recommendations 8. Further recommendation based on the above test results and clinical course Thank you for consultation request. Above plan was discussed in details with patient, dr. Cedric Chavis and dr Max Mason. Please call me if any further questions or concerns. Will continue to participate in the care of this patient.  
 
HPI: 
 
 65 y.o. male with type 2 DM,  hypertension, hyperlipidemia and renal insufficiency who presented to ED on 8/10/20 with cough for 2 weeks. Patient states he is low risk for COVID as he is only been around his wife who is also sick for 2 weeks. Patient is feeling extremely weak. He denies chest pain. Cough is dry. Patient denies vomiting or diarrhea. Patient has had decreased appetite. He denies any urinary symptoms. Patient denies smoking, alcohol recreational drug use. Patient was started on ceftriaxone, azithromycin. COVID-19 test sent on admission. It is now positive. I have been consulted for further recommendations. Patient was placed on oral Decadron since admission. Steroids changed to intravenous Solu-Medrol on 8/12. Patient appeared comfortable on 6 L oxygen when I evaluated him today.  
  
 
 
Past Medical History:  
Diagnosis Date  Acute coronary syndrome ([address] 3/2/2020  LOREN (acute kidney injury) ([address] 3/5/2020  Calculus of kidney  Cancer ([address] HX of [address]  Coronary artery disease of native artery with stable angina pectoris ([address] 3/3/2020  Diabetes mellitus ([address] 8/19/2010  Gout 8/19/2010  
 HTN (hypertension) 8/19/2010  Hyperlipemia 8/19/2010  Kidney disease  Lumbar spondylosis  Proteinuria  S/P CABG x 3 03/04/2020 CABG x 3, LIMA to LAD, SVG to OM1, SVG to diagonal 1  
 Skin cancer, basal cell   
 neck  Stage 3 chronic kidney disease ([address]  Type 2 diabetes mellitus without complication, without long-term current use of insulin ([address] 8/19/2010  Urolithiasis Past Surgical History:  
Procedure Laterality Date  HX CORONARY ARTERY BYPASS GRAFT  03/04/2020 CABG x3, Dr. Glory Yates, [facility]  
 HX GI    
 HX OTHER SURGICAL Basal cell removed from left side of face  HX UROLOGICAL    
 kidney  
 
 
home Medication List  
  
 Details  
metoprolol succinate (TOPROL-XL) 100 mg tablet Take 1 Tab by mouth nightly. melatonin 3 mg tablet Take 1 Tab by mouth nightly. aspirin-calcium carbonate 81 mg-300 mg calcium(777 mg) tab Take 81 mg by mouth daily. lisinopriL (PRINIVIL, ZESTRIL) 20 mg tablet Take 1 Tab by mouth two (2) times a day. Qty: 180 Tab, Refills: 3  
  
aspirin delayed-release 81 mg tablet take 1 tablet by mouth once daily 
Qty: 90 Tab, Refills: 1  
  
atorvastatin (LIPITOR) 40 mg tablet take 1 tablet by mouth nightly 
Qty: 90 Tab, Refills: 0  
  
cyanocobalamin 1,000 mcg tablet take 1 tablet by mouth once daily 
Qty: 90 Tab, Refills: 0  
  
folic acid (FOLVITE) 1 mg tablet take 1 tablet by mouth once daily 
Qty: 90 Tab, Refills: 0  
  
tamsulosin (FLOMAX) 0.4 mg capsule take 1 capsule by mouth once daily 
Qty: 90 Cap, Refills: 0  
  
allopurinoL (ZYLOPRIM) 300 mg tablet take 1 tablet by mouth once daily 
Qty: 90 Tab, Refills: 1 Associated Diagnoses: Gout, unspecified cause, unspecified chronicity, unspecified site  
  
ascorbic acid, vitamin C, (VITAMIN C) 250 mg tablet Take 1 Tab by mouth two (2) times daily (with meals). Qty: 180 Tab, Refills: 1  
  
acetaminophen (TYLENOL) 325 mg tablet Take 2 Tabs by mouth every six (6) hours as needed for Pain or Fever. Qty: 90 Tab, Refills: 2 cholecalciferol (VITAMIN D3) (1000 Units /25 mcg) tablet Take 4 Tabs by mouth daily. Qty: 120 Tab, Refills: 2  
  
ferrous sulfate 325 mg (65 mg iron) tablet Take 1 Tab by mouth two (2) times daily (with meals). Qty: 60 Tab, Refills: 2  
  
nystatin (MYCOSTATIN) powder Apply  to affected area two (2) times a day. Qty: 1 Bottle, Refills: 0  
  
glyBURIDE (DIABETA) 2.5 mg tablet Take 1 Tab by mouth two (2) times daily (with meals). Qty: 180 Tab, Refills: 1 Associated Diagnoses: Type 2 diabetes mellitus without complication, without long-term current use of insulin ([address] predniSONE (STERAPRED DS) 10 mg dose pack See administration instruction per 10mg dose pack Qty: 21 Tab, Refills: 0 Associated Diagnoses: Cervical radiculopathy  
  
sildenafil citrate (Viagra) 100 mg tablet Take tablet by mouth once daily as needed. Qty: 6 Tab, Refills: 5 Current Facility-Administered Medications Medication Dose Route Frequency  ipratropium-albuterol (COMBIVENT RESPIMAT) 20 mcg-100 mcg inhalation spray  2 Puff Inhalation Q4H PRN  
 methylPREDNISolone (PF) (SOLU-MEDROL) injection 60 mg  60 mg IntraVENous Q12H  
 ascorbic acid (vitamin C) (VITAMIN C) tablet 500 mg  500 mg Oral TID  zinc sulfate (ZINCATE) 220 (50) mg capsule 1 Cap  1 Cap Oral DAILY  atorvastatin (LIPITOR) tablet 40 mg  40 mg Oral QHS  folic acid (FOLVITE) tablet 1 mg  1 mg Oral DAILY  melatonin tablet 3 mg  3 mg Oral QHS  metoprolol succinate (TOPROL-XL) tablet 100 mg  100 mg Oral QHS  aspirin chewable tablet 81 mg  81 mg Oral DAILY And  
 calcium carbonate (CALTREX) tablet 300 mg [elemental]  300 mg Oral DAILY  cholecalciferol (VITAMIN D3) capsule 5,000 Units  5,000 Units Oral DAILY  insulin lispro (HUMALOG) injection   SubCUTAneous AC&HS  
 glucose chewable tablet 16 g  4 Tab Oral PRN  
 glucagon (GLUCAGEN) injection 1 mg  1 mg IntraMUSCular PRN  
 dextrose (D50W) injection syrg 12.5-25 g  25-50 mL IntraVENous PRN  
 sodium chloride (NS) flush 5-10 mL  5-10 mL IntraVENous PRN  
 cefTRIAXone (ROCEPHIN) 2 g in sterile water (preservative free) 20 mL IV syringe  2 g IntraVENous Q24H  
 azithromycin (ZITHROMAX) 500 mg in  mL  500 mg IntraVENous Q24H  
 acetaminophen (TYLENOL) tablet 650 mg  650 mg Oral Q6H PRN Or  
 acetaminophen (TYLENOL) suppository 650 mg  650 mg Rectal Q6H PRN  
 0.9% sodium chloride infusion  60 mL/hr IntraVENous CONTINUOUS  
 sodium chloride (NS) flush 5-40 mL  5-40 mL IntraVENous Q8H  
 sodium chloride (NS) flush 5-40 mL  5-40 mL IntraVENous PRN  
 senna (SENOKOT) tablet 17.2 mg  2 Tab Oral QHS  bisacodyL (DULCOLAX) suppository 10 mg  10 mg Rectal DAILY PRN  
 ondansetron (ZOFRAN) injection 4 mg  4 mg IntraVENous Q4H PRN  
 famotidine (PF) (PEPCID) 20 mg in 0.9% sodium chloride 10 mL injection  20 mg IntraVENous DAILY  enoxaparin (LOVENOX) injection 30 mg  30 mg SubCUTAneous Q12H Allergies: Metformin Family History Problem Relation Age of Onset  Diabetes Mother Social History Socioeconomic History  Marital status:  Spouse name: Not on file  Number of children: Not on file  Years of education: Not on file  Highest education level: Not on file Occupational History  Not on file Social Needs  Financial resource strain: Not on file  Food insecurity Worry: Not on file Inability: Not on file  Transportation needs Medical: Not on file Non-medical: Not on file Tobacco Use  Smoking status: Never Smoker  Smokeless tobacco: Never Used Substance and Sexual Activity  Alcohol use: No  
 Drug use: No  
 Sexual activity: Yes Lifestyle  Physical activity Days per week: Not on file Minutes per session: Not on file  Stress: Not on file Relationships  Social connections Talks on phone: Not on file Gets together: Not on file Attends Orthodoxy service: Not on file Active member of club or organization: Not on file Attends meetings of clubs or organizations: Not on file Relationship status: Not on file  Intimate partner violence Fear of current or ex partner: Not on file Emotionally abused: Not on file Physically abused: Not on file Forced sexual activity: Not on file Other Topics Concern  Not on file Social History Narrative  Not on file Social History Tobacco Use Smoking Status Never Smoker Smokeless Tobacco Never Used Temp (24hrs), Av.2 °F (36.2 °C), Min:96.9 °F (36.1 °C), Max:97.7 °F (36.5 °C) Visit Vitals /76 (BP 1 Location: Left arm, BP Patient Position: At rest) Pulse 70 Temp 96.9 °F (36.1 °C) Resp 17 Wt 76.3 kg (168 lb 3.4 oz) Comment: 07/30/2020 SpO2 92% BMI 26.35 kg/m² ROS: 12 point ROS obtained in details. Pertinent positives as mentioned in HPI,  
otherwise negative Physical Exam: 
 
General: Well developed, well nourished male laying on the bed AAOx3 in no acute distress. General:   awake alert and oriented HEENT:  Normocephalic, atraumatic, nasal and oral mucous are moist and without evidence of lesions. Neck supple, no bruits. Lungs:   non-labored, bilaterally crackles, no  wheezes Heart:  RRR, s1 and s2; no rubs or gallops, no edema, + pedal pulses Abdomen:  soft, non-distended, active bowel sounds, no hepatomegaly, no splenomegaly. Non-tender Genitourinary:  deferred Extremities:   no clubbing, cyanosis; no joint effusions or swelling;; muscle mass appropriate for age Neurologic:  No gross focal sensory abnormalities; 5/5 muscle strength to upper and lower extremities. Speech appropriate. Cranial nerves intact Skin:  No rash or ulcers noted Back:  no spinal or paraspinal muscle tenderness or rigidity, no CVA tenderness Psychiatric:  No suicidal or homicidal ideations, appropriate mood and affect Labs: Results:  
Chemistry Recent Labs 08/13/20 
8338 08/12/20 
0221 08/11/20 
1433 08/11/20 
0258 08/10/20 
1403 * 116* 203* 99 196*  144 140 141 135* K 3.9 3.8 4.0 2.9* 3.8  114* 110 109 106 CO2 23 24 23 23 22 BUN 41* 40* 39* 43* 53* CREA 1.67* 1.65* 1.67* 1.95* 2.21* CA 8.7 7.8* 7.7* 8.3* 7.1* AGAP 7 6 7 9 7 BUCR 25* 24* 23* 22* 24* AP  --  51  --  64 51 TP  --  5.6*  --  6.7 5.8* ALB  --  1.6*  --  2.2* 1.9*  
GLOB  --  4.0  --  4.5* 3.9 AGRAT  --  0.4*  --  0.5* 0.5* CBC w/Diff Recent Labs 08/13/20 
6629 08/12/20 
0221 08/11/20 
7665 WBC 10.4 10.6 11.5 RBC 4.02* 3.57* 4.07* HGB 12.0* 10.6* 12.2* HCT 36.5 32.3* 36.8  303 291 GRANS 95* 95* 88* LYMPH 5* 3* 7* EOS 0 0 1 Microbiology Recent Labs 08/10/20 
1553 08/10/20 
1403 CULT No growth (<1,000 CFU/ML) NO GROWTH 3 DAYS  NO GROWTH 3 DAYS  
  
 
 
RADIOLOGY: 
 
All available imaging studies/reports in [facility] for this admission were reviewed Dr. Yaneli Watson, Infectious Disease Specialist 
[phone] August 13, 2020 
1:20 PM

## 2020-08-14 NOTE — PROGRESS NOTES
Problem: Falls - Risk of 
Goal: *Absence of Falls Description: Document Brittney Mary Fall Risk and appropriate interventions in the flowsheet. 8/14/2020 0312 by Tre Chen RN Outcome: Progressing Towards Goal 
Note: Fall Risk Interventions: 
Mobility Interventions: Assess mobility with egress test, Bed/chair exit alarm, Communicate number of staff needed for ambulation/transfer, Patient to call before getting OOB, Utilize walker, cane, or other assistive device Mentation Interventions: Adequate sleep, hydration, pain control, Bed/chair exit alarm, Door open when patient unattended, Evaluate medications/consider consulting pharmacy, Eyeglasses and hearing aids, More frequent rounding, Reorient patient, Room close to nurse's station, Toileting rounds, Update white board Medication Interventions: Assess postural VS orthostatic hypotension, Bed/chair exit alarm, Evaluate medications/consider consulting pharmacy, Patient to call before getting OOB Elimination Interventions: Bed/chair exit alarm, Call light in reach, Elevated toilet seat, Patient to call for help with toileting needs, Stay With Me (per policy), Toileting schedule/hourly rounds, Urinal in reach History of Falls Interventions: Bed/chair exit alarm, Consult care management for discharge planning, Door open when patient unattended, Evaluate medications/consider consulting pharmacy, Investigate reason for fall, Room close to nurse's station, Assess for delayed presentation/identification of injury for 48 hrs (comment for end date), Vital signs minimum Q4HRs X 24 hrs (comment for end date) 8/14/2020 0310 by Tre Chen RN Outcome: Progressing Towards Goal 
Note: Fall Risk Interventions: 
Mobility Interventions: Assess mobility with egress test, Bed/chair exit alarm, Communicate number of staff needed for ambulation/transfer, Patient to call before getting OOB, Utilize walker, cane, or other assistive device Mentation Interventions: Adequate sleep, hydration, pain control, Bed/chair exit alarm, Door open when patient unattended, Evaluate medications/consider consulting pharmacy, Eyeglasses and hearing aids, More frequent rounding, Reorient patient, Room close to nurse's station, Toileting rounds, Update white board Medication Interventions: Assess postural VS orthostatic hypotension, Bed/chair exit alarm, Evaluate medications/consider consulting pharmacy, Patient to call before getting OOB Elimination Interventions: Bed/chair exit alarm, Call light in reach, Elevated toilet seat, Patient to call for help with toileting needs, Stay With Me (per policy), Toileting schedule/hourly rounds, Urinal in reach History of Falls Interventions: Bed/chair exit alarm, Consult care management for discharge planning, Door open when patient unattended, Evaluate medications/consider consulting pharmacy, Investigate reason for fall, Room close to nurse's station, Assess for delayed presentation/identification of injury for 48 hrs (comment for end date), Vital signs minimum Q4HRs X 24 hrs (comment for end date) Problem: Patient Education: Go to Patient Education Activity Goal: Patient/Family Education 8/14/2020 0312 by Ree Humphrey RN Outcome: Progressing Towards Goal 
8/14/2020 0310 by Ree Humphrey RN Outcome: Progressing Towards Goal 
  
Problem: Pressure Injury - Risk of 
Goal: *Prevention of pressure injury Description: Document Tr Scale and appropriate interventions in the flowsheet. 8/14/2020 0312 by Ree Humphrey RN Outcome: Progressing Towards Goal 
Note: Pressure Injury Interventions: 
Sensory Interventions: Assess changes in LOC, Keep linens dry and wrinkle-free, Minimize linen layers, Monitor skin under medical devices, Pad between skin to skin, Pressure redistribution bed/mattress (bed type) Moisture Interventions: Absorbent underpads, Apply protective barrier, creams and emollients, Check for incontinence Q2 hours and as needed, Internal/External urinary devices, Limit adult briefs, Maintain skin hydration (lotion/cream), Minimize layers, Moisture barrier, Offer toileting Q_hr Activity Interventions: Pressure redistribution bed/mattress(bed type) Mobility Interventions: HOB 30 degrees or less, Pressure redistribution bed/mattress (bed type) Nutrition Interventions: Document food/fluid/supplement intake Friction and Shear Interventions: Apply protective barrier, creams and emollients, HOB 30 degrees or less, Lift sheet, Minimize layers 8/14/2020 0310 by Tre Chen RN Outcome: Progressing Towards Goal 
Note: Pressure Injury Interventions: 
Sensory Interventions: Assess changes in LOC, Keep linens dry and wrinkle-free, Minimize linen layers, Monitor skin under medical devices, Pad between skin to skin, Pressure redistribution bed/mattress (bed type) Moisture Interventions: Absorbent underpads, Apply protective barrier, creams and emollients, Check for incontinence Q2 hours and as needed, Internal/External urinary devices, Limit adult briefs, Maintain skin hydration (lotion/cream), Minimize layers, Moisture barrier, Offer toileting Q_hr Activity Interventions: Pressure redistribution bed/mattress(bed type) Mobility Interventions: HOB 30 degrees or less, Pressure redistribution bed/mattress (bed type) Nutrition Interventions: Document food/fluid/supplement intake Friction and Shear Interventions: Apply protective barrier, creams and emollients, HOB 30 degrees or less, Lift sheet, Minimize layers Problem: Patient Education: Go to Patient Education Activity Goal: Patient/Family Education 8/14/2020 0312 by Tre Chen RN Outcome: Progressing Towards Goal 
8/14/2020 0310 by Tre Chen RN Outcome: Progressing Towards Goal 
  
Problem: Diabetes Self-Management Goal: *Disease process and treatment process Description: Define diabetes and identify own type of diabetes; list 3 options for treating diabetes. 8/14/2020 0312 by Ritu Jeff RN Outcome: Progressing Towards Goal 
8/14/2020 0310 by Ritu Jeff RN Outcome: Progressing Towards Goal 
Goal: *Incorporating nutritional management into lifestyle Description: Describe effect of type, amount and timing of food on blood glucose; list 3 methods for planning meals. 8/14/2020 0312 by Ritu Jeff RN Outcome: Progressing Towards Goal 
8/14/2020 0310 by Ritu Jeff RN Outcome: Progressing Towards Goal 
Goal: *Incorporating physical activity into lifestyle Description: State effect of exercise on blood glucose levels. 8/14/2020 0312 by Ritu Jeff RN Outcome: Progressing Towards Goal 
8/14/2020 0310 by Ritu Jeff RN Outcome: Progressing Towards Goal 
Goal: *Developing strategies to promote health/change behavior Description: Define the ABC's of diabetes; identify appropriate screenings, schedule and personal plan for screenings. 8/14/2020 0312 by Ritu Jeff RN Outcome: Progressing Towards Goal 
8/14/2020 0310 by Ritu Jeff RN Outcome: Progressing Towards Goal 
Goal: *Using medications safely Description: State effect of diabetes medications on diabetes; name diabetes medication taking, action and side effects. 8/14/2020 0312 by Ritu Jeff RN Outcome: Progressing Towards Goal 
8/14/2020 0310 by Ritu Jeff RN Outcome: Progressing Towards Goal 
Goal: *Monitoring blood glucose, interpreting and using results Description: Identify recommended blood glucose targets  and personal targets. 8/14/2020 0312 by Ritu Jeff RN Outcome: Progressing Towards Goal 
8/14/2020 0310 by Ritu Jeff RN Outcome: Progressing Towards Goal 
Goal: *Prevention, detection, treatment of acute complications Description: List symptoms of hyper- and hypoglycemia; describe how to treat low blood sugar and actions for lowering  high blood glucose level. 8/14/2020 0312 by Alcides Aviles RN Outcome: Progressing Towards Goal 
8/14/2020 0310 by Alcides Aviles RN Outcome: Progressing Towards Goal 
Goal: *Prevention, detection and treatment of chronic complications Description: Define the natural course of diabetes and describe the relationship of blood glucose levels to long term complications of diabetes. 8/14/2020 0312 by Alcides Aviles RN Outcome: Progressing Towards Goal 
8/14/2020 0310 by Alcides Aviles RN Outcome: Progressing Towards Goal 
Goal: *Developing strategies to address psychosocial issues Description: Describe feelings about living with diabetes; identify support needed and support network 8/14/2020 0312 by Alcides Aviles RN Outcome: Progressing Towards Goal 
8/14/2020 0310 by Alcides Aviles RN Outcome: Progressing Towards Goal 
Goal: *Insulin pump training 8/14/2020 0312 by Alcides Aviles RN Outcome: Progressing Towards Goal 
8/14/2020 0310 by Alcides Aviles RN Outcome: Progressing Towards Goal 
Goal: *Sick day guidelines 8/14/2020 0312 by Alcides Aviles RN Outcome: Progressing Towards Goal 
8/14/2020 0310 by Alcides Aviles RN Outcome: Progressing Towards Goal 
Goal: *Patient Specific Goal (EDIT GOAL, INSERT TEXT) 8/14/2020 0312 by Alcides Aviles RN Outcome: Progressing Towards Goal 
8/14/2020 0310 by Alcides Aviles RN Outcome: Progressing Towards Goal 
  
Problem: Patient Education: Go to Patient Education Activity Goal: Patient/Family Education 8/14/2020 0312 by Alcides Aviles RN Outcome: Progressing Towards Goal 
8/14/2020 0310 by Alcides Aviles RN Outcome: Progressing Towards Goal 
  
Problem: Patient Education: Go to Patient Education Activity Goal: Patient/Family Education 8/14/2020 0312 by Alcides Aviles RN Outcome: Progressing Towards Goal 
8/14/2020 0310 by Alcides Aviles RN Outcome: Progressing Towards Goal 
  
 Problem: Patient Education: Go to Patient Education Activity Goal: Patient/Family Education 8/14/2020 0312 by Daivd Dempsey RN Outcome: Progressing Towards Goal 
8/14/2020 0310 by David Dempsey RN Outcome: Progressing Towards Goal 
  
Problem: Non-Violent Restraints Goal: *Removal from restraints as soon as assessed to be safe 8/14/2020 0312 by David Dempsey RN Outcome: Progressing Towards Goal 
8/14/2020 0310 by David Dempsey RN Outcome: Progressing Towards Goal 
Goal: *No harm/injury to patient while restraints in use 8/14/2020 0312 by David Dempsey RN Outcome: Progressing Towards Goal 
8/14/2020 0310 by David Dempsey RN Outcome: Progressing Towards Goal 
Goal: *Patient's dignity will be maintained 8/14/2020 0312 by David Dempsey RN Outcome: Progressing Towards Goal 
8/14/2020 0310 by David Dempsey RN Outcome: Progressing Towards Goal 
Goal: *Patient Specific Goal (EDIT GOAL, INSERT TEXT) 8/14/2020 0312 by David Dempsey RN Outcome: Progressing Towards Goal 
8/14/2020 0310 by David Dempsey RN Outcome: Progressing Towards Goal 
Goal: Non-violent Restaints:Standard Interventions 8/14/2020 0312 by David Dempsey RN Outcome: Progressing Towards Goal 
8/14/2020 0310 by David Dempsey RN Outcome: Progressing Towards Goal 
Goal: Non-violent Restraints:Patient Interventions 8/14/2020 0312 by David Dempsey RN Outcome: Progressing Towards Goal 
8/14/2020 0310 by David Dempsey RN Outcome: Progressing Towards Goal 
Goal: Patient/Family Education 8/14/2020 0312 by David Dempsey RN Outcome: Progressing Towards Goal 
8/14/2020 0310 by David Dempsey RN Outcome: Progressing Towards Goal 
  
Problem: Patient Education: Go to Patient Education Activity Goal: Patient/Family Education Outcome: Progressing Towards Goal 
  
Problem: Pneumonia: Day 4 Goal: Off Pathway (Use only if patient is Off Pathway) Outcome: Progressing Towards Goal 
Goal: Activity/Safety Outcome: Progressing Towards Goal 
Goal: Nutrition/Diet Outcome: Progressing Towards Goal 
Goal: Discharge Planning Outcome: Progressing Towards Goal 
Goal: Medications Outcome: Progressing Towards Goal 
Goal: Respiratory Outcome: Progressing Towards Goal 
Goal: Treatments/Interventions/Procedures Outcome: Progressing Towards Goal 
Goal: Psychosocial 
Outcome: Progressing Towards Goal

## 2020-08-14 NOTE — PROGRESS NOTES
End of Shift Note Bedside and verbal shift change report given to  [facility] (On coming nurse) by  Matthew Calle (Off going nurse). Report included the following information:  
   --Procedure Summary 
   --MAR, 
   --Recent Results --Med Rec Status SBAR Recommendations:  
 
Issues for Provider to address Activity This Shift [x] Bed Rest Order 
 [] Refused 
 [] Dangled  
 [] TDWB Ambulating: 
   [] Bathroom [] BSC [] Room/Hallway Up in Chair for meals []Yes [] No  
Voiding       [] Yes  [] No 
Lin          [] Yes  [] No 
Incontinent [x] Yes  [] No 
 
DUE TO VOID POUR        [] Yes [] No 
Purewick    [] Yes [] No 
New Onset [] Yes [] No Straight Cath   []Yes  [] No 
Condom Cath  [x] Yes [] No 
MD Called      [] Yes  [] No  
Blood Sugars Managed [x]Yes [] No   
Bowels Moved [x] Yes [] No 
 
Incontinent     [x] Yes [] No Passed Gas []Yes [] No 
 
New Onset  []Yes [] No 
  
 
 MD Called [x]Yes  [] No 
  
CHG Bath Done Before Surgery After Surgery  
  
[] Yes  [] No 
[] Yes  [] No   
  
Drain Removed [] Yes  [] No [x] N/A Dressing Changed [] Yes   [] No [x] N/A Nausea/Vomiting [] Yes   [x] No    
Ice Packs Changed [] Yes   [] No  [x] N/A Incentive Spirometer  [] Yes  [] No   Needs help SCD Pumps On Ankle Pumping  [] Yes   [x] No  
  
[] Yes   [x] No    
  
Telemetry Monitoring [x] Yes   [] No   RhythmNSR

## 2020-08-14 NOTE — PROGRESS NOTES
Chart reviewed. Pt vomitting today. Still on high flow oxygen. Plan is for ARU but will need SNF if they decline him. Will continue to monitor for discharge needs. Farrah Pascal RN - Outcomes Manager  [phone]

## 2020-08-14 NOTE — DIABETES MGMT
Glycemic Control Plan of Care Recommend adding lantus 5 units daily Donice Challenger MPH RN CDE 
[phone]

## 2020-08-14 NOTE — PROGRESS NOTES
Patient remains confused and getting restless, states he wants to go home. New condom catheter on. Electrodes replaced patient pulled off and IV wrapped in ling and ace bandage. Patient hollers out he needs help to get out of here. Continued to monitor, reposition and reorient. 1730 Patient cleaned again for incontinence, pulled of condom catheter. SR up times 3, call bell in reach. Bed in low position. 1800 Patient hollering again wants to get out of here and go home. Wife called and spoke with patient, explained to wife he was confused today. 1840 Patient pulled off telemetry box, alarm on bed sounding, patient standing in room naked  has ripped off gown and pulled out IV. Page in to Dr. Delia Rodriguez for soft wrist restraints. Stayed in room, cleaned bed, floor and patient up. New gown placed on patient and telemetry box. No further bleeding from IV site noted. Patient sitting in recliner. 1920 Change of shift, patient assisted back to bed, soft wrist restraints applied per orders and patient transferred to room close to nurses station. Call bell in reach, bed in low position and SR up x4. Video monitor placed in room. Wife notified of soft wrist restraint order. No distress noted.

## 2020-08-14 NOTE — PROGRESS NOTES
Infectious Disease progress note Reason: COVID-19 pneumonia Current abx Prior abx Ceftriaxone, azithromycin since 8/10/20 Lines:  
 
 
Assessment : 
 
65 y.o. male with type 2 DM,  hypertension, hyperlipidemia and renal insufficiency who presented to ED on 8/10/20 with cough for 2 weeks. Chest x-ray 8/10, 8/13/2020-multifocal infiltrates Clinical presentation consistent with confirmed COVID-19 pneumonia Labs on 8/11-ferritin 841, CRP 23.6, procalcitonin 1.91, , d-dimer 3.72 Labs on 8/12-ferritin 782, CRP 17.3, procalcitonin 1.47, , d-dimer 3.14 Labs on 8/13-ferritin 715, CRP 8.4, procalcitonin 0.67 , d-dimer 6.42 Labs on 8/14-ferritin 691, CRP 4.4, procalcitonin 0.33, , d-dimer 6.58 
 
covid test 8/10- positive Acute renal insufficiency-likely secondary to volume depletion. Monitor for COVID nephropathy Hypoxia likely secondary to COVID-19 pneumonia. Concern for coagulopathy especially since increasing d-dimer. Pulmonary follow-up appreciated. venous duplex of lower extremity ordered. LOREN-likely secondary to COVID-19 infection, volume depletion. Some improvement noted. EGFR now greater than 35. Worsening hypoxia, tachypnea noted on today's exam.  Switch to 15 L oxygen overnight Recommendations: 1. Continue ceftriaxone, azithromycin for now. Start remdesivir -risk and benefits discussed with the patient. Will monitor LFTs daily 2. Agree with pulmonary recommendations regarding Solu-Medrol 3.  recommend switching to full dose anticoagulation since persistent hypoxia and increasing d-dimer noted 4. Follow-up the results of venous duplex 5. Titrate oxygen as tolerated 6. Monitor inflammatory parameters-ferritin, CRP, d-dimer, procalcitonin 7. Follow-up pulmonary recommendations 8. Further recommendation based on the above test results and clinical course 9.  Consent for the convalescent plasma has been obtained. Signed copy placed in the chart. Above plan was discussed in details with patient, dr. Ailin Higuera,  dr Salvador Collins. Please call me if any further questions or concerns. Will continue to participate in the care of this patient. HPI: 
 
Patient does not answer all questions asked. Detailed review of system not feasible at this time 
 
 
home Medication List  
  
 Details  
metoprolol succinate (TOPROL-XL) 100 mg tablet Take 1 Tab by mouth nightly. melatonin 3 mg tablet Take 1 Tab by mouth nightly. aspirin-calcium carbonate 81 mg-300 mg calcium(777 mg) tab Take 81 mg by mouth daily. lisinopriL (PRINIVIL, ZESTRIL) 20 mg tablet Take 1 Tab by mouth two (2) times a day. Qty: 180 Tab, Refills: 3  
  
aspirin delayed-release 81 mg tablet take 1 tablet by mouth once daily 
Qty: 90 Tab, Refills: 1  
  
atorvastatin (LIPITOR) 40 mg tablet take 1 tablet by mouth nightly 
Qty: 90 Tab, Refills: 0  
  
cyanocobalamin 1,000 mcg tablet take 1 tablet by mouth once daily 
Qty: 90 Tab, Refills: 0  
  
folic acid (FOLVITE) 1 mg tablet take 1 tablet by mouth once daily 
Qty: 90 Tab, Refills: 0  
  
tamsulosin (FLOMAX) 0.4 mg capsule take 1 capsule by mouth once daily 
Qty: 90 Cap, Refills: 0  
  
allopurinoL (ZYLOPRIM) 300 mg tablet take 1 tablet by mouth once daily 
Qty: 90 Tab, Refills: 1 Associated Diagnoses: Gout, unspecified cause, unspecified chronicity, unspecified site  
  
ascorbic acid, vitamin C, (VITAMIN C) 250 mg tablet Take 1 Tab by mouth two (2) times daily (with meals). Qty: 180 Tab, Refills: 1  
  
acetaminophen (TYLENOL) 325 mg tablet Take 2 Tabs by mouth every six (6) hours as needed for Pain or Fever. Qty: 90 Tab, Refills: 2 cholecalciferol (VITAMIN D3) (1000 Units /25 mcg) tablet Take 4 Tabs by mouth daily. Qty: 120 Tab, Refills: 2  
  
ferrous sulfate 325 mg (65 mg iron) tablet Take 1 Tab by mouth two (2) times daily (with meals). Qty: 60 Tab, Refills: 2  
  
nystatin (MYCOSTATIN) powder Apply  to affected area two (2) times a day. Qty: 1 Bottle, Refills: 0  
  
glyBURIDE (DIABETA) 2.5 mg tablet Take 1 Tab by mouth two (2) times daily (with meals). Qty: 180 Tab, Refills: 1 Associated Diagnoses: Type 2 diabetes mellitus without complication, without long-term current use of insulin ([address] predniSONE (STERAPRED DS) 10 mg dose pack See administration instruction per 10mg dose pack Qty: 21 Tab, Refills: 0 Associated Diagnoses: Cervical radiculopathy  
  
sildenafil citrate (Viagra) 100 mg tablet Take tablet by mouth once daily as needed. Qty: 6 Tab, Refills: 5 Current Facility-Administered Medications Medication Dose Route Frequency  ipratropium-albuterol (COMBIVENT RESPIMAT) 20 mcg-100 mcg inhalation spray  2 Puff Inhalation Q4H PRN  
 methylPREDNISolone (PF) (SOLU-MEDROL) injection 60 mg  60 mg IntraVENous Q12H  
 famotidine (PEPCID) tablet 20 mg  20 mg Oral DAILY  hydrALAZINE (APRESOLINE) tablet 10 mg  10 mg Oral Q6H PRN  
 ascorbic acid (vitamin C) (VITAMIN C) tablet 500 mg  500 mg Oral TID  zinc sulfate (ZINCATE) 220 (50) mg capsule 1 Cap  1 Cap Oral DAILY  atorvastatin (LIPITOR) tablet 40 mg  40 mg Oral QHS  folic acid (FOLVITE) tablet 1 mg  1 mg Oral DAILY  melatonin tablet 3 mg  3 mg Oral QHS  metoprolol succinate (TOPROL-XL) tablet 100 mg  100 mg Oral QHS  aspirin chewable tablet 81 mg  81 mg Oral DAILY And  
 calcium carbonate (CALTREX) tablet 300 mg [elemental]  300 mg Oral DAILY  cholecalciferol (VITAMIN D3) capsule 5,000 Units  5,000 Units Oral DAILY  insulin lispro (HUMALOG) injection   SubCUTAneous AC&HS  
 glucose chewable tablet 16 g  4 Tab Oral PRN  
 glucagon (GLUCAGEN) injection 1 mg  1 mg IntraMUSCular PRN  
 dextrose (D50W) injection syrg 12.5-25 g  25-50 mL IntraVENous PRN  
 sodium chloride (NS) flush 5-10 mL  5-10 mL IntraVENous PRN  
  cefTRIAXone (ROCEPHIN) 2 g in sterile water (preservative free) 20 mL IV syringe  2 g IntraVENous Q24H  
 azithromycin (ZITHROMAX) 500 mg in  mL  500 mg IntraVENous Q24H  
 acetaminophen (TYLENOL) tablet 650 mg  650 mg Oral Q6H PRN Or  
 acetaminophen (TYLENOL) suppository 650 mg  650 mg Rectal Q6H PRN  
 0.9% sodium chloride infusion  50 mL/hr IntraVENous CONTINUOUS  
 sodium chloride (NS) flush 5-40 mL  5-40 mL IntraVENous Q8H  
 sodium chloride (NS) flush 5-40 mL  5-40 mL IntraVENous PRN  
 senna (SENOKOT) tablet 17.2 mg  2 Tab Oral QHS  bisacodyL (DULCOLAX) suppository 10 mg  10 mg Rectal DAILY PRN  
 ondansetron (ZOFRAN) injection 4 mg  4 mg IntraVENous Q4H PRN  
 enoxaparin (LOVENOX) injection 30 mg  30 mg SubCUTAneous Q12H Allergies: Metformin Temp (24hrs), Av.4 °F (36.3 °C), Min:96.6 °F (35.9 °C), Max:98.9 °F (37.2 °C) Visit Vitals /90 Pulse (!) 104 Temp 97 °F (36.1 °C) Resp 28 Wt 76.3 kg (168 lb 3.4 oz) Comment: 2020 SpO2 91% BMI 26.35 kg/m² ROS: Unable to obtain at this time Physical Exam: 
 
General: Well developed, well nourished male laying on the bed , does not appear tachypneic or dyspneic, on 15 L high flow. HEENT:  Normocephalic, atraumatic, nasal and oral mucous are moist and without evidence of lesions. Neck supple, no bruits. Lungs:   non-labored, bilaterally crackles, no  wheezes Heart:  RRR, s1 and s2; no rubs or gallops, no edema, + pedal pulses Abdomen:  soft, non-distended, active bowel sounds, no hepatomegaly, no splenomegaly. Non-tender Genitourinary:  deferred Extremities:   no clubbing, cyanosis; no joint effusions or swelling;; muscle mass appropriate for age Neurologic:  No gross focal sensory abnormalities; 5/5 muscle strength to upper and lower extremities. Speech appropriate. Cranial nerves intact Skin:  No rash or ulcers noted Back:  no spinal or paraspinal muscle tenderness or rigidity, no CVA tenderness Psychiatric:  No suicidal or homicidal ideations, appropriate mood and affect Labs: Results:  
Chemistry Recent Labs 08/14/20 
1927 08/13/20 
3983 08/12/20 
0221 * 200* 116*  141 144  
K 3.8 3.9 3.8 * 111 114* CO2 22 23 24 BUN 41* 41* 40* CREA 1.59* 1.67* 1.65* CA 8.5 8.7 7.8* AGAP 8 7 6 BUCR 26* 25* 24* AP  --   --  51  
TP  --   --  5.6* ALB  --   --  1.6*  
GLOB  --   --  4.0 AGRAT  --   --  0.4* CBC w/Diff Recent Labs 08/14/20 
4394 08/13/20 
2942 08/12/20 
0221 WBC 20.4* 10.4 10.6 RBC 3.96* 4.02* 3.57* HGB 12.1* 12.0* 10.6* HCT 35.1* 36.5 32.3*  
* 377 303 GRANS 88* 95* 95* LYMPH 1* 5* 3* EOS 0 0 0 Microbiology No results for input(s): CULT in the last 72 hours. RADIOLOGY: 
 
All available imaging studies/reports in [facility] for this admission were reviewed Total time spent >35 minutes. High complexity decision making was performed during the evaluation of this patient at high risk for decompensation with multiple organ involvement Above mentioned total time spent on reviewing the case/medical record/data/notes/EMR/patient examination/documentation/coordinating care with nurse/consultants, exclusive of procedures with complex decision making performed and > 50% time spent in face to face evaluation. Dr. Willie Peña, Infectious Disease Specialist 
[phone] August 14, 2020 
1:20 PM

## 2020-08-14 NOTE — PROGRESS NOTES
[facility] Hospitalist Group Progress Note Patient: Marykay Mcardle Age: 65 y.o. : 1950 MR#: [id number] SSN: [id number] Date/Time: 2020 Subjective:  
 
Patient is laying in bed, appears confused. Does not appear in any pain. On high flow nasal cannula at 15 L oxygen Assessment/Plan:  
 
COVID-19 pneumonia Acute hypoxic respiratory failure, on 15 L oxygen high flow nasal cannulalikely secondary to COVID-19 pneumonia Type 2 diabetes Hypertension Coronary artery disease, history of CABG Dyslipidemia Acute kidney injury, present on admission, on top of chronic kidney disease stage II. Patient's baseline creatinine appears to be around 1.2 Acute metabolic encephalopathy in the setting of hypoxia and COVID-19 infection Plan Continue antibiotics, follow cultures Continue steroids pulmonary is following Droplet plus precautions ID is following, start remdesivir Heparin drip started Continue aspirin and statin and beta-blocker Nephrology is following, monitor renal function Discussed with patient Palliative care input notedpatient is full code PT OT Disposition pending I Called patient's wife Jerry Wilson at phone #[phone] twiceleft message Discussed with RN, discussed with ID, discussed with pulmonary I have upgraded patient to stepdowndiscussed with nursing supervisor Case discussed with:  [x]Patient  []Family  [x]Nursing  []Case Management DVT Prophylaxis:  []Lovenox  []Hep SQ  []SCDs  []Coumadin   [x]On Heparin gtt Objective:  
VS:  
Visit Vitals /82 Pulse 74 Temp 97.1 °F (36.2 °C) Resp 22 Wt 76.3 kg (168 lb 3.4 oz) Comment: 2020 SpO2 93% BMI 26.35 kg/m² Tmax/24hrs: Temp (24hrs), Av.5 °F (36.4 °C), Min:96.8 °F (36 °C), Max:98.9 °F (37.2 °C) Input/Output:  
 
Intake/Output Summary (Last 24 hours) at 2020 3862 Last data filed at 2020 0657 Gross per 24 hour Intake 720 ml Output 100 ml Net 620 ml General:  Awake, confused Cardiovascular:  S1S2+, RRR Pulmonary: Coarse breath sounds bilaterally GI:  Soft, BS+, NT, ND Extremities:  trace edema Labs:   
Recent Results (from the past 24 hour(s)) GLUCOSE, POC Collection Time: 08/13/20 10:15 PM  
Result Value Ref Range Glucose (POC) 210 (H) 70 - 110 mg/dL PROTHROMBIN TIME + INR Collection Time: 08/14/20  3:25 AM  
Result Value Ref Range Prothrombin time 14.9 11.5 - 15.2 sec INR 1.2 0.8 - 1.2 PTT Collection Time: 08/14/20  3:25 AM  
Result Value Ref Range aPTT 26.9 23.0 - 36.4 SEC FIBRINOGEN Collection Time: 08/14/20  3:25 AM  
Result Value Ref Range Fibrinogen 417 210 - 451 mg/dL D DIMER Collection Time: 08/14/20  3:25 AM  
Result Value Ref Range D DIMER 6.58 (H) <0.46 ug/ml(FEU) FERRITIN Collection Time: 08/14/20  3:25 AM  
Result Value Ref Range Ferritin 691 (H) 8 - 388 NG/ML  
C REACTIVE PROTEIN, QT Collection Time: 08/14/20  3:25 AM  
Result Value Ref Range C-Reactive protein 4.4 (H) 0 - 0.3 mg/dL LD Collection Time: 08/14/20  3:25 AM  
Result Value Ref Range  (H) 81 - 234 U/L  
PROCALCITONIN Collection Time: 08/14/20  3:25 AM  
Result Value Ref Range Procalcitonin 0.33 ng/mL CBC WITH AUTOMATED DIFF Collection Time: 08/14/20  3:25 AM  
Result Value Ref Range WBC 20.4 (H) 4.6 - 13.2 K/uL  
 RBC 3.96 (L) 4.70 - 5.50 M/uL  
 HGB 12.1 (L) 13.0 - 16.0 g/dL HCT 35.1 (L) 36.0 - 48.0 % MCV 88.6 74.0 - 97.0 FL  
 MCH 30.6 24.0 - 34.0 PG  
 MCHC 34.5 31.0 - 37.0 g/dL  
 RDW 13.9 11.6 - 14.5 % PLATELET 791 (H) 623 - 420 K/uL MPV 10.1 9.2 - 11.8 FL  
 NEUTROPHILS 88 (H) 42 - 75 % BAND NEUTROPHILS 5 0 - 5 % LYMPHOCYTES 1 (L) 20 - 51 % MONOCYTES 6 2 - 9 % EOSINOPHILS 0 0 - 5 % BASOPHILS 0 0 - 3 %  
 ABS. NEUTROPHILS 19.0 (H) 1.8 - 8.0 K/UL  
 ABS. LYMPHOCYTES 0.2 (L) 0.8 - 3.5 K/UL  
 ABS. MONOCYTES 1.2 (H) 0 - 1.0 K/UL ABS. EOSINOPHILS 0.0 0.0 - 0.4 K/UL  
 ABS. BASOPHILS 0.0 0.0 - 0.06 K/UL  
 DF AUTOMATED PLATELET COMMENTS Increased Platelets RBC COMMENTS NORMOCYTIC, NORMOCHROMIC METABOLIC PANEL, BASIC Collection Time: 08/14/20  3:25 AM  
Result Value Ref Range Sodium 143 136 - 145 mmol/L Potassium 3.8 3.5 - 5.5 mmol/L Chloride 113 (H) 100 - 111 mmol/L  
 CO2 22 21 - 32 mmol/L Anion gap 8 3.0 - 18 mmol/L Glucose 125 (H) 74 - 99 mg/dL BUN 41 (H) 7.0 - 18 MG/DL Creatinine 1.59 (H) 0.6 - 1.3 MG/DL  
 BUN/Creatinine ratio 26 (H) 12 - 20 GFR est AA 52 (L) >60 ml/min/1.73m2 GFR est non-AA 43 (L) >60 ml/min/1.73m2 Calcium 8.5 8.5 - 10.1 MG/DL MAGNESIUM Collection Time: 08/14/20  3:25 AM  
Result Value Ref Range Magnesium 2.0 1.6 - 2.6 mg/dL POC G3 Collection Time: 08/14/20  4:55 AM  
Result Value Ref Range Device: High Flow Nasal Cannula Flow rate (POC) 15 L/M  
 FIO2 (POC) 100 % pH (POC) 7.42 7.35 - 7.45    
 pCO2 (POC) 27.2 (L) 35.0 - 45.0 MMHG  
 pO2 (POC) 60 (L) 80 - 100 MMHG  
 HCO3 (POC) 17.8 (L) 22 - 26 MMOL/L  
 sO2 (POC) 92 92 - 97 % Base deficit (POC) 7 mmol/L Allens test (POC) YES Total resp. rate 32 Site RIGHT RADIAL Specimen type (POC) ARTERIAL Performed by Penny Cheadle, POC Collection Time: 08/14/20  5:44 AM  
Result Value Ref Range Glucose (POC) 175 (H) 70 - 110 mg/dL GLUCOSE, POC Collection Time: 08/14/20 11:45 AM  
Result Value Ref Range Glucose (POC) 186 (H) 70 - 110 mg/dL GLUCOSE, POC Collection Time: 08/14/20  5:02 PM  
Result Value Ref Range Glucose (POC) 191 (H) 70 - 110 mg/dL Additional Data Reviewed:   
 
Signed By: Leilani Lynch MD   
 August 14, 2020

## 2020-08-14 NOTE — PROGRESS NOTES
Bedside and Verbal shift change report given to Michelle Gregory and Tyesha Nolen RN(oncoming nurse) by Jose Daniel Wilson RN (offgoing nurse). Report included the following information SBAR and Kardex.

## 2020-08-14 NOTE — PROGRESS NOTES
MEWS score of 4 d/t elevated HR and BP. Pt was just given scheduled Metoprolol. Pt on heart monitor. Will monitor VS. 
 
 08/13/20 0712 Vital Signs Temp 98.9 °F (37.2 °C) Pulse (Heart Rate) (!) 115 Resp Rate 22  
O2 Sat (%) 93 % (after rechecked by RT) Level of Consciousness Alert  
BP (!) 174/98 MAP (Calculated) 123 MEWS Score 4

## 2020-08-14 NOTE — PROGRESS NOTES
Problem: Falls - Risk of 
Goal: *Absence of Falls Description: Document Duc Mo Fall Risk and appropriate interventions in the flowsheet. Outcome: Progressing Towards Goal 
Note: Fall Risk Interventions: 
Mobility Interventions: Assess mobility with egress test, Bed/chair exit alarm, Communicate number of staff needed for ambulation/transfer, Patient to call before getting OOB, Utilize walker, cane, or other assistive device Mentation Interventions: Adequate sleep, hydration, pain control, Bed/chair exit alarm, Door open when patient unattended, Evaluate medications/consider consulting pharmacy, Eyeglasses and hearing aids, More frequent rounding, Reorient patient, Room close to nurse's station, Toileting rounds, Update white board Medication Interventions: Assess postural VS orthostatic hypotension, Bed/chair exit alarm, Evaluate medications/consider consulting pharmacy, Patient to call before getting OOB Elimination Interventions: Bed/chair exit alarm, Call light in reach, Elevated toilet seat, Patient to call for help with toileting needs, Stay With Me (per policy), Toileting schedule/hourly rounds, Urinal in reach History of Falls Interventions: Bed/chair exit alarm, Consult care management for discharge planning, Door open when patient unattended, Evaluate medications/consider consulting pharmacy, Investigate reason for fall, Room close to nurse's station, Assess for delayed presentation/identification of injury for 48 hrs (comment for end date), Vital signs minimum Q4HRs X 24 hrs (comment for end date) Problem: Patient Education: Go to Patient Education Activity Goal: Patient/Family Education Outcome: Progressing Towards Goal 
  
Problem: Pressure Injury - Risk of 
Goal: *Prevention of pressure injury Description: Document Tr Scale and appropriate interventions in the flowsheet. Outcome: Progressing Towards Goal 
Note: Pressure Injury Interventions: Sensory Interventions: Assess changes in LOC, Keep linens dry and wrinkle-free, Minimize linen layers, Monitor skin under medical devices, Pad between skin to skin, Pressure redistribution bed/mattress (bed type) Moisture Interventions: Absorbent underpads, Apply protective barrier, creams and emollients, Check for incontinence Q2 hours and as needed, Internal/External urinary devices, Limit adult briefs, Maintain skin hydration (lotion/cream), Minimize layers, Moisture barrier, Offer toileting Q_hr Activity Interventions: Pressure redistribution bed/mattress(bed type) Mobility Interventions: HOB 30 degrees or less, Pressure redistribution bed/mattress (bed type) Nutrition Interventions: Document food/fluid/supplement intake Friction and Shear Interventions: Apply protective barrier, creams and emollients, HOB 30 degrees or less, Lift sheet, Minimize layers Problem: Patient Education: Go to Patient Education Activity Goal: Patient/Family Education Outcome: Progressing Towards Goal 
  
Problem: Diabetes Self-Management Goal: *Disease process and treatment process Description: Define diabetes and identify own type of diabetes; list 3 options for treating diabetes. Outcome: Progressing Towards Goal 
Goal: *Incorporating nutritional management into lifestyle Description: Describe effect of type, amount and timing of food on blood glucose; list 3 methods for planning meals. Outcome: Progressing Towards Goal 
Goal: *Incorporating physical activity into lifestyle Description: State effect of exercise on blood glucose levels. Outcome: Progressing Towards Goal 
Goal: *Developing strategies to promote health/change behavior Description: Define the ABC's of diabetes; identify appropriate screenings, schedule and personal plan for screenings. Outcome: Progressing Towards Goal 
Goal: *Using medications safely Description: State effect of diabetes medications on diabetes; name diabetes medication taking, action and side effects. Outcome: Progressing Towards Goal 
Goal: *Monitoring blood glucose, interpreting and using results Description: Identify recommended blood glucose targets  and personal targets. Outcome: Progressing Towards Goal 
Goal: *Prevention, detection, treatment of acute complications Description: List symptoms of hyper- and hypoglycemia; describe how to treat low blood sugar and actions for lowering  high blood glucose level. Outcome: Progressing Towards Goal 
Goal: *Prevention, detection and treatment of chronic complications Description: Define the natural course of diabetes and describe the relationship of blood glucose levels to long term complications of diabetes. Outcome: Progressing Towards Goal 
Goal: *Developing strategies to address psychosocial issues Description: Describe feelings about living with diabetes; identify support needed and support network Outcome: Progressing Towards Goal 
Goal: *Insulin pump training Outcome: Progressing Towards Goal 
Goal: *Sick day guidelines Outcome: Progressing Towards Goal 
Goal: *Patient Specific Goal (EDIT GOAL, INSERT TEXT) Outcome: Progressing Towards Goal 
  
Problem: Patient Education: Go to Patient Education Activity Goal: Patient/Family Education Outcome: Progressing Towards Goal 
  
Problem: Patient Education: Go to Patient Education Activity Goal: Patient/Family Education Outcome: Progressing Towards Goal 
  
Problem: Patient Education: Go to Patient Education Activity Goal: Patient/Family Education Outcome: Progressing Towards Goal 
  
Problem: Non-Violent Restraints Goal: *Removal from restraints as soon as assessed to be safe Outcome: Progressing Towards Goal 
Goal: *No harm/injury to patient while restraints in use Outcome: Progressing Towards Goal 
Goal: *Patient's dignity will be maintained Outcome: Progressing Towards Goal 
Goal: *Patient Specific Goal (EDIT GOAL, INSERT TEXT) Outcome: Progressing Towards Goal 
Goal: Non-violent Restaints:Standard Interventions Outcome: Progressing Towards Goal 
Goal: Non-violent Restraints:Patient Interventions Outcome: Progressing Towards Goal 
Goal: Patient/Family Education Outcome: Progressing Towards Goal

## 2020-08-14 NOTE — PROGRESS NOTES
MEWS score of 3 d/t elevated HR and BP. Pt om Metoprolol, heart monitor on. Will continue to monitor VS. 
 
 08/13/20 2016 Vital Signs Temp 97.7 °F (36.5 °C) Temp Source Oral  
Pulse (Heart Rate) (!) 114 Heart Rate Source Brachial  
Resp Rate 16  
O2 Sat (%) 90 % Level of Consciousness Alert  
BP (!) 172/104 
(nurse notified) MAP (Calculated) 127 BP 1 Method Automatic  
BP 1 Location Left arm BP Patient Position At rest  
MEWS Score 3 Patient Observation Observations alert, vs  
Activity In bed

## 2020-08-14 NOTE — PROGRESS NOTES
[employer] [facility] Pulmonary, Critical Care, and Sleep Medicine Pulmonary Medicine Progress Note Name: Timo Herndon MRN: [id number] : 1950 Hospital: [facility] Date: 2020 Subjective: 
Pt vomited this AM, states he feels nauseous. Was increased to 15L overnight after worsening hypoxia. Patient Active Problem List  
Diagnosis Code  
 HTN (hypertension) I10  
 Gout M10.9  Hyperlipemia E78.5  Nocturia R35.1  Elevated troponin R79.89  Back pain M54.9  Acute coronary syndrome (HCC) I24.9  S/P cardiac catheterization Z98.890  
 NSTEMI (non-ST elevated myocardial infarction) (HCC) I21.4  
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  
 Secondary hyperparathyroidism of renal origin ([facility] N25.81  
 Acute hypoxemic respiratory failure ([facility]) J96.01  
 Suspected COVID-19 virus infection Z20.828  Acute-on-chronic kidney injury ([facility] N17.9, N18.9 Assessment: 
Acute Hypoxic Respiratory Failure - 2/2 covid19 pneumonia - currently on NC [id number] Pneumonia - B/L infiltrates, fevers. Mildly elevated inflammatory markers Hx of recent 3V CABG LOREN on CKD 
- improving.  
  
Impression/Plan: 
-Continue steroids - increase to full [facility] 
- discussed with ID, will start remdesivir, consent for plasma - Wean fio2 as tolerated, would prefer a room with continuous pulse ox - CXR in AM 
 
FiO2 to keep SpO2 >=92%, HOB >=30 degree, aspiration precautions, aggressive pulmonary toileting, incentive spirometry. Other issues management by primary team and respective consultants. Events and notes from last 24 hours reviewed. Discussed with patient and family, answered all questions to their satisfaction. Care plan discussed with nursing. Labs and images personally seen and available reports reviewed All current medicines are reviewed Medications- Current: 
Current Facility-Administered Medications Medication Dose Route Frequency  [START ON 8/15/2020] remdesivir 100 mg in 0.9% sodium chloride 250 mL IVPB  100 mg IntraVENous Q24H  
 heparin 25,000 units in D5W 250 ml infusion  18-36 Units/kg/hr IntraVENous TITRATE  ipratropium-albuterol (COMBIVENT RESPIMAT) 20 mcg-100 mcg inhalation spray  2 Puff Inhalation Q4H PRN  
 methylPREDNISolone (PF) (SOLU-MEDROL) injection 60 mg  60 mg IntraVENous Q12H  
 famotidine (PEPCID) tablet 20 mg  20 mg Oral DAILY  hydrALAZINE (APRESOLINE) tablet 10 mg  10 mg Oral Q6H PRN  
 ascorbic acid (vitamin C) (VITAMIN C) tablet 500 mg  500 mg Oral TID  zinc sulfate (ZINCATE) 220 (50) mg capsule 1 Cap  1 Cap Oral DAILY  atorvastatin (LIPITOR) tablet 40 mg  40 mg Oral QHS  folic acid (FOLVITE) tablet 1 mg  1 mg Oral DAILY  melatonin tablet 3 mg  3 mg Oral QHS  metoprolol succinate (TOPROL-XL) tablet 100 mg  100 mg Oral QHS  aspirin chewable tablet 81 mg  81 mg Oral DAILY And  
 calcium carbonate (CALTREX) tablet 300 mg [elemental]  300 mg Oral DAILY  cholecalciferol (VITAMIN D3) capsule 5,000 Units  5,000 Units Oral DAILY  insulin lispro (HUMALOG) injection   SubCUTAneous AC&HS  
 glucose chewable tablet 16 g  4 Tab Oral PRN  
 glucagon (GLUCAGEN) injection 1 mg  1 mg IntraMUSCular PRN  
 dextrose (D50W) injection syrg 12.5-25 g  25-50 mL IntraVENous PRN  
 sodium chloride (NS) flush 5-10 mL  5-10 mL IntraVENous PRN  
 cefTRIAXone (ROCEPHIN) 2 g in sterile water (preservative free) 20 mL IV syringe  2 g IntraVENous Q24H  
 azithromycin (ZITHROMAX) 500 mg in  mL  500 mg IntraVENous Q24H  
 acetaminophen (TYLENOL) tablet 650 mg  650 mg Oral Q6H PRN  
 Or  
 acetaminophen (TYLENOL) suppository 650 mg  650 mg Rectal Q6H PRN  
 sodium chloride (NS) flush 5-40 mL  5-40 mL IntraVENous Q8H  
 sodium chloride (NS) flush 5-40 mL  5-40 mL IntraVENous PRN  
 senna (SENOKOT) tablet 17.2 mg  2 Tab Oral QHS  bisacodyL (DULCOLAX) suppository 10 mg  10 mg Rectal DAILY PRN  
 ondansetron (ZOFRAN) injection 4 mg  4 mg IntraVENous Q4H PRN Objective: 
Vital Signs:   
Visit Vitals /76 Pulse 64 Temp 97.2 °F (36.2 °C) Resp 22 Wt 76.3 kg (168 lb 3.4 oz) Comment: 2020 SpO2 90% BMI 26.35 kg/m² O2 Device: Nasal cannula O2 Flow Rate (L/min): 6 l/min Temp (24hrs), Av.4 °F (36.3 °C), Min:96.6 °F (35.9 °C), Max:98.9 °F (37.2 °C) Intake/Output:  
Last shift:      No intake/output data recorded. Last 3 shifts:  1901 -  0700 In: 1200 [P.O.:600; I.V.:600] Out: 1200 [Urine:1200] Intake/Output Summary (Last 24 hours) at 2020 1303 Last data filed at 2020 4924 Gross per 24 hour Intake 720 ml Output 500 ml Net 220 ml Physical Exam:  
 
General/Neurology: Alert, Awake Head:   Normocephalic, without obvious abnormality Eye:   PERRL, EOM intact Oral:  Mucus membranes moist 
Lung:   B/l air entry fair. No rales. No rhonchi. No wheezing Heart:   S1 S2 present Abdomen:  Soft, non tender, BS+nt Extremities:  No pedal edema. Skin:   Dry, intact Data: 
   
Recent Results (from the past 24 hour(s)) GLUCOSE, POC Collection Time: 20  5:25 PM  
Result Value Ref Range Glucose (POC) 228 (H) 70 - 110 mg/dL GLUCOSE, POC Collection Time: 20 10:15 PM  
Result Value Ref Range Glucose (POC) 210 (H) 70 - 110 mg/dL PROTHROMBIN TIME + INR Collection Time: 20  3:25 AM  
Result Value Ref Range Prothrombin time 14.9 11.5 - 15.2 sec INR 1.2 0.8 - 1.2 PTT Collection Time: 20  3:25 AM  
Result Value Ref Range aPTT 26.9 23.0 - 36.4 SEC FIBRINOGEN  
 Collection Time: 08/14/20  3:25 AM  
Result Value Ref Range Fibrinogen 417 210 - 451 mg/dL D DIMER Collection Time: 08/14/20  3:25 AM  
Result Value Ref Range D DIMER 6.58 (H) <0.46 ug/ml(FEU) FERRITIN Collection Time: 08/14/20  3:25 AM  
Result Value Ref Range Ferritin 691 (H) 8 - 388 NG/ML  
C REACTIVE PROTEIN, QT Collection Time: 08/14/20  3:25 AM  
Result Value Ref Range C-Reactive protein 4.4 (H) 0 - 0.3 mg/dL LD Collection Time: 08/14/20  3:25 AM  
Result Value Ref Range  (H) 81 - 234 U/L  
PROCALCITONIN Collection Time: 08/14/20  3:25 AM  
Result Value Ref Range Procalcitonin 0.33 ng/mL CBC WITH AUTOMATED DIFF Collection Time: 08/14/20  3:25 AM  
Result Value Ref Range WBC 20.4 (H) 4.6 - 13.2 K/uL  
 RBC 3.96 (L) 4.70 - 5.50 M/uL  
 HGB 12.1 (L) 13.0 - 16.0 g/dL HCT 35.1 (L) 36.0 - 48.0 % MCV 88.6 74.0 - 97.0 FL  
 MCH 30.6 24.0 - 34.0 PG  
 MCHC 34.5 31.0 - 37.0 g/dL  
 RDW 13.9 11.6 - 14.5 % PLATELET 524 (H) 054 - 420 K/uL MPV 10.1 9.2 - 11.8 FL  
 NEUTROPHILS 88 (H) 42 - 75 % BAND NEUTROPHILS 5 0 - 5 % LYMPHOCYTES 1 (L) 20 - 51 % MONOCYTES 6 2 - 9 % EOSINOPHILS 0 0 - 5 % BASOPHILS 0 0 - 3 %  
 ABS. NEUTROPHILS 19.0 (H) 1.8 - 8.0 K/UL  
 ABS. LYMPHOCYTES 0.2 (L) 0.8 - 3.5 K/UL  
 ABS. MONOCYTES 1.2 (H) 0 - 1.0 K/UL  
 ABS. EOSINOPHILS 0.0 0.0 - 0.4 K/UL  
 ABS. BASOPHILS 0.0 0.0 - 0.06 K/UL  
 DF AUTOMATED PLATELET COMMENTS Increased Platelets RBC COMMENTS NORMOCYTIC, NORMOCHROMIC METABOLIC PANEL, BASIC Collection Time: 08/14/20  3:25 AM  
Result Value Ref Range Sodium 143 136 - 145 mmol/L Potassium 3.8 3.5 - 5.5 mmol/L Chloride 113 (H) 100 - 111 mmol/L  
 CO2 22 21 - 32 mmol/L Anion gap 8 3.0 - 18 mmol/L Glucose 125 (H) 74 - 99 mg/dL BUN 41 (H) 7.0 - 18 MG/DL Creatinine 1.59 (H) 0.6 - 1.3 MG/DL  
 BUN/Creatinine ratio 26 (H) 12 - 20 GFR est AA 52 (L) >60 ml/min/1.73m2 GFR est non-AA 43 (L) >60 ml/min/1.73m2 Calcium 8.5 8.5 - 10.1 MG/DL MAGNESIUM Collection Time: 08/14/20  3:25 AM  
Result Value Ref Range Magnesium 2.0 1.6 - 2.6 mg/dL POC G3 Collection Time: 08/14/20  4:55 AM  
Result Value Ref Range Device: High Flow Nasal Cannula Flow rate (POC) 15 L/M  
 FIO2 (POC) 100 % pH (POC) 7.42 7.35 - 7.45    
 pCO2 (POC) 27.2 (L) 35.0 - 45.0 MMHG  
 pO2 (POC) 60 (L) 80 - 100 MMHG  
 HCO3 (POC) 17.8 (L) 22 - 26 MMOL/L  
 sO2 (POC) 92 92 - 97 % Base deficit (POC) 7 mmol/L Allens test (POC) YES Total resp. rate 32 Site RIGHT RADIAL Specimen type (POC) ARTERIAL Performed by Kasey Acosta, POC Collection Time: 08/14/20  5:44 AM  
Result Value Ref Range Glucose (POC) 175 (H) 70 - 110 mg/dL GLUCOSE, POC Collection Time: 08/14/20 11:45 AM  
Result Value Ref Range Glucose (POC) 186 (H) 70 - 110 mg/dL Chemistry Recent Labs 08/14/20 
0325 08/13/20 
7810 08/12/20 
0221 08/11/20 
1433 * 200* 116* 203*  141 144 140  
K 3.8 3.9 3.8 4.0  
* 111 114* 110  
CO2 22 23 24 23 BUN 41* 41* 40* 39* CREA 1.59* 1.67* 1.65* 1.67* CA 8.5 8.7 7.8* 7.7* MG 2.0  --   --   --   
PHOS  --   --   --  4.4 AGAP 8 7 6 7 BUCR 26* 25* 24* 23* AP  --   --  51  --   
TP  --   --  5.6*  --   
ALB  --   --  1.6*  --   
GLOB  --   --  4.0  --   
AGRAT  --   --  0.4*  --   
 
 
CBC w/Diff Recent Labs 08/14/20 
9885 08/13/20 
7981 08/12/20 
0221 WBC 20.4* 10.4 10.6 RBC 3.96* 4.02* 3.57* HGB 12.1* 12.0* 10.6* HCT 35.1* 36.5 32.3*  
* 377 303 GRANS 88* 95* 95* LYMPH 1* 5* 3* EOS 0 0 0 ABG Recent Labs 08/14/20 
0455 PHI 7.42  
PCO2I 27.2*  
PO2I 60* HCO3I 17.8*  
FIO2I 100 Micro No results for input(s): SDES, CULT in the last 72 hours. No results for input(s): CULT in the last 72 hours. CT (Most Recent) Results from [facility] Encounter encounter on 03/02/20 CTA CHEST ABD PELV W WO CONT Narrative CT without contrast and CT angiogram of the chest, abdomen and pelvis HISTORY: Severe acute midthoracic back pain and vomiting. Elevated troponin. Clinical concern of aortic dissection and PE 
 
COMPARISON: None TECHNIQUE: Multidetector helical CT is carried out from the thoracic inlet to 
the lesser trochanters before and after nonionic IV contrast administration. 3D postprocessed images, including surface shaded display, will produce for this 
exam, permanently archived, and interpreted. Parenchymal organ imaging will be 
limited by arterial phase contrast administration. All CT scans at this facility performed using dose optimization techniques as 
appreciated to a performed exam, to include automated exposure control, 
adjustment of the mA and or KU according to patient size (including appropriate 
matching for site specific examination), or use of iterative reconstruction 
technique. CONTRAST: 100 cc Isovue 370. FINDINGS: 
 
CT ANGIOGRAM: The contrast bolus is mainly in the pulmonary arteries with 
suboptimal opacification to the aorta with somehow limited vascular evaluation. THORACIC AORTA: Patent and normal in caliber. No evidence of aortic dissection 
or aneurysmal dilatation. A few atherosclerotic calcified plaques scattered at 
the arch and descending. The caliber measurements of the aorta: 
 3.4 x 3.9 x 4.4 cm at the sinus of Valsalva; 
 3.4 x 3.4 cm at the sinotubular junction of the aortic root; 
 3.7 x 3.8 cm at maximal ascending aorta; 
 3.3 x 3.3 cm at mid aortic arch after the last vessel takeoff; 
 2.7 x 2.7 cm at proximal descending aorta at the level of left atrium; 
 2.6 x 2.7 cm at the mid descending aorta; 
 2.3 x 2.6 cm at distal descending aorta at the diaphragmatic hiatus. THE GREAT VESSELS IN THE ARCH: Not well opacified by contrast due to the early scan as mentioned above. There are grossly patent with normal caliber. No 
significant atherosclerotic disease or stenosis. THE PULMONARY ARTERIES: Patent. No evidence of intraluminal filling defect to 
suggest pulmonary embolism. No pulmonary arterial dilatation. ABDOMINAL AORTA: Normal in caliber. No dissection or aneurysmal dilatation. Mild 
atherosclerotic calcified plaques at the mid and distal portion. ILIAC ARTERIES: Patent. Mild atherosclerotic calcified plaques present without 
stenosis. CELIAC ARTERY: Patent. Few small calcified plaques at the origin without 
stenosis. SMA: THERE IS A FOCAL STENOSIS at the ostium which estimated about 50-75%, 
likely due to soft plaque. RENAL ARTERIES: There are left main and accessory left of renal arteries. Short 
segmental stenosis of proximal left main renal artery is estimated about 75%. Patent left accessory artery. Right main and accessoryrenal arteries appear 
patent. [place] CT CHEST:  
 
LUNG PARENCHYMA: The lung parenchyma appears clear. No pulmonary nodule, mass or 
infiltration identified. THYROID: Unremarkable. MEDIASTINUM:  No adenopathy. THE HEART AND THE PERICARDIUM: . Unremarkable. PLEURAL SPACES AND CHEST WALL: No significant pleural pathology. CT ABDOMEN AND PELVIS:  
 
ABDOMINAL VISCERA: The liver, spleen, pancreas, gallbladder and both adrenal 
glands appear unremarkable. The stomach is poorly distended. The small and large 
bowel are nondilated. PERITONEUM/RETROPERITONEUM: No significant adenopathy. GENITOURINARY ORGANS: Benign cortical cysts identified in bilateral kidneys, the 
larger one in right kidney in posterior midpole measures 3.2 x 3.5 cm and the 
left renal cyst in lower pole measures 1.9 x 2.2 cm. Two 3 mm nonobstructive 
calculi in the lower pole of right kidney. No hydronephrosis. The bladder 
appears normal. The prostate is moderately enlarged. OTHERS: No free air or free fluid. CT OSSEOUS STRUCTURES:  Moderate spondylosis along the spine and moderate facet 
arthropathy at lower lumbar spine. Impression IMPRESSION:  
 
1. No aortic dissection or aneurysm. Very mild atherosclerotic aortic disease. 2. 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 3. Short segmental stenosis at proximal left the main renal artery, estimated 
about 75%. 4. No evidence of PE. 
 
5. Bilateral renal cysts. Nonobstructive right renal calculi. 6. Moderate prostate enlargement. The preliminary read without 3-D reconstruction was provided to the  the 
ordering physician by me upon the initial interpretation at approximately  1506  
hours, 3/2/20. Thank you for your referral.   
 
 
XR (Most Recent). CXR reviewed by me and compared with previous CXR Results from [facility] Encounter encounter on 08/10/20 XR CHEST SNGL V  
 Narrative EXAM: CHEST PORTABLE CLINICAL HISTORY/INDICATION: Respiratory failure, COVID-19 infection COMPARISON: 8/10/2020. TECHNIQUE: Portable AP view was obtained. FINDINGS:  
 
LINES/DEVICES: Median sternotomy wires and multiple mediastinal surgical clips. Multiple EKG leads overlie the patient. HEART/MEDIASTINUM: Cardiac silhouette is borderline enlarged, unchanged. LUNGS: Redemonstration of patchy bilateral airspace opacities, including more 
confluent consolidation at the peripheral left upper to mid lung. No pleural 
effusion or pneumothorax. SOFT TISSUES: Unremarkable. BONES: Unremarkable for age. Impression IMPRESSION: 
1. No significant interval change in multifocal bilateral infiltrates, left 
greater than right. Thank you for enabling us to participate in the care of this patient. See my orders for details Total care time exclusive of procedures with complex decision making, coordination of care and counseling patient performed and > 50% time spent in face to face evaluation as mentioned above. Sherman Da Silva MD 
Critical Care Medicine

## 2020-08-14 NOTE — PROGRESS NOTES
Patient confused today. Oxygen increased to 6L/NC this am will tray and wean down depending on oxygen saturation. Patient has had 3 condom catheters today which he pulled out 2 and one came off with BM. Have reoriented patient hourly. No distress noted.

## 2020-08-15 NOTE — PROGRESS NOTES
Problem: Falls - Risk of 
Goal: *Absence of Falls Description: Document Maninder Stade Fall Risk and appropriate interventions in the flowsheet. Outcome: Progressing Towards Goal 
Note: Fall Risk Interventions: 
Mobility Interventions: Assess mobility with egress test 
 
Mentation Interventions: Adequate sleep, hydration, pain control, Reorient patient Medication Interventions: Bed/chair exit alarm, Teach patient to arise slowly, Patient to call before getting OOB Elimination Interventions: Call light in reach, Bed/chair exit alarm History of Falls Interventions: Bed/chair exit alarm, Evaluate medications/consider consulting pharmacy Problem: Patient Education: Go to Patient Education Activity Goal: Patient/Family Education Outcome: Progressing Towards Goal 
  
Problem: Pressure Injury - Risk of 
Goal: *Prevention of pressure injury Description: Document Tr Scale and appropriate interventions in the flowsheet. Outcome: Progressing Towards Goal 
Note: Pressure Injury Interventions: 
Sensory Interventions: Assess changes in LOC Moisture Interventions: Absorbent underpads Activity Interventions: Assess need for specialty bed, Pressure redistribution bed/mattress(bed type) Mobility Interventions: Pressure redistribution bed/mattress (bed type), Assess need for specialty bed Nutrition Interventions: Document food/fluid/supplement intake Friction and Shear Interventions: HOB 30 degrees or less Problem: Patient Education: Go to Patient Education Activity Goal: Patient/Family Education Outcome: Progressing Towards Goal 
  
Problem: Diabetes Self-Management Goal: *Disease process and treatment process Description: Define diabetes and identify own type of diabetes; list 3 options for treating diabetes. Outcome: Progressing Towards Goal 
Goal: *Incorporating nutritional management into lifestyle Description: Describe effect of type, amount and timing of food on blood glucose; list 3 methods for planning meals. Outcome: Progressing Towards Goal 
Goal: *Incorporating physical activity into lifestyle Description: State effect of exercise on blood glucose levels. Outcome: Progressing Towards Goal 
Goal: *Developing strategies to promote health/change behavior Description: Define the ABC's of diabetes; identify appropriate screenings, schedule and personal plan for screenings. Outcome: Progressing Towards Goal 
Goal: *Using medications safely Description: State effect of diabetes medications on diabetes; name diabetes medication taking, action and side effects. Outcome: Progressing Towards Goal 
Goal: *Monitoring blood glucose, interpreting and using results Description: Identify recommended blood glucose targets  and personal targets. Outcome: Progressing Towards Goal 
Goal: *Prevention, detection, treatment of acute complications Description: List symptoms of hyper- and hypoglycemia; describe how to treat low blood sugar and actions for lowering  high blood glucose level. Outcome: Progressing Towards Goal 
Goal: *Prevention, detection and treatment of chronic complications Description: Define the natural course of diabetes and describe the relationship of blood glucose levels to long term complications of diabetes. Outcome: Progressing Towards Goal 
Goal: *Developing strategies to address psychosocial issues Description: Describe feelings about living with diabetes; identify support needed and support network Outcome: Progressing Towards Goal 
Goal: *Insulin pump training Outcome: Progressing Towards Goal 
Goal: *Sick day guidelines Outcome: Progressing Towards Goal 
Goal: *Patient Specific Goal (EDIT GOAL, INSERT TEXT) Outcome: Progressing Towards Goal 
  
Problem: Patient Education: Go to Patient Education Activity Goal: Patient/Family Education Outcome: Progressing Towards Goal 
  
Problem: Patient Education: Go to Patient Education Activity Goal: Patient/Family Education Outcome: Progressing Towards Goal 
  
Problem: Patient Education: Go to Patient Education Activity Goal: Patient/Family Education Outcome: Progressing Towards Goal 
  
Problem: Non-Violent Restraints Goal: *Removal from restraints as soon as assessed to be safe Outcome: Progressing Towards Goal 
Goal: *No harm/injury to patient while restraints in use Outcome: Progressing Towards Goal 
Goal: *Patient's dignity will be maintained Outcome: Progressing Towards Goal 
Goal: *Patient Specific Goal (EDIT GOAL, INSERT TEXT) Outcome: Progressing Towards Goal 
Goal: Non-violent Restaints:Standard Interventions Outcome: Progressing Towards Goal 
Goal: Non-violent Restraints:Patient Interventions Outcome: Progressing Towards Goal 
Goal: Patient/Family Education Outcome: Progressing Towards Goal 
  
Problem: Patient Education: Go to Patient Education Activity Goal: Patient/Family Education Outcome: Progressing Towards Goal 
  
Problem: Pneumonia: Day 4 Goal: Off Pathway (Use only if patient is Off Pathway) Outcome: Progressing Towards Goal 
Goal: Activity/Safety Outcome: Progressing Towards Goal 
Goal: Nutrition/Diet Outcome: Progressing Towards Goal 
Goal: Discharge Planning Outcome: Progressing Towards Goal 
Goal: Medications Outcome: Progressing Towards Goal 
Goal: Respiratory Outcome: Progressing Towards Goal 
Goal: Treatments/Interventions/Procedures Outcome: Progressing Towards Goal 
Goal: Psychosocial 
Outcome: Progressing Towards Goal

## 2020-08-15 NOTE — PROGRESS NOTES
08/15/20 0900 Oxygen Therapy O2 Sat (%) (!) 85 % Pulse via Oximetry 100 beats per minute O2 Device Hi flow nasal cannula 
(salter) O2 Flow Rate (L/min) 15 l/min Called by RN for decrease in saturations. Pt found on 15 liter salter. Pt confused calling out with multiple complaints such as back pain , sob. Pt saturations were 85.  Pt moved to other side of stepdown, placed on vapotherm at 100% and and 40lpm.

## 2020-08-15 NOTE — PROGRESS NOTES
1915: Bedside and Verbal shift change report given to Beatrice Martinez RN (oncoming nurse) by Kami Denis RN (offgoing nurse). Report included the following information SBAR, Kardex, Intake/Output, MAR, Recent Results and Cardiac Rhythm NSR/TACHY.

## 2020-08-15 NOTE — PROGRESS NOTES
[employer] [facility] Pulmonary, Critical Care, and Sleep Medicine Pulmonary Medicine Progress Note Name: Timo Herndon MRN: [id number] : 1950 Hospital: [facility] Date: 8/15/2020 Subjective: 
Pt moved to stepdown, continued to be hypoxic, placed on HFNC. Currently at 100%. Seems delirious, asking the same questions over and over. VSS. Patient Active Problem List  
Diagnosis Code  
 HTN (hypertension) I10  
 Gout M10.9  Hyperlipemia E78.5  Nocturia R35.1  Elevated troponin R79.89  Back pain M54.9  Acute coronary syndrome (HCC) I24.9  S/P cardiac catheterization Z98.890  
 NSTEMI (non-ST elevated myocardial infarction) (HCC) I21.4  
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  
 Secondary hyperparathyroidism of renal origin ([facility] [address] N25.81  
 Acute hypoxemic respiratory failure ([facility]) J96.01  
 Suspected COVID-19 virus infection Z20.828  Acute-on-chronic kidney injury ([facility] [address] N17.9, N18.9 Assessment: 
Acute Hypoxic Respiratory Failure - 2/2 covid19 pneumonia - currently on NC [id number] Pneumonia - B/L infiltrates, fevers. Mildly elevated inflammatory markers Hx of recent 3V CABG LOREN on CKD 
- improving.  
  
Impression/Plan: 
- CXR markedly worse 
-Continue steroids and AC 
- On remdesivir, consent for plasma - Wean HFNC as tolerated, if further decline, would warrant flolan or intubation depending on mental status 
- PRN bipap- settings ordered Critically ill, monitor closely for further decline FiO2 to keep SpO2 >=92%, HOB >=30 degree, aspiration precautions, aggressive pulmonary toileting, incentive spirometry. Other issues management by primary team and respective consultants. Events and notes from last 24 hours reviewed. Discussed with patient and family, answered all questions to their satisfaction. Care plan discussed with nursing. Labs and images personally seen and available reports reviewed All current medicines are reviewed Medications- Current: 
Current Facility-Administered Medications Medication Dose Route Frequency  0.9% sodium chloride infusion 250 mL  250 mL IntraVENous PRN  
 remdesivir 100 mg in 0.9% sodium chloride 250 mL IVPB  100 mg IntraVENous Q24H  
 heparin 25,000 units in D5W 250 ml infusion  18-36 Units/kg/hr IntraVENous TITRATE  ipratropium-albuterol (COMBIVENT RESPIMAT) 20 mcg-100 mcg inhalation spray  2 Puff Inhalation Q4H PRN  
 methylPREDNISolone (PF) (SOLU-MEDROL) injection 60 mg  60 mg IntraVENous Q12H  
 famotidine (PEPCID) tablet 20 mg  20 mg Oral DAILY  hydrALAZINE (APRESOLINE) tablet 10 mg  10 mg Oral Q6H PRN  
 ascorbic acid (vitamin C) (VITAMIN C) tablet 500 mg  500 mg Oral TID  zinc sulfate (ZINCATE) 220 (50) mg capsule 1 Cap  1 Cap Oral DAILY  atorvastatin (LIPITOR) tablet 40 mg  40 mg Oral QHS  folic acid (FOLVITE) tablet 1 mg  1 mg Oral DAILY  melatonin tablet 3 mg  3 mg Oral QHS  metoprolol succinate (TOPROL-XL) tablet 100 mg  100 mg Oral QHS  aspirin chewable tablet 81 mg  81 mg Oral DAILY And  
 calcium carbonate (CALTREX) tablet 300 mg [elemental]  300 mg Oral DAILY  cholecalciferol (VITAMIN D3) capsule 5,000 Units  5,000 Units Oral DAILY  insulin lispro (HUMALOG) injection   SubCUTAneous AC&HS  
 glucose chewable tablet 16 g  4 Tab Oral PRN  
 glucagon (GLUCAGEN) injection 1 mg  1 mg IntraMUSCular PRN  
 dextrose (D50W) injection syrg 12.5-25 g  25-50 mL IntraVENous PRN  
 sodium chloride (NS) flush 5-10 mL  5-10 mL IntraVENous PRN  
  cefTRIAXone (ROCEPHIN) 2 g in sterile water (preservative free) 20 mL IV syringe  2 g IntraVENous Q24H  
 azithromycin (ZITHROMAX) 500 mg in  mL  500 mg IntraVENous Q24H  
 acetaminophen (TYLENOL) tablet 650 mg  650 mg Oral Q6H PRN Or  
 acetaminophen (TYLENOL) suppository 650 mg  650 mg Rectal Q6H PRN  
 sodium chloride (NS) flush 5-40 mL  5-40 mL IntraVENous Q8H  
 sodium chloride (NS) flush 5-40 mL  5-40 mL IntraVENous PRN  
 senna (SENOKOT) tablet 17.2 mg  2 Tab Oral QHS  bisacodyL (DULCOLAX) suppository 10 mg  10 mg Rectal DAILY PRN  
 ondansetron (ZOFRAN) injection 4 mg  4 mg IntraVENous Q4H PRN Objective: 
Vital Signs:   
Visit Vitals BP (!) 156/91 (BP 1 Location: Left arm, BP Patient Position: At rest) Pulse 96 Temp 97.2 °F (36.2 °C) Resp 27 Wt 71.9 kg (158 lb 9.6 oz) SpO2 91% BMI 24.84 kg/m² O2 Device: Hi flow nasal cannula(salter) O2 Flow Rate (L/min): 40 l/min Temp (24hrs), Av.4 °F (36.3 °C), Min:97 °F (36.1 °C), Max:98.5 °F (36.9 °C) Intake/Output:  
Last shift:      08/15 07 - 08/15 1900 In: 250 [I.V.:250] Out: - Last 3 shifts: 1901 - 08/15 0700 In: 1475.1 [P.O.:720; I.V.:755.1] Out: 500 [Urine:500] Intake/Output Summary (Last 24 hours) at 8/15/2020 1452 Last data filed at 8/15/2020 1015 Gross per 24 hour Intake 885.1 ml Output  Net 885.1 ml Physical Exam:  
 
General/Neurology: Alert, Awake Head:   Normocephalic, without obvious abnormality Eye:   PERRL, EOM intact Oral:  Mucus membranes moist 
Lung:   B/l air entry fair. No rales. No rhonchi. No wheezing Heart:   S1 S2 present Abdomen:  Soft, non tender, BS+nt Extremities:  No pedal edema. Skin:   Dry, intact Data: 
   
Recent Results (from the past 24 hour(s)) GLUCOSE, POC Collection Time: 20  5:02 PM  
Result Value Ref Range Glucose (POC) 191 (H) 70 - 110 mg/dL CBC WITH AUTOMATED DIFF  Collection Time: 20  5:04 PM  
 Result Value Ref Range WBC 14.3 (H) 4.6 - 13.2 K/uL  
 RBC 3.95 (L) 4.70 - 5.50 M/uL  
 HGB 12.2 (L) 13.0 - 16.0 g/dL HCT 35.2 (L) 36.0 - 48.0 % MCV 89.1 74.0 - 97.0 FL  
 MCH 30.9 24.0 - 34.0 PG  
 MCHC 34.7 31.0 - 37.0 g/dL  
 RDW 13.8 11.6 - 14.5 % PLATELET 872 947 - 147 K/uL MPV 10.6 9.2 - 11.8 FL  
 NEUTROPHILS 90 (H) 40 - 73 % LYMPHOCYTES 4 (L) 21 - 52 % MONOCYTES 6 3 - 10 % EOSINOPHILS 0 0 - 5 % BASOPHILS 0 0 - 2 %  
 ABS. NEUTROPHILS 12.8 (H) 1.8 - 8.0 K/UL  
 ABS. LYMPHOCYTES 0.6 (L) 0.9 - 3.6 K/UL  
 ABS. MONOCYTES 0.9 0.05 - 1.2 K/UL  
 ABS. EOSINOPHILS 0.0 0.0 - 0.4 K/UL  
 ABS. BASOPHILS 0.0 0.0 - 0.1 K/UL  
 DF AUTOMATED    
PTT Collection Time: 08/14/20  5:04 PM  
Result Value Ref Range aPTT 35.1 23.0 - 36.4 SEC GLUCOSE, POC Collection Time: 08/14/20  9:29 PM  
Result Value Ref Range Glucose (POC) 207 (H) 70 - 110 mg/dL GLUCOSE, POC Collection Time: 08/15/20  8:11 AM  
Result Value Ref Range Glucose (POC) 204 (H) 70 - 110 mg/dL PROTHROMBIN TIME + INR Collection Time: 08/15/20 11:00 AM  
Result Value Ref Range Prothrombin time 16.4 (H) 11.5 - 15.2 sec INR 1.3 (H) 0.8 - 1.2 PTT Collection Time: 08/15/20 11:00 AM  
Result Value Ref Range aPTT 132.3 (H) 23.0 - 36.4 SEC FIBRINOGEN Collection Time: 08/15/20 11:00 AM  
Result Value Ref Range Fibrinogen 329 210 - 451 mg/dL D DIMER Collection Time: 08/15/20 11:00 AM  
Result Value Ref Range D DIMER 3.31 (H) <0.46 ug/ml(FEU) FERRITIN Collection Time: 08/15/20 11:00 AM  
Result Value Ref Range Ferritin 639 (H) 8 - 388 NG/ML  
C REACTIVE PROTEIN, QT Collection Time: 08/15/20 11:00 AM  
Result Value Ref Range C-Reactive protein 2.1 (H) 0 - 0.3 mg/dL LD Collection Time: 08/15/20 11:00 AM  
Result Value Ref Range  (H) 81 - 234 U/L  
PROCALCITONIN Collection Time: 08/15/20 11:00 AM  
Result Value Ref Range Procalcitonin 0.13 ng/mL METABOLIC PANEL, COMPREHENSIVE Collection Time: 08/15/20 11:00 AM  
Result Value Ref Range Sodium 144 136 - 145 mmol/L Potassium 3.2 (L) 3.5 - 5.5 mmol/L Chloride 112 (H) 100 - 111 mmol/L  
 CO2 23 21 - 32 mmol/L Anion gap 9 3.0 - 18 mmol/L Glucose 192 (H) 74 - 99 mg/dL BUN 46 (H) 7.0 - 18 MG/DL Creatinine 1.55 (H) 0.6 - 1.3 MG/DL  
 BUN/Creatinine ratio 30 (H) 12 - 20 GFR est AA 54 (L) >60 ml/min/1.73m2 GFR est non-AA 44 (L) >60 ml/min/1.73m2 Calcium 7.8 (L) 8.5 - 10.1 MG/DL Bilirubin, total 0.6 0.2 - 1.0 MG/DL  
 ALT (SGPT) 39 16 - 61 U/L  
 AST (SGOT) 72 (H) 10 - 38 U/L Alk. phosphatase 73 45 - 117 U/L Protein, total 5.8 (L) 6.4 - 8.2 g/dL Albumin 2.1 (L) 3.4 - 5.0 g/dL Globulin 3.7 2.0 - 4.0 g/dL A-G Ratio 0.6 (L) 0.8 - 1.7    
CBC WITH AUTOMATED DIFF Collection Time: 08/15/20 11:00 AM  
Result Value Ref Range WBC 17.1 (H) 4.6 - 13.2 K/uL  
 RBC 3.76 (L) 4.70 - 5.50 M/uL  
 HGB 11.5 (L) 13.0 - 16.0 g/dL HCT 33.4 (L) 36.0 - 48.0 % MCV 88.8 74.0 - 97.0 FL  
 MCH 30.6 24.0 - 34.0 PG  
 MCHC 34.4 31.0 - 37.0 g/dL  
 RDW 13.8 11.6 - 14.5 % PLATELET 620 666 - 199 K/uL MPV 10.2 9.2 - 11.8 FL  
 NEUTROPHILS 92 (H) 42 - 75 % LYMPHOCYTES 7 (L) 20 - 51 % MONOCYTES 1 (L) 2 - 9 % EOSINOPHILS 0 0 - 5 % BASOPHILS 0 0 - 3 %  
 ABS. NEUTROPHILS 15.7 (H) 1.8 - 8.0 K/UL  
 ABS. LYMPHOCYTES 1.2 0.8 - 3.5 K/UL  
 ABS. MONOCYTES 0.2 0 - 1.0 K/UL  
 ABS. EOSINOPHILS 0.0 0.0 - 0.4 K/UL  
 ABS. BASOPHILS 0.0 0.0 - 0.06 K/UL  
 DF MANUAL PLATELET COMMENTS ADEQUATE PLATELETS    
 RBC COMMENTS ANISOCYTOSIS 1+ 
    
 RBC COMMENTS ELLIPTOCYTES FEW HEMOGLOBIN A1C WITH EAG Collection Time: 08/15/20 11:00 AM  
Result Value Ref Range Hemoglobin A1c 6.9 (H) 4.2 - 5.6 % Est. average glucose 151 mg/dL GLUCOSE, POC Collection Time: 08/15/20 11:37 AM  
Result Value Ref Range Glucose (POC) 174 (H) 70 - 110 mg/dL Chemistry Recent Labs 08/15/20 
1100 08/14/20 
0325 08/13/20 
3754 * 125* 200*  143 141  
K 3.2* 3.8 3.9 * 113* 111 CO2 23 22 23 BUN 46* 41* 41* CREA 1.55* 1.59* 1.67* CA 7.8* 8.5 8.7 MG  --  2.0  --   
AGAP 9 8 7 BUCR 30* 26* 25* AP 73  --   --   
TP 5.8*  --   --   
ALB 2.1*  --   --   
GLOB 3.7  --   --   
AGRAT 0.6*  --   --   
 
 
CBC w/Diff Recent Labs 08/15/20 
1100 08/14/20 
1704 08/14/20 
0325 WBC 17.1* 14.3* 20.4*  
RBC 3.76* 3.95* 3.96* HGB 11.5* 12.2* 12.1*  
HCT 33.4* 35.2* 35.1*  
 406 455* GRANS 92* 90* 88* LYMPH 7* 4* 1*  
EOS 0 0 0 ABG Recent Labs 08/14/20 
0455 PHI 7.42  
PCO2I 27.2*  
PO2I 60* HCO3I 17.8*  
FIO2I 100 Micro No results for input(s): SDES, CULT in the last 72 hours. No results for input(s): CULT in the last 72 hours. CT (Most Recent) Results from [facility] Encounter encounter on 03/02/20 CTA CHEST ABD PELV W WO CONT Narrative CT without contrast and CT angiogram of the chest, abdomen and pelvis HISTORY: Severe acute midthoracic back pain and vomiting. Elevated troponin. Clinical concern of aortic dissection and PE 
 
COMPARISON: None TECHNIQUE: Multidetector helical CT is carried out from the thoracic inlet to 
the lesser trochanters before and after nonionic IV contrast administration. 3D postprocessed images, including surface shaded display, will produce for this 
exam, permanently archived, and interpreted. Parenchymal organ imaging will be 
limited by arterial phase contrast administration. All CT scans at this facility performed using dose optimization techniques as 
appreciated to a performed exam, to include automated exposure control, 
adjustment of the mA and or KU according to patient size (including appropriate 
matching for site specific examination), or use of iterative reconstruction 
technique. CONTRAST: 100 cc Isovue 370.  
 
FINDINGS: 
 
 CT ANGIOGRAM: The contrast bolus is mainly in the pulmonary arteries with 
suboptimal opacification to the aorta with somehow limited vascular evaluation. THORACIC AORTA: Patent and normal in caliber. No evidence of aortic dissection 
or aneurysmal dilatation. A few atherosclerotic calcified plaques scattered at 
the arch and descending. The caliber measurements of the aorta: 
 3.4 x 3.9 x 4.4 cm at the sinus of Valsalva; 
 3.4 x 3.4 cm at the sinotubular junction of the aortic root; 
 3.7 x 3.8 cm at maximal ascending aorta; 
 3.3 x 3.3 cm at mid aortic arch after the last vessel takeoff; 
 2.7 x 2.7 cm at proximal descending aorta at the level of left atrium; 
 2.6 x 2.7 cm at the mid descending aorta; 
 2.3 x 2.6 cm at distal descending aorta at the diaphragmatic hiatus. THE GREAT VESSELS IN THE ARCH: Not well opacified by contrast due to the early 
scan as mentioned above. There are grossly patent with normal caliber. No 
significant atherosclerotic disease or stenosis. THE PULMONARY ARTERIES: Patent. No evidence of intraluminal filling defect to 
suggest pulmonary embolism. No pulmonary arterial dilatation. ABDOMINAL AORTA: Normal in caliber. No dissection or aneurysmal dilatation. Mild 
atherosclerotic calcified plaques at the mid and distal portion. ILIAC ARTERIES: Patent. Mild atherosclerotic calcified plaques present without 
stenosis. CELIAC ARTERY: Patent. Few small calcified plaques at the origin without 
stenosis. SMA: THERE IS A FOCAL STENOSIS at the ostium which estimated about 50-75%, 
likely due to soft plaque. RENAL ARTERIES: There are left main and accessory left of renal arteries. Short 
segmental stenosis of proximal left main renal artery is estimated about 75%. Patent left accessory artery. Right main and accessoryrenal arteries appear 
patent. Trude Roers CT CHEST:  
 
LUNG PARENCHYMA: The lung parenchyma appears clear. No pulmonary nodule, mass or infiltration identified. THYROID: Unremarkable. MEDIASTINUM:  No adenopathy. THE HEART AND THE PERICARDIUM: . Unremarkable. PLEURAL SPACES AND CHEST WALL: No significant pleural pathology. CT ABDOMEN AND PELVIS:  
 
ABDOMINAL VISCERA: The liver, spleen, pancreas, gallbladder and both adrenal 
glands appear unremarkable. The stomach is poorly distended. The small and large 
bowel are nondilated. PERITONEUM/RETROPERITONEUM: No significant adenopathy. GENITOURINARY ORGANS: Benign cortical cysts identified in bilateral kidneys, the 
larger one in right kidney in posterior midpole measures 3.2 x 3.5 cm and the 
left renal cyst in lower pole measures 1.9 x 2.2 cm. Two 3 mm nonobstructive 
calculi in the lower pole of right kidney. No hydronephrosis. The bladder 
appears normal. The prostate is moderately enlarged. OTHERS: No free air or free fluid. CT OSSEOUS STRUCTURES:  Moderate spondylosis along the spine and moderate facet 
arthropathy at lower lumbar spine. Impression IMPRESSION:  
 
1. No aortic dissection or aneurysm. Very mild atherosclerotic aortic disease. 2. 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 3. Short segmental stenosis at proximal left the main renal artery, estimated 
about 75%. 4. No evidence of PE. 
 
5. Bilateral renal cysts. Nonobstructive right renal calculi. 6. Moderate prostate enlargement. The preliminary read without 3-D reconstruction was provided to the  the 
ordering physician by me upon the initial interpretation at approximately  1506  
hours, 3/2/20. Thank you for your referral.   
 
 
XR (Most Recent). CXR reviewed by me and compared with previous CXR [place] [facility] [place]/20 XR CHEST SNGL V  
 Narrative INDICATION:  Status post thoracentesis. COMPARISON:  Recent prior FINDINGS: A portable AP radiograph of the chest was obtained at 0123 hours. Cardiac silhouette and is post surgical changes are stable. No significant 
interval change in bilateral diffuse patchy opacities. No acute osseous 
abnormality. No pneumothorax. Impression IMPRESSION: No significant interval change. See my orders for details Total care time exclusive of procedures with complex decision making, coordination of care and counseling patient performed and > 50% time spent in face to face evaluation as mentioned above. Sal Coughlin MD 
Critical Care Medicine

## 2020-08-15 NOTE — PROGRESS NOTES
08/15/20 1625 Oxygen Therapy O2 Sat (%) 94 % Pulse via Oximetry 68 beats per minute O2 Device Hi flow nasal cannula; Heated O2 Flow Rate (L/min) 30 l/min O2 Temperature 91.4 °F (33 °C) FIO2 (%) 70 %

## 2020-08-15 NOTE — PROGRESS NOTES
Interim events noted. Afebrile. Increasing oxygen requirement. Currently on high flow at 40 L. Inflammatory markers-pending Procalcitonin-0.13 
D-dimer-3.31 Plan of care discussed with Dr. Zak Newman. Continue steroids, anticoagulation, remdesivir. Transfuse convalescent plasma - ordered Obtain nasal MRSA screen. Continue current abx. D/w dr. Merle Cedeño. Time spent > 25 min.

## 2020-08-15 NOTE — PROGRESS NOTES
Pt placed on vapotherm . Pt somewhat improved but remains confused. Will continue to monitor. Call out to Dr. Aguilar Beck 08/15/20 0910 Oxygen Therapy O2 Sat (%) 93 % Pulse via Oximetry 67 beats per minute O2 Device Hi flow nasal cannula; Heated O2 Flow Rate (L/min) 40 l/min O2 Temperature 91.4 °F (33 °C) FIO2 (%) 100 %

## 2020-08-15 NOTE — PROGRESS NOTES
RENAL DAILY PROGRESS NOTE Subjective:  
 
 
Complaint: feels fine. seen thru door Patient was seen during the [place] pandemic. Patient with COVID infection. Patient is in isolation room due to [facility] status and PPE is in short supply hence seen through door and d/w patients RN. Full contact physical exam was not possible due to patient's clinical condition, key findings seen by me are documented. Overnight events noted 
no nausea, vomiting, chest pain, short of breath, cough, seizure. On HFNC IMPRESSION:  
 
· Acute kidney injury on chronic kidney disease stage III due to septic LOREN, direct viral injury from COVID-19. · Diabetic nephropathy with chronic kidney disease stage III · Hypertension · History of coronary artery disease status post coronary artery bypass surgery · COVID-19 positive status PLAN:  
·  Can use as needed Lasix if needed but would be cautious with diuresis · I's and O's along with daily weights · Avoid other nephrotoxins including ACE inhibitor and ARB · Dose medications appropriately according to the creatinine clearance · Follow laboratory work-up closely. No labs for today. Order is active Will need follow-up as an outpatient for his chronic kidney disease Current Facility-Administered Medications Medication Dose Route Frequency  remdesivir 100 mg in 0.9% sodium chloride 250 mL IVPB  100 mg IntraVENous Q24H  
 heparin 25,000 units in D5W 250 ml infusion  18-36 Units/kg/hr IntraVENous TITRATE  ipratropium-albuterol (COMBIVENT RESPIMAT) 20 mcg-100 mcg inhalation spray  2 Puff Inhalation Q4H PRN  
 methylPREDNISolone (PF) (SOLU-MEDROL) injection 60 mg  60 mg IntraVENous Q12H  
 famotidine (PEPCID) tablet 20 mg  20 mg Oral DAILY  hydrALAZINE (APRESOLINE) tablet 10 mg  10 mg Oral Q6H PRN  
 ascorbic acid (vitamin C) (VITAMIN C) tablet 500 mg  500 mg Oral TID  zinc sulfate (ZINCATE) 220 (50) mg capsule 1 Cap  1 Cap Oral DAILY  atorvastatin (LIPITOR) tablet 40 mg  40 mg Oral QHS  folic acid (FOLVITE) tablet 1 mg  1 mg Oral DAILY  melatonin tablet 3 mg  3 mg Oral QHS  metoprolol succinate (TOPROL-XL) tablet 100 mg  100 mg Oral QHS  aspirin chewable tablet 81 mg  81 mg Oral DAILY And  
 calcium carbonate (CALTREX) tablet 300 mg [elemental]  300 mg Oral DAILY  cholecalciferol (VITAMIN D3) capsule 5,000 Units  5,000 Units Oral DAILY  insulin lispro (HUMALOG) injection   SubCUTAneous AC&HS  
 glucose chewable tablet 16 g  4 Tab Oral PRN  
 glucagon (GLUCAGEN) injection 1 mg  1 mg IntraMUSCular PRN  
 dextrose (D50W) injection syrg 12.5-25 g  25-50 mL IntraVENous PRN  
 sodium chloride (NS) flush 5-10 mL  5-10 mL IntraVENous PRN  
 cefTRIAXone (ROCEPHIN) 2 g in sterile water (preservative free) 20 mL IV syringe  2 g IntraVENous Q24H  
 azithromycin (ZITHROMAX) 500 mg in  mL  500 mg IntraVENous Q24H  
 acetaminophen (TYLENOL) tablet 650 mg  650 mg Oral Q6H PRN Or  
 acetaminophen (TYLENOL) suppository 650 mg  650 mg Rectal Q6H PRN  
 sodium chloride (NS) flush 5-40 mL  5-40 mL IntraVENous Q8H  
 sodium chloride (NS) flush 5-40 mL  5-40 mL IntraVENous PRN  
 senna (SENOKOT) tablet 17.2 mg  2 Tab Oral QHS  bisacodyL (DULCOLAX) suppository 10 mg  10 mg Rectal DAILY PRN  
 ondansetron (ZOFRAN) injection 4 mg  4 mg IntraVENous Q4H PRN Review of Symptoms: comprehensive ROS negative except above. Objective:  
 
Patient Vitals for the past 24 hrs: 
 Temp Pulse Resp BP SpO2  
08/15/20 0919     93 % 08/15/20 0900     (!) 85 % 08/15/20 0838 97.1 °F (36.2 °C) 86 22 151/86 (!) 89 % 08/15/20 0411 98.5 °F (36.9 °C) 83 22 (!) 148/93 92 % 08/14/20 2304 97.5 °F (36.4 °C) 98 22 151/87 93 % 08/14/20 2004 97 °F (36.1 °C) (!) 101 24 148/85 (!) 88 % 08/14/20 1602 97.1 °F (36.2 °C) 74 22 145/82 93 % 08/14/20 1245  64  134/76   
08/14/20 1206 97.2 °F (36.2 °C) 100 22 (!) 155/100 90 % Weight change:  
 
 08/13 1901 - 08/15 0700 In: 1475.1 [P.O.:720; I.V.:755.1] Out: 500 [Urine:500] Intake/Output Summary (Last 24 hours) at 8/15/2020 1057 Last data filed at 8/14/2020 2037 Gross per 24 hour Intake 635.1 ml Output 400 ml Net 235.1 ml  
 
Limited Physical Exam  
 
General: COMFORTABLE 
CV: visible on telemetry monitoring Pulm:  equal chest rise b/l, Abdomen: distended :no georges present Skin: appears pink, no obvious wounds or lesions. Extremities: NO EDEMA Data Review:  
 
LABS:  
Hematology:  
Recent Labs 08/14/20 
1704 08/14/20 
0325 08/13/20 
3749 WBC 14.3* 20.4* 10.4 HGB 12.2* 12.1* 12.0*  
HCT 35.2* 35.1* 36.5 Chemistry:  
Recent Labs 08/14/20 
4829 08/13/20 
7662 BUN 41* 41* CREA 1.59* 1.67* CA 8.5 8.7  
K 3.8 3.9  141 * 111 CO2 22 23 * 200* Procedures/imaging: see electronic medical records for all procedures, Xrays and details which were not copied into this note but were reviewed prior to creation of Plan Arvin Nayak MD 
8/15/2020

## 2020-08-15 NOTE — PROGRESS NOTES
[facility] Hospitalist Group Progress Note Patient: Severiano Elkins Age: 65 y.o. : 1950 MR#: [id number] SSN: [id number] Date/Time: 8/15/2020 Subjective:  
 
Patient is laying in bed, confused, now requiring 40 L oxygen via high flow nasal cannula Assessment/Plan:  
 
COVID-19 pneumonia Acute hypoxic respiratory failure, on 15 L oxygen high flow nasal cannulalikely secondary to COVID-19 pneumonia Type 2 diabetes Hypertension Coronary artery disease, history of CABG Dyslipidemia Acute kidney injury, present on admission, on top of chronic kidney disease stage II. Patient's baseline creatinine appears to be around 1.2 Acute metabolic encephalopathy in the setting of hypoxia and COVID-19 infection Plan Continue antibiotics, ID is following, follow cultures Steroids, pulmonary is following Droplet plus precautions On remdesivir, convalescent plasma ordered by ID On heparin drip Continue aspirin and statin and beta-blocker Nephrology is following, monitor renal function Discussed with patient Palliative care input notedpatient is full code PT OT Disposition pending I called patient's wife Akila Coreas at phone #[phone] and updated her regarding patient's condition and medical care. I discussed with the nursing care of the patient and advised her to make sure she obtains the MRSA swab Discussed with ID Case discussed with:  [x]Patient  [x]Family  [x]Nursing  []Case Management DVT Prophylaxis:  []Lovenox  []Hep SQ  []SCDs  []Coumadin   [x]On Heparin gtt Objective:  
VS:  
Visit Vitals BP (!) 157/94 Pulse (!) 101 Temp 97.9 °F (36.6 °C) Resp (!) 86 Wt 71.9 kg (158 lb 9.6 oz) SpO2 92% BMI 24.84 kg/m² Resp rate-26 Tmax/24hrs: Temp (24hrs), Av.5 °F (36.4 °C), Min:97 °F (36.1 °C), Max:98.5 °F (36.9 °C) Input/Output:  
 
Intake/Output Summary (Last 24 hours) at 8/15/2020 1537 Last data filed at 8/15/2020 1015 Gross per 24 hour Intake 885.1 ml Output  Net 885.1 ml  
 
 
General:  Awake, confused Cardiovascular:  S1S2+, RRR Pulmonary: Coarse breath sounds bilaterally GI:  Soft, BS+, NT, ND Extremities:  trace edema Labs:   
Recent Results (from the past 24 hour(s)) GLUCOSE, POC Collection Time: 08/14/20  5:02 PM  
Result Value Ref Range Glucose (POC) 191 (H) 70 - 110 mg/dL CBC WITH AUTOMATED DIFF Collection Time: 08/14/20  5:04 PM  
Result Value Ref Range WBC 14.3 (H) 4.6 - 13.2 K/uL  
 RBC 3.95 (L) 4.70 - 5.50 M/uL  
 HGB 12.2 (L) 13.0 - 16.0 g/dL HCT 35.2 (L) 36.0 - 48.0 % MCV 89.1 74.0 - 97.0 FL  
 MCH 30.9 24.0 - 34.0 PG  
 MCHC 34.7 31.0 - 37.0 g/dL  
 RDW 13.8 11.6 - 14.5 % PLATELET 090 738 - 751 K/uL MPV 10.6 9.2 - 11.8 FL  
 NEUTROPHILS 90 (H) 40 - 73 % LYMPHOCYTES 4 (L) 21 - 52 % MONOCYTES 6 3 - 10 % EOSINOPHILS 0 0 - 5 % BASOPHILS 0 0 - 2 %  
 ABS. NEUTROPHILS 12.8 (H) 1.8 - 8.0 K/UL  
 ABS. LYMPHOCYTES 0.6 (L) 0.9 - 3.6 K/UL  
 ABS. MONOCYTES 0.9 0.05 - 1.2 K/UL  
 ABS. EOSINOPHILS 0.0 0.0 - 0.4 K/UL  
 ABS. BASOPHILS 0.0 0.0 - 0.1 K/UL  
 DF AUTOMATED    
PTT Collection Time: 08/14/20  5:04 PM  
Result Value Ref Range aPTT 35.1 23.0 - 36.4 SEC GLUCOSE, POC Collection Time: 08/14/20  9:29 PM  
Result Value Ref Range Glucose (POC) 207 (H) 70 - 110 mg/dL GLUCOSE, POC Collection Time: 08/15/20  8:11 AM  
Result Value Ref Range Glucose (POC) 204 (H) 70 - 110 mg/dL PROTHROMBIN TIME + INR Collection Time: 08/15/20 11:00 AM  
Result Value Ref Range Prothrombin time 16.4 (H) 11.5 - 15.2 sec INR 1.3 (H) 0.8 - 1.2 PTT Collection Time: 08/15/20 11:00 AM  
Result Value Ref Range aPTT 132.3 (H) 23.0 - 36.4 SEC FIBRINOGEN Collection Time: 08/15/20 11:00 AM  
Result Value Ref Range Fibrinogen 329 210 - 451 mg/dL D DIMER Collection Time: 08/15/20 11:00 AM  
Result Value Ref Range D DIMER 3.31 (H) <0.46 ug/ml(FEU) FERRITIN  
 Collection Time: 08/15/20 11:00 AM  
Result Value Ref Range Ferritin 639 (H) 8 - 388 NG/ML  
C REACTIVE PROTEIN, QT Collection Time: 08/15/20 11:00 AM  
Result Value Ref Range C-Reactive protein 2.1 (H) 0 - 0.3 mg/dL LD Collection Time: 08/15/20 11:00 AM  
Result Value Ref Range  (H) 81 - 234 U/L  
PROCALCITONIN Collection Time: 08/15/20 11:00 AM  
Result Value Ref Range Procalcitonin 0.13 ng/mL METABOLIC PANEL, COMPREHENSIVE Collection Time: 08/15/20 11:00 AM  
Result Value Ref Range Sodium 144 136 - 145 mmol/L Potassium 3.2 (L) 3.5 - 5.5 mmol/L Chloride 112 (H) 100 - 111 mmol/L  
 CO2 23 21 - 32 mmol/L Anion gap 9 3.0 - 18 mmol/L Glucose 192 (H) 74 - 99 mg/dL BUN 46 (H) 7.0 - 18 MG/DL Creatinine 1.55 (H) 0.6 - 1.3 MG/DL  
 BUN/Creatinine ratio 30 (H) 12 - 20 GFR est AA 54 (L) >60 ml/min/1.73m2 GFR est non-AA 44 (L) >60 ml/min/1.73m2 Calcium 7.8 (L) 8.5 - 10.1 MG/DL Bilirubin, total 0.6 0.2 - 1.0 MG/DL  
 ALT (SGPT) 39 16 - 61 U/L  
 AST (SGOT) 72 (H) 10 - 38 U/L Alk. phosphatase 73 45 - 117 U/L Protein, total 5.8 (L) 6.4 - 8.2 g/dL Albumin 2.1 (L) 3.4 - 5.0 g/dL Globulin 3.7 2.0 - 4.0 g/dL A-G Ratio 0.6 (L) 0.8 - 1.7    
CBC WITH AUTOMATED DIFF Collection Time: 08/15/20 11:00 AM  
Result Value Ref Range WBC 17.1 (H) 4.6 - 13.2 K/uL  
 RBC 3.76 (L) 4.70 - 5.50 M/uL  
 HGB 11.5 (L) 13.0 - 16.0 g/dL HCT 33.4 (L) 36.0 - 48.0 % MCV 88.8 74.0 - 97.0 FL  
 MCH 30.6 24.0 - 34.0 PG  
 MCHC 34.4 31.0 - 37.0 g/dL  
 RDW 13.8 11.6 - 14.5 % PLATELET 756 369 - 927 K/uL MPV 10.2 9.2 - 11.8 FL  
 NEUTROPHILS 92 (H) 42 - 75 % LYMPHOCYTES 7 (L) 20 - 51 % MONOCYTES 1 (L) 2 - 9 % EOSINOPHILS 0 0 - 5 % BASOPHILS 0 0 - 3 %  
 ABS. NEUTROPHILS 15.7 (H) 1.8 - 8.0 K/UL  
 ABS. LYMPHOCYTES 1.2 0.8 - 3.5 K/UL  
 ABS. MONOCYTES 0.2 0 - 1.0 K/UL  
 ABS. EOSINOPHILS 0.0 0.0 - 0.4 K/UL  
 ABS. BASOPHILS 0.0 0.0 - 0.06 K/UL DF MANUAL PLATELET COMMENTS ADEQUATE PLATELETS    
 RBC COMMENTS ANISOCYTOSIS 1+ 
    
 RBC COMMENTS ELLIPTOCYTES FEW HEMOGLOBIN A1C WITH EAG Collection Time: 08/15/20 11:00 AM  
Result Value Ref Range Hemoglobin A1c 6.9 (H) 4.2 - 5.6 % Est. average glucose 151 mg/dL GLUCOSE, POC Collection Time: 08/15/20 11:37 AM  
Result Value Ref Range Glucose (POC) 174 (H) 70 - 110 mg/dL GLUCOSE, POC Collection Time: 08/15/20  3:31 PM  
Result Value Ref Range Glucose (POC) 151 (H) 70 - 110 mg/dL Additional Data Reviewed:   
 
Signed By: Evelia Garcia MD   
 August 15, 2020

## 2020-08-15 NOTE — ROUTINE PROCESS
TRANSFER - IN REPORT: 
 
Verbal report received from Aaron(name) on Dionne Rossi  being received from  (unit) for routine progression of care Report consisted of patients Situation, Background, Assessment and  
Recommendations(SBAR). Information from the following report(s) SBAR, Kardex, MAR and Accordion was reviewed with the receiving nurse. Opportunity for questions and clarification was provided. Assessment completed upon patients arrival to unit and care assumed. V/S taken. Telemonitor placed. Bilateral wrist restraints in place. IV heparin infusing as ordered. Continue to monitor.

## 2020-08-15 NOTE — ROUTINE PROCESS
Patient is alert, confused, restless. Continues to need bilateral wrist restraints. He will take his O2 off. Incontinence care given. Linen changed. IV heparin infusing. Continue to monitor. 0745  Bedside, Verbal and Written shift change report given to Radha Golden (oncoming nurse) by Francisco Mckenna (offgoing nurse). Report included the following information SBAR, Kardex, MAR and Accordion.

## 2020-08-16 NOTE — ROUTINE PROCESS
Verbal shift change report given to Mariel Garcia RN (oncoming nurse) by Sigifredo Ho RN 
(offgoing nurse).  Report included the following information SBAR, Intake/Output, MAR, Recent Results and Cardiac Rhythm ST

## 2020-08-16 NOTE — PROGRESS NOTES
[facility] Hospitalist Group Progress Note Patient: Isabelle Hooks Age: 65 y.o. : 1950 MR#: [id number] SSN: [id number] Date/Time: 2020 Subjective:  
 
Patient is laying in bed, appears confused, RN at bedside. On 25 L oxygen via high flow nasal cannula Assessment/Plan:  
 
COVID-19 pneumonia Acute hypoxic respiratory failure, Type 2 diabetes Hypertension Coronary artery disease, history of CABG Dyslipidemia Acute kidney injury, present on admission, on top of chronic kidney disease stage II. Patient's baseline creatinine appears to be around 1.2 Acute metabolic encephalopathy in the setting of hypoxia and COVID-19 infection Plan On antibiotics, follow cultures Continue steroids, pulmonary is following Droplet plus precautions On remdesivir, convalescent plasma ordered by ID On heparin drip Continue aspirin and statin and beta-blocker Monitor sugars, sliding scale insulin, add basal Lantus Nephrology is following, monitor renal function, replete potassium Palliative care input notedpatient is full code PT OT Disposition pending Called patient's wife Connie Avitia at phone #[phone] and gave her an update regarding patient's medical care Case discussed with:  [x]Patient  [x]Family  [x]Nursing  []Case Management DVT Prophylaxis:  []Lovenox  []Hep SQ  []SCDs  []Coumadin   [x]On Heparin gtt Objective:  
VS:  
Visit Vitals /62 (BP 1 Location: Left arm, BP Patient Position: At rest) Pulse 61 Temp 98.4 °F (36.9 °C) Resp 22 Wt 73.8 kg (162 lb 12.8 oz) SpO2 93% BMI 25.50 kg/m² Resp rate-26 Tmax/24hrs: Temp (24hrs), Av.9 °F (36.6 °C), Min:97.1 °F (36.2 °C), Max:98.5 °F (36.9 °C) Input/Output:  
 
Intake/Output Summary (Last 24 hours) at 2020 1736 Last data filed at 2020 1200 Gross per 24 hour Intake 1229.77 ml Output  Net 1229.77 ml General:  Awake, confused Cardiovascular:  S1S2+, RRR 
 Pulmonary: Coarse breath sounds bilaterally GI:  Soft, BS+, NT, ND Extremities:  No edema Labs:   
Recent Results (from the past 24 hour(s)) GLUCOSE, POC Collection Time: 08/15/20  7:42 PM  
Result Value Ref Range Glucose (POC) 241 (H) 70 - 110 mg/dL PROTHROMBIN TIME + INR Collection Time: 08/16/20  4:15 AM  
Result Value Ref Range Prothrombin time 15.7 (H) 11.5 - 15.2 sec INR 1.3 (H) 0.8 - 1.2 PTT Collection Time: 08/16/20  4:15 AM  
Result Value Ref Range aPTT 40.3 (H) 23.0 - 36.4 SEC FIBRINOGEN Collection Time: 08/16/20  4:15 AM  
Result Value Ref Range Fibrinogen 337 210 - 451 mg/dL D DIMER Collection Time: 08/16/20  4:15 AM  
Result Value Ref Range D DIMER 2.92 (H) <0.46 ug/ml(FEU) FERRITIN Collection Time: 08/16/20  4:15 AM  
Result Value Ref Range Ferritin 617 (H) 8 - 388 NG/ML  
C REACTIVE PROTEIN, QT Collection Time: 08/16/20  4:15 AM  
Result Value Ref Range C-Reactive protein 1.9 (H) 0 - 0.3 mg/dL LD Collection Time: 08/16/20  4:15 AM  
Result Value Ref Range  (H) 81 - 234 U/L  
PROCALCITONIN Collection Time: 08/16/20  4:15 AM  
Result Value Ref Range Procalcitonin 0.12 ng/mL METABOLIC PANEL, COMPREHENSIVE Collection Time: 08/16/20  4:15 AM  
Result Value Ref Range Sodium 140 136 - 145 mmol/L Potassium 3.3 (L) 3.5 - 5.5 mmol/L Chloride 107 100 - 111 mmol/L  
 CO2 25 21 - 32 mmol/L Anion gap 8 3.0 - 18 mmol/L Glucose 225 (H) 74 - 99 mg/dL BUN 49 (H) 7.0 - 18 MG/DL Creatinine 1.52 (H) 0.6 - 1.3 MG/DL  
 BUN/Creatinine ratio 32 (H) 12 - 20 GFR est AA 55 (L) >60 ml/min/1.73m2 GFR est non-AA 45 (L) >60 ml/min/1.73m2 Calcium 8.2 (L) 8.5 - 10.1 MG/DL Bilirubin, total 0.7 0.2 - 1.0 MG/DL  
 ALT (SGPT) 37 16 - 61 U/L  
 AST (SGOT) 45 (H) 10 - 38 U/L Alk. phosphatase 73 45 - 117 U/L Protein, total 5.8 (L) 6.4 - 8.2 g/dL Albumin 2.2 (L) 3.4 - 5.0 g/dL Globulin 3.6 2.0 - 4.0 g/dL A-G Ratio 0.6 (L) 0.8 - 1.7    
CBC WITH AUTOMATED DIFF Collection Time: 08/16/20  4:15 AM  
Result Value Ref Range WBC 18.6 (H) 4.6 - 13.2 K/uL  
 RBC 3.75 (L) 4.70 - 5.50 M/uL  
 HGB 11.2 (L) 13.0 - 16.0 g/dL HCT 33.4 (L) 36.0 - 48.0 % MCV 89.1 74.0 - 97.0 FL  
 MCH 29.9 24.0 - 34.0 PG  
 MCHC 33.5 31.0 - 37.0 g/dL  
 RDW 14.1 11.6 - 14.5 % PLATELET 666 884 - 440 K/uL MPV 10.7 9.2 - 11.8 FL  
 NEUTROPHILS 90 (H) 42 - 75 % BAND NEUTROPHILS 4 0 - 5 % LYMPHOCYTES 2 (L) 20 - 51 % MONOCYTES 4 2 - 9 % EOSINOPHILS 0 0 - 5 % BASOPHILS 0 0 - 3 %  
 ABS. NEUTROPHILS 17.5 (H) 1.8 - 8.0 K/UL  
 ABS. LYMPHOCYTES 0.4 (L) 0.8 - 3.5 K/UL  
 ABS. MONOCYTES 0.7 0 - 1.0 K/UL  
 ABS. EOSINOPHILS 0.0 0.0 - 0.4 K/UL  
 ABS. BASOPHILS 0.0 0.0 - 0.06 K/UL  
 DF AUTOMATED PLATELET COMMENTS ADEQUATE PLATELETS    
 RBC COMMENTS ANISOCYTOSIS 
1+ MAGNESIUM Collection Time: 08/16/20  4:15 AM  
Result Value Ref Range Magnesium 2.0 1.6 - 2.6 mg/dL GLUCOSE, POC Collection Time: 08/16/20  8:29 AM  
Result Value Ref Range Glucose (POC) 257 (H) 70 - 110 mg/dL BLOOD TYPE, (ABO+RH) Collection Time: 08/16/20 10:08 AM  
Result Value Ref Range ABO/Rh(D) A POSITIVE   
GLUCOSE, POC Collection Time: 08/16/20 11:53 AM  
Result Value Ref Range Glucose (POC) 213 (H) 70 - 110 mg/dL PTT Collection Time: 08/16/20  1:23 PM  
Result Value Ref Range aPTT 178.6 (HH) 23.0 - 36.4 SEC Additional Data Reviewed:   
 
Signed By: All Padilla MD   
 August 16, 2020

## 2020-08-16 NOTE — PROGRESS NOTES
Problem: Falls - Risk of 
Goal: *Absence of Falls Description: Document Ariadne Castillo Fall Risk and appropriate interventions in the flowsheet. Outcome: Progressing Towards Goal 
Note: Fall Risk Interventions: 
Mobility Interventions: Assess mobility with egress test, Bed/chair exit alarm, Communicate number of staff needed for ambulation/transfer, OT consult for ADLs, Patient to call before getting OOB, PT Consult for mobility concerns, Utilize walker, cane, or other assistive device Mentation Interventions: Adequate sleep, hydration, pain control, Bed/chair exit alarm, Evaluate medications/consider consulting pharmacy, More frequent rounding, Reorient patient, Room close to nurse's station, Toileting rounds, Update white board Medication Interventions: Bed/chair exit alarm, Evaluate medications/consider consulting pharmacy, Patient to call before getting OOB, Teach patient to arise slowly Elimination Interventions: Bed/chair exit alarm, Call light in reach, Elevated toilet seat, Patient to call for help with toileting needs, Stay With Me (per policy), Toilet paper/wipes in reach, Toileting schedule/hourly rounds History of Falls Interventions: Bed/chair exit alarm, Consult care management for discharge planning, Door open when patient unattended, Room close to nurse's station, Utilize gait belt for transfer/ambulation, Assess for delayed presentation/identification of injury for 48 hrs (comment for end date), Evaluate medications/consider consulting pharmacy Problem: Pressure Injury - Risk of 
Goal: *Prevention of pressure injury Description: Document Tr Scale and appropriate interventions in the flowsheet.  
Outcome: Progressing Towards Goal 
Note: Pressure Injury Interventions: 
Sensory Interventions: Assess changes in LOC, Assess need for specialty bed, Avoid rigorous massage over bony prominences, Chair cushion, Check visual cues for pain, Discuss PT/OT consult with provider, Float heels, Keep linens dry and wrinkle-free, Maintain/enhance activity level, Minimize linen layers, Monitor skin under medical devices, Pressure redistribution bed/mattress (bed type), Turn and reposition approx. every two hours (pillows and wedges if needed) Moisture Interventions: Absorbent underpads, Apply protective barrier, creams and emollients, Assess need for specialty bed, Check for incontinence Q2 hours and as needed, Internal/External urinary devices, Limit adult briefs, Maintain skin hydration (lotion/cream), Minimize layers, Moisture barrier Activity Interventions: Assess need for specialty bed, Pressure redistribution bed/mattress(bed type), PT/OT evaluation Mobility Interventions: Assess need for specialty bed, Float heels, HOB 30 degrees or less, Pressure redistribution bed/mattress (bed type), PT/OT evaluation, Turn and reposition approx. every two hours(pillow and wedges) Nutrition Interventions: Document food/fluid/supplement intake, Discuss nutritional consult with provider, Offer support with meals,snacks and hydration Friction and Shear Interventions: Apply protective barrier, creams and emollients, Foam dressings/transparent film/skin sealants, Feet elevated on foot rest, HOB 30 degrees or less, Lift team/patient mobility team, Minimize layers, Transferring/repositioning devices Problem: Diabetes Self-Management Goal: *Disease process and treatment process Description: Define diabetes and identify own type of diabetes; list 3 options for treating diabetes. Outcome: Progressing Towards Goal 
Goal: *Incorporating nutritional management into lifestyle Description: Describe effect of type, amount and timing of food on blood glucose; list 3 methods for planning meals. Outcome: Progressing Towards Goal 
Goal: *Incorporating physical activity into lifestyle Description: State effect of exercise on blood glucose levels.  
Outcome: Progressing Towards Goal 
 Goal: *Developing strategies to promote health/change behavior Description: Define the ABC's of diabetes; identify appropriate screenings, schedule and personal plan for screenings. Outcome: Progressing Towards Goal 
Goal: *Using medications safely Description: State effect of diabetes medications on diabetes; name diabetes medication taking, action and side effects. Outcome: Progressing Towards Goal 
Goal: *Monitoring blood glucose, interpreting and using results Description: Identify recommended blood glucose targets  and personal targets. Outcome: Progressing Towards Goal 
Goal: *Prevention, detection, treatment of acute complications Description: List symptoms of hyper- and hypoglycemia; describe how to treat low blood sugar and actions for lowering  high blood glucose level. Outcome: Progressing Towards Goal 
Goal: *Prevention, detection and treatment of chronic complications Description: Define the natural course of diabetes and describe the relationship of blood glucose levels to long term complications of diabetes. Outcome: Progressing Towards Goal 
Goal: *Developing strategies to address psychosocial issues Description: Describe feelings about living with diabetes; identify support needed and support network Outcome: Progressing Towards Goal 
  
Problem: Non-Violent Restraints Goal: *Removal from restraints as soon as assessed to be safe Outcome: Progressing Towards Goal 
Goal: *No harm/injury to patient while restraints in use Outcome: Progressing Towards Goal 
Goal: *Patient's dignity will be maintained Outcome: Progressing Towards Goal 
Goal: *Patient Specific Goal (EDIT GOAL, INSERT TEXT) Outcome: Progressing Towards Goal 
Goal: Non-violent Restaints:Standard Interventions Outcome: Progressing Towards Goal 
Goal: Non-violent Restraints:Patient Interventions Outcome: Progressing Towards Goal 
Goal: Patient/Family Education Outcome: Progressing Towards Goal 
  
 Problem: Patient Education: Go to Patient Education Activity Goal: Patient/Family Education Outcome: Progressing Towards Goal 
  
Problem: Pneumonia: Day 4 Goal: Off Pathway (Use only if patient is Off Pathway) Outcome: Progressing Towards Goal 
Goal: Activity/Safety Outcome: Progressing Towards Goal 
Goal: Nutrition/Diet Outcome: Progressing Towards Goal 
Goal: Discharge Planning Outcome: Progressing Towards Goal 
Goal: Medications Outcome: Progressing Towards Goal 
Goal: Respiratory Outcome: Progressing Towards Goal 
Goal: Treatments/Interventions/Procedures Outcome: Progressing Towards Goal 
Goal: Psychosocial 
Outcome: Progressing Towards Goal

## 2020-08-16 NOTE — PROGRESS NOTES
RENAL DAILY PROGRESS NOTE Subjective:  
 
 
Complaint: feels fine. seen thru door Patient was seen during the [place] pandemic. Patient with COVID infection. Patient is in isolation room due to [facility] status and PPE is in short supply hence seen through door and d/w patients RN. Full contact physical exam was not possible due to patient's clinical condition, key findings seen by me are documented. Overnight events noted 
no nausea, vomiting, chest pain, short of breath, cough, seizure. On HFNC IMPRESSION:  
 
· Acute kidney injury on chronic kidney disease stage III due to septic LOREN, direct viral injury from COVID-19. · Hypokalemia · Diabetic nephropathy with chronic kidney disease stage III · Hypertension · History of coronary artery disease status post coronary artery bypass surgery · COVID-19 positive status PLAN:  
·  Can use as needed Lasix if needed but would be cautious with diuresis · Replete K today. Mag is ok · I's and O's along with daily weights · Avoid other nephrotoxins including ACE inhibitor and ARB · Dose medications appropriately according to the creatinine clearance · Follow laboratory work-up closely. No labs for today. Order is active Will need follow-up as an outpatient for his chronic kidney disease Current Facility-Administered Medications Medication Dose Route Frequency  0.9% sodium chloride infusion 250 mL  250 mL IntraVENous PRN  
 remdesivir 100 mg in 0.9% sodium chloride 250 mL IVPB  100 mg IntraVENous Q24H  
 heparin 25,000 units in D5W 250 ml infusion  18-36 Units/kg/hr IntraVENous TITRATE  ipratropium-albuterol (COMBIVENT RESPIMAT) 20 mcg-100 mcg inhalation spray  2 Puff Inhalation Q4H PRN  
 methylPREDNISolone (PF) (SOLU-MEDROL) injection 60 mg  60 mg IntraVENous Q12H  
 famotidine (PEPCID) tablet 20 mg  20 mg Oral DAILY  hydrALAZINE (APRESOLINE) tablet 10 mg  10 mg Oral Q6H PRN  
  ascorbic acid (vitamin C) (VITAMIN C) tablet 500 mg  500 mg Oral TID  zinc sulfate (ZINCATE) 220 (50) mg capsule 1 Cap  1 Cap Oral DAILY  atorvastatin (LIPITOR) tablet 40 mg  40 mg Oral QHS  folic acid (FOLVITE) tablet 1 mg  1 mg Oral DAILY  melatonin tablet 3 mg  3 mg Oral QHS  metoprolol succinate (TOPROL-XL) tablet 100 mg  100 mg Oral QHS  aspirin chewable tablet 81 mg  81 mg Oral DAILY And  
 calcium carbonate (CALTREX) tablet 300 mg [elemental]  300 mg Oral DAILY  cholecalciferol (VITAMIN D3) capsule 5,000 Units  5,000 Units Oral DAILY  insulin lispro (HUMALOG) injection   SubCUTAneous AC&HS  
 glucose chewable tablet 16 g  4 Tab Oral PRN  
 glucagon (GLUCAGEN) injection 1 mg  1 mg IntraMUSCular PRN  
 dextrose (D50W) injection syrg 12.5-25 g  25-50 mL IntraVENous PRN  
 sodium chloride (NS) flush 5-10 mL  5-10 mL IntraVENous PRN  
 cefTRIAXone (ROCEPHIN) 2 g in sterile water (preservative free) 20 mL IV syringe  2 g IntraVENous Q24H  
 azithromycin (ZITHROMAX) 500 mg in  mL  500 mg IntraVENous Q24H  
 acetaminophen (TYLENOL) tablet 650 mg  650 mg Oral Q6H PRN Or  
 acetaminophen (TYLENOL) suppository 650 mg  650 mg Rectal Q6H PRN  
 sodium chloride (NS) flush 5-40 mL  5-40 mL IntraVENous Q8H  
 sodium chloride (NS) flush 5-40 mL  5-40 mL IntraVENous PRN  
 senna (SENOKOT) tablet 17.2 mg  2 Tab Oral QHS  bisacodyL (DULCOLAX) suppository 10 mg  10 mg Rectal DAILY PRN  
 ondansetron (ZOFRAN) injection 4 mg  4 mg IntraVENous Q4H PRN Objective:  
 
Patient Vitals for the past 24 hrs: 
 Temp Pulse Resp BP SpO2  
08/16/20 0800 98 °F (36.7 °C) 63 23 122/53 100 % 08/16/20 0745     100 % 08/16/20 0347 97.1 °F (36.2 °C) 85 22 141/78 100 % 08/16/20 0204     100 % 08/15/20 2351 97.4 °F (36.3 °C) 90 16 155/82 93 % 08/15/20 2029  (!) 105  155/90   
08/15/20 2027     96 % 08/15/20 1959 98.5 °F (36.9 °C) (!) 105 21 155/90 99 % 08/15/20 1625     94 % 08/15/20 1622     92 % 08/15/20 1534 97.9 °F (36.6 °C) (!) 101 (!) 86 (!) 157/94 92 % Weight change: -0.318 kg (-11.2 oz) 08/14 1901 - 08/16 0700 In: 1544.9 [P.O.:720; I.V.:824.9] Out: - Intake/Output Summary (Last 24 hours) at 8/16/2020 1225 Last data filed at 8/16/2020 9260 Gross per 24 hour Intake 899.77 ml Output  Net 899.77 ml Limited Physical Exam  
 
General: COMFORTABLE 
CV: visible on telemetry monitoring Pulm:  equal chest rise b/l, Abdomen: distended :no georges present Skin: appears pink, no obvious wounds or lesions. Extremities: NO EDEMA Data Review:  
 
LABS:  
Hematology:  
Recent Labs 08/16/20 
0415 08/15/20 
1100 08/14/20 
1704 08/14/20 
0325 WBC 18.6* 17.1* 14.3* 20.4* HGB 11.2* 11.5* 12.2* 12.1*  
HCT 33.4* 33.4* 35.2* 35.1* Chemistry:  
Recent Labs 08/16/20 
0415 08/15/20 
1100 08/14/20 
0325 BUN 49* 46* 41* CREA 1.52* 1.55* 1.59* CA 8.2* 7.8* 8.5 ALB 2.2* 2.1*  --   
K 3.3* 3.2* 3.8  144 143  112* 113* CO2 25 23 22 * 192* 125* Procedures/imaging: see electronic medical records for all procedures, Xrays and details which were not copied into this note but were reviewed prior to creation of Plan Rina Cerda MD 
8/16/2020

## 2020-08-16 NOTE — PROGRESS NOTES
[facility] Pulmonary, Critical Care, and Sleep Medicine Pulmonary Medicine Progress Note Name: Kathie Cervantes MRN: [id number] : 1950 Hospital: [address] [place] [address] Date: 2020 Subjective: 
Pt remains stable. Resting quietly on my assessment. Currently on 55% HFNC. sats 100%. Patient Active Problem List  
Diagnosis Code  
 HTN (hypertension) I10  
 Gout M10.9  Hyperlipemia E78.5  Nocturia R35.1  Elevated troponin R79.89  Back pain M54.9  Acute coronary syndrome (HCC) I24.9  S/P cardiac catheterization Z98.890  
 NSTEMI (non-ST elevated myocardial infarction) ([facility]) I21.4  
 S/P CABG x 3 Z95.1  Stage 3 chronic kidney disease (HCC) N18.3  LOREN (acute kidney injury) (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  
 Secondary hyperparathyroidism of renal origin ([facility] N25.81  
 Acute hypoxemic respiratory failure ([facility]) J96.01  
 Suspected COVID-19 virus infection Z20.828  Acute-on-chronic kidney injury ([facility] N17.9, N18.9 Assessment: 
Acute Hypoxic Respiratory Failure - 2/2 covid19 pneumonia - currently on NC [id number] Pneumonia - B/L infiltrates, fevers. Mildly elevated inflammatory markers Hx of recent 3V CABG LOREN on CKD 
- improving.  
  
Impression/Plan: - Will wean to 40% fio2, monitor closely 
-Continue steroids and AC 
- Continue remdesivir, consent for plasma - Wean HFNC as tolerated, if further decline, would warrant flolan or intubation depending on mental status 
- PRN bipap- settings ordered Critically ill, monitor closely for further decline FiO2 to keep SpO2 >=92%, HOB >=30 degree, aspiration precautions, aggressive pulmonary toileting, incentive spirometry. Other issues management by primary team and respective consultants. Events and notes from last 24 hours reviewed. Discussed with patient and family, answered all questions to their satisfaction. Care plan discussed with nursing. Labs and images personally seen and available reports reviewed All current medicines are reviewed Medications- Current: 
Current Facility-Administered Medications Medication Dose Route Frequency  0.9% sodium chloride infusion 250 mL  250 mL IntraVENous PRN  
 remdesivir 100 mg in 0.9% sodium chloride 250 mL IVPB  100 mg IntraVENous Q24H  
 heparin 25,000 units in D5W 250 ml infusion  18-36 Units/kg/hr IntraVENous TITRATE  ipratropium-albuterol (COMBIVENT RESPIMAT) 20 mcg-100 mcg inhalation spray  2 Puff Inhalation Q4H PRN  
 methylPREDNISolone (PF) (SOLU-MEDROL) injection 60 mg  60 mg IntraVENous Q12H  
 famotidine (PEPCID) tablet 20 mg  20 mg Oral DAILY  hydrALAZINE (APRESOLINE) tablet 10 mg  10 mg Oral Q6H PRN  
 ascorbic acid (vitamin C) (VITAMIN C) tablet 500 mg  500 mg Oral TID  zinc sulfate (ZINCATE) 220 (50) mg capsule 1 Cap  1 Cap Oral DAILY  atorvastatin (LIPITOR) tablet 40 mg  40 mg Oral QHS  folic acid (FOLVITE) tablet 1 mg  1 mg Oral DAILY  melatonin tablet 3 mg  3 mg Oral QHS  metoprolol succinate (TOPROL-XL) tablet 100 mg  100 mg Oral QHS  aspirin chewable tablet 81 mg  81 mg Oral DAILY And  
 calcium carbonate (CALTREX) tablet 300 mg [elemental]  300 mg Oral DAILY  cholecalciferol (VITAMIN D3) capsule 5,000 Units  5,000 Units Oral DAILY  insulin lispro (HUMALOG) injection   SubCUTAneous AC&HS  
 glucose chewable tablet 16 g  4 Tab Oral PRN  
 glucagon (GLUCAGEN) injection 1 mg  1 mg IntraMUSCular PRN  
 dextrose (D50W) injection syrg 12.5-25 g  25-50 mL IntraVENous PRN  
 sodium chloride (NS) flush 5-10 mL  5-10 mL IntraVENous PRN  
 cefTRIAXone (ROCEPHIN) 2 g in sterile water (preservative free) 20 mL IV syringe  2 g IntraVENous Q24H  
 azithromycin (ZITHROMAX) 500 mg in  mL  500 mg IntraVENous Q24H  
 acetaminophen (TYLENOL) tablet 650 mg  650 mg Oral Q6H PRN Or  
 acetaminophen (TYLENOL) suppository 650 mg  650 mg Rectal Q6H PRN  
 sodium chloride (NS) flush 5-40 mL  5-40 mL IntraVENous Q8H  
 sodium chloride (NS) flush 5-40 mL  5-40 mL IntraVENous PRN  
 senna (SENOKOT) tablet 17.2 mg  2 Tab Oral QHS  bisacodyL (DULCOLAX) suppository 10 mg  10 mg Rectal DAILY PRN  
 ondansetron (ZOFRAN) injection 4 mg  4 mg IntraVENous Q4H PRN Objective: 
Vital Signs:   
Visit Vitals /62 (BP 1 Location: Left arm, BP Patient Position: At rest) Pulse 61 Temp 98.4 °F (36.9 °C) Resp 22 Wt 73.8 kg (162 lb 12.8 oz) SpO2 100% BMI 25.50 kg/m² O2 Device: Hi flow nasal cannula O2 Flow Rate (L/min): 25 l/min Temp (24hrs), Av.9 °F (36.6 °C), Min:97.1 °F (36.2 °C), Max:98.5 °F (36.9 °C) Intake/Output:  
Last shift:       07 -  1900 In: 600 [P.O.:600] Out: - Last 3 shifts:  1901 -  0700 In: 1544.9 [P.O.:720; I.V.:824.9] Out: - Intake/Output Summary (Last 24 hours) at 2020 1413 Last data filed at 2020 1200 Gross per 24 hour Intake 1499.77 ml Output  Net 1499.77 ml Physical Exam:  
 
General/Neurology: Alert, Awake Head:   Normocephalic, without obvious abnormality Eye:   PERRL, EOM intact Oral:  Mucus membranes moist 
Lung:   B/l air entry fair. No rales. No rhonchi. No wheezing Heart:   S1 S2 present Abdomen:  Soft, non tender, BS+nt Extremities:  No pedal edema. Skin:   Dry, intact Data: 
   
Recent Results (from the past 24 hour(s)) GLUCOSE, POC Collection Time: 08/15/20  3:31 PM  
Result Value Ref Range Glucose (POC) 151 (H) 70 - 110 mg/dL PTT Collection Time: 08/15/20  4:05 PM  
Result Value Ref Range aPTT >180.0 (HH) 23.0 - 36.4 SEC GLUCOSE, POC  Collection Time: 08/15/20  7:42 PM  
 Result Value Ref Range Glucose (POC) 241 (H) 70 - 110 mg/dL PROTHROMBIN TIME + INR Collection Time: 08/16/20  4:15 AM  
Result Value Ref Range Prothrombin time 15.7 (H) 11.5 - 15.2 sec INR 1.3 (H) 0.8 - 1.2 PTT Collection Time: 08/16/20  4:15 AM  
Result Value Ref Range aPTT 40.3 (H) 23.0 - 36.4 SEC FIBRINOGEN Collection Time: 08/16/20  4:15 AM  
Result Value Ref Range Fibrinogen 337 210 - 451 mg/dL D DIMER Collection Time: 08/16/20  4:15 AM  
Result Value Ref Range D DIMER 2.92 (H) <0.46 ug/ml(FEU) FERRITIN Collection Time: 08/16/20  4:15 AM  
Result Value Ref Range Ferritin 617 (H) 8 - 388 NG/ML  
C REACTIVE PROTEIN, QT Collection Time: 08/16/20  4:15 AM  
Result Value Ref Range C-Reactive protein 1.9 (H) 0 - 0.3 mg/dL LD Collection Time: 08/16/20  4:15 AM  
Result Value Ref Range  (H) 81 - 234 U/L  
PROCALCITONIN Collection Time: 08/16/20  4:15 AM  
Result Value Ref Range Procalcitonin 0.12 ng/mL METABOLIC PANEL, COMPREHENSIVE Collection Time: 08/16/20  4:15 AM  
Result Value Ref Range Sodium 140 136 - 145 mmol/L Potassium 3.3 (L) 3.5 - 5.5 mmol/L Chloride 107 100 - 111 mmol/L  
 CO2 25 21 - 32 mmol/L Anion gap 8 3.0 - 18 mmol/L Glucose 225 (H) 74 - 99 mg/dL BUN 49 (H) 7.0 - 18 MG/DL Creatinine 1.52 (H) 0.6 - 1.3 MG/DL  
 BUN/Creatinine ratio 32 (H) 12 - 20 GFR est AA 55 (L) >60 ml/min/1.73m2 GFR est non-AA 45 (L) >60 ml/min/1.73m2 Calcium 8.2 (L) 8.5 - 10.1 MG/DL Bilirubin, total 0.7 0.2 - 1.0 MG/DL  
 ALT (SGPT) 37 16 - 61 U/L  
 AST (SGOT) 45 (H) 10 - 38 U/L Alk. phosphatase 73 45 - 117 U/L Protein, total 5.8 (L) 6.4 - 8.2 g/dL Albumin 2.2 (L) 3.4 - 5.0 g/dL Globulin 3.6 2.0 - 4.0 g/dL A-G Ratio 0.6 (L) 0.8 - 1.7    
CBC WITH AUTOMATED DIFF Collection Time: 08/16/20  4:15 AM  
Result Value Ref Range WBC 18.6 (H) 4.6 - 13.2 K/uL  
 RBC 3.75 (L) 4.70 - 5.50 M/uL HGB 11.2 (L) 13.0 - 16.0 g/dL HCT 33.4 (L) 36.0 - 48.0 % MCV 89.1 74.0 - 97.0 FL  
 MCH 29.9 24.0 - 34.0 PG  
 MCHC 33.5 31.0 - 37.0 g/dL  
 RDW 14.1 11.6 - 14.5 % PLATELET 397 102 - 516 K/uL MPV 10.7 9.2 - 11.8 FL  
 NEUTROPHILS 90 (H) 42 - 75 % BAND NEUTROPHILS 4 0 - 5 % LYMPHOCYTES 2 (L) 20 - 51 % MONOCYTES 4 2 - 9 % EOSINOPHILS 0 0 - 5 % BASOPHILS 0 0 - 3 %  
 ABS. NEUTROPHILS 17.5 (H) 1.8 - 8.0 K/UL  
 ABS. LYMPHOCYTES 0.4 (L) 0.8 - 3.5 K/UL  
 ABS. MONOCYTES 0.7 0 - 1.0 K/UL  
 ABS. EOSINOPHILS 0.0 0.0 - 0.4 K/UL  
 ABS. BASOPHILS 0.0 0.0 - 0.06 K/UL  
 DF AUTOMATED PLATELET COMMENTS ADEQUATE PLATELETS    
 RBC COMMENTS ANISOCYTOSIS 
1+ MAGNESIUM Collection Time: 08/16/20  4:15 AM  
Result Value Ref Range Magnesium 2.0 1.6 - 2.6 mg/dL GLUCOSE, POC Collection Time: 08/16/20  8:29 AM  
Result Value Ref Range Glucose (POC) 257 (H) 70 - 110 mg/dL BLOOD TYPE, (ABO+RH) Collection Time: 08/16/20 10:08 AM  
Result Value Ref Range ABO/Rh(D) A POSITIVE   
GLUCOSE, POC Collection Time: 08/16/20 11:53 AM  
Result Value Ref Range Glucose (POC) 213 (H) 70 - 110 mg/dL Chemistry Recent Labs 08/16/20 
0415 08/15/20 
1100 08/14/20 
0325 * 192* 125*  144 143  
K 3.3* 3.2* 3.8  112* 113* CO2 25 23 22 BUN 49* 46* 41* CREA 1.52* 1.55* 1.59* CA 8.2* 7.8* 8.5 MG 2.0  --  2.0 AGAP 8 9 8 BUCR 32* 30* 26* AP 73 73  --   
TP 5.8* 5.8*  --   
ALB 2.2* 2.1*  --   
GLOB 3.6 3.7  --   
AGRAT 0.6* 0.6*  --   
 
 
CBC w/Diff Recent Labs 08/16/20 
0415 08/15/20 
1100 08/14/20 
1704 WBC 18.6* 17.1* 14.3*  
RBC 3.75* 3.76* 3.95* HGB 11.2* 11.5* 12.2* HCT 33.4* 33.4* 35.2*  
 379 406 GRANS 90* 92* 90* LYMPH 2* 7* 4* EOS 0 0 0 ABG Recent Labs 08/14/20 
0455 PHI 7.42  
PCO2I 27.2*  
PO2I 60* HCO3I 17.8*  
FIO2I 100 Micro No results for input(s): SDES, CULT in the last 72 hours. No results for input(s): CULT in the last 72 hours. CT (Most Recent) Results from [facility] [place] [facility] Encounter encounter on 03/02/20 CTA CHEST ABD PELV W WO CONT Narrative CT without contrast and CT angiogram of the chest, abdomen and pelvis HISTORY: Severe acute midthoracic back pain and vomiting. Elevated troponin. Clinical concern of aortic dissection and PE 
 
COMPARISON: None TECHNIQUE: Multidetector helical CT is carried out from the thoracic inlet to 
the lesser trochanters before and after nonionic IV contrast administration. 3D postprocessed images, including surface shaded display, will produce for this 
exam, permanently archived, and interpreted. Parenchymal organ imaging will be 
limited by arterial phase contrast administration. All CT scans at this facility performed using dose optimization techniques as 
appreciated to a performed exam, to include automated exposure control, 
adjustment of the mA and or KU according to patient size (including appropriate 
matching for site specific examination), or use of iterative reconstruction 
technique. CONTRAST: 100 cc Isovue 370. FINDINGS: 
 
CT ANGIOGRAM: The contrast bolus is mainly in the pulmonary arteries with 
suboptimal opacification to the aorta with somehow limited vascular evaluation. THORACIC AORTA: Patent and normal in caliber. No evidence of aortic dissection 
or aneurysmal dilatation. A few atherosclerotic calcified plaques scattered at 
the arch and descending. The caliber measurements of the aorta: 
 3.4 x 3.9 x 4.4 cm at the sinus of Valsalva; 
 3.4 x 3.4 cm at the sinotubular junction of the aortic root; 
 3.7 x 3.8 cm at maximal ascending aorta; 
 3.3 x 3.3 cm at mid aortic arch after the last vessel takeoff; 
 2.7 x 2.7 cm at proximal descending aorta at the level of left atrium; 
 2.6 x 2.7 cm at the mid descending aorta; 
 2.3 x 2.6 cm at distal descending aorta at the diaphragmatic hiatus. THE GREAT VESSELS IN THE ARCH: Not well opacified by contrast due to the early 
scan as mentioned above. There are grossly patent with normal caliber. No 
significant atherosclerotic disease or stenosis. THE PULMONARY ARTERIES: Patent. No evidence of intraluminal filling defect to 
suggest pulmonary embolism. No pulmonary arterial dilatation. ABDOMINAL AORTA: Normal in caliber. No dissection or aneurysmal dilatation. Mild 
atherosclerotic calcified plaques at the mid and distal portion. ILIAC ARTERIES: Patent. Mild atherosclerotic calcified plaques present without 
stenosis. CELIAC ARTERY: Patent. Few small calcified plaques at the origin without 
stenosis. SMA: THERE IS A FOCAL STENOSIS at the ostium which estimated about 50-75%, 
likely due to soft plaque. RENAL ARTERIES: There are left main and accessory left of renal arteries. Short 
segmental stenosis of proximal left main renal artery is estimated about 75%. Patent left accessory artery. Right main and accessoryrenal arteries appear 
patent. Gerldine Prost CT CHEST:  
 
LUNG PARENCHYMA: The lung parenchyma appears clear. No pulmonary nodule, mass or 
infiltration identified. THYROID: Unremarkable. MEDIASTINUM:  No adenopathy. THE HEART AND THE PERICARDIUM: . Unremarkable. PLEURAL SPACES AND CHEST WALL: No significant pleural pathology. CT ABDOMEN AND PELVIS:  
 
ABDOMINAL VISCERA: The liver, spleen, pancreas, gallbladder and both adrenal 
glands appear unremarkable. The stomach is poorly distended. The small and large 
bowel are nondilated. PERITONEUM/RETROPERITONEUM: No significant adenopathy. GENITOURINARY ORGANS: Benign cortical cysts identified in bilateral kidneys, the 
larger one in right kidney in posterior midpole measures 3.2 x 3.5 cm and the 
left renal cyst in lower pole measures 1.9 x 2.2 cm. Two 3 mm nonobstructive 
calculi in the lower pole of right kidney. No hydronephrosis. The bladder appears normal. The prostate is moderately enlarged. OTHERS: No free air or free fluid. CT OSSEOUS STRUCTURES:  Moderate spondylosis along the spine and moderate facet 
arthropathy at lower lumbar spine. Impression IMPRESSION:  
 
1. No aortic dissection or aneurysm. Very mild atherosclerotic aortic disease. 2. 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 3. Short segmental stenosis at proximal left the main renal artery, estimated 
about 75%. 4. No evidence of PE. 
 
5. Bilateral renal cysts. Nonobstructive right renal calculi. 6. Moderate prostate enlargement. The preliminary read without 3-D reconstruction was provided to the  the 
ordering physician by me upon the initial interpretation at approximately  1506  
hours, 3/2/20. Thank you for your referral.   
 
 
XR (Most Recent). CXR reviewed by me and compared with previous CXR Results from [facility] [place] Encounter encounter on 08/10/20 XR CHEST SNGL V  
 Narrative INDICATION:  Status post thoracentesis. COMPARISON:  Recent prior FINDINGS: A portable AP radiograph of the chest was obtained at 0123 hours. Cardiac silhouette and is post surgical changes are stable. No significant 
interval change in bilateral diffuse patchy opacities. No acute osseous 
abnormality. No pneumothorax. Impression IMPRESSION: No significant interval change. See my orders for details Total care time exclusive of procedures with complex decision making, coordination of care and counseling patient performed and > 50% time spent in face to face evaluation as mentioned above. Chelsey Natarajan MD 
Critical Care Medicine

## 2020-08-17 NOTE — PROGRESS NOTES
Comprehensive Nutrition Assessment Type and Reason for Visit: Initial, RD nutrition re-screen/LOS Nutrition Recommendations/Plan:  
- Change to soft solid diet with chopped meats and add supplements: Glucerna Shake TID. - Monitor and encourage po intake. Nutrition Assessment:  Altered mentation with poor po intake, minimal meal intake yesterday and improved for breakfast this morning per nursing- CNA report. Consuming 5-25% of meals per chart. Malnutrition Assessment: 
Malnutrition Status: At risk for malnutrition (specify)(poor po intake, altered mentation, weight loss PTA) Nutrition History and Allergies: PMH of diabetes, hypertension, hyperlipidemia, coronary artery disease and renal insufficiency/chronic kidney disease stage 3 admitted with cough, generalized weakness and decreased appetite x several weeks PTA. Per RD assessment from previous admission 3/2/20- pt with poor dentition with dentures requiring soft solid foods and consumes small frequent meals with poor po intake, progressive decline over the past year. Previous weight of 200 lb (12/2019), 185 lb, 3/2020. Estimated Daily Nutrient Needs: 
Energy (kcal):  4677-3909 Protein (g):   Fluid (ml/day):  9209-4246 Nutrition Related Findings:  Last BM 8/15/ Receiving senna daily, started on lantus yesterday with hyperglycemia on steroid, replaced with 40 mEq KCl today. Wounds:  Partial thickness(Left hip abrasion from fall) Current Nutrition Therapies: DIET DIABETIC CONSISTENT CARB Regular Anthropometric Measures: 
· Height:  5' 7\" (170.2 cm) · Current Body Wt:  73.8 kg (162 lb 11.2 oz) · Admission Body Wt:  158 lb 8.2 oz · Usual Body Wt:  200 lb · Ideal Body Wt:  148 lbs:  109.9 % · BMI Category: Overweight (BMI 25.0-29. 9) Nutrition Diagnosis:  
· Unintended weight loss related to cognitive or neurological impairment, biting/chewing (masticatory) difficulty(decreased appetite) as evidenced by poor intake prior to admission, weight loss(23 lb, 12% weight loss x 5 months PTA per chart.) Nutrition Interventions:  
Food and/or Nutrient Delivery: Modify current diet, Mineral supplement, Vitamin supplement, Start oral nutrition supplement Nutrition Education and Counseling: Education not indicated Coordination of Nutrition Care: Continued inpatient monitoring(patient discussed with nursing) Goals: 
PO nutrition intake will meet >75% of patient estimated nutritional needs within the next 7 days. Nutrition Monitoring and Evaluation:  
Food/Nutrient Intake Outcomes: Diet advancement/tolerance, Food and nutrient intake, Supplement intake, Vitamin/mineral intake Physical Signs/Symptoms Outcomes: Chewing or swallowing, GI status, Hemodynamic status, Nutrition focused physical findings, Meal time behavior Discharge Planning: Too soon to determine Electronically signed by Halley Damian RD, [address]  on 8/17/2020 at 2:49 PM 
 
Contact: [phone]

## 2020-08-17 NOTE — DIABETES MGMT
Glycemic Control Plan of Care COVID-19 (+) Attempted phone contact to help minimize spread of infection but patient did not answer call at this time. Glycemic assessment: 
POC BG 8/16: 257, 213, 221 POC BG 8/17 at time of review: 258, 276 Total daily dose insulin previous day: 8/16/2020 Lantus: 4 units Lispro: 27 units Recommendation(s): consider increasing basal lantus insulin dose to 10 units daily starting today. Addendum at 2:13 PM: obtained order to increase lantus to 9 units daily at bedtime. Current hospital diabetes medications: 
Basal lantus insulin 9 units daily at bedtime. Correctional lispro insulin ACHS. Modified to very resistant dose. Total daily dose insulin requirement previous day: 8/16/2020: 
Lantus: 4 units Lispro: 27 units Most recent blood glucose values: 
 
 
 
Current A1C: 6.6% (8/12/2020) which is equivalent to estimated average blood glucose of 143 mgd/L during the past 2-3 months. Home diabetes medication: Patient reported on 8/13/2020 Glyburide 2.5 mg BID Diet: Diabetic consistent carb regular Goals:  Blood glucose will be within target range of  mg/dL by 8/20/2020 Education:  ___  Refer to Diabetes Education Record _X__  Education not indicated at this time Víctor Hollingsworth RN [facility] Pager: [phone]

## 2020-08-17 NOTE — ROUTINE PROCESS
Received verbal order from Dr. Carmen Cortes for stat ultrasound guided venous access, midline. Patient has 24 gauge and is extremely hard stick. Patient needs convalescent plasma, antibiotics and is on constant heparin drip. Order placed as given.

## 2020-08-17 NOTE — PROGRESS NOTES
Palliative Medicine Consult  [facility]: [phone] [facility]: [phone] Goals of care defined, PM team will sign off. No further palliative intervention required. Thank you for the Palliative Medicine consult and allowing us to participate in the care of Mr. Ruthell Mcardle. GOALS OF CARE: Full code/Full Interventions. La WELLSN, RN, [facility] Palliative Medicine Inpatient RN  [facility] Palliative [facility] Line: [phone])

## 2020-08-17 NOTE — PROGRESS NOTES
Problem: Falls - Risk of 
Goal: *Absence of Falls Description: Document Duc Mo Fall Risk and appropriate interventions in the flowsheet. Outcome: Progressing Towards Goal 
Note: Fall Risk Interventions: 
Mobility Interventions: Assess mobility with egress test, Bed/chair exit alarm, Communicate number of staff needed for ambulation/transfer Mentation Interventions: Adequate sleep, hydration, pain control, Bed/chair exit alarm, Evaluate medications/consider consulting pharmacy, Eyeglasses and hearing aids, More frequent rounding, Reorient patient, Toileting rounds, Update white board Medication Interventions: Evaluate medications/consider consulting pharmacy, Bed/chair exit alarm Elimination Interventions: Bed/chair exit alarm, Call light in reach, Toileting schedule/hourly rounds, Patient to call for help with toileting needs History of Falls Interventions: Bed/chair exit alarm, Evaluate medications/consider consulting pharmacy Problem: Patient Education: Go to Patient Education Activity Goal: Patient/Family Education Outcome: Progressing Towards Goal 
  
Problem: Pressure Injury - Risk of 
Goal: *Prevention of pressure injury Description: Document Tr Scale and appropriate interventions in the flowsheet. Outcome: Progressing Towards Goal 
Note: Pressure Injury Interventions: 
Sensory Interventions: Assess changes in LOC, Assess need for specialty bed, Check visual cues for pain, Float heels, Keep linens dry and wrinkle-free, Minimize linen layers, Monitor skin under medical devices, Pressure redistribution bed/mattress (bed type), Turn and reposition approx. every two hours (pillows and wedges if needed) Moisture Interventions: Absorbent underpads, Apply protective barrier, creams and emollients, Assess need for specialty bed, Maintain skin hydration (lotion/cream), Minimize layers, Moisture barrier Activity Interventions: Assess need for specialty bed, Pressure redistribution bed/mattress(bed type) Mobility Interventions: Assess need for specialty bed, HOB 30 degrees or less, Pressure redistribution bed/mattress (bed type), Turn and reposition approx. every two hours(pillow and wedges) Nutrition Interventions: Document food/fluid/supplement intake, Discuss nutritional consult with provider, Offer support with meals,snacks and hydration Friction and Shear Interventions: Apply protective barrier, creams and emollients, Minimize layers, Transferring/repositioning devices Problem: Patient Education: Go to Patient Education Activity Goal: Patient/Family Education Outcome: Progressing Towards Goal 
  
Problem: Diabetes Self-Management Goal: *Disease process and treatment process Description: Define diabetes and identify own type of diabetes; list 3 options for treating diabetes. Outcome: Progressing Towards Goal 
Goal: *Incorporating nutritional management into lifestyle Description: Describe effect of type, amount and timing of food on blood glucose; list 3 methods for planning meals. Outcome: Progressing Towards Goal 
Goal: *Incorporating physical activity into lifestyle Description: State effect of exercise on blood glucose levels. Outcome: Progressing Towards Goal 
Goal: *Developing strategies to promote health/change behavior Description: Define the ABC's of diabetes; identify appropriate screenings, schedule and personal plan for screenings. Outcome: Progressing Towards Goal 
Goal: *Using medications safely Description: State effect of diabetes medications on diabetes; name diabetes medication taking, action and side effects. Outcome: Progressing Towards Goal 
Goal: *Monitoring blood glucose, interpreting and using results Description: Identify recommended blood glucose targets  and personal targets. Outcome: Progressing Towards Goal 
Goal: *Prevention, detection, treatment of acute complications Description: List symptoms of hyper- and hypoglycemia; describe how to treat low blood sugar and actions for lowering  high blood glucose level. Outcome: Progressing Towards Goal 
Goal: *Prevention, detection and treatment of chronic complications Description: Define the natural course of diabetes and describe the relationship of blood glucose levels to long term complications of diabetes. Outcome: Progressing Towards Goal 
Goal: *Developing strategies to address psychosocial issues Description: Describe feelings about living with diabetes; identify support needed and support network Outcome: Progressing Towards Goal 
Goal: *Insulin pump training Outcome: Progressing Towards Goal 
Goal: *Sick day guidelines Outcome: Progressing Towards Goal 
Goal: *Patient Specific Goal (EDIT GOAL, INSERT TEXT) Outcome: Progressing Towards Goal 
  
Problem: Patient Education: Go to Patient Education Activity Goal: Patient/Family Education Outcome: Progressing Towards Goal 
  
Problem: Patient Education: Go to Patient Education Activity Goal: Patient/Family Education Outcome: Progressing Towards Goal 
  
Problem: Patient Education: Go to Patient Education Activity Goal: Patient/Family Education Outcome: Progressing Towards Goal 
  
Problem: Non-Violent Restraints Goal: *Removal from restraints as soon as assessed to be safe Outcome: Progressing Towards Goal 
Goal: *No harm/injury to patient while restraints in use Outcome: Progressing Towards Goal 
Goal: *Patient's dignity will be maintained Outcome: Progressing Towards Goal 
Goal: *Patient Specific Goal (EDIT GOAL, INSERT TEXT) Outcome: Progressing Towards Goal 
Goal: Non-violent Restaints:Standard Interventions Outcome: Progressing Towards Goal 
Goal: Non-violent Restraints:Patient Interventions Outcome: Progressing Towards Goal 
Goal: Patient/Family Education Outcome: Progressing Towards Goal 
  
Problem: Patient Education: Go to Patient Education Activity Goal: Patient/Family Education Outcome: Progressing Towards Goal 
  
Problem: Pneumonia: Day 4 Goal: Off Pathway (Use only if patient is Off Pathway) Outcome: Progressing Towards Goal 
Goal: Activity/Safety Outcome: Progressing Towards Goal 
Goal: Nutrition/Diet Outcome: Progressing Towards Goal 
Goal: Discharge Planning Outcome: Progressing Towards Goal 
Goal: Medications Outcome: Progressing Towards Goal 
Goal: Respiratory Outcome: Progressing Towards Goal 
Goal: Treatments/Interventions/Procedures Outcome: Progressing Towards Goal 
Goal: Psychosocial 
Outcome: Progressing Towards Goal

## 2020-08-17 NOTE — PROGRESS NOTES
Infectious Disease progress note Reason: COVID-19 pneumonia Current abx Prior abx Ceftriaxone, azithromycin since 8/10/20 Lines:  
 
 
Assessment : 
 
65 y.o. male with type 2 DM,  hypertension, hyperlipidemia and renal insufficiency who presented to ED on 8/10/20 with cough for 2 weeks. Chest x-ray 8/10, 8/13/2020-multifocal infiltrates Clinical presentation consistent with confirmed COVID-19 pneumonia Labs on 8/11-ferritin 841, CRP 23.6, procalcitonin 1.91, , d-dimer 3.72 Labs on 8/12-ferritin 782, CRP 17.3, procalcitonin 1.47, , d-dimer 3.14 Labs on 8/13-ferritin 715, CRP 8.4, procalcitonin 0.67 , d-dimer 6.42 Labs on 8/14-ferritin 691, CRP 4.4, procalcitonin 0.33, , d-dimer 6.58 Labs on 8/17-ferritin 625, CRP 1.6, procalcitonin 1.13, d-dimer 3.43 
 
covid test 8/10- positive Acute renal insufficiency-likely secondary to volume depletion. Monitor for COVID nephropathy Hypoxia likely secondary to COVID-19 pneumonia. Concern for coagulopathy especially since increasing d-dimer. Pulmonary follow-up appreciated. venous duplex of lower extremity ordered. LOREN-likely secondary to COVID-19 infection, volume depletion. Some improvement noted. EGFR now greater than 35. Worsening hypoxia, tachypnea noted since 8/14 due to severe COVID-19 pneumonia. Status post remdesivir since 8/14 Currently on 25L high flow. Was informed by RN that convalescent plasma is available. Unable to give due to lack of good iv access. Venous duplex 8/14- negative for DVT Recommendations: 
 
1. D/c ceftriaxone. D/c azithromycin. Continue remdesivir (d3/5). Monitor LFTs 2. Agree with pulmonary recommendations regarding Solu-Medrol 3.  recommend switching to full dose anticoagulation since persistent hypoxia and increasing d-dimer noted 4. Follow-up the results of venous duplex 5. Titrate oxygen as tolerated 6. Monitor inflammatory parameters-ferritin, CRP, d-dimer, procalcitonin 7. Follow-up pulmonary recommendations 8. Obtain midline stat - d/w IR. transfuse convalescent plasma once midline available Above plan was discussed in details with patient, dr. Jennetta Hodgkins,  dr Mckinley Geiger. Please call me if any further questions or concerns. Will continue to participate in the care of this patient. HPI: 
 
Feels ok. Denies cp, sob. Patient does not answer all questions asked. Detailed review of system not feasible at this time 
 
 
home Medication List  
  
 Details  
metoprolol succinate (TOPROL-XL) 100 mg tablet Take 1 Tab by mouth nightly. melatonin 3 mg tablet Take 1 Tab by mouth nightly. aspirin-calcium carbonate 81 mg-300 mg calcium(777 mg) tab Take 81 mg by mouth daily. lisinopriL (PRINIVIL, ZESTRIL) 20 mg tablet Take 1 Tab by mouth two (2) times a day. Qty: 180 Tab, Refills: 3  
  
aspirin delayed-release 81 mg tablet take 1 tablet by mouth once daily 
Qty: 90 Tab, Refills: 1  
  
atorvastatin (LIPITOR) 40 mg tablet take 1 tablet by mouth nightly 
Qty: 90 Tab, Refills: 0  
  
cyanocobalamin 1,000 mcg tablet take 1 tablet by mouth once daily 
Qty: 90 Tab, Refills: 0  
  
folic acid (FOLVITE) 1 mg tablet take 1 tablet by mouth once daily 
Qty: 90 Tab, Refills: 0  
  
tamsulosin (FLOMAX) 0.4 mg capsule take 1 capsule by mouth once daily 
Qty: 90 Cap, Refills: 0  
  
allopurinoL (ZYLOPRIM) 300 mg tablet take 1 tablet by mouth once daily 
Qty: 90 Tab, Refills: 1 Associated Diagnoses: Gout, unspecified cause, unspecified chronicity, unspecified site  
  
ascorbic acid, vitamin C, (VITAMIN C) 250 mg tablet Take 1 Tab by mouth two (2) times daily (with meals). Qty: 180 Tab, Refills: 1  
  
acetaminophen (TYLENOL) 325 mg tablet Take 2 Tabs by mouth every six (6) hours as needed for Pain or Fever. Qty: 90 Tab, Refills: 2 cholecalciferol (VITAMIN D3) (1000 Units /25 mcg) tablet Take 4 Tabs by mouth daily. Qty: 120 Tab, Refills: 2  
  
ferrous sulfate 325 mg (65 mg iron) tablet Take 1 Tab by mouth two (2) times daily (with meals). Qty: 60 Tab, Refills: 2  
  
nystatin (MYCOSTATIN) powder Apply  to affected area two (2) times a day. Qty: 1 Bottle, Refills: 0  
  
glyBURIDE (DIABETA) 2.5 mg tablet Take 1 Tab by mouth two (2) times daily (with meals). Qty: 180 Tab, Refills: 1 Associated Diagnoses: Type 2 diabetes mellitus without complication, without long-term current use of insulin ([facility] [address] predniSONE (STERAPRED DS) 10 mg dose pack See administration instruction per 10mg dose pack Qty: 21 Tab, Refills: 0 Associated Diagnoses: Cervical radiculopathy  
  
sildenafil citrate (Viagra) 100 mg tablet Take tablet by mouth once daily as needed. Qty: 6 Tab, Refills: 5 Current Facility-Administered Medications Medication Dose Route Frequency  insulin glargine (LANTUS) injection 4 Units  4 Units SubCUTAneous QHS  
 0.9% sodium chloride infusion 250 mL  250 mL IntraVENous PRN  
 remdesivir 100 mg in 0.9% sodium chloride 250 mL IVPB  100 mg IntraVENous Q24H  
 heparin 25,000 units in D5W 250 ml infusion  18-36 Units/kg/hr IntraVENous TITRATE  ipratropium-albuterol (COMBIVENT RESPIMAT) 20 mcg-100 mcg inhalation spray  2 Puff Inhalation Q4H PRN  
 methylPREDNISolone (PF) (SOLU-MEDROL) injection 60 mg  60 mg IntraVENous Q12H  
 famotidine (PEPCID) tablet 20 mg  20 mg Oral DAILY  hydrALAZINE (APRESOLINE) tablet 10 mg  10 mg Oral Q6H PRN  
 ascorbic acid (vitamin C) (VITAMIN C) tablet 500 mg  500 mg Oral TID  zinc sulfate (ZINCATE) 220 (50) mg capsule 1 Cap  1 Cap Oral DAILY  atorvastatin (LIPITOR) tablet 40 mg  40 mg Oral QHS  folic acid (FOLVITE) tablet 1 mg  1 mg Oral DAILY  melatonin tablet 3 mg  3 mg Oral QHS  metoprolol succinate (TOPROL-XL) tablet 100 mg  100 mg Oral QHS  aspirin chewable tablet 81 mg  81 mg Oral DAILY  And  
  calcium carbonate (CALTREX) tablet 300 mg [elemental]  300 mg Oral DAILY  cholecalciferol (VITAMIN D3) capsule 5,000 Units  5,000 Units Oral DAILY  insulin lispro (HUMALOG) injection   SubCUTAneous AC&HS  
 glucose chewable tablet 16 g  4 Tab Oral PRN  
 glucagon (GLUCAGEN) injection 1 mg  1 mg IntraMUSCular PRN  
 dextrose (D50W) injection syrg 12.5-25 g  25-50 mL IntraVENous PRN  
 sodium chloride (NS) flush 5-10 mL  5-10 mL IntraVENous PRN  
 cefTRIAXone (ROCEPHIN) 2 g in sterile water (preservative free) 20 mL IV syringe  2 g IntraVENous Q24H  
 azithromycin (ZITHROMAX) 500 mg in  mL  500 mg IntraVENous Q24H  
 acetaminophen (TYLENOL) tablet 650 mg  650 mg Oral Q6H PRN Or  
 acetaminophen (TYLENOL) suppository 650 mg  650 mg Rectal Q6H PRN  
 sodium chloride (NS) flush 5-40 mL  5-40 mL IntraVENous Q8H  
 sodium chloride (NS) flush 5-40 mL  5-40 mL IntraVENous PRN  
 senna (SENOKOT) tablet 17.2 mg  2 Tab Oral QHS  bisacodyL (DULCOLAX) suppository 10 mg  10 mg Rectal DAILY PRN  
 ondansetron (ZOFRAN) injection 4 mg  4 mg IntraVENous Q4H PRN Allergies: Metformin Temp (24hrs), Av.8 °F (36.6 °C), Min:97.6 °F (36.4 °C), Max:98.4 °F (36.9 °C) Visit Vitals /78 (BP 1 Location: Left arm, BP Patient Position: At rest) Pulse 97 Temp 97.7 °F (36.5 °C) Resp 20 Wt 73.8 kg (162 lb 12.8 oz) SpO2 99% BMI 25.50 kg/m² ROS: Unable to obtain at this time Physical Exam: 
 
General: Well developed, well nourished male laying on the bed , does not appear tachypneic or dyspneic, on 25 L high flow. HEENT:  Normocephalic, atraumatic, nasal and oral mucous are moist and without evidence of lesions. Neck supple, no bruits. Lungs:   non-labored, bilaterally crackles, no  wheezes Heart:  RRR, s1 and s2; no rubs or gallops, no edema, + pedal pulses Abdomen:  soft, non-distended, active bowel sounds, no hepatomegaly, no splenomegaly. Non-tender Genitourinary:  deferred Extremities:   no clubbing, cyanosis; no joint effusions or swelling;; muscle mass appropriate for age Neurologic:  No gross focal sensory abnormalities; 5/5 muscle strength to upper and lower extremities. Speech appropriate. Cranial nerves intact Skin:  No rash or ulcers noted Back:  no spinal or paraspinal muscle tenderness or rigidity, no CVA tenderness Psychiatric:  No suicidal or homicidal ideations, appropriate mood and affect Labs: Results:  
Chemistry Recent Labs 08/17/20 
0445 08/16/20 
0415 08/15/20 
1100 * 225* 192*  140 144  
K 3.4* 3.3* 3.2*  
 107 112* CO2 27 25 23 BUN 52* 49* 46* CREA 1.44* 1.52* 1.55* CA 8.3* 8.2* 7.8* AGAP 7 8 9 BUCR 36* 32* 30* AP 82 73 73 TP 6.0* 5.8* 5.8* ALB 2.2* 2.2* 2.1*  
GLOB 3.8 3.6 3.7 AGRAT 0.6* 0.6* 0.6* CBC w/Diff Recent Labs 08/17/20 
0445 08/16/20 
0415 08/15/20 
1100 WBC 21.0* 18.6* 17.1*  
RBC 3.92* 3.75* 3.76* HGB 11.7* 11.2* 11.5* HCT 35.1* 33.4* 33.4*  
 386 379 GRANS 96* 90* 92* LYMPH 2* 2* 7* EOS 0 0 0 Microbiology No results for input(s): CULT in the last 72 hours. RADIOLOGY: 
 
All available imaging studies/reports in [facility] for this admission were reviewed Total time spent >35 minutes. High complexity decision making was performed during the evaluation of this patient at high risk for decompensation with multiple organ involvement Above mentioned total time spent on reviewing the case/medical record/data/notes/EMR/patient examination/documentation/coordinating care with nurse/consultants, exclusive of procedures with complex decision making performed and > 50% time spent in face to face evaluation. Dr. Jaime Camposs, Infectious Disease Specialist 
[phone] August 17, 2020 
1:20 PM

## 2020-08-17 NOTE — ROUTINE PROCESS
7949- Verbal shift change report given to Isreal Romberg (oncoming nurse) by Marisol Nicole (offgoing nurse). Report included the following information SBAR, Kardex, MAR, Recent Results, Med Rec Status and Alarm Parameters . 1920- Verbal shift change report given to Tanja BLACK (oncoming nurse) by Isreal Romberg (offgoing nurse). Report included the following information SBAR, Kardex, MAR, Recent Results, Med Rec Status and Alarm Parameters .

## 2020-08-17 NOTE — PROGRESS NOTES
RENAL DAILY PROGRESS NOTE Subjective:  
 
 
Complaint: feels fine. seen thru door Patient was seen during the [place] pandemic. Patient with COVID infection. Patient is in isolation room due to [facility] status and PPE is in short supply hence seen through door and d/w patients RN. Full contact physical exam was not possible due to patient's clinical condition, key findings seen by me are documented. Overnight events noted IMPRESSION:  
 
· Acute kidney injury on chronic kidney disease stage III due to septic LOREN, direct viral injury from COVID-19. · Hypokalemia · Diabetic nephropathy with chronic kidney disease stage III · Hypertension · History of coronary artery disease status post coronary artery bypass surgery · COVID-19 positive status PLAN:  
 stable renal function, continue current supportive care. Current Facility-Administered Medications Medication Dose Route Frequency  insulin glargine (LANTUS) injection 4 Units  4 Units SubCUTAneous QHS  
 0.9% sodium chloride infusion 250 mL  250 mL IntraVENous PRN  
 remdesivir 100 mg in 0.9% sodium chloride 250 mL IVPB  100 mg IntraVENous Q24H  
 heparin 25,000 units in D5W 250 ml infusion  18-36 Units/kg/hr IntraVENous TITRATE  ipratropium-albuterol (COMBIVENT RESPIMAT) 20 mcg-100 mcg inhalation spray  2 Puff Inhalation Q4H PRN  
 methylPREDNISolone (PF) (SOLU-MEDROL) injection 60 mg  60 mg IntraVENous Q12H  
 famotidine (PEPCID) tablet 20 mg  20 mg Oral DAILY  hydrALAZINE (APRESOLINE) tablet 10 mg  10 mg Oral Q6H PRN  
 ascorbic acid (vitamin C) (VITAMIN C) tablet 500 mg  500 mg Oral TID  zinc sulfate (ZINCATE) 220 (50) mg capsule 1 Cap  1 Cap Oral DAILY  atorvastatin (LIPITOR) tablet 40 mg  40 mg Oral QHS  folic acid (FOLVITE) tablet 1 mg  1 mg Oral DAILY  melatonin tablet 3 mg  3 mg Oral QHS  metoprolol succinate (TOPROL-XL) tablet 100 mg  100 mg Oral QHS  aspirin chewable tablet 81 mg  81 mg Oral DAILY And  
 calcium carbonate (CALTREX) tablet 300 mg [elemental]  300 mg Oral DAILY  cholecalciferol (VITAMIN D3) capsule 5,000 Units  5,000 Units Oral DAILY  insulin lispro (HUMALOG) injection   SubCUTAneous AC&HS  
 glucose chewable tablet 16 g  4 Tab Oral PRN  
 glucagon (GLUCAGEN) injection 1 mg  1 mg IntraMUSCular PRN  
 dextrose (D50W) injection syrg 12.5-25 g  25-50 mL IntraVENous PRN  
 sodium chloride (NS) flush 5-10 mL  5-10 mL IntraVENous PRN  
 cefTRIAXone (ROCEPHIN) 2 g in sterile water (preservative free) 20 mL IV syringe  2 g IntraVENous Q24H  
 azithromycin (ZITHROMAX) 500 mg in  mL  500 mg IntraVENous Q24H  
 acetaminophen (TYLENOL) tablet 650 mg  650 mg Oral Q6H PRN Or  
 acetaminophen (TYLENOL) suppository 650 mg  650 mg Rectal Q6H PRN  
 sodium chloride (NS) flush 5-40 mL  5-40 mL IntraVENous Q8H  
 sodium chloride (NS) flush 5-40 mL  5-40 mL IntraVENous PRN  
 senna (SENOKOT) tablet 17.2 mg  2 Tab Oral QHS  bisacodyL (DULCOLAX) suppository 10 mg  10 mg Rectal DAILY PRN  
 ondansetron (ZOFRAN) injection 4 mg  4 mg IntraVENous Q4H PRN Objective:  
 
Patient Vitals for the past 24 hrs: 
 Temp Pulse Resp BP SpO2  
08/17/20 1600 98.4 °F (36.9 °C) 78 19 114/51 93 % 08/17/20 1530 98.2 °F (36.8 °C) 82 17 116/51 91 % 08/17/20 1515 98.3 °F (36.8 °C) 76 16 110/56 94 % 08/17/20 1200 98.3 °F (36.8 °C) 97 20 139/59 98 % 08/17/20 0822  97   99 % 08/17/20 0730 97.7 °F (36.5 °C) 96 20 143/78 100 % 08/17/20 0351 97.7 °F (36.5 °C) 64 18 121/64 100 % 08/17/20 0000     97 % 08/16/20 2335 97.8 °F (36.6 °C) 94 24 156/88 97 % 08/16/20 1947 97.6 °F (36.4 °C) 91 19 152/74 93 % Weight change:  
 
 08/15 1901 - 08/17 0700 In: 1229.8 [P.O.:1080; I.V.:149.8] Out: 600 [Urine:600] Intake/Output Summary (Last 24 hours) at 8/17/2020 1715 Last data filed at 8/17/2020 1600 Gross per 24 hour Intake 1249 ml Output 600 ml Net 649 ml  
 
Limited Physical Exam  
 
General: COMFORTABLE 
CV: visible on telemetry monitoring Pulm:  equal chest rise b/l, Abdomen: distended :no georges present Skin: appears pink, no obvious wounds or lesions. Extremities: NO EDEMA Data Review:  
 
LABS:  
Hematology:  
Recent Labs 08/17/20 0445 08/16/20 
0415 08/15/20 
1100 WBC 21.0* 18.6* 17.1* HGB 11.7* 11.2* 11.5* HCT 35.1* 33.4* 33.4* Chemistry:  
Recent Labs 08/17/20 0445 08/16/20 
0415 08/15/20 
1100 BUN 52* 49* 46* CREA 1.44* 1.52* 1.55* CA 8.3* 8.2* 7.8* ALB 2.2* 2.2* 2.1*  
K 3.4* 3.3* 3.2*  140 144  107 112* CO2 27 25 23 * 225* 192* Procedures/imaging: see electronic medical records for all procedures, Xrays and details which were not copied into this note but were reviewed prior to creation of Kita Harding MD 
8/17/2020

## 2020-08-17 NOTE — PROGRESS NOTES
[facility] Pulmonary, Critical Care, and Sleep Medicine Pulmonary Medicine Progress Note Name: Isabelle Hooks MRN: [id number] : 1950 [facility] Date: 2020 Assessment: 
Acute Hypoxic Respiratory Failure - 2/2 covid19 pneumonia:  
- currently on HFNC [id number] Pneumonia: + COVID 8/10/2020 - B/L infiltrates, fevers. Mildly elevated inflammatory markers CAD with Hx of recent 3V CABG LOREN on CKD- Diabetic  
- improving. Deliria: 
- suspect multifactorial etiology: steroids, other meds, medical isolation, serious medical illness, CKD, etc 
Leukocytosis - Suspect due to steroids Deconditioning with low albumin state Diabetes Hypokalemia 
  
Impression/Plan: 
- FiO2 to keep SpO2 >=92%, HOB >=30 degree, aspiration precautions, aggressive pulmonary toileting, incentive spirometry. - Continue steroids and AC 
- Trend LFTs and other COVID inflammatory markers - Convalescent plasma when available - Wean HFNC as tolerated, if further decline, would warrant flolan or intubation depending on mental status 
- PRN bipap: - as long as mental status allows - PO intake as tolerated - Critically ill, monitor closely for further decline and need for ICU admission 
- Other issues management by primary team and respective consultants. - Will continue to follow Subjective: 
 
20 Hospital Day: 7 Events and notes from last 24 hours reviewed. Discussed with patient to the best of my ability Care plan discussed with nursing. Labs and images personally seen and available reports reviewed All current medicines are reviewed · Patient resting in bed · Appropriate at time of my evaluation · Nurse reports that patient was hallucinating earlier- seeing a child, dog and cat in room- this is a waxing occurrence · Patient went to IR and had midline placed in right arm · Poor IV Access- on heparin drip, D-Dimer, Fibrinogen, Ferritin increasing and CRP increasing · Awaiting convalescent serum · On HFNC · WBC remains elevated- On steroid therapy Patient Active Problem List  
Diagnosis Code  
 HTN (hypertension) I10  
 Gout M10.9  Hyperlipemia E78.5  Nocturia R35.1  Elevated troponin R79.89  Back pain M54.9  Acute coronary syndrome (HCC) I24.9  S/P cardiac catheterization Z98.890  
 NSTEMI (non-ST elevated myocardial infarction) (HCC) I21.4  
 S/P CABG x 3 Z95.1  Stage 3 chronic kidney disease (HCC) N18.3  LOREN (acute kidney injury) 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  
 Secondary hyperparathyroidism of renal origin ([facility] N25.81  
 Acute hypoxemic respiratory failure ([facility]) J96.01  
 Suspected COVID-19 virus infection Z20.828  Acute-on-chronic kidney injury ([facility] N17.9, N18.9 Medications- Current: 
Current Facility-Administered Medications Medication Dose Route Frequency  insulin glargine (LANTUS) injection 4 Units  4 Units SubCUTAneous QHS  
 0.9% sodium chloride infusion 250 mL  250 mL IntraVENous PRN  
 remdesivir 100 mg in 0.9% sodium chloride 250 mL IVPB  100 mg IntraVENous Q24H  
 heparin 25,000 units in D5W 250 ml infusion  18-36 Units/kg/hr IntraVENous TITRATE  ipratropium-albuterol (COMBIVENT RESPIMAT) 20 mcg-100 mcg inhalation spray  2 Puff Inhalation Q4H PRN  
 methylPREDNISolone (PF) (SOLU-MEDROL) injection 60 mg  60 mg IntraVENous Q12H  
 famotidine (PEPCID) tablet 20 mg  20 mg Oral DAILY  hydrALAZINE (APRESOLINE) tablet 10 mg  10 mg Oral Q6H PRN  
 ascorbic acid (vitamin C) (VITAMIN C) tablet 500 mg  500 mg Oral TID  zinc sulfate (ZINCATE) 220 (50) mg capsule 1 Cap  1 Cap Oral DAILY  atorvastatin (LIPITOR) tablet 40 mg  40 mg Oral QHS  folic acid (FOLVITE) tablet 1 mg  1 mg Oral DAILY  melatonin tablet 3 mg  3 mg Oral QHS  metoprolol succinate (TOPROL-XL) tablet 100 mg  100 mg Oral QHS  aspirin chewable tablet 81 mg  81 mg Oral DAILY And  
 calcium carbonate (CALTREX) tablet 300 mg [elemental]  300 mg Oral DAILY  cholecalciferol (VITAMIN D3) capsule 5,000 Units  5,000 Units Oral DAILY  insulin lispro (HUMALOG) injection   SubCUTAneous AC&HS  
 glucose chewable tablet 16 g  4 Tab Oral PRN  
 glucagon (GLUCAGEN) injection 1 mg  1 mg IntraMUSCular PRN  
 dextrose (D50W) injection syrg 12.5-25 g  25-50 mL IntraVENous PRN  
 sodium chloride (NS) flush 5-10 mL  5-10 mL IntraVENous PRN  
 cefTRIAXone (ROCEPHIN) 2 g in sterile water (preservative free) 20 mL IV syringe  2 g IntraVENous Q24H  
 azithromycin (ZITHROMAX) 500 mg in  mL  500 mg IntraVENous Q24H  
 acetaminophen (TYLENOL) tablet 650 mg  650 mg Oral Q6H PRN Or  
 acetaminophen (TYLENOL) suppository 650 mg  650 mg Rectal Q6H PRN  
 sodium chloride (NS) flush 5-40 mL  5-40 mL IntraVENous Q8H  
 sodium chloride (NS) flush 5-40 mL  5-40 mL IntraVENous PRN  
 senna (SENOKOT) tablet 17.2 mg  2 Tab Oral QHS  bisacodyL (DULCOLAX) suppository 10 mg  10 mg Rectal DAILY PRN  
 ondansetron (ZOFRAN) injection 4 mg  4 mg IntraVENous Q4H PRN Objective: 
Vital Signs:   
Visit Vitals /51 (BP 1 Location: Left arm, BP Patient Position: At rest) Pulse 78 Temp 98.4 °F (36.9 °C) Resp 19 Ht 5' 7\" (1.702 m) Wt 73.8 kg (162 lb 12.8 oz) SpO2 93% BMI 25.50 kg/m² O2 Device: Hi flow nasal cannula, Heated, Humidifier O2 Flow Rate (L/min): 30 l/min Temp (24hrs), Av °F (36.7 °C), Min:97.6 °F (36.4 °C), Max:98.4 °F (36.9 °C) Intake/Output:  
Last shift:      701 - 1900 In: 80 Out: - Last 3 shifts: 08/15 1901 - 700 In: 1229.8 [P.O.:1080; I.V.:149.8] Out: 600 [Urine:600] Intake/Output Summary (Last 24 hours) at 8/17/2020 1644 Last data filed at 8/17/2020 1510 Gross per 24 hour Intake 209 ml Output 600 ml Net -391 ml Physical Exam:  
 
Patient evaluated at bedside General/Neurology: Alert, Awake, appropriate frail, speaking in 3 word sentences Head:   Normocephalic, without obvious abnormality Eye:   PERRL, EOM intact Oral:  Mucus membranes moist 
Lung:   Modest effort at best, no accessory muscle use, No crepitus, No audible wheezing. On HFNC Heart:   S1 S2 present Abdomen:  Soft, non tender Extremities:  No cyanosis, wear, no erythema, mild edema- lebs, Midline - right upper extremity Skin:   Dry, intact, several areas of ecchymosis in various stages of healing Data: 
   
Recent Results (from the past 24 hour(s)) PTT Collection Time: 08/16/20  7:36 PM  
Result Value Ref Range aPTT 27.3 23.0 - 36.4 SEC GLUCOSE, POC Collection Time: 08/16/20  8:17 PM  
Result Value Ref Range Glucose (POC) 221 (H) 70 - 110 mg/dL PROTHROMBIN TIME + INR Collection Time: 08/17/20  4:45 AM  
Result Value Ref Range Prothrombin time 15.4 (H) 11.5 - 15.2 sec INR 1.2 0.8 - 1.2 PTT Collection Time: 08/17/20  4:45 AM  
Result Value Ref Range aPTT 25.6 23.0 - 36.4 SEC FIBRINOGEN Collection Time: 08/17/20  4:45 AM  
Result Value Ref Range Fibrinogen 274 210 - 451 mg/dL D DIMER Collection Time: 08/17/20  4:45 AM  
Result Value Ref Range D DIMER 3.43 (H) <0.46 ug/ml(FEU) FERRITIN Collection Time: 08/17/20  4:45 AM  
Result Value Ref Range Ferritin 625 (H) 8 - 388 NG/ML  
C REACTIVE PROTEIN, QT Collection Time: 08/17/20  4:45 AM  
Result Value Ref Range C-Reactive protein 1.6 (H) 0 - 0.3 mg/dL LD Collection Time: 08/17/20  4:45 AM  
Result Value Ref Range  (H) 81 - 234 U/L  
PROCALCITONIN Collection Time: 08/17/20  4:45 AM  
Result Value Ref Range Procalcitonin 0.13 ng/mL METABOLIC PANEL, COMPREHENSIVE Collection Time: 08/17/20  4:45 AM  
Result Value Ref Range Sodium 141 136 - 145 mmol/L Potassium 3.4 (L) 3.5 - 5.5 mmol/L Chloride 107 100 - 111 mmol/L  
 CO2 27 21 - 32 mmol/L Anion gap 7 3.0 - 18 mmol/L Glucose 159 (H) 74 - 99 mg/dL BUN 52 (H) 7.0 - 18 MG/DL Creatinine 1.44 (H) 0.6 - 1.3 MG/DL  
 BUN/Creatinine ratio 36 (H) 12 - 20 GFR est AA 58 (L) >60 ml/min/1.73m2 GFR est non-AA 48 (L) >60 ml/min/1.73m2 Calcium 8.3 (L) 8.5 - 10.1 MG/DL Bilirubin, total 0.8 0.2 - 1.0 MG/DL  
 ALT (SGPT) 40 16 - 61 U/L  
 AST (SGOT) 32 10 - 38 U/L Alk. phosphatase 82 45 - 117 U/L Protein, total 6.0 (L) 6.4 - 8.2 g/dL Albumin 2.2 (L) 3.4 - 5.0 g/dL Globulin 3.8 2.0 - 4.0 g/dL A-G Ratio 0.6 (L) 0.8 - 1.7    
CBC WITH AUTOMATED DIFF Collection Time: 08/17/20  4:45 AM  
Result Value Ref Range WBC 21.0 (H) 4.6 - 13.2 K/uL  
 RBC 3.92 (L) 4.70 - 5.50 M/uL  
 HGB 11.7 (L) 13.0 - 16.0 g/dL HCT 35.1 (L) 36.0 - 48.0 % MCV 89.5 74.0 - 97.0 FL  
 MCH 29.8 24.0 - 34.0 PG  
 MCHC 33.3 31.0 - 37.0 g/dL  
 RDW 14.3 11.6 - 14.5 % PLATELET 866 279 - 231 K/uL MPV 10.8 9.2 - 11.8 FL  
 NEUTROPHILS 96 (H) 42 - 75 % LYMPHOCYTES 2 (L) 20 - 51 % MONOCYTES 2 2 - 9 % EOSINOPHILS 0 0 - 5 % BASOPHILS 0 0 - 3 %  
 ABS. NEUTROPHILS 20.2 (H) 1.8 - 8.0 K/UL  
 ABS. LYMPHOCYTES 0.4 (L) 0.8 - 3.5 K/UL  
 ABS. MONOCYTES 0.4 0 - 1.0 K/UL  
 ABS. EOSINOPHILS 0.0 0.0 - 0.4 K/UL  
 ABS. BASOPHILS 0.0 0.0 - 0.06 K/UL  
 DF MANUAL PLATELET COMMENTS ADEQUATE PLATELETS    
 RBC COMMENTS NORMOCYTIC, NORMOCHROMIC    
GLUCOSE, POC Collection Time: 08/17/20  7:34 AM  
Result Value Ref Range Glucose (POC) 258 (H) 70 - 110 mg/dL GLUCOSE, POC Collection Time: 08/17/20 11:28 AM  
Result Value Ref Range Glucose (POC) 276 (H) 70 - 110 mg/dL PTT Collection Time: 08/17/20  3:31 PM  
Result Value Ref Range  aPTT 26.8 23.0 - 36.4 SEC  
   
 
 Chemistry Recent Labs 08/17/20 
0445 08/16/20 
0415 08/15/20 
1100 * 225* 192*  140 144  
K 3.4* 3.3* 3.2*  
 107 112* CO2 27 25 23 BUN 52* 49* 46* CREA 1.44* 1.52* 1.55* CA 8.3* 8.2* 7.8*  
MG  --  2.0  --   
AGAP 7 8 9 BUCR 36* 32* 30* AP 82 73 73 TP 6.0* 5.8* 5.8* ALB 2.2* 2.2* 2.1*  
GLOB 3.8 3.6 3.7 AGRAT 0.6* 0.6* 0.6* CBC w/Diff Recent Labs 08/17/20 
0445 08/16/20 
0415 08/15/20 
1100 WBC 21.0* 18.6* 17.1*  
RBC 3.92* 3.75* 3.76* HGB 11.7* 11.2* 11.5* HCT 35.1* 33.4* 33.4*  
 386 379 GRANS 96* 90* 92* LYMPH 2* 2* 7* EOS 0 0 0 Results for Kay De La Rosa (MRN [id number]) as of 8/17/2020 16:49 Ref. Range 8/13/2020 03:39 8/14/2020 03:25 8/15/2020 11:00 8/16/2020 04:15 8/17/2020 04:45  
D DIMER Latest Ref Range: <0.46 ug/ml(FEU) 6.42 (H) 6.58 (H) 3.31 (H) 2.92 (H) 3.43 (H) Fibrinogen Latest Ref Range: 210 - 451 mg/dL 597 (H) 417 329 337 274 ALT Latest Ref Range: 16 - 61 U/L   39 37 40 AST Latest Ref Range: 10 - 38 U/L   72 (H) 45 (H) 32 LD Latest Ref Range: 81 - 234 U/L 352 (H) 513 (H) 550 (H) 557 (H) 524 (H) C-Reactive protein Latest Ref Range: 0 - 0.3 mg/dL 8.4 (H) 4.4 (H) 2.1 (H) 1.9 (H) 1.6 (H) Ferritin Latest Ref Range: 8 - 388 NG/ (H) 691 (H) 639 (H) 617 (H) 625 (H)  
 
 
8/13/2020  4:36 AM - Serafin, Lab In [facility] - Collected 8/10/20 Component  Value  Flag  Ref Range  Units  Status SARS-CoV-2  Detected  Abnormal    Not Detected     Final   
Comment:   
 
 
ABG No results for input(s): PHI, PHI, POC2, PCO2I, PO2, PO2I, HCO3, HCO3I, FIO2, FIO2I in the last 72 hours. Micro No results for input(s): SDES, CULT in the last 72 hours. No results for input(s): CULT in the last 72 hours. CT (Most Recent) [place] [facility] [place] CTA CHEST ABD PELV W WO CONT Narrative CT without contrast and CT angiogram of the chest, abdomen and pelvis HISTORY: Severe acute midthoracic back pain and vomiting. Elevated troponin. Clinical concern of aortic dissection and PE 
 
COMPARISON: None TECHNIQUE: Multidetector helical CT is carried out from the thoracic inlet to 
the lesser trochanters before and after nonionic IV contrast administration. 3D postprocessed images, including surface shaded display, will produce for this 
exam, permanently archived, and interpreted. Parenchymal organ imaging will be 
limited by arterial phase contrast administration. All CT scans at this facility performed using dose optimization techniques as 
appreciated to a performed exam, to include automated exposure control, 
adjustment of the mA and or KU according to patient size (including appropriate 
matching for site specific examination), or use of iterative reconstruction 
technique. CONTRAST: 100 cc Isovue 370. FINDINGS: 
 
CT ANGIOGRAM: The contrast bolus is mainly in the pulmonary arteries with 
suboptimal opacification to the aorta with somehow limited vascular evaluation. THORACIC AORTA: Patent and normal in caliber. No evidence of aortic dissection 
or aneurysmal dilatation. A few atherosclerotic calcified plaques scattered at 
the arch and descending. The caliber measurements of the aorta: 
 3.4 x 3.9 x 4.4 cm at the sinus of Valsalva; 
 3.4 x 3.4 cm at the sinotubular junction of the aortic root; 
 3.7 x 3.8 cm at maximal ascending aorta; 
 3.3 x 3.3 cm at mid aortic arch after the last vessel takeoff; 
 2.7 x 2.7 cm at proximal descending aorta at the level of left atrium; 
 2.6 x 2.7 cm at the mid descending aorta; 
 2.3 x 2.6 cm at distal descending aorta at the diaphragmatic hiatus. THE GREAT VESSELS IN THE ARCH: Not well opacified by contrast due to the early 
scan as mentioned above. There are grossly patent with normal caliber. No 
significant atherosclerotic disease or stenosis. THE PULMONARY ARTERIES: Patent. No evidence of intraluminal filling defect to 
suggest pulmonary embolism. No pulmonary arterial dilatation. ABDOMINAL AORTA: Normal in caliber. No dissection or aneurysmal dilatation. Mild 
atherosclerotic calcified plaques at the mid and distal portion. ILIAC ARTERIES: Patent. Mild atherosclerotic calcified plaques present without 
stenosis. CELIAC ARTERY: Patent. Few small calcified plaques at the origin without 
stenosis. SMA: THERE IS A FOCAL STENOSIS at the ostium which estimated about 50-75%, 
likely due to soft plaque. RENAL ARTERIES: There are left main and accessory left of renal arteries. Short 
segmental stenosis of proximal left main renal artery is estimated about 75%. Patent left accessory artery. Right main and accessoryrenal arteries appear 
patent. [facility] CT CHEST:  
 
LUNG PARENCHYMA: The lung parenchyma appears clear. No pulmonary nodule, mass or 
infiltration identified. THYROID: Unremarkable. MEDIASTINUM:  No adenopathy. THE HEART AND THE PERICARDIUM: . Unremarkable. PLEURAL SPACES AND CHEST WALL: No significant pleural pathology. CT ABDOMEN AND PELVIS:  
 
ABDOMINAL VISCERA: The liver, spleen, pancreas, gallbladder and both adrenal 
glands appear unremarkable. The stomach is poorly distended. The small and large 
bowel are nondilated. PERITONEUM/RETROPERITONEUM: No significant adenopathy. GENITOURINARY ORGANS: Benign cortical cysts identified in bilateral kidneys, the 
larger one in right kidney in posterior midpole measures 3.2 x 3.5 cm and the 
left renal cyst in lower pole measures 1.9 x 2.2 cm. Two 3 mm nonobstructive 
calculi in the lower pole of right kidney. No hydronephrosis. The bladder 
appears normal. The prostate is moderately enlarged. OTHERS: No free air or free fluid. CT OSSEOUS STRUCTURES:  Moderate spondylosis along the spine and moderate facet 
arthropathy at lower lumbar spine. Impression IMPRESSION:  
 
1. No aortic dissection or aneurysm. Very mild atherosclerotic aortic disease. 2. 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 3. Short segmental stenosis at proximal left the main renal artery, estimated 
about 75%. 4. No evidence of PE. 
 
5. Bilateral renal cysts. Nonobstructive right renal calculi. 6. Moderate prostate enlargement. The preliminary read without 3-D reconstruction was provided to the  the 
ordering physician by me upon the initial interpretation at approximately  1506  
hours, 3/2/20. Thank you for your referral.   
 
 
XR (Most Recent). CXR reviewed by me and compared with previous CXR Results from [facility] [place] Encounter encounter on 08/10/20 XR CHEST SNGL V  
 Narrative INDICATION:  Status post thoracentesis. COMPARISON:  Recent prior FINDINGS: A portable AP radiograph of the chest was obtained at 0123 hours. Cardiac silhouette and is post surgical changes are stable. No significant 
interval change in bilateral diffuse patchy opacities. No acute osseous 
abnormality. No pneumothorax. Impression IMPRESSION: No significant interval change. Complex decision making was made in the evaluation and management plans during this consultation. More than 50% of time was spent in counseling and coordination of care including review of data and discussion with other team members. Vicky Morrow DO, [facility] Pulmonary, Critical Care, and Sleep Medicine

## 2020-08-17 NOTE — PROGRESS NOTES
Problem: Mobility Impaired (Adult and Pediatric) Goal: *Acute Goals and Plan of Care (Insert Text) Description: Physical Therapy Goals Initiated 8/12/2020 and to be accomplished within 7 day(s) 1. Patient will move from supine to sit and sit to supine  in bed with modified independence. 2.  Patient will transfer from bed to chair and chair to bed with modified independence using the least restrictive device. 3.  Patient will perform sit to stand with modified independence. 4.  Patient will ambulate with modified independence for 100 feet with the least restrictive device. 5.  Patient will ascend/descend 4 stairs with handrail(s) with contact guard assist 
 
 
PLOF: Patient reports he was independent with self care and functional mobility. Outcome: Progressing Towards Goal 
  
PHYSICAL THERAPY TREATMENT Patient: Kathie Cervantes (5 y.o. male) Date: 8/17/2020 Diagnosis: Pneumonia [J18.9] Pneumonia Precautions: Aspiration, Skin, Fall, Contact ASSESSMENT: 
Nurse cleared patient to participate in PT session. Patient received supine in bed on 25 L HFNC and B soft wrist restraints. He is pleasantly confused, agreeable to PT session. Patient performs BLE there-ex to increase strength with guidance of therapist. SPO2 87-90% with activity. Cues provided for deep breathing. Deferred further functional mobility due to desats with bed level exercises. At end of session, patient left with call bell within reach and needs addressed. Progression toward goals:  
[]      Improving appropriately and progressing toward goals [x]      Improving slowly and progressing toward goals 
[]      Not making progress toward goals and plan of care will be adjusted PLAN: 
Patient continues to benefit from skilled intervention to address the above impairments. Continue treatment per established plan of care. Discharge Recommendations:  [place] Further Equipment Recommendations for Discharge: defer to facility SUBJECTIVE:  
Patient stated I enjoyed my pudding.  OBJECTIVE DATA SUMMARY:  
Critical Behavior: 
Neurologic State: Confused Orientation Level: Oriented to place, Oriented to person, Disoriented to time, Disoriented to situation Cognition: Follows commands, Decreased attention/concentration Safety/Judgement: Fall prevention Functional Mobility Training: 
Bed Mobility: 
Deferred due to poor endurance and desats with bed level exercises Therapeutic Exercises:  
 
 
 
EXERCISE Sets Reps Active Active Assist  
Passive Self ROM Comments Ankle Pumps 1 20  [x] [] [] [] Quad Sets/Glut Sets    [] [] [] [] Hold for 5 secs Hamstring Sets   [] [] [] [] Short Arc Quads   [] [] [] [] Heel Slides 1 10 [x] [] [] [] Straight Leg Raises 1 10 [x] [] [] [] Hip Add/adduction Wind shield wipers 1 10 [x] [] [] [] Long Arc Quads   [] [] [] [] Seated Marching   [] [] [] []   
Standing Marching   [] [] [] []   
   [] [] [] []   
 
 
Pain: 
Pain level pre-treatment: -/10 pt c/o pain in B forearms Pain level post-treatment: -/10 Pain Intervention(s): Medication (see MAR); Rest, Ice, Repositioning Response to intervention: Nurse notified, See doc flow Activity Tolerance:  
Fair Please refer to the flowsheet for vital signs taken during this treatment. After treatment:  
[] Patient left in no apparent distress sitting up in chair 
[x] Patient left in no apparent distress in bed 
[x] Call bell left within reach [x] Nursing notified 
[] Caregiver present 
[] Bed alarm activated 
[] SCDs applied COMMUNICATION/EDUCATION:  
[x]         Role of Physical Therapy in the acute care setting. []         Fall prevention education was provided and the patient/caregiver indicated understanding. [x]         Patient/family have participated as able in working toward goals and plan of care. [x]         Patient/family agree to work toward stated goals and plan of care. []         Patient understands intent and goals of therapy, but is neutral about his/her participation. []         Patient is unable to participate in stated goals/plan of care: ongoing with therapy staff. 
[]         Other: 
 
   
Orest Cowden, PT Time Calculation: 16 mins

## 2020-08-17 NOTE — PROGRESS NOTES
Physician Progress Note May SANTA 
CSN #:                  [id number] :                       1950 ADMIT DATE:       8/10/2020 1:41 PM 
[address] [place] DATE: 
RESPONDING 
PROVIDER #:        Bess ROSAS 
 
 
 
 
QUERY TEXT: 
 
Pt admitted with COVID 19. Pt noted to have sepsis in a progress note by Mariangel Minaya on 2020. If possible, please document in the progress notes and discharge summary if you are evaluating and /or treating any of the following: The medical record reflects the following: 
Risk Factors: COVID 19, acute hypoxic respiratory failure Clinical Indicators: \"Currently on 55% HFNC. sats 100%. \" per Dr. Ziggy Elaine, progress note, 2020. \"due to septic LOREN\"  per Dr. Mariangel Minaya, progress note, 2020. WBC:  21 Plts:  381 Cr:  1.44 
bilirubin:  0.8 
procalcitonin:  0.13 CRP:  1.6 COVID 19:  positive on 8/10/2020 
temperature:  96.6 on 2020 @ 1720 Heart rate range:  104-115 on 2020 to 2020 0400 Treatment: 
remdisivir 100mg IV q24h 
methylprednisolone 60mg IV q12h 
ceftriaxone 2g IV q24h 
azithromycin 500mg IV q24h Thank you, SAUD Mccall RN, [email or web address] 
Office #:  [phone] Options provided: 
-- Sepsis, not present on admission, 
-- Sepsis, present on admission, 
-- No Sepsis, localized infection only COVID 
-- Sepsis was ruled out 
-- Other - I will add my own diagnosis -- Disagree - Not applicable / Not valid -- Disagree - Clinically unable to determine / Unknown 
-- Refer to Clinical Documentation Reviewer PROVIDER RESPONSE TEXT: 
 
This patient has sepsis that was not present on admission. Query created by:  Armida Melo on 2020 9:34 AM 
 
 
Electronically signed by:  Bess Hyatt MD [facility] 2020 12:57 PM

## 2020-08-17 NOTE — PROGRESS NOTES
Chart reviewed:  Spoke with Esthela of [facility]. She stated they are following this case. Pt will need updated pt/ot notes when appropriate. Stormy Hillman, PRISCILLAN RN Care Management Pager: [phone]

## 2020-08-18 NOTE — DIABETES MGMT
GLYCEMIC CONTROL PLAN OF CARE Assessment/Recommendations: 
Fasting lab glucose this am 271 mg/dl Recommend increasing lantus insulin to 12 units every bedtime. Continue corrective insulin coverage as needed Pt already receiving very insulin resistant coverage. Will continue inpatient monitoring. Most recent blood glucose values: 
 
Results for Harjinder Jiménez (MRN [id number]) as of 8/18/2020 14:45 Ref. Range 8/17/2020 17:08 8/17/2020 22:23 8/18/2020 09:00 8/18/2020 12:33 8/18/2020 14:28 GLUCOSE,FAST - POC Latest Ref Range: 70 - 110 mg/dL 195 (H) 297 (H) 227 (H) 251 (H) 305 (H) Current A1C of 6.9 % is equivalent to average blood glucose of 151 mg/dl over the past 2-3 months. Current hospital diabetes medications:  
lantus 6 units every bedtime Lispro corrective insulin coverage AC&HS Previous day's insulin requirements:  
lantus 6 units Lispro 30 units corrective insulin Home diabetes medications: 
Glyburide 2.5 mg bid Diet: DIET DIABETIC CONSISTENT CARB Soft Solids DIET NUTRITIONAL SUPPLEMENTS Breakfast, Lunch, Dinner; [employer] Education:  ____Refer to Diabetes Education Record  
          _x__Education not indicated at this time Noel Nieves RN CDE Ext [phone]

## 2020-08-18 NOTE — PROGRESS NOTES
Infectious Disease progress note Reason: COVID-19 pneumonia Current abx Prior abx Ceftriaxone, azithromycin since 8/10/20 Lines:  
 
 
Assessment : 
 
65 y.o. male with type 2 DM,  hypertension, hyperlipidemia and renal insufficiency who presented to ED on 8/10/20 with cough for 2 weeks. Chest x-ray 8/10, 8/13/2020-multifocal infiltrates Clinical presentation consistent with confirmed COVID-19 pneumonia Labs on 8/11-ferritin 841, CRP 23.6, procalcitonin 1.91, , d-dimer 3.72 Labs on 8/12-ferritin 782, CRP 17.3, procalcitonin 1.47, , d-dimer 3.14 Labs on 8/13-ferritin 715, CRP 8.4, procalcitonin 0.67 , d-dimer 6.42 Labs on 8/14-ferritin 691, CRP 4.4, procalcitonin 0.33, , d-dimer 6.58 Labs on 8/17-ferritin 625, CRP 1.6, procalcitonin 1.13, d-dimer 3.43 Labs on 8/18-ferritin 665, CRP 1.2, procalcitonin 0.09, d-dimer 3.07 
 
 
covid test 8/10- positive Acute renal insufficiency-likely secondary to volume depletion. Monitor for COVID nephropathy Hypoxia likely secondary to COVID-19 pneumonia. Concern for coagulopathy especially since increasing d-dimer. Pulmonary follow-up appreciated. LOREN-likely secondary to COVID-19 infection, volume depletion. Some improvement noted. EGFR now greater than 35. Worsening hypoxia, tachypnea noted since 8/14 due to severe COVID-19 pneumonia. Status post remdesivir since 8/14 Currently on 25L high flow. S/p  convalescent plasma on 8/17/20 Venous duplex 8/14- negative for DVT Niccoli better. Subjective sense of improvement. Improved hypoxia. Recommendations: 
 
1. D/c remdesivir 2.  continue Solu-Medrol 60 mg IV q 12 hour. Will start decreasing dose in am if continued improvement. 3. Continue full dose anticoagulation for now 4. Titrate oxygen as tolerated 6. Monitor inflammatory parameters-ferritin, CRP, d-dimer, procalcitonin 7. Follow-up pulmonary recommendations Above plan was discussed in details with patient, dr. Paschal Rubinstein. Please call me if any further questions or concerns. Will continue to participate in the care of this patient. HPI: 
 
Feels ok. Denies cp, sob.  
 
 
home Medication List  
  
 Details  
metoprolol succinate (TOPROL-XL) 100 mg tablet Take 1 Tab by mouth nightly. melatonin 3 mg tablet Take 1 Tab by mouth nightly. aspirin-calcium carbonate 81 mg-300 mg calcium(777 mg) tab Take 81 mg by mouth daily. lisinopriL (PRINIVIL, ZESTRIL) 20 mg tablet Take 1 Tab by mouth two (2) times a day. Qty: 180 Tab, Refills: 3  
  
aspirin delayed-release 81 mg tablet take 1 tablet by mouth once daily 
Qty: 90 Tab, Refills: 1  
  
atorvastatin (LIPITOR) 40 mg tablet take 1 tablet by mouth nightly 
Qty: 90 Tab, Refills: 0  
  
cyanocobalamin 1,000 mcg tablet take 1 tablet by mouth once daily 
Qty: 90 Tab, Refills: 0  
  
folic acid (FOLVITE) 1 mg tablet take 1 tablet by mouth once daily 
Qty: 90 Tab, Refills: 0  
  
tamsulosin (FLOMAX) 0.4 mg capsule take 1 capsule by mouth once daily 
Qty: 90 Cap, Refills: 0  
  
allopurinoL (ZYLOPRIM) 300 mg tablet take 1 tablet by mouth once daily 
Qty: 90 Tab, Refills: 1 Associated Diagnoses: Gout, unspecified cause, unspecified chronicity, unspecified site  
  
ascorbic acid, vitamin C, (VITAMIN C) 250 mg tablet Take 1 Tab by mouth two (2) times daily (with meals). Qty: 180 Tab, Refills: 1  
  
acetaminophen (TYLENOL) 325 mg tablet Take 2 Tabs by mouth every six (6) hours as needed for Pain or Fever. Qty: 90 Tab, Refills: 2 cholecalciferol (VITAMIN D3) (1000 Units /25 mcg) tablet Take 4 Tabs by mouth daily. Qty: 120 Tab, Refills: 2  
  
ferrous sulfate 325 mg (65 mg iron) tablet Take 1 Tab by mouth two (2) times daily (with meals). Qty: 60 Tab, Refills: 2  
  
nystatin (MYCOSTATIN) powder Apply  to affected area two (2) times a day. Qty: 1 Bottle, Refills: 0 glyBURIDE (DIABETA) 2.5 mg tablet Take 1 Tab by mouth two (2) times daily (with meals). Qty: 180 Tab, Refills: 1 Associated Diagnoses: Type 2 diabetes mellitus without complication, without long-term current use of insulin ([address] predniSONE (STERAPRED DS) 10 mg dose pack See administration instruction per 10mg dose pack Qty: 21 Tab, Refills: 0 Associated Diagnoses: Cervical radiculopathy  
  
sildenafil citrate (Viagra) 100 mg tablet Take tablet by mouth once daily as needed. Qty: 6 Tab, Refills: 5 Current Facility-Administered Medications Medication Dose Route Frequency  insulin glargine (LANTUS) injection 6 Units  6 Units SubCUTAneous QHS  
 0.9% sodium chloride infusion 250 mL  250 mL IntraVENous PRN  
 remdesivir 100 mg in 0.9% sodium chloride 250 mL IVPB  100 mg IntraVENous Q24H  
 heparin 25,000 units in D5W 250 ml infusion  18-36 Units/kg/hr IntraVENous TITRATE  ipratropium-albuterol (COMBIVENT RESPIMAT) 20 mcg-100 mcg inhalation spray  2 Puff Inhalation Q4H PRN  
 methylPREDNISolone (PF) (SOLU-MEDROL) injection 60 mg  60 mg IntraVENous Q12H  
 famotidine (PEPCID) tablet 20 mg  20 mg Oral DAILY  hydrALAZINE (APRESOLINE) tablet 10 mg  10 mg Oral Q6H PRN  
 ascorbic acid (vitamin C) (VITAMIN C) tablet 500 mg  500 mg Oral TID  zinc sulfate (ZINCATE) 220 (50) mg capsule 1 Cap  1 Cap Oral DAILY  atorvastatin (LIPITOR) tablet 40 mg  40 mg Oral QHS  folic acid (FOLVITE) tablet 1 mg  1 mg Oral DAILY  melatonin tablet 3 mg  3 mg Oral QHS  metoprolol succinate (TOPROL-XL) tablet 100 mg  100 mg Oral QHS  aspirin chewable tablet 81 mg  81 mg Oral DAILY And  
 calcium carbonate (CALTREX) tablet 300 mg [elemental]  300 mg Oral DAILY  cholecalciferol (VITAMIN D3) capsule 5,000 Units  5,000 Units Oral DAILY  insulin lispro (HUMALOG) injection   SubCUTAneous AC&HS  
 glucose chewable tablet 16 g  4 Tab Oral PRN  
  glucagon (GLUCAGEN) injection 1 mg  1 mg IntraMUSCular PRN  
 dextrose (D50W) injection syrg 12.5-25 g  25-50 mL IntraVENous PRN  
 sodium chloride (NS) flush 5-10 mL  5-10 mL IntraVENous PRN  
 acetaminophen (TYLENOL) tablet 650 mg  650 mg Oral Q6H PRN Or  
 acetaminophen (TYLENOL) suppository 650 mg  650 mg Rectal Q6H PRN  
 sodium chloride (NS) flush 5-40 mL  5-40 mL IntraVENous Q8H  
 sodium chloride (NS) flush 5-40 mL  5-40 mL IntraVENous PRN  
 senna (SENOKOT) tablet 17.2 mg  2 Tab Oral QHS  bisacodyL (DULCOLAX) suppository 10 mg  10 mg Rectal DAILY PRN  
 ondansetron (ZOFRAN) injection 4 mg  4 mg IntraVENous Q4H PRN Allergies: Metformin Temp (24hrs), Av.1 °F (36.7 °C), Min:97.6 °F (36.4 °C), Max:98.4 °F (36.9 °C) Visit Vitals /53 (BP 1 Location: Left arm, BP Patient Position: At rest) Pulse 60 Temp 97.6 °F (36.4 °C) Resp 19 Ht 5' 7\" (1.702 m) Wt 73.8 kg (162 lb 12.8 oz) SpO2 95% BMI 25.50 kg/m² ROS: 12 point review systems obtained details, pertinent positives mentioned in history of present illness otherwise negative Physical Exam: 
 
General: Well developed, well nourished male laying on the bed , does not appear tachypneic or dyspneic, on high flow. HEENT:  Normocephalic, atraumatic, nasal and oral mucous are moist and without evidence of lesions. Neck supple, no bruits. Lungs:   non-labored, bilaterally crackles, no  wheezes Heart:  RRR, s1 and s2; no rubs or gallops, no edema, + pedal pulses Abdomen:  soft, non-distended, active bowel sounds, no hepatomegaly, no splenomegaly. Non-tender Genitourinary:  deferred Extremities:   no clubbing, cyanosis; no joint effusions or swelling;; muscle mass appropriate for age Neurologic:  No gross focal sensory abnormalities; 5/5 muscle strength to upper and lower extremities. Speech appropriate. Cranial nerves intact Skin:  No rash or ulcers noted Back:  no spinal or paraspinal muscle tenderness or rigidity, no CVA tenderness Psychiatric:  No suicidal or homicidal ideations, appropriate mood and affect Labs: Results:  
Chemistry Recent Labs 08/18/20 0300 08/17/20 
0445 08/16/20 
3466 * 159* 225*  141 140  
K 3.9 3.4* 3.3*  
 107 107 CO2 26 27 25 BUN 55* 52* 49* CREA 1.62* 1.44* 1.52* CA 7.9* 8.3* 8.2* AGAP 8 7 8 BUCR 34* 36* 32* AP 83 82 73  
TP 5.9* 6.0* 5.8* ALB 2.3* 2.2* 2.2*  
GLOB 3.6 3.8 3.6 AGRAT 0.6* 0.6* 0.6* CBC w/Diff Recent Labs 08/18/20 0300 08/17/20 0445 08/16/20 
3432 WBC 21.1* 21.0* 18.6*  
RBC 3.71* 3.92* 3.75* HGB 11.4* 11.7* 11.2* HCT 33.2* 35.1* 33.4*  
 381 386 GRANS 88* 96* 90* LYMPH 4* 2* 2*  
EOS 0 0 0 Microbiology No results for input(s): CULT in the last 72 hours. RADIOLOGY: 
 
All available imaging studies/reports in [facility] for this admission were reviewed Dr. Ernesto Chandler, Infectious Disease Specialist 
[phone] August 18, 2020 
1:20 PM

## 2020-08-18 NOTE — PROGRESS NOTES
Problem: Falls - Risk of 
Goal: *Absence of Falls Description: Document Julio Cesar Hao Fall Risk and appropriate interventions in the flowsheet. Outcome: Progressing Towards Goal 
Note: Fall Risk Interventions: 
Mobility Interventions: Assess mobility with egress test, Communicate number of staff needed for ambulation/transfer, OT consult for ADLs, PT Consult for mobility concerns, PT Consult for assist device competence, Strengthening exercises (ROM-active/passive) Mentation Interventions: Adequate sleep, hydration, pain control, Door open when patient unattended, Evaluate medications/consider consulting pharmacy, More frequent rounding, Reorient patient, Room close to nurse's station, Update white board Medication Interventions: Evaluate medications/consider consulting pharmacy, Assess postural VS orthostatic hypotension Elimination Interventions: Call light in reach, Patient to call for help with toileting needs, Toilet paper/wipes in reach, Toileting schedule/hourly rounds History of Falls Interventions: Bed/chair exit alarm, Door open when patient unattended, Evaluate medications/consider consulting pharmacy, Room close to nurse's station, Assess for delayed presentation/identification of injury for 48 hrs (comment for end date), Vital signs minimum Q4HRs X 24 hrs (comment for end date) Problem: Pressure Injury - Risk of 
Goal: *Prevention of pressure injury Description: Document Tr Scale and appropriate interventions in the flowsheet. Outcome: Progressing Towards Goal 
Note: Pressure Injury Interventions: 
Sensory Interventions: Assess changes in LOC, Pad between skin to skin, Pressure redistribution bed/mattress (bed type), Check visual cues for pain, Turn and reposition approx. every two hours (pillows and wedges if needed) Moisture Interventions: Absorbent underpads, Apply protective barrier, creams and emollients, Check for incontinence Q2 hours and as needed, Minimize layers, Moisture barrier Activity Interventions: Pressure redistribution bed/mattress(bed type), PT/OT evaluation, Assess need for specialty bed Mobility Interventions: Assess need for specialty bed, Float heels, Pressure redistribution bed/mattress (bed type), PT/OT evaluation, Turn and reposition approx. every two hours(pillow and wedges) Nutrition Interventions: Document food/fluid/supplement intake, Discuss nutritional consult with provider, Offer support with meals,snacks and hydration Friction and Shear Interventions: Apply protective barrier, creams and emollients, Foam dressings/transparent film/skin sealants, Transferring/repositioning devices Problem: Diabetes Self-Management Goal: *Disease process and treatment process Description: Define diabetes and identify own type of diabetes; list 3 options for treating diabetes. Outcome: Progressing Towards Goal 
Goal: *Incorporating nutritional management into lifestyle Description: Describe effect of type, amount and timing of food on blood glucose; list 3 methods for planning meals. Outcome: Progressing Towards Goal 
Goal: *Incorporating physical activity into lifestyle Description: State effect of exercise on blood glucose levels. Outcome: Progressing Towards Goal 
Goal: *Developing strategies to promote health/change behavior Description: Define the ABC's of diabetes; identify appropriate screenings, schedule and personal plan for screenings. Outcome: Progressing Towards Goal 
Goal: *Using medications safely Description: State effect of diabetes medications on diabetes; name diabetes medication taking, action and side effects. Outcome: Progressing Towards Goal 
Goal: *Monitoring blood glucose, interpreting and using results Description: Identify recommended blood glucose targets  and personal targets. Outcome: Progressing Towards Goal 
Goal: *Prevention, detection, treatment of acute complications Description: List symptoms of hyper- and hypoglycemia; describe how to treat low blood sugar and actions for lowering  high blood glucose level. Outcome: Progressing Towards Goal 
Goal: *Prevention, detection and treatment of chronic complications Description: Define the natural course of diabetes and describe the relationship of blood glucose levels to long term complications of diabetes. Outcome: Progressing Towards Goal 
Goal: *Developing strategies to address psychosocial issues Description: Describe feelings about living with diabetes; identify support needed and support network Outcome: Progressing Towards Goal 
Goal: *Insulin pump training Outcome: Progressing Towards Goal 
Goal: *Sick day guidelines Outcome: Progressing Towards Goal 
Goal: *Patient Specific Goal (EDIT GOAL, INSERT TEXT) Outcome: Progressing Towards Goal 
  
Problem: Non-Violent Restraints Goal: *Removal from restraints as soon as assessed to be safe Outcome: Progressing Towards Goal 
Goal: *No harm/injury to patient while restraints in use Outcome: Progressing Towards Goal 
Goal: *Patient's dignity will be maintained Outcome: Progressing Towards Goal 
Goal: *Patient Specific Goal (EDIT GOAL, INSERT TEXT) Outcome: Progressing Towards Goal 
Goal: Non-violent Restaints:Standard Interventions Outcome: Progressing Towards Goal 
Goal: Non-violent Restraints:Patient Interventions Outcome: Progressing Towards Goal 
Goal: Patient/Family Education Outcome: Progressing Towards Goal 
  
Problem: Nutrition Deficit Goal: *Optimize nutritional status Outcome: Progressing Towards Goal

## 2020-08-18 NOTE — PROGRESS NOTES
Problem: Self Care Deficits Care Plan (Adult) Goal: *Acute Goals and Plan of Care (Insert Text) Description: Occupational Therapy Goals Initiated 8/12/2020 within 7 day(s). 1.  Patient will perform grooming with modified independence. 2.  Patient will perform upper body dressing with supervision/set-up. 3.  Patient will perform lower body dressing with supervision/set-up. 4.  Patient will perform toilet transfers with supervision/set-up. 5.  Patient will perform all aspects of toileting with supervision/set-up. 6.  Patient will participate in upper extremity therapeutic exercise/activities with modified independence for 8 minutes. 7.  Patient will utilize energy conservation techniques during functional activities with verbal cues. Prior Level of Function: Pt reports he was (I) with basic self-care/ADLs PTA. Pt lives with his wife. Pt does not have any AD/DME at home. Outcome: Progressing Towards Goal 
 OCCUPATIONAL THERAPY TREATMENT Patient: Marykay Mcardle (60 y.o. male) Date: 8/18/2020 Diagnosis: Pneumonia [J18.9] Pneumonia Precautions: Aspiration, Skin, Fall, Contact Chart, occupational therapy assessment, plan of care, and goals were reviewed. ASSESSMENT: 
Pt is pleasant and cooperative. Pt and linens soiled w/food. Pt seen for modified UB ADL at bed level. (see functional levels below) BUE edematous, R > L. Pt performs UE TherEx at bed level and requires assist w/shoulder flexion. Pt educated on importance of UE TherEx and encouraged to perform throughout the day. Pt request to use bedpan and requires Max Assist w/bowel hygiene and clothing mgt. BUE elevated at end of session. SpO2% > 91 on 40L 60% HFNC w/activity Progression toward goals: 
[]          Improving appropriately and progressing toward goals [x]          Improving slowly and progressing toward goals 
[]          Not making progress toward goals and plan of care will be adjusted PLAN: 
 Patient continues to benefit from skilled intervention to address the above impairments. Continue treatment per established plan of care. Discharge Recommendations:  [place] Further Equipment Recommendations for Discharge:  shower chair and rolling walker SUBJECTIVE:  
Patient stated I'm ready to go home.  OBJECTIVE DATA SUMMARY:  
Cognitive/Behavioral Status: 
Neurologic State: Alert, Confused Orientation Level: Disoriented to time Cognition: Follows commands Safety/Judgement: Fall prevention Functional Mobility and Transfers for ADLs: 
 Bed Mobility: 
Rolling: Minimum assistance ADL Intervention: 
Grooming Position Performed: Other (comment)(supine w/HOB raised) Washing Face: Stand-by assistance Washing Hands: Minimum assistance(for thoroughness) Upper Body Bathing Bathing Assistance: Stand-by assistance Position Performed: Other (comment)(supine w/HOB raised) Upper Body Dressing Assistance Hospital Gown: Minimum  assistance Toileting Toileting Assistance: Maximum assistance(at bed level) Bowel Hygiene: Maximum assistance Clothing Management: Maximum assistance UE Therapeutic Exercises: AROM BUE elbow flexion/extension AROM BUE hand squeezes AAROM BUE shoulder flexion Pain: 
Pain level pre-treatment: 0/10 Pain level post-treatment: 0/10 Activity Tolerance:   
Fair Please refer to the flowsheet for vital signs taken during this treatment. After treatment:  
[]  Patient left in no apparent distress sitting up in chair 
[x]  Patient left in no apparent distress in bed 
[x]  Call bell left within reach [x]  Nursing notified 
[]  Caregiver present 
[]  Bed alarm activated COMMUNICATION/EDUCATION:  
[] Role of Occupational Therapy in the acute care setting 
[] Home safety education was provided and the patient/caregiver indicated understanding. [] Patient/family have participated as able in working towards goals and plan of care. [x] Patient/family agree to work toward stated goals and plan of care. [] Patient understands intent and goals of therapy, but is neutral about his/her participation. [] Patient is unable to participate in goal setting and plan of care. Thank you for this referral. 
FIORELLA Robledo Time Calculation: 25 mins

## 2020-08-18 NOTE — PROGRESS NOTES
[facility] Progress Note Patient: Tori Clark Age: 65 y.o. : 1950 MR#: [id number] SSN: [id number] Date/Time: 2020 Subjective:  
 
Patient is laying in bed, appears confused. On high flow nasal cannula Assessment/Plan:  
 
COVID-19 pneumonia Sepsis secondary to COVID-19 infection Acute hypoxic respiratory failure, Type 2 diabetes Hypertension Coronary artery disease, history of CABG Dyslipidemia Acute kidney injury, present on admission, on top of chronic kidney disease stage II. Patient's baseline creatinine appears to be around 1.2 Acute metabolic encephalopathy in the setting of hypoxia and COVID-19 infection Plan On antibiotics, follow cultures Steroids, pulmonary on case Droplet plus precautions On remdesivir, status post convalescent plasma On heparin infusion Continue aspirin, statin, beta-blocker Monitor sugars, sliding scale insulin,basal Lantus Nephrology is following, monitor renal function Palliative care input notedpatient is full code PT OT Disposition pending I called patient's wife Gini Reynolds at phone #[phone] and discussed the patient's medical care with her Case discussed with:  [x]Patient  [x]Family  [x]Nursing  []Case Management DVT Prophylaxis:  []Lovenox  []Hep SQ  []SCDs  []Coumadin   [x]On Heparin gtt Objective:  
VS:  
Visit Vitals /51 (BP 1 Location: Left arm, BP Patient Position: At rest) Pulse 78 Temp 98.4 °F (36.9 °C) Resp 19 Ht 5' 7\" (1.702 m) Wt 73.8 kg (162 lb 12.8 oz) SpO2 93% BMI 25.50 kg/m² Resp rate-26 Tmax/24hrs: Temp (24hrs), Av.1 °F (36.7 °C), Min:97.7 °F (36.5 °C), Max:98.4 °F (36.9 °C) Input/Output:  
 
Intake/Output Summary (Last 24 hours) at 2020 Last data filed at 2020 1600 Gross per 24 hour Intake 1458 ml Output 600 ml Net 858 ml General:  Awake, confused Cardiovascular:  S1S2+, RRR 
 Pulmonary: Coarse breath sounds bilaterally GI:  Soft, BS+, NT, ND Extremities:  trace edema Labs:   
Recent Results (from the past 24 hour(s)) GLUCOSE, POC Collection Time: 08/16/20  8:17 PM  
Result Value Ref Range Glucose (POC) 221 (H) 70 - 110 mg/dL PROTHROMBIN TIME + INR Collection Time: 08/17/20  4:45 AM  
Result Value Ref Range Prothrombin time 15.4 (H) 11.5 - 15.2 sec INR 1.2 0.8 - 1.2 PTT Collection Time: 08/17/20  4:45 AM  
Result Value Ref Range aPTT 25.6 23.0 - 36.4 SEC FIBRINOGEN Collection Time: 08/17/20  4:45 AM  
Result Value Ref Range Fibrinogen 274 210 - 451 mg/dL D DIMER Collection Time: 08/17/20  4:45 AM  
Result Value Ref Range D DIMER 3.43 (H) <0.46 ug/ml(FEU) FERRITIN Collection Time: 08/17/20  4:45 AM  
Result Value Ref Range Ferritin 625 (H) 8 - 388 NG/ML  
C REACTIVE PROTEIN, QT Collection Time: 08/17/20  4:45 AM  
Result Value Ref Range C-Reactive protein 1.6 (H) 0 - 0.3 mg/dL LD Collection Time: 08/17/20  4:45 AM  
Result Value Ref Range  (H) 81 - 234 U/L  
PROCALCITONIN Collection Time: 08/17/20  4:45 AM  
Result Value Ref Range Procalcitonin 0.13 ng/mL METABOLIC PANEL, COMPREHENSIVE Collection Time: 08/17/20  4:45 AM  
Result Value Ref Range Sodium 141 136 - 145 mmol/L Potassium 3.4 (L) 3.5 - 5.5 mmol/L Chloride 107 100 - 111 mmol/L  
 CO2 27 21 - 32 mmol/L Anion gap 7 3.0 - 18 mmol/L Glucose 159 (H) 74 - 99 mg/dL BUN 52 (H) 7.0 - 18 MG/DL Creatinine 1.44 (H) 0.6 - 1.3 MG/DL  
 BUN/Creatinine ratio 36 (H) 12 - 20 GFR est AA 58 (L) >60 ml/min/1.73m2 GFR est non-AA 48 (L) >60 ml/min/1.73m2 Calcium 8.3 (L) 8.5 - 10.1 MG/DL Bilirubin, total 0.8 0.2 - 1.0 MG/DL  
 ALT (SGPT) 40 16 - 61 U/L  
 AST (SGOT) 32 10 - 38 U/L Alk. phosphatase 82 45 - 117 U/L Protein, total 6.0 (L) 6.4 - 8.2 g/dL Albumin 2.2 (L) 3.4 - 5.0 g/dL Globulin 3.8 2.0 - 4.0 g/dL A-G Ratio 0.6 (L) 0.8 - 1.7    
CBC WITH AUTOMATED DIFF Collection Time: 08/17/20  4:45 AM  
Result Value Ref Range WBC 21.0 (H) 4.6 - 13.2 K/uL  
 RBC 3.92 (L) 4.70 - 5.50 M/uL  
 HGB 11.7 (L) 13.0 - 16.0 g/dL HCT 35.1 (L) 36.0 - 48.0 % MCV 89.5 74.0 - 97.0 FL  
 MCH 29.8 24.0 - 34.0 PG  
 MCHC 33.3 31.0 - 37.0 g/dL  
 RDW 14.3 11.6 - 14.5 % PLATELET 157 069 - 813 K/uL MPV 10.8 9.2 - 11.8 FL  
 NEUTROPHILS 96 (H) 42 - 75 % LYMPHOCYTES 2 (L) 20 - 51 % MONOCYTES 2 2 - 9 % EOSINOPHILS 0 0 - 5 % BASOPHILS 0 0 - 3 %  
 ABS. NEUTROPHILS 20.2 (H) 1.8 - 8.0 K/UL  
 ABS. LYMPHOCYTES 0.4 (L) 0.8 - 3.5 K/UL  
 ABS. MONOCYTES 0.4 0 - 1.0 K/UL  
 ABS. EOSINOPHILS 0.0 0.0 - 0.4 K/UL  
 ABS. BASOPHILS 0.0 0.0 - 0.06 K/UL  
 DF MANUAL PLATELET COMMENTS ADEQUATE PLATELETS    
 RBC COMMENTS NORMOCYTIC, NORMOCHROMIC    
GLUCOSE, POC Collection Time: 08/17/20  7:34 AM  
Result Value Ref Range Glucose (POC) 258 (H) 70 - 110 mg/dL GLUCOSE, POC Collection Time: 08/17/20 11:28 AM  
Result Value Ref Range Glucose (POC) 276 (H) 70 - 110 mg/dL PTT Collection Time: 08/17/20  3:31 PM  
Result Value Ref Range aPTT 26.8 23.0 - 36.4 SEC GLUCOSE, POC Collection Time: 08/17/20  5:08 PM  
Result Value Ref Range Glucose (POC) 195 (H) 70 - 110 mg/dL Additional Data Reviewed:   
 
Signed By: Ever Nguyen MD   
 August 17, 2020 same name as above

## 2020-08-18 NOTE — PROGRESS NOTES
[facility] REFERRAL CONTACT NOTE [id number] [place] [address] for Physical Rehabilitation Mitchell France Thank you for the opportunity to review this patient's case for admission to [id number] [place] [address] for Physical Rehabilitation. Based on our pre-admission screening:  
 
[x ] Our Team/Medical Director is following this case. Comments: Patient is COVID positive,confused, on 40L high flow 02, on a heparin gtt and IV solumedrol. Will continue to follow for medical stability and therapy progress. Again, Thank you for this referral. Should you have any questions please do not hesitate to call. Sincerely, Esthela Cabrera Admissions [facility] 
[phone]

## 2020-08-18 NOTE — PROGRESS NOTES
[facility] Pulmonary, Critical Care, and Sleep Medicine Pulmonary Medicine Progress Note Name: Odilon Morfin MRN: [id number] : 1950 Hospital: [address] [place] [address] Date: 2020 Assessment: 
Acute Hypoxic Respiratory Failure - 2/2 covid19 pneumonia:  
- currently on HFNC 40LPM @60% [id number] Pneumonia: + COVID 8/10/2020 - B/L infiltrates, fevers. Mildly elevated inflammatory markers CAD with Hx of recent 3V CABG LOREN on CKD- Diabetic - Creat increased today Deliria: Waxes and Wanes 
- suspect multifactorial etiology: steroids, other meds, medical isolation, serious medical illness, CKD, etc 
Leukocytosis persistent and now with bandemia today- No secondary infection found at this time- ID staff following, Procal reassuring - Suspect due to steroids Deconditioning with low albumin state Diabetes Hypokalemia 
  
Impression/Plan: 
- FiO2 to keep SpO2 >=92%, HOB >=30 degree, aspiration precautions, aggressive pulmonary toileting, incentive spirometry. - Continue steroids and AC 
- Trend LFTs and other COVID inflammatory markers - Wean HFNC as tolerated, if further decline, would warrant flolan or intubation depending on mental status 
- PRN bipap: - as long as mental status allows - PO intake as tolerated - Send UA/Micro 
- Other issues management by primary team and respective consultants. - Will continue to follow- Further recommendations pending clinical course Subjective: 
 
20 Hospital Day: 8 Events and notes from last 24 hours reviewed. Discussed with patient to the best of my ability Care plan discussed with nursing. Labs and images personally seen and available reports reviewed All current medicines are reviewed · Patient resting in bed- appears a bit more interactive. Nurse reports that patient spoke with wife today · Appropriate at time of my evaluation and a bit more engaging in conversations · Able to eat- states today was the most he ate in a long time. · Remains on Vapotherm 40LPM flow and FiO2 60% ·  Afebrile · Convalescent serum infused 8/17/2020- 1500 · CRP down trending, Ferritin modest elevation, Patient Active Problem List  
Diagnosis Code  
 HTN (hypertension) I10  
 Gout M10.9  Hyperlipemia E78.5  Nocturia R35.1  Elevated troponin R79.89  Back pain M54.9  Acute coronary syndrome (HCC) I24.9  S/P cardiac catheterization Z98.890  
 NSTEMI (non-ST elevated myocardial infarction) ([facility]) I21.4  
 S/P CABG x 3 Z95.1  Stage 3 chronic kidney disease ([facility]) N18.3  LOREN (acute kidney injury) ([facility] [address] N17.9  Atherosclerosis of native coronary artery of native heart without angina pectoris I25.10  Urinary retention R33.9  Moderate protein-calorie malnutrition ([facility]) E44.0  Pneumonia J18.9  Type 2 diabetes mellitus with chronic kidney disease ([facility]) E11.22  
 Secondary hyperparathyroidism of renal origin ([facility] [address] N25.81  
 Acute hypoxemic respiratory failure ([facility]) J96.01  
 Suspected COVID-19 virus infection Z20.828  Acute-on-chronic kidney injury ([facility] [address] N17.9, N18.9 Medications- Current: 
Current Facility-Administered Medications Medication Dose Route Frequency  insulin glargine (LANTUS) injection 6 Units  6 Units SubCUTAneous QHS  
 0.9% sodium chloride infusion 250 mL  250 mL IntraVENous PRN  
 heparin 25,000 units in D5W 250 ml infusion  18-36 Units/kg/hr IntraVENous TITRATE  ipratropium-albuterol (COMBIVENT RESPIMAT) 20 mcg-100 mcg inhalation spray  2 Puff Inhalation Q4H PRN  
 methylPREDNISolone (PF) (SOLU-MEDROL) injection 60 mg  60 mg IntraVENous Q12H  
 famotidine (PEPCID) tablet 20 mg  20 mg Oral DAILY  hydrALAZINE (APRESOLINE) tablet 10 mg  10 mg Oral Q6H PRN  
 ascorbic acid (vitamin C) (VITAMIN C) tablet 500 mg  500 mg Oral TID  zinc sulfate (ZINCATE) 220 (50) mg capsule 1 Cap  1 Cap Oral DAILY  atorvastatin (LIPITOR) tablet 40 mg  40 mg Oral QHS  folic acid (FOLVITE) tablet 1 mg  1 mg Oral DAILY  melatonin tablet 3 mg  3 mg Oral QHS  metoprolol succinate (TOPROL-XL) tablet 100 mg  100 mg Oral QHS  aspirin chewable tablet 81 mg  81 mg Oral DAILY And  
 calcium carbonate (CALTREX) tablet 300 mg [elemental]  300 mg Oral DAILY  cholecalciferol (VITAMIN D3) capsule 5,000 Units  5,000 Units Oral DAILY  insulin lispro (HUMALOG) injection   SubCUTAneous AC&HS  
 glucose chewable tablet 16 g  4 Tab Oral PRN  
 glucagon (GLUCAGEN) injection 1 mg  1 mg IntraMUSCular PRN  
 dextrose (D50W) injection syrg 12.5-25 g  25-50 mL IntraVENous PRN  
 sodium chloride (NS) flush 5-10 mL  5-10 mL IntraVENous PRN  
 acetaminophen (TYLENOL) tablet 650 mg  650 mg Oral Q6H PRN Or  
 acetaminophen (TYLENOL) suppository 650 mg  650 mg Rectal Q6H PRN  
 sodium chloride (NS) flush 5-40 mL  5-40 mL IntraVENous Q8H  
 sodium chloride (NS) flush 5-40 mL  5-40 mL IntraVENous PRN  
 senna (SENOKOT) tablet 17.2 mg  2 Tab Oral QHS  bisacodyL (DULCOLAX) suppository 10 mg  10 mg Rectal DAILY PRN  
 ondansetron (ZOFRAN) injection 4 mg  4 mg IntraVENous Q4H PRN Objective: 
Vital Signs:   
Visit Vitals /65 (BP 1 Location: Left arm, BP Patient Position: At rest) Pulse 65 Temp 97.8 °F (36.6 °C) Resp 16 Ht 5' 7\" (1.702 m) Wt 73.8 kg (162 lb 12.8 oz) SpO2 93% BMI 25.50 kg/m² O2 Device: Hi flow nasal cannula O2 Flow Rate (L/min): 40 l/min Temp (24hrs), Av °F (36.7 °C), Min:97.6 °F (36.4 °C), Max:98.4 °F (36.9 °C) Intake/Output:  
Last shift:       07 - 1900 In: 375.8 [I.V.:375.8] Out: - Last 3 shifts: 1901 -  0700 In: 2261.9 [P.O.:50; I.V.:1793.9] Out: 600 [Urine:600] Intake/Output Summary (Last 24 hours) at 2020 1447 Last data filed at 2020 1026 Gross per 24 hour Intake 2637.72 ml Output 0 ml Net 2637.72 ml Physical Exam:  
 
Patient evaluated at bedside General/Neurology: Alert, Awake, appropriate, frail, speaking in 3 word sentences Head:   Normocephalic, without obvious abnormality Eye:   PERRL, EOM intact Oral:  Mucus membranes moist 
Lung:   Modest effort at best, no accessory muscle use, No crepitus, No audible wheezing. On HFNC Heart:   S1 S2 - regular by palpation Abdomen:  Soft, non tender Extremities:  No cyanosis, wear, no erythema, mild edema- legs, Midline - right upper extremity Skin:   Dry, intact, several areas of ecchymosis in various stages of healing Data: 
   
Recent Results (from the past 24 hour(s)) PTT Collection Time: 08/17/20  3:31 PM  
Result Value Ref Range aPTT 26.8 23.0 - 36.4 SEC GLUCOSE, POC Collection Time: 08/17/20  5:08 PM  
Result Value Ref Range Glucose (POC) 195 (H) 70 - 110 mg/dL GLUCOSE, POC Collection Time: 08/17/20 10:23 PM  
Result Value Ref Range Glucose (POC) 297 (H) 70 - 110 mg/dL PROTHROMBIN TIME + INR Collection Time: 08/18/20  3:00 AM  
Result Value Ref Range Prothrombin time 18.8 (H) 11.5 - 15.2 sec INR 1.6 (H) 0.8 - 1.2 PTT Collection Time: 08/18/20  3:00 AM  
Result Value Ref Range aPTT >180.0 (HH) 23.0 - 36.4 SEC FIBRINOGEN Collection Time: 08/18/20  3:00 AM  
Result Value Ref Range Fibrinogen 269 210 - 451 mg/dL D DIMER Collection Time: 08/18/20  3:00 AM  
Result Value Ref Range D DIMER 3.07 (H) <0.46 ug/ml(FEU) FERRITIN Collection Time: 08/18/20  3:00 AM  
Result Value Ref Range Ferritin 665 (H) 8 - 388 NG/ML  
C REACTIVE PROTEIN, QT Collection Time: 08/18/20  3:00 AM  
Result Value Ref Range C-Reactive protein 1.2 (H) 0 - 0.3 mg/dL LD Collection Time: 08/18/20  3:00 AM  
Result Value Ref Range  (H) 81 - 234 U/L  
PROCALCITONIN Collection Time: 08/18/20  3:00 AM  
Result Value Ref Range Procalcitonin 0.09 ng/mL METABOLIC PANEL, COMPREHENSIVE  
 Collection Time: 08/18/20  3:00 AM  
Result Value Ref Range Sodium 140 136 - 145 mmol/L Potassium 3.9 3.5 - 5.5 mmol/L Chloride 106 100 - 111 mmol/L  
 CO2 26 21 - 32 mmol/L Anion gap 8 3.0 - 18 mmol/L Glucose 271 (H) 74 - 99 mg/dL BUN 55 (H) 7.0 - 18 MG/DL Creatinine 1.62 (H) 0.6 - 1.3 MG/DL  
 BUN/Creatinine ratio 34 (H) 12 - 20 GFR est AA 51 (L) >60 ml/min/1.73m2 GFR est non-AA 42 (L) >60 ml/min/1.73m2 Calcium 7.9 (L) 8.5 - 10.1 MG/DL Bilirubin, total 0.8 0.2 - 1.0 MG/DL  
 ALT (SGPT) 32 16 - 61 U/L  
 AST (SGOT) 23 10 - 38 U/L Alk. phosphatase 83 45 - 117 U/L Protein, total 5.9 (L) 6.4 - 8.2 g/dL Albumin 2.3 (L) 3.4 - 5.0 g/dL Globulin 3.6 2.0 - 4.0 g/dL A-G Ratio 0.6 (L) 0.8 - 1.7    
CBC WITH AUTOMATED DIFF Collection Time: 08/18/20  3:00 AM  
Result Value Ref Range WBC 21.1 (H) 4.6 - 13.2 K/uL  
 RBC 3.71 (L) 4.70 - 5.50 M/uL  
 HGB 11.4 (L) 13.0 - 16.0 g/dL HCT 33.2 (L) 36.0 - 48.0 % MCV 89.5 74.0 - 97.0 FL  
 MCH 30.7 24.0 - 34.0 PG  
 MCHC 34.3 31.0 - 37.0 g/dL  
 RDW 14.2 11.6 - 14.5 % PLATELET 645 341 - 480 K/uL MPV 11.0 9.2 - 11.8 FL  
 NEUTROPHILS 88 (H) 42 - 75 % BAND NEUTROPHILS 7 (H) 0 - 5 % LYMPHOCYTES 4 (L) 20 - 51 % MONOCYTES 1 (L) 2 - 9 % EOSINOPHILS 0 0 - 5 % BASOPHILS 0 0 - 3 %  
 ABS. NEUTROPHILS 20.1 (H) 1.8 - 8.0 K/UL  
 ABS. LYMPHOCYTES 0.8 0.8 - 3.5 K/UL  
 ABS. MONOCYTES 0.2 0 - 1.0 K/UL  
 ABS. EOSINOPHILS 0.0 0.0 - 0.4 K/UL  
 ABS. BASOPHILS 0.0 0.0 - 0.06 K/UL  
 DF AUTOMATED PLATELET COMMENTS ADEQUATE PLATELETS    
 RBC COMMENTS ANISOCYTOSIS 1+ GLUCOSE, POC Collection Time: 08/18/20  9:00 AM  
Result Value Ref Range Glucose (POC) 227 (H) 70 - 110 mg/dL GLUCOSE, POC Collection Time: 08/18/20 12:33 PM  
Result Value Ref Range Glucose (POC) 251 (H) 70 - 110 mg/dL GLUCOSE, POC Collection Time: 08/18/20  2:28 PM  
Result Value Ref Range Glucose (POC) 305 (H) 70 - 110 mg/dL Chemistry Recent Labs 08/18/20 
0300 08/17/20 
0445 08/16/20 
9461 * 159* 225*  141 140  
K 3.9 3.4* 3.3*  
 107 107 CO2 26 27 25 BUN 55* 52* 49* CREA 1.62* 1.44* 1.52* CA 7.9* 8.3* 8.2* MG  --   --  2.0 AGAP 8 7 8 BUCR 34* 36* 32* AP 83 82 73  
TP 5.9* 6.0* 5.8* ALB 2.3* 2.2* 2.2*  
GLOB 3.6 3.8 3.6 AGRAT 0.6* 0.6* 0.6* CBC w/Diff Recent Labs 08/18/20 0300 08/17/20 0445 08/16/20 
3847 WBC 21.1* 21.0* 18.6*  
RBC 3.71* 3.92* 3.75* HGB 11.4* 11.7* 11.2* HCT 33.2* 35.1* 33.4*  
 381 386 GRANS 88* 96* 90* LYMPH 4* 2* 2*  
EOS 0 0 0 Results for Kay De La Rosa (MRN [id number]) as of 8/18/2020 14:50 Ref. Range 8/14/2020 17:04 8/15/2020 11:00 8/16/2020 04:15 8/17/2020 04:45 8/18/2020 03:00 WBC Latest Ref Range: 4.6 - 13.2 K/uL 14.3 (H) 17.1 (H) 18.6 (H) 21.0 (H) 21.1 (H) PLATELET Latest Ref Range: 135 - 420 K/uL 406 379 386 381 372 BAND NEUTROPHILS Latest Ref Range: 0 - 5 %   4  7 (H) BUN Latest Ref Range: 7.0 - 18 MG/DL  46 (H) 49 (H) 52 (H) 55 (H) Creatinine Latest Ref Range: 0.6 - 1.3 MG/DL  1.55 (H) 1.52 (H) 1.44 (H) 1.62 (H) LD Latest Ref Range: 81 - 234 U/L  550 (H) 557 (H) 524 (H) 458 (H) Procalcitonin Latest Units: ng/mL  0.13 0.12 0.13 0.09 Ferritin Latest Ref Range: 8 - 388 NG/ML  639 (H) 617 (H) 625 (H) 665 (H) C-Reactive protein Latest Ref Range: 0 - 0.3 mg/dL  2.1 (H) 1.9 (H) 1.6 (H) 1.2 (H)  
 
 
 
8/13/2020  4:36 AM - Serafin, [facility] ([place]) - Collected 8/10/20 Component  Value  Flag  Ref Range  Units  Status SARS-CoV-2  Detected  Abnormal    Not Detected     Final   
Comment:   
 
 
ABG No results for input(s): PHI, PHI, POC2, PCO2I, PO2, PO2I, HCO3, HCO3I, FIO2, FIO2I in the last 72 hours. Micro Recent Labs 08/17/20 
0630 CULT MRSA NOT PRESENT      Screening of patient nares for MRSA is for surveillance purposes and, if positive, to facilitate isolation considerations in high risk settings. It is not intended for automatic decolonization interventions per se as regimens are not sufficiently effective to warrant routine use. Recent Labs 08/17/20 
0630 CULT MRSA NOT PRESENT      Screening of patient nares for MRSA is for surveillance purposes and, if positive, to facilitate isolation considerations in high risk settings. It is not intended for automatic decolonization interventions per se as regimens are not sufficiently effective to warrant routine use. CT (Most Recent) Results from [facility] Encounter encounter on 03/02/20 CTA CHEST ABD PELV W WO CONT Narrative CT without contrast and CT angiogram of the chest, abdomen and pelvis HISTORY: Severe acute midthoracic back pain and vomiting. Elevated troponin. Clinical concern of aortic dissection and PE 
 
COMPARISON: None TECHNIQUE: Multidetector helical CT is carried out from the thoracic inlet to 
the lesser trochanters before and after nonionic IV contrast administration. 3D postprocessed images, including surface shaded display, will produce for this 
exam, permanently archived, and interpreted. Parenchymal organ imaging will be 
limited by arterial phase contrast administration. All CT scans at this facility performed using dose optimization techniques as 
appreciated to a performed exam, to include automated exposure control, 
adjustment of the mA and or KU according to patient size (including appropriate 
matching for site specific examination), or use of iterative reconstruction 
technique. CONTRAST: 100 cc Isovue 370. FINDINGS: 
 
CT ANGIOGRAM: The contrast bolus is mainly in the pulmonary arteries with 
suboptimal opacification to the aorta with somehow limited vascular evaluation. THORACIC AORTA: Patent and normal in caliber. No evidence of aortic dissection or aneurysmal dilatation. A few atherosclerotic calcified plaques scattered at 
the arch and descending. The caliber measurements of the aorta: 
 3.4 x 3.9 x 4.4 cm at the sinus of Valsalva; 
 3.4 x 3.4 cm at the sinotubular junction of the aortic root; 
 3.7 x 3.8 cm at maximal ascending aorta; 
 3.3 x 3.3 cm at mid aortic arch after the last vessel takeoff; 
 2.7 x 2.7 cm at proximal descending aorta at the level of left atrium; 
 2.6 x 2.7 cm at the mid descending aorta; 
 2.3 x 2.6 cm at distal descending aorta at the diaphragmatic hiatus. THE GREAT VESSELS IN THE ARCH: Not well opacified by contrast due to the early 
scan as mentioned above. There are grossly patent with normal caliber. No 
significant atherosclerotic disease or stenosis. THE PULMONARY ARTERIES: Patent. No evidence of intraluminal filling defect to 
suggest pulmonary embolism. No pulmonary arterial dilatation. ABDOMINAL AORTA: Normal in caliber. No dissection or aneurysmal dilatation. Mild 
atherosclerotic calcified plaques at the mid and distal portion. ILIAC ARTERIES: Patent. Mild atherosclerotic calcified plaques present without 
stenosis. CELIAC ARTERY: Patent. Few small calcified plaques at the origin without 
stenosis. SMA: THERE IS A FOCAL STENOSIS at the ostium which estimated about 50-75%, 
likely due to soft plaque. RENAL ARTERIES: There are left main and accessory left of renal arteries. Short 
segmental stenosis of proximal left main renal artery is estimated about 75%. Patent left accessory artery. Right main and accessoryrenal arteries appear 
patent. [place] R[place] CT CHEST:  
 
LUNG PARENCHYMA: The lung parenchyma appears clear. No pulmonary nodule, mass or 
infiltration identified. THYROID: Unremarkable. MEDIASTINUM:  No adenopathy. THE HEART AND THE PERICARDIUM: . Unremarkable. PLEURAL SPACES AND CHEST WALL: No significant pleural pathology.  
 
 
CT ABDOMEN AND PELVIS:  
 
 ABDOMINAL VISCERA: The liver, spleen, pancreas, gallbladder and both adrenal 
glands appear unremarkable. The stomach is poorly distended. The small and large 
bowel are nondilated. PERITONEUM/RETROPERITONEUM: No significant adenopathy. GENITOURINARY ORGANS: Benign cortical cysts identified in bilateral kidneys, the 
larger one in right kidney in posterior midpole measures 3.2 x 3.5 cm and the 
left renal cyst in lower pole measures 1.9 x 2.2 cm. Two 3 mm nonobstructive 
calculi in the lower pole of right kidney. No hydronephrosis. The bladder 
appears normal. The prostate is moderately enlarged. OTHERS: No free air or free fluid. CT OSSEOUS STRUCTURES:  Moderate spondylosis along the spine and moderate facet 
arthropathy at lower lumbar spine. Impression IMPRESSION:  
 
1. No aortic dissection or aneurysm. Very mild atherosclerotic aortic disease. 2. 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 3. Short segmental stenosis at proximal left the main renal artery, estimated 
about 75%. 4. No evidence of PE. 
 
5. Bilateral renal cysts. Nonobstructive right renal calculi. 6. Moderate prostate enlargement. The preliminary read without 3-D reconstruction was provided to the  the 
ordering physician by me upon the initial interpretation at approximately  1506  
hours, 3/2/20. Thank you for your referral.   
 
 
XR (Most Recent). CXR reviewed by me and compared with previous CXR [place] [facility] [place]/20 XR CHEST SNGL V  
 Narrative INDICATION:  Status post thoracentesis. COMPARISON:  Recent prior FINDINGS: A portable AP radiograph of the chest was obtained at 0123 hours. Cardiac silhouette and is post surgical changes are stable. No significant 
interval change in bilateral diffuse patchy opacities. No acute osseous 
abnormality. No pneumothorax. Impression IMPRESSION: No significant interval change. Complex decision making was made in the evaluation and management plans during this consultation. More than 50% of time was spent in counseling and coordination of care including review of data and discussion with other team members. Vicky Morrow DO, [facility] Pulmonary, Critical Care, and Sleep Medicine

## 2020-08-18 NOTE — PROGRESS NOTES
[facility] Hospitalist Group Progress Note Patient: Judson Shea Age: 65 y.o. : 1950 MR#: [id number] SSN: [id number] Date/Time: 2020 Subjective:  
 
Patient is laying in bed in no apparent distress, awake, eating his dinner. Appears mildly confused Assessment/Plan:  
 
COVID-19 pneumonia Sepsis secondary to COVID-19 infection Acute hypoxic respiratory failure, Type 2 diabetes Hypertension Coronary artery disease, history of CABG Dyslipidemia Acute kidney injury, present on admission, on top of chronic kidney disease stage II. Patient's baseline creatinine appears to be around 1.2 Acute metabolic encephalopathy in the setting of hypoxia and COVID-19 infection Plan Status post antibiotics On steroids, pulmonary following Droplet plus precautions s/p remdesivir, status post convalescent plasma On heparin drip Continue aspirin, statin, beta-blocker Monitor sugars, sliding scale insulin, increase Lantus Nephrology is following, monitor renal function Palliative care input notedpatient is full code PT OT Disposition pending I called patient's wife Jemma Mata at phone #[phone] and left a message 
discussed with RN Case discussed with:  [x]Patient  [x]Family  [x]Nursing  []Case Management DVT Prophylaxis:  []Lovenox  []Hep SQ  []SCDs  []Coumadin   [x]On Heparin gtt Objective:  
VS:  
Visit Vitals /50 Pulse 67 Temp 97.9 °F (36.6 °C) Resp 18 Ht 5' 7\" (1.702 m) Wt 73.8 kg (162 lb 12.8 oz) SpO2 96% BMI 25.50 kg/m² Resp rate-26 Tmax/24hrs: Temp (24hrs), Av.8 °F (36.6 °C), Min:97.6 °F (36.4 °C), Max:98 °F (36.7 °C) Input/Output:  
 
Intake/Output Summary (Last 24 hours) at 2020 Last data filed at 2020 1026 Gross per 24 hour Intake 688.62 ml Output 0 ml Net 688.62 ml General:  Awake,follows commands Cardiovascular:  S1S2+, RRR Pulmonary:  Coarse bs b/l GI:  Soft, BS+, NT, ND 
 Extremities:  trace edema Labs:   
Recent Results (from the past 24 hour(s)) GLUCOSE, POC Collection Time: 08/17/20 10:23 PM  
Result Value Ref Range Glucose (POC) 297 (H) 70 - 110 mg/dL PROTHROMBIN TIME + INR Collection Time: 08/18/20  3:00 AM  
Result Value Ref Range Prothrombin time 18.8 (H) 11.5 - 15.2 sec INR 1.6 (H) 0.8 - 1.2 PTT Collection Time: 08/18/20  3:00 AM  
Result Value Ref Range aPTT >180.0 (HH) 23.0 - 36.4 SEC FIBRINOGEN Collection Time: 08/18/20  3:00 AM  
Result Value Ref Range Fibrinogen 269 210 - 451 mg/dL D DIMER Collection Time: 08/18/20  3:00 AM  
Result Value Ref Range D DIMER 3.07 (H) <0.46 ug/ml(FEU) FERRITIN Collection Time: 08/18/20  3:00 AM  
Result Value Ref Range Ferritin 665 (H) 8 - 388 NG/ML  
C REACTIVE PROTEIN, QT Collection Time: 08/18/20  3:00 AM  
Result Value Ref Range C-Reactive protein 1.2 (H) 0 - 0.3 mg/dL LD Collection Time: 08/18/20  3:00 AM  
Result Value Ref Range  (H) 81 - 234 U/L  
PROCALCITONIN Collection Time: 08/18/20  3:00 AM  
Result Value Ref Range Procalcitonin 0.09 ng/mL METABOLIC PANEL, COMPREHENSIVE Collection Time: 08/18/20  3:00 AM  
Result Value Ref Range Sodium 140 136 - 145 mmol/L Potassium 3.9 3.5 - 5.5 mmol/L Chloride 106 100 - 111 mmol/L  
 CO2 26 21 - 32 mmol/L Anion gap 8 3.0 - 18 mmol/L Glucose 271 (H) 74 - 99 mg/dL BUN 55 (H) 7.0 - 18 MG/DL Creatinine 1.62 (H) 0.6 - 1.3 MG/DL  
 BUN/Creatinine ratio 34 (H) 12 - 20 GFR est AA 51 (L) >60 ml/min/1.73m2 GFR est non-AA 42 (L) >60 ml/min/1.73m2 Calcium 7.9 (L) 8.5 - 10.1 MG/DL Bilirubin, total 0.8 0.2 - 1.0 MG/DL  
 ALT (SGPT) 32 16 - 61 U/L  
 AST (SGOT) 23 10 - 38 U/L Alk. phosphatase 83 45 - 117 U/L Protein, total 5.9 (L) 6.4 - 8.2 g/dL Albumin 2.3 (L) 3.4 - 5.0 g/dL Globulin 3.6 2.0 - 4.0 g/dL  A-G Ratio 0.6 (L) 0.8 - 1.7    
CBC WITH AUTOMATED DIFF  
 Collection Time: 08/18/20  3:00 AM  
Result Value Ref Range WBC 21.1 (H) 4.6 - 13.2 K/uL  
 RBC 3.71 (L) 4.70 - 5.50 M/uL  
 HGB 11.4 (L) 13.0 - 16.0 g/dL HCT 33.2 (L) 36.0 - 48.0 % MCV 89.5 74.0 - 97.0 FL  
 MCH 30.7 24.0 - 34.0 PG  
 MCHC 34.3 31.0 - 37.0 g/dL  
 RDW 14.2 11.6 - 14.5 % PLATELET 506 960 - 943 K/uL MPV 11.0 9.2 - 11.8 FL  
 NEUTROPHILS 88 (H) 42 - 75 % BAND NEUTROPHILS 7 (H) 0 - 5 % LYMPHOCYTES 4 (L) 20 - 51 % MONOCYTES 1 (L) 2 - 9 % EOSINOPHILS 0 0 - 5 % BASOPHILS 0 0 - 3 %  
 ABS. NEUTROPHILS 20.1 (H) 1.8 - 8.0 K/UL  
 ABS. LYMPHOCYTES 0.8 0.8 - 3.5 K/UL  
 ABS. MONOCYTES 0.2 0 - 1.0 K/UL  
 ABS. EOSINOPHILS 0.0 0.0 - 0.4 K/UL  
 ABS. BASOPHILS 0.0 0.0 - 0.06 K/UL  
 DF AUTOMATED PLATELET COMMENTS ADEQUATE PLATELETS    
 RBC COMMENTS ANISOCYTOSIS 1+ GLUCOSE, POC Collection Time: 08/18/20  9:00 AM  
Result Value Ref Range Glucose (POC) 227 (H) 70 - 110 mg/dL GLUCOSE, POC Collection Time: 08/18/20 12:33 PM  
Result Value Ref Range Glucose (POC) 251 (H) 70 - 110 mg/dL GLUCOSE, POC Collection Time: 08/18/20  2:28 PM  
Result Value Ref Range Glucose (POC) 305 (H) 70 - 110 mg/dL GLUCOSE, POC Collection Time: 08/18/20  3:52 PM  
Result Value Ref Range Glucose (POC) 311 (H) 70 - 110 mg/dL URINALYSIS W/MICROSCOPIC Collection Time: 08/18/20  4:55 PM  
Result Value Ref Range Color YELLOW Appearance CLEAR Specific gravity 1.022 1.005 - 1.030    
 pH (UA) 5.5 5.0 - 8.0 Protein 300 (A) NEG mg/dL Glucose 500 (A) NEG mg/dL Ketone Negative NEG mg/dL Bilirubin Negative NEG Blood MODERATE (A) NEG Urobilinogen 0.2 0.2 - 1.0 EU/dL Nitrites Negative NEG Leukocyte Esterase Negative NEG    
 WBC 3 to 5 0 - 5 /hpf  
 RBC 0 to 3 0 - 5 /hpf Epithelial cells FEW 0 - 5 /lpf Bacteria 2+ (A) NEG /hpf Yeast 2+ (A) NEG Additional Data Reviewed:   
 
Signed By: Kang Del Rosario MD   
 August 18, 2020

## 2020-08-18 NOTE — PROGRESS NOTES
Chart reviewed. PT recommending snf and ARU following. Called pt's wife Zack Hook. She declined snf placement and ARU placement. She also declined home health. Linda Umana, PRISCILLAN RN Care Management Pager: [phone]

## 2020-08-18 NOTE — PROGRESS NOTES
08/18/20 1459 Oxygen Therapy O2 Sat (%) 95 % Pulse via Oximetry 64 beats per minute O2 Device Hi flow nasal cannula O2 Flow Rate (L/min) 40 l/min O2 Temperature 91.4 °F (33 °C) FIO2 (%) 55 % (decreased to 55%)

## 2020-08-18 NOTE — PROGRESS NOTES
Blood pressure 93/42. MD made aware. Orders received, manual blood pressure at this time is 104/50. Will continue to monitor.

## 2020-08-19 PROBLEM — U07.1 PNEUMONIA DUE TO 2019 NOVEL CORONAVIRUS: Status: ACTIVE | Noted: 2020-01-01

## 2020-08-19 PROBLEM — J12.82 PNEUMONIA DUE TO 2019 NOVEL CORONAVIRUS: Status: ACTIVE | Noted: 2020-01-01

## 2020-08-19 NOTE — PROGRESS NOTES
08/19/20 1609 Oxygen Therapy O2 Sat (%) 99 % O2 Device Hi flow nasal cannula; Heated O2 Flow Rate (L/min) 35 l/min O2 Temperature 91.4 °F (33 °C) FIO2 (%) 35 %

## 2020-08-19 NOTE — PROGRESS NOTES
[facility] Hospitalist Group Progress Note Patient: Morena Beard Age: 65 y.o. : 1950 MR#: [id number] SSN: [id number] Date/Time: 2020 Subjective:  
 
Awake, laying in bed in soft wrist restraints No apparent distress Confused today Says he wants to leave, repeatedly asks to leave hospital  
Doesn't appear to understand that is on 35 LPM O2 Assessment/Plan:  
 
1. COVID-19 pneumonia 2. Sepsis secondary to COVID-19 infection 3. Acute hypoxic respiratory failure 4. Hypotension with bradycardia 5. Type 2 diabetes 6. Hypertension 7. Coronary artery disease, history of CABG 8. Dyslipidemia 9. LOREN on CKD stage III 10. Leukocytosislikely secondary to steroids 11. Acute metabolic encephalopathy in the setting of hypoxia and COVID-19 infection Pulmonology and nephrology following Status post antibiotics Cont IV steroids Continue O2 and BiPAP as needed S/p remdesivir, status post convalescent plasma On heparin drip Continue aspirin, statin; lower dose of beta-blocker Monitor sugars, sliding scale insulin,  Lantus Nephrology is following, monitor renal function Palliative care input noted PT OT Droplet plus precautions Case discussed with:  [x]Patient  []Family  [x]Nursing  []Case Management DVT Prophylaxis:  []Lovenox  []Hep SQ  []SCDs  []Coumadin   [x]On Heparin gtt Diet: Diabetic Code Status: FULL Contact: La Khan (wife)  [phone] Disposition: Continue current care; > 2 nights H. Connor Lewis, DO 2020 Objective:  
VS:  
Visit Vitals BP 90/45 (BP 1 Location: Left arm, BP Patient Position: At rest) Pulse 63 Temp 98.6 °F (37 °C) Resp 21 Ht 5' 7\" (1.702 m) Wt 75.3 kg (166 lb) SpO2 99% BMI 26.00 kg/m² Resp rate-26 Tmax/24hrs: Temp (24hrs), Av.1 °F (36.7 °C), Min:97.7 °F (36.5 °C), Max:98.6 °F (37 °C) Input/Output:  
 
Intake/Output Summary (Last 24 hours) at 2020 1917 Last data filed at 8/18/2020 2140 Gross per 24 hour Intake 206.94 ml Output 350 ml Net -143.06 ml General:  Awake, confused, follows commands Cardiovascular:  S1S2+, RRR Pulmonary:  Coarse bs b/l GI:  Soft, BS+, NT, ND Extremities:  trace edema Labs:   
Recent Results (from the past 24 hour(s)) GLUCOSE, POC Collection Time: 08/19/20  1:05 AM  
Result Value Ref Range Glucose (POC) 396 (H) 70 - 110 mg/dL PROTHROMBIN TIME + INR Collection Time: 08/19/20  3:00 AM  
Result Value Ref Range Prothrombin time 17.2 (H) 11.5 - 15.2 sec INR 1.4 (H) 0.8 - 1.2 PTT Collection Time: 08/19/20  3:00 AM  
Result Value Ref Range aPTT 96.1 (H) 23.0 - 36.4 SEC FIBRINOGEN Collection Time: 08/19/20  3:00 AM  
Result Value Ref Range Fibrinogen 200 (L) 210 - 451 mg/dL D DIMER Collection Time: 08/19/20  3:00 AM  
Result Value Ref Range D DIMER 2.69 (H) <0.46 ug/ml(FEU) FERRITIN Collection Time: 08/19/20  3:00 AM  
Result Value Ref Range Ferritin 548 (H) 8 - 388 NG/ML  
C REACTIVE PROTEIN, QT Collection Time: 08/19/20  3:00 AM  
Result Value Ref Range C-Reactive protein 0.7 (H) 0 - 0.3 mg/dL LD Collection Time: 08/19/20  3:00 AM  
Result Value Ref Range  (H) 81 - 234 U/L  
PROCALCITONIN Collection Time: 08/19/20  3:00 AM  
Result Value Ref Range Procalcitonin 0.06 ng/mL METABOLIC PANEL, COMPREHENSIVE Collection Time: 08/19/20  3:00 AM  
Result Value Ref Range Sodium 138 136 - 145 mmol/L Potassium 4.1 3.5 - 5.5 mmol/L Chloride 106 100 - 111 mmol/L  
 CO2 26 21 - 32 mmol/L Anion gap 6 3.0 - 18 mmol/L Glucose 390 (H) 74 - 99 mg/dL BUN 64 (H) 7.0 - 18 MG/DL Creatinine 1.71 (H) 0.6 - 1.3 MG/DL  
 BUN/Creatinine ratio 37 (H) 12 - 20 GFR est AA 48 (L) >60 ml/min/1.73m2 GFR est non-AA 40 (L) >60 ml/min/1.73m2 Calcium 7.5 (L) 8.5 - 10.1 MG/DL  Bilirubin, total 0.5 0.2 - 1.0 MG/DL  
 ALT (SGPT) 23 16 - 61 U/L  
 AST (SGOT) 15 10 - 38 U/L Alk. phosphatase 58 45 - 117 U/L Protein, total 4.6 (L) 6.4 - 8.2 g/dL Albumin 1.8 (L) 3.4 - 5.0 g/dL Globulin 2.8 2.0 - 4.0 g/dL A-G Ratio 0.6 (L) 0.8 - 1.7    
CBC WITH AUTOMATED DIFF Collection Time: 08/19/20  3:00 AM  
Result Value Ref Range WBC 20.7 (H) 4.6 - 13.2 K/uL  
 RBC 3.01 (L) 4.70 - 5.50 M/uL HGB 9.3 (L) 13.0 - 16.0 g/dL HCT 27.0 (L) 36.0 - 48.0 % MCV 89.7 74.0 - 97.0 FL  
 MCH 30.9 24.0 - 34.0 PG  
 MCHC 34.4 31.0 - 37.0 g/dL  
 RDW 14.4 11.6 - 14.5 % PLATELET 249 964 - 230 K/uL MPV 11.3 9.2 - 11.8 FL  
 NEUTROPHILS 92 (H) 42 - 75 % LYMPHOCYTES 4 (L) 20 - 51 % MONOCYTES 4 2 - 9 % EOSINOPHILS 0 0 - 5 % BASOPHILS 0 0 - 3 %  
 ABS. NEUTROPHILS 19.1 (H) 1.8 - 8.0 K/UL  
 ABS. LYMPHOCYTES 0.8 0.8 - 3.5 K/UL  
 ABS. MONOCYTES 0.8 0 - 1.0 K/UL  
 ABS. EOSINOPHILS 0.0 0.0 - 0.4 K/UL  
 ABS. BASOPHILS 0.0 0.0 - 0.06 K/UL  
 DF MANUAL PLATELET COMMENTS ADEQUATE PLATELETS    
 RBC COMMENTS ANISOCYTOSIS 1+ 
    
 RBC COMMENTS HYPOCHROMIA 1+ 
    
 RBC COMMENTS POLYCHROMASIA 1+ 
    
 RBC COMMENTS OVALOCYTES 1+ MAGNESIUM Collection Time: 08/19/20  3:00 AM  
Result Value Ref Range Magnesium 2.1 1.6 - 2.6 mg/dL GLUCOSE, POC Collection Time: 08/19/20  8:23 AM  
Result Value Ref Range Glucose (POC) 243 (H) 70 - 110 mg/dL PTT Collection Time: 08/19/20 10:36 AM  
Result Value Ref Range aPTT 27.3 23.0 - 36.4 SEC GLUCOSE, POC Collection Time: 08/19/20 12:10 PM  
Result Value Ref Range Glucose (POC) 209 (H) 70 - 110 mg/dL GLUCOSE, POC Collection Time: 08/19/20  3:55 PM  
Result Value Ref Range Glucose (POC) 202 (H) 70 - 110 mg/dL

## 2020-08-19 NOTE — PROGRESS NOTES
Problem: Mobility Impaired (Adult and Pediatric) Goal: *Acute Goals and Plan of Care (Insert Text) Description: Physical Therapy Goals Initiated 8/12/2020, reviewed and continued 8/19/2020 and to be accomplished within 7 day(s) 1. Patient will move from supine to sit and sit to supine  in bed with modified independence. 2.  Patient will transfer from bed to chair and chair to bed with modified independence using the least restrictive device. 3.  Patient will perform sit to stand with modified independence. 4.  Patient will ambulate with modified independence for 100 feet with the least restrictive device. 5.  Patient will ascend/descend 4 stairs with handrail(s) with contact guard assist 
 
 
PLOF: Patient reports he was independent with self care and functional mobility. Outcome: Progressing Towards Goal 
 PHYSICAL THERAPY RE-EVALUATION Patient: Erika Escobar (63 y.o. male) Date: 8/19/2020 Primary Diagnosis: Pneumonia [J18.9] Precautions:   Aspiration, Skin, Fall, Contact PLOF: see above ASSESSMENT : 
Based on the objective data described below, the patient presents with decreased strength, balance and activity tolerance resulting in decreased independence with functional mobility. Pt is progressing slowly and is currently requiring increased O2 than when evaluated. Pt transferred supine to sit with mod A and sit to stand, twice, with max A. Pt required verbal cues throughout session for proper transfer technique. Once back in supine patient performed LE exercises for strengthening to allow for increased independence with transfers. Pt was unable to ambulate today due to poor standing balance and higher assistive needs. Pt was left in bed with needs and reach and left UE wrist restraint on as it was prior to treatment session. Patient will benefit from skilled intervention to address the above impairments. Patient's rehabilitation potential is considered to be Good Factors which may influence rehabilitation potential include:  
[]         None noted 
[]         Mental ability/status [x]         Medical condition 
[x]         Home/family situation and support systems 
[x]         Safety awareness 
[]         Pain tolerance/management 
[]         Other: PLAN : 
Recommendations and Planned Interventions:  
[x]           Bed Mobility Training             [x]    Neuromuscular Re-Education 
[x]           Transfer Training                   []    Orthotic/Prosthetic Training 
[x]           Gait Training                          []    Modalities [x]           Therapeutic Exercises           []    Edema Management/Control 
[x]           Therapeutic Activities            []    Family Training/Education 
[x]           Patient Education 
[]           Other (comment): Frequency/Duration: Patient will be followed by physical therapy 1-2 times per day/4-7 days per week to address goals. Discharge Recommendations: Inpatient Rehab Further Equipment Recommendations for Discharge: N/A  
 
SUBJECTIVE:  
Patient stated I just want to get out of here.  OBJECTIVE DATA SUMMARY:  
Hospital course since last seen and reason for re-evaluation: pt has had increased confusion requiring UE restraints, increased O2 requirement Past Medical History:  
Diagnosis Date Acute coronary syndrome ([address] 3/2/2020 LOREN (acute kidney injury) ([address] 3/5/2020 Calculus of kidney Cancer ([address] HX of [facility] Coronary artery disease of native artery with stable angina pectoris ([address] 3/3/2020 Diabetes mellitus ([address] 8/19/2010 Gout 8/19/2010 HTN (hypertension) 8/19/2010 Hyperlipemia 8/19/2010 Kidney disease Lumbar spondylosis Proteinuria S/P CABG x 3 03/04/2020 CABG x 3, LIMA to LAD, SVG to OM1, SVG to diagonal 1 Skin cancer, basal cell   
 neck Stage 3 chronic kidney disease ([address]  Type 2 diabetes mellitus without complication, without long-term current use of insulin ([facility] 8/19/2010 Urolithiasis Past Surgical History:  
Procedure Laterality Date HX CORONARY ARTERY BYPASS GRAFT  03/04/2020 CABG x3, Dr. Maria L Jackson, [facility]  
 HX GI    
 HX OTHER SURGICAL Basal cell removed from left side of face HX UROLOGICAL    
 kidney Barriers to Learning/Limitations: None Compensate with: N/A Home Situation:  
Home Situation Home Environment: Private residence One/Two Story Residence: Two story Living Alone: No 
Support Systems: Spouse/Significant Other/Partner Patient Expects to be Discharged to[de-identified] Private residence Current DME Used/Available at Home: None Critical Behavior: 
 Calm and cooperative Strength:   
 Grossly 3 to 3+/5 B LEs Tone & Sensation:  
 Intact B LEs Range Of Motion: WFL B LEs Functional Mobility: 
Bed Mobility: 
Rolling: Contact guard assistance Supine to Sit: Moderate assistance Sit to Supine: Moderate assistance Transfers: 
Sit to Stand: Maximum assistance Stand to Sit: Maximum assistance Balance:  
Sitting - Static: Fair (occasional) Sitting - Dynamic: Fair (occasional) Standing: With support Standing - Static: Poor Ambulation/Gait Training: 
 Unable to ambulate today Therapeutic Exercises: Ankle pumps, heel slides, hip abd/adduction, glute sets x10 Pain: 
Pain level pre-treatment: 0/10 Pain level post-treatment: 0/10 Pain Intervention(s) : N/A Pt reports no pain just stiffness Activity Tolerance:  
Poor+ Please refer to the flowsheet for vital signs taken during this treatment. After treatment:  
[]         Patient left in no apparent distress sitting up in chair 
[x]         Patient left in no apparent distress in bed 
[x]         Call bell left within reach [x]         Nursing notified 
[]         Caregiver present [x]         Bed alarm activated 
[]         SCDs applied COMMUNICATION/EDUCATION:  
[x]         Role of Physical Therapy in the acute care setting. []         Fall prevention education was provided and the patient/caregiver indicated understanding. []         Patient/family have participated as able in goal setting and plan of care. [x]         Patient/family agree to work toward stated goals and plan of care. []         Patient understands intent and goals of therapy, but is neutral about his/her participation. []         Patient is unable to participate in goal setting/plan of care: ongoing with therapy staff. [x]         Other: educated patient on performing LE exercises throughout the day Thank you for this referral. 
Carmencita Gonzalez, PT Time Calculation: 29 mins

## 2020-08-19 NOTE — PROGRESS NOTES
Infectious Disease progress note Reason: COVID-19 pneumonia Current abx Prior abx Ceftriaxone, azithromycin since 8/10/20-8/17 Lines:  
 
 
Assessment : 
 
65 y.o. male with type 2 DM,  hypertension, hyperlipidemia and renal insufficiency who presented to ED on 8/10/20 with cough for 2 weeks. Chest x-ray 8/10, 8/13/2020-multifocal infiltrates Clinical presentation consistent with confirmed COVID-19 pneumonia Labs on 8/11-ferritin 841, CRP 23.6, procalcitonin 1.91, , d-dimer 3.72 Labs on 8/12-ferritin 782, CRP 17.3, procalcitonin 1.47, , d-dimer 3.14 Labs on 8/13-ferritin 715, CRP 8.4, procalcitonin 0.67 , d-dimer 6.42 Labs on 8/14-ferritin 691, CRP 4.4, procalcitonin 0.33, , d-dimer 6.58 Labs on 8/17-ferritin 625, CRP 1.6, procalcitonin 1.13, d-dimer 3.43 Labs on 8/18-ferritin 665, CRP 1.2, procalcitonin 0.09, d-dimer 3.07 Labs on 8/19-ferritin 548, CRP 8.7, procalcitonin 0.06, d-dimer 2.69 
 
 
covid test 8/10- positive Acute renal insufficiency-likely secondary to volume depletion. Monitor for COVID nephropathy Hypoxia likely secondary to COVID-19 pneumonia. Concern for coagulopathy especially since increasing d-dimer. Pulmonary follow-up appreciated. LOREN-likely secondary to COVID-19 infection, volume depletion. Some improvement noted. EGFR now greater than 35. Worsening hypoxia, tachypnea noted since 8/14 due to severe COVID-19 pneumonia. Status post remdesivir since 8/14 Currently on 25L high flow. S/p  convalescent plasma on 8/17/20 Venous duplex 8/14- negative for DVT 
 
clinically better. Subjective sense of improvement. Improved hypoxia. Recommendations: 
 
1. Hold further abx 2. Decrease Solu-Medrol to 40 mg IV every 12 hours 3. F/u pulmonary recommendations regarding anticoagulation 4. Titrate oxygen as tolerated 5. Monitor inflammatory parameters-ferritin, CRP, d-dimer, procalcitonin 7.  Follow-up pulmonary recommendations Above plan was discussed in details with patient, dr. Ochoa Joes Please call me if any further questions or concerns. Will continue to participate in the care of this patient. HPI: 
 
Feels ok. Denies cp, sob. States that he wants to leave.  
 
 
home Medication List  
  
 Details  
metoprolol succinate (TOPROL-XL) 100 mg tablet Take 1 Tab by mouth nightly. melatonin 3 mg tablet Take 1 Tab by mouth nightly. aspirin-calcium carbonate 81 mg-300 mg calcium(777 mg) tab Take 81 mg by mouth daily. lisinopriL (PRINIVIL, ZESTRIL) 20 mg tablet Take 1 Tab by mouth two (2) times a day. Qty: 180 Tab, Refills: 3  
  
aspirin delayed-release 81 mg tablet take 1 tablet by mouth once daily 
Qty: 90 Tab, Refills: 1  
  
atorvastatin (LIPITOR) 40 mg tablet take 1 tablet by mouth nightly 
Qty: 90 Tab, Refills: 0  
  
cyanocobalamin 1,000 mcg tablet take 1 tablet by mouth once daily 
Qty: 90 Tab, Refills: 0  
  
folic acid (FOLVITE) 1 mg tablet take 1 tablet by mouth once daily 
Qty: 90 Tab, Refills: 0  
  
tamsulosin (FLOMAX) 0.4 mg capsule take 1 capsule by mouth once daily 
Qty: 90 Cap, Refills: 0  
  
allopurinoL (ZYLOPRIM) 300 mg tablet take 1 tablet by mouth once daily 
Qty: 90 Tab, Refills: 1 Associated Diagnoses: Gout, unspecified cause, unspecified chronicity, unspecified site  
  
ascorbic acid, vitamin C, (VITAMIN C) 250 mg tablet Take 1 Tab by mouth two (2) times daily (with meals). Qty: 180 Tab, Refills: 1  
  
acetaminophen (TYLENOL) 325 mg tablet Take 2 Tabs by mouth every six (6) hours as needed for Pain or Fever. Qty: 90 Tab, Refills: 2 cholecalciferol (VITAMIN D3) (1000 Units /25 mcg) tablet Take 4 Tabs by mouth daily. Qty: 120 Tab, Refills: 2  
  
ferrous sulfate 325 mg (65 mg iron) tablet Take 1 Tab by mouth two (2) times daily (with meals). Qty: 60 Tab, Refills: 2  
  
nystatin (MYCOSTATIN) powder Apply  to affected area two (2) times a day. Qty: 1 Bottle, Refills: 0  
  
glyBURIDE (DIABETA) 2.5 mg tablet Take 1 Tab by mouth two (2) times daily (with meals). Qty: 180 Tab, Refills: 1 Associated Diagnoses: Type 2 diabetes mellitus without complication, without long-term current use of insulin ([facility] predniSONE (STERAPRED DS) 10 mg dose pack See administration instruction per 10mg dose pack Qty: 21 Tab, Refills: 0 Associated Diagnoses: Cervical radiculopathy  
  
sildenafil citrate (Viagra) 100 mg tablet Take tablet by mouth once daily as needed. Qty: 6 Tab, Refills: 5 Current Facility-Administered Medications Medication Dose Route Frequency  insulin glargine (LANTUS) injection 10 Units  10 Units SubCUTAneous QHS  
 0.9% sodium chloride infusion 250 mL  250 mL IntraVENous PRN  
 heparin 25,000 units in D5W 250 ml infusion  18-36 Units/kg/hr IntraVENous TITRATE  ipratropium-albuterol (COMBIVENT RESPIMAT) 20 mcg-100 mcg inhalation spray  2 Puff Inhalation Q4H PRN  
 methylPREDNISolone (PF) (SOLU-MEDROL) injection 60 mg  60 mg IntraVENous Q12H  
 famotidine (PEPCID) tablet 20 mg  20 mg Oral DAILY  hydrALAZINE (APRESOLINE) tablet 10 mg  10 mg Oral Q6H PRN  
 ascorbic acid (vitamin C) (VITAMIN C) tablet 500 mg  500 mg Oral TID  zinc sulfate (ZINCATE) 220 (50) mg capsule 1 Cap  1 Cap Oral DAILY  atorvastatin (LIPITOR) tablet 40 mg  40 mg Oral QHS  folic acid (FOLVITE) tablet 1 mg  1 mg Oral DAILY  melatonin tablet 3 mg  3 mg Oral QHS  metoprolol succinate (TOPROL-XL) tablet 100 mg  100 mg Oral QHS  aspirin chewable tablet 81 mg  81 mg Oral DAILY And  
 calcium carbonate (CALTREX) tablet 300 mg [elemental]  300 mg Oral DAILY  cholecalciferol (VITAMIN D3) capsule 5,000 Units  5,000 Units Oral DAILY  insulin lispro (HUMALOG) injection   SubCUTAneous AC&HS  
 glucose chewable tablet 16 g  4 Tab Oral PRN  
 glucagon (GLUCAGEN) injection 1 mg  1 mg IntraMUSCular PRN  
  dextrose (D50W) injection syrg 12.5-25 g  25-50 mL IntraVENous PRN  
 sodium chloride (NS) flush 5-10 mL  5-10 mL IntraVENous PRN  
 acetaminophen (TYLENOL) tablet 650 mg  650 mg Oral Q6H PRN Or  
 acetaminophen (TYLENOL) suppository 650 mg  650 mg Rectal Q6H PRN  
 sodium chloride (NS) flush 5-40 mL  5-40 mL IntraVENous Q8H  
 sodium chloride (NS) flush 5-40 mL  5-40 mL IntraVENous PRN  
 senna (SENOKOT) tablet 17.2 mg  2 Tab Oral QHS  bisacodyL (DULCOLAX) suppository 10 mg  10 mg Rectal DAILY PRN  
 ondansetron (ZOFRAN) injection 4 mg  4 mg IntraVENous Q4H PRN Allergies: Metformin Temp (24hrs), Av °F (36.7 °C), Min:97.7 °F (36.5 °C), Max:98.4 °F (36.9 °C) Visit Vitals /48 (BP 1 Location: Left arm, BP Patient Position: At rest) Pulse (!) 54 Temp 98.1 °F (36.7 °C) Resp 17 Ht 5' 7\" (1.702 m) Wt 75.3 kg (166 lb) SpO2 100% BMI 26.00 kg/m² ROS: 12 point review systems obtained details, pertinent positives mentioned in history of present illness otherwise negative Physical Exam: 
 
General: Well developed, well nourished male laying on the bed , does not appear tachypneic or dyspneic, on high flow. HEENT:  Normocephalic, atraumatic, nasal and oral mucous are moist and without evidence of lesions. Neck supple, no bruits. Lungs:   non-labored, bilaterally crackles, no  wheezes Heart:  RRR, s1 and s2; no rubs or gallops, no edema, + pedal pulses Abdomen:  soft, non-distended, active bowel sounds, no hepatomegaly, no splenomegaly. Non-tender Genitourinary:  deferred Extremities:   no clubbing, cyanosis; no joint effusions or swelling;; muscle mass appropriate for age Neurologic:  No gross focal sensory abnormalities; 5/5 muscle strength to upper and lower extremities. Speech appropriate. Cranial nerves intact Skin:  No rash or ulcers noted Back:  no spinal or paraspinal muscle tenderness or rigidity, no CVA tenderness Psychiatric:  No suicidal or homicidal ideations, appropriate mood and affect Labs: Results:  
Chemistry Recent Labs 08/19/20 
0300 08/18/20 
0300 08/17/20 
0445 * 271* 159*  140 141  
K 4.1 3.9 3.4*  
 106 107 CO2 26 26 27 BUN 64* 55* 52* CREA 1.71* 1.62* 1.44* CA 7.5* 7.9* 8.3* AGAP 6 8 7 BUCR 37* 34* 36* AP 58 83 82  
TP 4.6* 5.9* 6.0* ALB 1.8* 2.3* 2.2*  
GLOB 2.8 3.6 3.8 AGRAT 0.6* 0.6* 0.6* CBC w/Diff Recent Labs 08/19/20 0300 08/18/20 
1730 08/18/20 
0300 08/17/20 
0445 WBC 20.7*  --  21.1* 21.0*  
RBC 3.01*  --  3.71* 3.92* HGB 9.3* 9.5* 11.4* 11.7* HCT 27.0* 28.2* 33.2* 35.1*  
  --  372 381 GRANS 92*  --  88* 96* LYMPH 4*  --  4* 2* EOS 0  --  0 0 Microbiology Recent Labs 08/17/20 
0630 CULT MRSA NOT PRESENT      Screening of patient nares for MRSA is for surveillance purposes and, if positive, to facilitate isolation considerations in high risk settings. It is not intended for automatic decolonization interventions per se as regimens are not sufficiently effective to warrant routine use. RADIOLOGY: 
 
All available imaging studies/reports in [facility] for this admission were reviewed Dr. Abdulkadir Mott, Infectious Disease Specialist 
[phone] August 19, 2020 
1:20 PM

## 2020-08-19 NOTE — PROGRESS NOTES
08/19/20 0800 Oxygen Therapy O2 Sat (%) 100 % Pulse via Oximetry 78 beats per minute O2 Device Hi flow nasal cannula; Heated O2 Flow Rate (L/min) 40 l/min O2 Temperature 91.4 °F (33 °C) FIO2 (%) 50 %

## 2020-08-19 NOTE — PROGRESS NOTES
Problem: Falls - Risk of 
Goal: *Absence of Falls Description: Document Linda Hernandez Fall Risk and appropriate interventions in the flowsheet. Outcome: Progressing Towards Goal 
Note: Fall Risk Interventions: 
Mobility Interventions: Bed/chair exit alarm, Communicate number of staff needed for ambulation/transfer, OT consult for ADLs, Patient to call before getting OOB, Utilize walker, cane, or other assistive device, PT Consult for assist device competence, PT Consult for mobility concerns, Strengthening exercises (ROM-active/passive) Mentation Interventions: Adequate sleep, hydration, pain control, Bed/chair exit alarm, Reorient patient, More frequent rounding, Increase mobility, Toileting rounds Medication Interventions: Bed/chair exit alarm, Teach patient to arise slowly, Patient to call before getting OOB, Evaluate medications/consider consulting pharmacy Elimination Interventions: Bed/chair exit alarm, Call light in reach, Patient to call for help with toileting needs, Toilet paper/wipes in reach, Toileting schedule/hourly rounds, Urinal in reach History of Falls Interventions: Bed/chair exit alarm, Consult care management for discharge planning, Room close to nurse's station, Vital signs minimum Q4HRs X 24 hrs (comment for end date) Problem: Patient Education: Go to Patient Education Activity Goal: Patient/Family Education Outcome: Progressing Towards Goal 
  
Problem: Pressure Injury - Risk of 
Goal: *Prevention of pressure injury Description: Document Tr Scale and appropriate interventions in the flowsheet. Outcome: Progressing Towards Goal 
Note: Pressure Injury Interventions: 
Sensory Interventions: Assess changes in LOC, Assess need for specialty bed, Check visual cues for pain, Discuss PT/OT consult with provider, Turn and reposition approx. every two hours (pillows and wedges if needed), Pressure redistribution bed/mattress (bed type) Moisture Interventions: Absorbent underpads, Check for incontinence Q2 hours and as needed, Internal/External urinary devices, Maintain skin hydration (lotion/cream), Offer toileting Q_hr Activity Interventions: PT/OT evaluation, Pressure redistribution bed/mattress(bed type), Increase time out of bed, Assess need for specialty bed Mobility Interventions: Assess need for specialty bed, Pressure redistribution bed/mattress (bed type), HOB 30 degrees or less, PT/OT evaluation, Turn and reposition approx. every two hours(pillow and wedges) Nutrition Interventions: Document food/fluid/supplement intake, Discuss nutritional consult with provider, Offer support with meals,snacks and hydration Friction and Shear Interventions: HOB 30 degrees or less, Foam dressings/transparent film/skin sealants, Apply protective barrier, creams and emollients, Lift team/patient mobility team 
 
  
 
 
 
  
Problem: Patient Education: Go to Patient Education Activity Goal: Patient/Family Education Outcome: Progressing Towards Goal 
  
Problem: Diabetes Self-Management Goal: *Disease process and treatment process Description: Define diabetes and identify own type of diabetes; list 3 options for treating diabetes. Outcome: Progressing Towards Goal 
Goal: *Incorporating nutritional management into lifestyle Description: Describe effect of type, amount and timing of food on blood glucose; list 3 methods for planning meals. Outcome: Progressing Towards Goal 
Goal: *Incorporating physical activity into lifestyle Description: State effect of exercise on blood glucose levels. Outcome: Progressing Towards Goal 
Goal: *Developing strategies to promote health/change behavior Description: Define the ABC's of diabetes; identify appropriate screenings, schedule and personal plan for screenings. Outcome: Progressing Towards Goal 
Goal: *Using medications safely Description: State effect of diabetes medications on diabetes; name diabetes medication taking, action and side effects. Outcome: Progressing Towards Goal 
Goal: *Monitoring blood glucose, interpreting and using results Description: Identify recommended blood glucose targets  and personal targets. Outcome: Progressing Towards Goal 
Goal: *Prevention, detection, treatment of acute complications Description: List symptoms of hyper- and hypoglycemia; describe how to treat low blood sugar and actions for lowering  high blood glucose level. Outcome: Progressing Towards Goal 
Goal: *Prevention, detection and treatment of chronic complications Description: Define the natural course of diabetes and describe the relationship of blood glucose levels to long term complications of diabetes. Outcome: Progressing Towards Goal 
Goal: *Developing strategies to address psychosocial issues Description: Describe feelings about living with diabetes; identify support needed and support network Outcome: Progressing Towards Goal 
Goal: *Insulin pump training Outcome: Progressing Towards Goal 
Goal: *Sick day guidelines Outcome: Progressing Towards Goal 
Goal: *Patient Specific Goal (EDIT GOAL, INSERT TEXT) Outcome: Progressing Towards Goal 
  
Problem: Patient Education: Go to Patient Education Activity Goal: Patient/Family Education Outcome: Progressing Towards Goal 
  
Problem: Patient Education: Go to Patient Education Activity Goal: Patient/Family Education Outcome: Progressing Towards Goal 
  
Problem: Patient Education: Go to Patient Education Activity Goal: Patient/Family Education Outcome: Progressing Towards Goal 
  
Problem: Non-Violent Restraints Goal: *Removal from restraints as soon as assessed to be safe Outcome: Progressing Towards Goal 
Goal: *No harm/injury to patient while restraints in use Outcome: Progressing Towards Goal 
Goal: *Patient's dignity will be maintained Outcome: Progressing Towards Goal 
Goal: *Patient Specific Goal (EDIT GOAL, INSERT TEXT) Outcome: Progressing Towards Goal 
Goal: Non-violent Restaints:Standard Interventions Outcome: Progressing Towards Goal 
Goal: Non-violent Restraints:Patient Interventions Outcome: Progressing Towards Goal 
Goal: Patient/Family Education Outcome: Progressing Towards Goal 
  
Problem: Patient Education: Go to Patient Education Activity Goal: Patient/Family Education Outcome: Progressing Towards Goal 
  
Problem: Pneumonia: Day 4 Goal: Off Pathway (Use only if patient is Off Pathway) Outcome: Progressing Towards Goal 
Goal: Activity/Safety Outcome: Progressing Towards Goal 
Goal: Nutrition/Diet Outcome: Progressing Towards Goal 
Goal: Discharge Planning Outcome: Progressing Towards Goal 
Goal: Medications Outcome: Progressing Towards Goal 
Goal: Respiratory Outcome: Progressing Towards Goal 
Goal: Treatments/Interventions/Procedures Outcome: Progressing Towards Goal 
Goal: Psychosocial 
Outcome: Progressing Towards Goal 
  
Problem: Nutrition Deficit Goal: *Optimize nutritional status Outcome: Progressing Towards Goal

## 2020-08-19 NOTE — PROGRESS NOTES
Rate verified with Paul Miguel. Heparin is currently stopped per MD orders, will reevaluate when second PTT is drawn

## 2020-08-19 NOTE — PROGRESS NOTES
Due to bleeding of uknown origin in pt PICC, previous RN had stopped hepariin to assess bleeding status. By the time I went to look at ptt status I noticed last ppt was over 6 hrs ago and labs had not been drawn. Stat PTT ordered. PTT resulted, however heparin has been off for several hours now. Pt is theraputic according to recent PTT. MD paged. Instructed to repeat PTT at 0700 and leave off heparin until then, will cont to monitor

## 2020-08-19 NOTE — PROGRESS NOTES
[facility] Pulmonary, Critical Care, and Sleep Medicine Pulmonary Medicine Progress Note Name: Seferino Landau MRN: [id number] : 1950 Hospital: [facility] Date: 2020 Assessment: Hypotension with bradycardia- new today- suspect due to BB,  Med currently on hold and team will reassess Acute Hypoxic Respiratory Failure - 2/2 covid19 pneumonia:  
- currently on HFNC 40LPM @50% [id number] Pneumonia: + COVID 8/10/2020 - B/L infiltrates, fevers. Mildly elevated inflammatory markers CAD with Hx of recent 3V CABG LOREN on CKD- Diabetic - Creat continues to rise Deliria: Waxes and Wanes 
- suspect multifactorial etiology: steroids, other meds, medical isolation, serious medical illness, CKD, etc 
Leukocytosis persistent  No secondary infection found at this time- ID staff following, holding on Abx Procal reassuring, 
- Suspect due to steroids Deconditioning with low albumin state Diabetes Hypokalemia 
  
Impression/Plan: 
- FiO2 to keep SpO2 >=92%, HOB >=30 degree, aspiration precautions, aggressive pulmonary toileting, incentive spirometry. - Continue steroids- agree with decreasing dose - Continue with systemic anticoagulation for now - Trend LFTs and other COVID inflammatory markers - Wean HFNC as tolerated, 
- PRN bipap: - as long as mental status allows - PO intake as tolerated - BB held, consider restarting at half dose with hold parameters when restarting 
- Other issues management by primary team and respective consultants. - Will continue to follow- Further recommendations pending clinical course Subjective: 
 
20 Hospital Day: 9 Events and notes from last 24 hours reviewed. Discussed with patient to the best of my ability Care plan discussed with nursing. Labs and images personally seen and available reports reviewed All current medicines are reviewed · Patient appears tired and slightly more confused but still pleasant and able to properly answer some of my questions · In - soft wrist restraints at time of my evaluation · Tolerating PO · Remains on Vapotherm 40LPM flow and FiO2 50% ·  Afebrile · Convalescent serum infused 8/17/2020- 1500 · Inflammatory markers appears downward trend · WBC still elevated · BP and HR decreased today- he did get his BB earlier this morning- I have held for now - will discuss with hospitalist 
 
 
 
 
Patient Active Problem List  
Diagnosis Code  
 HTN (hypertension) I10  
 Gout M10.9  Hyperlipemia E78.5  Nocturia R35.1  Elevated troponin R79.89  Back pain M54.9  Acute coronary syndrome (HCC) I24.9  S/P cardiac catheterization Z98.890  
 NSTEMI (non-ST elevated myocardial infarction) ([facility]) I21.4  
 S/P CABG x 3 Z95.1  Stage 3 chronic kidney disease ([facility]) N18.3  LOREN (acute kidney injury) ([facility] [address] N17.9  Atherosclerosis of native coronary artery of native heart without angina pectoris I25.10  Urinary retention R33.9  Moderate protein-calorie malnutrition ([facility]) E44.0  Pneumonia J18.9  Type 2 diabetes mellitus with chronic kidney disease ([facility]) E11.22  
 Secondary hyperparathyroidism of renal origin ([facility] [address] N25.81  
 Acute hypoxemic respiratory failure ([facility]) J96.01  
 Suspected COVID-19 virus infection Z20.828  Acute-on-chronic kidney injury ([facility] [address] N17.9, N18.9  Pneumonia due to 2019 novel coronavirus U07.1, J12.89 Medications- Current: 
Current Facility-Administered Medications Medication Dose Route Frequency  insulin glargine (LANTUS) injection 10 Units  10 Units SubCUTAneous QHS  
 0.9% sodium chloride infusion 250 mL  250 mL IntraVENous PRN  
 heparin 25,000 units in D5W 250 ml infusion  18-36 Units/kg/hr IntraVENous TITRATE  ipratropium-albuterol (COMBIVENT RESPIMAT) 20 mcg-100 mcg inhalation spray  2 Puff Inhalation Q4H PRN  
  methylPREDNISolone (PF) (SOLU-MEDROL) injection 60 mg  60 mg IntraVENous Q12H  
 famotidine (PEPCID) tablet 20 mg  20 mg Oral DAILY  hydrALAZINE (APRESOLINE) tablet 10 mg  10 mg Oral Q6H PRN  
 ascorbic acid (vitamin C) (VITAMIN C) tablet 500 mg  500 mg Oral TID  zinc sulfate (ZINCATE) 220 (50) mg capsule 1 Cap  1 Cap Oral DAILY  atorvastatin (LIPITOR) tablet 40 mg  40 mg Oral QHS  folic acid (FOLVITE) tablet 1 mg  1 mg Oral DAILY  melatonin tablet 3 mg  3 mg Oral QHS  metoprolol succinate (TOPROL-XL) tablet 100 mg  100 mg Oral QHS  aspirin chewable tablet 81 mg  81 mg Oral DAILY And  
 calcium carbonate (CALTREX) tablet 300 mg [elemental]  300 mg Oral DAILY  cholecalciferol (VITAMIN D3) capsule 5,000 Units  5,000 Units Oral DAILY  insulin lispro (HUMALOG) injection   SubCUTAneous AC&HS  
 glucose chewable tablet 16 g  4 Tab Oral PRN  
 glucagon (GLUCAGEN) injection 1 mg  1 mg IntraMUSCular PRN  
 dextrose (D50W) injection syrg 12.5-25 g  25-50 mL IntraVENous PRN  
 sodium chloride (NS) flush 5-10 mL  5-10 mL IntraVENous PRN  
 acetaminophen (TYLENOL) tablet 650 mg  650 mg Oral Q6H PRN Or  
 acetaminophen (TYLENOL) suppository 650 mg  650 mg Rectal Q6H PRN  
 sodium chloride (NS) flush 5-40 mL  5-40 mL IntraVENous Q8H  
 sodium chloride (NS) flush 5-40 mL  5-40 mL IntraVENous PRN  
 senna (SENOKOT) tablet 17.2 mg  2 Tab Oral QHS  bisacodyL (DULCOLAX) suppository 10 mg  10 mg Rectal DAILY PRN  
 ondansetron (ZOFRAN) injection 4 mg  4 mg IntraVENous Q4H PRN Objective: 
Vital Signs:   
Visit Vitals BP (!) 90/39 (BP 1 Location: Left arm, BP Patient Position: At rest) Pulse (!) 57 Temp 98 °F (36.7 °C) Resp 18 Ht 5' 7\" (1.702 m) Wt 75.3 kg (166 lb) SpO2 99% BMI 26.00 kg/m² O2 Device: Hi flow nasal cannula, Heated O2 Flow Rate (L/min): 40 l/min Temp (24hrs), Av °F (36.7 °C), Min:97.7 °F (36.5 °C), Max:98.4 °F (36.9 °C) Intake/Output: Last shift:      No intake/output data recorded. Last 3 shifts: 08/17 1901 - 08/19 0700 In: 895.6 [P.O.:50; I.V.:845.6] Out: 350 [Urine:350] Intake/Output Summary (Last 24 hours) at 8/19/2020 1506 Last data filed at 8/18/2020 2140 Gross per 24 hour Intake 206.94 ml Output 350 ml Net -143.06 ml Physical Exam:  
 
Patient evaluated at bedside General/Neurology: Alert, Awake, appropriate, frail, speaking in 3 word sentences Head:   Normocephalic, without obvious abnormality Eye:   PERRL, EOM intact Oral:  Mucus membranes moist 
Lung:   Modest effort at best, no accessory muscle use, No crepitus, No audible wheezing. On HFNC Heart:   S1 S2 - regular by palpation Abdomen:  Soft, non tender Extremities:  No cyanosis, wear, no erythema, mild edema- legs, Midline - right upper extremity Skin:   Dry, intact, several areas of ecchymosis in various stages of healing Data: 
   
Recent Results (from the past 24 hour(s)) GLUCOSE, POC Collection Time: 08/18/20  3:52 PM  
Result Value Ref Range Glucose (POC) 311 (H) 70 - 110 mg/dL URINALYSIS W/MICROSCOPIC Collection Time: 08/18/20  4:55 PM  
Result Value Ref Range Color YELLOW Appearance CLEAR Specific gravity 1.022 1.005 - 1.030    
 pH (UA) 5.5 5.0 - 8.0 Protein 300 (A) NEG mg/dL Glucose 500 (A) NEG mg/dL Ketone Negative NEG mg/dL Bilirubin Negative NEG Blood MODERATE (A) NEG Urobilinogen 0.2 0.2 - 1.0 EU/dL Nitrites Negative NEG Leukocyte Esterase Negative NEG    
 WBC 3 to 5 0 - 5 /hpf  
 RBC 0 to 3 0 - 5 /hpf Epithelial cells FEW 0 - 5 /lpf Bacteria 2+ (A) NEG /hpf Yeast 2+ (A) NEG  
PTT Collection Time: 08/18/20  5:26 PM  
Result Value Ref Range aPTT >180.0 (HH) 23.0 - 36.4 SEC  
HGB & HCT Collection Time: 08/18/20  5:30 PM  
Result Value Ref Range HGB 9.5 (L) 13.0 - 16.0 g/dL HCT 28.2 (L) 36.0 - 48.0 % GLUCOSE, POC  Collection Time: 08/19/20  1:05 AM  
 Result Value Ref Range Glucose (POC) 396 (H) 70 - 110 mg/dL PROTHROMBIN TIME + INR Collection Time: 08/19/20  3:00 AM  
Result Value Ref Range Prothrombin time 17.2 (H) 11.5 - 15.2 sec INR 1.4 (H) 0.8 - 1.2 PTT Collection Time: 08/19/20  3:00 AM  
Result Value Ref Range aPTT 96.1 (H) 23.0 - 36.4 SEC FIBRINOGEN Collection Time: 08/19/20  3:00 AM  
Result Value Ref Range Fibrinogen 200 (L) 210 - 451 mg/dL D DIMER Collection Time: 08/19/20  3:00 AM  
Result Value Ref Range D DIMER 2.69 (H) <0.46 ug/ml(FEU) FERRITIN Collection Time: 08/19/20  3:00 AM  
Result Value Ref Range Ferritin 548 (H) 8 - 388 NG/ML  
C REACTIVE PROTEIN, QT Collection Time: 08/19/20  3:00 AM  
Result Value Ref Range C-Reactive protein 0.7 (H) 0 - 0.3 mg/dL LD Collection Time: 08/19/20  3:00 AM  
Result Value Ref Range  (H) 81 - 234 U/L  
PROCALCITONIN Collection Time: 08/19/20  3:00 AM  
Result Value Ref Range Procalcitonin 0.06 ng/mL METABOLIC PANEL, COMPREHENSIVE Collection Time: 08/19/20  3:00 AM  
Result Value Ref Range Sodium 138 136 - 145 mmol/L Potassium 4.1 3.5 - 5.5 mmol/L Chloride 106 100 - 111 mmol/L  
 CO2 26 21 - 32 mmol/L Anion gap 6 3.0 - 18 mmol/L Glucose 390 (H) 74 - 99 mg/dL BUN 64 (H) 7.0 - 18 MG/DL Creatinine 1.71 (H) 0.6 - 1.3 MG/DL  
 BUN/Creatinine ratio 37 (H) 12 - 20 GFR est AA 48 (L) >60 ml/min/1.73m2 GFR est non-AA 40 (L) >60 ml/min/1.73m2 Calcium 7.5 (L) 8.5 - 10.1 MG/DL Bilirubin, total 0.5 0.2 - 1.0 MG/DL  
 ALT (SGPT) 23 16 - 61 U/L  
 AST (SGOT) 15 10 - 38 U/L Alk. phosphatase 58 45 - 117 U/L Protein, total 4.6 (L) 6.4 - 8.2 g/dL Albumin 1.8 (L) 3.4 - 5.0 g/dL Globulin 2.8 2.0 - 4.0 g/dL A-G Ratio 0.6 (L) 0.8 - 1.7    
CBC WITH AUTOMATED DIFF Collection Time: 08/19/20  3:00 AM  
Result Value Ref Range WBC 20.7 (H) 4.6 - 13.2 K/uL  
 RBC 3.01 (L) 4.70 - 5.50 M/uL HGB 9.3 (L) 13.0 - 16.0 g/dL HCT 27.0 (L) 36.0 - 48.0 % MCV 89.7 74.0 - 97.0 FL  
 MCH 30.9 24.0 - 34.0 PG  
 MCHC 34.4 31.0 - 37.0 g/dL  
 RDW 14.4 11.6 - 14.5 % PLATELET 881 139 - 840 K/uL MPV 11.3 9.2 - 11.8 FL  
 NEUTROPHILS 92 (H) 42 - 75 % LYMPHOCYTES 4 (L) 20 - 51 % MONOCYTES 4 2 - 9 % EOSINOPHILS 0 0 - 5 % BASOPHILS 0 0 - 3 %  
 ABS. NEUTROPHILS 19.1 (H) 1.8 - 8.0 K/UL  
 ABS. LYMPHOCYTES 0.8 0.8 - 3.5 K/UL  
 ABS. MONOCYTES 0.8 0 - 1.0 K/UL  
 ABS. EOSINOPHILS 0.0 0.0 - 0.4 K/UL  
 ABS. BASOPHILS 0.0 0.0 - 0.06 K/UL  
 DF MANUAL PLATELET COMMENTS ADEQUATE PLATELETS    
 RBC COMMENTS ANISOCYTOSIS 1+ 
    
 RBC COMMENTS HYPOCHROMIA 1+ 
    
 RBC COMMENTS POLYCHROMASIA 1+ 
    
 RBC COMMENTS OVALOCYTES 1+ MAGNESIUM Collection Time: 08/19/20  3:00 AM  
Result Value Ref Range Magnesium 2.1 1.6 - 2.6 mg/dL GLUCOSE, POC Collection Time: 08/19/20  8:23 AM  
Result Value Ref Range Glucose (POC) 243 (H) 70 - 110 mg/dL PTT Collection Time: 08/19/20 10:36 AM  
Result Value Ref Range aPTT 27.3 23.0 - 36.4 SEC GLUCOSE, POC Collection Time: 08/19/20 12:10 PM  
Result Value Ref Range Glucose (POC) 209 (H) 70 - 110 mg/dL Chemistry Recent Labs 08/19/20 
0300 08/18/20 
0300 08/17/20 
0445 * 271* 159*  140 141  
K 4.1 3.9 3.4*  
 106 107 CO2 26 26 27 BUN 64* 55* 52* CREA 1.71* 1.62* 1.44* CA 7.5* 7.9* 8.3*  
MG 2.1  --   --   
AGAP 6 8 7 BUCR 37* 34* 36* AP 58 83 82  
TP 4.6* 5.9* 6.0* ALB 1.8* 2.3* 2.2*  
GLOB 2.8 3.6 3.8 AGRAT 0.6* 0.6* 0.6* CBC w/Diff Recent Labs 08/19/20 
0300 08/18/20 
1730 08/18/20 
0300 08/17/20 
0445 WBC 20.7*  --  21.1* 21.0*  
RBC 3.01*  --  3.71* 3.92* HGB 9.3* 9.5* 11.4* 11.7* HCT 27.0* 28.2* 33.2* 35.1*  
  --  372 381 GRANS 92*  --  88* 96* LYMPH 4*  --  4* 2* EOS 0  --  0 0  Results for Roberto Kohler (MRN [id number]) as of 8/18/2020 14:50 
 Ref. Range 8/14/2020 17:04 8/15/2020 11:00 8/16/2020 04:15 8/17/2020 04:45 8/18/2020 03:00 WBC Latest Ref Range: 4.6 - 13.2 K/uL 14.3 (H) 17.1 (H) 18.6 (H) 21.0 (H) 21.1 (H) Results for Jennifer Ocasio (MRN [id number]) as of 8/19/2020 15:09 Ref. Range 8/15/2020 11:00 8/16/2020 04:15 8/17/2020 04:45 8/18/2020 03:00 8/19/2020 03:00  
D DIMER Latest Ref Range: <0.46 ug/ml(FEU) 3.31 (H) 2.92 (H) 3.43 (H) 3.07 (H) 2.69 (H) Fibrinogen Latest Ref Range: 210 - 451 mg/dL 329 337 274 269 200 (L) Ferritin Latest Ref Range: 8 - 388 NG/ (H) 617 (H) 625 (H) 665 (H) 548 (H) C-Reactive protein Latest Ref Range: 0 - 0.3 mg/dL 2.1 (H) 1.9 (H) 1.6 (H) 1.2 (H) 0.7 (H) Procalcitonin Latest Units: ng/mL 0.13 0.12 0.13 0.09 0.06  
 
 
8/13/2020  4:36 AM - Serafin, Lab In [facility] - Collected 8/10/20 Component  Value  Flag  Ref Range  Units  Status SARS-CoV-2  Detected  Abnormal    Not Detected     Final   
Comment:   
 
 
ABG No results for input(s): PHI, PHI, POC2, PCO2I, PO2, PO2I, HCO3, HCO3I, FIO2, FIO2I in the last 72 hours. Micro Recent Labs 08/17/20 
0630 CULT MRSA NOT PRESENT      Screening of patient nares for MRSA is for surveillance purposes and, if positive, to facilitate isolation considerations in high risk settings. It is not intended for automatic decolonization interventions per se as regimens are not sufficiently effective to warrant routine use. Recent Labs 08/17/20 
0630 CULT MRSA NOT PRESENT      Screening of patient nares for MRSA is for surveillance purposes and, if positive, to facilitate isolation considerations in high risk settings. It is not intended for automatic decolonization interventions per se as regimens are not sufficiently effective to warrant routine use. CT (Most Recent) Results from [facility] Encounter encounter on 03/02/20 CTA CHEST ABD PELV W WO CONT Narrative CT without contrast and CT angiogram of the chest, abdomen and pelvis HISTORY: Severe acute midthoracic back pain and vomiting. Elevated troponin. Clinical concern of aortic dissection and PE 
 
COMPARISON: None TECHNIQUE: Multidetector helical CT is carried out from the thoracic inlet to 
the lesser trochanters before and after nonionic IV contrast administration. 3D postprocessed images, including surface shaded display, will produce for this 
exam, permanently archived, and interpreted. Parenchymal organ imaging will be 
limited by arterial phase contrast administration. All CT scans at this facility performed using dose optimization techniques as 
appreciated to a performed exam, to include automated exposure control, 
adjustment of the mA and or KU according to patient size (including appropriate 
matching for site specific examination), or use of iterative reconstruction 
technique. CONTRAST: 100 cc Isovue 370. FINDINGS: 
 
CT ANGIOGRAM: The contrast bolus is mainly in the pulmonary arteries with 
suboptimal opacification to the aorta with somehow limited vascular evaluation. THORACIC AORTA: Patent and normal in caliber. No evidence of aortic dissection 
or aneurysmal dilatation. A few atherosclerotic calcified plaques scattered at 
the arch and descending. The caliber measurements of the aorta: 
 3.4 x 3.9 x 4.4 cm at the sinus of Valsalva; 
 3.4 x 3.4 cm at the sinotubular junction of the aortic root; 
 3.7 x 3.8 cm at maximal ascending aorta; 
 3.3 x 3.3 cm at mid aortic arch after the last vessel takeoff; 
 2.7 x 2.7 cm at proximal descending aorta at the level of left atrium; 
 2.6 x 2.7 cm at the mid descending aorta; 
 2.3 x 2.6 cm at distal descending aorta at the diaphragmatic hiatus. THE GREAT VESSELS IN THE ARCH: Not well opacified by contrast due to the early 
scan as mentioned above. There are grossly patent with normal caliber. No 
significant atherosclerotic disease or stenosis. THE PULMONARY ARTERIES: Patent. No evidence of intraluminal filling defect to 
suggest pulmonary embolism. No pulmonary arterial dilatation. ABDOMINAL AORTA: Normal in caliber. No dissection or aneurysmal dilatation. Mild 
atherosclerotic calcified plaques at the mid and distal portion. ILIAC ARTERIES: Patent. Mild atherosclerotic calcified plaques present without 
stenosis. CELIAC ARTERY: Patent. Few small calcified plaques at the origin without 
stenosis. SMA: THERE IS A FOCAL STENOSIS at the ostium which estimated about 50-75%, 
likely due to soft plaque. RENAL ARTERIES: There are left main and accessory left of renal arteries. Short 
segmental stenosis of proximal left main renal artery is estimated about 75%. Patent left accessory artery. Right main and accessoryrenal arteries appear 
patent. Julio Cesar Kurtis CT CHEST:  
 
LUNG PARENCHYMA: The lung parenchyma appears clear. No pulmonary nodule, mass or 
infiltration identified. THYROID: Unremarkable. MEDIASTINUM:  No adenopathy. THE HEART AND THE PERICARDIUM: . Unremarkable. PLEURAL SPACES AND CHEST WALL: No significant pleural pathology. CT ABDOMEN AND PELVIS:  
 
ABDOMINAL VISCERA: The liver, spleen, pancreas, gallbladder and both adrenal 
glands appear unremarkable. The stomach is poorly distended. The small and large 
bowel are nondilated. PERITONEUM/RETROPERITONEUM: No significant adenopathy. GENITOURINARY ORGANS: Benign cortical cysts identified in bilateral kidneys, the 
larger one in right kidney in posterior midpole measures 3.2 x 3.5 cm and the 
left renal cyst in lower pole measures 1.9 x 2.2 cm. Two 3 mm nonobstructive 
calculi in the lower pole of right kidney. No hydronephrosis. The bladder 
appears normal. The prostate is moderately enlarged. OTHERS: No free air or free fluid. CT OSSEOUS STRUCTURES:  Moderate spondylosis along the spine and moderate facet 
arthropathy at lower lumbar spine. Impression IMPRESSION:  
 
1. No aortic dissection or aneurysm. Very mild atherosclerotic aortic disease. 2. 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 3. Short segmental stenosis at proximal left the main renal artery, estimated 
about 75%. 4. No evidence of PE. 
 
5. Bilateral renal cysts. Nonobstructive right renal calculi. 6. Moderate prostate enlargement. The preliminary read without 3-D reconstruction was provided to the  the 
ordering physician by me upon the initial interpretation at approximately  1506  
hours, 3/2/20. Thank you for your referral.   
 
 
XR (Most Recent). CXR reviewed by me and compared with previous CXR [place] [facility] [place] XR CHEST SNGL V  
 Narrative INDICATION:  Status post thoracentesis. COMPARISON:  Recent prior FINDINGS: A portable AP radiograph of the chest was obtained at 0123 hours. Cardiac silhouette and is post surgical changes are stable. No significant 
interval change in bilateral diffuse patchy opacities. No acute osseous 
abnormality. No pneumothorax. Impression IMPRESSION: No significant interval change. Complex decision making was made in the evaluation and management plans during this consultation. More than 50% of time was spent in counseling and coordination of care including review of data and discussion with other team members. Vicky Morrow DO, [facility] Pulmonary, Critical Care, and Sleep Medicine

## 2020-08-19 NOTE — PROGRESS NOTES
Problem: Falls - Risk of 
Goal: *Absence of Falls Description: Document Ferny Bobo Fall Risk and appropriate interventions in the flowsheet. Outcome: Progressing Towards Goal 
Note: Fall Risk Interventions: 
Mobility Interventions: Bed/chair exit alarm, Communicate number of staff needed for ambulation/transfer Mentation Interventions: Adequate sleep, hydration, pain control, Bed/chair exit alarm, Reorient patient, More frequent rounding, Increase mobility, Toileting rounds Medication Interventions: Bed/chair exit alarm, Teach patient to arise slowly, Patient to call before getting OOB, Evaluate medications/consider consulting pharmacy Elimination Interventions: Bed/chair exit alarm, Call light in reach, Patient to call for help with toileting needs, Toilet paper/wipes in reach, Toileting schedule/hourly rounds, Urinal in reach History of Falls Interventions: Bed/chair exit alarm, Consult care management for discharge planning, Room close to nurse's station, Vital signs minimum Q4HRs X 24 hrs (comment for end date) Problem: Patient Education: Go to Patient Education Activity Goal: Patient/Family Education Outcome: Progressing Towards Goal

## 2020-08-19 NOTE — PROGRESS NOTES
RENAL DAILY PROGRESS NOTE Subjective:  
 
 
Complaint: feels fine. seen thru door Patient was seen during the [place] pandemic. Patient with COVID infection. Patient is in isolation room due to [place]"" status and PPE is in short supply hence seen through door and d/w patients RN. Full contact physical exam was not possible due to patient's clinical condition, key findings seen by me are documented. Overnight events noted IMPRESSION:  
 
· Acute kidney injury on chronic kidney disease stage III due to septic LOREN, direct viral injury from COVID-19. · Hypokalemia · Diabetic nephropathy with chronic kidney disease stage III · Hypertension · History of coronary artery disease status post coronary artery bypass surgery · COVID-19 positive status PLAN:  
 Creatinine trending upward , but no major electrolyte imbalance or volume overload. , continue to monitor for now. Current Facility-Administered Medications Medication Dose Route Frequency  insulin glargine (LANTUS) injection 10 Units  10 Units SubCUTAneous QHS  
 0.9% sodium chloride infusion 250 mL  250 mL IntraVENous PRN  
 heparin 25,000 units in D5W 250 ml infusion  18-36 Units/kg/hr IntraVENous TITRATE  ipratropium-albuterol (COMBIVENT RESPIMAT) 20 mcg-100 mcg inhalation spray  2 Puff Inhalation Q4H PRN  
 methylPREDNISolone (PF) (SOLU-MEDROL) injection 60 mg  60 mg IntraVENous Q12H  
 famotidine (PEPCID) tablet 20 mg  20 mg Oral DAILY  hydrALAZINE (APRESOLINE) tablet 10 mg  10 mg Oral Q6H PRN  
 ascorbic acid (vitamin C) (VITAMIN C) tablet 500 mg  500 mg Oral TID  zinc sulfate (ZINCATE) 220 (50) mg capsule 1 Cap  1 Cap Oral DAILY  atorvastatin (LIPITOR) tablet 40 mg  40 mg Oral QHS  folic acid (FOLVITE) tablet 1 mg  1 mg Oral DAILY  melatonin tablet 3 mg  3 mg Oral QHS  [Held by provider] metoprolol succinate (TOPROL-XL) tablet 100 mg  100 mg Oral QHS  aspirin chewable tablet 81 mg  81 mg Oral DAILY And  
 calcium carbonate (CALTREX) tablet 300 mg [elemental]  300 mg Oral DAILY  cholecalciferol (VITAMIN D3) capsule 5,000 Units  5,000 Units Oral DAILY  insulin lispro (HUMALOG) injection   SubCUTAneous AC&HS  
 glucose chewable tablet 16 g  4 Tab Oral PRN  
 glucagon (GLUCAGEN) injection 1 mg  1 mg IntraMUSCular PRN  
 dextrose (D50W) injection syrg 12.5-25 g  25-50 mL IntraVENous PRN  
 sodium chloride (NS) flush 5-10 mL  5-10 mL IntraVENous PRN  
 acetaminophen (TYLENOL) tablet 650 mg  650 mg Oral Q6H PRN Or  
 acetaminophen (TYLENOL) suppository 650 mg  650 mg Rectal Q6H PRN  
 sodium chloride (NS) flush 5-40 mL  5-40 mL IntraVENous Q8H  
 sodium chloride (NS) flush 5-40 mL  5-40 mL IntraVENous PRN  
 senna (SENOKOT) tablet 17.2 mg  2 Tab Oral QHS  bisacodyL (DULCOLAX) suppository 10 mg  10 mg Rectal DAILY PRN  
 ondansetron (ZOFRAN) injection 4 mg  4 mg IntraVENous Q4H PRN Objective:  
 
Patient Vitals for the past 24 hrs: 
 Temp Pulse Resp BP SpO2  
08/19/20 1206 98 °F (36.7 °C) (!) 57 18 (!) 90/39 99 % 08/19/20 0800 98.1 °F (36.7 °C) (!) 54 17 114/48 100 % 08/19/20 0706 98.4 °F (36.9 °C) (!) 58 20 124/55 100 % 08/19/20 0137 97.8 °F (36.6 °C) 72 22 128/60 99 % 08/18/20 2140     92 % 08/18/20 2042 97.7 °F (36.5 °C) 75 18 120/63 94 % 08/18/20 1750    104/50  Weight change:  
 
 08/17 1901 - 08/19 0700 In: 895.6 [P.O.:50; I.V.:845.6] Out: 350 [Urine:350] Intake/Output Summary (Last 24 hours) at 8/19/2020 1609 Last data filed at 8/18/2020 2140 Gross per 24 hour Intake 206.94 ml Output 350 ml Net -143.06 ml Limited Physical Exam  
 
General: COMFORTABLE 
CV: visible on telemetry monitoring Pulm:  equal chest rise b/l, Abdomen: distended :no georges present Skin: appears pink, no obvious wounds or lesions. Extremities: NO EDEMA Data Review:  
 
LABS:  
Hematology: Recent Labs 08/19/20 
0300 08/18/20 
1730 08/18/20 
0300 08/17/20 
0445 WBC 20.7*  --  21.1* 21.0* HGB 9.3* 9.5* 11.4* 11.7* HCT 27.0* 28.2* 33.2* 35.1* Chemistry:  
Recent Labs 08/19/20 
0300 08/18/20 
0300 08/17/20 
0445 BUN 64* 55* 52* CREA 1.71* 1.62* 1.44* CA 7.5* 7.9* 8.3* ALB 1.8* 2.3* 2.2*  
K 4.1 3.9 3.4*  140 141  106 107 CO2 26 26 27 * 271* 159* Procedures/imaging: see electronic medical records for all procedures, Xrays and details which were not copied into this note but were reviewed prior to creation of Travis Angelucci, MD 
8/19/2020

## 2020-08-19 NOTE — DIABETES MGMT
GLYCEMIC CONTROL PLAN OF CARE Assessment/Recommendations: 
Fasting lab glucose this am 390 mg/dl Recommend increasing lantus insulin to 14 units every bedtime. Continue corrective insulin coverage as needed Pt already receiving very insulin resistant coverage. Will continue inpatient monitoring. Most recent blood glucose values: 
 
Results for Tala Hidalgo (MRN [id number]) as of 8/19/2020 14:46 Ref. Range 8/18/2020 14:28 8/18/2020 15:52 8/19/2020 01:05 8/19/2020 08:23 8/19/2020 12:10  
GLUCOSE,FAST - POC Latest Ref Range: 70 - 110 mg/dL 305 (H) 311 (H) 396 (H) 243 (H) 209 (H) Current A1C of 6.9 % is equivalent to average blood glucose of 151 mg/dl over the past 2-3 months. Current hospital diabetes medications:  
lantus 10 units every bedtime Lispro corrective insulin coverage AC&HS Previous day's insulin requirements:  
lantus 10 units Lispro 18 units corrective insulin Home diabetes medications: 
Glyburide 2.5 mg bid Diet: DIET DIABETIC CONSISTENT CARB Soft Solids DIET NUTRITIONAL SUPPLEMENTS Breakfast, Lunch, Dinner; [employer] Education:  ____Refer to Diabetes Education Record  
          _x__Education not indicated at this time Pedro Blackwell RN CDE Ext [phone]

## 2020-08-20 NOTE — PROGRESS NOTES
Bedside shift change report given to Marisela Marks (oncoming nurse) by Ashtyn Jonas (offgoing nurse). Report included the following information SBAR, Kardex, Intake/Output and MAR.

## 2020-08-20 NOTE — PROGRESS NOTES
[facility] REFERRAL CONTACT NOTE [id number] [place] [address] for Physical Rehabilitation RE: Derrick Daily Thank you for the opportunity to review this patient's case for admission to [address] [place] [address] for Physical Rehabilitation. Based on our pre-admission screening:  
 
[x ] Our Team/Medical Director is following this case. Comments: Patient is COVID positive from 8/10/202. He is confused, on soft wrist restraints, on Hi Flow O2 at 35L's and a heparin gtt. Will continues to monitor patients medical and therapy progress and advise when appropriate. Again, Thank you for this referral. Should you have any questions please do not hesitate to call. Sincerely, Esthela Rivers Admissions [facility] Physical [facility] 
[phone]

## 2020-08-20 NOTE — PROGRESS NOTES
Problem: Self Care Deficits Care Plan (Adult) Goal: *Acute Goals and Plan of Care (Insert Text) Description: Occupational Therapy Goals Initiated 8/12/2020 within 7 day(s). 1.  Patient will perform grooming with modified independence. 2.  Patient will perform upper body dressing with minimal assistance. 3.  Patient will perform lower body dressing with minimal assistance. 4.  Patient will perform toilet transfers with minimal assistance. 5.  Patient will perform all aspects of toileting with minimal assistance. 6.  Patient will participate in upper extremity therapeutic exercise/activities with modified independence for 8 minutes. 7.  Patient will utilize energy conservation techniques during functional activities with verbal cues. 8.  Patient will perform bed mobility with contact guard assist in preparation for further self-care. Prior Level of Function: Pt reports he was (I) with basic self-care/ADLs PTA. Pt lives with his wife. Pt does not have any AD/DME at home. Outcome: Progressing Towards Goal 
 OCCUPATIONAL THERAPY RE-EVALUATION Patient: Tori Clark (24 y.o. male) Date: 8/20/2020 Primary Diagnosis: Pneumonia [J18.9] Precautions:  Aspiration, Skin, Fall, Contact ASSESSMENT : 
Pt cleared to participate in OT re-evaluation by RN. Upon entering room, pt supine with HOB elevated, alert, and agreeable to therapy session. Based on the objective data described below, the patient presents with increased pain, decreased strength, decreased endurance, decreased activity tolerance, decreased functional sitting balance, and decreased independence in ADLs, increasing the need for assistance from others. Pt on high flow, 35L O2. When assessing orientation, pt would often state \"I don't know\" but was able to verbalize name, [place], month, and year correctly.  Pt perseverating throughout session, c/o pain in back and L hip. B soft wrist restraints doffed for full participation in skilled OT re-eval. Noted visible bruising throughout BUEs with scab on L forearm. Pt requires moderate assist for sup>sit to participate in further self-care. Pt only able to tolerate sitting EOB ~6 minutes before returning to bed due to increased pain in back, also c/o L elbow. At bed level, pt with soiled chux pad. Pt able to roll bilaterally with CGA for replacement of new chux pad. In supine with HOB elevated, pt performed pericare, SBA min cueing for thoroughness. With CHG wipes, pt able to wash UB with min-moderate assist, and moderate assist with LB. Pt able to externally rotate R hip to reach R heel, but continued to demo difficulty with doffing/donning R sock. Pt becoming emotional/tearful when re-donning B soft wrist restraints; provided therapeutic listening to build rapport and decrease pt anxiety. Dr. Ric Betts came into room to speak to pt. The pt will benefit from further OT services, in order to maximize his ADL performance and decrease the risk for complications associated with decreased functional activity. At the end of the session, pt left resting in bed, call-bell in reach, with all needs met. Patient will benefit from skilled intervention to address the above impairments. Patient's rehabilitation potential is considered to be Good Factors which may influence rehabilitation potential include:  
[]             None noted [x]             Mental ability/status [x]             Medical condition [x]             Home/family situation and support systems [x]             Safety awareness [x]             Pain tolerance/management 
[]             Other: PLAN : 
Recommendations and Planned Interventions:  
[x]               Self Care Training                  [x]      Therapeutic Activities [x]               Functional Mobility Training   [x]      Cognitive Retraining [x]               Therapeutic Exercises           [x]      Endurance Activities [x]               Balance Training                    []      Neuromuscular Re-Education []               Visual/Perceptual Training     [x]      Home Safety Training 
[x]               Patient Education                   [x]      Family Training/Education []               Other (comment): Frequency/Duration: Patient will be followed by occupational therapy 1-2 times per day/4-7 days per week to address goals. Discharge Recommendations: Inpatient Rehab Further Equipment Recommendations for Discharge: TBD at the next level of care SUBJECTIVE:  
Patient stated I don't know honey\"; \"My left hip hurts\"; \"My left elbow hurts\"; \"I can't honey\"; \"My breathing is okay\"; \"I need to pee honey\" ; Pt also became emotional/tearful when verbalizing to pt that I needed to re-don B soft wrist restraints, pt stated \"I don't want those on. I love you. I love my son\" OBJECTIVE DATA SUMMARY:  
Hospital course since last seen and reason for reevaluation: Pt is due for re-evaluation. Pt making slow progress with ADL performance, therefore goals have been downgraded to a more attainable level for the pt to achieve. OT anticipates pt will continue demonstrating functional gains in his ADL performance & overall functional independence, as a result of continued OT services. Past Medical History:  
Diagnosis Date  Acute coronary syndrome ([address] 3/2/2020  LOREN (acute kidney injury) ([address] 3/5/2020  Calculus of kidney  Cancer ([facility]  Coronary artery disease of native artery with stable angina pectoris ([address] 3/3/2020  Diabetes mellitus ([address] 8/19/2010  Gout 8/19/2010  
 HTN (hypertension) 8/19/2010  Hyperlipemia 8/19/2010  Kidney disease  Lumbar spondylosis  Proteinuria  S/P CABG x 3 03/04/2020 CABG x 3, LIMA to LAD, SVG to OM1, SVG to diagonal 1  
 Skin cancer, basal cell   
 neck  Stage 3 chronic kidney disease ([facility]  Type 2 diabetes mellitus without complication, without long-term current use of insulin ([facility] 8/19/2010  Urolithiasis Past Surgical History:  
Procedure Laterality Date  HX CORONARY ARTERY BYPASS GRAFT  03/04/2020 CABG x3, Dr. Adeel Nails, [facility]  
 HX GI    
 HX OTHER SURGICAL Basal cell removed from left side of face  HX UROLOGICAL    
 kidney Barriers to Learning/Limitations: yes;  altered mental status (i.e.Sedation, Confusion) Compensate with: visual, verbal, tactile, kinesthetic cues/model Home Situation:  
Home Situation Home Environment: Private residence One/Two Story Residence: Two story Living Alone: No 
Support Systems: Spouse/Significant Other/Partner Patient Expects to be Discharged to[de-identified] Private residence Current DME Used/Available at Home: None 
[x]  Right hand dominant   []  Left hand dominant Cognitive/Behavioral Status: 
Neurologic State: Alert Orientation Level: Oriented to person;Oriented to place;Oriented to time Cognition: Decreased attention/concentration Safety/Judgement: Fall prevention Skin: Visible bruising throughout BUEs Edema: None noted Coordination: BUE Coordination: Generally decreased, functional 
Fine Motor Skills-Upper: Left Intact; Right Intact Gross Motor Skills-Upper: Left Intact; Right Intact Balance: 
Sitting: Impaired Sitting - Static: Good (unsupported) Sitting - Dynamic: Fair (occasional) Strength: BUE Strength: Generally decreased, functional 
 
 
Tone & Sensation: BUE Tone: Normal 
Sensation: Intact Range of Motion: BUE 
AROM: Generally decreased, functional 
  
 
 
Functional Mobility and Transfers for ADLs: 
Bed Mobility: 
Rolling: Contact guard assistance Supine to Sit: Moderate assistance Sit to Supine: Minimum assistance Scooting: Maximum assistance(laterally towards [facility] and supine to bring pt towards [facility]) Transfers: NT as pt reported increased pain in back, requesting to return back to bed. ADL Assessment:  
Feeding: Setup Oral Facial Hygiene/Grooming: Minimum assistance Bathing: Moderate assistance Upper Body Dressing: Moderate assistance Lower Body Dressing: Moderate assistance Toileting: Moderate assistance ADL Intervention: 
Feeding Feeding Assistance: Set-up Drink to Mouth: Set-up Seated EOB Grooming Position Performed: (Supine with HOB elevated) Washing Hands: Set-up Upper Body Bathing Bathing Assistance: Minimum assistance Position Performed: (Supine with HOB elevated) Lower Body Bathing Bathing Assistance: (using CHG wipes) Perineal  : Supervision;Set-up Position Performed: Supine(with HOB elevated) Lower Body : Moderate assistance Lower Body Dressing Assistance Dressing Assistance: Total assistance(dependent) Socks: Maximum assistance At bed level. Cognitive Retraining Safety/Judgement: Fall prevention Therapeutic Exercise: 
B shld flex x5 reps at bed level to increase UB ROM in preparation for ADLs Pain: 
Pain level pre-treatment: 10/10 (Back and L hip) Pain level post-treatment: 10/10 (Back, L hip, and L elbow) Pain Intervention(s): Medication (see MAR); Rest, Ice, Repositioning Response to intervention: Nurse notified, See doc flow Activity Tolerance:  
Good Please refer to the flowsheet for vital signs taken during this treatment. After treatment:  
[] Patient left in no apparent distress sitting up in chair 
[x] Patient left in no apparent distress in bed with restraints doffed 
[x] Call bell left within reach [x] Nursing notified 
[] Caregiver present 
[] Bed alarm activated COMMUNICATION/EDUCATION:  
[x] Role of Occupational Therapy in the acute care setting 
[] Home safety education was provided and the patient/caregiver indicated understanding. [x] Patient/family have participated as able in goal setting and plan of care. [x] Patient/family agree to work toward stated goals and plan of care. [] Patient understands intent and goals of therapy, but is neutral about his/her participation. [] Patient is unable to participate in goal setting and plan of care. Thank you for this referral. 
Marcellus Hicks MS, OTR/L Time Calculation: 44 mins

## 2020-08-20 NOTE — PROGRESS NOTES
[facility] Pulmonary, Critical Care, and Sleep Medicine Pulmonary Medicine Progress Note Name: Nadeem Christianson MRN: [id number] : 1950 Hospital: [facility] Date: 2020 Assessment: Hypotension with bradycardia- new today- suspect due to BB,  Med currently on hold and team will reassess Acute Hypoxic Respiratory Failure - 2/2 covid19 pneumonia:  
- currently on HFNC 40LPM @50% [id number] Pneumonia: + COVID 8/10/2020 - B/L infiltrates, fevers. Mildly elevated inflammatory markers CAD with Hx of recent 3V CABG LOREN on CKD- Diabetic - Creat continues to rise Deliria: Waxes and Wanes 
- suspect multifactorial etiology: steroids, other meds, medical isolation, serious medical illness, CKD, etc 
Leukocytosis persistent  No secondary infection found at this time- ID staff following, holding on Abx Procal reassuring, 
- Suspect due to steroids Deconditioning with low albumin state Diabetes Hypokalemia 
  
Impression/Plan: - Monitor edema in left hand and possible need to cut off wedding ring 
- FiO2 to keep SpO2 >=92%- currently on minimal vapotherm settings- may be able to transition to [facility] in near future 
- [facility] >=30 degree, Maintain strict aspiration precautions,  
- Aggressive pulmonary toileting and stress on oral care- Peridex added to oral care plan-patient will need assistance -  Incentive spirometry as able- patient in restraints 
- Continue steroids - Continue with systemic anticoagulation for now - Trend LFTs and other COVID inflammatory markers 
- PRN bipap: - as long as mental status allows - PO intake as tolerated 
 
- Other issues management by primary team and respective consultants. - Will continue to follow- Further recommendations pending clinical course Subjective: 
 
20 Hospital Day: 10 Events and notes from last 24 hours reviewed. Discussed with patient to the best of my ability Care plan discussed with nursing. Labs and images personally seen and available reports reviewed All current medicines are reviewed · Patient appears tired and slightly confused again today but still pleasant and able to properly answer some of my questions · In - soft wrist restraints at time of my evaluation · Increased hand edema- left hand with edema and wedding ring- I can still turn ring but spoke with nurse to monitor this- may need to cut ring if fingers swell more · Tolerating PO · Remains on Vapotherm 30LPM flow and FiO2 35%- I dropped FiO2 down to 30%- for now appears to be tolerating well ·  Afebrile · Convalescent serum infused 8/17/2020- 1500 · Inflammatory markers appears downward trend · WBC still elevated but minimally decreased today and no bands · BB held yesterday due to low HR and BP- Lopressor restarted at 25mg with hold parameters Patient Vitals for the past 24 hrs: 
 Temp Pulse Resp BP SpO2  
08/20/20 1124 98.1 °F (36.7 °C) 68 17 120/61 98 % 08/20/20 0906     94 % 08/20/20 0800 97.6 °F (36.4 °C) 64 17 101/50 98 % 08/20/20 0405 97 °F (36.1 °C)  20    
08/20/20 0342 98.7 °F (37.1 °C) 89 19 118/63 98 % 08/19/20 2333 97 °F (36.1 °C) 71 18 119/57 95 % 08/19/20 1949 97 °F (36.1 °C) 68 17 103/47 98 % 08/19/20 1609 98.6 °F (37 °C) 63 21 90/45 99 % Patient Active Problem List  
Diagnosis Code  
 HTN (hypertension) I10  
 Gout M10.9  Hyperlipemia E78.5  Nocturia R35.1  Elevated troponin R79.89  Back pain M54.9  Acute coronary syndrome (HCC) I24.9  S/P cardiac catheterization Z98.890  
 NSTEMI (non-ST elevated myocardial infarction) (HCC) I21.4  
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  
 [facility] renal origin [facility] N25.81  
 Acute hypoxemic respiratory failure (HCC) J96.01  
 Suspected COVID-19 virus infection Z20.828  Acute-on-chronic kidney injury ([facility] N17.9, N18.9  Pneumonia due to 2019 novel coronavirus U07.1, J12.89 Medications- Current: 
Current Facility-Administered Medications Medication Dose Route Frequency  zinc oxide-vitamin b5-vit e (BALMEX) cream   Topical DAILY PRN  
 heparin (porcine) 25,000 units in 0.45% saline 250 ml infusion  18-36 Units/kg/hr IntraVENous TITRATE  methylPREDNISolone (PF) (Solu-MEDROL) injection 40 mg  40 mg IntraVENous Q12H  
 insulin glargine (LANTUS) injection 10 Units  10 Units SubCUTAneous Q12H  
 metoprolol succinate (TOPROL-XL) XL tablet 25 mg  25 mg Oral QHS  
 0.9% sodium chloride infusion 250 mL  250 mL IntraVENous PRN  
 ipratropium-albuterol (COMBIVENT RESPIMAT) 20 mcg-100 mcg inhalation spray  2 Puff Inhalation Q4H PRN  
 famotidine (PEPCID) tablet 20 mg  20 mg Oral DAILY  hydrALAZINE (APRESOLINE) tablet 10 mg  10 mg Oral Q6H PRN  
 ascorbic acid (vitamin C) (VITAMIN C) tablet 500 mg  500 mg Oral TID  zinc sulfate (ZINCATE) 220 (50) mg capsule 1 Cap  1 Cap Oral DAILY  atorvastatin (LIPITOR) tablet 40 mg  40 mg Oral QHS  folic acid (FOLVITE) tablet 1 mg  1 mg Oral DAILY  melatonin tablet 3 mg  3 mg Oral QHS  aspirin chewable tablet 81 mg  81 mg Oral DAILY And  
 calcium carbonate (CALTREX) tablet 300 mg [elemental]  300 mg Oral DAILY  cholecalciferol (VITAMIN D3) capsule 5,000 Units  5,000 Units Oral DAILY  insulin lispro (HUMALOG) injection   SubCUTAneous AC&HS  
 glucose chewable tablet 16 g  4 Tab Oral PRN  
 glucagon (GLUCAGEN) injection 1 mg  1 mg IntraMUSCular PRN  
 dextrose (D50W) injection syrg 12.5-25 g  25-50 mL IntraVENous PRN  
  sodium chloride (NS) flush 5-10 mL  5-10 mL IntraVENous PRN  
 acetaminophen (TYLENOL) tablet 650 mg  650 mg Oral Q6H PRN Or  
 acetaminophen (TYLENOL) suppository 650 mg  650 mg Rectal Q6H PRN  
 sodium chloride (NS) flush 5-40 mL  5-40 mL IntraVENous Q8H  
 sodium chloride (NS) flush 5-40 mL  5-40 mL IntraVENous PRN  
 senna (SENOKOT) tablet 17.2 mg  2 Tab Oral QHS  bisacodyL (DULCOLAX) suppository 10 mg  10 mg Rectal DAILY PRN  
 ondansetron (ZOFRAN) injection 4 mg  4 mg IntraVENous Q4H PRN Objective: 
Vital Signs:   
Visit Vitals /61 (BP 1 Location: Right arm, BP Patient Position: At rest) Pulse 68 Temp 98.1 °F (36.7 °C) Resp 17 Ht 5' 7\" (1.702 m) Wt 75.3 kg (166 lb) SpO2 98% BMI 26.00 kg/m² O2 Device: Hi flow nasal cannula(Vapotherm) O2 Flow Rate (L/min): 35 l/min Temp (24hrs), Av.7 °F (36.5 °C), Min:97 °F (36.1 °C), Max:98.7 °F (37.1 °C) Intake/Output:  
Last shift:      No intake/output data recorded. Last 3 shifts:  1901 -  0700 In: 206.9 [I.V.:206.9] Out: 350 [Urine:350] No intake or output data in the 24 hours ending 20 1247 Physical Exam:  
 
Patient evaluated at bedside General/Neurology: Alert, Awake, appropriate, frail, speaking in 3 word sentences Head:   Normocephalic, without obvious abnormality Eye:   PERRL, EOM intact Oral:  Mucus membranes moist- exudate on tongue does not appear to be thrush Lung:   Modest effort at best, no accessory muscle use, No crepitus, No audible wheezing. On HFNC Heart:   S1 S2 - regular by palpation Abdomen:  Soft, non tender Extremities:  No cyanosis, wear, no erythema, mild edema- legs,increased edema in hands Midline - right upper extremity Skin:   Dry, intact, several areas of ecchymosis in various stages of healing Data: 
   
Recent Results (from the past 24 hour(s)) GLUCOSE, POC Collection Time: 20  3:55 PM  
Result Value Ref Range Glucose (POC) 202 (H) 70 - 110 mg/dL GLUCOSE, POC Collection Time: 08/19/20  8:48 PM  
Result Value Ref Range Glucose (POC) 228 (H) 70 - 110 mg/dL FIBRINOGEN Collection Time: 08/20/20 12:58 AM  
Result Value Ref Range Fibrinogen 211 210 - 451 mg/dL D DIMER Collection Time: 08/20/20 12:58 AM  
Result Value Ref Range D DIMER 2.87 (H) <0.46 ug/ml(FEU) FERRITIN Collection Time: 08/20/20 12:58 AM  
Result Value Ref Range Ferritin 522 (H) 8 - 388 NG/ML  
C REACTIVE PROTEIN, QT Collection Time: 08/20/20 12:58 AM  
Result Value Ref Range C-Reactive protein 0.6 (H) 0 - 0.3 mg/dL LD Collection Time: 08/20/20 12:58 AM  
Result Value Ref Range  (H) 81 - 234 U/L  
PROCALCITONIN Collection Time: 08/20/20 12:58 AM  
Result Value Ref Range Procalcitonin 0.12 ng/mL METABOLIC PANEL, COMPREHENSIVE Collection Time: 08/20/20 12:58 AM  
Result Value Ref Range Sodium 140 136 - 145 mmol/L Potassium 4.4 3.5 - 5.5 mmol/L Chloride 108 100 - 111 mmol/L  
 CO2 23 21 - 32 mmol/L Anion gap 9 3.0 - 18 mmol/L Glucose 191 (H) 74 - 99 mg/dL BUN 76 (H) 7.0 - 18 MG/DL Creatinine 1.78 (H) 0.6 - 1.3 MG/DL  
 BUN/Creatinine ratio 43 (H) 12 - 20 GFR est AA 46 (L) >60 ml/min/1.73m2 GFR est non-AA 38 (L) >60 ml/min/1.73m2 Calcium 7.5 (L) 8.5 - 10.1 MG/DL Bilirubin, total 0.5 0.2 - 1.0 MG/DL  
 ALT (SGPT) 20 16 - 61 U/L  
 AST (SGOT) 18 10 - 38 U/L Alk. phosphatase 55 45 - 117 U/L Protein, total 4.4 (L) 6.4 - 8.2 g/dL Albumin 1.7 (L) 3.4 - 5.0 g/dL Globulin 2.7 2.0 - 4.0 g/dL A-G Ratio 0.6 (L) 0.8 - 1.7 PTT Collection Time: 08/20/20 12:58 AM  
Result Value Ref Range aPTT >180.0 (HH) 23.0 - 36.4 SEC GLUCOSE, POC Collection Time: 08/20/20  8:41 AM  
Result Value Ref Range Glucose (POC) 257 (H) 70 - 110 mg/dL PTT Collection Time: 08/20/20 11:00 AM  
Result Value Ref Range aPTT >180.0 (HH) 23.0 - 36.4 SEC GLUCOSE, POC  
 Collection Time: 08/20/20 11:25 AM  
Result Value Ref Range Glucose (POC) 292 (H) 70 - 110 mg/dL Chemistry Recent Labs  
  08/20/20 
0058 08/19/20 
0300 08/18/20 
0300 * 390* 271*  138 140  
K 4.4 4.1 3.9  106 106 CO2 23 26 26 BUN 76* 64* 55* CREA 1.78* 1.71* 1.62* CA 7.5* 7.5* 7.9*  
MG  --  2.1  --   
AGAP 9 6 8 BUCR 43* 37* 34* AP 55 58 83  
TP 4.4* 4.6* 5.9* ALB 1.7* 1.8* 2.3*  
GLOB 2.7 2.8 3.6 AGRAT 0.6* 0.6* 0.6* CBC w/Diff Recent Labs 08/19/20 
0300 08/18/20 
1730 08/18/20 
0300 WBC 20.7*  --  21.1*  
RBC 3.01*  --  3.71* HGB 9.3* 9.5* 11.4* HCT 27.0* 28.2* 33.2*  
  --  372 GRANS 92*  --  88* LYMPH 4*  --  4*  
EOS 0  --  0 Lab Results Component Value Date/Time TSH 0.01 (L) 05/19/2020 08:26 AM  
 T4, Free 1.5 05/19/2020 08:26 AM  
 
 
   
 Results for Antionette Olsen (MRN [id number]) as of 8/20/2020 12:40 Ref. Range 8/13/2020 03:39 8/14/2020 03:25 8/15/2020 11:00 8/16/2020 04:15 8/17/2020 04:45 8/18/2020 03:00 8/19/2020 03:00 8/20/2020 00:58 Fibrinogen Latest Ref Range: 210 - 451 mg/dL 597 (H) 417 329 337 274 269 200 (L) 211 D DIMER Latest Ref Range: <0.46 ug/ml(FEU) 6.42 (H) 6.58 (H) 3.31 (H) 2.92 (H) 3.43 (H) 3.07 (H) 2.69 (H) 2.87 (H) Procalcitonin Latest Units: ng/mL 0.67 0.33 0.13 0.12 0.13 0.09 0.06 0.12 Ferritin Latest Ref Range: 8 - 388 NG/ (H) 691 (H) 639 (H) 617 (H) 625 (H) 665 (H) 548 (H) 522 (H) C-Reactive protein Latest Ref Range: 0 - 0.3 mg/dL 8.4 (H) 4.4 (H) 2.1 (H) 1.9 (H) 1.6 (H) 1.2 (H) 0.7 (H) 0.6 (H)  
 
8/13/2020  4:36 AM - [facility], [facility] Collected 8/10/20 Component  Value  Flag  Ref Range  Units  Status SARS-CoV-2  Detected  Abnormal    Not Detected     Final   
Comment:   
 
 
ABG No results for input(s): PHI, PHI, POC2, PCO2I, PO2, PO2I, HCO3, HCO3I, FIO2, FIO2I in the last 72 hours. Micro Results Procedure Component Value Units Date/Time CULTURE, MRSA [[id number]] Collected:  08/17/20 0630 Order Status:  Completed Specimen:  Nasal from Nares Updated:  08/18/20 0486 Special Requests: NO SPECIAL REQUESTS Culture result: MRSA NOT PRESENT Screening of patient nares for MRSA is for surveillance purposes and, if positive, to facilitate isolation considerations in high risk settings. It is not intended for automatic decolonization interventions per se as regimens are not sufficiently effective to warrant routine use. CULTURE, URINE [[id number]] Collected:  08/10/20 1553 Order Status:  Completed Specimen:  Cath Urine Updated:  08/11/20 2310 Special Requests: NO SPECIAL REQUESTS Culture result: No growth (<1,000 CFU/ML) CULTURE, BLOOD [[id number]] Collected:  08/10/20 1403 Order Status:  Completed Specimen:  Blood Updated:  08/16/20 0636 Special Requests: --     
  NO SPECIAL REQUESTS 
RIGHT 
HAND Culture result: NO GROWTH 6 DAYS     
 CULTURE, BLOOD [[id number]] Collected:  08/10/20 1403 Order Status:  Completed Specimen:  Blood Updated:  08/16/20 0636 Special Requests: --     
  NO SPECIAL REQUESTS 
RIGHT 
HAND Culture result: NO GROWTH 6 DAYS     
  
 
 
 
CT (Most Recent) Results from [facility] [place] encounter on 03/02/20 CTA CHEST ABD PELV W WO CONT Narrative CT without contrast and CT angiogram of the chest, abdomen and pelvis HISTORY: Severe acute midthoracic back pain and vomiting. Elevated troponin. Clinical concern of aortic dissection and PE 
 
COMPARISON: None TECHNIQUE: Multidetector helical CT is carried out from the thoracic inlet to 
the lesser trochanters before and after nonionic IV contrast administration. 3D postprocessed images, including surface shaded display, will produce for this 
exam, permanently archived, and interpreted. Parenchymal organ imaging will be 
limited by arterial phase contrast administration. All CT scans at this facility performed using dose optimization techniques as 
appreciated to a performed exam, to include automated exposure control, 
adjustment of the mA and or KU according to patient size (including appropriate 
matching for site specific examination), or use of iterative reconstruction 
technique. CONTRAST: 100 cc Isovue 370. FINDINGS: 
 
CT ANGIOGRAM: The contrast bolus is mainly in the pulmonary arteries with 
suboptimal opacification to the aorta with somehow limited vascular evaluation. THORACIC AORTA: Patent and normal in caliber. No evidence of aortic dissection 
or aneurysmal dilatation. A few atherosclerotic calcified plaques scattered at 
the arch and descending. The caliber measurements of the aorta: 
 3.4 x 3.9 x 4.4 cm at the sinus of Valsalva; 
 3.4 x 3.4 cm at the sinotubular junction of the aortic root; 
 3.7 x 3.8 cm at maximal ascending aorta; 
 3.3 x 3.3 cm at mid aortic arch after the last vessel takeoff; 
 2.7 x 2.7 cm at proximal descending aorta at the level of left atrium; 
 2.6 x 2.7 cm at the mid descending aorta; 
 2.3 x 2.6 cm at distal descending aorta at the diaphragmatic hiatus. THE GREAT VESSELS IN THE ARCH: Not well opacified by contrast due to the early 
scan as mentioned above. There are grossly patent with normal caliber. No 
significant atherosclerotic disease or stenosis. THE PULMONARY ARTERIES: Patent. No evidence of intraluminal filling defect to 
suggest pulmonary embolism. No pulmonary arterial dilatation. ABDOMINAL AORTA: Normal in caliber. No dissection or aneurysmal dilatation. Mild 
atherosclerotic calcified plaques at the mid and distal portion. ILIAC ARTERIES: Patent. Mild atherosclerotic calcified plaques present without 
stenosis. CELIAC ARTERY: Patent. Few small calcified plaques at the origin without 
stenosis. SMA: THERE IS A FOCAL STENOSIS at the ostium which estimated about 50-75%, 
likely due to soft plaque. RENAL ARTERIES: There are left main and accessory left of renal arteries. Short 
segmental stenosis of proximal left main renal artery is estimated about 75%. Patent left accessory artery. Right main and accessoryrenal arteries appear 
patent. Andrea Angry CT CHEST:  
 
LUNG PARENCHYMA: The lung parenchyma appears clear. No pulmonary nodule, mass or 
infiltration identified. THYROID: Unremarkable. MEDIASTINUM:  No adenopathy. THE HEART AND THE PERICARDIUM: . Unremarkable. PLEURAL SPACES AND CHEST WALL: No significant pleural pathology. CT ABDOMEN AND PELVIS:  
 
ABDOMINAL VISCERA: The liver, spleen, pancreas, gallbladder and both adrenal 
glands appear unremarkable. The stomach is poorly distended. The small and large 
bowel are nondilated. PERITONEUM/RETROPERITONEUM: No significant adenopathy. GENITOURINARY ORGANS: Benign cortical cysts identified in bilateral kidneys, the 
larger one in right kidney in posterior midpole measures 3.2 x 3.5 cm and the 
left renal cyst in lower pole measures 1.9 x 2.2 cm. Two 3 mm nonobstructive 
calculi in the lower pole of right kidney. No hydronephrosis. The bladder 
appears normal. The prostate is moderately enlarged. OTHERS: No free air or free fluid. CT OSSEOUS STRUCTURES:  Moderate spondylosis along the spine and moderate facet 
arthropathy at lower lumbar spine. Impression IMPRESSION:  
 
1. No aortic dissection or aneurysm. Very mild atherosclerotic aortic disease. 2. 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 3. Short segmental stenosis at proximal left the main renal artery, estimated 
about 75%. 4. No evidence of PE. 
 
5. Bilateral renal cysts. Nonobstructive right renal calculi. 6. Moderate prostate enlargement. The preliminary read without 3-D reconstruction was provided to the  the 
ordering physician by me upon the initial interpretation at approximately  1506  
hours, 3/2/20. Thank you for your referral.   
 
 
XR (Most Recent). CXR reviewed by me and compared with previous CXR Results from [facility] En[facility] encounter on 08/10/20 XR CHEST SNGL V  
 Narrative INDICATION:  Status post thoracentesis. COMPARISON:  Recent prior FINDINGS: A portable AP radiograph of the chest was obtained at 0123 hours. Cardiac silhouette and is post surgical changes are stable. No significant 
interval change in bilateral diffuse patchy opacities. No acute osseous 
abnormality. No pneumothorax. Impression IMPRESSION: No significant interval change. Complex decision making was made in the evaluation and management plans during this consultation. More than 50% of time was spent in counseling and coordination of care including review of data and discussion with other team members. Vicky Morrow DO, [facility] Pulmonary, Critical Care, and Sleep Medicine

## 2020-08-20 NOTE — PROGRESS NOTES
Pt transitioned down to a salter high flow cannula at 7 liters. Sats are steady at 100% 08/20/20 1531 Oxygen Therapy O2 Sat (%) 100 % Pulse via Oximetry 68 beats per minute O2 Device Hi flow nasal cannula;Humidifier 
(salter) O2 Flow Rate (L/min) 7 l/min

## 2020-08-20 NOTE — ROUTINE PROCESS
1- Dr. Gillie Pion called about patient's left arm swelling. Patient is now complaining of severe pain in the arm. Informed Dr. Verito Holden that the left arm is noticeably bigger then the right and of the patient's complaint of pain. Dr. Verito Holden states he will order a stat ultrasound to check for dvt.

## 2020-08-20 NOTE — PROGRESS NOTES
Infectious Disease progress note Reason: COVID-19 pneumonia Current abx Prior abx Ceftriaxone, azithromycin since 8/10/20-8/17 Lines:  
 
 
Assessment : 
 
65 y.o. male with type 2 DM,  hypertension, hyperlipidemia and renal insufficiency who presented to ED on 8/10/20 with cough for 2 weeks. Chest x-ray 8/10, 8/13/2020-multifocal infiltrates Clinical presentation consistent with confirmed COVID-19 pneumonia Labs on 8/11-ferritin 841, CRP 23.6, procalcitonin 1.91, , d-dimer 3.72 Labs on 8/12-ferritin 782, CRP 17.3, procalcitonin 1.47, , d-dimer 3.14 Labs on 8/13-ferritin 715, CRP 8.4, procalcitonin 0.67 , d-dimer 6.42 Labs on 8/14-ferritin 691, CRP 4.4, procalcitonin 0.33, , d-dimer 6.58 Labs on 8/17-ferritin 625, CRP 1.6, procalcitonin 1.13, d-dimer 3.43 Labs on 8/18-ferritin 665, CRP 1.2, procalcitonin 0.09, d-dimer 3.07 Labs on 8/19-ferritin 548, CRP 0.7, procalcitonin 0.06, d-dimer 2.69 Labs on 8/20-ferritin 522, CRP 0.6, procalcitonin 0.12, d-dimer 2.87 
 
covid test 8/10- positive Acute renal insufficiency-likely secondary to volume depletion. Monitor for COVID nephropathy Hypoxia likely secondary to COVID-19 pneumonia. Concern for coagulopathy especially since increasing d-dimer. Pulmonary follow-up appreciated. LOREN-likely secondary to COVID-19 infection, volume depletion. Some improvement noted. EGFR now greater than 35. Worsening hypoxia, tachypnea noted since 8/14 due to severe COVID-19 pneumonia. Status post remdesivir since 8/14 Currently on 25L high flow. S/p  convalescent plasma on 8/17/20 Venous duplex 8/14- negative for DVT 
 
clinically better. Subjective sense of improvement. Improved hypoxia. Recommendations: 
 
1. Hold further abx 2. Switch to po prednisone 40 mg po bid and taper gradually. 3.  F/u pulmonary recommendations regarding anticoagulation 4. Titrate oxygen as tolerated 5. Monitor inflammatory parameters-ferritin, CRP, d-dimer, procalcitonin 7. Follow-up pulmonary recommendations Above plan was discussed in details with patient, dr. Deep Byrd Please call me if any further questions or concerns. Will continue to participate in the care of this patient. HPI: 
 
Feels ok. Denies cp, sob. States that he wants to leave.  
 
 
home Medication List  
  
 Details  
metoprolol succinate (TOPROL-XL) 100 mg tablet Take 1 Tab by mouth nightly. melatonin 3 mg tablet Take 1 Tab by mouth nightly. aspirin-calcium carbonate 81 mg-300 mg calcium(777 mg) tab Take 81 mg by mouth daily. lisinopriL (PRINIVIL, ZESTRIL) 20 mg tablet Take 1 Tab by mouth two (2) times a day. Qty: 180 Tab, Refills: 3  
  
aspirin delayed-release 81 mg tablet take 1 tablet by mouth once daily 
Qty: 90 Tab, Refills: 1  
  
atorvastatin (LIPITOR) 40 mg tablet take 1 tablet by mouth nightly 
Qty: 90 Tab, Refills: 0  
  
cyanocobalamin 1,000 mcg tablet take 1 tablet by mouth once daily 
Qty: 90 Tab, Refills: 0  
  
folic acid (FOLVITE) 1 mg tablet take 1 tablet by mouth once daily 
Qty: 90 Tab, Refills: 0  
  
tamsulosin (FLOMAX) 0.4 mg capsule take 1 capsule by mouth once daily 
Qty: 90 Cap, Refills: 0  
  
allopurinoL (ZYLOPRIM) 300 mg tablet take 1 tablet by mouth once daily 
Qty: 90 Tab, Refills: 1 Associated Diagnoses: Gout, unspecified cause, unspecified chronicity, unspecified site  
  
ascorbic acid, vitamin C, (VITAMIN C) 250 mg tablet Take 1 Tab by mouth two (2) times daily (with meals). Qty: 180 Tab, Refills: 1  
  
acetaminophen (TYLENOL) 325 mg tablet Take 2 Tabs by mouth every six (6) hours as needed for Pain or Fever. Qty: 90 Tab, Refills: 2 cholecalciferol (VITAMIN D3) (1000 Units /25 mcg) tablet Take 4 Tabs by mouth daily. Qty: 120 Tab, Refills: 2  
  
ferrous sulfate 325 mg (65 mg iron) tablet Take 1 Tab by mouth two (2) times daily (with meals). Qty: 60 Tab, Refills: 2 nystatin (MYCOSTATIN) powder Apply  to affected area two (2) times a day. Qty: 1 Bottle, Refills: 0  
  
glyBURIDE (DIABETA) 2.5 mg tablet Take 1 Tab by mouth two (2) times daily (with meals). Qty: 180 Tab, Refills: 1 Associated Diagnoses: Type 2 diabetes mellitus without complication, without long-term current use of insulin ([address] predniSONE (STERAPRED DS) 10 mg dose pack See administration instruction per 10mg dose pack Qty: 21 Tab, Refills: 0 Associated Diagnoses: Cervical radiculopathy  
  
sildenafil citrate (Viagra) 100 mg tablet Take tablet by mouth once daily as needed. Qty: 6 Tab, Refills: 5 Current Facility-Administered Medications Medication Dose Route Frequency  zinc oxide-vitamin b5-vit e (BALMEX) cream   Topical DAILY PRN  
 metoprolol succinate (TOPROL-XL) XL tablet 25 mg  25 mg Oral QHS  insulin glargine (LANTUS) injection 10 Units  10 Units SubCUTAneous QHS  
 0.9% sodium chloride infusion 250 mL  250 mL IntraVENous PRN  
 heparin 25,000 units in D5W 250 ml infusion  18-36 Units/kg/hr IntraVENous TITRATE  ipratropium-albuterol (COMBIVENT RESPIMAT) 20 mcg-100 mcg inhalation spray  2 Puff Inhalation Q4H PRN  
 methylPREDNISolone (PF) (SOLU-MEDROL) injection 60 mg  60 mg IntraVENous Q12H  
 famotidine (PEPCID) tablet 20 mg  20 mg Oral DAILY  hydrALAZINE (APRESOLINE) tablet 10 mg  10 mg Oral Q6H PRN  
 ascorbic acid (vitamin C) (VITAMIN C) tablet 500 mg  500 mg Oral TID  zinc sulfate (ZINCATE) 220 (50) mg capsule 1 Cap  1 Cap Oral DAILY  atorvastatin (LIPITOR) tablet 40 mg  40 mg Oral QHS  folic acid (FOLVITE) tablet 1 mg  1 mg Oral DAILY  melatonin tablet 3 mg  3 mg Oral QHS  aspirin chewable tablet 81 mg  81 mg Oral DAILY And  
 calcium carbonate (CALTREX) tablet 300 mg [elemental]  300 mg Oral DAILY  cholecalciferol (VITAMIN D3) capsule 5,000 Units  5,000 Units Oral DAILY  insulin lispro (HUMALOG) injection   SubCUTAneous AC&HS  
  glucose chewable tablet 16 g  4 Tab Oral PRN  
 glucagon (GLUCAGEN) injection 1 mg  1 mg IntraMUSCular PRN  
 dextrose (D50W) injection syrg 12.5-25 g  25-50 mL IntraVENous PRN  
 sodium chloride (NS) flush 5-10 mL  5-10 mL IntraVENous PRN  
 acetaminophen (TYLENOL) tablet 650 mg  650 mg Oral Q6H PRN Or  
 acetaminophen (TYLENOL) suppository 650 mg  650 mg Rectal Q6H PRN  
 sodium chloride (NS) flush 5-40 mL  5-40 mL IntraVENous Q8H  
 sodium chloride (NS) flush 5-40 mL  5-40 mL IntraVENous PRN  
 senna (SENOKOT) tablet 17.2 mg  2 Tab Oral QHS  bisacodyL (DULCOLAX) suppository 10 mg  10 mg Rectal DAILY PRN  
 ondansetron (ZOFRAN) injection 4 mg  4 mg IntraVENous Q4H PRN Allergies: Metformin Temp (24hrs), Av.7 °F (36.5 °C), Min:97 °F (36.1 °C), Max:98.7 °F (37.1 °C) Visit Vitals /50 (BP 1 Location: Right arm, BP Patient Position: At rest) Pulse 64 Temp 97.6 °F (36.4 °C) Resp 17 Ht 5' 7\" (1.702 m) Wt 75.3 kg (166 lb) SpO2 94% BMI 26.00 kg/m² ROS: 12 point review systems obtained details, pertinent positives mentioned in history of present illness otherwise negative Physical Exam: 
 
General: Well developed, well nourished male laying on the bed , does not appear tachypneic or dyspneic, on high flow. HEENT:  Normocephalic, atraumatic, nasal and oral mucous are moist and without evidence of lesions. Neck supple, no bruits. Lungs:   non-labored, bilaterally crackles, no  wheezes Heart:  RRR, s1 and s2; no rubs or gallops, no edema, + pedal pulses Abdomen:  soft, non-distended, active bowel sounds, no hepatomegaly, no splenomegaly. Non-tender Genitourinary:  deferred Extremities:   no clubbing, cyanosis; no joint effusions or swelling;; muscle mass appropriate for age Neurologic:  No gross focal sensory abnormalities; 5/5 muscle strength to upper and lower extremities. Speech appropriate. Cranial nerves intact Skin:  No rash or ulcers noted Back:  no spinal or paraspinal muscle tenderness or rigidity, no CVA tenderness Psychiatric:  No suicidal or homicidal ideations, appropriate mood and affect Labs: Results:  
Chemistry Recent Labs  
  08/20/20 
0058 08/19/20 
0300 08/18/20 
0300 * 390* 271*  138 140  
K 4.4 4.1 3.9  106 106 CO2 23 26 26 BUN 76* 64* 55* CREA 1.78* 1.71* 1.62* CA 7.5* 7.5* 7.9* AGAP 9 6 8 BUCR 43* 37* 34* AP 55 58 83  
TP 4.4* 4.6* 5.9* ALB 1.7* 1.8* 2.3*  
GLOB 2.7 2.8 3.6 AGRAT 0.6* 0.6* 0.6* CBC w/Diff Recent Labs 08/19/20 
0300 08/18/20 
1730 08/18/20 
0300 WBC 20.7*  --  21.1*  
RBC 3.01*  --  3.71* HGB 9.3* 9.5* 11.4* HCT 27.0* 28.2* 33.2*  
  --  372 GRANS 92*  --  88* LYMPH 4*  --  4*  
EOS 0  --  0 Microbiology No results for input(s): CULT in the last 72 hours. RADIOLOGY: 
 
All available imaging studies/reports in [facility] for this admission were reviewed Dr. Kit Kat, Infectious Disease Specialist 
[phone] August 20, 2020 
1:20 PM

## 2020-08-20 NOTE — PROGRESS NOTES
Wedding ring removed from finger, placed in bag, and put with the rest of patient belongings in room.

## 2020-08-20 NOTE — PROGRESS NOTES
[facility] Hospitalist Group Progress Note Patient: Perry Campbell Age: 65 y.o. : 1950 MR#: [id number] SSN: [id number] Date/Time: 2020 Subjective:  
 
Awake, laying in bed in soft wrist restraints No apparent distress Confused, still wanting to leave ASAP Doesn't appear to understand that is on 35 LPM O2  
 
LUE markedly swollen today Assessment/Plan:  
 
1. COVID-19 pneumonia 2. Sepsis secondary to COVID-19 infection 3. Acute hypoxic respiratory failure 4. Hypotension with bradycardia 5. Type 2 diabetes 6. Hypertension 7. Coronary artery disease, history of CABG 8. Dyslipidemia 9. LOREN on CKD stage III 10. Leukocytosislikely secondary to steroids 11. Acute metabolic encephalopathy in the setting of hypoxia and COVID-19 infection 12. LUE swelling and pain- DVT vs hematoma? Pulmonology, ID and nephrology following Stat US of LUE Status post antibiotics Cont IV steroids Continue O2 and BiPAP as needed S/p remdesivir, status post convalescent plasma On heparin drip Continue aspirin, statin; lower dose of beta-blocker Monitor sugars, sliding scale insulin,  Lantus Pain control Palliative care input noted PT OT Droplet plus precautions Discussed with wife over phone. She wants  home ASAP. Case discussed with:  [x]Patient  []Family  [x]Nursing  []Case Management DVT Prophylaxis:  []Lovenox  []Hep SQ  []SCDs  []Coumadin   [x]On Heparin gtt Diet: Diabetic Code Status: FULL Contact: Monique Salts (wife)  [phone] Disposition: Continue current care; > 2 nights H. Khushboo Marinelli DO 2020 Objective:  
VS:  
Visit Vitals /61 (BP 1 Location: Right arm, BP Patient Position: At rest) Pulse 68 Temp 98.1 °F (36.7 °C) Resp 17 Ht 5' 7\" (1.702 m) Wt 75.3 kg (166 lb) SpO2 100% BMI 26.00 kg/m² Resp rate-26 Tmax/24hrs: Temp (24hrs), Av.7 °F (36.5 °C), Min:97 °F (36.1 °C), Max:98.7 °F (37.1 °C) Input/Output:  
No intake or output data in the 24 hours ending 08/20/20 1539 General:  Awake, confused, follows commands Cardiovascular:  S1S2+, RRR Pulmonary:  Coarse bs b/l GI:  Soft, BS+, NT, ND Extremities:  trace edema Labs:   
Recent Results (from the past 24 hour(s)) GLUCOSE, POC Collection Time: 08/19/20  3:55 PM  
Result Value Ref Range Glucose (POC) 202 (H) 70 - 110 mg/dL GLUCOSE, POC Collection Time: 08/19/20  8:48 PM  
Result Value Ref Range Glucose (POC) 228 (H) 70 - 110 mg/dL FIBRINOGEN Collection Time: 08/20/20 12:58 AM  
Result Value Ref Range Fibrinogen 211 210 - 451 mg/dL D DIMER Collection Time: 08/20/20 12:58 AM  
Result Value Ref Range D DIMER 2.87 (H) <0.46 ug/ml(FEU) FERRITIN Collection Time: 08/20/20 12:58 AM  
Result Value Ref Range Ferritin 522 (H) 8 - 388 NG/ML  
C REACTIVE PROTEIN, QT Collection Time: 08/20/20 12:58 AM  
Result Value Ref Range C-Reactive protein 0.6 (H) 0 - 0.3 mg/dL LD Collection Time: 08/20/20 12:58 AM  
Result Value Ref Range  (H) 81 - 234 U/L  
PROCALCITONIN Collection Time: 08/20/20 12:58 AM  
Result Value Ref Range Procalcitonin 0.12 ng/mL METABOLIC PANEL, COMPREHENSIVE Collection Time: 08/20/20 12:58 AM  
Result Value Ref Range Sodium 140 136 - 145 mmol/L Potassium 4.4 3.5 - 5.5 mmol/L Chloride 108 100 - 111 mmol/L  
 CO2 23 21 - 32 mmol/L Anion gap 9 3.0 - 18 mmol/L Glucose 191 (H) 74 - 99 mg/dL BUN 76 (H) 7.0 - 18 MG/DL Creatinine 1.78 (H) 0.6 - 1.3 MG/DL  
 BUN/Creatinine ratio 43 (H) 12 - 20 GFR est AA 46 (L) >60 ml/min/1.73m2 GFR est non-AA 38 (L) >60 ml/min/1.73m2 Calcium 7.5 (L) 8.5 - 10.1 MG/DL Bilirubin, total 0.5 0.2 - 1.0 MG/DL  
 ALT (SGPT) 20 16 - 61 U/L  
 AST (SGOT) 18 10 - 38 U/L Alk. phosphatase 55 45 - 117 U/L Protein, total 4.4 (L) 6.4 - 8.2 g/dL Albumin 1.7 (L) 3.4 - 5.0 g/dL Globulin 2.7 2.0 - 4.0 g/dL A-G Ratio 0.6 (L) 0.8 - 1.7 PTT Collection Time: 08/20/20 12:58 AM  
Result Value Ref Range aPTT >180.0 (HH) 23.0 - 36.4 SEC GLUCOSE, POC Collection Time: 08/20/20  8:41 AM  
Result Value Ref Range Glucose (POC) 257 (H) 70 - 110 mg/dL PTT Collection Time: 08/20/20 11:00 AM  
Result Value Ref Range aPTT >180.0 (HH) 23.0 - 36.4 SEC GLUCOSE, POC Collection Time: 08/20/20 11:25 AM  
Result Value Ref Range Glucose (POC) 292 (H) 70 - 110 mg/dL

## 2020-08-20 NOTE — PROGRESS NOTES
Patient only 94% on the following settings so O2 weaning not done on this round. Aerosol inhalation water changed. 08/20/20 3604 Oxygen Therapy O2 Sat (%) 94 % Pulse via Oximetry 66 beats per minute O2 Device Hi flow nasal cannula 
(Vapotherm) O2 Flow Rate (L/min) 35 l/min O2 Temperature 93.2 °F (34 °C) FIO2 (%) 35 %

## 2020-08-20 NOTE — ROUTINE PROCESS
Bedside and Verbal shift change report given to WAYNE Tse (oncoming nurse) by Tita Field RN 
 (offgoing nurse). Report included the following information SBAR and Kardex.

## 2020-08-20 NOTE — DIABETES MGMT
GLYCEMIC CONTROL PLAN OF CARE Assessment/Recommendations: 
Lab glucose this am 191 mg/dl. Current  mg/dl Recommend increasing lantus insulin to 10 units every 12 hours Continue corrective insulin coverage as needed Pt already receiving very insulin resistant coverage. Noted Solumedrol decreased to 40 mg every 12 hours. Will continue inpatient monitoring. Most recent blood glucose values: 
 
 
Results for Kimberly Salvador (MRN [id number]) as of 8/20/2020 12:35 Ref. Range 8/19/2020 12:10 8/19/2020 15:55 8/19/2020 20:48 8/20/2020 08:41 8/20/2020 11:25 GLUCOSE,FAST - POC Latest Ref Range: 70 - 110 mg/dL 209 (H) 202 (H) 228 (H) 257 (H) 292 (H) Current A1C of 6.9 % is equivalent to average blood glucose of 151 mg/dl over the past 2-3 months. Current hospital diabetes medications:  
lantus 10 units every bedtime Lispro corrective insulin coverage AC&HS Previous day's insulin requirements:  
lantus 30 units Lispro 39 units corrective insulin Home diabetes medications: 
Glyburide 2.5 mg bid Diet: DIET DIABETIC CONSISTENT CARB Soft Solids DIET NUTRITIONAL SUPPLEMENTS Breakfast, Lunch, Dinner; [employer] Education:  ____Refer to Diabetes Education Record  
          _x__Education not indicated at this time Elisabeth Danielle RN CDE Ext [phone]

## 2020-08-21 NOTE — PROGRESS NOTES
[facility] Code Blue Note: 
 
 
I was leader of this code blue event. Code blue was called overhead. When I came to bedside, CPR had been initiated by the bedside nursing staff. I brought in lifepak and pads to attach pt to defibrillator monitor. Full ACLS protocol was followed. Code Start time: 11:15am 
Initial rhythm: PEA Code End time: 11:49am 
End result: Death -- pt pronounced dead with no cardiac activity, no cranial nerve activity - no cough or gag or pupillary or corneal responses, no pulses. Please refer to flowsheets for exact medications and dosings and details. Pt did also suffer v-fib during the code and was debrillated twice during this code without restoration of spontaneous circulation. Chase Izaguirre MD/MPH Pulmonary, Critical Care Medicine [facility], [place] [facility]

## 2020-08-21 NOTE — ROUTINE PROCESS
Called [facility], spoke with Adonis Morris, pt does not qualify with tissue donation, may transport to the [facility] when appropriate. Do not let the patient go to the  home until the [facility] has called back.  Thuy Kay with Renaldo Leigh at [facility], patient does not qualify for eye donation.

## 2020-08-21 NOTE — PROGRESS NOTES
Patient is on IV heparin, however several times to obtain blood for coags (was due at 9 PM)  were unsuccessful. Will DC IV heparin, and placed on Lovenox. Defer anticoagulation options to primary team in a.m.

## 2020-08-21 NOTE — PROGRESS NOTES
Nutrition Assessment Type and Reason for Visit: Cici Mcwilliams Nutrition Recommendations/Plan:  
- Continue current nutrition interventions. Monitor po intake and diet tolerance. Nutrition Assessment:  Altered mentation, unable to reach via telephone for remote assessment. Consuming 50% of recent meals. Receiving ascorbic acid, cholecalciferol, zinc, folic acid and lantus BID, SSI. Malnutrition Assessment: 
Malnutrition Status: At risk for malnutrition (specify)(poor po intake, altered mentation, weight loss PTA) Estimated Daily Nutrient Needs: 
Energy (kcal):  8214-4615 Protein (g):   Fluid (ml/day):  6760-8170 Nutrition Related Findings:  Last BM 8/20, formed on bowel regimen- senna daily; + edema, increased UE swelling noted Current Nutrition Therapies: DIET NUTRITIONAL SUPPLEMENTS Breakfast, Lunch, Dinner; [employer] DIET DIABETIC CONSISTENT CARB Soft Solids Anthropometric Measures: 
· Height:  5' 7\" (170.2 cm) · Current Body Wt:  75.1 kg (165 lb 9.1 oz) · BMI: 25.9 Nutrition Diagnosis:  
· Unintended weight loss related to cognitive or neurological impairment, biting/chewing (masticatory) difficulty(decreased appetite) as evidenced by poor intake prior to admission, weight loss(23 lb, 12% weight loss x 5 months PTA per chart.) Nutrition Intervention: 
Food and/or Nutrient Delivery: Continue current diet, Mineral supplement, Vitamin supplement, Continue oral nutrition supplement Nutrition Education and Counseling: Education not indicated Coordination of Nutrition Care: Continued inpatient monitoring Goals: 
PO nutrition intake will meet >75% of patient estimated nutritional needs within the next 7 days. Nutrition Monitoring and Evaluation:  
Food/Nutrient Intake Outcomes: Diet advancement/tolerance, Food and nutrient intake, Supplement intake, Vitamin/mineral intake Physical Signs/Symptoms Outcomes: Chewing or swallowing, GI status, Nutrition focused physical findings Discharge Planning:   
Continue oral nutrition supplement, Continue current diet Electronically signed by Cleo Danielson RD, [address]  on 8/21/2020 at 12:55 PM 
 
Contact Number: [phone]

## 2020-08-21 NOTE — DIABETES MGMT
GLYCEMIC CONTROL PLAN OF CARE Assessment/Recommendations: 
Lab glucose this am 154 mg/dl. Continue corrective insulin coverage as needed Pt already receiving very insulin resistant coverage. Noted Solumedrol decreased to 40 mg every 12 hours. Will continue inpatient monitoring. Most recent blood glucose values: 
 
 
Results for Jordan Hannah (MRN [id number]) as of 8/21/2020 11:30 Ref. Range 8/20/2020 08:41 8/20/2020 11:25 8/20/2020 16:04 8/20/2020 20:58 8/21/2020 08:13 GLUCOSE,FAST - POC Latest Ref Range: 70 - 110 mg/dL 257 (H) 292 (H) 274 (H) 274 (H) 221 (H) Current A1C of 6.9 % is equivalent to average blood glucose of 151 mg/dl over the past 2-3 months. Current hospital diabetes medications:  
lantus 10 units every bedtime Lispro corrective insulin coverage AC&HS Previous day's insulin requirements:  
lantus 20 units Lispro 36 units corrective insulin Home diabetes medications: 
Glyburide 2.5 mg bid Diet: DIET DIABETIC CONSISTENT CARB Soft Solids DIET NUTRITIONAL SUPPLEMENTS Breakfast, Lunch, Dinner; [employer] Education:  ____Refer to Diabetes Education Record  
          _x__Education not indicated at this time Ana Maria Chacon RN CDE Ext [phone]

## 2020-08-21 NOTE — PROGRESS NOTES
CODE BLUE NOTE 
[facility] Service Patient: Porfirio Parmar MRN: [id number] SSN: [id number]  YOB: 1950 Age: 65 y.o. Sex: male Admit date: 8/10/2020 Length of stay:  LOS: 11 days PCP: Josué Davis NP PP: Pneumonia due to 2019 novel coronavirus Comments:  
Code called by nursing due to Cardiac arrest. 
Pt without pulses, CPR initiated by nursing at 11:15. Pt intubated by anesthesia. Shocked twice, at 11:24, 11:36. Multiple rounds of Epi, Amio given. Pronounced dead at 11:49 Rhythm:  Asystole, Vfib, PEA Medications:   
Epi x 5 doses. Amio x 2 doses. Bicarb x 1 doses. Mag x 1 dose Calcium x 1 dose Assessment and Plan:  
65 y.o. yo male s/p Cardiac arrest. CPR was started, patient did not regain his pulse. Patient pronounced dead at 11:49 p.m. Attending physician, Dr. Jere Ansari notified. He will notify family.  
 
Signed, 
Viri Hernandez MD   
August 21, 2020   
12:57 PM

## 2020-08-21 NOTE — PROGRESS NOTES
Problem: Falls - Risk of 
Goal: *Absence of Falls Description: Document Robert Mooney Fall Risk and appropriate interventions in the flowsheet. Outcome: Progressing Towards Goal 
Note: Fall Risk Interventions: 
Mobility Interventions: Bed/chair exit alarm Mentation Interventions: Bed/chair exit alarm Medication Interventions: Bed/chair exit alarm Elimination Interventions: Bed/chair exit alarm History of Falls Interventions: Bed/chair exit alarm Problem: Patient Education: Go to Patient Education Activity Goal: Patient/Family Education Outcome: Progressing Towards Goal 
  
Problem: Pressure Injury - Risk of 
Goal: *Prevention of pressure injury Description: Document Tr Scale and appropriate interventions in the flowsheet. Outcome: Progressing Towards Goal 
Note: Pressure Injury Interventions: 
Sensory Interventions: Assess changes in LOC Moisture Interventions: Absorbent underpads Activity Interventions: Assess need for specialty bed Mobility Interventions: Assess need for specialty bed Nutrition Interventions: Document food/fluid/supplement intake Friction and Shear Interventions: Apply protective barrier, creams and emollients Problem: Patient Education: Go to Patient Education Activity Goal: Patient/Family Education Outcome: Progressing Towards Goal 
  
Problem: Diabetes Self-Management Goal: *Disease process and treatment process Description: Define diabetes and identify own type of diabetes; list 3 options for treating diabetes. Outcome: Progressing Towards Goal 
Goal: *Incorporating nutritional management into lifestyle Description: Describe effect of type, amount and timing of food on blood glucose; list 3 methods for planning meals. Outcome: Progressing Towards Goal 
Goal: *Incorporating physical activity into lifestyle Description: State effect of exercise on blood glucose levels.  
Outcome: Progressing Towards Goal 
 Goal: *Developing strategies to promote health/change behavior Description: Define the ABC's of diabetes; identify appropriate screenings, schedule and personal plan for screenings. Outcome: Progressing Towards Goal 
Goal: *Using medications safely Description: State effect of diabetes medications on diabetes; name diabetes medication taking, action and side effects. Outcome: Progressing Towards Goal 
Goal: *Monitoring blood glucose, interpreting and using results Description: Identify recommended blood glucose targets  and personal targets. Outcome: Progressing Towards Goal 
Goal: *Prevention, detection, treatment of acute complications Description: List symptoms of hyper- and hypoglycemia; describe how to treat low blood sugar and actions for lowering  high blood glucose level. Outcome: Progressing Towards Goal 
Goal: *Prevention, detection and treatment of chronic complications Description: Define the natural course of diabetes and describe the relationship of blood glucose levels to long term complications of diabetes. Outcome: Progressing Towards Goal 
Goal: *Developing strategies to address psychosocial issues Description: Describe feelings about living with diabetes; identify support needed and support network Outcome: Progressing Towards Goal 
Goal: *Insulin pump training Outcome: Progressing Towards Goal 
Goal: *Sick day guidelines Outcome: Progressing Towards Goal 
Goal: *Patient Specific Goal (EDIT GOAL, INSERT TEXT) Outcome: Progressing Towards Goal 
  
Problem: Patient Education: Go to Patient Education Activity Goal: Patient/Family Education Outcome: Progressing Towards Goal 
  
Problem: Patient Education: Go to Patient Education Activity Goal: Patient/Family Education Outcome: Progressing Towards Goal

## 2020-08-21 NOTE — DISCHARGE SUMMARY
Death/Discharge Summary Patient: Esther Appiah MRN: [id number]  CSN: [id number] YOB: 1950  Age: 65 y.o. Sex: male DOA: 8/10/2020 LOS:  LOS: 11 days    on: 2020 Admission Diagnoses: Pneumonia [J18.9] Discharge Diagnoses:   
1. Large aspiration event 2. COVID-19 pneumonia 3. Sepsis secondary to COVID-19 infection 4. Acute hypoxic respiratory failure 5. Hypotension with bradycardia 6. Type 2 diabetes 7. Hypertension 8. Coronary artery disease, history of CABG 9. Dyslipidemia 10. LOREN on CKD stage III 11. Leukocytosis 12. Acute metabolic encephalopathy 13. Acute LUE DVT Consults:  
Pulmonology- Dr. Gerhardt Lange, MD  
Infectious Disease- Dr. Carmen Cortes MD  
Nephrology- Dr. Yoselyn Briones MD  
Palliative Medicine- Seamus Mohan NP Significant Diagnostic Studies: CXR 8/10/20: The cardiac enlargement is no longer evident. Patchy opacities at the lateral left lower lung, concerning infiltration rather than atelectasis. Recommend clinical correlation and follow-up studies. CXR 8/10/20: Interval worsening of bilateral multifocal infiltrates with more consolidations as discussed. Retroperitoneal US 20:  
1. Mildly echogenic renal cortex, new since the prior study and compatible with medical renal disease, probably acute or subacute. 2. Small nonobstructive calculus in midpole of right kidney. Stable right renal cyst. 
 
Duplex LE Venous Bilateral 20: No evidence of deep vein thrombosis within the bilateral lower extremities. Duplex UE Venous L 20:  
· Acute appearing thrombus noted in the left brachial vein(s). · Thrombus noted within the left basilic upper arm vein(s). Hospital Course: Mr. Sherry Cervantes is a 22-year-old male with a PMHx of CAD status post CABG, type II DM, CKD and basal cell carcinoma who presented to the ED with complaints of increasing productive cough and shortness of breath over the previous few days. His wife and developed an upper respiratory illness with cough about 2 weeks prior after which his symptoms began. In the ED, vitals were notable for T1 100.5, , /81, RR 31, and SPO2 92% on room air. Labs were notable for lactic acid 2.44, WBC 15.2, BUN 53, creatinine 2.21, albumin 1.9, d-dimer 2.46. CXR showed patchy opacities at the lateral left lung. COVID19 testing was obtained. Mr. Odessa Saint was started on IV steroids and ABX and admitted for severe sepsis secondary to left lower lobe pneumonia and concern for COVID19 infection. Pulmonology and infectious disease were consulted and made further recommendations. COVID-19 testing returned positive. Over the next couple days, despite being continued on IV steroids and ABX along with supportive care, Mr. Socorro Chiu respiratory status worsened significantly. His inflammatory markers dao considerably and he required increasing amounts of oxygen. Eventually he required O2 by HFNC at 40 L/min. He was then consented and started on IV remdesivir. In addition he was consented and received a convalescent plasma transfusion. Because his d-dimer dao and continue to be significantly high, he was started on a heparin drip. Nephrology was consulted as he developed an LOREN on his CKD. Over the next few days, Mr. Odessa Saint was continued on the above treatments and his respiratory status stabilized. He did develop some intermittent confusion and delirium that required soft wrist restraints for his safety. He was followed by nephrology, pulmonology and infectious disease and his respiratory status appeared to be making slow and gradual progress as his oxygen was slowly weaned. On 8/20/2020, his left upper extremity was noted to be markedly swollen. An ultrasound was done that showed an acute occlusive DVT in the left brachial and basilic veins. This was despite being on a heparin drip. The following morning on 8/21/2020, Mr. Hari Meng was noted by nursing to have improved respiratory status and his O2 was able to be weaned down to 10 L/min. He did have an episode of vomiting after which nursing was able to clean him up. He reported being okay and was noted to be alert and oriented x3. Shortly thereafter, nursing received a call from telemetry that his heart rate had dropped into the 30s rather quickly. When they went to evaluate him, they found him covered in vomit, unresponsive and apneic. FREEDOM FRANCIS was called at 11:15 AM and he was noted to be in PEA. Full ACLS protocol was followed. He did suffer V. fib during the code and was defibrillated twice without ROSC. The code was stopped at 11:49 AM as resuscitative efforts were considered futile. He was then pronounced dead at 11:49 AM on 8/21/2020. Minutes spent on discharge: >30 minutes spent coordinating this discharge (review instructions/follow-up, prescriptions, preparing report for sign off) UZMA Cruz,  August 23, 2020 Disclaimer: Sections of this note are dictated using utilizing voice recognition software, which may have resulted in some phonetic based errors in grammar and contents. Even though attempts were made to correct all the mistakes, some may have been missed, and remained in the body of the document. If questions arise, please contact our department.

## 2020-08-21 NOTE — PROGRESS NOTES
responded to Death of  Kishan Lira, who was a 65 y. o.,male, The  provided the following Interventions: 
Provided crisis pastoral care, pastoral support and grief interventions. Offered prayers on behalf of the patient. Chart reviewed. Family was notified of death by physician. Family will call back with  arrangements. Plan: 
Chaplains will continue to follow and will provide pastoral care on an as needed/requested basis and grief support for the family. Waldrop Beck Board Certified [address] [place] [address] Care  
[phone]

## 2020-08-21 NOTE — ROUTINE PROCESS
Received verbal report from Leslie Plata, report included SBAR, MAR, and Kardex. 0430 - Hep gtt was stopped, Lab technicians have tried several times during the shift to draw ptt labs, they were unsuccessful. Dr. Ramírez Alba, advised to stop the heparin gtt, he would put orders in for lovenox. Gave verbal report to [address] [place] [address] Drive, report included SBAR, MAR, and Kardex.

## 2020-08-21 NOTE — PROGRESS NOTES
Death Pronouncement Note Patient lying in bed, mouth open, eyes closed. Pupils fixed and equal bilaterally. No response to verbal or painful stimuli. No heart or lung sounds auscultated. No carotid or peripheral pulses. Death officially pronounced at 11:49 am, 8/21/2020 DANYA. Celestine Patrick,  August 21, 2020

## 2020-08-21 NOTE — ANESTHESIA PROCEDURE NOTES
Emergent Intubation Performed by: Toro Mattson CRNA Authorized by: Toro Mattson CRNA Emergent Intubation:  
Patient location: Stepdown unit- [address] [place] [address]. Date/Time:  8/21/2020 11:29 AM 
Indications:  Respiratory failure Spontaneous Ventilation: absent Level of Consciousness: unresponsive Preoxygenated: Yes Airway Documentation: Airway:  ETT - Cuffed Technique:  Direct laryngoscopy Advanced Technique:  Glide scope Insertion Site:  Oral 
Blade Type:  Mamadou Blade Size:  4 ETT size (mm):  7.0 ETT Line Juan:  Teeth ETT Insertion depth (cm):  22 Placement verified by: auscultation, EtCO2 and BBS Attempts:  2 
1st attempt unsuccessful with DL during chest compressions; vomitus of gastric tube feedings present in oral cavity; pt suctioned and 2nd attempt successful with glidescope.

## 2020-09-02 NOTE — PROGRESS NOTES
INCOMPLETE CARDIAC REHAB DISCHARGE FOR REVIEW AND SIGNATURE Judson Shea 
65 y.o. With diagnosis of CABG x3 attended phase II cardiac rehab for 19 sessions. Patient is now . Ramón Fields, RN 
2020

## 2023-09-08 NOTE — Clinical Note
TRANSFER - OUT REPORT:     Verbal report given to: Sherrell Weir RN. Report consisted of patient's Situation, Background, Assessment and   Recommendations(SBAR). Opportunity for questions and clarification was provided. Patient transported with a Cardiac Cath Tech / Patient Care Tech. Patient transported to: [address]. Home

## 2023-11-06 NOTE — PROGRESS NOTES
[facility]  [address] [place], [address] [place] [address]  [phone] fax    Continued Plan of Care/ Re-certification for Physical Therapy Services      Patient name: Ange Lui Start of Care: 4/3/2018   Referral source: Devang Cash MD : 1950                         Medical Diagnosis: Left shoulder pain [M25.512] Onset Date:2 months                         Treatment Diagnosis: Left shoulder pain   Prior Hospitalization: see medical history Provider#: [id number]   Medications: Verified on Patient summary List    Comorbidities: history of basal cell cancer, high blood pressure, diabetes   Prior Level of Function: Functionally independent, just retired from [employer]. [employer], lives with wife and they also have a house at [facility]    Visits from [facility]: 9    Missed Visits: [place] and following information is based on the patient's current status:  Goal: Patient will be independent and compliant with HEP in order to progress toward long term goals. Status at last note/certification: Issued and reviewed  Current status: Met, Pt reports compliance. Long Term Goals: To be accomplished in 10 treatments:  Goal: Patient will improve FOTO assessment score to 73 in order to indicate improved functional abilities. Status at last note/certification: 68  Current status: Progressing, 69  Goal: Patient will improve seated Left shoulder flexion and abduction AROM by at least 10 degrees in order to perform overhead activities. Status at last note/certification: flexion 120, abduction 72  Current status: Met, Flexion  40, abduction 135  Goal: Patient will improve Left shoulder flexion/abduction/external rotation strength, within available ROM, to 5/5 without increase in pain in order to improve ease of household/yard tasks.   Status at last note/certification: 4+/5, some pain and instability  Current status: Progressing flexion/Abduction 5/5 but michelle,  ER 4+/5  Goal: Patient will report overall at least 65% improvement in function in order to progress toward premorbid status. Status at last note/certification: n/a  Current status: Progressing, 50%    Key functional changes:   Functional Deficits: \"infrequent shoulder pain\"  % improvement: 50%  Pain   Average: 0/10                            Best: 0/10                          Worst: 4/10 \"twinge\"  Patient Goal: \"Get this shoulder better and figure out whats going on. \"                       Problems/ barriers to goal attainment: none     Problem List: pain affecting function, decrease ROM, decrease strength, edema affecting function, decrease ADL/ functional abilitiies, decrease activity tolerance, decrease flexibility/ joint mobility and decrease transfer abilities    Treatment Plan: Therapeutic exercise, Therapeutic activities, Neuromuscular re-education, Physical agent/modality, Manual therapy, Aquatic therapy, Patient education and Self Care training     Goals for this certification period to be accomplished in 10 treatments:  Goal: Patient will improve FOTO assessment score to 73 in order to indicate improved functional abilities. Status at last note/certification: 69  Goal: Patient will improve Left shoulder flexion/abduction/external rotation strength, within available ROM, to 5/5 without increase in pain in order to improve ease of household/yard tasks. Status at last note/certification: flexion/Abduction 5/5 but shaky, external rotation 4+/5  Goal: Patient will report overall at least 65% improvement in function in order to progress toward premorbid status. Status at last note/certification: 52%    Frequency / Duration: Patient to be seen 2 times per week for 10 treatments:    Assessment / Recommendations:Patient demonstrates good improvement in Left shoulder AROM with reaching overhead, but continues to have instability, indicating possible muscle involvement.  Program at therapy has been progressed as able to improve overall pectoral mobility and shoulder stability, but activity levels are variable. Recommend continuing skilled physical therapy for another month to maximize strength and function. If shoulder function remains limited at that time we will recommend scheduling an earlier MD follow up for appropriate testing. G-Codes (GP)  Carry   Current  CJ= 20-39%   [id number] Goal  CJ= 20-39%    The severity rating is based on clinical judgment and the FOTO score. Certification Period: 5/1/18 to 5/30/18    Kleber Kimble, PT 5/1/2018 11:21 AM    ________________________________________________________________________  I certify that the above Therapy Services are being furnished while the patient is under my care. I agree with the treatment plan and certify that this therapy is necessary. [] I have read the above and request that my patient continue as recommended.   [] I have read the above report and request that my patient continue therapy with the following changes/special instructions: ______________________________________  [] I have read the above report and request that my patient be discharged from therapy    Physician's Signature:_______________________________Date:___________Time:__________  Please sign and return to [facility] [address]  [phone] fax Normal vision: sees adequately in most situations; can see medication labels, newsprint

## (undated) DEVICE — POWDER HEMOSTAT GEL 1GR --

## (undated) DEVICE — GAUZE,SPONGE,8"X4",12PLY,XRAY,STRL,LF: Brand: MEDLINE

## (undated) DEVICE — MEDI-VAC YANK SUCT HNDL W/TPRD BULBOUS TIP: Brand: CARDINAL HEALTH

## (undated) DEVICE — Device: Brand: RETRACT-O-TAPE 18G X 30.5CM BLUNT NEEDLE

## (undated) DEVICE — ARGYLE FRAZIER SURGICAL SUCTION INSTRUMENT 8 FR/CH (2.7 MM): Brand: ARGYLE

## (undated) DEVICE — TOWEL SURG W16XL26IN WHT NONFENESTRATED ST 4 PER PK

## (undated) DEVICE — SUT PROL 4-0 36IN RB1 DA BLU --

## (undated) DEVICE — GLOVE SURG SZ 8 L11.77IN FNGR THK9.8MIL STRW LTX POLYMER

## (undated) DEVICE — Device

## (undated) DEVICE — X-RAY SPONGES,12 PLY: Brand: DERMACEA

## (undated) DEVICE — CATHETER ART 20 GAX4 CM 22 GA RADIAL FEP

## (undated) DEVICE — STANDARD SURGICAL GOWN, L: Brand: CONVERTORS

## (undated) DEVICE — SUTURE MCRYL SZ 4-0 L18IN ABSRB UD L19MM PS-2 3/8 CIR PRIM Y496G

## (undated) DEVICE — PRESSURE MONITORING SET: Brand: TRUWAVE

## (undated) DEVICE — SYR 50ML LR LCK 1ML GRAD NSAF --

## (undated) DEVICE — SYR LR LCK 1ML GRAD NSAF 30ML --

## (undated) DEVICE — PAD,ABDOMINAL,8"X10",ST,LF: Brand: MEDLINE

## (undated) DEVICE — SUT PROL 4-0 30IN SH1 DA BLU --

## (undated) DEVICE — APPLICATOR FBR TIP L6IN COT TIP WOOD SHFT SWAB 2000 PER CA

## (undated) DEVICE — RADIFOCUS OPTITORQUE ANGIOGRAPHIC CATHETER: Brand: OPTITORQUE

## (undated) DEVICE — HEART II: Brand: MEDLINE INDUSTRIES, INC.

## (undated) DEVICE — PROCEDURE KIT FLUID MGMT 10 FR CUST MAINFOLD

## (undated) DEVICE — PROBE VASC 8MHZ WTRPRF

## (undated) DEVICE — SUTURE VCRL SZ 1 L36IN ABSRB UD CTX L48MM 1/2 CIR J977H

## (undated) DEVICE — BAND HEMOSTAT DRY D-STAT RAD --

## (undated) DEVICE — DR LANCEY ON PUMP PACK: Brand: MEDLINE INDUSTRIES, INC.

## (undated) DEVICE — SUT SLK 2 60IN TIE MP BLK --

## (undated) DEVICE — STERILE POLYISOPRENE POWDER-FREE SURGICAL GLOVES: Brand: PROTEXIS

## (undated) DEVICE — SUT SLK 0 30IN FSL BLK --

## (undated) DEVICE — SPONGE HEMOSTAT CELLULS 4X8IN -- SURGICEL

## (undated) DEVICE — FLEX ADVANTAGE 1500CC: Brand: FLEX ADVANTAGE

## (undated) DEVICE — REM POLYHESIVE ADULT PATIENT RETURN ELECTRODE: Brand: VALLEYLAB

## (undated) DEVICE — CATH FOL URETH C CARE MTR 16FR -- LUBRI-SIL

## (undated) DEVICE — SUT SLK 0 30IN TIE MP BLK --

## (undated) DEVICE — SUTURE SZ 7 L18IN NONABSORBABLE SIL CCS L48MM 1/2 CIR STRNM M655G

## (undated) DEVICE — SET FLD ADMIN 3 W STPCOCK FIX FEM L BOR 1IN

## (undated) DEVICE — BRUSH SCRB 4% CHG RED DISP --

## (undated) DEVICE — CABLE PACE ALGTR CLP SAF 12FT --

## (undated) DEVICE — SUT PROL 6-0 30IN C1 DA BLU --

## (undated) DEVICE — SPONGE LAP 18X18IN STRL -- 5/PK

## (undated) DEVICE — GLIDESHEATH SLENDER STAINLESS STEEL KIT: Brand: GLIDESHEATH SLENDER

## (undated) DEVICE — PENCIL ES L3M BTTN SWCH S STL HEX LOK BLDE ELECTRD HOLSTER

## (undated) DEVICE — SUTURE PERMAHAND SZ 2-0 L18IN NONABSORBABLE BLK L26MM SH C012D

## (undated) DEVICE — SUTURE PROL SZ 7-0 L24IN NONABSORBABLE BLU L8MM BV175-6 3/8 8735H

## (undated) DEVICE — (D)PREP SKN CHLRAPRP APPL 26ML -- CONVERT TO ITEM 371833

## (undated) DEVICE — ZINACTIVE USE 2641837 CLIP LIG M BLU TI HRT SHP WIRE HORZ 600 PER BX

## (undated) DEVICE — NEEDLE HYPO 25GA L0.625IN BLU POLYPR HUB S STL REG BVL STR

## (undated) DEVICE — ADAPTER CARDPLG SET MGMT FEM LUER W/ CLR CODE CLMP DLP

## (undated) DEVICE — CLIP INT SM WIDE RED TI TRNSVRS GRV CHEVRON SHP W/ PRECIS

## (undated) DEVICE — EZ GLIDE AORTIC CANNULA: Brand: EDWARDS LIFESCIENCES EZ GLIDE AORTIC CANNULA

## (undated) DEVICE — COVER US PRB W15XL120CM W/ GEL RUBBERBAND TAPE STRP FLD GEN

## (undated) DEVICE — SUT SLK 1 30IN TIE MP BLK --

## (undated) DEVICE — SUTURE MCRYL SZ 4 0 L18IN ABSRB VLT PS 1 L24MM 3 8 CIR REV Y682H

## (undated) DEVICE — SYS VSL HARV HEMOPRO2 VASOVIEW -- HARV SYS MINIMUM ORDER 5/EA

## (undated) DEVICE — SOLUTION IV 1000ML 0.9% SOD CHL

## (undated) DEVICE — GOWN,SIRUS,NONRNF,SETINSLV,XL,20/CS: Brand: MEDLINE

## (undated) DEVICE — CATHETER THOR 28FR L23CM DIA9.3MM POLYVI CHL TAPR CONN TIP

## (undated) DEVICE — GAUZE,SPONGE,4"X4",16PLY,STRL,LF,10/TRAY: Brand: MEDLINE

## (undated) DEVICE — SUTURE ETHBND EXCEL SZ 2-0 L36IN NONABSORBABLE GRN SH-1 X763H

## (undated) DEVICE — NDL PRT INJ NSAF BLNT 18GX1.5 --

## (undated) DEVICE — ROTATING SURGICAL PUNCHES, 1 PER POUCH: Brand: A&E MEDICAL / ROTATING SURGICAL PUNCHES

## (undated) DEVICE — LEAD PCMKR MYOCARDL BPLR TEMP. --

## (undated) DEVICE — PACK PROCEDURE SURG VASC CATH 161 MMC LF

## (undated) DEVICE — SUTURE VCRL SZ 2-0 L27IN ABSRB UD L26MM CT-2 1/2 CIR J269H

## (undated) DEVICE — FOGARTY SPRING CLIPS 6MM: Brand: FOGARTY SOFTJAW

## (undated) DEVICE — SUTURE VCRL SZ 0 L36IN ABSRB UD L36MM CT-1 1/2 CIR J946H

## (undated) DEVICE — WAX SURG 2.5GM HEMSTAT BNE BEESWAX PARAFFIN ISO PALMITATE

## (undated) DEVICE — SUT WRE PACE 2-0 24IN DA BLU --